# Patient Record
Sex: FEMALE | Race: WHITE | NOT HISPANIC OR LATINO | Employment: OTHER | URBAN - NONMETROPOLITAN AREA
[De-identification: names, ages, dates, MRNs, and addresses within clinical notes are randomized per-mention and may not be internally consistent; named-entity substitution may affect disease eponyms.]

---

## 2017-01-12 ENCOUNTER — AMBULATORY - GICH (OUTPATIENT)
Dept: RADIOLOGY | Facility: OTHER | Age: 72
End: 2017-01-12

## 2017-01-12 DIAGNOSIS — R92.8 OTHER ABNORMAL AND INCONCLUSIVE FINDINGS ON DIAGNOSTIC IMAGING OF BREAST: ICD-10-CM

## 2017-01-27 ENCOUNTER — AMBULATORY - GICH (OUTPATIENT)
Dept: LAB | Facility: OTHER | Age: 72
End: 2017-01-27

## 2017-01-27 DIAGNOSIS — M06.4 INFLAMMATORY POLYARTHROPATHY (H): ICD-10-CM

## 2017-01-27 LAB
AST SERPL-CCNC: 20 IU/L (ref 13–39)
CREAT SERPL-MCNC: 1.08 MG/DL (ref 0.7–1.3)
ERYTHROCYTE [DISTWIDTH] IN BLOOD BY AUTOMATED COUNT: 14.5 % (ref 11.5–15.5)
GFR IF NOT AFRICAN AMERICAN - HISTORICAL: 50 ML/MIN/1.73M2
HCT VFR BLD AUTO: 42.7 % (ref 33–51)
HEMOGLOBIN: 13.6 G/DL (ref 12–16)
MCH RBC QN AUTO: 30.9 PG (ref 26–34)
MCHC RBC AUTO-ENTMCNC: 31.9 G/DL (ref 32–36)
MCV RBC AUTO: 97 FL (ref 80–100)
PLATELET # BLD AUTO: 267 THOU/CU MM (ref 140–440)
PMV BLD: 8.4 FL (ref 6.5–11)
RED BLOOD COUNT - HISTORICAL: 4.42 MIL/CU MM (ref 4–5.2)
WHITE BLOOD COUNT - HISTORICAL: 9.1 THOU/CU MM (ref 4.5–11)

## 2017-02-07 ENCOUNTER — HISTORY (OUTPATIENT)
Dept: RADIOLOGY | Facility: OTHER | Age: 72
End: 2017-02-07

## 2017-02-07 ENCOUNTER — HOSPITAL ENCOUNTER (OUTPATIENT)
Dept: RADIOLOGY | Facility: OTHER | Age: 72
End: 2017-02-07
Attending: NURSE PRACTITIONER

## 2017-02-07 DIAGNOSIS — R92.8 OTHER ABNORMAL AND INCONCLUSIVE FINDINGS ON DIAGNOSTIC IMAGING OF BREAST: ICD-10-CM

## 2017-03-16 ENCOUNTER — COMMUNICATION - GICH (OUTPATIENT)
Dept: FAMILY MEDICINE | Facility: OTHER | Age: 72
End: 2017-03-16

## 2017-03-16 DIAGNOSIS — Z20.828 CONTACT WITH AND (SUSPECTED) EXPOSURE TO OTHER VIRAL COMMUNICABLE DISEASES: ICD-10-CM

## 2017-05-23 ENCOUNTER — OFFICE VISIT - GICH (OUTPATIENT)
Dept: INTERNAL MEDICINE | Facility: OTHER | Age: 72
End: 2017-05-23

## 2017-05-23 ENCOUNTER — HISTORY (OUTPATIENT)
Dept: INTERNAL MEDICINE | Facility: OTHER | Age: 72
End: 2017-05-23

## 2017-05-23 DIAGNOSIS — S29.019A STRAIN OF MUSCLE AND TENDON OF UNSPECIFIED WALL OF THORAX, INITIAL ENCOUNTER: ICD-10-CM

## 2017-05-23 DIAGNOSIS — I10 ESSENTIAL (PRIMARY) HYPERTENSION: ICD-10-CM

## 2017-05-23 DIAGNOSIS — M1A.0720 IDIOPATHIC CHRONIC GOUT, LEFT ANKLE AND FOOT, WITHOUT TOPHUS (TOPHI): ICD-10-CM

## 2017-05-23 DIAGNOSIS — N28.9 DISORDER OF KIDNEY AND URETER: ICD-10-CM

## 2017-05-23 DIAGNOSIS — M06.09 RHEUMATOID ARTHRITIS OF MULTIPLE SITES WITHOUT RHEUMATOID FACTOR (H): ICD-10-CM

## 2017-05-23 LAB
ANION GAP - HISTORICAL: 5 (ref 5–18)
BUN SERPL-MCNC: 22 MG/DL (ref 7–25)
BUN/CREAT RATIO - HISTORICAL: 20
CALCIUM SERPL-MCNC: 9.6 MG/DL (ref 8.6–10.3)
CHLORIDE SERPLBLD-SCNC: 104 MMOL/L (ref 98–107)
CO2 SERPL-SCNC: 27 MMOL/L (ref 21–31)
CREAT SERPL-MCNC: 1.09 MG/DL (ref 0.7–1.3)
GFR IF NOT AFRICAN AMERICAN - HISTORICAL: 50 ML/MIN/1.73M2
GLUCOSE SERPL-MCNC: 100 MG/DL (ref 70–105)
POTASSIUM SERPL-SCNC: 4.2 MMOL/L (ref 3.5–5.1)
SODIUM SERPL-SCNC: 136 MMOL/L (ref 133–143)
URATE SERPL-MCNC: 4.6 MG/DL (ref 4.4–7.6)

## 2017-05-24 ENCOUNTER — AMBULATORY - GICH (OUTPATIENT)
Dept: ORTHOPEDICS | Facility: OTHER | Age: 72
End: 2017-05-24

## 2017-05-24 DIAGNOSIS — M79.645 PAIN OF FINGER OF LEFT HAND: ICD-10-CM

## 2017-05-26 ENCOUNTER — AMBULATORY - GICH (OUTPATIENT)
Dept: ORTHOPEDICS | Facility: OTHER | Age: 72
End: 2017-05-26

## 2017-05-26 ENCOUNTER — HISTORY (OUTPATIENT)
Dept: ORTHOPEDICS | Facility: OTHER | Age: 72
End: 2017-05-26

## 2017-05-30 ENCOUNTER — OFFICE VISIT - GICH (OUTPATIENT)
Dept: ORTHOPEDICS | Facility: OTHER | Age: 72
End: 2017-05-30

## 2017-05-30 ENCOUNTER — HISTORY (OUTPATIENT)
Dept: ORTHOPEDICS | Facility: OTHER | Age: 72
End: 2017-05-30

## 2017-05-30 ENCOUNTER — HOSPITAL ENCOUNTER (OUTPATIENT)
Dept: RADIOLOGY | Facility: OTHER | Age: 72
End: 2017-05-30
Attending: ORTHOPAEDIC SURGERY

## 2017-05-30 DIAGNOSIS — M19.041 PRIMARY OSTEOARTHRITIS OF RIGHT HAND: ICD-10-CM

## 2017-05-30 DIAGNOSIS — M19.042 PRIMARY OSTEOARTHRITIS OF LEFT HAND: ICD-10-CM

## 2017-05-30 DIAGNOSIS — M79.645 PAIN OF FINGER OF LEFT HAND: ICD-10-CM

## 2017-06-13 ENCOUNTER — COMMUNICATION - GICH (OUTPATIENT)
Dept: INTERNAL MEDICINE | Facility: OTHER | Age: 72
End: 2017-06-13

## 2017-06-13 DIAGNOSIS — I10 ESSENTIAL (PRIMARY) HYPERTENSION: ICD-10-CM

## 2017-08-08 ENCOUNTER — AMBULATORY - GICH (OUTPATIENT)
Dept: INTERNAL MEDICINE | Facility: OTHER | Age: 72
End: 2017-08-08

## 2017-08-08 ENCOUNTER — AMBULATORY - GICH (OUTPATIENT)
Dept: LAB | Facility: OTHER | Age: 72
End: 2017-08-08

## 2017-08-08 DIAGNOSIS — F19.21 OTHER PSYCHOACTIVE SUBSTANCE DEPENDENCE, IN REMISSION (H): ICD-10-CM

## 2017-08-08 DIAGNOSIS — Z79.899 OTHER LONG TERM (CURRENT) DRUG THERAPY: ICD-10-CM

## 2017-08-08 LAB
ABSOLUTE BASOPHILS - HISTORICAL: 0 THOU/CU MM
ABSOLUTE EOSINOPHILS - HISTORICAL: 0.3 THOU/CU MM
ABSOLUTE IMMATURE GRANULOCYTES(METAS,MYELOS,PROS) - HISTORICAL: 0.1 THOU/CU MM
ABSOLUTE LYMPHOCYTES - HISTORICAL: 1.6 THOU/CU MM (ref 0.9–2.9)
ABSOLUTE MONOCYTES - HISTORICAL: 0.7 THOU/CU MM
ABSOLUTE NEUTROPHILS - HISTORICAL: 7.1 THOU/CU MM (ref 1.7–7)
AST SERPL-CCNC: 23 IU/L (ref 13–39)
BASOPHILS # BLD AUTO: 0.4 %
CREAT SERPL-MCNC: 1.02 MG/DL (ref 0.7–1.3)
EOSINOPHIL NFR BLD AUTO: 3.1 %
ERYTHROCYTE [DISTWIDTH] IN BLOOD BY AUTOMATED COUNT: 15.6 % (ref 11.5–15.5)
GFR IF NOT AFRICAN AMERICAN - HISTORICAL: 54 ML/MIN/1.73M2
HCT VFR BLD AUTO: 40.1 % (ref 33–51)
HEMOGLOBIN: 13.3 G/DL (ref 12–16)
IMMATURE GRANULOCYTES(METAS,MYELOS,PROS) - HISTORICAL: 0.5 %
LYMPHOCYTES NFR BLD AUTO: 16.1 % (ref 20–44)
MCH RBC QN AUTO: 32.1 PG (ref 26–34)
MCHC RBC AUTO-ENTMCNC: 33.2 G/DL (ref 32–36)
MCV RBC AUTO: 97 FL (ref 80–100)
MONOCYTES NFR BLD AUTO: 7.4 %
NEUTROPHILS NFR BLD AUTO: 72.5 % (ref 42–72)
PLATELET # BLD AUTO: 243 THOU/CU MM (ref 140–440)
PMV BLD: 10.1 FL (ref 6.5–11)
RED BLOOD COUNT - HISTORICAL: 4.14 MIL/CU MM (ref 4–5.2)
WHITE BLOOD COUNT - HISTORICAL: 9.8 THOU/CU MM (ref 4.5–11)

## 2017-10-19 ENCOUNTER — AMBULATORY - GICH (OUTPATIENT)
Dept: LAB | Facility: OTHER | Age: 72
End: 2017-10-19

## 2017-10-19 DIAGNOSIS — Z79.899 OTHER LONG TERM (CURRENT) DRUG THERAPY: ICD-10-CM

## 2017-10-19 LAB
ABSOLUTE BASOPHILS - HISTORICAL: 0.1 THOU/CU MM
ABSOLUTE EOSINOPHILS - HISTORICAL: 0.4 THOU/CU MM
ABSOLUTE IMMATURE GRANULOCYTES(METAS,MYELOS,PROS) - HISTORICAL: 0.1 THOU/CU MM
ABSOLUTE LYMPHOCYTES - HISTORICAL: 1.6 THOU/CU MM (ref 0.9–2.9)
ABSOLUTE MONOCYTES - HISTORICAL: 0.4 THOU/CU MM
ABSOLUTE NEUTROPHILS - HISTORICAL: 6 THOU/CU MM (ref 1.7–7)
AST SERPL-CCNC: 22 IU/L (ref 13–39)
BASOPHILS # BLD AUTO: 0.6 %
CREAT SERPL-MCNC: 1.02 MG/DL (ref 0.7–1.3)
EOSINOPHIL NFR BLD AUTO: 4.2 %
ERYTHROCYTE [DISTWIDTH] IN BLOOD BY AUTOMATED COUNT: 15.2 % (ref 11.5–15.5)
GFR IF NOT AFRICAN AMERICAN - HISTORICAL: 54 ML/MIN/1.73M2
HCT VFR BLD AUTO: 42.1 % (ref 33–51)
HEMOGLOBIN: 13.9 G/DL (ref 12–16)
IMMATURE GRANULOCYTES(METAS,MYELOS,PROS) - HISTORICAL: 0.6 %
LYMPHOCYTES NFR BLD AUTO: 18.6 % (ref 20–44)
MCH RBC QN AUTO: 31.7 PG (ref 26–34)
MCHC RBC AUTO-ENTMCNC: 33 G/DL (ref 32–36)
MCV RBC AUTO: 96 FL (ref 80–100)
MONOCYTES NFR BLD AUTO: 4.6 %
NEUTROPHILS NFR BLD AUTO: 71.4 % (ref 42–72)
PLATELET # BLD AUTO: 253 THOU/CU MM (ref 140–440)
PMV BLD: 9.7 FL (ref 6.5–11)
RED BLOOD COUNT - HISTORICAL: 4.39 MIL/CU MM (ref 4–5.2)
WHITE BLOOD COUNT - HISTORICAL: 8.4 THOU/CU MM (ref 4.5–11)

## 2017-11-13 ENCOUNTER — AMBULATORY - GICH (OUTPATIENT)
Dept: FAMILY MEDICINE | Facility: OTHER | Age: 72
End: 2017-11-13

## 2017-11-13 DIAGNOSIS — Z23 ENCOUNTER FOR IMMUNIZATION: ICD-10-CM

## 2017-12-20 ENCOUNTER — OFFICE VISIT - GICH (OUTPATIENT)
Dept: INTERNAL MEDICINE | Facility: OTHER | Age: 72
End: 2017-12-20

## 2017-12-20 ENCOUNTER — HISTORY (OUTPATIENT)
Dept: INTERNAL MEDICINE | Facility: OTHER | Age: 72
End: 2017-12-20

## 2017-12-20 DIAGNOSIS — I10 ESSENTIAL (PRIMARY) HYPERTENSION: ICD-10-CM

## 2017-12-20 DIAGNOSIS — E78.5 HYPERLIPIDEMIA: ICD-10-CM

## 2017-12-20 DIAGNOSIS — R10.31 RIGHT LOWER QUADRANT PAIN: ICD-10-CM

## 2017-12-20 DIAGNOSIS — M1A.0720 IDIOPATHIC CHRONIC GOUT, LEFT ANKLE AND FOOT, WITHOUT TOPHUS (TOPHI): ICD-10-CM

## 2017-12-20 DIAGNOSIS — I25.10 ATHEROSCLEROTIC HEART DISEASE OF NATIVE CORONARY ARTERY WITHOUT ANGINA PECTORIS: ICD-10-CM

## 2017-12-20 LAB
ALT (SGPT) - HISTORICAL: 18 IU/L (ref 7–52)
ANION GAP - HISTORICAL: 11 (ref 5–18)
AST SERPL-CCNC: 20 IU/L (ref 13–39)
BUN SERPL-MCNC: 16 MG/DL (ref 7–25)
BUN/CREAT RATIO - HISTORICAL: 15
CALCIUM SERPL-MCNC: 9.3 MG/DL (ref 8.6–10.3)
CHLORIDE SERPLBLD-SCNC: 102 MMOL/L (ref 98–107)
CHOL/HDL RATIO - HISTORICAL: 2.76
CHOLESTEROL TOTAL: 135 MG/DL
CK SERPL-CCNC: 149 IU/L (ref 30–223)
CO2 SERPL-SCNC: 27 MMOL/L (ref 21–31)
CREAT SERPL-MCNC: 1.05 MG/DL (ref 0.7–1.3)
ERYTHROCYTE [DISTWIDTH] IN BLOOD BY AUTOMATED COUNT: 15.2 % (ref 11.5–15.5)
GFR IF NOT AFRICAN AMERICAN - HISTORICAL: 52 ML/MIN/1.73M2
GLUCOSE SERPL-MCNC: 102 MG/DL (ref 70–105)
HCT VFR BLD AUTO: 42.8 % (ref 33–51)
HDLC SERPL-MCNC: 49 MG/DL (ref 23–92)
HEMOGLOBIN: 14.2 G/DL (ref 12–16)
LDLC SERPL CALC-MCNC: 52 MG/DL
MCH RBC QN AUTO: 31.8 PG (ref 26–34)
MCHC RBC AUTO-ENTMCNC: 33.2 G/DL (ref 32–36)
MCV RBC AUTO: 96 FL (ref 80–100)
NON-HDL CHOLESTEROL - HISTORICAL: 86 MG/DL
PLATELET # BLD AUTO: 228 THOU/CU MM (ref 140–440)
PMV BLD: 9.9 FL (ref 6.5–11)
POTASSIUM SERPL-SCNC: 3.7 MMOL/L (ref 3.5–5.1)
PROVIDER ORDERDED STATUS - HISTORICAL: ABNORMAL
RED BLOOD COUNT - HISTORICAL: 4.47 MIL/CU MM (ref 4–5.2)
SODIUM SERPL-SCNC: 140 MMOL/L (ref 133–143)
TRIGL SERPL-MCNC: 169 MG/DL
WHITE BLOOD COUNT - HISTORICAL: 8.6 THOU/CU MM (ref 4.5–11)

## 2017-12-20 ASSESSMENT — PATIENT HEALTH QUESTIONNAIRE - PHQ9: SUM OF ALL RESPONSES TO PHQ QUESTIONS 1-9: 0

## 2017-12-22 ENCOUNTER — HOSPITAL ENCOUNTER (OUTPATIENT)
Dept: RADIOLOGY | Facility: OTHER | Age: 72
End: 2017-12-22
Attending: NURSE PRACTITIONER

## 2017-12-22 DIAGNOSIS — R10.31 RIGHT LOWER QUADRANT PAIN: ICD-10-CM

## 2017-12-28 NOTE — TELEPHONE ENCOUNTER
Patient Information     Patient Name MRN Sex Margaret Nowak [id number] Female 1945      Telephone Encounter by Nimisha Cordon LPN at 2017  9:54 AM     Author:  Nimisha Cordon LPN Service:  (none) Author Type:  NURS- Licensed Practical Nurse     Filed:  2017 10:00 AM Encounter Date:  2017 Status:  Signed     :  Nimisha Cordon LPN (NURS- Licensed Practical Nurse)            Attempted to call [facility], phone number was for member directory, fax has been placed in folder for RN to jose elias up refill.  Nimisha Cordon LPN.........2017   10:00 AM

## 2017-12-28 NOTE — TELEPHONE ENCOUNTER
Patient Information     Patient Name MRN Margaret Montanez [id number] Female 1945      Telephone Encounter by Tony Buck RN at 2017 11:20 AM     Author:  Tony Buck RN Service:  (none) Author Type:  NURS- Registered Nurse     Filed:  2017 11:25 AM Encounter Date:  2017 Status:  Signed     :  Tony Buck RN (NURS- Registered Nurse)            Pharmacy is requesting a change from Atenolol 100mg tabs (as these are currently out of stock), to Atenolol 50 mg tabs.    Beta Blockers     Office visit in the past 12 months or per provider note.    Last visit with JONATHAN DAWKINS was on: 2017 in [facility]  Next visit with JONATHAN DAWKINS is on: No future appointment listed with this provider  Next visit with Internal Medicine is on: No future appointment listed in this department    Max refill for 12 months from last office visit or per provider note.  Prescription refilled per RN Medication Refill Policy.................... TONY BUCK RN ....................  2017   11:22 AM

## 2018-01-03 NOTE — TELEPHONE ENCOUNTER
Patient Information     Patient Name MRN Margaret Montanez [id number] Female 1945      Telephone Encounter by Alda Vargas NP at 3/16/2017  4:47 PM     Author:  Alda Vargas NP Service:  (none) Author Type:  PHYS- Nurse Practitioner     Filed:  3/16/2017  4:48 PM Encounter Date:  3/16/2017 Status:  Signed     :  Alda Vargas NP (PHYS- Nurse Practitioner)             with influenza  She would like Tamilfu as well  Rx sent to pharmacy  ALDA VARGAS NP ....................  3/16/2017   4:48 PM

## 2018-01-05 NOTE — NURSING NOTE
Patient Information     Patient Name MRN Sex Margaret Nowak [id number] Female 1945      Nursing Note by Nimisha Cordon LPN at 2017  8:00 AM     Author:  Nimisha Cordon LPN Service:  (none) Author Type:  NURS- Licensed Practical Nurse     Filed:  2017  8:25 AM Encounter Date:  2017 Status:  Signed     :  Nimisha Cordon LPN (NURS- Licensed Practical Nurse)            Margaret Batista is a 71 y.o. female here today for follow up of lab work from the [facility] and concerns about a growth on her thumb.  Nimisha Cordon LPN.........2017   8:12 AM

## 2018-01-05 NOTE — PROGRESS NOTES
Patient Information     Patient Name MRN Sex Margaret Navarro [id number] Female 1945      Progress Notes by Swetha Maxwell NP at 2017  8:00 AM     Author:  Swetha Maxwell NP Service:  (none) Author Type:  PHYS- Nurse Practitioner     Filed:  2017  8:53 AM Encounter Date:  2017 Status:  Signed     :  Swetha Maxwell NP (PHYS- Nurse Practitioner)            SUBJECTIVE:    Margaret Batista is a 71 y.o. female who presents for multiple concerns    HPI  gout: Patient was seen by her rheumatologist at [facility] on . Uric acid level was increased at 7.5 so allopurinol dose was increased. For the first 2 weeks she took 250 mg and then 300 mg daily thereafter. She is due to have lab rechecked.  Renal insufficiency: During this same visit she was found to have an increase of her creatinine at 1.3. She has normally had good renal function. She feels that she likely does not drink enough water during the day. Her rheumatologist recommend that she have this checked again.  Rheumatoid arthritis: Plaqueinal dose was decreased from 200 mg down to 150 mg. She states that she actually accomplishes this dose by taking 200 mg one day and the next day taking 100 mg. She is doing well on the dose reduction.  Joint cyst: The right thumb has a growth. Sometimes it is larger than others and is more comfortable. She states it is now less swollen than usual. She was seen by her rheumatologist who thought maybe she needed an ultrasound but did not order anything. She is here today for follow-up. She has not seen an orthopedic specialist.  Right mid back pain: This is been occurring for at least the past 3 months. She states it hurts if she twists to the right and left and also with raising her right arm. She had shoulder surgery several years ago and since that time has had limited range of motion of the right shoulder. She is right-handed. She finds that it is worse after  doing activities that she is reaching overhead or twisting. She denies gallbladder disease. It is not worse after meals.  Allergies     Allergen  Reactions     Enalapril Cough   ,   Current Outpatient Prescriptions on File Prior to Visit       Medication  Sig Dispense Refill     aspirin (ECOTRIN) 81 mg enteric coated tablet Take 1 tablet by mouth once daily. 30 tablet prn     atenolol (TENORMIN) 100 mg tablet Take 1 tablet by mouth once daily. 90 tablet 3     atorvastatin (LIPITOR) 40 mg tablet Take 1 tablet by mouth once daily. 90 tablet 3     cholecalciferol (VITAMIN D-3) 2,000 unit capsule Take 1 capsule by mouth once daily.  0     fexofenadine-pseudoephedrine,  MG, (ALLEGRA-D)  mg per tablet Take 1 tablet by mouth 2 times daily. 180 tablet 3     folic acid 1 mg tablet Take 1 tablet by mouth once daily. 90 tablet 3     hydroCHLOROthiazide (HCTZ) 25 mg tablet Take 1 tablet by mouth once daily. 90 tablet 3     losartan (COZAAR) 50 mg tablet Take 1 tablet by mouth once daily. 90 tablet 3     methotrexate (RHEUMATREX) 2.5 mg tablet Take 15 mg by mouth every Monday.  0     nitroglycerin (NITROSTAT) 0.4 mg sublingual tablet Place 1 tablet under the tongue every 5 minutes if needed for Chest Pain (For chest pain x 3 doses.). 25 tablet 2     prasugrel (EFFIENT) 10 mg tab tablet Take 1 tablet by mouth every morning. 90 Tab 3     triamcinolone, 55 mcg each actuation, nasal (NASACORT AQ) 55 mcg nasal spray Inhale 2 Sprays into both nostrils once daily. 16.5 g 0     No current facility-administered medications on file prior to visit.     and   Past Medical History:     Diagnosis  Date     ACP (advance care planning) 10/28/2013    Patient has identified Health Care Agent(s): Yes Add Health Care Agents: Yes   Health Care Agent(s): Primary Health Care Agent: Jay Batista  Relationship:  Phone:   Secondary Health Care Agent:  Relationship: Daughter Phone:   Conservator:  Relationship:  Phone:   Guardian:  "Relationship:  Phone:    Patient has Advance Care Plan Documents (Health Care Directive, POLST): No, referral made to Social Work Services.  Patient has identified Specific Treatment Preferences: Yes  Specific Treatment Preferences: a.) Code Status:  CPR/Attempt Resuscitation        ASCVD (arteriosclerotic cardiovascular disease)     -s/p ADÁN to the mid-LAD, mid-circumflex, mid-right coronary artery, proximal right coronary  artery, and PTCA of a marginal branch of the right coronary artery 04/30/2007. -s/p ADÁN to proximal left anterior descending 10/29/13.       Gout, unspecified      Hematoma of right psoas region 2/2 to anticoagulant therapy 2014     Hyperlipidemia 4/4/2007     Hypertension 12/14/2006     Osteoarthritis of multiple joints      Postmenopausal bleeding      Rheumatoid arthritis (HC)     follows at [facility] yearly      Stented coronary artery 2007/2013    unstable angina; severe prox LAD stenosis; s/p 7 stents        REVIEW OF SYSTEMS:  ROS  see history of present illness  OBJECTIVE:  /82  Temp 97.8  F (36.6  C) (Tympanic)  Ht 1.715 m (5' 7.5\")  Wt 98.9 kg (218 lb)  Breastfeeding? No  BMI 33.64 kg/m2    EXAM:   Physical Exam  pleasant female without acute distress. Affect normal. Alert and oriented ×4. Skin color pink. Sclera nonicteric. Lung fields clear to auscultation. Cardiovascular regular rate and rhythm. Extremities with trace bilateral edema. Right, along the dorsal surface has a small cystic lesion over the DIP joint space that is not tender with palpation. No pain with palpation over spinal processes. There is some tenderness to the right of the mid thoracic region with palpation, twisting from side to side and reaching overhead with her right arm. She does have reduction in the right shoulder range of motion was lifting over her head.  Labs are [facility] that patient brought in today are reviewed and discussed. Creatinine 1.3, uric acid 7.5. Normal hemoglobin  ASSESSMENT/PLAN:    " ICD-10-CM    1. Idiopathic chronic gout of left foot without tophus M1A.0720 allopurinol (ZYLOPRIM) 100 mg tablet      URIC ACID   2. Rheumatoid arthritis of multiple sites with negative rheumatoid factor (HC) M06.09    3. Renal insufficiency N28.9 BASIC METABOLIC PANEL   4. Hypertension I10    5. Thoracic myofascial strain, initial encounter S29.019A         Plan:  1. Uric acid level will be repeated. Allopurinol dose will be adjusted accordingly. 2. Continue with management through rheumatology. 3. She is on medications which can cause climb in her kidney function. She also does not drink enough fluids. I have asked that she try drinking more water during the day. She may need dose adjustment of medications depending upon results today. 4. Blood pressure well controlled. When she was seen at the [facility] her blood pressure was 105/62. She has not been checking her blood pressure at home. It was repeated today by this provider and same as documented in vital sign portion. 5. She has thoracic myofascial strain. Recommended gentle stretching exercises application of heat and topical analgesia. If she is not finding that this improves over the course of the next month then she will follow up and will refer to physical therapy.

## 2018-01-05 NOTE — PROGRESS NOTES
Patient Information     Patient Name MRN Sex Margaret Navarro [id number] Female 1945      Progress Notes by Waqar Bee DO at 2017  7:45 AM     Author:  Waqar Bee DO Service:  (none) Author Type:  Physician     Filed:  2017  8:19 AM Encounter Date:  2017 Status:  Signed     :  Waqar Bee DO (Physician)            Margaret Batista was seen in consultation for Swetha Maxwell NP for a chief complaint of a mass about the right and left thumbs.      CHIEF COMPLAINT: Margaret Batista is a 71 y.o.  female  Chief Complaint     Patient presents with       Consult      left thumb mass         HISTORY OF PRESENTING INJURY   History of presenting injury, patient is a 71-year-old female who has noticed lumps/masses over the DIP joint of both thumbs. She doesn't recall any injury to when she was visiting with her rheumatologist at [facility] states that she had brought this up to her and she was encouraged to see orthopedics for this. She comes in today to discuss options.  Description of pain:  mild aching   Radiation of pain: no  Pain course: stable  Worse with: tender to touch  Improved by: Rx meds, rest and ice  History of injection: No  Any PT: No    PAST MEDICAL HISTORY:  Past Medical History:     Diagnosis  Date     ACP (advance care planning) 10/28/2013    Patient has identified Health Care Agent(s): Yes Add Health Care Agents: Yes   Health Care Agent(s): Primary Health Care Agent: Jay Murilloin  Relationship:  Phone:   Secondary Health Care Agent:  Relationship: Daughter Phone:   Conservator:  Relationship:  Phone:   Guardian: Relationship:  Phone:    Patient has Advance Care Plan Documents (Health Care Directive, POLST): No, referral made to Social Work Services.  Patient has identified Specific Treatment Preferences: Yes  Specific Treatment Preferences: a.) Code Status:  CPR/Attempt Resuscitation        ASCVD (arteriosclerotic  cardiovascular disease)     -s/p ADÁN to the mid-LAD, mid-circumflex, mid-right coronary artery, proximal right coronary  artery, and PTCA of a marginal branch of the right coronary artery 2007. -s/p ADÁN to proximal left anterior descending 10/29/13.       Gout, unspecified      Hematoma of right psoas region 2/2 to anticoagulant therapy      Hyperlipidemia 2007     Hypertension 2006     Mass of left thumb 2017     Osteoarthritis of multiple joints      Postmenopausal bleeding      Rheumatoid arthritis (HC)     follows at [facility] yearly      Stented coronary artery     unstable angina; severe prox LAD stenosis; s/p 7 stents        PAST SURGICAL HISTORY:  Past Surgical History:      Procedure  Laterality Date     BREAST BIOPSY  10/25/95    BRIAN RAMIREZ        SECTION  1974     Section       COLONOSCOPY SCREENING  05    Normal - Repeat in 10 years       CORONARY STENT PLACEMENT  10/29/2013    ADÁN to proximal left anterior descending       ESOPHAGOGASTRODUODENOSCOPY  2011    erosive gastritis;bx;consider MRI/A;[facility]       KNEE ARTHROSCOPY Bilateral      KNEE REPLACEMENT Left 2011     MT COLONOSCOPY REMOVE ELIZA POLYP LESN SNARE  2016    repeat 2019, precancerous polyps       STATUS POST PERC TRANSLUM CORON ANGIO V45.82       TOTAL SHOULDER ARTHROPLASTY Right 2014     WISDOM TEETH EXTRACTION  1970       ORTHOPEDIC FRACTURES AND BROKEN BONES:  As above.    ALLERGIES:  Allergies     Allergen  Reactions     Enalapril Cough       CURRENT MEDICATIONS:  Current Outpatient Prescriptions       Medication  Sig Dispense Refill     allopurinol (ZYLOPRIM) 100 mg tablet Take 3 tablets by mouth once daily. 180 tablet 3     aspirin (ECOTRIN) 81 mg enteric coated tablet Take 1 tablet by mouth once daily. 30 tablet prn     atenolol (TENORMIN) 100 mg tablet Take 1 tablet by mouth once daily. 90 tablet 3     atorvastatin (LIPITOR) 40 mg tablet  Take 1 tablet by mouth once daily. 90 tablet 3     cholecalciferol (VITAMIN D-3) 2,000 unit capsule Take 1 capsule by mouth once daily.  0     fexofenadine-pseudoephedrine,  MG, (ALLEGRA-D)  mg per tablet Take 1 tablet by mouth 2 times daily. 180 tablet 3     folic acid 1 mg tablet Take 1 tablet by mouth once daily. 90 tablet 3     hydroCHLOROthiazide (HCTZ) 25 mg tablet Take 1 tablet by mouth once daily. 90 tablet 3     hydroxychloroquine (PLAQUENIL) 200 mg tablet Take two tablets by mouth every other day and one tablet by mouth on opposite days.  0     losartan (COZAAR) 50 mg tablet Take 1 tablet by mouth once daily. 90 tablet 3     methotrexate (RHEUMATREX) 2.5 mg tablet Take 15 mg by mouth every Monday.  0     nitroglycerin (NITROSTAT) 0.4 mg sublingual tablet Place 1 tablet under the tongue every 5 minutes if needed for Chest Pain (For chest pain x 3 doses.). 25 tablet 2     prasugrel (EFFIENT) 10 mg tab tablet Take 1 tablet by mouth every morning. 90 Tab 3     triamcinolone, 55 mcg each actuation, nasal (NASACORT AQ) 55 mcg nasal spray Inhale 2 Sprays into both nostrils once daily. 16.5 g 0     No current facility-administered medications for this visit.      Medications have been reviewed by me and are current to the best of my knowledge and ability.      SOCIAL HISTORY:  Marital Status:   Children: Yes  Occupation: Retired.  Alcohol use:  Yes  Tobacco use: Smoker: no  Are you or have you used illicit drugs:  no    FAMILY HISTORY:  Family History      Problem  Relation Age of Onset     Heart Disease Mother      Hypertension Mother      Stroke Mother      Cancer-breast Mother 53     Arthritis Father      Cancer Father      Heart Disease Father      Hypertension Father      Good Health Brother      Heart Disease Brother      Good Health Sister        REVIEW OF SYSTEMS:  The review of systems as documented in the HPI and on the intake questionnaire, completed by the patient on 5/30/2017 have  "been reviewed by myself and the pertinent positives and negatives addressed.  The remainder of the complete review of systems was non-contributory.      PHYSICAL EXAM:   /80  Pulse 68  Ht 1.715 m (5' 7.5\")  Wt 98.9 kg (218 lb)  BMI 33.64 kg/m2 Body mass index is 33.64 kg/(m^2).    General Appearance: Pleasant female in good appearance, mood and affect.  Alert and orientated times three ( time, date and location).    Skin: Abnormal, the skin is intact there are increased masses over the DIP joint of both thumbs. She also has these on other digits namely the small fingers.    Hand:  Thenar wasting: no  Hypothenar wasting: no  Sensation: Normal  Radial and ulnar blood flow:  Normal  Tinel's test negative  Phalen's test negative  Compression test negative.  Tenderness: With palpation over the DIP joints of both thumbs she does have some increased discomfort.  Negative grind tests have both first CMC joints.    Shoulder:  Motion: full    Elbow:  Flexion: Normal  Extension: Normal    Eyes: Pupils are round.    Ears: Hearing: Intact    Heart: Normal capillary refill into her hands and fingers.    Lungs: Clear.     Radiographic images from 5/30 where independently reviewed and discussed with the patient.      Xray:    X-rays demonstrate very mild first CMC arthritis, there are degenerative changes of the DIP joint with increased bony osteophytes. No fractures or subluxations noted.    IMPRESSION:  Osteoarthritis small joints bilateral thumbs at the DIP joints with Heberden's nodes. There are also changes with osteoarthritis over other digits namely the small fingers.  Bilateral first CMC arthritis.    PLAN:  Risks, benefits, conservative, surgical and alternatives to treatment where discussed and the patient would like to proceed with conservative measures.  I instructed the patient and how to utilize her hands in the morning to warm up so she could have better use throughout the day, she verbalized an " understanding.  If things seem to worsen or she has increased discomfort she would come in to see me.  There is a possibility based on her description of these getting larger and then smaller that there could be a small ganglion or mucinous cyst. At present time I do not palpate or see one.  After our discussion she verbalized an understanding of her osteoarthritis and call if there are other problems.  Questions and concerns answered.    Waqar Bee D.O.  Orthopaedic Surgeon    [address] [address]  Phone [phone] (KNEE)  Fax [phone]    This document was created using computer generated templates and voice activated software.    8:00 AM 5/30/2017

## 2018-01-22 ENCOUNTER — AMBULATORY - GICH (OUTPATIENT)
Dept: LAB | Facility: OTHER | Age: 73
End: 2018-01-22

## 2018-01-22 DIAGNOSIS — Z79.899 OTHER LONG TERM (CURRENT) DRUG THERAPY: ICD-10-CM

## 2018-01-22 LAB
ABSOLUTE BASOPHILS - HISTORICAL: 0.1 THOU/CU MM
ABSOLUTE EOSINOPHILS - HISTORICAL: 0.4 THOU/CU MM
ABSOLUTE IMMATURE GRANULOCYTES(METAS,MYELOS,PROS) - HISTORICAL: 0.1 THOU/CU MM
ABSOLUTE LYMPHOCYTES - HISTORICAL: 1.4 THOU/CU MM (ref 0.9–2.9)
ABSOLUTE MONOCYTES - HISTORICAL: 0.6 THOU/CU MM
ABSOLUTE NEUTROPHILS - HISTORICAL: 6.3 THOU/CU MM (ref 1.7–7)
AST SERPL-CCNC: 16 IU/L (ref 13–39)
BASOPHILS # BLD AUTO: 0.6 %
CREAT SERPL-MCNC: 1.22 MG/DL (ref 0.7–1.3)
EOSINOPHIL NFR BLD AUTO: 4 %
ERYTHROCYTE [DISTWIDTH] IN BLOOD BY AUTOMATED COUNT: 15.1 % (ref 11.5–15.5)
GFR IF NOT AFRICAN AMERICAN - HISTORICAL: 43 ML/MIN/1.73M2
HCT VFR BLD AUTO: 42.7 % (ref 33–51)
HEMOGLOBIN: 14.1 G/DL (ref 12–16)
IMMATURE GRANULOCYTES(METAS,MYELOS,PROS) - HISTORICAL: 0.6 %
LYMPHOCYTES NFR BLD AUTO: 16.2 % (ref 20–44)
MCH RBC QN AUTO: 31.4 PG (ref 26–34)
MCHC RBC AUTO-ENTMCNC: 33 G/DL (ref 32–36)
MCV RBC AUTO: 95 FL (ref 80–100)
MONOCYTES NFR BLD AUTO: 6.4 %
NEUTROPHILS NFR BLD AUTO: 72.2 % (ref 42–72)
PLATELET # BLD AUTO: 224 THOU/CU MM (ref 140–440)
PMV BLD: 9.8 FL (ref 6.5–11)
RED BLOOD COUNT - HISTORICAL: 4.49 MIL/CU MM (ref 4–5.2)
WHITE BLOOD COUNT - HISTORICAL: 8.8 THOU/CU MM (ref 4.5–11)

## 2018-01-24 ENCOUNTER — DOCUMENTATION ONLY (OUTPATIENT)
Dept: FAMILY MEDICINE | Facility: OTHER | Age: 73
End: 2018-01-24

## 2018-01-24 PROBLEM — M19.042 PRIMARY OSTEOARTHRITIS OF BOTH HANDS: Status: ACTIVE | Noted: 2017-05-30

## 2018-01-24 PROBLEM — R22.30 MASS OF FINGER: Status: ACTIVE | Noted: 2017-05-26

## 2018-01-24 PROBLEM — M19.041 PRIMARY OSTEOARTHRITIS OF BOTH HANDS: Status: ACTIVE | Noted: 2017-05-30

## 2018-01-24 RX ORDER — PRASUGREL 10 MG/1
1 TABLET, FILM COATED ORAL EVERY MORNING
COMMUNITY
Start: 2017-12-15 | End: 2019-05-31

## 2018-01-24 RX ORDER — NITROGLYCERIN 0.4 MG/1
1 TABLET SUBLINGUAL EVERY 5 MIN PRN
COMMUNITY
Start: 2017-12-20 | End: 2020-09-16

## 2018-01-24 RX ORDER — ASPIRIN 81 MG/1
1 TABLET ORAL DAILY
COMMUNITY
Start: 2014-07-15 | End: 2023-08-15 | Stop reason: ALTCHOICE

## 2018-01-24 RX ORDER — FOLIC ACID 1 MG/1
1 TABLET ORAL DAILY
COMMUNITY
Start: 2016-11-29 | End: 2019-10-08

## 2018-01-24 RX ORDER — ATENOLOL 50 MG/1
2 TABLET ORAL DAILY
COMMUNITY
Start: 2017-12-20 | End: 2018-11-06

## 2018-01-24 RX ORDER — TRIAMCINOLONE ACETONIDE 55 UG/1
2 SPRAY, METERED NASAL DAILY
COMMUNITY
Start: 2016-02-01 | End: 2020-01-21

## 2018-01-24 RX ORDER — ATORVASTATIN CALCIUM 40 MG/1
1 TABLET, FILM COATED ORAL DAILY
COMMUNITY
Start: 2017-12-20 | End: 2018-11-06

## 2018-01-24 RX ORDER — ALLOPURINOL 300 MG/1
1 TABLET ORAL DAILY
COMMUNITY
Start: 2017-12-20 | End: 2018-11-06

## 2018-01-24 RX ORDER — FEXOFENADINE HCL AND PSEUDOEPHEDRINE HCI 60; 120 MG/1; MG/1
1 TABLET, EXTENDED RELEASE ORAL 2 TIMES DAILY
COMMUNITY
Start: 2016-11-21 | End: 2018-08-06

## 2018-01-24 RX ORDER — HYDROCHLOROTHIAZIDE 25 MG/1
1 TABLET ORAL DAILY
COMMUNITY
Start: 2016-11-29 | End: 2018-02-16

## 2018-01-24 RX ORDER — LOSARTAN POTASSIUM 50 MG/1
1 TABLET ORAL DAILY
COMMUNITY
Start: 2017-12-20 | End: 2018-11-06

## 2018-01-24 RX ORDER — ACETAMINOPHEN 160 MG
2000 TABLET,DISINTEGRATING ORAL DAILY
COMMUNITY
Start: 2013-10-25 | End: 2024-05-23

## 2018-01-25 VITALS
DIASTOLIC BLOOD PRESSURE: 82 MMHG | WEIGHT: 218 LBS | SYSTOLIC BLOOD PRESSURE: 132 MMHG | HEIGHT: 68 IN | TEMPERATURE: 97.8 F | BODY MASS INDEX: 33.04 KG/M2

## 2018-01-25 VITALS
BODY MASS INDEX: 33.04 KG/M2 | HEART RATE: 68 BPM | WEIGHT: 218 LBS | DIASTOLIC BLOOD PRESSURE: 80 MMHG | HEIGHT: 68 IN | SYSTOLIC BLOOD PRESSURE: 120 MMHG

## 2018-02-03 ENCOUNTER — OFFICE VISIT - GICH (OUTPATIENT)
Dept: FAMILY MEDICINE | Facility: OTHER | Age: 73
End: 2018-02-03

## 2018-02-03 ENCOUNTER — HISTORY (OUTPATIENT)
Dept: FAMILY MEDICINE | Facility: OTHER | Age: 73
End: 2018-02-03

## 2018-02-03 DIAGNOSIS — R39.89 OTHER SYMPTOMS AND SIGNS INVOLVING THE GENITOURINARY SYSTEM: ICD-10-CM

## 2018-02-03 DIAGNOSIS — N95.0 POSTMENOPAUSAL BLEEDING: ICD-10-CM

## 2018-02-03 LAB
BACTERIA URINE: ABNORMAL BACTERIA/HPF
BILIRUB UR QL: NEGATIVE
CLARITY, URINE: CLEAR CLARITY
COLOR UR: YELLOW COLOR
EPITHELIAL CELLS: ABNORMAL EPI/HPF
GLUCOSE URINE: NEGATIVE MG/DL
KETONES UR QL: NEGATIVE MG/DL
LEUKOCYTE ESTERASE URINE: ABNORMAL
NITRITE UR QL STRIP: NEGATIVE
OCCULT BLOOD,URINE - HISTORICAL: ABNORMAL
PH UR: 6 [PH]
PROTEIN QUALITATIVE,URINE - HISTORICAL: NEGATIVE MG/DL
RBC - HISTORICAL: ABNORMAL /HPF
SP GR UR STRIP: 1.01
UROBILINOGEN,QUALITATIVE - HISTORICAL: NORMAL EU/DL
WBC - HISTORICAL: ABNORMAL /HPF

## 2018-02-07 ENCOUNTER — HISTORY (OUTPATIENT)
Dept: OBGYN | Facility: OTHER | Age: 73
End: 2018-02-07

## 2018-02-07 ENCOUNTER — OFFICE VISIT - GICH (OUTPATIENT)
Dept: OBGYN | Facility: OTHER | Age: 73
End: 2018-02-07

## 2018-02-07 DIAGNOSIS — N95.0 POSTMENOPAUSAL BLEEDING: ICD-10-CM

## 2018-02-07 DIAGNOSIS — R31.29 OTHER MICROSCOPIC HEMATURIA: ICD-10-CM

## 2018-02-09 ENCOUNTER — HOSPITAL ENCOUNTER (OUTPATIENT)
Dept: RADIOLOGY | Facility: OTHER | Age: 73
End: 2018-02-09
Attending: OBSTETRICS & GYNECOLOGY

## 2018-02-09 VITALS
BODY MASS INDEX: 33.8 KG/M2 | HEART RATE: 80 BPM | DIASTOLIC BLOOD PRESSURE: 84 MMHG | WEIGHT: 216 LBS | HEIGHT: 67 IN | SYSTOLIC BLOOD PRESSURE: 122 MMHG | TEMPERATURE: 98.2 F | SYSTOLIC BLOOD PRESSURE: 132 MMHG | WEIGHT: 215.38 LBS | BODY MASS INDEX: 33.9 KG/M2 | HEIGHT: 67 IN | DIASTOLIC BLOOD PRESSURE: 82 MMHG | HEART RATE: 80 BPM

## 2018-02-09 VITALS
HEART RATE: 72 BPM | TEMPERATURE: 97.4 F | WEIGHT: 215 LBS | HEIGHT: 67 IN | BODY MASS INDEX: 33.74 KG/M2 | DIASTOLIC BLOOD PRESSURE: 80 MMHG | SYSTOLIC BLOOD PRESSURE: 130 MMHG

## 2018-02-09 DIAGNOSIS — R31.29 OTHER MICROSCOPIC HEMATURIA: ICD-10-CM

## 2018-02-11 ASSESSMENT — PATIENT HEALTH QUESTIONNAIRE - PHQ9: SUM OF ALL RESPONSES TO PHQ QUESTIONS 1-9: 0

## 2018-02-12 ENCOUNTER — TELEPHONE (OUTPATIENT)
Dept: ONCOLOGY | Facility: OTHER | Age: 73
End: 2018-02-12

## 2018-02-12 NOTE — PROGRESS NOTES
Patient Information     Patient Name MRN Sex Margaret Navarro [id number] Female 1945      Progress Notes by Swetha Maxwell NP at 2017  9:00 AM     Author:  Swetha Maxwell NP Service:  (none) Author Type:  PHYS- Nurse Practitioner     Filed:  2017 10:25 AM Encounter Date:  2017 Status:  Signed     :  Swetha Maxwell NP (PHYS- Nurse Practitioner)            SUBJECTIVE:    Margaret Batista is a 71 y.o. female who presents for chronic disease management    HPI  patient has hypertension, ASCVD and hyperlipidemia. She saw cardiology last week. They have ordered labs for her to have completed today which include fasting lipids, AST, ALT and CPK. She is considering having knee replacement surgery. She recently had a steroid injection and that has helped significantly with the pain so she is postponing the surgery. If she decides to go through with the surgery she will need cardiac stress testing. An order has been placed by cardiology for her to have a Lexiscan. She was playing to have this done in [place]. She would like to have this completed at this clinic if at all possible if she proceeds with surgery. She will discuss this with her cardiologist.  Also has history of gout. She has not had a flare since allopurinol was increased. She had been on 100 mg daily and is now 300 mg daily.  She does report that she has had mild crampy type pain in the right lower quadrant of her abdomen. She has had bowel changes in that she doesn't have a bowel movement every day as she use to and when she does have a bowel movement it is in fragments and doesn't feel that she is emptying completely. She does not hurt enough water nor fresh fruits and vegetables. She does very little exercising. She had a colonoscopy last year and is due for repeat colonoscopy in 2019 secondary to polyps. She is not having rectal bleeding or dark stools. Denies vaginal bleeding, hematuria and  dysuria and other changes with urination. The pain does not radiate. She believes the pain is about 1 or 2 out of 10 and only lasts a few minutes when it occurs. It does not change with movement. It is been occurring for approximately 4 months or longer.  She has immunizations updated.  She is not due for mammogram until February and will schedule this herself. She had a bone density scan 2016 that was normal.  Allergies     Allergen  Reactions     Enalapril Cough   ,   Family History      Problem  Relation Age of Onset     Heart Disease Mother      Hypertension Mother      Stroke Mother      Cancer-breast Mother 53     Arthritis Father      Cancer Father      Heart Disease Father      Hypertension Father      Good Health Brother      Heart Disease Brother      Good Health Sister    ,   Current Outpatient Prescriptions on File Prior to Visit       Medication  Sig Dispense Refill     aspirin (ECOTRIN) 81 mg enteric coated tablet Take 1 tablet by mouth once daily. 30 tablet prn     cholecalciferol (VITAMIN D-3) 2,000 unit capsule Take 1 capsule by mouth once daily.  0     fexofenadine-pseudoephedrine,  MG, (ALLEGRA-D)  mg per tablet Take 1 tablet by mouth 2 times daily. 180 tablet 3     folic acid 1 mg tablet Take 1 tablet by mouth once daily. 90 tablet 3     hydroCHLOROthiazide (HCTZ) 25 mg tablet Take 1 tablet by mouth once daily. 90 tablet 3     methotrexate (RHEUMATREX) 2.5 mg tablet Take 15 mg by mouth every Monday.  0     prasugrel (EFFIENT) 10 mg tab tablet Take 1 tablet by mouth every morning. 90 Tab 3     triamcinolone, 55 mcg each actuation, nasal (NASACORT AQ) 55 mcg nasal spray Inhale 2 Sprays into both nostrils once daily. 16.5 g 0     No current facility-administered medications on file prior to visit.    ,   Past Medical History:     Diagnosis  Date     ACP (advance care planning) 10/28/2013    Patient has identified Health Care Agent(s): Yes Add Health Care Agents: Yes   Health Care  Agent(s): Primary Health Care Agent: Jay Batista  Relationship:  Phone:   Secondary Health Care Agent:  Relationship: Daughter Phone:   Conservator:  Relationship:  Phone:   Guardian: Relationship:  Phone:    Patient has Advance Care Plan Documents (Health Care Directive, POLST): No, referral made to Social Work Services.  Patient has identified Specific Treatment Preferences: Yes  Specific Treatment Preferences: a.) Code Status:  CPR/Attempt Resuscitation        ASCVD (arteriosclerotic cardiovascular disease)     -s/p ADÁN to the mid-LAD, mid-circumflex, mid-right coronary artery, proximal right coronary  artery, and PTCA of a marginal branch of the right coronary artery 2007. -s/p ADÁN to proximal left anterior descending 10/29/13.       Gout, unspecified      Hematoma of right psoas region  to anticoagulant therapy      Hyperlipidemia 2007     Hypertension 2006     Mass of left thumb 2017     Osteoarthritis of multiple joints      Postmenopausal bleeding      Primary osteoarthritis of both hands 2017     Rheumatoid arthritis (HC)     follows at [facility] yearly      Stented coronary artery     unstable angina; severe prox LAD stenosis; s/p 7 stents    ,   Past Surgical History:      Procedure  Laterality Date     BREAST BIOPSY  10/25/95    BRIAN RAMIREZ        SECTION  1974     Section       COLONOSCOPY SCREENING  05    Normal - Repeat in 10 years       CORONARY STENT PLACEMENT  10/29/2013    ADÁN to proximal left anterior descending       ESOPHAGOGASTRODUODENOSCOPY  2011    erosive gastritis;bx;consider MRI/A;[facility]       KNEE ARTHROSCOPY Bilateral      KNEE REPLACEMENT Left 2011     [place] COLONOSCOPY REMOVE ELIZA POLYP LESN SNARE  2016    repeat 2019, precancerous polyps       STATUS POST PERC TRANSLUM CORON ANGIO V45.82       TOTAL SHOULDER ARTHROPLASTY Right 2014     WISDOM TEETH EXTRACTION  1970    and  "  Social History       Substance Use Topics         Smoking status:   Never Smoker     Smokeless tobacco:   Never Used     Alcohol use   0.0 oz/week     0 Standard drinks or equivalent per week        Comment: Rare use. ~2 glasses of wine a month        REVIEW OF SYSTEMS:  Review of Systems   Constitutional: Negative.    HENT: Negative.    Eyes: Negative.    Respiratory: Negative.    Cardiovascular: Negative.    Gastrointestinal: Positive for abdominal pain. Negative for blood in stool, diarrhea, heartburn, melena, nausea and vomiting.   Genitourinary: Negative.    Musculoskeletal: Negative.    Skin: Negative.    Neurological: Negative.    Endo/Heme/Allergies: Negative.    Psychiatric/Behavioral: Negative.        OBJECTIVE:  /80 (Cuff Site: Right Arm, Position: Sitting, Cuff Size: Adult Large)  Pulse 72  Temp 97.4  F (36.3  C) (Tympanic)  Ht 1.708 m (5' 7.25\")  Wt 97.5 kg (215 lb)  Breastfeeding? No  BMI 33.42 kg/m2    EXAM:   Physical Exam   Constitutional: She is oriented to person, place, and time and well-developed, well-nourished, and in no distress. No distress.   HENT:   Mouth/Throat: Oropharynx is clear and moist. No oropharyngeal exudate.   Throat without erythema   Eyes: Conjunctivae are normal. No scleral icterus.   Neck: Neck supple. No JVD present. No thyromegaly present.   No carotid bruits   Cardiovascular: Normal rate, regular rhythm, normal heart sounds and intact distal pulses.  Exam reveals no gallop.    Pulmonary/Chest: Effort normal and breath sounds normal.   Abdominal: Soft. Bowel sounds are normal. She exhibits no distension and no mass. There is tenderness. There is no rebound and no guarding.   Mild discomfort with deep palpation to the right lower quadrant. No hepatosplenomegaly. No inguinal adenopathy. No abdominal bruits or pulsatile masses.   Genitourinary:   Genitourinary Comments: Vaginal exam deferred by patient   Musculoskeletal: Normal range of motion. She exhibits no " edema or tenderness.   Lymphadenopathy:     She has no cervical adenopathy.   Neurological: She is alert and oriented to person, place, and time. Gait normal.   Skin: Skin is warm. No rash noted. She is not diaphoretic. No pallor.   Psychiatric: Mood, memory, affect and judgment normal.   Nursing note and vitals reviewed.      ASSESSMENT/PLAN:    ICD-10-CM    1. Hypertension I10 atenolol (TENORMIN) 50 mg tablet      losartan (COZAAR) 50 mg tablet      BASIC METABOLIC PANEL      BASIC METABOLIC PANEL   2. ASCVD (arteriosclerotic cardiovascular disease) I25.10 atorvastatin (LIPITOR) 40 mg tablet      losartan (COZAAR) 50 mg tablet      nitroglycerin (NITROSTAT) 0.4 mg sublingual tablet   3. Hyperlipidemia, unspecified hyperlipidemia type E78.5 atorvastatin (LIPITOR) 40 mg tablet      LIPID PANEL      CK TOTAL      ALT (SGPT)      AST (SGOT)   4. Idiopathic chronic gout of left foot without tophus M1A.0720 allopurinol (ZYLOPRIM) 300 mg tablet   5. ASHD (arteriosclerotic heart disease) I25.10 LIPID PANEL      CK TOTAL      ALT (SGPT)      AST (SGOT)   6. Colicky RLQ abdominal pain R10.31 US PELVIS COMPLETE TA AND TV      CBC W PLT NO DIFF      CBC W PLT NO DIFF        Plan:  She will continue with same medication and treatment plans. Refills are provided. She has labs ordered today through cardiology. We'll also add basic metabolic panel and chemistry panel. With the right lower quadrant abdominal discomfort and change in bowel patterns. Will order ultrasound of pelvis. If not improving then may consider checking urinalysis and referring her back for colonoscopy or may even consider CT of abdomen and pelvis. I have asked that she increase her fluid intake and begin eating fresh fruits and vegetables. She may very well have some constipation causing discomfort and change in bowel patterns. Also discussed that if she does need to have cardiac stress test completed that I would be more than happy to do that here rather  than her having to travel however she will have to discuss this with cardiology. Otherwise we'll plan to see her back in 3 months, sooner with problems. She will set up her own mammogram in February.  40 minutes of face-to-face time spent with patient with greater than 50% in care coordination and counseling.

## 2018-02-12 NOTE — NURSING NOTE
Patient Information     Patient Name MRN Sex Margaret Nowak [id number] Female 1945      Nursing Note by Nimisha Cordon LPN at 2017  9:00 AM     Author:  Nimisha Cordon LPN Service:  (none) Author Type:  NURS- Licensed Practical Nurse     Filed:  2017  9:18 AM Encounter Date:  2017 Status:  Signed     :  Nimisah Cordon LPN (NURS- Licensed Practical Nurse)            Margaret Batista is a 71 y.o. female here today for annual review. She also has concerns about a right sided groin pain that she has been having on and off for the past couple months.  Nimisha Cordon LPN.........2017   9:06 AM

## 2018-02-12 NOTE — TELEPHONE ENCOUNTER
EMBx 2/7/18. Has bright red blood only when wiping after urination. She is unsure if the bleeding is vaginal or from her urethra. She does not notice blood in the toilet. She was advised that this small amount of bleeding is not concerning and is okay to wait until her appointment. She is requesting an earlier appointment. [facility]  will contact to determine if that is possible.    Verena Bee RN...................2/12/2018 10:52 AM

## 2018-02-12 NOTE — PROGRESS NOTES
Patient Information     Patient Name MRN Sex Margaret Nowak [id number] Female 1945      Progress Notes by Narda Andrea R.T. ([facility]) at 2017 11:35 AM     Author:  Narda Andrea R.T. ([facility]) Service:  (none) Author Type:  [employer] - Registered Radiologic Technologist     Filed:  2017 12:11 PM Date of Service:  2017 11:35 AM Status:  Signed     :  Narda Andrea R.T. ([facility]) ([employer] - Registered Radiologic Technologist)            Falls Risk Criteria:    Age 65 and older or under age 4        Sensory deficits    Poor vision    Use of ambulatory aides    Impaired judgment    Unable to walk independently    Meets High Risk criteria for falls:  Yes               1.  Do you have dizziness or vertigo?    no                    2.  Do you need help standing or walking?   no                 3.  Have you fallen within the last 6 months?    no           4.  Has the patient been fasting?      no       If any risks are marked Yes, the following interventions are utilized:    Do not leave patient unattended     Assist patient in the dressing room and bathroom    Have ambulatory aides available throughout procedure    Involve patient s family if available

## 2018-02-13 ENCOUNTER — TELEPHONE (OUTPATIENT)
Dept: OBGYN | Facility: OTHER | Age: 73
End: 2018-02-13

## 2018-02-13 NOTE — PROGRESS NOTES
Patient Information     Patient Name MRN Sex Margaret Nowak [id number] Female 1945      Progress Notes by Nazanin Wood at 2018  9:04 AM     Author:  Nazanin Wood Service:  (none) Author Type:  Other Clinical Staff     Filed:  2018  9:04 AM Date of Service:  2018  9:04 AM Status:  Signed     :  Nazanin Wood (Other Clinical Staff)            Falls Risk Criteria:    Age 65 and older or under age 4        Sensory deficits    Poor vision    Use of ambulatory aides    Impaired judgment    Unable to walk independently    Meets High Risk criteria for falls:  Yes               1.  Do you have dizziness or vertigo?    no                    2.  Do you need help standing or walking?   no                 3.  Have you fallen within the last 6 months?    no           4.  Has the patient been fasting?      no       If any risks are marked Yes, the following interventions are utilized:    Do not leave patient unattended     Assist patient in the dressing room and bathroom    Have ambulatory aides available throughout procedure    Involve patient s family if available

## 2018-02-13 NOTE — NURSING NOTE
Patient Information     Patient Name MRN Sex Margaret Nowak [id number] Female 1945      Nursing Note by Tracy Dumont at 2/3/2018 12:15 PM     Author:  Tracy Dumont Service:  (none) Author Type:  (none)     Filed:  2/3/2018 12:17 PM Encounter Date:  2/3/2018 Status:  Signed     :  Tracy Dumont            Patient presents to the clinic for hematuria that started this morning. Patient reports having a blood clot in her urine and states it is now painful when she urinates.   Tracy MARIA, RODRIGUE..AAMA.....2/3/2018..12:04 PM

## 2018-02-13 NOTE — PROGRESS NOTES
Patient Information     Patient Name MRN Sex     Margaret Batista [id number] Female 1945      Progress Notes by Quinten Mackenzie MD at 2018  2:00 PM     Author:  Quinten Mackenzie MD Service:  (none) Author Type:  Physician     Filed:  2018  3:27 PM Encounter Date:  2018 Status:  Signed     :  Quinten Mackenzie MD (Physician)            SUBJECTIVE:    Margaret Batista is a 72 y.o. female who presents for consultation & treatment of her pelvic bleeding at the request of Dr Johnson    Gyn Exam   The patient's primary symptoms include pelvic pain and vaginal bleeding. The patient's pertinent negatives include no genital itching, genital lesions, genital odor, genital rash or missed menses. This is a recurrent problem. The current episode started in the past 7 days. The problem has been gradually improving. The pain is mild. The problem affects the right side. Associated symptoms include flank pain, hematuria and nausea. Pertinent negatives include no back pain, chills, constipation, diarrhea, dysuria, fever, frequency, urgency or vomiting. The vaginal bleeding is spotting. She has not been passing clots. She has not been passing tissue. She is not sexually active. She is postmenopausal.       Allergies     Allergen  Reactions     Enalapril Cough   ,   Family History      Problem  Relation Age of Onset     Heart Disease Mother      Hypertension Mother      Stroke Mother      Cancer-breast Mother 53     Arthritis Father      Cancer Father      Heart Disease Father      Hypertension Father      Good Health Brother      Heart Disease Brother      Good Health Sister    ,   Current Outpatient Prescriptions:      allopurinol (ZYLOPRIM) 300 mg tablet, Take 1 tablet by mouth once daily., Disp: 90 tablet, Rfl: 3     aspirin (ECOTRIN) 81 mg enteric coated tablet, Take 1 tablet by mouth once daily., Disp: 30 tablet, Rfl: prn     atenolol (TENORMIN) 50 mg tablet, Take 2 tablets by mouth once daily.,  Disp: 180 tablet, Rfl: 3     atorvastatin (LIPITOR) 40 mg tablet, Take 1 tablet by mouth once daily., Disp: 90 tablet, Rfl: 3     cholecalciferol (VITAMIN D-3) 2,000 unit capsule, Take 1 capsule by mouth once daily., Disp: , Rfl: 0     fexofenadine-pseudoephedrine,  MG, (ALLEGRA-D)  mg per tablet, Take 1 tablet by mouth 2 times daily., Disp: 180 tablet, Rfl: 3     folic acid 1 mg tablet, Take 1 tablet by mouth once daily., Disp: 90 tablet, Rfl: 3     hydroCHLOROthiazide (HCTZ) 25 mg tablet, Take 1 tablet by mouth once daily., Disp: 90 tablet, Rfl: 3     hydroxychloroquine (PLAQUENIL) 200 mg tablet, , Disp: , Rfl:      losartan (COZAAR) 50 mg tablet, Take 1 tablet by mouth once daily., Disp: 90 tablet, Rfl: 3     methotrexate (RHEUMATREX) 2.5 mg tablet, Take 15 mg by mouth every Monday., Disp: , Rfl: 0     nitroglycerin (NITROSTAT) 0.4 mg sublingual tablet, Place 1 tablet under the tongue every 5 minutes if needed for Chest Pain (For chest pain x 3 doses.)., Disp: 25 tablet, Rfl: 2     prasugrel (EFFIENT) 10 mg tab tablet, Take 1 tablet by mouth every morning., Disp: 90 Tab, Rfl: 3     triamcinolone, 55 mcg each actuation, nasal (NASACORT AQ) 55 mcg nasal spray, Inhale 2 Sprays into both nostrils once daily., Disp: 16.5 g, Rfl: 0  Medications have been reviewed by me and are current to the best of my knowledge and ability.,   Past Medical History:     Diagnosis  Date     ACP (advance care planning) 10/28/2013    Patient has identified Health Care Agent(s): Yes Add Health Care Agents: Yes   Health Care Agent(s): Primary Health Care Agent: Jay Batista  Relationship:  Phone:   Secondary Health Care Agent:  Relationship: Daughter Phone:   Conservator:  Relationship:  Phone:   Guardian: Relationship:  Phone:    Patient has Advance Care Plan Documents (Health Care Directive, POLST): No, referral made to Social Work Services.  Patient has identified Specific Treatment Preferences: Yes  Specific Treatment  Preferences: a.) Code Status:  CPR/Attempt Resuscitation        ASCVD (arteriosclerotic cardiovascular disease)     -s/p ADÁN to the mid-LAD, mid-circumflex, mid-right coronary artery, proximal right coronary  artery, and PTCA of a marginal branch of the right coronary artery 04/30/2007. -s/p ADÁN to proximal left anterior descending 10/29/13.       Gout, unspecified      Hematoma of right psoas region 2/2 to anticoagulant therapy 2014     Hyperlipidemia 4/4/2007     Hypertension 12/14/2006     Mass of left thumb 5/26/2017     Osteoarthritis of multiple joints      Postmenopausal bleeding      Primary osteoarthritis of both hands 5/30/2017     Rheumatoid arthritis (HC)     follows at [facility] yearly      Stented coronary artery 2007/2013    unstable angina; severe prox LAD stenosis; s/p 7 stents    ,   Patient Active Problem List       Diagnosis  Date Noted     Primary osteoarthritis of both hands  05/30/2017     Mass of left thumb  05/26/2017     Osteopenia  11/21/2016     Special screening for malignant neoplasms, colon  04/13/2016     Allergic rhinitis  02/01/2016     Health care maintenance  11/17/2015     Colonoscopy: Due, will order  Breast Exam/Mammography: Does not complete self exams; last mammogram 11/2015 and normal, repeat every year however consider moving to every other year soon  Pap smear: Never any abnormal, no longer performing due to age and patient request unless symptomatic  DEXA: Done in 2012 and showed osteopenia; repeat as needed to manage your bone density problem including in the next 6 months  Immunizations: Due for prevnar, given today. UTD on other vaccines.   Lipids/Annual Exam: Done today, repeat annually  Hepatitis C screen: will discuss in future        Hypertension  01/16/2015     Hyperlipidemia  01/16/2015     ASCVD (arteriosclerotic cardiovascular disease)  10/28/2013     -s/p ADÁN to the mid-LAD, mid-circumflex, mid-right coronary artery, proximal right coronary artery, and PTCA of a  marginal branch of the right coronary artery 2007.  -s/p ADÁN to proximal left anterior descending 10/29/13.          ACP (advance care planning)  10/28/2013     Patient has identified Health Care Agent(s): Yes  Add Health Care Agents: Yes    Health Care Agent(s):  Primary Health Care Agent: Jay Batista  Relationship:  Phone:    Secondary Health Care Agent:  Relationship: Daughter Phone:    Conservator:  Relationship:  Phone:    Guardian: Relationship:  Phone:      Patient has Advance Care Plan Documents (Health Care Directive, POLST): No, referral made to Social Work Services.    Patient has identified Specific Treatment Preferences: Yes   Specific Treatment Preferences: a.) Code Status:  CPR/Attempt Resuscitation         Rheumatoid arthritis-followed at [facility]  10/25/2013     DJD (degenerative joint disease) of knee  2011     Gout, unspecified  10/27/2009   ,   Past Surgical History:      Procedure  Laterality Date     BREAST BIOPSY  10/25/95    BRIAN RAMIREZ        SECTION  1974     Section       COLONOSCOPY SCREENING  05    Normal - Repeat in 10 years       CORONARY STENT PLACEMENT  10/29/2013    ADÁN to proximal left anterior descending       ESOPHAGOGASTRODUODENOSCOPY  2011    erosive gastritis;bx;consider MRI/A;[facility]       KNEE ARTHROSCOPY Bilateral      KNEE REPLACEMENT Left 2011     [place] COLONOSCOPY REMOVE ELIZA POLYP LESN SNARE  2016    repeat 2019, precancerous polyps       STATUS POST PERC TRANSLUM CORON ANGIO V45.82       TOTAL SHOULDER ARTHROPLASTY Right 2014     WISDOM TEETH EXTRACTION  1970    and   Social History       Substance Use Topics         Smoking status:   Never Smoker     Smokeless tobacco:   Never Used     Alcohol use   0.0 oz/week      0 Standard drinks or equivalent per week         Comment: Rare use. ~2 glasses of wine a month        REVIEW OF SYSTEMS:  Review of Systems   Constitutional: Negative for chills  "and fever.   Gastrointestinal: Positive for nausea. Negative for constipation, diarrhea and vomiting.   Genitourinary: Positive for flank pain, hematuria and pelvic pain. Negative for dysuria, frequency, missed menses and urgency.   Musculoskeletal: Negative for back pain.       OBJECTIVE:  /82 (Cuff Site: Right Arm, Position: Sitting, Cuff Size: Adult Large)  Pulse 80  Ht 1.702 m (5' 7\")  Wt 98 kg (216 lb)  BMI 33.83 kg/m2    EXAM:   Physical Exam   Constitutional: She is oriented to person, place, and time and well-developed, well-nourished, and in no distress.   HENT:   Head: Normocephalic and atraumatic.   Eyes: Pupils are equal, round, and reactive to light.   Abdominal: Soft. She exhibits no distension and no mass. There is no tenderness. There is no rebound and no guarding.   Genitourinary: Vagina normal, uterus normal, cervix normal and vulva normal.   Genitourinary Comments: Non-palpable adnexa.    Pelvic exam was performed which revealed the uterus to be anterior.  There are no adnexal masses. The speculum was then placed and the  cervix was washed with Betadine solution.  The uterus was sounded to 6 cm and then the endometrial cannula was inserted. Curettage was performed in all areas. There was a scant amount of tissue obtained. A double pass was completed to ensure all areas were adequately sampled.       Neurological: She is alert and oriented to person, place, and time.   Skin: Skin is warm and dry.   Psychiatric: Mood, memory, affect and judgment normal.       ASSESSMENT/PLAN:  1. Microhematuria, CT, stone protocol and urine cytology  2. Postmenopausal bleeding, embx pending    Plan:  F/U in 1 week to review results        "

## 2018-02-13 NOTE — TELEPHONE ENCOUNTER
Patient was contacted and instructed to schedule a Pre-op appointment with her PCP and a nurse only visit to sign surgical consents for hysteroscopy, D&C, and polypectomy, per verbal discussion with Dr. Quinten Mackenzie MD, FACOG. Unit [facility]  will call patient tomorrow to facilitate this.    Verena Bee RN...................2/13/2018 5:30 PM

## 2018-02-13 NOTE — PROGRESS NOTES
Patient Information     Patient Name MRN Sex     Margaret Batista [id number] Female 1945      Progress Notes by Ana Johnson DO at 2/3/2018 12:15 PM     Author:  Ana Johnson DO Service:  (none) Author Type:  PHYS- Osteopathic     Filed:  2018  2:22 PM Encounter Date:  2/3/2018 Status:  Signed     :  Ana Johnson DO (PHYS- Osteopathic)            SUBJECTIVE:  Margaret Batista is a 72 y.o. female who presents for blood in urine.    HPI  Blood during urination this morning; noticed x 1 this morning.  But believes this was more from a vaginal source, however it is hard to tell for her.  Not the first urine this morning; but second one - after 9am.  Went to the bathroom; a lot of blood after wiping.  Bright red.  Quite a bit in underwear afterward/since.  She has recently had a pelvic US due to some groin pain. That revealed a fibroid uterus, non-visualization of the ovaries and a poorly visualized endometrial stripe.      No weight changes; no blood with stooling.  NO recent constipation.    Allergies     Allergen  Reactions     Enalapril Cough   ,   Current Outpatient Prescriptions on File Prior to Visit       Medication  Sig Dispense Refill     allopurinol (ZYLOPRIM) 300 mg tablet Take 1 tablet by mouth once daily. 90 tablet 3     aspirin (ECOTRIN) 81 mg enteric coated tablet Take 1 tablet by mouth once daily. 30 tablet prn     atenolol (TENORMIN) 50 mg tablet Take 2 tablets by mouth once daily. 180 tablet 3     atorvastatin (LIPITOR) 40 mg tablet Take 1 tablet by mouth once daily. 90 tablet 3     cholecalciferol (VITAMIN D-3) 2,000 unit capsule Take 1 capsule by mouth once daily.  0     fexofenadine-pseudoephedrine,  MG, (ALLEGRA-D)  mg per tablet Take 1 tablet by mouth 2 times daily. 180 tablet 3     folic acid 1 mg tablet Take 1 tablet by mouth once daily. 90 tablet 3     hydroCHLOROthiazide (HCTZ) 25 mg tablet Take 1 tablet by mouth once daily. 90 tablet 3      losartan (COZAAR) 50 mg tablet Take 1 tablet by mouth once daily. 90 tablet 3     methotrexate (RHEUMATREX) 2.5 mg tablet Take 15 mg by mouth every Monday.  0     nitroglycerin (NITROSTAT) 0.4 mg sublingual tablet Place 1 tablet under the tongue every 5 minutes if needed for Chest Pain (For chest pain x 3 doses.). 25 tablet 2     prasugrel (EFFIENT) 10 mg tab tablet Take 1 tablet by mouth every morning. 90 Tab 3     triamcinolone, 55 mcg each actuation, nasal (NASACORT AQ) 55 mcg nasal spray Inhale 2 Sprays into both nostrils once daily. 16.5 g 0     No current facility-administered medications on file prior to visit.     and   Past Medical History:     Diagnosis  Date     ACP (advance care planning) 10/28/2013    Patient has identified Health Care Agent(s): Yes Add Health Care Agents: Yes   Health Care Agent(s): Primary Health Care Agent: Jay Batista  Relationship:  Phone:   Secondary Health Care Agent:  Relationship: Daughter Phone:   Conservator:  Relationship:  Phone:   Guardian: Relationship:  Phone:    Patient has Advance Care Plan Documents (Health Care Directive, POLST): No, referral made to Social Work Services.  Patient has identified Specific Treatment Preferences: Yes  Specific Treatment Preferences: a.) Code Status:  CPR/Attempt Resuscitation        ASCVD (arteriosclerotic cardiovascular disease)     -s/p ADÁN to the mid-LAD, mid-circumflex, mid-right coronary artery, proximal right coronary  artery, and PTCA of a marginal branch of the right coronary artery 04/30/2007. -s/p ADÁN to proximal left anterior descending 10/29/13.       Gout, unspecified      Hematoma of right psoas region 2/2 to anticoagulant therapy 2014     Hyperlipidemia 4/4/2007     Hypertension 12/14/2006     Mass of left thumb 5/26/2017     Osteoarthritis of multiple joints      Postmenopausal bleeding      Primary osteoarthritis of both hands 5/30/2017     Rheumatoid arthritis (HC)     follows at [place] yearly      Stented  "coronary artery 2007/2013    unstable angina; severe prox LAD stenosis; s/p 7 stents        REVIEW OF SYSTEMS:  Review of Systems   All other systems reviewed and are negative.      OBJECTIVE:  /84 (Cuff Site: Left Arm, Position: Sitting, Cuff Size: Adult Large)  Pulse 80  Temp 98.2  F (36.8  C) (Tympanic)  Ht 1.702 m (5' 7\")  Wt 97.7 kg (215 lb 6 oz)  BMI 33.73 kg/m2    EXAM:   Physical Exam   Constitutional: She is well-developed, well-nourished, and in no distress.   HENT:   Head: Normocephalic and atraumatic.   Genitourinary: Vagina normal, uterus normal, cervix normal, right adnexa normal, left adnexa normal and vulva normal. No vaginal discharge found.   Genitourinary Comments: No obvious laceration or area of bleeding.  No blood at cervical os.   Nursing note and vitals reviewed.      ASSESSMENT/PLAN:    ICD-10-CM    1. Post-menopausal bleeding N95.0 AMB CONSULT TO OB-GYN   2. Urinary problem R39.89 URINALYSIS W REFLEX MICROSCOPIC IF POSITIVE      URINALYSIS W REFLEX MICROSCOPIC IF POSITIVE      URINALYSIS MICROSCOPIC      URINALYSIS MICROSCOPIC      URINE CULTURE      URINE CULTURE        Plan:   Urine without hematuria, but concern that this was from a vaginal source.  She has recent pelvic US - will be referred to OBGYN for possible EMB and further work up.  UC is added to today's labs; and she will be notified when that is resulted.  No obvious treatment required today.    Follow up prn; or otherwise as scheduled.          "

## 2018-02-13 NOTE — NURSING NOTE
Patient Information     Patient Name MRN Sex Margaret Nowak [id number] Female 1945      Nursing Note by Bari Miguel LPN at 2018  2:00 PM     Author:  Bari Miguel LPN  Service:  (none) Author Type:  NURS- Licensed Practical Nurse     Filed:  2018  2:11 PM  Encounter Date:  2018 Status:  Addendum     :  Bari Miguel LPN (NURS- Licensed Practical Nurse)        Related Notes: Original Note by Bari Miguel LPN (NURS- Licensed Practical Nurse) filed at 2018  2:08 PM            Patient presents to the clinic for a consult for post menopausal bleeding. Patient states Saturday it started but did not last long and has not had bleeding since.  Bari Miguel LPN ..............2018 2:08 PM

## 2018-02-15 ENCOUNTER — TELEPHONE (OUTPATIENT)
Dept: INTERNAL MEDICINE | Facility: OTHER | Age: 73
End: 2018-02-15

## 2018-02-15 DIAGNOSIS — I10 ESSENTIAL HYPERTENSION: Primary | ICD-10-CM

## 2018-02-16 RX ORDER — HYDROCHLOROTHIAZIDE 25 MG/1
25 TABLET ORAL DAILY
Qty: 30 TABLET | Refills: 0 | Status: SHIPPED | OUTPATIENT
Start: 2018-02-16 | End: 2018-03-29

## 2018-02-16 NOTE — TELEPHONE ENCOUNTER
Pharmacy calling stated that the patient is going on vacation and will not be home while her mail order for the hydrochlorothiazide arrives at her place. She is [facility] day [facility] Please address.  Nimisha Cordon LPN.......2/16/2018.......12:44 PM

## 2018-03-27 ENCOUNTER — HOSPITAL ENCOUNTER (OUTPATIENT)
Dept: MAMMOGRAPHY | Facility: OTHER | Age: 73
Discharge: HOME | End: 2018-03-27
Attending: NURSE PRACTITIONER | Admitting: NURSE PRACTITIONER
Payer: COMMERCIAL

## 2018-03-27 DIAGNOSIS — Z12.39 SCREENING BREAST EXAMINATION: ICD-10-CM

## 2018-03-27 PROCEDURE — 77067 SCR MAMMO BI INCL CAD: CPT

## 2018-03-29 DIAGNOSIS — I10 ESSENTIAL HYPERTENSION: ICD-10-CM

## 2018-04-02 RX ORDER — HYDROCHLOROTHIAZIDE 25 MG/1
TABLET ORAL
Qty: 90 TABLET | Refills: 3 | Status: SHIPPED | OUTPATIENT
Start: 2018-04-02 | End: 2018-11-06

## 2018-04-03 ENCOUNTER — HOSPITAL ENCOUNTER (OUTPATIENT)
Dept: MAMMOGRAPHY | Facility: OTHER | Age: 73
End: 2018-04-03
Attending: NURSE PRACTITIONER
Payer: COMMERCIAL

## 2018-04-03 DIAGNOSIS — R92.8 ABNORMAL FINDING ON BREAST IMAGING: ICD-10-CM

## 2018-04-03 PROCEDURE — 77065 DX MAMMO INCL CAD UNI: CPT | Mod: LT

## 2018-07-23 NOTE — PROGRESS NOTES
Patient Information     Patient Name  Margaret Batista MRN  [id number] Sex  Female   1945      Letter by José Miguel Cosme at      Author:  José Miguel Cosme Service:  (none) Author Type:  (none)    Filed:   Date of Service:   Status:  (Other)       [facility]  [address]  [facility] [address]  [phone]         Margaret Batista   [address] [facility] [address]      2017  Date of Breast Imagin2017  9:50 AM    Dear Ms. Batista:    We are pleased to inform you that the result of your recent breast imaging examination is normal/benign (not cancer).    A report of your results was sent to your health care provider(s).    Your images will become part of your medical file here at [facility] and will be available for your continuing care. You are responsible for informing any new health care provider or breast imaging facility of the date and location of this examination.    Although mammography is the most accurate method for early detection, not all cancers are found through mammography. If you notice any new changes in your breast(s) please inform your health care provider without delay.    Thank you for choosing [facility] [place] to participate in your healthcare needs.         [facility] [place] Recommendations for Early Breast Cancer Detection   in Women without Symptoms  When to start having mammograms to screen for breast cancer, and how often to have them, is a personal decision. It should be based on your preferences, your values and your risk for developing breast cancer. [facility] [place] recommends that you and your health care provider together determine when mammograms are right for you.    [facility] [place] recommends the following guidelines for women who have an average risk for breast cancer, based on American Cancer Society guidelines:    Age 40 to 44:  Mammograms are optional.     Age 45 to 54: Have a mammogram every year.           Age 55 and older: Have a mammogram every year, or transition to having one every 2 years. Continue to have mammograms as long as your health is good.    If you have a higher than average risk for breast cancer, your health care provider may recommend a different schedule.

## 2018-07-31 DIAGNOSIS — M06.4 RHEUMATOID ARTHRITIS WITH INFLAMMATORY POLYARTHROPATHY (H): Primary | ICD-10-CM

## 2018-07-31 LAB
AST SERPL W P-5'-P-CCNC: 18 U/L (ref 13–39)
BASOPHILS # BLD AUTO: 0.1 10E9/L (ref 0–0.2)
BASOPHILS NFR BLD AUTO: 0.7 %
CREAT SERPL-MCNC: 1.19 MG/DL (ref 0.6–1.2)
DIFFERENTIAL METHOD BLD: ABNORMAL
EOSINOPHIL # BLD AUTO: 0.3 10E9/L (ref 0–0.7)
EOSINOPHIL NFR BLD AUTO: 3.3 %
ERYTHROCYTE [DISTWIDTH] IN BLOOD BY AUTOMATED COUNT: 15.5 % (ref 10–15)
GFR SERPL CREATININE-BSD FRML MDRD: 45 ML/MIN/1.7M2
HCT VFR BLD AUTO: 43.1 % (ref 35–47)
HGB BLD-MCNC: 14.1 G/DL (ref 11.7–15.7)
IMM GRANULOCYTES # BLD: 0 10E9/L (ref 0–0.4)
IMM GRANULOCYTES NFR BLD: 0.3 %
LYMPHOCYTES # BLD AUTO: 1.4 10E9/L (ref 0.8–5.3)
LYMPHOCYTES NFR BLD AUTO: 16 %
MCH RBC QN AUTO: 31.6 PG (ref 26.5–33)
MCHC RBC AUTO-ENTMCNC: 32.7 G/DL (ref 31.5–36.5)
MCV RBC AUTO: 97 FL (ref 78–100)
MONOCYTES # BLD AUTO: 0.5 10E9/L (ref 0–1.3)
MONOCYTES NFR BLD AUTO: 5.2 %
NEUTROPHILS # BLD AUTO: 6.5 10E9/L (ref 1.6–8.3)
NEUTROPHILS NFR BLD AUTO: 74.5 %
PLATELET # BLD AUTO: 250 10E9/L (ref 150–450)
RBC # BLD AUTO: 4.46 10E12/L (ref 3.8–5.2)
WBC # BLD AUTO: 8.7 10E9/L (ref 4–11)

## 2018-07-31 PROCEDURE — 84450 TRANSFERASE (AST) (SGOT): CPT

## 2018-07-31 PROCEDURE — 36415 COLL VENOUS BLD VENIPUNCTURE: CPT

## 2018-07-31 PROCEDURE — 82565 ASSAY OF CREATININE: CPT

## 2018-07-31 PROCEDURE — 85025 COMPLETE CBC W/AUTO DIFF WBC: CPT

## 2018-08-06 ENCOUNTER — OFFICE VISIT (OUTPATIENT)
Dept: OBGYN | Facility: OTHER | Age: 73
End: 2018-08-06
Attending: OBSTETRICS & GYNECOLOGY
Payer: COMMERCIAL

## 2018-08-06 VITALS
SYSTOLIC BLOOD PRESSURE: 138 MMHG | BODY MASS INDEX: 32.96 KG/M2 | WEIGHT: 212 LBS | DIASTOLIC BLOOD PRESSURE: 88 MMHG | HEART RATE: 68 BPM

## 2018-08-06 DIAGNOSIS — N84.0 ENDOMETRIAL POLYP: Primary | ICD-10-CM

## 2018-08-06 DIAGNOSIS — I86.3 VULVAL VARICES: ICD-10-CM

## 2018-08-06 PROCEDURE — 99213 OFFICE O/P EST LOW 20 MIN: CPT | Performed by: OBSTETRICS & GYNECOLOGY

## 2018-08-06 PROCEDURE — G0463 HOSPITAL OUTPT CLINIC VISIT: HCPCS

## 2018-08-06 RX ORDER — CETIRIZINE HYDROCHLORIDE 10 MG/1
10 TABLET ORAL DAILY PRN
COMMUNITY
End: 2019-02-11 | Stop reason: ALTCHOICE

## 2018-08-06 ASSESSMENT — PAIN SCALES - GENERAL: PAINLEVEL: NO PAIN (1)

## 2018-08-06 NOTE — MR AVS SNAPSHOT
After Visit Summary   8/6/2018    Margaret Batista    MRN: [id number]           Patient Information     Date Of Birth          1945        Visit Information        Provider Department      8/6/2018 9:00 AM Dereck Cosme MD [facility]        Today's Diagnoses     Endometrial polyp    -  1    Vulval varices           Follow-ups after your visit        Your next 10 appointments already scheduled     Sep 25, 2018  8:00 AM CDT   Office Visit with Swetha Maxwell NP   [facility]    [address]  [place] [address]   [phone]           Bring a current list of meds and any records pertaining to this visit. For Physicals, please bring immunization records and any forms needing to be filled out. Please arrive 10 minutes early to complete paperwork.              Who to contact     If you have questions or need follow up information about today's clinic visit or your schedule please contact [facility] directly at [phone].  Normal or non-critical lab and imaging results will be communicated to you by [email or web address], letter or phone within 4 business days after the clinic has received the results. If you do not hear from us within 7 days, please contact the clinic through OneCardt or phone. If you have a critical or abnormal lab result, we will notify you by phone as soon as possible.  Submit refill requests through Sharethrough or call your pharmacy and they will forward the refill request to us. Please allow 3 business days for your refill to be completed.          Additional Information About Your Visit        [email or web address] Information     Sharethrough gives you secure access to your electronic health record. If you see a primary care provider, you can also send messages to your care team and make appointments. If you have questions, please call your primary care clinic.  If you do not have a  primary care provider, please call [phone] and they will assist you.        Care EveryWhere ID     This is your Care EveryWhere ID. This could be used by other organizations to access your [facility] medical records  [id number]        Your Vitals Were     Pulse Breastfeeding? BMI (Body Mass Index)             68 No 32.96 kg/m2          Blood Pressure from Last 3 Encounters:   08/06/18 138/88   02/13/18 130/90   02/07/18 122/82    Weight from Last 3 Encounters:   08/06/18 96.2 kg (212 lb)   02/13/18 97.7 kg (215 lb 4.8 oz)   02/07/18 98 kg (216 lb)              Today, you had the following     No orders found for display       Primary Care Provider Office Phone # Fax #    Swetha ROMEO Maxwell [phone] [address] [facility] [address]        Equal Access to Services     [facility] : Hadii aad ku hadasho Sorosieali, waaxda luqadaha, qaybta kaalmada adeegyada, waxyonatan montes . So [facility] [phone].    ATENCIÓN: Si habla español, tiene a almanzar disposición servicios gratuitos de asistencia lingüística. Marcelo al [phone].    We comply with applicable federal civil rights laws and [place] laws. We do not discriminate on the basis of race, color, national origin, age, disability, sex, sexual orientation, or gender identity.            Thank you!     Thank you for choosing [facility]  for your care. Our goal is always to provide you with excellent care. Hearing back from our patients is one way we can continue to improve our services. Please take a few minutes to complete the written survey that you may receive in the mail after your visit with us. Thank you!             Your Updated Medication List - Protect others around you: Learn how to safely use, store and throw away your medicines at www.disposemymeds.org.          This list is accurate as of 8/6/18  9:58 AM.  Always use your most recent med list.                   Brand Name  Dispense Instructions for use Diagnosis    allopurinol 300 MG tablet    ZYLOPRIM     Take 1 tablet by mouth daily        aspirin 81 MG EC tablet      Take 1 tablet by mouth daily        atenolol 50 MG tablet    TENORMIN     Take 2 tablets by mouth daily        atorvastatin 40 MG tablet    LIPITOR     Take 1 tablet by mouth daily        cetirizine 10 MG tablet    zyrTEC     Take 10 mg by mouth daily as needed for allergies        folic acid 1 MG tablet    FOLVITE     Take 1 mg by mouth daily        hydrochlorothiazide 25 MG tablet    HYDRODIURIL    90 tablet    TAKE 1 TABLET DAILY    Essential hypertension       hydroxychloroquine 200 MG tablet    PLAQUENIL          losartan 50 MG tablet    COZAAR     Take 1 tablet by mouth daily        methotrexate 2.5 MG tablet CHEMO      Take 15 mg by mouth Every Monday        nitroGLYcerin 0.4 MG sublingual tablet    NITROSTAT     Place 1 tablet under the tongue every 5 minutes as needed for chest pain (For chest pain x 3 doses)        prasugrel 10 MG Tabs tablet    EFFIENT     Take 1 tablet by mouth every morning        triamcinolone 55 MCG/ACT Inhaler    NASACORT     Spray 2 sprays into both nostrils daily        vitamin D3 2000 units Caps      Take 2,000 Units by mouth daily

## 2018-08-06 NOTE — LETTER
8/6/2018       RE: Margaret Batista  [address] [facility] [address]     Dear Colleague,    Thank you for referring your patient, Margaret Batista, to the [facility] at [facility]. Please see a copy of my visit note below.    Margaret is a very pleasant 72-year-old who is seen today in follow-up from Dr. Quinten Mckeon.  This past winter she had an episode of vaginal bleeding.  It was self-limited but heavy for about 1 day.  She had had an ultrasound performed in December which indicated 2 small fibroids however the endometrial plate was difficult to distinguish.  She had endometrial biopsy by Dr. Mackenzie in February which was benign and suggested benign endometrial polyps.  At that point he had talked about the option of proceeding with hysteroscopy D&C.    Did have a D&C in 2011 for benign endometrial polyps    Patient since that time has had no bleeding at all and denies any pelvic pain.  She has noticed some lumps or areas of swelling right around the vaginal opening and wonders what they are.  They are nontender and soft and she states that they feel almost like there are hemorrhoids.    He has no other gynecologic concerns    Problem list is reviewed in [facility] and includes atherosclerotic heart disease hypertension arthritis hyperlipidemia    Medications reviewed on epic    Allergies reviewed on epic    Social history     GYN  GI review of systems otherwise negative physical exam today patient is alert orientated ×3 appears in no acute distress blood pressure 138/88 pulse 68 weight 212 abdomen is soft benign grade 1 nontender no masses appreciated pelvic external without lesion very faint varicosities present around the vaginal introitus and this is what patient has noticed.  There is no hyperplastic lesions vagina is clean cervix unremarkable bimanual shows a small mobile nontender uterus and adnexa is negative  assessment    1 episode of postmenopausal bleeding 6-7 months ago isolated.  Ultrasound noncontributory.  Endometrial biopsy benign suspicious for benign endometrial polyps.  Previous history thereof.  No further issues.    2 benign small perineal varicosities     Plan:    Discussed the option of proceeding with hysteroscopy D&C but at this point since no further bleeding would be content on expectant management.  Patient is in agreement with this.  If she would have any further bleeding would proceed directly to a hysteroscopy drink D&C.    Peroneal varicosities discussed no further evaluation needed.        Again, thank you for allowing me to participate in the care of your patient.      Sincerely,    Dereck Cosme MD

## 2018-08-06 NOTE — PROGRESS NOTES
Margaret is a very pleasant 72-year-old who is seen today in follow-up from Dr. Quinten Mckeon.  This past winter she had an episode of vaginal bleeding.  It was self-limited but heavy for about 1 day.  She had had an ultrasound performed in December which indicated 2 small fibroids however the endometrial plate was difficult to distinguish.  She had endometrial biopsy by Dr. Mackenzie in February which was benign and suggested benign endometrial polyps.  At that point he had talked about the option of proceeding with hysteroscopy D&C.    Did have a D&C in 2011 for benign endometrial polyps    Patient since that time has had no bleeding at all and denies any pelvic pain.  She has noticed some lumps or areas of swelling right around the vaginal opening and wonders what they are.  They are nontender and soft and she states that they feel almost like there are hemorrhoids.    He has no other gynecologic concerns    Problem list is reviewed in [facility] and includes atherosclerotic heart disease hypertension arthritis hyperlipidemia    Medications reviewed on epic    Allergies reviewed on epic    Social history     GYN  GI review of systems otherwise negative physical exam today patient is alert orientated ×3 appears in no acute distress blood pressure 138/88 pulse 68 weight 212 abdomen is soft benign grade 1 nontender no masses appreciated pelvic external without lesion very faint varicosities present around the vaginal introitus and this is what patient has noticed.  There is no hyperplastic lesions vagina is clean cervix unremarkable bimanual shows a small mobile nontender uterus and adnexa is negative assessment    1 episode of postmenopausal bleeding 6-7 months ago isolated.  Ultrasound noncontributory.  Endometrial biopsy benign suspicious for benign endometrial polyps.  Previous history thereof.  No further issues.    2 benign small perineal varicosities     Plan:    Discussed the option of proceeding with  hysteroscopy D&C but at this point since no further bleeding would be content on expectant management.  Patient is in agreement with this.  If she would have any further bleeding would proceed directly to a hysteroscopy drink D&C.    Peroneal varicosities discussed no further evaluation needed.

## 2018-08-06 NOTE — NURSING NOTE
Patient presents for vaginal bumps that she has noticed within the last month.  She would also like to discuss the history of vaginal bleeding.  She states no bleeding since had EMB.   Gissell Linder LPN........................8/6/2018  9:02 AM

## 2018-10-01 ENCOUNTER — OFFICE VISIT (OUTPATIENT)
Dept: INTERNAL MEDICINE | Facility: OTHER | Age: 73
End: 2018-10-01
Attending: NURSE PRACTITIONER
Payer: COMMERCIAL

## 2018-10-01 VITALS
HEART RATE: 72 BPM | BODY MASS INDEX: 33.98 KG/M2 | DIASTOLIC BLOOD PRESSURE: 88 MMHG | HEIGHT: 67 IN | OXYGEN SATURATION: 96 % | RESPIRATION RATE: 12 BRPM | TEMPERATURE: 96.5 F | WEIGHT: 216.5 LBS | SYSTOLIC BLOOD PRESSURE: 124 MMHG

## 2018-10-01 DIAGNOSIS — I25.10 ASCVD (ARTERIOSCLEROTIC CARDIOVASCULAR DISEASE): Primary | ICD-10-CM

## 2018-10-01 DIAGNOSIS — L98.9 SKIN LESION: ICD-10-CM

## 2018-10-01 DIAGNOSIS — E78.5 HYPERLIPIDEMIA, UNSPECIFIED HYPERLIPIDEMIA TYPE: ICD-10-CM

## 2018-10-01 DIAGNOSIS — I10 ESSENTIAL HYPERTENSION: ICD-10-CM

## 2018-10-01 DIAGNOSIS — Z01.818 PREOPERATIVE EXAMINATION: ICD-10-CM

## 2018-10-01 DIAGNOSIS — M05.79 RHEUMATOID ARTHRITIS INVOLVING MULTIPLE SITES WITH POSITIVE RHEUMATOID FACTOR (H): ICD-10-CM

## 2018-10-01 DIAGNOSIS — M06.4 RHEUMATOID ARTHRITIS WITH INFLAMMATORY POLYARTHROPATHY (H): ICD-10-CM

## 2018-10-01 DIAGNOSIS — R06.83 SNORING: ICD-10-CM

## 2018-10-01 LAB
ANION GAP SERPL CALCULATED.3IONS-SCNC: 8 MMOL/L (ref 3–14)
AST SERPL W P-5'-P-CCNC: 22 U/L (ref 13–39)
BASOPHILS # BLD AUTO: 0.1 10E9/L (ref 0–0.2)
BASOPHILS NFR BLD AUTO: 0.6 %
BUN SERPL-MCNC: 19 MG/DL (ref 7–25)
CALCIUM SERPL-MCNC: 9.6 MG/DL (ref 8.6–10.3)
CHLORIDE SERPL-SCNC: 106 MMOL/L (ref 98–107)
CO2 SERPL-SCNC: 28 MMOL/L (ref 21–31)
CREAT SERPL-MCNC: 0.98 MG/DL (ref 0.6–1.2)
DIFFERENTIAL METHOD BLD: ABNORMAL
EOSINOPHIL # BLD AUTO: 0.4 10E9/L (ref 0–0.7)
EOSINOPHIL NFR BLD AUTO: 3.7 %
ERYTHROCYTE [DISTWIDTH] IN BLOOD BY AUTOMATED COUNT: 15.5 % (ref 10–15)
GFR SERPL CREATININE-BSD FRML MDRD: 56 ML/MIN/1.7M2
GLUCOSE SERPL-MCNC: 99 MG/DL (ref 70–105)
HCT VFR BLD AUTO: 42.9 % (ref 35–47)
HGB BLD-MCNC: 13.8 G/DL (ref 11.7–15.7)
IMM GRANULOCYTES # BLD: 0.1 10E9/L (ref 0–0.4)
IMM GRANULOCYTES NFR BLD: 0.8 %
LYMPHOCYTES # BLD AUTO: 1.6 10E9/L (ref 0.8–5.3)
LYMPHOCYTES NFR BLD AUTO: 15.8 %
MCH RBC QN AUTO: 31.4 PG (ref 26.5–33)
MCHC RBC AUTO-ENTMCNC: 32.2 G/DL (ref 31.5–36.5)
MCV RBC AUTO: 98 FL (ref 78–100)
MONOCYTES # BLD AUTO: 0.5 10E9/L (ref 0–1.3)
MONOCYTES NFR BLD AUTO: 5.2 %
NEUTROPHILS # BLD AUTO: 7.4 10E9/L (ref 1.6–8.3)
NEUTROPHILS NFR BLD AUTO: 73.9 %
PLATELET # BLD AUTO: 226 10E9/L (ref 150–450)
POTASSIUM SERPL-SCNC: 4.3 MMOL/L (ref 3.5–5.1)
RBC # BLD AUTO: 4.4 10E12/L (ref 3.8–5.2)
SODIUM SERPL-SCNC: 142 MMOL/L (ref 134–144)
WBC # BLD AUTO: 10.1 10E9/L (ref 4–11)

## 2018-10-01 PROCEDURE — 36415 COLL VENOUS BLD VENIPUNCTURE: CPT | Performed by: NURSE PRACTITIONER

## 2018-10-01 PROCEDURE — 85025 COMPLETE CBC W/AUTO DIFF WBC: CPT | Performed by: NURSE PRACTITIONER

## 2018-10-01 PROCEDURE — 84450 TRANSFERASE (AST) (SGOT): CPT | Performed by: NURSE PRACTITIONER

## 2018-10-01 PROCEDURE — 80048 BASIC METABOLIC PNL TOTAL CA: CPT | Performed by: NURSE PRACTITIONER

## 2018-10-01 PROCEDURE — 99215 OFFICE O/P EST HI 40 MIN: CPT | Performed by: NURSE PRACTITIONER

## 2018-10-01 PROCEDURE — G0463 HOSPITAL OUTPT CLINIC VISIT: HCPCS

## 2018-10-01 ASSESSMENT — PAIN SCALES - GENERAL: PAINLEVEL: NO PAIN (0)

## 2018-10-01 NOTE — MR AVS SNAPSHOT
After Visit Summary   10/1/2018    Margaret Batista    MRN: [id number]           Patient Information     Date Of Birth          1945        Visit Information        Provider Department      10/1/2018 8:20 AM Swetha Maxwell NP [facility]        Today's Diagnoses     ASCVD (arteriosclerotic cardiovascular disease)    -  1    Essential hypertension        Hyperlipidemia, unspecified hyperlipidemia type        Rheumatoid arthritis involving multiple sites with positive rheumatoid factor (H)        Snoring        Skin lesion        Preoperative examination        Rheumatoid arthritis with inflammatory polyarthropathy (H)           Follow-ups after your visit        Additional Services     GENERAL SURG ADULT REFERRAL           SLEEP EVALUATION & MANAGEMENT REFERRAL - [facility] [phone] (Age 13 and up)       Please be aware that coverage of these services is subject to the terms and limitations of your health insurance plan.  Call member services at your health plan with any benefit or coverage questions.      Please bring the following to your appointment:    >>   List of current medications   >>   This referral request   >>   Any documents/labs given to you for this referral                      Your next 10 appointments already scheduled     Nov 13, 2018  9:50 AM CST   PROCEDURE with Evelin Thompson MD,   SURGERY   [facility] ([facility])    [address]  [facility] [id number] [phone]              Future tests that were ordered for you today     Open Future Orders        Priority Expected Expires Ordered    SLEEP EVALUATION & MANAGEMENT REFERRAL - [facility] [phone] (Age 13 and up) Routine  10/1/2019 10/1/2018            Who to contact     If you have questions or need follow up information about today's  "clinic visit or your schedule please contact [facility] directly at [phone].  Normal or non-critical lab and imaging results will be communicated to you by MyChart, letter or phone within 4 business days after the clinic has received the results. If you do not hear from us within 7 days, please contact the clinic through [employer] or phone. If you have a critical or abnormal lab result, we will notify you by phone as soon as possible.  Submit refill requests through [employer] or call your pharmacy and they will forward the refill request to us. Please allow 3 business days for your refill to be completed.          Additional Information About Your Visit        [employer] Information     Expert[employer] gives you secure access to your electronic health record. If you see a primary care provider, you can also send messages to your care team and make appointments. If you have questions, please call your primary care clinic.  If you do not have a primary care provider, please call [phone] and they will assist you.        Care EveryWhere ID     This is your Care EveryWhere ID. This could be used by other organizations to access your [place] medical records  [id number]        Your Vitals Were     Pulse Temperature Respirations Height Pulse Oximetry Breastfeeding?    72 96.5  F (35.8  C) (Tympanic) 12 5' 7.25\" (1.708 m) 96% Yes    BMI (Body Mass Index)                   33.66 kg/m2            Blood Pressure from Last 3 Encounters:   10/01/18 124/88   08/06/18 138/88   02/13/18 130/90    Weight from Last 3 Encounters:   10/01/18 216 lb 8 oz (98.2 kg)   08/06/18 212 lb (96.2 kg)   02/13/18 215 lb 4.8 oz (97.7 kg)              We Performed the Following     AST     Basic metabolic panel     CBC with platelets differential     GENERAL SURG ADULT REFERRAL        Primary Care Provider Office Phone # Fax #    Swetha Maxwell [phone] [address]  [facility] " [id number]        Equal Access to Services     [facility] : Hadii aad ku hadisaethel Tarunali, wadmitriyda luqadaha, qaybta kaalmaedna zhang. So [facility] [phone].    ATENCIÓN: Si habla español, tiene a almanzar disposición servicios gratuitos de asistencia lingüística. Ricoame al [phone].    We comply with applicable federal civil rights laws and [place] laws. We do not discriminate on the basis of race, color, national origin, age, disability, sex, sexual orientation, or gender identity.            Thank you!     Thank you for choosing [facility]  for your care. Our goal is always to provide you with excellent care. Hearing back from our patients is one way we can continue to improve our services. Please take a few minutes to complete the written survey that you may receive in the mail after your visit with us. Thank you!             Your Updated Medication List - Protect others around you: Learn how to safely use, store and throw away your medicines at www.disposemymeds.org.          This list is accurate as of 10/1/18 11:27 AM.  Always use your most recent med list.                   Brand Name Dispense Instructions for use Diagnosis    allopurinol 300 MG tablet    ZYLOPRIM     Take 1 tablet by mouth daily        aspirin 81 MG EC tablet      Take 1 tablet by mouth daily        atenolol 50 MG tablet    TENORMIN     Take 2 tablets by mouth daily        atorvastatin 40 MG tablet    LIPITOR     Take 1 tablet by mouth daily        cetirizine 10 MG tablet    zyrTEC     Take 10 mg by mouth daily as needed for allergies        folic acid 1 MG tablet    FOLVITE     Take 1 mg by mouth daily        hydrochlorothiazide 25 MG tablet    HYDRODIURIL    90 tablet    TAKE 1 TABLET DAILY    Essential hypertension       hydroxychloroquine 200 MG tablet    PLAQUENIL          losartan 50 MG tablet    COZAAR     Take 1 tablet by mouth daily        methotrexate 2.5 MG tablet CHEMO       Take 15 mg by mouth Every Monday        nitroGLYcerin 0.4 MG sublingual tablet    NITROSTAT     Place 1 tablet under the tongue every 5 minutes as needed for chest pain (For chest pain x 3 doses)        prasugrel 10 MG Tabs tablet    EFFIENT     Take 1 tablet by mouth every morning        triamcinolone 55 MCG/ACT inhaler    NASACORT     Spray 2 sprays into both nostrils daily        vitamin D3 2000 units Caps      Take 2,000 Units by mouth daily

## 2018-10-01 NOTE — NURSING NOTE
"Date of Surgery: 10-8-18   Type of Surgery: Right Total Knee  Surgeon: Dr. Marshall  Primary [facility]:  [facility]       Fever/Chills or other infectious symptoms in past month: no  >10lb weight loss in past two months: no  O2 SAT: 96% on room air    Health Care Directive/Code status:  No has one to fill out at home  Hx of blood transfusions:   no  Td up to date:  no  History of VRE/MRSA:      Preoperative Evaluation: Obstructive Sleep Apnea screening    S: Snore -  Do you snore loudly? (louder than talking or loud enough to be heard through closed doors)yes  T: Tired - Do you often feel tired, fatigued, or sleepy during the daytime?yes  O: Observed - Has anyone ever observed you stop breathing during your sleep?no  P: Pressure - Do you have or are you being treated for high blood pressure?yes  B: BMI - BMI greater than 35kg/m2?no  A: Age - Age over 50 years old?yes  N: Neck - Neck circumference greater than 40 cm?no  G: Gender - Gender: Male?no    Total number of \"YES\" responses:  4    Scoring: Low risk of AJ 0-2  At Risk of AJ: >3 High Risk of AJ: 5-8    Antonia Arnold 10/1/2018 8:24 AM   "

## 2018-10-02 ASSESSMENT — PATIENT HEALTH QUESTIONNAIRE - PHQ9: SUM OF ALL RESPONSES TO PHQ QUESTIONS 1-9: 0

## 2018-10-08 ENCOUNTER — TRANSFERRED RECORDS (OUTPATIENT)
Dept: HEALTH INFORMATION MANAGEMENT | Facility: OTHER | Age: 73
End: 2018-10-08

## 2018-10-24 ENCOUNTER — ALLIED HEALTH/NURSE VISIT (OUTPATIENT)
Dept: FAMILY MEDICINE | Facility: OTHER | Age: 73
End: 2018-10-24
Attending: NURSE PRACTITIONER
Payer: COMMERCIAL

## 2018-10-24 DIAGNOSIS — Z23 NEED FOR PROPHYLACTIC VACCINATION AND INOCULATION AGAINST INFLUENZA: Primary | ICD-10-CM

## 2018-10-24 PROCEDURE — 90471 IMMUNIZATION ADMIN: CPT

## 2018-10-24 PROCEDURE — 90662 IIV NO PRSV INCREASED AG IM: CPT

## 2018-10-24 NOTE — PROGRESS NOTES

## 2018-10-24 NOTE — MR AVS SNAPSHOT
"              After Visit Summary   10/24/2018    Margaret Batista    MRN: [id number]           Patient Information     Date Of Birth          1945        Visit Information        Provider Department      10/24/2018 11:10 AM  [facility]        Today's Diagnoses     Need for prophylactic vaccination and inoculation against influenza    -  1       Follow-ups after your visit        Your next 10 appointments already scheduled     Nov 13, 2018  9:50 AM CST   PROCEDURE with Evelin Thompson MD,   SURGERY   [facility] ([facility])    [address]  [place] [id number] [phone]              Who to contact     If you have questions or need follow up information about today's clinic visit or your schedule please contact [facility] directly at [phone].  Normal or non-critical lab and imaging results will be communicated to you by [employer], letter or phone within 4 business days after the clinic has received the results. If you do not hear from us within 7 days, please contact the clinic through [employer] or phone. If you have a critical or abnormal lab result, we will notify you by phone as soon as possible.  Submit refill requests through [email or web address] or call your pharmacy and they will forward the refill request to us. Please allow 3 business days for your refill to be completed.          Additional Information About Your Visit        [employer] Information     [email or web address] lets you send messages to your doctor, view your test results, renew your prescriptions, schedule appointments and more. To sign up, go to [email or web address] . Click on \"Log in\" on the left side of the screen, which will take you to the Welcome page. Then click on \"Sign up Now\" on the right side of the page.     You will be asked to enter the access code listed below, as well as some personal information. Please follow the directions to " create your username and password.     Your access code is: [email or web address]  Expires: 2019 11:23 AM     Your access code will  in 90 days. If you need help or a new code, please call your [facility] or [phone].        Care EveryWhere ID     This is your Care EveryWhere ID. This could be used by other organizations to access your [place] medical records  [id number]         Blood Pressure from Last 3 Encounters:   10/01/18 124/88   18 138/88   18 130/90    Weight from Last 3 Encounters:   10/01/18 216 lb 8 oz (98.2 kg)   18 212 lb (96.2 kg)   18 215 lb 4.8 oz (97.7 kg)              We Performed the Following     HC FLU VACCINE, INCREASED ANTIGEN, PRESV FREE     Vaccine Administration, Initial [32560]        Primary Care Provider Office Phone # Fax #    Swetha ROMEO Maxwell [phone] [address] [facility] [address]        Equal Access to Services     [facility]: Hadii aad ku hadasho Sorosieali, waaxda luqadaha, qaybta kaalmada zoë, edna montes . So [facility] [phone].    ATENCIÓN: Si habla español, tiene a almanzar disposición servicios gratuitos de asistencia lingüística. [facility] [phone].    We comply with applicable federal civil rights laws and [place] laws. We do not discriminate on the basis of race, color, national origin, age, disability, sex, sexual orientation, or gender identity.            Thank you!     Thank you for choosing [facility]  for your care. Our goal is always to provide you with excellent care. Hearing back from our patients is one way we can continue to improve our services. Please take a few minutes to complete the written survey that you may receive in the mail after your visit with us. Thank you!             Your Updated Medication List - Protect others around you: Learn how to safely use, store and throw away your medicines at www.disposemymeds.org.           This list is accurate as of 10/24/18 11:23 AM.  Always use your most recent med list.                   Brand Name Dispense Instructions for use Diagnosis    allopurinol 300 MG tablet    ZYLOPRIM     Take 1 tablet by mouth daily        aspirin 81 MG EC tablet      Take 1 tablet by mouth daily        atenolol 50 MG tablet    TENORMIN     Take 2 tablets by mouth daily        atorvastatin 40 MG tablet    LIPITOR     Take 1 tablet by mouth daily        cetirizine 10 MG tablet    zyrTEC     Take 10 mg by mouth daily as needed for allergies        folic acid 1 MG tablet    FOLVITE     Take 1 mg by mouth daily        hydrochlorothiazide 25 MG tablet    HYDRODIURIL    90 tablet    TAKE 1 TABLET DAILY    Essential hypertension       hydroxychloroquine 200 MG tablet    PLAQUENIL          losartan 50 MG tablet    COZAAR     Take 1 tablet by mouth daily        methotrexate 2.5 MG tablet CHEMO      Take 15 mg by mouth Every Monday        nitroGLYcerin 0.4 MG sublingual tablet    NITROSTAT     Place 1 tablet under the tongue every 5 minutes as needed for chest pain (For chest pain x 3 doses)        prasugrel 10 MG Tabs tablet    EFFIENT     Take 1 tablet by mouth every morning        triamcinolone 55 MCG/ACT inhaler    NASACORT     Spray 2 sprays into both nostrils daily        vitamin D3 2000 units Caps      Take 2,000 Units by mouth daily

## 2018-11-06 ENCOUNTER — OFFICE VISIT (OUTPATIENT)
Dept: FAMILY MEDICINE | Facility: OTHER | Age: 73
End: 2018-11-06
Attending: FAMILY MEDICINE
Payer: COMMERCIAL

## 2018-11-06 VITALS
TEMPERATURE: 97.3 F | RESPIRATION RATE: 18 BRPM | SYSTOLIC BLOOD PRESSURE: 124 MMHG | DIASTOLIC BLOOD PRESSURE: 70 MMHG | WEIGHT: 205 LBS | HEART RATE: 76 BPM | HEIGHT: 67 IN | BODY MASS INDEX: 32.18 KG/M2

## 2018-11-06 DIAGNOSIS — M10.9 GOUT, UNSPECIFIED CAUSE, UNSPECIFIED CHRONICITY, UNSPECIFIED SITE: ICD-10-CM

## 2018-11-06 DIAGNOSIS — E78.5 HYPERLIPIDEMIA, UNSPECIFIED HYPERLIPIDEMIA TYPE: ICD-10-CM

## 2018-11-06 DIAGNOSIS — M13.80 SERONEGATIVE ARTHRITIS: ICD-10-CM

## 2018-11-06 DIAGNOSIS — K29.70 GASTRITIS WITHOUT BLEEDING, UNSPECIFIED CHRONICITY, UNSPECIFIED GASTRITIS TYPE: ICD-10-CM

## 2018-11-06 DIAGNOSIS — I10 ESSENTIAL HYPERTENSION: ICD-10-CM

## 2018-11-06 LAB
ALBUMIN SERPL-MCNC: 3.9 G/DL (ref 3.5–5.7)
ALP SERPL-CCNC: 72 U/L (ref 34–104)
ALT SERPL W P-5'-P-CCNC: 14 U/L (ref 7–52)
AMYLASE SERPL-CCNC: 47 U/L (ref 29–103)
ANION GAP SERPL CALCULATED.3IONS-SCNC: 11 MMOL/L (ref 3–14)
AST SERPL W P-5'-P-CCNC: 15 U/L (ref 13–39)
BASOPHILS # BLD AUTO: 0 10E9/L (ref 0–0.2)
BASOPHILS NFR BLD AUTO: 0.4 %
BILIRUB SERPL-MCNC: 0.7 MG/DL (ref 0.3–1)
BUN SERPL-MCNC: 23 MG/DL (ref 7–25)
CALCIUM SERPL-MCNC: 10 MG/DL (ref 8.6–10.3)
CHLORIDE SERPL-SCNC: 101 MMOL/L (ref 98–107)
CO2 SERPL-SCNC: 28 MMOL/L (ref 21–31)
CREAT SERPL-MCNC: 1.24 MG/DL (ref 0.6–1.2)
DIFFERENTIAL METHOD BLD: ABNORMAL
EOSINOPHIL # BLD AUTO: 0.5 10E9/L (ref 0–0.7)
EOSINOPHIL NFR BLD AUTO: 5.3 %
ERYTHROCYTE [DISTWIDTH] IN BLOOD BY AUTOMATED COUNT: 15.3 % (ref 10–15)
ERYTHROCYTE [SEDIMENTATION RATE] IN BLOOD BY WESTERGREN METHOD: 23 MM/H (ref 1–15)
GFR SERPL CREATININE-BSD FRML MDRD: 43 ML/MIN/1.7M2
GLUCOSE SERPL-MCNC: 111 MG/DL (ref 70–105)
HCT VFR BLD AUTO: 39.2 % (ref 35–47)
HGB BLD-MCNC: 12.5 G/DL (ref 11.7–15.7)
IMM GRANULOCYTES # BLD: 0 10E9/L (ref 0–0.4)
IMM GRANULOCYTES NFR BLD: 0.3 %
LYMPHOCYTES # BLD AUTO: 1.6 10E9/L (ref 0.8–5.3)
LYMPHOCYTES NFR BLD AUTO: 16.6 %
MCH RBC QN AUTO: 30.6 PG (ref 26.5–33)
MCHC RBC AUTO-ENTMCNC: 31.9 G/DL (ref 31.5–36.5)
MCV RBC AUTO: 96 FL (ref 78–100)
MONOCYTES # BLD AUTO: 0.7 10E9/L (ref 0–1.3)
MONOCYTES NFR BLD AUTO: 7.6 %
NEUTROPHILS # BLD AUTO: 6.8 10E9/L (ref 1.6–8.3)
NEUTROPHILS NFR BLD AUTO: 69.8 %
PLATELET # BLD AUTO: 296 10E9/L (ref 150–450)
POTASSIUM SERPL-SCNC: 3.4 MMOL/L (ref 3.5–5.1)
PROT SERPL-MCNC: 6.7 G/DL (ref 6.4–8.9)
RBC # BLD AUTO: 4.08 10E12/L (ref 3.8–5.2)
SODIUM SERPL-SCNC: 140 MMOL/L (ref 134–144)
WBC # BLD AUTO: 9.7 10E9/L (ref 4–11)

## 2018-11-06 PROCEDURE — 85025 COMPLETE CBC W/AUTO DIFF WBC: CPT | Performed by: FAMILY MEDICINE

## 2018-11-06 PROCEDURE — G0463 HOSPITAL OUTPT CLINIC VISIT: HCPCS

## 2018-11-06 PROCEDURE — 80053 COMPREHEN METABOLIC PANEL: CPT | Performed by: FAMILY MEDICINE

## 2018-11-06 PROCEDURE — 99214 OFFICE O/P EST MOD 30 MIN: CPT | Performed by: FAMILY MEDICINE

## 2018-11-06 PROCEDURE — 85652 RBC SED RATE AUTOMATED: CPT | Performed by: FAMILY MEDICINE

## 2018-11-06 PROCEDURE — 36415 COLL VENOUS BLD VENIPUNCTURE: CPT | Performed by: FAMILY MEDICINE

## 2018-11-06 PROCEDURE — 82150 ASSAY OF AMYLASE: CPT | Performed by: FAMILY MEDICINE

## 2018-11-06 RX ORDER — ATENOLOL 50 MG/1
100 TABLET ORAL DAILY
Qty: 90 TABLET | Refills: 3 | Status: SHIPPED | OUTPATIENT
Start: 2018-11-06 | End: 2019-02-07

## 2018-11-06 RX ORDER — HYDROCHLOROTHIAZIDE 25 MG/1
25 TABLET ORAL DAILY
Qty: 90 TABLET | Refills: 3 | Status: SHIPPED | OUTPATIENT
Start: 2018-11-06 | End: 2019-10-08

## 2018-11-06 RX ORDER — SUCRALFATE 1 G/1
1 TABLET ORAL 4 TIMES DAILY
Qty: 120 TABLET | Refills: 1 | Status: SHIPPED | OUTPATIENT
Start: 2018-11-06 | End: 2018-12-06

## 2018-11-06 RX ORDER — LOSARTAN POTASSIUM 50 MG/1
50 TABLET ORAL DAILY
Qty: 90 TABLET | Refills: 3 | Status: SHIPPED | OUTPATIENT
Start: 2018-11-06 | End: 2019-02-26

## 2018-11-06 RX ORDER — ALLOPURINOL 300 MG/1
1 TABLET ORAL DAILY
Qty: 90 TABLET | Refills: 3 | Status: SHIPPED | OUTPATIENT
Start: 2018-11-06 | End: 2019-10-08

## 2018-11-06 RX ORDER — ATORVASTATIN CALCIUM 40 MG/1
40 TABLET, FILM COATED ORAL DAILY
Qty: 90 TABLET | Refills: 3 | Status: SHIPPED | OUTPATIENT
Start: 2018-11-06 | End: 2019-07-23

## 2018-11-06 ASSESSMENT — PAIN SCALES - GENERAL: PAINLEVEL: MILD PAIN (2)

## 2018-11-06 NOTE — PROGRESS NOTES
"  SUBJECTIVE:   Nursing Notes:   Ania Contreras LPN  11/6/2018  9:40 AM  Unsigned  Chief Complaint   Patient presents with     Recheck Medication     asa    Patient has had nausea since having her right knee replacement a few weeks ago . She has also had diarrhea off and on also.     Initial /70 (BP Location: Right arm, Patient Position: Sitting, Cuff Size: Adult Large)  Pulse 76  Temp 97.3  F (36.3  C) (Tympanic)  Resp 18  Ht 5' 7.25\" (1.708 m)  Wt 205 lb (93 kg)  Breastfeeding? No  BMI 31.87 kg/m2 Estimated body mass index is 31.87 kg/(m^2) as calculated from the following:    Height as of this encounter: 5' 7.25\" (1.708 m).    Weight as of this encounter: 205 lb (93 kg).  Medication Reconciliation: complete    Ania Contreras LPN    Margaret Batista is a 72 year old female who presents to clinic today for a complaint of of nausea.  She had her right knee replaced a few weeks ago.  She had been taking a lot of tylenol for 1-2 weeks. She was taking 8 regular strength tylenol + 325 mg of aspirin per day.  Started having nausea, so decreased her tylenol to one per day and a baby aspirin.  She is feeling nauseous.  No dizziness.  Had a lot of diarrhea after coming home from the hospital.  She had been on a lot of stool softeners.  She is still having this intermittently since her hospitalization.  In 2011, had a left total knee.  The same type of thing happened after that surgery as well.  She had a lot of testing and even went to the [facility].  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  They put her on methotrexate and plaquenil.  She goes to [place] once a year.  She had been on prednisone at that time as well.  Right now, her low back is bothering her a little, but no other joint pain to speak of.  Last time in 2011, she hurt everywhere and was having swollen joints.  She has lost 11 lb since early October.      HPI    I personally " reviewed medications/allergies/history listed below:    Patient Active Problem List    Diagnosis Date Noted     Primary osteoarthritis of both hands 05/30/2017     Priority: Medium     Mass of finger 05/26/2017     Priority: Medium     Osteopenia 11/21/2016     Priority: Medium     Special screening for malignant neoplasms, colon 04/13/2016     Priority: Medium     Allergic rhinitis 02/01/2016     Priority: Medium     Health care maintenance 11/17/2015     Priority: Medium     Overview:   Colonoscopy: Due, will order  Breast Exam/Mammography: Does not complete self exams; last mammogram 11/2015 and normal, repeat every year however consider moving to every other year soon  Pap smear: Never any abnormal, no longer performing due to age and patient request unless symptomatic  DEXA: Done in 2012 and showed osteopenia; repeat as needed to manage your bone density problem including in the next 6 months  Immunizations: Due for prevnar, given today. UTD on other vaccines.   Lipids/Annual Exam: Done today, repeat annually  Hepatitis C screen: will discuss in future       Status post total shoulder replacement, right 05/28/2015     Priority: Medium     S/P shoulder surgery 02/10/2015     Priority: Medium     Hyperlipidemia 01/16/2015     Priority: Medium     Hypertension 01/16/2015     Priority: Medium     Avascular necrosis of humeral head, right (H) 01/12/2015     Priority: Medium     Rotator cuff tendonitis, right 12/01/2014     Priority: Medium     Shoulder impingement, right 12/01/2014     Priority: Medium     Right shoulder pain 10/14/2014     Priority: Medium     ACP (advance care planning) 10/28/2013     Priority: Medium     Overview:   Formatting of this note may be different from the original.  Patient has identified Health Care Agent(s): Yes  Add Health Care Agents: Yes    Health Care Agent(s):  Primary Health Care Agent: Jay Batista  Relationship:  Phone:    Secondary Health Care Agent:  Relationship:  Daughter Phone:    Conservator:  Relationship:  Phone:    Guardian: Relationship:  Phone:      Patient has Advance Care Plan Documents (Health Care Directive, POLST): No, referral made to Social Work Services.    Patient has identified Specific Treatment Preferences: Yes   Specific Treatment Preferences: a.) Code Status:  CPR/Attempt Resuscitation        ASCVD (arteriosclerotic cardiovascular disease) 10/28/2013     Priority: Medium     Overview:   -s/p ADÁN to the mid-LAD, mid-circumflex, mid-right coronary artery, proximal right coronary artery, and PTCA of a marginal branch of the right coronary artery 04/30/2007.  -s/p ADÁN to proximal left anterior descending 10/29/13.       Rheumatoid arthritis (H) 10/25/2013     Priority: Medium     DJD (degenerative joint disease) of knee 01/13/2011     Priority: Medium     Gout 10/27/2009     Priority: Medium     Past Medical History:   Diagnosis Date     Atherosclerotic heart disease of native coronary artery without angina pectoris     -s/p ADÁN to the mid-LAD, mid-circumflex, mid-right coronary artery, proximal right coronary  artery, and PTCA of a marginal branch of the right coronary artery 04/30/2007. -s/p ADÁN to proximal left anterior descending 10/29/13.     Contusion of abdominal wall     2014     Essential (primary) hypertension     12/14/2006     Gout     No Comments Provided     Hyperlipidemia     4/4/2007     Localized swelling, mass, or lump of upper extremity     5/26/2017     Other specified counseling     10/28/2013,Patient has identified Health Care Agent(s): Yes Add Health Care Agents: Yes   Health Care Agent(s): Primary Health Care Agent: Jay Batista  Relationship:  Phone:   Secondary Health Care Agent:  Relationship: Daughter Phone:   Conservator:  Relationship:  Phone:   Guardian: Relationship:  Phone:    Patient has Advance Care Plan Documents (Health Care Directive, POLST): No, refe*     Polyosteoarthritis     No Comments Provided      Postmenopausal bleeding     No Comments Provided     Presence of coronary angioplasty implant and graft     ,unstable angina; severe prox LAD stenosis; s/p 7 stents     Primary osteoarthritis of left hand     2017     Rheumatoid arthritis (H)     follows at [facility] yearly      Past Surgical History:   Procedure Laterality Date     ARTHROPLASTY KNEE      2011     ARTHROSCOPY KNEE           BIOPSY BREAST      10/25/95,BRIAN RAMIREZ      SECTION      1974, Section     COLONOSCOPY      05,Normal - Repeat in 10 years     ESOPHAGOSCOPY, GASTROSCOPY, DUODENOSCOPY (EGD), COMBINED      2011,erosive gastritis;bx;consider MRI/A;[facility]     EXTRACTION(S) DENTAL      1970     HEART CATH, ANGIOPLASTY      10/29/2013,ADÁN to proximal left anterior descending     OTHER SURGICAL HISTORY      ,149684,OTHER     OTHER SURGICAL HISTORY      2014,DVD049,TOTAL SHOULDER ARTHROPLASTY,Right     OTHER SURGICAL HISTORY      2016,38189.0,FL COLONOSCOPY REMOVE ELIZA POLYP LESN SNARE,repeat , precancerous polyps     Family History   Problem Relation Age of Onset     HEART DISEASE Mother      Heart Disease     Hypertension Mother      Hypertension     Other - See Comments Mother      Stroke     Breast Cancer Mother 53     Cancer-breast     Arthritis Father      Arthritis     Cancer Father      Cancer     HEART DISEASE Father      Heart Disease     Hypertension Father      Hypertension     Family History Negative Brother      Good Health     HEART DISEASE Brother      Heart Disease     Family History Negative Sister      Good Health     Social History   Substance Use Topics     Smoking status: Never Smoker     Smokeless tobacco: Never Used     Alcohol use 0.0 oz/week      Comment: Alcoholic Drinks/day: Rare use. ~2 glasses of wine a month     Social History     Social History Narrative    . Moved to [place], [place] Summer of 2013 from [place],  [place]. Has a daughter (lives in [place]) and son (who lives in [place]). 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 Never smoked. Rare alcohol use.     Current Outpatient Prescriptions   Medication Sig Dispense Refill     allopurinol (ZYLOPRIM) 300 MG tablet Take 1 tablet (300 mg) by mouth daily 90 tablet 3     aspirin EC 81 MG EC tablet Take 1 tablet by mouth daily       atenolol (TENORMIN) 50 MG tablet Take 2 tablets (100 mg) by mouth daily 90 tablet 3     atorvastatin (LIPITOR) 40 MG tablet Take 1 tablet (40 mg) by mouth daily 90 tablet 3     cetirizine (ZYRTEC) 10 MG tablet Take 10 mg by mouth daily as needed for allergies       Cholecalciferol (VITAMIN D3) 2000 UNITS CAPS Take 2,000 Units by mouth daily       folic acid (FOLVITE) 1 MG tablet Take 1 mg by mouth daily       hydrochlorothiazide (HYDRODIURIL) 25 MG tablet Take 1 tablet (25 mg) by mouth daily 90 tablet 3     hydroxychloroquine (PLAQUENIL) 200 MG tablet        losartan (COZAAR) 50 MG tablet Take 1 tablet (50 mg) by mouth daily 90 tablet 3     methotrexate 2.5 MG tablet CHEMO Take 15 mg by mouth Every Monday       nitroGLYcerin (NITROSTAT) 0.4 MG sublingual tablet Place 1 tablet under the tongue every 5 minutes as needed for chest pain (For chest pain x 3 doses)       prasugrel (EFFIENT) 10 MG TABS tablet Take 1 tablet by mouth every morning       sucralfate (CARAFATE) 1 GM tablet Take 1 tablet (1 g) by mouth 4 times daily 120 tablet 1     triamcinolone (NASACORT) 55 MCG/ACT Inhaler Spray 2 sprays into both nostrils daily       Allergies   Allergen Reactions     Food      Macadamia nuts make mouth itch     Enalapril Cough       Review of Systems   Constitutional: Negative for chills and fever.   Gastrointestinal: Positive for nausea. Negative for abdominal distention, abdominal pain, anal bleeding, constipation, diarrhea and vomiting.   Genitourinary: Negative for dysuria.   Psychiatric/Behavioral: Negative for mood  "changes.        OBJECTIVE:     /70 (BP Location: Right arm, Patient Position: Sitting, Cuff Size: Adult Large)  Pulse 76  Temp 97.3  F (36.3  C) (Tympanic)  Resp 18  Ht 5' 7.25\" (1.708 m)  Wt 205 lb (93 kg)  Breastfeeding? No  BMI 31.87 kg/m2  Body mass index is 31.87 kg/(m^2).  Physical Exam   Constitutional: She appears well-developed.   HENT:   Head: Normocephalic.   Mouth/Throat: Oropharynx is clear and moist.   Eyes: Pupils are equal, round, and reactive to light.   Neck: Normal range of motion. Neck supple. No thyromegaly present.   Cardiovascular: Normal rate, regular rhythm and normal heart sounds.    No murmur heard.  Pulmonary/Chest: Effort normal and breath sounds normal. No respiratory distress. She has no wheezes. She has no rales.   Abdominal: Soft. Bowel sounds are normal. She exhibits no distension and no mass. There is no tenderness. There is no rebound and no guarding.   Musculoskeletal: She exhibits no edema.   Lymphadenopathy:     She has no cervical adenopathy.   Psychiatric: She has a normal mood and affect.         PHQ-2 Score:     PHQ-2 ( 1999 [employer]) 11/6/2018 8/6/2018   Q1: Little interest or pleasure in doing things 0 0   Q2: Feeling down, depressed or hopeless 0 0   PHQ-2 Score 0 0       I personally reviewed results withpatient as listed below:   Diagnostic Test Results:  Results for orders placed or performed in visit on 11/06/18   CBC with platelets differential   Result Value Ref Range    WBC 9.7 4.0 - 11.0 10e9/L    RBC Count 4.08 3.8 - 5.2 10e12/L    Hemoglobin 12.5 11.7 - 15.7 g/dL    Hematocrit 39.2 35.0 - 47.0 %    MCV 96 78 - 100 fl    MCH 30.6 26.5 - 33.0 pg    MCHC 31.9 31.5 - 36.5 g/dL    RDW 15.3 (H) 10.0 - 15.0 %    Platelet Count 296 150 - 450 10e9/L    Diff Method Automated Method     % Neutrophils 69.8 %    % Lymphocytes 16.6 %    % Monocytes 7.6 %    % Eosinophils 5.3 %    % Basophils 0.4 %    % Immature Granulocytes 0.3 %    Absolute Neutrophil 6.8 1.6 - " 8.3 10e9/L    Absolute Lymphocytes 1.6 0.8 - 5.3 10e9/L    Absolute Monocytes 0.7 0.0 - 1.3 10e9/L    Absolute Eosinophils 0.5 0.0 - 0.7 10e9/L    Absolute Basophils 0.0 0.0 - 0.2 10e9/L    Abs Immature Granulocytes 0.0 0 - 0.4 10e9/L   Comprehensive Metabolic Panel   Result Value Ref Range    Sodium 140 134 - 144 mmol/L    Potassium 3.4 (L) 3.5 - 5.1 mmol/L    Chloride 101 98 - 107 mmol/L    Carbon Dioxide 28 21 - 31 mmol/L    Anion Gap 11 3 - 14 mmol/L    Glucose 111 (H) 70 - 105 mg/dL    Urea Nitrogen 23 7 - 25 mg/dL    Creatinine 1.24 (H) 0.60 - 1.20 mg/dL    GFR Estimate 43 (L) >60 mL/min/1.7m2    GFR Estimate If Black 52 (L) >60 mL/min/1.7m2    Calcium 10.0 8.6 - 10.3 mg/dL    Bilirubin Total 0.7 0.3 - 1.0 mg/dL    Albumin 3.9 3.5 - 5.7 g/dL    Protein Total 6.7 6.4 - 8.9 g/dL    Alkaline Phosphatase 72 34 - 104 U/L    ALT 14 7 - 52 U/L    AST 15 13 - 39 U/L   Sedimentation Rate (ESR)   Result Value Ref Range    Sed Rate 23 (H) 1 - 15 mm/h   Amylase   Result Value Ref Range    Amylase 47 29 - 103 U/L       ASSESSMENT/PLAN:       ICD-10-CM    1. Gastritis without bleeding, unspecified chronicity, unspecified gastritis type K29.70 CBC with platelets differential     Comprehensive Metabolic Panel     Amylase     sucralfate (CARAFATE) 1 GM tablet     CBC with platelets differential     Comprehensive Metabolic Panel     Amylase   2. Seronegative arthritis M13.80 Sedimentation Rate (ESR)     Sedimentation Rate (ESR)   3. Essential hypertension I10 hydrochlorothiazide (HYDRODIURIL) 25 MG tablet     atenolol (TENORMIN) 50 MG tablet     losartan (COZAAR) 50 MG tablet   4. Hyperlipidemia, unspecified hyperlipidemia type E78.5 atorvastatin (LIPITOR) 40 MG tablet   5. Gout, unspecified cause, unspecified chronicity, unspecified site M10.9 allopurinol (ZYLOPRIM) 300 MG tablet       1.  Complete Blood Count was normal.  Potassium was slightly low and creatinine up a bit.  Suspect not taking enough fluids with decreased  appetite.  Encouraged her to eat a banana daily to improve potassium.  Suspect that she likely has a gastritis from having taken more aspirin and tylenol than she is used to.  Only minimal ESR increase and discussed that she doesn't have other joint pain to suspect flare of her arthritis.  Recommend trial of carafate to see if this helps symptoms subside.  If not improving or worsening, would recommend follow up and consider imaging vs EGD.  2.  See above.  Continues on plaquenil and methotrexate.  Follows with Rheumatology.  3.  Blood pressure stable.  Medications refilled as above.  4.  Stable.  Atorvastatin refilled.  5.  Stable.  Allopurinol refilled.  Madhuri Richardson MD on 11/7/2018 at 10:35 AM     Madhuri Richardson MD  [facility]

## 2018-11-06 NOTE — MR AVS SNAPSHOT
After Visit Summary   11/6/2018    Margaret Batista    MRN: [id number]           Patient Information     Date Of Birth          1945        Visit Information        Provider Department      11/6/2018 9:30 AM Madhuri Richardson MD [facility]        Today's Diagnoses     Essential hypertension    -  1    Hyperlipidemia, unspecified hyperlipidemia type        Gout, unspecified cause, unspecified chronicity, unspecified site        Gastritis without bleeding, unspecified chronicity, unspecified gastritis type        Seronegative arthritis           Follow-ups after your visit        Your next 10 appointments already scheduled     Nov 13, 2018  9:50 AM CST   PROCEDURE with Evelin Thompson MD,   SURGERY   [facility] ([facility])    [address]  [facility] [id number] [phone]            Dec 17, 2018 11:00 AM CST   New Visit with Tomy Tejada   [facility] ([facility]    [address]  [facility] [id number] [phone]              Future tests that were ordered for you today     Open Future Orders        Priority Expected Expires Ordered    Amylase Routine  11/6/2019 11/6/2018    CBC with platelets differential Routine  11/7/2019 11/6/2018    Comprehensive Metabolic Panel Routine  11/6/2019 11/6/2018    Sedimentation Rate (ESR) Routine  11/6/2019 11/6/2018            Who to contact     If you have questions or need follow up information about today's clinic visit or your schedule please contact [facility] directly at [phone].  Normal or non-critical lab and imaging results will be communicated to you by MyChart, letter or phone within 4 business days after the clinic has received the results. If you do not hear from us within 7 days, please contact the clinic through MyChart or phone. If you have a critical or abnormal  "lab result, we will notify you by phone as soon as possible.  Submit refill requests through go[employer] or call your pharmacy and they will forward the refill request to us. Please allow 3 business days for your refill to be completed.          Additional Information About Your Visit        [employer] Information     [employer] gives you secure access to your electronic health record. If you see a primary care provider, you can also send messages to your care team and make appointments. If you have questions, please call your primary care clinic.  If you do not have a primary care provider, please call [phone] and they will assist you.        Care EveryWhere ID     This is your Care EveryWhere ID. This could be used by other organizations to access your [facility] medical records  [id number]        Your Vitals Were     Pulse Temperature Respirations Height Breastfeeding? BMI (Body Mass Index)    76 97.3  F (36.3  C) (Tympanic) 18 5' 7.25\" (1.708 m) No 31.87 kg/m2       Blood Pressure from Last 3 Encounters:   11/06/18 124/70   10/01/18 124/88   08/06/18 138/88    Weight from Last 3 Encounters:   11/06/18 205 lb (93 kg)   10/01/18 216 lb 8 oz (98.2 kg)   08/06/18 212 lb (96.2 kg)                 Today's Medication Changes          These changes are accurate as of 11/6/18 10:09 AM.  If you have any questions, ask your nurse or doctor.               Start taking these medicines.        Dose/Directions    sucralfate 1 GM tablet   Commonly known as:  CARAFATE   Used for:  Gastritis without bleeding, unspecified chronicity, unspecified gastritis type   Started by:  Madhuri Richardson MD        Dose:  1 g   Take 1 tablet (1 g) by mouth 4 times daily   Quantity:  120 tablet   Refills:  1         These medicines have changed or have updated prescriptions.        Dose/Directions    hydrochlorothiazide 25 MG tablet   Commonly known as:  HYDRODIURIL   This may have changed:  See the new instructions.   Used for:  Essential " hypertension   Changed by:  Madhuri Richardson MD        Dose:  25 mg   Take 1 tablet (25 mg) by mouth daily   Quantity:  90 tablet   Refills:  3            Where to get your medicines      These medications were sent to [facility] - [address] [address]     Phone:  [phone]     allopurinol 300 MG tablet    atenolol 50 MG tablet    atorvastatin 40 MG tablet    hydrochlorothiazide 25 MG tablet    losartan 50 MG tablet         Some of these will need a paper prescription and others can be bought over the counter.  Ask your nurse if you have questions.     Bring a paper prescription for each of these medications     sucralfate 1 GM tablet                Primary Care Provider Office Phone # Fax #    Swetha ROMEO Maxwell [phone] [address] [facility] [address]        Equal Access to Services     [facility]: Hadii marzena de jesus hadasho Soomaali, waaxda luqadaha, qaybta kaalmada adeegyada, edna wang haymarielle montes . So [facility] [phone].    ATENCIÓN: Si habla español, tiene a almanzar disposición servicios gratuitos de asistencia lingüística. Ricoame al [phone].    We comply with applicable federal civil rights laws and [place] laws. We do not discriminate on the basis of race, color, national origin, age, disability, sex, sexual orientation, or gender identity.            Thank you!     Thank you for choosing [facility]  for your care. Our goal is always to provide you with excellent care. Hearing back from our patients is one way we can continue to improve our services. Please take a few minutes to complete the written survey that you may receive in the mail after your visit with us. Thank you!             Your Updated Medication List - Protect others around you: Learn how to safely use, store and throw away your medicines at www.disposemymeds.org.           This list is accurate as of 11/6/18 10:09 AM.  Always use your most recent med list.                   Brand Name Dispense Instructions for use Diagnosis    allopurinol 300 MG tablet    ZYLOPRIM    90 tablet    Take 1 tablet (300 mg) by mouth daily    Gout, unspecified cause, unspecified chronicity, unspecified site       aspirin 81 MG EC tablet      Take 1 tablet by mouth daily        atenolol 50 MG tablet    TENORMIN    90 tablet    Take 2 tablets (100 mg) by mouth daily    Essential hypertension       atorvastatin 40 MG tablet    LIPITOR    90 tablet    Take 1 tablet (40 mg) by mouth daily    Hyperlipidemia, unspecified hyperlipidemia type       cetirizine 10 MG tablet    zyrTEC     Take 10 mg by mouth daily as needed for allergies        folic acid 1 MG tablet    FOLVITE     Take 1 mg by mouth daily        hydrochlorothiazide 25 MG tablet    HYDRODIURIL    90 tablet    Take 1 tablet (25 mg) by mouth daily    Essential hypertension       hydroxychloroquine 200 MG tablet    PLAQUENIL          losartan 50 MG tablet    COZAAR    90 tablet    Take 1 tablet (50 mg) by mouth daily    Essential hypertension       methotrexate 2.5 MG tablet CHEMO      Take 15 mg by mouth Every Monday        nitroGLYcerin 0.4 MG sublingual tablet    NITROSTAT     Place 1 tablet under the tongue every 5 minutes as needed for chest pain (For chest pain x 3 doses)        prasugrel 10 MG Tabs tablet    EFFIENT     Take 1 tablet by mouth every morning        sucralfate 1 GM tablet    CARAFATE    120 tablet    Take 1 tablet (1 g) by mouth 4 times daily    Gastritis without bleeding, unspecified chronicity, unspecified gastritis type       triamcinolone 55 MCG/ACT inhaler    NASACORT     Spray 2 sprays into both nostrils daily        vitamin D3 2000 units Caps      Take 2,000 Units by mouth daily

## 2018-11-06 NOTE — NURSING NOTE
"Chief Complaint   Patient presents with     Recheck Medication     asa    Patient has had nausea since having her right knee replacement a few weeks ago . She has also had diarrhea off and on also.     Initial /70 (BP Location: Right arm, Patient Position: Sitting, Cuff Size: Adult Large)  Pulse 76  Temp 97.3  F (36.3  C) (Tympanic)  Resp 18  Ht 5' 7.25\" (1.708 m)  Wt 205 lb (93 kg)  Breastfeeding? No  BMI 31.87 kg/m2 Estimated body mass index is 31.87 kg/(m^2) as calculated from the following:    Height as of this encounter: 5' 7.25\" (1.708 m).    Weight as of this encounter: 205 lb (93 kg).  Medication Reconciliation: complete    Ania Contreras LPN  "

## 2018-11-07 ASSESSMENT — ENCOUNTER SYMPTOMS
FEVER: 0
CHILLS: 0
CONSTIPATION: 0
VOMITING: 0
NAUSEA: 1
DYSURIA: 0
ABDOMINAL PAIN: 0
ABDOMINAL DISTENTION: 0
ANAL BLEEDING: 0
DIARRHEA: 0

## 2018-11-13 ENCOUNTER — OFFICE VISIT (OUTPATIENT)
Dept: SURGERY | Facility: OTHER | Age: 73
End: 2018-11-13
Attending: NURSE PRACTITIONER
Payer: COMMERCIAL

## 2018-11-13 VITALS — WEIGHT: 204.8 LBS | SYSTOLIC BLOOD PRESSURE: 118 MMHG | DIASTOLIC BLOOD PRESSURE: 70 MMHG | BODY MASS INDEX: 31.84 KG/M2

## 2018-11-13 DIAGNOSIS — L98.9 SKIN LESION: ICD-10-CM

## 2018-11-13 DIAGNOSIS — L82.0 INFLAMED SEBORRHEIC KERATOSIS: ICD-10-CM

## 2018-11-13 DIAGNOSIS — Z12.11 SPECIAL SCREENING FOR MALIGNANT NEOPLASMS, COLON: Primary | ICD-10-CM

## 2018-11-13 DIAGNOSIS — L82.1 SEBORRHEIC KERATOSIS: Primary | ICD-10-CM

## 2018-11-13 PROCEDURE — 17110 DESTRUCTION B9 LES UP TO 14: CPT | Performed by: SURGERY

## 2018-11-13 PROCEDURE — G0463 HOSPITAL OUTPT CLINIC VISIT: HCPCS | Mod: 25

## 2018-11-13 PROCEDURE — 17110 DESTRUCTION B9 LES UP TO 14: CPT

## 2018-11-13 RX ORDER — BISACODYL 5 MG/1
TABLET, DELAYED RELEASE ORAL
Qty: 2 TABLET | Refills: 0 | Status: ON HOLD | OUTPATIENT
Start: 2018-11-13 | End: 2019-01-09

## 2018-11-13 RX ORDER — POLYETHYLENE GLYCOL 3350, SODIUM CHLORIDE, SODIUM BICARBONATE, POTASSIUM CHLORIDE 420; 11.2; 5.72; 1.48 G/4L; G/4L; G/4L; G/4L
4000 POWDER, FOR SOLUTION ORAL ONCE
Qty: 4000 ML | Refills: 0 | Status: SHIPPED | OUTPATIENT
Start: 2018-11-13 | End: 2019-02-15

## 2018-11-13 ASSESSMENT — PAIN SCALES - GENERAL: PAINLEVEL: MILD PAIN (2)

## 2018-11-13 NOTE — TELEPHONE ENCOUNTER
Screening Questions for the Scheduling of Screening Colonoscopies   (If Colonoscopy is diagnostic, Provider should review the chart before scheduling.)  Are you younger than 50 or older than 80?  NO  Do you take aspirin or fish oil?  YES (if yes, tell patient to stop 1 week prior to Colonoscopy)  Do you take warfarin (Coumadin), clopidogrel (Plavix), apixaban (Eliquis), dabigatram (Pradaxa), rivaroxaban (Xarelto) or any blood thinner? YES  Do you use oxygen at home?  NO  Do you have kidney disease? NO  Are you on dialysis? NO  Have you had a stroke or heart attack in the last year? NO  Have you had a stent in your heart or any blood vessel in the last year? NO-LAST STENT 2015  Have you had a transplant of any organ? NO  Have you had a colonoscopy or upper endoscopy (EGD) before? YES         When?  3YRS AGO  Date of scheduled Colonoscopy. 1/9/2019  Provider DR HEMPHILL  [facility]

## 2018-11-13 NOTE — NURSING NOTE
"Chief Complaint   Patient presents with     Lesion Removal     from left side       Initial /70 (BP Location: Right arm, Patient Position: Sitting, Cuff Size: Adult Large)  Wt 204 lb 12.8 oz (92.9 kg)  Breastfeeding? No  BMI 31.84 kg/m2 Estimated body mass index is 31.84 kg/(m^2) as calculated from the following:    Height as of 11/6/18: 5' 7.25\" (1.708 m).    Weight as of this encounter: 204 lb 12.8 oz (92.9 kg).  Medication Reconciliation: complete    Sujatha Lynch LPN  "

## 2018-11-13 NOTE — PROGRESS NOTES
Patient with inflamed SK.  /70 (BP Location: Right arm, Patient Position: Sitting, Cuff Size: Adult Large)  Wt 204 lb 12.8 oz (92.9 kg)  Breastfeeding? No  BMI 31.84 kg/m2  General-no acute distress  We discussed cryo therapy of the lesion. We specifically discussed the risks of infection, discoloration and the possible need for further treatments. The patient expressed understanding and the patient wishes to proceed. Informed consent paperwork was completed.   Procedure:  The area of the skin lesion was treated with liquid nitrogen for 2 freeze thaw cycles. The patient tolerated the procedure with no immediately apparent complications. We revieweddischarge instructions. The patient will call for any concerns. The patient will follow up in 6 weeks for a recheck of the area if it hasn't completely resolved. The patient denies questions at this time.

## 2018-11-13 NOTE — MR AVS SNAPSHOT
After Visit Summary   11/13/2018    Margaret Batista    MRN: [id number]           Patient Information     Date Of Birth          1945        Visit Information        Provider Department      11/13/2018 9:50 AM Evelin Thompson MD;   SURGERY [facility]        Today's Diagnoses     Seborrheic keratosis    -  1    Skin lesion        Inflamed seborrheic keratosis          Care Instructions    What to Expect Following Cryosurgery (Liquid Nitrogen)   What isCryosurgery?   Cryosurgery is a technique for removing skin lesions that primarily involve the surface of the skin, such as warts, seborrheic keratosis, or actinic keratosis. It is a quick method of removing the lesion with minimal scarring.   The liquid nitrogen needs nadeem applied long enough to freeze the affected skin. By freezing the skin, a blister is created underneath the lesion. Ideally, as the new skin forms underneath the blister, the abnormal skin on the roof of the blister peelsoff. Occasionally, if the lesion is very thick (such as a large wart), only the surface is blistered off. The base or residual lesion may need to be frozen at another visit.   It takes about one to two weeks for the scabto fall off, which is when the new layer of skin has formed under the blister. Areas of thinner skin, such as the face, may heal a little faster.      What to Expect Over the Next Few Weeks     During Treatment - Area being treated will sting, burn, and then possibly itch.     Immediately After Treatment - Area will be red, sore, and swollen.     Next Day - Blister or blood blister has formed, tendernessstarts to subside. Apply a Band-Aid if necessary.     7 Days - Surface is dark red/brown and scab-like. You can apply an antibacterial ointment, such as Polysporin , but you don't have to.     2 to 4 Weeks - The surface starts to peel off. This may be encouraged gently during bathing, when the scab is softened.     No  makeup should be applied until area is fully healed.      How to Take Care of the Skin after Cryosurgery   A Band-Aid can be used for larger blisters or blisters in areas that are more likely to betraumatized -such as fingers and toes. If the area becomes dry or crusted, an ointment (Vaseline , Bacitracin , or Polysporin ) can also be applied.   Cleanse area with a mild cleanser and cool water.   Patthe area dry with a lint-free cloth.   Avoid glycolic acids, Vitamin C, scrubs, Tretinoin (Retin-A), and Retinol creams for 7 to 10 days.  The area may get wet while bathing, but swimming or hot tub use should beavoided for one week following a treatment or while the skin is open.   Within 24 hours, you can expect the area to be swollen and/or blistered. The blister may not be visible to the naked eye.   Within one week, theswelling goes down. The top becomes dark red and scab-like. The scab will loosen over the next weeks, and should fall off within one month.      Adverse Effects   The most common adverse effects are pain,swelling/blistering, potential for infection, and discoloration of the skin after it heals.     Blisters  Anytime a blister surfaces, whether from ill-fitting shoes, an oven burn, or liquid nitrogencryosurgery, it will be a bit painful. For most patients, the pain is a temporary sting with some discomfort periodically over the next day as the blister forms.   The goal is to achieve a blister. This means, mostcommonly, patients will have a blister form following treatment. Sometimes, the blister is so thin that it can't be seen and may have minimal swelling. Occasionally, a blood blister forms that can be quite dramatic but isharmless.   Rarely, the blister may become infected. When this happens, the blister becomes unusually tender, the fluid becomes cloudy, and the redness around it becomes more extensive (and may even form streaks). If this happens, contact our office.   Some lesions, especially  those on the face, may leave a slight palediscoloration.   True scarring, involving deeper layers of the skin is unlikely.             Follow-ups after your visit        Follow-up notes from your care team     Return for Endoscopy-follow up polyps in January.      Your next 10 appointments already scheduled     Dec 17, 2018 11:00 AM CST   New Visit with Tomy Tejada   [facility]    [address]  [place] [address] [phone]              Who to contact     If you have questions or need follow up information about today's clinic visit or your schedule please contact [facility] directly at [phone].  Normal or non-critical lab and imaging results will be communicated to you by [employer], letter or phone within 4 business days after the clinic has received the results. If you do not hear from us within 7 days, please contact the clinic through [employer] or phone. If you have a critical or abnormal lab result, we will notify you by phone as soon as possible.  Submit refill requests through [employer] or call your pharmacy and they will forward the refill request to us. Please allow 3 business days for your refill to be completed.          Additional Information About Your Visit        MyChart Information     [employer] gives you secure access to your electronic health record. If you see a primary care provider, you can also send messages to your care team and make appointments. If you have questions, please call your primary care clinic.  If you do not have a primary care provider, please call [phone] and they will assist you.        Care EveryWhere ID     This is your Care EveryWhere ID. This could be used by other organizations to access your [place] medical records  [id number]        Your Vitals Were     Breastfeeding? BMI (Body Mass Index)                No 31.84 kg/m2           Blood Pressure from Last 3  Encounters:   11/13/18 118/70   11/06/18 124/70   10/01/18 124/88    Weight from Last 3 Encounters:   11/13/18 204 lb 12.8 oz (92.9 kg)   11/06/18 205 lb (93 kg)   10/01/18 216 lb 8 oz (98.2 kg)              We Performed the Following     C DESTROY < 15 BENIGN SKIN LESIONS        Primary Care Provider Office Phone # Fax #    Swetha MaxwellLEONID [phone]       [address]  [facility] [address]        Equal Access to Services     [facility]: Hadii aad ku hadasho Soomaali, waaxda luqadaha, qaybta kaalmada adeegyada, edna montes . [facility] [phone].    ATENCIÓN: Si habla español, tiene a almanzar disposición servicios gratuitos de asistencia lingüística. [facility] [phone].    We comply with applicable federal civil rights laws and [place] laws. We do not discriminate on the basis of race, color, national origin, age, disability, sex, sexual orientation, or gender identity.            Thank you!     Thank you for choosing [facility]  for your care. Our goal is always to provide you with excellent care. Hearing back from our patients is one way we can continue to improve our services. Please take a few minutes to complete the written survey that you may receive in the mail after your visit with us. Thank you!             Your Updated Medication List - Protect others around you: Learn how to safely use, store and throw away your medicines at www.disposemymeds.org.          This list is accurate as of 11/13/18 10:42 AM.  Always use your most recent med list.                   Brand Name Dispense Instructions for use Diagnosis    allopurinol 300 MG tablet    ZYLOPRIM    90 tablet    Take 1 tablet (300 mg) by mouth daily    Gout, unspecified cause, unspecified chronicity, unspecified site       aspirin 81 MG EC tablet      Take 1 tablet by mouth daily        atenolol 50 MG tablet    TENORMIN    90 tablet    Take 2 tablets (100 mg) by  mouth daily    Essential hypertension       atorvastatin 40 MG tablet    LIPITOR    90 tablet    Take 1 tablet (40 mg) by mouth daily    Hyperlipidemia, unspecified hyperlipidemia type       cetirizine 10 MG tablet    zyrTEC     Take 10 mg by mouth daily as needed for allergies        folic acid 1 MG tablet    FOLVITE     Take 1 mg by mouth daily        hydrochlorothiazide 25 MG tablet    HYDRODIURIL    90 tablet    Take 1 tablet (25 mg) by mouth daily    Essential hypertension       hydroxychloroquine 200 MG tablet    PLAQUENIL          losartan 50 MG tablet    COZAAR    90 tablet    Take 1 tablet (50 mg) by mouth daily    Essential hypertension       methotrexate 2.5 MG tablet CHEMO      Take 15 mg by mouth Every Monday        nitroGLYcerin 0.4 MG sublingual tablet    NITROSTAT     Place 1 tablet under the tongue every 5 minutes as needed for chest pain (For chest pain x 3 doses)        prasugrel 10 MG Tabs tablet    EFFIENT     Take 1 tablet by mouth every morning        sucralfate 1 GM tablet    CARAFATE    120 tablet    Take 1 tablet (1 g) by mouth 4 times daily    Gastritis without bleeding, unspecified chronicity, unspecified gastritis type       triamcinolone 55 MCG/ACT inhaler    NASACORT     Spray 2 sprays into both nostrils daily        vitamin D3 2000 units Caps      Take 2,000 Units by mouth daily

## 2018-11-13 NOTE — PATIENT INSTRUCTIONS
What to Expect Following Cryosurgery (Liquid Nitrogen)   What isCryosurgery?   Cryosurgery is a technique for removing skin lesions that primarily involve the surface of the skin, such as warts, seborrheic keratosis, or actinic keratosis. It is a quick method of removing the lesion with minimal scarring.   The liquid nitrogen needs nadeem applied long enough to freeze the affected skin. By freezing the skin, a blister is created underneath the lesion. Ideally, as the new skin forms underneath the blister, the abnormal skin on the roof of the blister peelsoff. Occasionally, if the lesion is very thick (such as a large wart), only the surface is blistered off. The base or residual lesion may need to be frozen at another visit.   It takes about one to two weeks for the scabto fall off, which is when the new layer of skin has formed under the blister. Areas of thinner skin, such as the face, may heal a little faster.      What to Expect Over the Next Few Weeks     During Treatment - Area being treated will sting, burn, and then possibly itch.     Immediately After Treatment - Area will be red, sore, and swollen.     Next Day - Blister or blood blister has formed, tendernessstarts to subside. Apply a Band-Aid if necessary.     7 Days - Surface is dark red/brown and scab-like. You can apply an antibacterial ointment, such as Polysporin , but you don't have to.     2 to 4 Weeks - The surface starts to peel off. This may be encouraged gently during bathing, when the scab is softened.     No makeup should be applied until area is fully healed.      How to Take Care of the Skin after Cryosurgery   A Band-Aid can be used for larger blisters or blisters in areas that are more likely to betraumatized -such as fingers and toes. If the area becomes dry or crusted, an ointment (Vaseline , Bacitracin , or Polysporin ) can also be applied.   Cleanse area with a mild cleanser and cool water.   Patthe area dry with a lint-free cloth.    Avoid glycolic acids, Vitamin C, scrubs, Tretinoin (Retin-A), and Retinol creams for 7 to 10 days.  The area may get wet while bathing, but swimming or hot tub use should beavoided for one week following a treatment or while the skin is open.   Within 24 hours, you can expect the area to be swollen and/or blistered. The blister may not be visible to the naked eye.   Within one week, theswelling goes down. The top becomes dark red and scab-like. The scab will loosen over the next weeks, and should fall off within one month.      Adverse Effects   The most common adverse effects are pain,swelling/blistering, potential for infection, and discoloration of the skin after it heals.     Blisters  Anytime a blister surfaces, whether from ill-fitting shoes, an oven burn, or liquid nitrogencryosurgery, it will be a bit painful. For most patients, the pain is a temporary sting with some discomfort periodically over the next day as the blister forms.   The goal is to achieve a blister. This means, mostcommonly, patients will have a blister form following treatment. Sometimes, the blister is so thin that it can't be seen and may have minimal swelling. Occasionally, a blood blister forms that can be quite dramatic but isharmless.   Rarely, the blister may become infected. When this happens, the blister becomes unusually tender, the fluid becomes cloudy, and the redness around it becomes more extensive (and may even form streaks). If this happens, contact our office.   Some lesions, especially those on the face, may leave a slight palediscoloration.   True scarring, involving deeper layers of the skin is unlikely.

## 2018-11-20 ENCOUNTER — TELEPHONE (OUTPATIENT)
Dept: FAMILY MEDICINE | Facility: OTHER | Age: 73
End: 2018-11-20

## 2018-11-20 NOTE — TELEPHONE ENCOUNTER
I think it would be ok to try omeprazole to see if this provides any relief.  I think she should have an EGD to evaluate this further.  I'm concerned that she could have an ulcer or other process that is not improving.  If she is ok with that, please let me know and I will place an order.  Madhuri Richardson MD on 11/20/2018 at 4:57 PM

## 2018-11-20 NOTE — TELEPHONE ENCOUNTER
Patient continues to have c/o nausea, was started on Carafate, states helped for awhile but now it is not. She wondered about stopping that and starting omeprazole? States its kind of in her throat as well.  She will buy this OTC. And wondering if you have any other suggestions?  Ani Hardy LPN ...... 11/20/2018 11:55 AM

## 2018-11-21 NOTE — TELEPHONE ENCOUNTER
Patient notified, she will try the omeprazole and call us early next week if not feeling better, and decide if she would like to proceed.  Ani Hardy LPN ...... 11/21/2018 8:45 AM

## 2018-12-06 ENCOUNTER — OFFICE VISIT (OUTPATIENT)
Dept: FAMILY MEDICINE | Facility: OTHER | Age: 73
End: 2018-12-06
Attending: FAMILY MEDICINE
Payer: COMMERCIAL

## 2018-12-06 ENCOUNTER — TELEPHONE (OUTPATIENT)
Dept: FAMILY MEDICINE | Facility: OTHER | Age: 73
End: 2018-12-06

## 2018-12-06 VITALS
DIASTOLIC BLOOD PRESSURE: 80 MMHG | HEART RATE: 80 BPM | RESPIRATION RATE: 18 BRPM | SYSTOLIC BLOOD PRESSURE: 134 MMHG | WEIGHT: 205 LBS | TEMPERATURE: 98 F | BODY MASS INDEX: 31.87 KG/M2

## 2018-12-06 DIAGNOSIS — R10.13 EPIGASTRIC PAIN: ICD-10-CM

## 2018-12-06 DIAGNOSIS — R11.0 NAUSEA: Primary | ICD-10-CM

## 2018-12-06 LAB
ALBUMIN SERPL-MCNC: 4 G/DL (ref 3.5–5.7)
ALP SERPL-CCNC: 70 U/L (ref 34–104)
ALT SERPL W P-5'-P-CCNC: 13 U/L (ref 7–52)
ANION GAP SERPL CALCULATED.3IONS-SCNC: 11 MMOL/L (ref 3–14)
AST SERPL W P-5'-P-CCNC: 15 U/L (ref 13–39)
BASOPHILS # BLD AUTO: 0.1 10E9/L (ref 0–0.2)
BASOPHILS NFR BLD AUTO: 0.5 %
BILIRUB SERPL-MCNC: 0.9 MG/DL (ref 0.3–1)
BUN SERPL-MCNC: 24 MG/DL (ref 7–25)
CALCIUM SERPL-MCNC: 9.9 MG/DL (ref 8.6–10.3)
CHLORIDE SERPL-SCNC: 101 MMOL/L (ref 98–107)
CO2 SERPL-SCNC: 26 MMOL/L (ref 21–31)
CREAT SERPL-MCNC: 1.18 MG/DL (ref 0.6–1.2)
DIFFERENTIAL METHOD BLD: NORMAL
EOSINOPHIL # BLD AUTO: 0.4 10E9/L (ref 0–0.7)
EOSINOPHIL NFR BLD AUTO: 3.7 %
ERYTHROCYTE [DISTWIDTH] IN BLOOD BY AUTOMATED COUNT: 14.6 % (ref 10–15)
GFR SERPL CREATININE-BSD FRML MDRD: 45 ML/MIN/1.7M2
GLUCOSE SERPL-MCNC: 95 MG/DL (ref 70–105)
HCT VFR BLD AUTO: 40.6 % (ref 35–47)
HGB BLD-MCNC: 13.2 G/DL (ref 11.7–15.7)
IMM GRANULOCYTES # BLD: 0 10E9/L (ref 0–0.4)
IMM GRANULOCYTES NFR BLD: 0.4 %
LYMPHOCYTES # BLD AUTO: 2.1 10E9/L (ref 0.8–5.3)
LYMPHOCYTES NFR BLD AUTO: 19.3 %
MCH RBC QN AUTO: 30.6 PG (ref 26.5–33)
MCHC RBC AUTO-ENTMCNC: 32.5 G/DL (ref 31.5–36.5)
MCV RBC AUTO: 94 FL (ref 78–100)
MONOCYTES # BLD AUTO: 0.4 10E9/L (ref 0–1.3)
MONOCYTES NFR BLD AUTO: 3.7 %
NEUTROPHILS # BLD AUTO: 7.8 10E9/L (ref 1.6–8.3)
NEUTROPHILS NFR BLD AUTO: 72.4 %
PLATELET # BLD AUTO: 281 10E9/L (ref 150–450)
POTASSIUM SERPL-SCNC: 3.6 MMOL/L (ref 3.5–5.1)
PROT SERPL-MCNC: 7.1 G/DL (ref 6.4–8.9)
RBC # BLD AUTO: 4.31 10E12/L (ref 3.8–5.2)
SODIUM SERPL-SCNC: 138 MMOL/L (ref 134–144)
TSH SERPL DL<=0.05 MIU/L-ACNC: 0.65 IU/ML (ref 0.34–5.6)
WBC # BLD AUTO: 10.8 10E9/L (ref 4–11)

## 2018-12-06 PROCEDURE — 84443 ASSAY THYROID STIM HORMONE: CPT | Performed by: FAMILY MEDICINE

## 2018-12-06 PROCEDURE — G0463 HOSPITAL OUTPT CLINIC VISIT: HCPCS | Performed by: FAMILY MEDICINE

## 2018-12-06 PROCEDURE — 36415 COLL VENOUS BLD VENIPUNCTURE: CPT | Performed by: FAMILY MEDICINE

## 2018-12-06 PROCEDURE — 99213 OFFICE O/P EST LOW 20 MIN: CPT | Performed by: FAMILY MEDICINE

## 2018-12-06 PROCEDURE — 85025 COMPLETE CBC W/AUTO DIFF WBC: CPT | Performed by: FAMILY MEDICINE

## 2018-12-06 PROCEDURE — 80053 COMPREHEN METABOLIC PANEL: CPT | Performed by: FAMILY MEDICINE

## 2018-12-06 RX ORDER — SUCRALFATE 1 G/1
TABLET ORAL
Refills: 1 | COMMUNITY
Start: 2018-11-06 | End: 2019-01-02

## 2018-12-06 ASSESSMENT — PAIN SCALES - GENERAL: PAINLEVEL: NO PAIN (0)

## 2018-12-06 NOTE — TELEPHONE ENCOUNTER
[facility]-Pt has not felt good since after knee surgery Oct. 8th she is still not feeling well at all, no appts to see her. Please call on cell number.    Thanks

## 2018-12-06 NOTE — TELEPHONE ENCOUNTER
Talked to patient and she will be here today at 3:15 . Put in schedule . Ania Contreras LPN ....................12/6/2018  1:10 PM

## 2018-12-06 NOTE — NURSING NOTE
Patient here today because she has not been feeling well since surgery in October. C/o feeling nauseous, very fatigued and just not right. Has had a 10lbs weight loss since surgery.     Chief Complaint   Patient presents with     RECHECK         Medication Reconciliation: complete    Carin Lorenzo, LPN

## 2018-12-06 NOTE — TELEPHONE ENCOUNTER
Before calling patient, we only have a couple 15 minute same days. Is that enough time for patient ? Ania Contreras LPN ....................12/6/2018  10:22 AM

## 2018-12-06 NOTE — PROGRESS NOTES
SUBJECTIVE:   Nursing Notes:   Carin Lorezno LPN  12/6/2018  3:24 PM  Unsigned  Patient here today because she has not been feeling well since surgery in October. C/o feeling nauseous, very fatigued and just not right. Has had a 10lbs weight loss since surgery.     Chief Complaint   Patient presents with     RECHECK         Medication Reconciliation: complete    Carin Lorenzo LPN      Margaret Batista is a 72 year old female who presents to clinic today for continued symptoms of fatigue and nausea.  Had right total knee replacement on 10/81/8.  Has been feeling this way ever since her surgery.  Has minimal pain in her knee any longer.  Has not had to take any pain relievers for weeks.  No fever.  Has had 10 lb of weight loss.  No abdominal pain.  Had a little diarrhea 2 days ago, but this is much better than it had been.  No black/tarry or red stools.  She is scheduled for a colonoscopy in January with Dr. Thompson.  Last colonoscopy was in 1/2016.  She had adenomatous polyps.  Gets a little sweaty and hot with waves of nausea, but it passes with rest.  Little appetite.  Has lost 10 lb since October.  No night sweats.  No vomiting.  No frequency, urgency or dysuria.  No known fever.   carafate and omeprazole did not help.  Had a stress test this past summer, which was normal.    HPI    I personally reviewed medications/allergies/history listed below:    Patient Active Problem List    Diagnosis Date Noted     Primary osteoarthritis of both hands 05/30/2017     Priority: Medium     Mass of finger 05/26/2017     Priority: Medium     Osteopenia 11/21/2016     Priority: Medium     Special screening for malignant neoplasms, colon 04/13/2016     Priority: Medium     Allergic rhinitis 02/01/2016     Priority: Medium     Health care maintenance 11/17/2015     Priority: Medium     Overview:   Colonoscopy: Due, will order  Breast Exam/Mammography: Does not complete self exams; last mammogram 11/2015 and normal, repeat  every year however consider moving to every other year soon  Pap smear: Never any abnormal, no longer performing due to age and patient request unless symptomatic  DEXA: Done in 2012 and showed osteopenia; repeat as needed to manage your bone density problem including in the next 6 months  Immunizations: Due for prevnar, given today. UTD on other vaccines.   Lipids/Annual Exam: Done today, repeat annually  Hepatitis C screen: will discuss in future       Status post total shoulder replacement, right 05/28/2015     Priority: Medium     S/P shoulder surgery 02/10/2015     Priority: Medium     Hyperlipidemia 01/16/2015     Priority: Medium     Hypertension 01/16/2015     Priority: Medium     Avascular necrosis of humeral head, right (H) 01/12/2015     Priority: Medium     Rotator cuff tendonitis, right 12/01/2014     Priority: Medium     Shoulder impingement, right 12/01/2014     Priority: Medium     Right shoulder pain 10/14/2014     Priority: Medium     ACP (advance care planning) 10/28/2013     Priority: Medium     Overview:   Formatting of this note may be different from the original.  Patient has identified Health Care Agent(s): Yes  Add Health Care Agents: Yes    Health Care Agent(s):  Primary Health Care Agent: Jay Batista  Relationship:  Phone:    Secondary Health Care Agent:  Relationship: Daughter Phone:    Conservator:  Relationship:  Phone:    Guardian: Relationship:  Phone:      Patient has Advance Care Plan Documents (Health Care Directive, POLST): No, referral made to Social Work Services.    Patient has identified Specific Treatment Preferences: Yes   Specific Treatment Preferences: a.) Code Status:  CPR/Attempt Resuscitation        ASCVD (arteriosclerotic cardiovascular disease) 10/28/2013     Priority: Medium     Overview:   -s/p ADÁN to the mid-LAD, mid-circumflex, mid-right coronary artery, proximal right coronary artery, and PTCA of a marginal branch of the right coronary artery  2007.  -s/p ADÁN to proximal left anterior descending 10/29/13.       Rheumatoid arthritis (H) 10/25/2013     Priority: Medium     DJD (degenerative joint disease) of knee 2011     Priority: Medium     Gout 10/27/2009     Priority: Medium     Past Medical History:   Diagnosis Date     Atherosclerotic heart disease of native coronary artery without angina pectoris     -s/p ADÁN to the mid-LAD, mid-circumflex, mid-right coronary artery, proximal right coronary  artery, and PTCA of a marginal branch of the right coronary artery 2007. -s/p ADÁN to proximal left anterior descending 10/29/13.     Contusion of abdominal wall          Essential (primary) hypertension     2006     Gout     No Comments Provided     Hyperlipidemia     2007     Localized swelling, mass, or lump of upper extremity     2017     Other specified counseling     10/28/2013,Patient has identified Health Care Agent(s): Yes Add Health Care Agents: Yes   Health Care Agent(s): Primary Health Care Agent: Jay Batista  Relationship:  Phone:   Secondary Health Care Agent:  Relationship: Daughter Phone:   Conservator:  Relationship:  Phone:   Guardian: Relationship:  Phone:    Patient has Advance Care Plan Documents (Health Care Directive, POLST): No, refe*     Polyosteoarthritis     No Comments Provided     Postmenopausal bleeding     No Comments Provided     Presence of coronary angioplasty implant and graft     ,unstable angina; severe prox LAD stenosis; s/p 7 stents     Primary osteoarthritis of left hand     2017     Rheumatoid arthritis (H)     follows at [facility] yearly      Past Surgical History:   Procedure Laterality Date     ARTHROPLASTY KNEE      2011     ARTHROSCOPY KNEE           BIOPSY BREAST      10/25/95,BRIAN RAMIREZ      SECTION      , Section     COLONOSCOPY      05,Normal - Repeat in 10 years     ESOPHAGOSCOPY, GASTROSCOPY, DUODENOSCOPY  (EGD), COMBINED      5/2/2011,erosive gastritis;bx;consider MRI/A;[facility]     EXTRACTION(S) DENTAL      1970     HEART CATH, ANGIOPLASTY      10/29/2013,ADÁN to proximal left anterior descending     OTHER SURGICAL HISTORY      2007/2013,239668,OTHER     OTHER SURGICAL HISTORY      01/2014,VXO613,TOTAL SHOULDER ARTHROPLASTY,Right     OTHER SURGICAL HISTORY      04/14/2016,91105.0,ME COLONOSCOPY REMOVE ELIZA POLYP LESN SNARE,repeat 2019, precancerous polyps     STRESS LEXISCAN TEST  08/2018    With Dr. Irwin - normal     Family History   Problem Relation Age of Onset     Heart Disease Mother      Heart Disease     Hypertension Mother      Hypertension     Other - See Comments Mother      Stroke     Breast Cancer Mother 53     Cancer-breast     Arthritis Father      Arthritis     Cancer Father      Cancer     Heart Disease Father      Heart Disease     Hypertension Father      Hypertension     Family History Negative Brother      Good Health     Heart Disease Brother      Heart Disease     Family History Negative Sister      Good Health     Social History   Substance Use Topics     Smoking status: Never Smoker     Smokeless tobacco: Never Used     Alcohol use 0.0 oz/week      Comment: Alcoholic Drinks/day: Rare use. ~2 glasses of wine a month     Social History     Social History Narrative    . Moved to [place], [place] Summer of 2013 from [place], [place]. Has a daughter (lives in [place]) and son (who lives in [place]). 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 Never smoked. Rare alcohol use.     Current Outpatient Prescriptions   Medication Sig Dispense Refill     allopurinol (ZYLOPRIM) 300 MG tablet Take 1 tablet (300 mg) by mouth daily 90 tablet 3     aspirin EC 81 MG EC tablet Take 1 tablet by mouth daily       atenolol (TENORMIN) 50 MG tablet Take 2 tablets (100 mg) by mouth daily 90 tablet 3     atorvastatin (LIPITOR) 40 MG tablet Take 1 tablet (40 mg) by mouth  daily 90 tablet 3     bisacodyl (DULCOLAX) 5 MG EC tablet Take as directed by colonoscopy prep instructions 2 tablet 0     cetirizine (ZYRTEC) 10 MG tablet Take 10 mg by mouth daily as needed for allergies       Cholecalciferol (VITAMIN D3) 2000 UNITS CAPS Take 2,000 Units by mouth daily       folic acid (FOLVITE) 1 MG tablet Take 1 mg by mouth daily       hydrochlorothiazide (HYDRODIURIL) 25 MG tablet Take 1 tablet (25 mg) by mouth daily 90 tablet 3     hydroxychloroquine (PLAQUENIL) 200 MG tablet        losartan (COZAAR) 50 MG tablet Take 1 tablet (50 mg) by mouth daily 90 tablet 3     methotrexate 2.5 MG tablet CHEMO Take 15 mg by mouth Every Monday       nitroGLYcerin (NITROSTAT) 0.4 MG sublingual tablet Place 1 tablet under the tongue every 5 minutes as needed for chest pain (For chest pain x 3 doses)       prasugrel (EFFIENT) 10 MG TABS tablet Take 1 tablet by mouth every morning       triamcinolone (NASACORT) 55 MCG/ACT Inhaler Spray 2 sprays into both nostrils daily       sucralfate (CARAFATE) 1 GM tablet TK 1 T PO 4 TIMES DAILY  1     Allergies   Allergen Reactions     Food      Macadamia nuts make mouth itch     Enalapril Cough       Review of Systems   Constitutional: Negative for fever.   HENT: Negative for congestion, ear pain, rhinorrhea, sinus pain, sinus pressure and sore throat.    Respiratory: Negative for cough.    Gastrointestinal: Positive for nausea. Negative for abdominal distention, abdominal pain, anal bleeding, constipation, diarrhea, heartburn, hematochezia, rectal pain and vomiting.   Musculoskeletal: Negative for arthralgias and joint swelling.   Psychiatric/Behavioral: Negative for mood changes.        OBJECTIVE:     /80 (BP Location: Right arm, Patient Position: Sitting, Cuff Size: Adult Regular)  Pulse 80  Temp 98  F (36.7  C) (Tympanic)  Resp 18  Wt 205 lb (93 kg)  Breastfeeding? No  BMI 31.87 kg/m2  Body mass index is 31.87 kg/(m^2).  Physical Exam   Constitutional: She  appears well-developed.   HENT:   Head: Normocephalic.   Right Ear: External ear normal.   Left Ear: External ear normal.   Nose: Nose normal.   Mouth/Throat: Oropharynx is clear and moist.   Eyes: Pupils are equal, round, and reactive to light.   Neck: Normal range of motion. Neck supple. No thyromegaly present.   Cardiovascular: Normal rate, regular rhythm and normal heart sounds.    No murmur heard.  Pulmonary/Chest: Effort normal and breath sounds normal. No respiratory distress. She has no wheezes. She has no rales.   Abdominal: Soft. Bowel sounds are normal. She exhibits no distension and no mass. There is tenderness (tender in epigastric region). There is no rebound and no guarding.   Musculoskeletal: She exhibits no edema.   Lymphadenopathy:     She has no cervical adenopathy.   Psychiatric: She has a normal mood and affect.         PHQ-2 Score:     PHQ-2 ( 1999 [employer]) 12/6/2018 11/6/2018   Q1: Little interest or pleasure in doing things 1 0   Q2: Feeling down, depressed or hopeless 0 0   PHQ-2 Score 1 0       I personally reviewed results withpatient as listed below:   Diagnostic Test Results:  Results for orders placed or performed in visit on 12/06/18   Thyrotropin GH   Result Value Ref Range    Thyrotropin 0.65 0.34 - 5.60 IU/mL   Comprehensive Metabolic Panel   Result Value Ref Range    Sodium 138 134 - 144 mmol/L    Potassium 3.6 3.5 - 5.1 mmol/L    Chloride 101 98 - 107 mmol/L    Carbon Dioxide 26 21 - 31 mmol/L    Anion Gap 11 3 - 14 mmol/L    Glucose 95 70 - 105 mg/dL    Urea Nitrogen 24 7 - 25 mg/dL    Creatinine 1.18 0.60 - 1.20 mg/dL    GFR Estimate 45 (L) >60 mL/min/1.7m2    GFR Estimate If Black 55 (L) >60 mL/min/1.7m2    Calcium 9.9 8.6 - 10.3 mg/dL    Bilirubin Total 0.9 0.3 - 1.0 mg/dL    Albumin 4.0 3.5 - 5.7 g/dL    Protein Total 7.1 6.4 - 8.9 g/dL    Alkaline Phosphatase 70 34 - 104 U/L    ALT 13 7 - 52 U/L    AST 15 13 - 39 U/L   CBC with platelets differential   Result Value Ref Range     WBC 10.8 4.0 - 11.0 10e9/L    RBC Count 4.31 3.8 - 5.2 10e12/L    Hemoglobin 13.2 11.7 - 15.7 g/dL    Hematocrit 40.6 35.0 - 47.0 %    MCV 94 78 - 100 fl    MCH 30.6 26.5 - 33.0 pg    MCHC 32.5 31.5 - 36.5 g/dL    RDW 14.6 10.0 - 15.0 %    Platelet Count 281 150 - 450 10e9/L    Diff Method Automated Method     % Neutrophils 72.4 %    % Lymphocytes 19.3 %    % Monocytes 3.7 %    % Eosinophils 3.7 %    % Basophils 0.5 %    % Immature Granulocytes 0.4 %    Absolute Neutrophil 7.8 1.6 - 8.3 10e9/L    Absolute Lymphocytes 2.1 0.8 - 5.3 10e9/L    Absolute Monocytes 0.4 0.0 - 1.3 10e9/L    Absolute Eosinophils 0.4 0.0 - 0.7 10e9/L    Absolute Basophils 0.1 0.0 - 0.2 10e9/L    Abs Immature Granulocytes 0.0 0 - 0.4 10e9/L       ASSESSMENT/PLAN:       ICD-10-CM    1. Nausea R11.0 Thyrotropin GH     Comprehensive Metabolic Panel     CBC with platelets differential     CT Abdomen Pelvis w Contrast     Thyrotropin GH     Comprehensive Metabolic Panel     CBC with platelets differential   2. Epigastric pain R10.13 CT Abdomen Pelvis w Contrast       1.  Labs completed today were normal as above.  Will pursue further evaluation with CT as above.  If normal, would recommend having EGD (and possible colonoscopy) for further evaluation.  Follow-up urgently if pain is getting considerably worse.  2.  CT as above.    Madhuri Richardson MD  [facility]

## 2018-12-06 NOTE — MR AVS SNAPSHOT
After Visit Summary   12/6/2018    Margaret Batista    MRN: [id number]           Patient Information     Date Of Birth          1945        Visit Information        Provider Department      12/6/2018 3:15 PM Madhuri Richardson MD [facility]        Today's Diagnoses     Nausea    -  1    Epigastric pain           Follow-ups after your visit        Your next 10 appointments already scheduled     Dec 17, 2018 11:00 AM CST   New Visit with Tomy Tejada   [facility] ([facility])    [address]  [place] [id number] [phone]            Jan 09, 2019   Procedure with Evelin Thompson MD   [facility] ([facility])    [address]  [place] [id number] [phone]              Future tests that were ordered for you today     Open Future Orders        Priority Expected Expires Ordered    Thyrotropin GH Routine 12/6/2018 12/6/2019 12/6/2018    Comprehensive Metabolic Panel Routine  12/6/2019 12/6/2018    CBC with platelets differential Routine  12/7/2019 12/6/2018    CT Abdomen Pelvis w Contrast Routine  12/6/2019 12/6/2018            Who to contact     If you have questions or need follow up information about today's clinic visit or your schedule please contact [facility] directly at [phone].  Normal or non-critical lab and imaging results will be communicated to you by MyChart, letter or phone within 4 business days after the clinic has received the results. If you do not hear from us within 7 days, please contact the clinic through MyChart or phone. If you have a critical or abnormal lab result, we will notify you by phone as soon as possible.  Submit refill requests through [employer] or call your pharmacy and they will forward the refill request to us. Please allow 3 business days for your refill to be completed.           Additional Information About Your Visit        MyChart Information     [employer] gives you secure access to your electronic health record. If you see a primary care provider, you can also send messages to your care team and make appointments. If you have questions, please call your primary care clinic.  If you do not have a primary care provider, please call [phone] and they will assist you.        Care EveryWhere ID     This is your Care EveryWhere ID. This could be used by other organizations to access your [facility] medical records  [id number]        Your Vitals Were     Pulse Temperature Respirations Breastfeeding? BMI (Body Mass Index)       80 98  F (36.7  C) (Tympanic) 18 No 31.87 kg/m2        Blood Pressure from Last 3 Encounters:   12/06/18 134/80   11/13/18 118/70   11/06/18 124/70    Weight from Last 3 Encounters:   12/06/18 205 lb (93 kg)   11/13/18 204 lb 12.8 oz (92.9 kg)   11/06/18 205 lb (93 kg)               Primary Care Provider Office Phone # Fax #    Swetha Maxwell [phone]       [address] [facility] [address]        Equal Access to Services     BEATRIS SWANN AH: Hadii aad ku hadasho Sorosieali, waaxda luqadaha, qaybta kaalmada adeegyada, edna roberts. So [facility] [phone].    ATENCIÓN: Si habla español, tiene a almanzar disposición servicios gratuitos de asistencia lingüística. Llame al [phone].    We comply with applicable federal civil rights laws and [place] laws. We do not discriminate on the basis of race, color, national origin, age, disability, sex, sexual orientation, or gender identity.            Thank you!     Thank you for choosing [facility] AND [facility]  for your care. Our goal is always to provide you with excellent care. Hearing back from our patients is one way we can continue to improve our services. Please take a few minutes to complete the written survey that you may receive in the mail after your  visit with us. Thank you!             Your Updated Medication List - Protect others around you: Learn how to safely use, store and throw away your medicines at www.disposemymeds.org.          This list is accurate as of 12/6/18  3:52 PM.  Always use your most recent med list.                   Brand Name Dispense Instructions for use Diagnosis    allopurinol 300 MG tablet    ZYLOPRIM    90 tablet    Take 1 tablet (300 mg) by mouth daily    Gout, unspecified cause, unspecified chronicity, unspecified site       aspirin 81 MG EC tablet      Take 1 tablet by mouth daily        atenolol 50 MG tablet    TENORMIN    90 tablet    Take 2 tablets (100 mg) by mouth daily    Essential hypertension       atorvastatin 40 MG tablet    LIPITOR    90 tablet    Take 1 tablet (40 mg) by mouth daily    Hyperlipidemia, unspecified hyperlipidemia type       bisacodyl 5 MG EC tablet    DULCOLAX    2 tablet    Take as directed by colonoscopy prep instructions    Special screening for malignant neoplasms, colon       cetirizine 10 MG tablet    zyrTEC     Take 10 mg by mouth daily as needed for allergies        folic acid 1 MG tablet    FOLVITE     Take 1 mg by mouth daily        hydrochlorothiazide 25 MG tablet    HYDRODIURIL    90 tablet    Take 1 tablet (25 mg) by mouth daily    Essential hypertension       hydroxychloroquine 200 MG tablet    PLAQUENIL          losartan 50 MG tablet    COZAAR    90 tablet    Take 1 tablet (50 mg) by mouth daily    Essential hypertension       methotrexate 2.5 MG tablet      Take 15 mg by mouth Every Monday        nitroGLYcerin 0.4 MG sublingual tablet    NITROSTAT     Place 1 tablet under the tongue every 5 minutes as needed for chest pain (For chest pain x 3 doses)        prasugrel 10 MG Tabs tablet    EFFIENT     Take 1 tablet by mouth every morning        sucralfate 1 GM tablet    CARAFATE     TK 1 T PO 4 TIMES DAILY        triamcinolone 55 MCG/ACT nasal aerosol    NASACORT     Cypress 2 sprays into  both nostrils daily        vitamin D3 2000 units Caps      Take 2,000 Units by mouth daily

## 2018-12-07 ASSESSMENT — ENCOUNTER SYMPTOMS
VOMITING: 0
ARTHRALGIAS: 0
ANAL BLEEDING: 0
NAUSEA: 1
HEARTBURN: 0
HEMATOCHEZIA: 0
ABDOMINAL PAIN: 0
JOINT SWELLING: 0
CONSTIPATION: 0
RECTAL PAIN: 0
DIARRHEA: 0
FEVER: 0
ABDOMINAL DISTENTION: 0
SORE THROAT: 0
SINUS PAIN: 0
COUGH: 0
SINUS PRESSURE: 0
RHINORRHEA: 0

## 2018-12-17 ENCOUNTER — HOSPITAL ENCOUNTER (OUTPATIENT)
Dept: CT IMAGING | Facility: OTHER | Age: 73
Discharge: HOME | End: 2018-12-17
Attending: FAMILY MEDICINE | Admitting: FAMILY MEDICINE
Payer: COMMERCIAL

## 2018-12-17 DIAGNOSIS — R10.13 ABDOMINAL PAIN, EPIGASTRIC: Primary | ICD-10-CM

## 2018-12-17 DIAGNOSIS — R10.13 EPIGASTRIC PAIN: ICD-10-CM

## 2018-12-17 DIAGNOSIS — R11.0 NAUSEA: ICD-10-CM

## 2018-12-17 PROCEDURE — 25500064 ZZH RX 255 OP 636: Performed by: FAMILY MEDICINE

## 2018-12-17 PROCEDURE — 74177 CT ABD & PELVIS W/CONTRAST: CPT

## 2018-12-17 RX ORDER — IODIXANOL 320 MG/ML
100 INJECTION, SOLUTION INTRAVASCULAR ONCE
Status: COMPLETED | OUTPATIENT
Start: 2018-12-17 | End: 2018-12-17

## 2018-12-17 RX ADMIN — IODIXANOL 100 ML: 320 INJECTION, SOLUTION INTRAVASCULAR at 09:39

## 2019-01-02 RX ORDER — ACETAMINOPHEN 325 MG/1
650 TABLET ORAL
COMMUNITY
Start: 2018-10-09 | End: 2019-01-17

## 2019-01-02 RX ORDER — FEXOFENADINE HCL AND PSEUDOEPHEDRINE HCI 60; 120 MG/1; MG/1
1 TABLET, EXTENDED RELEASE ORAL
COMMUNITY
Start: 2016-11-21 | End: 2019-01-17

## 2019-01-09 ENCOUNTER — ANESTHESIA (OUTPATIENT)
Dept: SURGERY | Facility: OTHER | Age: 74
End: 2019-01-09
Payer: COMMERCIAL

## 2019-01-09 ENCOUNTER — HOSPITAL ENCOUNTER (OUTPATIENT)
Facility: OTHER | Age: 74
Discharge: HOME | End: 2019-01-09
Attending: SURGERY | Admitting: SURGERY
Payer: COMMERCIAL

## 2019-01-09 ENCOUNTER — ANESTHESIA EVENT (OUTPATIENT)
Dept: SURGERY | Facility: OTHER | Age: 74
End: 2019-01-09
Payer: COMMERCIAL

## 2019-01-09 VITALS
BODY MASS INDEX: 30.71 KG/M2 | RESPIRATION RATE: 14 BRPM | TEMPERATURE: 97.5 F | DIASTOLIC BLOOD PRESSURE: 77 MMHG | SYSTOLIC BLOOD PRESSURE: 121 MMHG | OXYGEN SATURATION: 97 % | WEIGHT: 202.6 LBS | HEIGHT: 68 IN

## 2019-01-09 DIAGNOSIS — Z12.11 SPECIAL SCREENING FOR MALIGNANT NEOPLASMS, COLON: ICD-10-CM

## 2019-01-09 DIAGNOSIS — K29.70 GASTRITIS DETERMINED BY ENDOSCOPY: Primary | ICD-10-CM

## 2019-01-09 PROCEDURE — 43239 EGD BIOPSY SINGLE/MULTIPLE: CPT | Performed by: NURSE ANESTHETIST, CERTIFIED REGISTERED

## 2019-01-09 PROCEDURE — 25000125 ZZHC RX 250: Performed by: NURSE ANESTHETIST, CERTIFIED REGISTERED

## 2019-01-09 PROCEDURE — G0105 COLORECTAL SCRN; HI RISK IND: HCPCS | Performed by: SURGERY

## 2019-01-09 PROCEDURE — 25000128 H RX IP 250 OP 636: Performed by: SURGERY

## 2019-01-09 PROCEDURE — 25000125 ZZHC RX 250: Performed by: SURGERY

## 2019-01-09 PROCEDURE — 99100 ANES PT EXTEME AGE<1 YR&>70: CPT | Performed by: NURSE ANESTHETIST, CERTIFIED REGISTERED

## 2019-01-09 PROCEDURE — 25000128 H RX IP 250 OP 636: Performed by: NURSE ANESTHETIST, CERTIFIED REGISTERED

## 2019-01-09 PROCEDURE — 88305 TISSUE EXAM BY PATHOLOGIST: CPT

## 2019-01-09 PROCEDURE — 40000010 ZZH STATISTIC ANES STAT CODE-CRNA PER MINUTE: Performed by: SURGERY

## 2019-01-09 PROCEDURE — 43239 EGD BIOPSY SINGLE/MULTIPLE: CPT | Mod: 51 | Performed by: SURGERY

## 2019-01-09 PROCEDURE — 45380 COLONOSCOPY AND BIOPSY: CPT | Performed by: SURGERY

## 2019-01-09 PROCEDURE — 43239 EGD BIOPSY SINGLE/MULTIPLE: CPT | Performed by: SURGERY

## 2019-01-09 PROCEDURE — 45378 DIAGNOSTIC COLONOSCOPY: CPT | Performed by: SURGERY

## 2019-01-09 RX ORDER — LIDOCAINE 40 MG/G
CREAM TOPICAL
Status: DISCONTINUED | OUTPATIENT
Start: 2019-01-09 | End: 2019-01-09 | Stop reason: HOSPADM

## 2019-01-09 RX ORDER — LIDOCAINE HYDROCHLORIDE 20 MG/ML
INJECTION, SOLUTION INFILTRATION; PERINEURAL PRN
Status: DISCONTINUED | OUTPATIENT
Start: 2019-01-09 | End: 2019-01-09

## 2019-01-09 RX ORDER — FLUMAZENIL 0.1 MG/ML
0.2 INJECTION, SOLUTION INTRAVENOUS
Status: DISCONTINUED | OUTPATIENT
Start: 2019-01-09 | End: 2019-01-09 | Stop reason: HOSPADM

## 2019-01-09 RX ORDER — ONDANSETRON 4 MG/1
4 TABLET, ORALLY DISINTEGRATING ORAL EVERY 6 HOURS PRN
Status: DISCONTINUED | OUTPATIENT
Start: 2019-01-09 | End: 2019-01-09 | Stop reason: HOSPADM

## 2019-01-09 RX ORDER — ONDANSETRON 2 MG/ML
4 INJECTION INTRAMUSCULAR; INTRAVENOUS
Status: DISCONTINUED | OUTPATIENT
Start: 2019-01-09 | End: 2019-01-09 | Stop reason: HOSPADM

## 2019-01-09 RX ORDER — SODIUM CHLORIDE, SODIUM LACTATE, POTASSIUM CHLORIDE, CALCIUM CHLORIDE 600; 310; 30; 20 MG/100ML; MG/100ML; MG/100ML; MG/100ML
INJECTION, SOLUTION INTRAVENOUS CONTINUOUS
Status: DISCONTINUED | OUTPATIENT
Start: 2019-01-09 | End: 2019-01-09 | Stop reason: HOSPADM

## 2019-01-09 RX ORDER — NALOXONE HYDROCHLORIDE 0.4 MG/ML
.1-.4 INJECTION, SOLUTION INTRAMUSCULAR; INTRAVENOUS; SUBCUTANEOUS
Status: DISCONTINUED | OUTPATIENT
Start: 2019-01-09 | End: 2019-01-09 | Stop reason: HOSPADM

## 2019-01-09 RX ORDER — ONDANSETRON 2 MG/ML
4 INJECTION INTRAMUSCULAR; INTRAVENOUS EVERY 6 HOURS PRN
Status: DISCONTINUED | OUTPATIENT
Start: 2019-01-09 | End: 2019-01-09 | Stop reason: HOSPADM

## 2019-01-09 RX ORDER — PROPOFOL 10 MG/ML
INJECTION, EMULSION INTRAVENOUS PRN
Status: DISCONTINUED | OUTPATIENT
Start: 2019-01-09 | End: 2019-01-09

## 2019-01-09 RX ORDER — PROPOFOL 10 MG/ML
INJECTION, EMULSION INTRAVENOUS CONTINUOUS PRN
Status: DISCONTINUED | OUTPATIENT
Start: 2019-01-09 | End: 2019-01-09

## 2019-01-09 RX ADMIN — PROPOFOL 140 MCG/KG/MIN: 10 INJECTION, EMULSION INTRAVENOUS at 12:38

## 2019-01-09 RX ADMIN — SODIUM CHLORIDE, SODIUM LACTATE, POTASSIUM CHLORIDE, AND CALCIUM CHLORIDE: 600; 310; 30; 20 INJECTION, SOLUTION INTRAVENOUS at 11:58

## 2019-01-09 RX ADMIN — LIDOCAINE HYDROCHLORIDE 1 ML: 10 INJECTION, SOLUTION EPIDURAL; INFILTRATION; INTRACAUDAL; PERINEURAL at 11:58

## 2019-01-09 RX ADMIN — PROPOFOL 50 MG: 10 INJECTION, EMULSION INTRAVENOUS at 12:48

## 2019-01-09 RX ADMIN — LIDOCAINE HYDROCHLORIDE 40 MG: 20 INJECTION, SOLUTION INFILTRATION; PERINEURAL at 12:38

## 2019-01-09 RX ADMIN — PROPOFOL 120 MG: 10 INJECTION, EMULSION INTRAVENOUS at 12:38

## 2019-01-09 ASSESSMENT — MIFFLIN-ST. JEOR: SCORE: 1477.49

## 2019-01-09 NOTE — OP NOTE
PROCEDURE NOTE    SURGEON:Evelin Thompson MD    PRE-OP DIAGNOSIS:   Reflux, history of colon polyp      POST-OP DIAGNOSIS: gastritis, normal appearing colon    PROCEDURE PLANNED:   Diagnostic EGD      Screening Colonoscopy    PROCEDURE PERFORMED:  Flexible EGD with biopsies, screening colonoscopy with history of polyps    SPECIMEN:  Duodenum, antrum, GEJ biopsies    ANESTHESIA:  See anesthesia note, anesthesia coverage requested due to age more than 70    ESTIMATED BLOOD LOSS: none    COMPLICATIONS:  None    INDICATION FOR THE PROCEDURE: The patient is a 73 year old female. The patient presents with reflux and history of colon polyps. I explained to the patient the risks, benefits and alternatives to diagnostic EGD and colonoscopy for evaluating her concerns. We specifically discussed the risks of bleeding, infection, perforation, potential inability to reach the cecum and the risks of sedation. The patient's questions were answered and the patient wished to proceed. Informed consent paperwork was completed.    PROCEDURE: The patient was taken to the endoscopy suite. Appropriate monitors were attached. The patient was placed in the left lateral decubitus position. Biteblock was positioned. Timeout was performed confirming the patient's identity and procedure to be performed. After appropriate sedation was confirmed, the flexible endoscope was advanced into the oropharynx. The posteriororopharynx appeared grossly normal. The scope was advanced into the proximal esophagus. The esophagus was insufflated with air. The scope was advanced under direct visualization. No acute abnormalities of the esophagus were noted. The scope was advanced into the stomach. Gastritis was noted. The scope was advanced through the pylorus into the duodenal bulb. The bulb and distal duodenum appeared grossly normal. Biopsies were taken. The scope was withdrawn back into the stomach. Antral biopsy was obtained and sent to pathology. The scope  was retroflexed and the GE junction inspected. No abnormalities were noted. The scope was returned to a neutral position and the stomach was decompressed. The scope was withdrawn to the GE junction and biopsy obtained. The mucosa of the esophagus was inspected while withdrawing the scope. No abnormalities were noted. The scope was withdrawn from the patient. The bite block was removed.    The patient was repositioned. After appropriate sedation, digital rectal exam was performed.  There was normal tone and no gross abnormality was noted.  The lubricated flexible colonoscope was introduced into the anus the colon was insufflated with air. The prep quality was adequate. Under direct visualization the scope was advanced to the cecum. The ileocecal valve was intubated and the terminal ileum inspected. No gross abnormality was noted. The scope was withdrawn back into the cecum. The mucosa of colon was inspected while withdrawing the scope. No abnormalities were noted. The scope was retroflexed in the rectum and the anorectal junction was inspected. No abnormalities were noted. The scope was returned to a neutral position and the colon was decompressed. The scope was removed. The patient tolerated the procedure with no immediately apparent complication. The patient was taken to recovery in stable condition.    FOLLOW UP:  RECOMMENDATIONS high fiber diet, will call with EGD pathology follow up screening colonoscopy not indicated due to age more than 75 at next screening interval.

## 2019-01-09 NOTE — H&P
CHIEF COMPLAINT / REASON FOR PROCEDURE:  Reflux and history of polyps    PERTINENT HISTORY   Patient complains of reflux and previous polyps. Previous colonoscopy 2016-3 year follow up for polyps, previous EGD . No family history of colon polyps or colon cancer.  Past Medical History:   Diagnosis Date     Atherosclerotic heart disease of native coronary artery without angina pectoris     -s/p ADÁN to the mid-LAD, mid-circumflex, mid-right coronary artery, proximal right coronary  artery, and PTCA of a marginal branch of the right coronary artery 2007. -s/p ADÁN to proximal left anterior descending 10/29/13.     Contusion of abdominal wall          Essential (primary) hypertension     2006     Gout     No Comments Provided     Hyperlipidemia     2007     Localized swelling, mass, or lump of upper extremity     2017     Other specified counseling     10/28/2013,Patient has identified Health Care Agent(s): Yes Add Health Care Agents: Yes   Health Care Agent(s): Primary Health Care Agent: Jay Batista  Relationship:  Phone:   Secondary Health Care Agent:  Relationship: Daughter Phone:   Conservator:  Relationship:  Phone:   Guardian: Relationship:  Phone:    Patient has Advance Care Plan Documents (Health Care Directive, POLST): No, refe*     Polyosteoarthritis     No Comments Provided     Postmenopausal bleeding     No Comments Provided     Presence of coronary angioplasty implant and graft     ,unstable angina; severe prox LAD stenosis; s/p 7 stents     Primary osteoarthritis of left hand     2017     Rheumatoid arthritis (H)     follows at [facility] yearly     Past Surgical History:   Procedure Laterality Date     ARTHROPLASTY KNEE      2011     ARTHROSCOPY KNEE           BIOPSY BREAST      10/25/95,BRIAN RAMIREZ      SECTION      1974, Section     COLONOSCOPY      05,Normal - Repeat in 10 years     COLONOSCOPY  2016     04/14/2016     ESOPHAGOSCOPY, GASTROSCOPY, DUODENOSCOPY (EGD), COMBINED      5/2/2011,erosive gastritis;bx;consider MRI/A;[facility]     EXTRACTION(S) DENTAL      1970     HEART CATH, ANGIOPLASTY      10/29/2013,ADÁN to proximal left anterior descending     OTHER SURGICAL HISTORY      2007/2013,[id number],OTHER     OTHER SURGICAL HISTORY      01/2014,[id number],TOTAL SHOULDER ARTHROPLASTY,Right     OTHER SURGICAL HISTORY      04/14/2016,22090.0,[place] COLONOSCOPY REMOVE ELIZA POLYP LESN SNARE,repeat 2019, precancerous polyps     STRESS LEXISCAN TEST  08/2018    With Dr. Irwin - sonja     Other:  None  Bleeding tendencies:  No    Relevant Family History: none    Relevant Social History:  none    A relevant review of systems was performed and was Negative. See HPI.    ALLERGIES/SENSITIVITIES:   Allergies   Allergen Reactions     Food      Macadamia nuts make mouth itch     Enalapril Cough        CURRENT MEDICATIONS:    Current Facility-Administered Medications   Medication     lactated ringers infusion     lidocaine (LMX4) cream     lidocaine 1 % 1 mL     ondansetron (ZOFRAN) injection 4 mg     sodium chloride (PF) 0.9% PF flush 3 mL     sodium chloride (PF) 0.9% PF flush 3 mL       No current facility-administered medications on file prior to encounter.   Current Outpatient Medications on File Prior to Encounter:  allopurinol (ZYLOPRIM) 300 MG tablet Take 1 tablet (300 mg) by mouth daily   atenolol (TENORMIN) 50 MG tablet Take 2 tablets (100 mg) by mouth daily   atorvastatin (LIPITOR) 40 MG tablet Take 1 tablet (40 mg) by mouth daily   Cholecalciferol (VITAMIN D3) 2000 UNITS CAPS Take 2,000 Units by mouth daily   folic acid (FOLVITE) 1 MG tablet Take 1 mg by mouth daily   hydrochlorothiazide (HYDRODIURIL) 25 MG tablet Take 1 tablet (25 mg) by mouth daily   hydroxychloroquine (PLAQUENIL) 200 MG tablet    losartan (COZAAR) 50 MG tablet Take 1 tablet (50 mg) by mouth daily   methotrexate 2.5 MG tablet CHEMO Take 15 mg by mouth Every  "Monday   prasugrel (EFFIENT) 10 MG TABS tablet Take 1 tablet by mouth every morning   acetaminophen (TYLENOL) 325 MG tablet Take 650 mg by mouth   aspirin EC 81 MG EC tablet Take 1 tablet by mouth daily   cetirizine (ZYRTEC) 10 MG tablet Take 10 mg by mouth daily as needed for allergies   fexofenadine-pseudoePHEDrine (ALLEGRA-D)  MG 12 hr tablet Take 1 tablet by mouth   nitroGLYcerin (NITROSTAT) 0.4 MG sublingual tablet Place 1 tablet under the tongue every 5 minutes as needed for chest pain (For chest pain x 3 doses)   triamcinolone (NASACORT) 55 MCG/ACT Inhaler Spray 2 sprays into both nostrils daily       PRE-SEDATION ASSESSMENT:    /77   Temp 98  F (36.7  C) (Tympanic)   Resp 16   Ht 1.727 m (5' 8\")   Wt 91.9 kg (202 lb 9.6 oz)   SpO2 98%   Breastfeeding? No   BMI 30.81 kg/m    Lung Exam:  Normal  Heart Exam:  Normal    Comment(s):      IMPRESSION:  Reflux and history of polyps.    PLAN:  I discussed diagnostic EGD and colonoscopy with the patient. Anesthesia coverage requested due to age more than 71.    "

## 2019-01-09 NOTE — OR NURSING
Patient has been discharged to home at 1415 via ambulatory accompanied by     Written discharge instructions were provided to patient.  Prescriptions were N/A.      Patient and adult caring for them verbalize understanding of discharge instructions including no driving until tomorrow and no longer taking narcotic pain medications - no operating mechanical equipment and no making any important decisions.They understand reason for discharge, and necessary follow-up appointments.      Peggy Blanco RN

## 2019-01-09 NOTE — ANESTHESIA PREPROCEDURE EVALUATION
Anesthesia Pre-Procedure Evaluation    Patient: Margaret Batista   MRN: [id number] : 1945          Preoperative Diagnosis: history of polyps    Procedure(s):  COLONOSCOPY  COMBINED ESOPHAGOSCOPY, GASTROSCOPY, DUODENOSCOPY (EGD)    Past Medical History:   Diagnosis Date     Atherosclerotic heart disease of native coronary artery without angina pectoris     -s/p ADÁN to the mid-LAD, mid-circumflex, mid-right coronary artery, proximal right coronary  artery, and PTCA of a marginal branch of the right coronary artery 2007. -s/p ADÁN to proximal left anterior descending 10/29/13.     Contusion of abdominal wall          Essential (primary) hypertension     2006     Gout     No Comments Provided     Hyperlipidemia     2007     Localized swelling, mass, or lump of upper extremity     2017     Other specified counseling     10/28/2013,Patient has identified Health Care Agent(s): Yes Add Health Care Agents: Yes   Health Care Agent(s): Primary Health Care Agent: Jya Batista  Relationship:  Phone:   Secondary Health Care Agent:  Relationship: Daughter Phone:   Conservator:  Relationship:  Phone:   Guardian: Relationship:  Phone:    Patient has Advance Care Plan Documents (Health Care Directive, POLST): No, refe*     Polyosteoarthritis     No Comments Provided     Postmenopausal bleeding     No Comments Provided     Presence of coronary angioplasty implant and graft     ,unstable angina; severe prox LAD stenosis; s/p 7 stents     Primary osteoarthritis of left hand     2017     Rheumatoid arthritis (H)     follows at [facility] yearly     Past Surgical History:   Procedure Laterality Date     ARTHROPLASTY KNEE      2011     ARTHROSCOPY KNEE           BIOPSY BREAST      10/25/95,BRIAN RAMIREZ      SECTION      1974, Section     COLONOSCOPY      05,Normal - Repeat in 10 years     COLONOSCOPY  2016     ESOPHAGOSCOPY,  GASTROSCOPY, DUODENOSCOPY (EGD), COMBINED      5/2/2011,erosive gastritis;bx;consider MRI/A;[facility]ravo     EXTRACTION(S) DENTAL      1970     HEART CATH, ANGIOPLASTY      10/29/2013,ADÁN to proximal left anterior descending     OTHER SURGICAL HISTORY      2007/2013,[id number],OTHER     OTHER SURGICAL HISTORY      01/2014,[id number],TOTAL SHOULDER ARTHROPLASTY,Right     OTHER SURGICAL HISTORY      04/14/2016,70522.0,[place] COLONOSCOPY REMOVE ELIZA POLYP LESN SNARE,repeat 2019, precancerous polyps     STRESS LEXISCAN TEST  08/2018    With Dr. Irwin - normal       Anesthesia Evaluation     . Pt has had prior anesthetic.            ROS/MED HX    ENT/Pulmonary:  - neg pulmonary ROS     Neurologic:  - neg neurologic ROS     Cardiovascular:     (+) Dyslipidemia, hypertension--CAD, --stent,. : . . . :. .       METS/Exercise Tolerance:  >4 METS   Hematologic:  - neg hematologic  ROS       Musculoskeletal:   (+) arthritis, , , -       GI/Hepatic:  - neg GI/hepatic ROS       Renal/Genitourinary:  - ROS Renal section negative       Endo:  - neg endo ROS       Psychiatric:  - neg psychiatric ROS       Infectious Disease:  - neg infectious disease ROS       Malignancy:      - no malignancy   Other:    (+) No chance of pregnancy   - neg other ROS                      Physical Exam  Normal systems: cardiovascular, pulmonary and dental    Airway   Mallampati: II  TM distance: >3 FB  Neck ROM: full    Dental     Cardiovascular   Rhythm and rate: regular and normal      Pulmonary    breath sounds clear to auscultation            Lab Results   Component Value Date    WBC 10.8 12/06/2018    HGB 13.2 12/06/2018    HCT 40.6 12/06/2018     12/06/2018    SED 23 (H) 11/06/2018     12/06/2018    POTASSIUM 3.6 12/06/2018    CHLORIDE 101 12/06/2018    CO2 26 12/06/2018    BUN 24 12/06/2018    CR 1.18 12/06/2018    GLC 95 12/06/2018    MONI 9.9 12/06/2018    PHOS 3.3 11/16/2015    ALBUMIN 4.0 12/06/2018    PROTTOTAL 7.1 12/06/2018    ALT 13  "12/06/2018    AST 15 12/06/2018    ALKPHOS 70 12/06/2018    BILITOTAL 0.9 12/06/2018    AMYLASE 47 11/06/2018    INR 1.0 01/16/2015       Preop Vitals  BP Readings from Last 3 Encounters:   01/09/19 142/77   12/06/18 134/80   11/13/18 118/70    Pulse Readings from Last 3 Encounters:   12/06/18 80   11/06/18 76   10/01/18 72      Resp Readings from Last 3 Encounters:   01/09/19 16   12/06/18 18   11/06/18 18    SpO2 Readings from Last 3 Encounters:   01/09/19 98%   10/01/18 96%      Temp Readings from Last 1 Encounters:   01/09/19 98  F (36.7  C) (Tympanic)    Ht Readings from Last 1 Encounters:   01/09/19 1.727 m (5' 8\")      Wt Readings from Last 1 Encounters:   01/09/19 91.9 kg (202 lb 9.6 oz)    Estimated body mass index is 30.81 kg/m  as calculated from the following:    Height as of this encounter: 1.727 m (5' 8\").    Weight as of this encounter: 91.9 kg (202 lb 9.6 oz).       Anesthesia Plan      History & Physical Review      ASA Status:  2 .    NPO Status:  > 8 hours    Plan for MAC with Intravenous induction. Maintenance will be Balanced.           Postoperative Care      Consents                 JODEE Monteiro CRNA  "

## 2019-01-09 NOTE — ANESTHESIA POSTPROCEDURE EVALUATION
Patient: Margaret Batista    Procedure(s):  COLONOSCOPY  COMBINED COLONOSCOPY, SINGLE OR MULTIPLE BIOPSY/POLYPECTOMY BY BIOPSY    Diagnosis:history of polyps  Diagnosis Additional Information: No value filed.    Anesthesia Type:  MAC    Note:  Anesthesia Post Evaluation    Patient participation: Able to fully participate in evaluation  Level of consciousness: awake and alert  Pain management: adequate  Airway patency: patent  Cardiovascular status: blood pressure returned to baseline, acceptable and hemodynamically stable  Respiratory status: acceptable  Hydration status: acceptable  PONV: none     Anesthetic complications: None          Last vitals:  Vitals:    01/09/19 1142 01/09/19 1319   BP: 142/77 114/81   Resp: 16 14   Temp: 98  F (36.7  C) 97.5  F (36.4  C)   SpO2: 98%          Electronically Signed By: JODEE Monteiro CRNA  January 9, 2019  1:35 PM

## 2019-01-09 NOTE — DISCHARGE INSTRUCTIONS
Procedure you had done: egd and colonoscopy  Your health care provider is:  Swetha Maxwell  Your surgeon is Dr. Evelin Thompson.   Please call your health care provider or surgeon at [phone] if:    - you feel you are getting worse or having an increase in problems    - fever greater than 101 degrees  - increasing shortness of breath or chest pain  - any signs of infection (increasing redness, swelling, tenderness, warmth, change in appearance, or  increased drainage)  - blood in your urine or stool  - coughing or vomiting blood  - nausea (upset stomach) and vomiting and/or diarrhea that will not stop  - severe pain that is not relieved by medicine, rest or ice  You have had medications for sedation. Please be aware that this can cause drowsiness and impaired judgment for up to 24 hours after your procedure. Do not drive, operate power tools or drink alcohol for 24 hours.  If samples were taken-you will get a phone call and a letter with your results in the next 7-10 days. If you don't get results, please call and let us know!

## 2019-01-15 DIAGNOSIS — E78.5 HYPERLIPIDEMIA, UNSPECIFIED HYPERLIPIDEMIA TYPE: Primary | ICD-10-CM

## 2019-01-15 DIAGNOSIS — M06.4 RHEUMATOID ARTHRITIS WITH INFLAMMATORY POLYARTHROPATHY (H): ICD-10-CM

## 2019-01-15 LAB
ALT SERPL W P-5'-P-CCNC: 14 U/L (ref 7–52)
AST SERPL W P-5'-P-CCNC: 16 U/L (ref 13–39)
BASOPHILS # BLD AUTO: 0.1 10E9/L (ref 0–0.2)
BASOPHILS NFR BLD AUTO: 0.6 %
CHOLEST SERPL-MCNC: 135 MG/DL
CK SERPL-CCNC: 97 U/L (ref 30–223)
CREAT SERPL-MCNC: 1 MG/DL (ref 0.6–1.2)
DIFFERENTIAL METHOD BLD: ABNORMAL
EOSINOPHIL # BLD AUTO: 0.3 10E9/L (ref 0–0.7)
EOSINOPHIL NFR BLD AUTO: 3.6 %
ERYTHROCYTE [DISTWIDTH] IN BLOOD BY AUTOMATED COUNT: 15.2 % (ref 10–15)
GFR SERPL CREATININE-BSD FRML MDRD: 55 ML/MIN/{1.73_M2}
HCT VFR BLD AUTO: 42 % (ref 35–47)
HDLC SERPL-MCNC: 45 MG/DL (ref 23–92)
HGB BLD-MCNC: 13.5 G/DL (ref 11.7–15.7)
IMM GRANULOCYTES # BLD: 0.1 10E9/L (ref 0–0.4)
IMM GRANULOCYTES NFR BLD: 0.5 %
LDLC SERPL CALC-MCNC: 65 MG/DL
LYMPHOCYTES # BLD AUTO: 1.6 10E9/L (ref 0.8–5.3)
LYMPHOCYTES NFR BLD AUTO: 17.5 %
MCH RBC QN AUTO: 30.5 PG (ref 26.5–33)
MCHC RBC AUTO-ENTMCNC: 32.1 G/DL (ref 31.5–36.5)
MCV RBC AUTO: 95 FL (ref 78–100)
MONOCYTES # BLD AUTO: 0.6 10E9/L (ref 0–1.3)
MONOCYTES NFR BLD AUTO: 6.1 %
NEUTROPHILS # BLD AUTO: 6.7 10E9/L (ref 1.6–8.3)
NEUTROPHILS NFR BLD AUTO: 71.7 %
NONHDLC SERPL-MCNC: 90 MG/DL
PLATELET # BLD AUTO: 221 10E9/L (ref 150–450)
RBC # BLD AUTO: 4.43 10E12/L (ref 3.8–5.2)
TRIGL SERPL-MCNC: 124 MG/DL
WBC # BLD AUTO: 9.3 10E9/L (ref 4–11)

## 2019-01-15 PROCEDURE — 36415 COLL VENOUS BLD VENIPUNCTURE: CPT

## 2019-01-15 PROCEDURE — 84450 TRANSFERASE (AST) (SGOT): CPT

## 2019-01-15 PROCEDURE — 82565 ASSAY OF CREATININE: CPT

## 2019-01-15 PROCEDURE — 85025 COMPLETE CBC W/AUTO DIFF WBC: CPT

## 2019-01-15 PROCEDURE — 82550 ASSAY OF CK (CPK): CPT | Performed by: INTERNAL MEDICINE

## 2019-01-15 PROCEDURE — 84460 ALANINE AMINO (ALT) (SGPT): CPT | Performed by: INTERNAL MEDICINE

## 2019-01-15 PROCEDURE — 80061 LIPID PANEL: CPT | Performed by: INTERNAL MEDICINE

## 2019-01-17 ENCOUNTER — OFFICE VISIT (OUTPATIENT)
Dept: SURGERY | Facility: OTHER | Age: 74
End: 2019-01-17
Attending: SURGERY
Payer: COMMERCIAL

## 2019-01-17 VITALS
SYSTOLIC BLOOD PRESSURE: 118 MMHG | BODY MASS INDEX: 30.77 KG/M2 | RESPIRATION RATE: 16 BRPM | HEART RATE: 76 BPM | WEIGHT: 203 LBS | HEIGHT: 68 IN | DIASTOLIC BLOOD PRESSURE: 78 MMHG | TEMPERATURE: 97.7 F

## 2019-01-17 DIAGNOSIS — M19.042 PRIMARY OSTEOARTHRITIS OF BOTH HANDS: ICD-10-CM

## 2019-01-17 DIAGNOSIS — K31.9 GASTROPATHY: Primary | ICD-10-CM

## 2019-01-17 DIAGNOSIS — M19.041 PRIMARY OSTEOARTHRITIS OF BOTH HANDS: ICD-10-CM

## 2019-01-17 DIAGNOSIS — M05.79 RHEUMATOID ARTHRITIS INVOLVING MULTIPLE SITES WITH POSITIVE RHEUMATOID FACTOR (H): ICD-10-CM

## 2019-01-17 LAB — ERYTHROCYTE [SEDIMENTATION RATE] IN BLOOD BY WESTERGREN METHOD: 12 MM/H (ref 1–15)

## 2019-01-17 PROCEDURE — 36415 COLL VENOUS BLD VENIPUNCTURE: CPT | Performed by: SURGERY

## 2019-01-17 PROCEDURE — 85652 RBC SED RATE AUTOMATED: CPT | Performed by: SURGERY

## 2019-01-17 PROCEDURE — G0463 HOSPITAL OUTPT CLINIC VISIT: HCPCS

## 2019-01-17 PROCEDURE — 99213 OFFICE O/P EST LOW 20 MIN: CPT | Performed by: SURGERY

## 2019-01-17 RX ORDER — L.ACIDOPH/B.ANIMALIS/B.LONGUM 15B CELL
1 CAPSULE ORAL DAILY
Qty: 30 CAPSULE | Refills: 1 | Status: SHIPPED | OUTPATIENT
Start: 2019-01-17 | End: 2019-05-31

## 2019-01-17 RX ORDER — SUCRALFATE 1 G/1
1 TABLET ORAL
Qty: 90 TABLET | Refills: 3 | Status: SHIPPED | OUTPATIENT
Start: 2019-01-17 | End: 2019-05-31

## 2019-01-17 ASSESSMENT — MIFFLIN-ST. JEOR: SCORE: 1479.3

## 2019-01-17 ASSESSMENT — PAIN SCALES - GENERAL: PAINLEVEL: NO PAIN (0)

## 2019-01-17 NOTE — PROGRESS NOTES
Swetha Maxwell NP    HPI:   Patient is here for follow up. The patient is 73 year old female with history of reflux and bloating. The patient hadn't been taking PPI or H2 blocker with continued symptoms.  She recently had EGD (1/9/19) showing gastropathy with bile reflux noted. Not having bad heartburn but is having nausea and bloating. She notes that the first time this happened was after orthopedic surgery and they ended up diagnosing her arthritis and when that got under control so did her stomach issues. This most recent episode started after an ortho procedure as well. She had a slightly elevated sed rate at last check (11/6/18). No dark stools or vomiting. No dysphagia.     ASSESSMENT AND PLAN/RECOMMENDATIONS:   Gastropathy- Start on carafate 2-4 times a day.  h2 blocker (like zantac) 2 times a day. Add probiotic for bloating. Discussed the pathophysiology of bile reflux gastritis/gastropathy and the importance of acid and bile balancing out for healthy stomach lining, digestion and absorption of nutrients. Patient expressed understanding. Follow up in about a month.  Rheumatoid arthritis-will check sed rate and call her with results.    Past Medical History:   Diagnosis Date     Atherosclerotic heart disease of native coronary artery without angina pectoris     -s/p ADÁN to the mid-LAD, mid-circumflex, mid-right coronary artery, proximal right coronary  artery, and PTCA of a marginal branch of the right coronary artery 04/30/2007. -s/p ADÁN to proximal left anterior descending 10/29/13.     Contusion of abdominal wall     2014     Essential (primary) hypertension     12/14/2006     Gout     No Comments Provided     Hyperlipidemia     4/4/2007     Localized swelling, mass, or lump of upper extremity     5/26/2017     Other specified counseling     10/28/2013,Patient has identified Health Care Agent(s): Yes Add Health Care Agents: Yes   Health Care Agent(s): Primary Health Care Agent: Jay Batista   Relationship:  Phone:   Secondary Health Care Agent:  Relationship: Daughter Phone:   Conservator:  Relationship:  Phone:   Guardian: Relationship:  Phone:    Patient has Advance Care Plan Documents (Health Care Directive, POLST): No, refe*     Polyosteoarthritis     No Comments Provided     Postmenopausal bleeding     No Comments Provided     Presence of coronary angioplasty implant and graft     ,unstable angina; severe prox LAD stenosis; s/p 7 stents     Primary osteoarthritis of left hand     2017     Rheumatoid arthritis (H)     follows at [facility] yearly     Past Surgical History:   Procedure Laterality Date     ARTHROPLASTY KNEE      2011     ARTHROSCOPY KNEE           BIOPSY BREAST      10/25/95,BRIAN  DR. YOGI RAMIREZ      SECTION      , Section     COLONOSCOPY      05,Normal - Repeat in 10 years     COLONOSCOPY  2016     COLONOSCOPY N/A 2019    Procedure: COLONOSCOPY;  Surgeon: Evelin Thompson MD;  Location:  OR     COLONOSCOPY N/A 2019    Procedure: COMBINED COLONOSCOPY, SINGLE OR MULTIPLE BIOPSY/POLYPECTOMY BY BIOPSY;  Surgeon: Evelin Thompson MD;  Location:  OR     ESOPHAGOSCOPY, GASTROSCOPY, DUODENOSCOPY (EGD), COMBINED      2011,erosive gastritis;bx;consider MRI/A;Bravo     EXTRACTION(S) DENTAL      1970     HEART CATH, ANGIOPLASTY      10/29/2013,ADÁN to proximal left anterior descending     OTHER SURGICAL HISTORY      ,[id number],OTHER     OTHER SURGICAL HISTORY      2014,[id number],TOTAL SHOULDER ARTHROPLASTY,Right     OTHER SURGICAL HISTORY      2016,59191.0,[place] COLONOSCOPY REMOVE ELIZA POLYP LESN SNARE,repeat , precancerous polyps     STRESS LEXISCAN TEST  2018    With Dr. Irwin - normal     Family History   Problem Relation Age of Onset     Heart Disease Mother         Heart Disease     Hypertension Mother         Hypertension     Other - See Comments Mother         Stroke     Breast Cancer  Mother 53        Cancer-breast     Arthritis Father         Arthritis     Cancer Father         Cancer     Heart Disease Father         Heart Disease     Hypertension Father         Hypertension     Family History Negative Brother         Good Health     Heart Disease Brother         Heart Disease     Family History Negative Sister         Good Health     Social History     Socioeconomic History     Marital status:      Spouse name: rashaad     Number of children: None     Years of education: None     Highest education level: None   Social Needs     Financial resource strain: None     Food insecurity - worry: None     Food insecurity - inability: None     Transportation needs - medical: None     Transportation needs - non-medical: None   Occupational History     None   Tobacco Use     Smoking status: Never Smoker     Smokeless tobacco: Never Used   Substance and Sexual Activity     Alcohol use: Yes     Alcohol/week: 0.0 oz     Comment: Alcoholic Drinks/day: Rare use. ~2 glasses of wine a month     Drug use: No     Comment: Drug use: Nono IV drug use     Sexual activity: Yes     Partners: Male     Birth control/protection: Post-menopausal   Other Topics Concern     None   Social History Narrative    . Moved to [place], [place] Summer of 2013 from [place], [place]. Has a daughter (lives in [place]) and son (who lives in [place]). 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 Never smoked. Rare alcohol use.     Current Outpatient Medications   Medication     allopurinol (ZYLOPRIM) 300 MG tablet     aspirin EC 81 MG EC tablet     atenolol (TENORMIN) 50 MG tablet     atorvastatin (LIPITOR) 40 MG tablet     cetirizine (ZYRTEC) 10 MG tablet     Cholecalciferol (VITAMIN D3) 2000 UNITS CAPS     folic acid (FOLVITE) 1 MG tablet     hydrochlorothiazide (HYDRODIURIL) 25 MG tablet     hydroxychloroquine (PLAQUENIL) 200 MG tablet     losartan (COZAAR) 50 MG tablet     methotrexate  "2.5 MG tablet CHEMO     nitroGLYcerin (NITROSTAT) 0.4 MG sublingual tablet     prasugrel (EFFIENT) 10 MG TABS tablet     triamcinolone (NASACORT) 55 MCG/ACT Inhaler     No current facility-administered medications for this visit.      Allergies   Allergen Reactions     Food      Macadamia nuts make mouth itch     Enalapril Cough       REVIEW OF SYSTEMS  GENERAL: No fevers or chills. Has some fatigue.  HEENT: No sinus drainage. No changes with vision or hearing. No difficulty swallowing.   LYMPHATICS:  No swollen nodes in axilla, neck or groin.  CARDIOVASCULAR: Denies chest pain, palpitations and dyspnea on exertion.  PULMONARY: No shortness of breath or cough. No increase in sputum production.  GI: Denies melena, bright red blood in stools. No hematemesis. No constipation or diarrhea.  : No dysuria or hematuria.  SKIN: No recent rashes or ulcers.   HEMATOLOGY:  No history of easy bruising or bleeding.  ENDOCRINE:  No history of diabetes or thyroid problems.  NEUROLOGY:  No history of seizures or headaches. No motor or sensory changes.    PHYSICAL EXAM  Vitals: /78 (BP Location: Right arm, Patient Position: Sitting, Cuff Size: Adult Large)   Pulse 76   Temp 97.7  F (36.5  C) (Tympanic)   Resp 16   Ht 1.727 m (5' 8\")   Wt 92.1 kg (203 lb)   BMI 30.87 kg/m    BMI= Body mass index is 30.87 kg/m .   GENERAL: Healthy appearing patient in no acute distress. Pleasant and cooperative with exam and interview.   HEENT:Head-normocephalic. Eyes-no scleral icterus, pupils equal, round, and reactive to light. Nose-no nasal drainage. No lesions. Mouth-oral mucosa pink and moist, no lesions.  NECK: Supple. No thyroid nodules. Trachea midline.  ABDOMEN: Non distended. Bowel sounds active. Soft, minimal epigastric tenderness, no hepatosplenomegaly or hernias. No peritoneal signs.  SKIN: Pink, warm and dry. No jaundice. No rash.  NEURO:  Cranial nerves II-XII grossly intact. Alert and oriented.  PSYCH: Appropriate mood " and affect.    IMAGING/LAB  I personally reviewed patient's EGD photos and pathology report with the patient.

## 2019-01-17 NOTE — NURSING NOTE
"Chief Complaint   Patient presents with     RECHECK     gastropathy       Initial /78 (BP Location: Right arm, Patient Position: Sitting, Cuff Size: Adult Large)   Pulse 76   Temp 97.7  F (36.5  C) (Tympanic)   Resp 16   Ht 1.727 m (5' 8\")   Wt 92.1 kg (203 lb)   BMI 30.87 kg/m   Estimated body mass index is 30.87 kg/m  as calculated from the following:    Height as of this encounter: 1.727 m (5' 8\").    Weight as of this encounter: 92.1 kg (203 lb).  Medication Reconciliation: complete    Rae Villavicencio LPN    "

## 2019-01-17 NOTE — PATIENT INSTRUCTIONS
Start Probiotic Florjen3 daily, see how that goes. Try the carafate 2-3 times a day, as you're able. Try the Zantac twice a day. I will call you with lab results. I'll see you in about 3 weeks.

## 2019-02-04 ENCOUNTER — TRANSFERRED RECORDS (OUTPATIENT)
Dept: HEALTH INFORMATION MANAGEMENT | Facility: OTHER | Age: 74
End: 2019-02-04

## 2019-02-07 ENCOUNTER — OFFICE VISIT (OUTPATIENT)
Dept: SURGERY | Facility: OTHER | Age: 74
End: 2019-02-07
Attending: SURGERY
Payer: COMMERCIAL

## 2019-02-07 VITALS
HEIGHT: 68 IN | BODY MASS INDEX: 31.07 KG/M2 | TEMPERATURE: 97.9 F | SYSTOLIC BLOOD PRESSURE: 130 MMHG | HEART RATE: 76 BPM | RESPIRATION RATE: 16 BRPM | DIASTOLIC BLOOD PRESSURE: 80 MMHG | WEIGHT: 205 LBS

## 2019-02-07 DIAGNOSIS — K31.9 GASTROPATHY: Primary | ICD-10-CM

## 2019-02-07 PROCEDURE — G0463 HOSPITAL OUTPT CLINIC VISIT: HCPCS

## 2019-02-07 PROCEDURE — 99212 OFFICE O/P EST SF 10 MIN: CPT | Performed by: SURGERY

## 2019-02-07 RX ORDER — ATENOLOL 100 MG/1
100 TABLET ORAL DAILY
COMMUNITY
Start: 2019-02-04 | End: 2019-10-08

## 2019-02-07 ASSESSMENT — MIFFLIN-ST. JEOR: SCORE: 1488.37

## 2019-02-07 ASSESSMENT — PAIN SCALES - GENERAL: PAINLEVEL: NO PAIN (1)

## 2019-02-07 NOTE — NURSING NOTE
"Chief Complaint   Patient presents with     RECHECK     follow up medication       Initial /80 (BP Location: Right arm, Patient Position: Sitting, Cuff Size: Adult Large)   Pulse 76   Temp 97.9  F (36.6  C) (Tympanic)   Resp 16   Ht 1.727 m (5' 8\")   Wt 93 kg (205 lb)   BMI 31.17 kg/m   Estimated body mass index is 31.17 kg/m  as calculated from the following:    Height as of this encounter: 1.727 m (5' 8\").    Weight as of this encounter: 93 kg (205 lb).  Medication Reconciliation: complete    Rae Villavicencio LPN    "

## 2019-02-11 ENCOUNTER — HOSPITAL ENCOUNTER (EMERGENCY)
Facility: OTHER | Age: 74
Discharge: HOME | End: 2019-02-11
Attending: FAMILY MEDICINE | Admitting: FAMILY MEDICINE
Payer: COMMERCIAL

## 2019-02-11 VITALS
DIASTOLIC BLOOD PRESSURE: 80 MMHG | SYSTOLIC BLOOD PRESSURE: 114 MMHG | TEMPERATURE: 98.4 F | RESPIRATION RATE: 18 BRPM | HEART RATE: 80 BPM | OXYGEN SATURATION: 95 %

## 2019-02-11 DIAGNOSIS — T78.3XXA ANGIOEDEMA, INITIAL ENCOUNTER: ICD-10-CM

## 2019-02-11 DIAGNOSIS — L50.9 URTICARIA: ICD-10-CM

## 2019-02-11 PROCEDURE — 96375 TX/PRO/DX INJ NEW DRUG ADDON: CPT | Performed by: FAMILY MEDICINE

## 2019-02-11 PROCEDURE — 99284 EMERGENCY DEPT VISIT MOD MDM: CPT | Mod: 25 | Performed by: FAMILY MEDICINE

## 2019-02-11 PROCEDURE — 96374 THER/PROPH/DIAG INJ IV PUSH: CPT | Performed by: FAMILY MEDICINE

## 2019-02-11 PROCEDURE — 99283 EMERGENCY DEPT VISIT LOW MDM: CPT | Mod: Z6 | Performed by: FAMILY MEDICINE

## 2019-02-11 PROCEDURE — 25000128 H RX IP 250 OP 636: Performed by: FAMILY MEDICINE

## 2019-02-11 PROCEDURE — 25000132 ZZH RX MED GY IP 250 OP 250 PS 637: Performed by: FAMILY MEDICINE

## 2019-02-11 RX ORDER — HYDROXYZINE HYDROCHLORIDE 25 MG/1
25 TABLET, FILM COATED ORAL EVERY 8 HOURS PRN
Qty: 20 TABLET | Refills: 0 | Status: SHIPPED | OUTPATIENT
Start: 2019-02-11 | End: 2019-02-26

## 2019-02-11 RX ORDER — CETIRIZINE HYDROCHLORIDE 10 MG/1
10 TABLET ORAL DAILY
Qty: 5 TABLET | Refills: 0 | Status: SHIPPED | OUTPATIENT
Start: 2019-02-11 | End: 2019-02-15

## 2019-02-11 RX ORDER — DIPHENHYDRAMINE HYDROCHLORIDE 50 MG/ML
25 INJECTION INTRAMUSCULAR; INTRAVENOUS ONCE
Status: COMPLETED | OUTPATIENT
Start: 2019-02-11 | End: 2019-02-11

## 2019-02-11 RX ORDER — LORATADINE 10 MG/1
10 TABLET ORAL ONCE
Status: COMPLETED | OUTPATIENT
Start: 2019-02-11 | End: 2019-02-11

## 2019-02-11 RX ORDER — FAMOTIDINE 40 MG/1
40 TABLET, FILM COATED ORAL DAILY
Qty: 5 TABLET | Refills: 0 | Status: SHIPPED | OUTPATIENT
Start: 2019-02-11 | End: 2019-02-26

## 2019-02-11 RX ORDER — PREDNISONE 20 MG/1
60 TABLET ORAL DAILY
Qty: 12 TABLET | Refills: 0 | Status: SHIPPED | OUTPATIENT
Start: 2019-02-11 | End: 2019-02-15

## 2019-02-11 RX ORDER — DEXAMETHASONE SODIUM PHOSPHATE 4 MG/ML
10 INJECTION, SOLUTION INTRA-ARTICULAR; INTRALESIONAL; INTRAMUSCULAR; INTRAVENOUS; SOFT TISSUE ONCE
Status: COMPLETED | OUTPATIENT
Start: 2019-02-11 | End: 2019-02-11

## 2019-02-11 RX ORDER — HYDROXYZINE PAMOATE 25 MG/1
25 CAPSULE ORAL ONCE
Status: COMPLETED | OUTPATIENT
Start: 2019-02-11 | End: 2019-02-11

## 2019-02-11 RX ADMIN — HYDROXYZINE PAMOATE 25 MG: 25 CAPSULE ORAL at 04:02

## 2019-02-11 RX ADMIN — DIPHENHYDRAMINE HYDROCHLORIDE 25 MG: 50 INJECTION INTRAMUSCULAR; INTRAVENOUS at 03:03

## 2019-02-11 RX ADMIN — LORATADINE 10 MG: 10 TABLET ORAL at 02:59

## 2019-02-11 RX ADMIN — DEXAMETHASONE SODIUM PHOSPHATE 10 MG: 4 INJECTION, SOLUTION INTRAMUSCULAR; INTRAVENOUS at 02:59

## 2019-02-11 RX ADMIN — FAMOTIDINE 20 MG: 20 INJECTION, SOLUTION INTRAVENOUS at 03:05

## 2019-02-11 ASSESSMENT — ENCOUNTER SYMPTOMS
TROUBLE SWALLOWING: 0
EYE REDNESS: 0
EYE ITCHING: 0
DIARRHEA: 0
SHORTNESS OF BREATH: 0
PALPITATIONS: 0
LIGHT-HEADEDNESS: 0
FEVER: 0
DYSURIA: 0
VOMITING: 0
COUGH: 0
WHEEZING: 0
VOICE CHANGE: 0
NECK PAIN: 0
RHINORRHEA: 0
FACIAL SWELLING: 1
SORE THROAT: 0
EYE DISCHARGE: 0
ABDOMINAL PAIN: 0
NAUSEA: 0

## 2019-02-11 NOTE — PROGRESS NOTES
Swetha Maxwell NP    HPI:   Patient is here for follow up. The patient is 73 year old female with history of nausea and bloating. The patient has been taking carafate and H2 blocker-she is doing much better. Just a bit of pressure after she eats. Not having bad heartburn or nausea. No dark stools or vomiting. No dysphagia. She is getting an appetite back and getting more energy.    ASSESSMENT AND PLAN/RECOMMENDATIONS:   Gastropathy- Continue on carafate up to 4 times a day. Continue with the H2 blocker as needed. Reviewed the pathophysiology of bile reflux gastritis/gastropathy and the importance of acid and bile balancing out for healthy stomach lining, digestion and absorption of nutrients. Patient expressed understanding. Follow up if she has concerns or questions.    Past Medical History:   Diagnosis Date     Atherosclerotic heart disease of native coronary artery without angina pectoris     -s/p ADÁN to the mid-LAD, mid-circumflex, mid-right coronary artery, proximal right coronary  artery, and PTCA of a marginal branch of the right coronary artery 04/30/2007. -s/p ADÁN to proximal left anterior descending 10/29/13.     Contusion of abdominal wall     2014     Essential (primary) hypertension     12/14/2006     Gout     No Comments Provided     Hyperlipidemia     4/4/2007     Localized swelling, mass, or lump of upper extremity     5/26/2017     Other specified counseling     10/28/2013,Patient has identified Health Care Agent(s): Yes Add Health Care Agents: Yes   Health Care Agent(s): Primary Health Care Agent: Jay Batista  Relationship:  Phone:   Secondary Health Care Agent:  Relationship: Daughter Phone:   Conservator:  Relationship:  Phone:   Guardian: Relationship:  Phone:    Patient has Advance Care Plan Documents (Health Care Directive, POLST): No, refe*     Polyosteoarthritis     No Comments Provided     Postmenopausal bleeding     No Comments Provided     Presence of coronary angioplasty  implant and graft     ,unstable angina; severe prox LAD stenosis; s/p 7 stents     Primary osteoarthritis of left hand     2017     Rheumatoid arthritis (H)     follows at [facility] yearly     Past Surgical History:   Procedure Laterality Date     ARTHROPLASTY KNEE      2011     ARTHROSCOPY KNEE           BIOPSY BREAST      10/25/95,BRIAN RAMIREZ      SECTION      1974, Section     COLONOSCOPY      05,Normal - Repeat in 10 years     COLONOSCOPY  2016     COLONOSCOPY N/A 2019    Procedure: COLONOSCOPY;  Surgeon: Evelin Thompson MD;  Location:  OR     COLONOSCOPY N/A 2019    Procedure: COMBINED COLONOSCOPY, SINGLE OR MULTIPLE BIOPSY/POLYPECTOMY BY BIOPSY;  Surgeon: Evelin Thompson MD;  Location:  OR     ESOPHAGOSCOPY, GASTROSCOPY, DUODENOSCOPY (EGD), COMBINED      2011,erosive gastritis;bx;consider MRI/A;[facility]     EXTRACTION(S) DENTAL      1970     HEART CATH, ANGIOPLASTY      10/29/2013,ADÁN to proximal left anterior descending     OTHER SURGICAL HISTORY      ,[id number],OTHER     OTHER SURGICAL HISTORY      2014,[id number],TOTAL SHOULDER ARTHROPLASTY,Right     OTHER SURGICAL HISTORY      2016,71042.0,IL COLONOSCOPY REMOVE ELIZA POLYP LESN SNARE,repeat , precancerous polyps     STRESS LEXISCAN TEST  2018    With Dr. Irwin - normal     Family History   Problem Relation Age of Onset     Heart Disease Mother         Heart Disease     Hypertension Mother         Hypertension     Other - See Comments Mother         Stroke     Breast Cancer Mother 53        Cancer-breast     Arthritis Father         Arthritis     Cancer Father         Cancer     Heart Disease Father         Heart Disease     Hypertension Father         Hypertension     Family History Negative Brother         Good Health     Heart Disease Brother         Heart Disease     Family History Negative Sister         Good Health     Social History     Socioeconomic  History     Marital status:      Spouse name: rashaad     Number of children: None     Years of education: None     Highest education level: None   Social Needs     Financial resource strain: None     Food insecurity - worry: None     Food insecurity - inability: None     Transportation needs - medical: None     Transportation needs - non-medical: None   Occupational History     None   Tobacco Use     Smoking status: Never Smoker     Smokeless tobacco: Never Used   Substance and Sexual Activity     Alcohol use: Yes     Alcohol/week: 0.0 oz     Comment: Alcoholic Drinks/day: Rare use. ~2 glasses of wine a month     Drug use: No     Comment: Drug use: Nono IV drug use     Sexual activity: Yes     Partners: Male     Birth control/protection: Post-menopausal   Other Topics Concern     None   Social History Narrative    . Moved to [place], [place] Summer of 2013 from [place], [place]. Has a daughter (lives in [place]) and son (who lives in [place]). Patient retired. 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 Never smoked. Rare alcohol use.     Current Outpatient Medications   Medication     allopurinol (ZYLOPRIM) 300 MG tablet     aspirin EC 81 MG EC tablet     atenolol (TENORMIN) 100 MG tablet     atorvastatin (LIPITOR) 40 MG tablet     Cholecalciferol (VITAMIN D3) 2000 UNITS CAPS     folic acid (FOLVITE) 1 MG tablet     hydrochlorothiazide (HYDRODIURIL) 25 MG tablet     hydroxychloroquine (PLAQUENIL) 200 MG tablet     losartan (COZAAR) 50 MG tablet     methotrexate 2.5 MG tablet CHEMO     nitroGLYcerin (NITROSTAT) 0.4 MG sublingual tablet     prasugrel (EFFIENT) 10 MG TABS tablet     Probiotic Product (FLORAJEN3) CAPS     ranitidine (ZANTAC) 150 MG capsule     sucralfate (CARAFATE) 1 GM tablet     triamcinolone (NASACORT) 55 MCG/ACT Inhaler     cetirizine (ZYRTEC) 10 MG tablet     famotidine (PEPCID) 40 MG tablet     hydrOXYzine (ATARAX) 25 MG tablet     predniSONE (DELTASONE) 20 MG  "tablet     No current facility-administered medications for this visit.      Allergies   Allergen Reactions     Food      Macadamia nuts make mouth itch     Enalapril Cough       REVIEW OF SYSTEMS  GENERAL: No fevers or chills. Denies fatigue, recent weight loss.  HEENT: No sinus drainage. No changes with vision or hearing. No difficulty swallowing.   LYMPHATICS:  No swollen nodes in axilla, neck or groin.  CARDIOVASCULAR: Denies chest pain, palpitations and dyspnea on exertion.  PULMONARY: No shortness of breath or cough. No increase in sputum production.  GI: Denies melena, bright red blood in stools. No hematemesis. No constipation or diarrhea.  : No dysuria or hematuria.  SKIN: No recent rashes or ulcers.   HEMATOLOGY:  No history of easy bruising or bleeding.  ENDOCRINE:  No history of diabetes or thyroid problems.  NEUROLOGY:  Nohistory of seizures or headaches. No motor or sensory changes.    PHYSICAL EXAM  Vitals: /80 (BP Location: Right arm, Patient Position: Sitting, Cuff Size: Adult Large)   Pulse 76   Temp 97.9  F (36.6  C) (Tympanic)   Resp 16   Ht 1.727 m (5' 8\")   Wt 93 kg (205 lb)   BMI 31.17 kg/m    BMI= Body mass index is 31.17 kg/m .   GENERAL: Healthy appearing patient in no acute distress. Pleasant and cooperative with exam and interview.   HEENT:Head-normocephalic. Eyes-no scleral icterus, pupils equal, round, and reactive to light. Nose-no nasal drainage. No lesions. Mouth-oral mucosa pink and moist, no lesions.  NECK: Supple. No thyroid nodules. Tracheamidline.  ABDOMEN: Non distended. Bowel sounds active. Soft, non-tender, no hepatosplenomegaly or hernias. No peritoneal signs.  SKIN: Pink, warm and dry. No jaundice. No rash.  NEURO:  Cranial nerves II-XII grossly intact. Alert and oriented.  PSYCH: Appropriate mood and affect.    "

## 2019-02-11 NOTE — ED PROVIDER NOTES
History     Chief Complaint   Patient presents with     Facial Swelling     HPI  Margaret Batista is a 73 year old female who presents to ED with facial swelling that started yesterday afternoon around 4:30 PM.  Was approximately 10-1/2 hours ago.  Patient states that she noticed some itching in her throat and her throat felt funny and tight and then she broke out in hives.  She states that she took some Benadryl at around 5 PM last night and then again around 10 PM last night.  She woke up this morning with itching on the left side of her face and noticed that her lips were swollen on the left side.  She felt that initially the left side of her face was swollen as well.  She continues to have hives in the trunk and upper thighs.  She denies any eye symptoms, nasal symptoms, difficulty swallowing or speaking, cough, shortness of breath, wheezing.    She is not aware of anything that she has eaten or been exposed to that may be triggering this.    Allergies:  Allergies   Allergen Reactions     Food      Macadamia nuts make mouth itch     Enalapril Cough       Problem List:    Patient Active Problem List    Diagnosis Date Noted     Primary osteoarthritis of both hands 05/30/2017     Priority: Medium     Mass of finger 05/26/2017     Priority: Medium     Osteopenia 11/21/2016     Priority: Medium     Special screening for malignant neoplasms, colon 04/13/2016     Priority: Medium     Allergic rhinitis 02/01/2016     Priority: Medium     Health care maintenance 11/17/2015     Priority: Medium     Overview:   Colonoscopy: Due, will order  Breast Exam/Mammography: Does not complete self exams; last mammogram 11/2015 and normal, repeat every year however consider moving to every other year soon  Pap smear: Never any abnormal, no longer performing due to age and patient request unless symptomatic  DEXA: Done in 2012 and showed osteopenia; repeat as needed to manage your bone density problem including in the next 6  months  Immunizations: Due for prevnar, given today. UTD on other vaccines.   Lipids/Annual Exam: Done today, repeat annually  Hepatitis C screen: will discuss in future       Status post total shoulder replacement, right 05/28/2015     Priority: Medium     S/P shoulder surgery 02/10/2015     Priority: Medium     Hyperlipidemia 01/16/2015     Priority: Medium     Hypertension 01/16/2015     Priority: Medium     Avascular necrosis of humeral head, right (H) 01/12/2015     Priority: Medium     Rotator cuff tendonitis, right 12/01/2014     Priority: Medium     Shoulder impingement, right 12/01/2014     Priority: Medium     Right shoulder pain 10/14/2014     Priority: Medium     ACP (advance care planning) 10/28/2013     Priority: Medium     Overview:   Formatting of this note may be different from the original.  Patient has identified Health Care Agent(s): Yes  Add Health Care Agents: Yes    Health Care Agent(s):  Primary Health Care Agent: Jay Batista  Relationship:  Phone:    Secondary Health Care Agent:  Relationship: Daughter Phone:    Conservator:  Relationship:  Phone:    Guardian: Relationship:  Phone:      Patient has Advance Care Plan Documents (Health Care Directive, POLST): No, referral made to Social Work Services.    Patient has identified Specific Treatment Preferences: Yes   Specific Treatment Preferences: a.) Code Status:  CPR/Attempt Resuscitation        ASCVD (arteriosclerotic cardiovascular disease) 10/28/2013     Priority: Medium     Overview:   -s/p ADÁN to the mid-LAD, mid-circumflex, mid-right coronary artery, proximal right coronary artery, and PTCA of a marginal branch of the right coronary artery 04/30/2007.  -s/p ADÁN to proximal left anterior descending 10/29/13.       Rheumatoid arthritis (H) 10/25/2013     Priority: Medium     DJD (degenerative joint disease) of knee 01/13/2011     Priority: Medium     Gout 10/27/2009     Priority: Medium        Past Medical History:    Past Medical  History:   Diagnosis Date     Atherosclerotic heart disease of native coronary artery without angina pectoris      Contusion of abdominal wall      Essential (primary) hypertension      Gout      Hyperlipidemia      Localized swelling, mass, or lump of upper extremity      Other specified counseling      Polyosteoarthritis      Postmenopausal bleeding      Presence of coronary angioplasty implant and graft      Primary osteoarthritis of left hand      Rheumatoid arthritis (H)        Past Surgical History:    Past Surgical History:   Procedure Laterality Date     ARTHROPLASTY KNEE      2011     ARTHROSCOPY KNEE           BIOPSY BREAST      10/25/95,BRIAN  DR. YOGI RAMIREZ      SECTION      , Section     COLONOSCOPY      05,Normal - Repeat in 10 years     COLONOSCOPY  2016     COLONOSCOPY N/A 2019    Procedure: COLONOSCOPY;  Surgeon: Evelin Thompson MD;  Location:  OR     COLONOSCOPY N/A 2019    Procedure: COMBINED COLONOSCOPY, SINGLE OR MULTIPLE BIOPSY/POLYPECTOMY BY BIOPSY;  Surgeon: Evelin Thompson MD;  Location:  OR     ESOPHAGOSCOPY, GASTROSCOPY, DUODENOSCOPY (EGD), COMBINED      2011,erosive gastritis;bx;consider MRI/A;[facility]     EXTRACTION(S) DENTAL      1970     HEART CATH, ANGIOPLASTY      10/29/2013,ADÁN to proximal left anterior descending     OTHER SURGICAL HISTORY      ,[id number],OTHER     OTHER SURGICAL HISTORY      2014,[id number],TOTAL SHOULDER ARTHROPLASTY,Right     OTHER SURGICAL HISTORY      2016,65613.0,ID COLONOSCOPY REMOVE ELIZA POLYP LESN SNARE,repeat , precancerous polyps     STRESS LEXISCAN TEST  2018    With Dr. Irwin - normal       Family History:    Family History   Problem Relation Age of Onset     Heart Disease Mother         Heart Disease     Hypertension Mother         Hypertension     Other - See Comments Mother         Stroke     Breast Cancer Mother 53        Cancer-breast     Arthritis Father          Arthritis     Cancer Father         Cancer     Heart Disease Father         Heart Disease     Hypertension Father         Hypertension     Family History Negative Brother         Good Health     Heart Disease Brother         Heart Disease     Family History Negative Sister         Good Health       Social History:  Marital Status:   [2]  Social History     Tobacco Use     Smoking status: Never Smoker     Smokeless tobacco: Never Used   Substance Use Topics     Alcohol use: Yes     Alcohol/week: 0.0 oz     Comment: Alcoholic Drinks/day: Rare use. ~2 glasses of wine a month     Drug use: No     Comment: Drug use: Nono IV drug use        Medications:      cetirizine (ZYRTEC) 10 MG tablet   famotidine (PEPCID) 40 MG tablet   hydrOXYzine (ATARAX) 25 MG tablet   predniSONE (DELTASONE) 20 MG tablet   allopurinol (ZYLOPRIM) 300 MG tablet   aspirin EC 81 MG EC tablet   atenolol (TENORMIN) 100 MG tablet   atorvastatin (LIPITOR) 40 MG tablet   Cholecalciferol (VITAMIN D3) 2000 UNITS CAPS   folic acid (FOLVITE) 1 MG tablet   hydrochlorothiazide (HYDRODIURIL) 25 MG tablet   hydroxychloroquine (PLAQUENIL) 200 MG tablet   losartan (COZAAR) 50 MG tablet   methotrexate 2.5 MG tablet CHEMO   nitroGLYcerin (NITROSTAT) 0.4 MG sublingual tablet   prasugrel (EFFIENT) 10 MG TABS tablet   Probiotic Product (FLORAJEN3) CAPS   ranitidine (ZANTAC) 150 MG capsule   sucralfate (CARAFATE) 1 GM tablet   triamcinolone (NASACORT) 55 MCG/ACT Inhaler         Review of Systems   Constitutional: Negative for fever.   HENT: Positive for facial swelling. Negative for congestion, rhinorrhea, sneezing, sore throat, trouble swallowing and voice change.    Eyes: Negative for discharge, redness and itching.   Respiratory: Negative for cough, shortness of breath and wheezing.    Cardiovascular: Negative for chest pain, palpitations and leg swelling.   Gastrointestinal: Negative for abdominal pain, diarrhea, nausea and vomiting.   Genitourinary:  Negative for dysuria.   Musculoskeletal: Negative for neck pain.   Skin: Positive for rash.   Neurological: Negative for light-headedness.   All other systems reviewed and are negative.      Physical Exam   BP: 137/84  Pulse: 80  Heart Rate: 85  Temp: 98.4  F (36.9  C)  Resp: 18  SpO2: 99 %      Physical Exam   Constitutional: She is oriented to person, place, and time. She appears well-developed and well-nourished. She is cooperative. No distress.   HENT:   Head: Normocephalic and atraumatic.   Right Ear: External ear normal.   Left Ear: External ear normal.   Nose: Nose normal.   Mouth/Throat: Uvula is midline, oropharynx is clear and moist and mucous membranes are normal. No oral lesions. No uvula swelling. No posterior oropharyngeal edema.       Eyes: Conjunctivae and EOM are normal. Pupils are equal, round, and reactive to light. No scleral icterus.   Neck: Trachea normal, normal range of motion, full passive range of motion without pain and phonation normal. Neck supple. No thyromegaly present.   Cardiovascular: Normal rate, regular rhythm, normal heart sounds and intact distal pulses.   No murmur heard.  Pulmonary/Chest: Effort normal and breath sounds normal. No respiratory distress. She has no wheezes. She has no rales.   Abdominal: Soft. Bowel sounds are normal. There is no tenderness.   Musculoskeletal: Normal range of motion. She exhibits no edema or tenderness.   Lymphadenopathy:     She has no cervical adenopathy.   Neurological: She is alert and oriented to person, place, and time.   Skin: Skin is warm. Capillary refill takes less than 2 seconds. Rash noted. Rash is urticarial. She is not diaphoretic.   There are scattered hives on the lower abdomen, buttocks, lower back and upper thighs.   Nursing note and vitals reviewed.      ED Course     Procedures        Critical Care time:  none    No results found for this or any previous visit (from the past 24 hour(s)).    Medications   hydrOXYzine  (VISTARIL) capsule 25 mg (not administered)   dexamethasone (DECADRON) injection 10 mg (10 mg Intravenous Given 2/11/19 0259)   diphenhydrAMINE (BENADRYL) injection 25 mg (25 mg Intravenous Given 2/11/19 0303)   loratadine (CLARITIN) tablet 10 mg (10 mg Oral Given 2/11/19 0259)   famotidine (PEPCID) infusion 20 mg (0 mg Intravenous Stopped 2/11/19 0320)       I had a discussion with the patient and the family regarding the symptoms, exam results, diagnosis, and plan.  0357 -patient was reevaluated and is doing much better.  Her lips are improving and have very little swelling left at this time.  The hives on her lower trunk and thighs have almost completely resolved.  She does still have a few scattered hives on her upper extremities but those are also improving.  She oxygen saturations, clear lungs, no difficulty speaking or swallowing.    The patient is discharged home in good condition.  She will be continued on prednisone, Pepcid, Zyrtec, Atarax as needed for itching.    I answered all questions to the best of my ability.    The patient voiced understanding of the plan, was agreeable, and had no further questions or concerns. Advised to return for any worsening symptoms.      Assessments & Plan (with Medical Decision Making)     I have reviewed the nursing notes.    I have reviewed the findings, diagnosis, plan and need for follow up with the patient.       Medication List      Started    famotidine 40 MG tablet  Commonly known as:  PEPCID  40 mg, Oral, DAILY     hydrOXYzine 25 MG tablet  Commonly known as:  ATARAX  25 mg, Oral, EVERY 8 HOURS PRN     predniSONE 20 MG tablet  Commonly known as:  DELTASONE  60 mg, Oral, DAILY        Modified    cetirizine 10 MG tablet  Commonly known as:  zyrTEC  10 mg, Oral, DAILY  What changed:      when to take this    reasons to take this        ASK your doctor about these medications    polyethylene glycol-electrolytes 420 g solution  Commonly known as:  NULYTELY  4,000 mLs,  Oral, ONCE  Ask about: Should I take this medication?            Final diagnoses:   Urticaria   Angioedema, initial encounter       2/11/2019   [facility]     Gerry Avelar MD  02/11/19 0404

## 2019-02-11 NOTE — ED AVS SNAPSHOT
[facility]  [address]  [place] [address]  Phone:  [phone]  Fax:  [phone]                                    Margaret Batista   MRN: [id number]    Department:  [facility]   Date of Visit:  2/11/2019           After Visit Summary Signature Page    I have received my discharge instructions, and my questions have been answered. I have discussed any challenges I see with this plan with the nurse or doctor.    ..........................................................................................................................................  Patient/Patient Representative Signature      ..........................................................................................................................................  Patient Representative Print Name and Relationship to Patient    ..................................................               ................................................  Date                                   Time    ..........................................................................................................................................  Reviewed by Signature/Title    ...................................................              ..............................................  Date                                               Time          22EPIC Rev 08/18

## 2019-02-11 NOTE — ED TRIAGE NOTES
Pt comes into the ER with her  reporting hives (on her bottom) after she woke up from a nap around [id number] last evening. [place] something in her throat. Felt her throat was hoarse. Pt took a benadryl around 1830 and again around 2230. Reports the benadryl helped a little the first time she took it and the second time it did not do much.   Pt's voice is hoarse, left side of her face is swollen, hives noted on bottom and lower back, itching.

## 2019-02-12 ENCOUNTER — TELEPHONE (OUTPATIENT)
Dept: INTERNAL MEDICINE | Facility: OTHER | Age: 74
End: 2019-02-12

## 2019-02-12 ENCOUNTER — OFFICE VISIT (OUTPATIENT)
Dept: INTERNAL MEDICINE | Facility: OTHER | Age: 74
End: 2019-02-12
Attending: NURSE PRACTITIONER
Payer: COMMERCIAL

## 2019-02-12 VITALS
SYSTOLIC BLOOD PRESSURE: 116 MMHG | DIASTOLIC BLOOD PRESSURE: 82 MMHG | WEIGHT: 208 LBS | TEMPERATURE: 96.7 F | HEART RATE: 76 BPM | BODY MASS INDEX: 31.63 KG/M2 | RESPIRATION RATE: 16 BRPM

## 2019-02-12 DIAGNOSIS — T78.3XXD ANGIOEDEMA, SUBSEQUENT ENCOUNTER: ICD-10-CM

## 2019-02-12 DIAGNOSIS — L50.9 HIVES: Primary | ICD-10-CM

## 2019-02-12 PROCEDURE — 99214 OFFICE O/P EST MOD 30 MIN: CPT | Performed by: NURSE PRACTITIONER

## 2019-02-12 PROCEDURE — G0463 HOSPITAL OUTPT CLINIC VISIT: HCPCS

## 2019-02-12 ASSESSMENT — PAIN SCALES - GENERAL: PAINLEVEL: NO PAIN (0)

## 2019-02-12 NOTE — PROGRESS NOTES
Subjective:  She is here today for follow-up on emergency department visit.  She was seen there early Monday morning with facial and lip swelling and hives.  She reports that around 4:00 that day she developed hives and during the night she awoke with burning in the throat swelling of her lips and face.  She received Vistaril, Decadron, Benadryl, Claritin and famotidine in the emergency department.  She improved over her stay.  She states she was feeling well yesterday but then this morning she awoke with hives on her arm and chest and abdomen once again.  This does itch.  She was prescribed several medications at the emergency department visit and she is not quite sure what she should be taking.  She also has not sure what she could be allergic to.  She did have a CT angiogram last Monday and is never had a reaction to CT contrast dye in the past.  She also is on Floragen 3, ranitidine twice daily and Carafate which was prescribed for gastritis by Dr. Evelin Thompson.  She has been on these medications for proximately 3 weeks now.  There have been no other medications, foods, detergents, soaps, etc.  At the emergency department visit she was prescribed prednisone 20 mg 3 pills daily which she was told not to start taking until today, famotidine 20 mg daily for 5 days, hydroxyzine 3 times daily as needed and could also take Benadryl as needed.  She took a Benadryl 25 mg tablet this morning and was taking it every 6 hours yesterday.  She has not had any evidence of angioedema, chest tightness nor wheezing today.    Patient Active Problem List   Diagnosis     ACP (advance care planning)     Allergic rhinitis     ASCVD (arteriosclerotic cardiovascular disease)     DJD (degenerative joint disease) of knee     Gout     Hyperlipidemia     Hypertension     Mass of finger     Osteopenia     Primary osteoarthritis of both hands     Rheumatoid arthritis (H)     Avascular necrosis of humeral head, right (H)     Right shoulder pain      Rotator cuff tendonitis, right     S/P shoulder surgery     Shoulder impingement, right     Status post total shoulder replacement, right     Past Medical History:   Diagnosis Date     Atherosclerotic heart disease of native coronary artery without angina pectoris     -s/p ADÁN to the mid-LAD, mid-circumflex, mid-right coronary artery, proximal right coronary  artery, and PTCA of a marginal branch of the right coronary artery 2007. -s/p ADÁN to proximal left anterior descending 10/29/13.     Contusion of abdominal wall          Essential (primary) hypertension     2006     Gout     No Comments Provided     Hyperlipidemia     2007     Localized swelling, mass, or lump of upper extremity     2017     Other specified counseling     10/28/2013,Patient has identified Health Care Agent(s): Yes Add Health Care Agents: Yes   Health Care Agent(s): Primary Health Care Agent: Jay Batista  Relationship:  Phone:   Secondary Health Care Agent:  Relationship: Daughter Phone:   Conservator:  Relationship:  Phone:   Guardian: Relationship:  Phone:    Patient has Advance Care Plan Documents (Health Care Directive, POLST): No, refe*     Polyosteoarthritis     No Comments Provided     Postmenopausal bleeding     No Comments Provided     Presence of coronary angioplasty implant and graft     ,unstable angina; severe prox LAD stenosis; s/p 7 stents     Primary osteoarthritis of left hand     2017     Rheumatoid arthritis (H)     follows at [facility] yearly     Past Surgical History:   Procedure Laterality Date     ARTHROPLASTY KNEE      2011     ARTHROSCOPY KNEE           BIOPSY BREAST      10/25/95,BRIAN RAMIREZ      SECTION      1974, Section     COLONOSCOPY      05,Normal - Repeat in 10 years     COLONOSCOPY  2016     COLONOSCOPY N/A 2019    Procedure: COLONOSCOPY;  Surgeon: Evelin Thompson MD;  Location: [facility] OR     COLONOSCOPY  N/A 1/9/2019    Procedure: COMBINED COLONOSCOPY, SINGLE OR MULTIPLE BIOPSY/POLYPECTOMY BY BIOPSY;  Surgeon: Evelin Thompson MD;  Location: [facility] OR     ESOPHAGOSCOPY, GASTROSCOPY, DUODENOSCOPY (EGD), COMBINED      5/2/2011,erosive gastritis;bx;consider MRI/A;Bravo     EXTRACTION(S) DENTAL      1970     HEART CATH, ANGIOPLASTY      10/29/2013,ADÁN to proximal left anterior descending     OTHER SURGICAL HISTORY      2007/2013,839030,OTHER     OTHER SURGICAL HISTORY      01/2014,[id number],TOTAL SHOULDER ARTHROPLASTY,Right     OTHER SURGICAL HISTORY      04/14/2016,17271.0,[place] COLONOSCOPY REMOVE ELIZA POLYP LESN SNARE,repeat 2019, precancerous polyps     STRESS LEXISCAN TEST  08/2018    With Dr. Irwin - normal     Social History     Socioeconomic History     Marital status:      Spouse name: rashaad     Number of children: Not on file     Years of education: Not on file     Highest education level: Not on file   Social Needs     Financial resource strain: Not on file     Food insecurity - worry: Not on file     Food insecurity - inability: Not on file     Transportation needs - medical: Not on file     Transportation needs - non-medical: Not on file   Occupational History     Not on file   Tobacco Use     Smoking status: Never Smoker     Smokeless tobacco: Never Used   Substance and Sexual Activity     Alcohol use: Yes     Alcohol/week: 0.0 oz     Comment: Alcoholic Drinks/day: Rare use. ~2 glasses of wine a month     Drug use: No     Comment: Drug use: Nono IV drug use     Sexual activity: Yes     Partners: Male     Birth control/protection: Post-menopausal   Other Topics Concern     Not on file   Social History Narrative    . Moved to [place], [place] Summer of 2013 from [place], [place]. Has a daughter (lives in [place]) and son (who lives in [place]). 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 Rare alcohol use.     Family History   Problem Relation Age of Onset      Heart Disease Mother         Heart Disease     Hypertension Mother         Hypertension     Other - See Comments Mother         Stroke     Breast Cancer Mother 53        Cancer-breast     Arthritis Father         Arthritis     Cancer Father         Cancer     Heart Disease Father         Heart Disease     Hypertension Father         Hypertension     Family History Negative Brother         Good Health     Heart Disease Brother         Heart Disease     Family History Negative Sister         Good Health     Current Outpatient Medications   Medication Sig Dispense Refill     allopurinol (ZYLOPRIM) 300 MG tablet Take 1 tablet (300 mg) by mouth daily 90 tablet 3     aspirin EC 81 MG EC tablet Take 1 tablet by mouth daily       atenolol (TENORMIN) 100 MG tablet Take 100 mg by mouth daily       atorvastatin (LIPITOR) 40 MG tablet Take 1 tablet (40 mg) by mouth daily 90 tablet 3     cetirizine (ZYRTEC) 10 MG tablet Take 1 tablet (10 mg) by mouth daily for 5 days 5 tablet 0     Cholecalciferol (VITAMIN D3) 2000 UNITS CAPS Take 2,000 Units by mouth daily       famotidine (PEPCID) 40 MG tablet Take 1 tablet (40 mg) by mouth daily for 5 days 5 tablet 0     folic acid (FOLVITE) 1 MG tablet Take 1 mg by mouth daily       hydrochlorothiazide (HYDRODIURIL) 25 MG tablet Take 1 tablet (25 mg) by mouth daily 90 tablet 3     hydroxychloroquine (PLAQUENIL) 200 MG tablet        hydrOXYzine (ATARAX) 25 MG tablet Take 1 tablet (25 mg) by mouth every 8 hours as needed for itching 20 tablet 0     losartan (COZAAR) 50 MG tablet Take 1 tablet (50 mg) by mouth daily 90 tablet 3     methotrexate 2.5 MG tablet CHEMO Take 15 mg by mouth Every Monday       nitroGLYcerin (NITROSTAT) 0.4 MG sublingual tablet Place 1 tablet under the tongue every 5 minutes as needed for chest pain (For chest pain x 3 doses)       prasugrel (EFFIENT) 10 MG TABS tablet Take 1 tablet by mouth every morning       predniSONE (DELTASONE) 20 MG tablet Take 60 mg by mouth  daily for 4 days. 12 tablet 0     Probiotic Product (FLORAJEN3) CAPS Take 1 capsule by mouth daily 30 capsule 1     ranitidine (ZANTAC) 150 MG capsule Take 1 capsule (150 mg) by mouth 2 times daily 90 capsule 3     sucralfate (CARAFATE) 1 GM tablet Take 1 tablet (1 g) by mouth 3 times daily (before meals) 90 tablet 3     triamcinolone (NASACORT) 55 MCG/ACT Inhaler Spray 2 sprays into both nostrils daily       Food and Enalapril      Review of Systems:  Review of Systems  See HPI    Objective:   /82 (BP Location: Right arm, Patient Position: Chair, Cuff Size: Adult Large)   Pulse 76   Temp 96.7  F (35.9  C) (Tympanic)   Resp 16   Wt 94.3 kg (208 lb)   Breastfeeding? No   BMI 31.63 kg/m    Physical Exam  Pleasant female accompanied by her  in no acute distress.  Affect normal.  Alert and oriented x4.  Sclera nonicteric.  Conjunctiva noninflamed.  Oral mucosa pink and moist.  Throat without erythema.  No swelling along the lips or face.  Neck supple without adenopathy.  Lung fields clear to auscultation.  Cardiovascular regular rate and rhythm.  Hives are present on her arms chest and abdomen and her cheeks are light pink in color.    Assessment:    ICD-10-CM    1. Hives L50.9    2. Angioedema, subsequent encounter T78.3XXD        Plan:   Not sure what she is allergic to however patient is on losartan which does have a risk of angioedema.  I have asked her to place this medication on hold and to contact her cardiologist to get her opinion as to whether she thought this could be the potential cause to the recent angioedema.  I also feel that she would benefit from holding the new medications which were started last week to include Zantac, Carafate and Floragen 3.  She recently had CT contrast dye which could have caused reaction even though delayed.  She will start the prednisone today.  She may take the famotidine once daily and may use Benadryl 25 mg 1 tablet every 6 hours as needed.  She will hold  off on the hydroxyzine unless she needs this for pruritus.  Also will start the cetirizine 10 mg daily.  She will follow-up in clinic later this week however she has further problems with angioedema, wheezing, chest tightness, etc. she needs to be evaluated in the emergency department.    PEDRO Reese   2/12/2019  2:36 PM

## 2019-02-12 NOTE — NURSING NOTE
"Chief Complaint   Patient presents with     ER F/U     Hives       Initial /82 (BP Location: Right arm, Patient Position: Chair, Cuff Size: Adult Large)   Pulse 76   Temp 96.7  F (35.9  C) (Tympanic)   Resp 16   Wt 94.3 kg (208 lb)   Breastfeeding? No   BMI 31.63 kg/m   Estimated body mass index is 31.63 kg/m  as calculated from the following:    Height as of 2/7/19: 1.727 m (5' 8\").    Weight as of this encounter: 94.3 kg (208 lb).  Medication Reconciliation: complete      Antonia Arnold LPN on 2/12/2019 at 12:53 PM    "

## 2019-02-12 NOTE — TELEPHONE ENCOUNTER
Please advise regarding appointment today  Antonia Arnold LPN...................2/12/2019   9:25 AM

## 2019-02-12 NOTE — TELEPHONE ENCOUNTER
The patient called regarding her appointment tomorrow for hives.  She was wondering if she could get in today.  She was seen in the ER and was given medication for this and has questions about this.  She has broken out in hives again and needs advice about the medication she was given.  She does have an appointment tomorrow morning but because of the hives coming back would like to see Suma today if at all possible.  Please advise.  [phone],

## 2019-02-13 ENCOUNTER — NURSE TRIAGE (OUTPATIENT)
Dept: INTERNAL MEDICINE | Facility: OTHER | Age: 74
End: 2019-02-13

## 2019-02-13 NOTE — TELEPHONE ENCOUNTER
"Patient will try taking the 2 pills of the 25 mg of Benadryl and is she wondering if she can take this with the Hydroxyzine as well? She said she did take Benadryl \"all day yesterday\" but it was only 1 25 mg tablet.   Antonia Arnold LPN...................2/13/2019   11:21 AM   "

## 2019-02-15 ENCOUNTER — OFFICE VISIT (OUTPATIENT)
Dept: INTERNAL MEDICINE | Facility: OTHER | Age: 74
End: 2019-02-15
Attending: NURSE PRACTITIONER
Payer: COMMERCIAL

## 2019-02-15 VITALS
RESPIRATION RATE: 16 BRPM | WEIGHT: 207.2 LBS | TEMPERATURE: 97 F | HEART RATE: 76 BPM | DIASTOLIC BLOOD PRESSURE: 88 MMHG | BODY MASS INDEX: 31.5 KG/M2 | SYSTOLIC BLOOD PRESSURE: 122 MMHG

## 2019-02-15 DIAGNOSIS — T78.3XXD ANGIOEDEMA, SUBSEQUENT ENCOUNTER: ICD-10-CM

## 2019-02-15 DIAGNOSIS — L50.9 HIVES: Primary | ICD-10-CM

## 2019-02-15 DIAGNOSIS — I10 ESSENTIAL HYPERTENSION: ICD-10-CM

## 2019-02-15 PROCEDURE — G0463 HOSPITAL OUTPT CLINIC VISIT: HCPCS

## 2019-02-15 PROCEDURE — 99214 OFFICE O/P EST MOD 30 MIN: CPT | Performed by: NURSE PRACTITIONER

## 2019-02-15 RX ORDER — PREDNISONE 20 MG/1
TABLET ORAL
Qty: 12 TABLET | Refills: 0 | Status: SHIPPED | OUTPATIENT
Start: 2019-02-15 | End: 2019-05-31

## 2019-02-15 RX ORDER — CETIRIZINE HYDROCHLORIDE 10 MG/1
10 TABLET ORAL DAILY
Qty: 30 TABLET | Refills: 3 | Status: SHIPPED | OUTPATIENT
Start: 2019-02-15 | End: 2019-02-26

## 2019-02-15 ASSESSMENT — PAIN SCALES - GENERAL: PAINLEVEL: NO PAIN (0)

## 2019-02-15 NOTE — PROGRESS NOTES
Subjective:  She is here today for follow-up on hives and angioedema.  She was seen in clinic on Tuesday and at that time she was continued on the prednisone 60 mg daily and has been taking Benadryl 50 mg every 6 hours.  She is not finding that the hives are improving.  She has not had angioedema any longer.  She continues on all her usual medications but did stop her losartan and the GI meds that were recently prescribed by Dr. Thompson.  She still cannot think of any new foods lotions detergents, etc. in her environment.  The only thing different is that the prasugrel tablet change to a different color and she started this about 2 months ago.  Last cardiac stents were in 2013.  She denies shortness of breath, wheezing, chest tightness, difficulty swallowing and angioedema.    Patient Active Problem List   Diagnosis     ACP (advance care planning)     Allergic rhinitis     ASCVD (arteriosclerotic cardiovascular disease)     DJD (degenerative joint disease) of knee     Gout     Hyperlipidemia     Hypertension     Mass of finger     Osteopenia     Primary osteoarthritis of both hands     Rheumatoid arthritis (H)     Avascular necrosis of humeral head, right (H)     Right shoulder pain     Rotator cuff tendonitis, right     S/P shoulder surgery     Shoulder impingement, right     Status post total shoulder replacement, right     Past Medical History:   Diagnosis Date     Atherosclerotic heart disease of native coronary artery without angina pectoris     -s/p ADÁN to the mid-LAD, mid-circumflex, mid-right coronary artery, proximal right coronary  artery, and PTCA of a marginal branch of the right coronary artery 04/30/2007. -s/p ADÁN to proximal left anterior descending 10/29/13.     Contusion of abdominal wall     2014     Essential (primary) hypertension     12/14/2006     Gout     No Comments Provided     Hyperlipidemia     4/4/2007     Localized swelling, mass, or lump of upper extremity     5/26/2017     Other  specified counseling     10/28/2013,Patient has identified Health Care Agent(s): Yes Add Health Care Agents: Yes   Health Care Agent(s): Primary Health Care Agent: Jay Batista  Relationship:  Phone:   Secondary Health Care Agent:  Relationship: Daughter Phone:   Conservator:  Relationship:  Phone:   Guardian: Relationship:  Phone:    Patient has Advance Care Plan Documents (Health Care Directive, POLST): No, refe*     Polyosteoarthritis     No Comments Provided     Postmenopausal bleeding     No Comments Provided     Presence of coronary angioplasty implant and graft     ,unstable angina; severe prox LAD stenosis; s/p 7 stents     Primary osteoarthritis of left hand     2017     Rheumatoid arthritis (H)     follows at [facility] yearly     Past Surgical History:   Procedure Laterality Date     ARTHROPLASTY KNEE      2011     ARTHROSCOPY KNEE           BIOPSY BREAST      10/25/95,BRIAN RAMIREZ      SECTION      , Section     COLONOSCOPY      05,Normal - Repeat in 10 years     COLONOSCOPY  2016     COLONOSCOPY N/A 2019    Procedure: COLONOSCOPY;  Surgeon: Evelin Thompson MD;  Location:  OR     COLONOSCOPY N/A 2019    Procedure: COMBINED COLONOSCOPY, SINGLE OR MULTIPLE BIOPSY/POLYPECTOMY BY BIOPSY;  Surgeon: Evelin Thompson MD;  Location:  OR     ESOPHAGOSCOPY, GASTROSCOPY, DUODENOSCOPY (EGD), COMBINED      2011,erosive gastritis;bx;consider MRI/A;Bravo     EXTRACTION(S) DENTAL      1970     HEART CATH, ANGIOPLASTY      10/29/2013,ADÁN to proximal left anterior descending     OTHER SURGICAL HISTORY      ,[id number],OTHER     OTHER SURGICAL HISTORY      2014,[id number],TOTAL SHOULDER ARTHROPLASTY,Right     OTHER SURGICAL HISTORY      2016,99891.0,[place] COLONOSCOPY REMOVE ELIZA POLYP LESN SNARE,repeat , precancerous polyps     STRESS LEXISCAN TEST  2018    With Dr. Irwin - normal     Social History      Socioeconomic History     Marital status:      Spouse name: rashaad     Number of children: Not on file     Years of education: Not on file     Highest education level: Not on file   Social Needs     Financial resource strain: Not on file     Food insecurity - worry: Not on file     Food insecurity - inability: Not on file     Transportation needs - medical: Not on file     Transportation needs - non-medical: Not on file   Occupational History     Not on file   Tobacco Use     Smoking status: Never Smoker     Smokeless tobacco: Never Used   Substance and Sexual Activity     Alcohol use: Yes     Alcohol/week: 0.0 oz     Comment: Alcoholic Drinks/day: Rare use. ~2 glasses of wine a month     Drug use: No     Comment: Drug use: Nono IV drug use     Sexual activity: Yes     Partners: Male     Birth control/protection: Post-menopausal   Other Topics Concern     Not on file   Social History Narrative    . Moved to [place], [place] Summer of 2013 from [place], [place]. Has a daughter (lives in [place]) and son (who lives in [place]). 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 Rare alcohol use.     Family History   Problem Relation Age of Onset     Heart Disease Mother         Heart Disease     Hypertension Mother         Hypertension     Other - See Comments Mother         Stroke     Breast Cancer Mother 53        Cancer-breast     Arthritis Father         Arthritis     Cancer Father         Cancer     Heart Disease Father         Heart Disease     Hypertension Father         Hypertension     Family History Negative Brother         Good Health     Heart Disease Brother         Heart Disease     Family History Negative Sister         Good Health     Current Outpatient Medications   Medication Sig Dispense Refill     cetirizine (ZYRTEC) 10 MG tablet Take 1 tablet (10 mg) by mouth daily 30 tablet 3     predniSONE (DELTASONE) 20 MG tablet 2 pills daily for 3  days, 1 pill daily for 3 days, 1/2 pill daily for 3 days then stop. 12 tablet 0     allopurinol (ZYLOPRIM) 300 MG tablet Take 1 tablet (300 mg) by mouth daily 90 tablet 3     aspirin EC 81 MG EC tablet Take 1 tablet by mouth daily       atenolol (TENORMIN) 100 MG tablet Take 100 mg by mouth daily       atorvastatin (LIPITOR) 40 MG tablet Take 1 tablet (40 mg) by mouth daily 90 tablet 3     Cholecalciferol (VITAMIN D3) 2000 UNITS CAPS Take 2,000 Units by mouth daily       famotidine (PEPCID) 40 MG tablet Take 1 tablet (40 mg) by mouth daily for 5 days 5 tablet 0     folic acid (FOLVITE) 1 MG tablet Take 1 mg by mouth daily       hydrochlorothiazide (HYDRODIURIL) 25 MG tablet Take 1 tablet (25 mg) by mouth daily 90 tablet 3     hydroxychloroquine (PLAQUENIL) 200 MG tablet        hydrOXYzine (ATARAX) 25 MG tablet Take 1 tablet (25 mg) by mouth every 8 hours as needed for itching 20 tablet 0     losartan (COZAAR) 50 MG tablet Take 1 tablet (50 mg) by mouth daily 90 tablet 3     methotrexate 2.5 MG tablet CHEMO Take 15 mg by mouth Every Monday       nitroGLYcerin (NITROSTAT) 0.4 MG sublingual tablet Place 1 tablet under the tongue every 5 minutes as needed for chest pain (For chest pain x 3 doses)       prasugrel (EFFIENT) 10 MG TABS tablet Take 1 tablet by mouth every morning       Probiotic Product (FLORAJEN3) CAPS Take 1 capsule by mouth daily 30 capsule 1     ranitidine (ZANTAC) 150 MG capsule Take 1 capsule (150 mg) by mouth 2 times daily 90 capsule 3     sucralfate (CARAFATE) 1 GM tablet Take 1 tablet (1 g) by mouth 3 times daily (before meals) 90 tablet 3     triamcinolone (NASACORT) 55 MCG/ACT Inhaler Spray 2 sprays into both nostrils daily       Food and Enalapril      Review of Systems:  Review of Systems  See HPI    Objective:   /88 (BP Location: Right arm, Patient Position: Chair, Cuff Size: Adult Large)   Pulse 76   Temp 97  F (36.1  C) (Tympanic)   Resp 16   Wt 94 kg (207 lb 3.2 oz)    Breastfeeding? No   BMI 31.50 kg/m    Physical Exam  Pleasant female no acute distress.  Affect normal.  Alert and oriented x4.  Hives are present along her forehead, arms, chest, abdomen, back and legs.  Coordination noted.  No facial swelling.  Neck supple without adenopathy.  Lung fields clear to auscultation.  Cardiovascular regular.    Assessment:    ICD-10-CM    1. Hives L50.9 predniSONE (DELTASONE) 20 MG tablet     cetirizine (ZYRTEC) 10 MG tablet   2. Angioedema, subsequent encounter T78.3XXD predniSONE (DELTASONE) 20 MG tablet     cetirizine (ZYRTEC) 10 MG tablet   3. Essential hypertension I10        Plan:   Patient symptoms are not clearing.  I want her to continue with prednisone but will do a taper.  She will begin prednisone 40 mg daily for 3 days then 20 mg daily for 3 days then 10 mg for 3 days then discontinue.  Start Zyrtec 10 mg daily.  Continue with the Benadryl 50 mg every 6 hours.  If hives improve then may back down to 1 tablet every 6 hours until they go away then discontinue the Benadryl.  I feel she is going to benefit from a stopping all of her medications.  She needs to follow-up next week in clinic.  She has been seen by dermatologist in the past and will try to get an appointment with dermatology in [place] in the near future.  If her hives resolve with stopping the medications then these will need to be started back slowly.  Would recommend starting back the cardiac medications first then the RA medications and so on.  If she develops angioedema, difficulty swallowing or breathing, chest pain or tightness, etc. she needs to seek ED care  Blood pressure is well controlled with being off the losartan.  She will update her cardiologist of the medication changes.  PEDRO Reese   2/15/2019  9:51 AM

## 2019-02-15 NOTE — NURSING NOTE
"Chief Complaint   Patient presents with     Follow Up       Initial /88 (BP Location: Right arm, Patient Position: Chair, Cuff Size: Adult Large)   Pulse 76   Temp 97  F (36.1  C) (Tympanic)   Resp 16   Wt 94 kg (207 lb 3.2 oz)   Breastfeeding? No   BMI 31.50 kg/m   Estimated body mass index is 31.5 kg/m  as calculated from the following:    Height as of 2/7/19: 1.727 m (5' 8\").    Weight as of this encounter: 94 kg (207 lb 3.2 oz).  Medication Reconciliation: complete      Antonia Arnold LPN on 2/15/2019 at 9:19 AM    "

## 2019-02-22 ENCOUNTER — OFFICE VISIT (OUTPATIENT)
Dept: FAMILY MEDICINE | Facility: OTHER | Age: 74
End: 2019-02-22
Attending: FAMILY MEDICINE
Payer: COMMERCIAL

## 2019-02-22 VITALS
DIASTOLIC BLOOD PRESSURE: 90 MMHG | BODY MASS INDEX: 33.17 KG/M2 | HEIGHT: 66 IN | SYSTOLIC BLOOD PRESSURE: 130 MMHG | RESPIRATION RATE: 24 BRPM | HEART RATE: 74 BPM | TEMPERATURE: 97 F | WEIGHT: 206.4 LBS

## 2019-02-22 DIAGNOSIS — Z91.018 FOOD ALLERGY: ICD-10-CM

## 2019-02-22 DIAGNOSIS — L50.9 URTICARIA: Primary | ICD-10-CM

## 2019-02-22 PROCEDURE — 99213 OFFICE O/P EST LOW 20 MIN: CPT | Performed by: FAMILY MEDICINE

## 2019-02-22 PROCEDURE — G0463 HOSPITAL OUTPT CLINIC VISIT: HCPCS

## 2019-02-22 ASSESSMENT — MIFFLIN-ST. JEOR: SCORE: 1462.97

## 2019-02-22 ASSESSMENT — PAIN SCALES - GENERAL: PAINLEVEL: NO PAIN (0)

## 2019-02-22 NOTE — PROGRESS NOTES
SUBJECTIVE:   There are no exam notes on file for this visit.    Margaret Batista is a 73 year old female who presents to clinic today for follow up.  She had been seen last week with hives and angioedema.  She was given prednisone 60 mg daily and benadryl 50 mg by mouth every 6 hours.  The angioedema went away, but her hives did not.  She had stopped losartan and the GI medications that had recently been prescribed by Dr. Thompson.  When she last saw Suma Maxwell on 2/15/19, she had extended her prednisone with a taper (40 mg daily x 3 days, then 20 mg daily x 3 days, the 10 mg daily x 3 days, the stop).  Zyrtec 10 mg daily also started and was recommended to continue benadryl.  She was also going to try getting in to see Dermatology.  They had discussed restarting her medications one by one if the hives went away.  Suma recommended restarting her cardiac medications first, then her rheumatoid arthritis medications, etc.  The only new medication she had been on prior to this happening was Effient.  She had been on this medication before, but it was from a different .  The fillers were different.  She has been off of all of her medications as of 2/15/19.  The hives went away 2-3 days after stopping everything.  She has now restarted aspirin, atenolol, hydrochlorothiazide, plaquenil, methotrexate, allopurinol.  She is planning to restart Atorvastatin tonight.  She has not restarted Effient, losartan, folic acid or vitamin D.   She sees Dr. Irwin for her cardiology.  She had called her and she felt that if she needed to stay off of the Effient, that would be ok.  Stopped benadryl 2 days ago.  Has a history of food allergies as a youngster, which she mostly grew out of.  She still has had issues with macadamia nuts more recently.    HPI    I personally reviewed medications/allergies/history listed below:    Patient Active Problem List    Diagnosis Date Noted     Primary osteoarthritis of both hands  05/30/2017     Priority: Medium     Mass of finger 05/26/2017     Priority: Medium     Osteopenia 11/21/2016     Priority: Medium     Allergic rhinitis 02/01/2016     Priority: Medium     Status post total shoulder replacement, right 05/28/2015     Priority: Medium     S/P shoulder surgery 02/10/2015     Priority: Medium     Hyperlipidemia 01/16/2015     Priority: Medium     Hypertension 01/16/2015     Priority: Medium     Avascular necrosis of humeral head, right (H) 01/12/2015     Priority: Medium     Rotator cuff tendonitis, right 12/01/2014     Priority: Medium     Shoulder impingement, right 12/01/2014     Priority: Medium     Right shoulder pain 10/14/2014     Priority: Medium     ACP (advance care planning) 10/28/2013     Priority: Medium     Overview:   Formatting of this note may be different from the original.  Patient has identified Health Care Agent(s): Yes  Add Health Care Agents: Yes    Health Care Agent(s):  Primary Health Care Agent: Jay Batitsa  Relationship:  Phone:    Secondary Health Care Agent:  Relationship: Daughter Phone:    Conservator:  Relationship:  Phone:    Guardian: Relationship:  Phone:      Patient has Advance Care Plan Documents (Health Care Directive, POLST): No, referral made to Social Work Services.    Patient has identified Specific Treatment Preferences: Yes   Specific Treatment Preferences: a.) Code Status:  CPR/Attempt Resuscitation        ASCVD (arteriosclerotic cardiovascular disease) 10/28/2013     Priority: Medium     Overview:   -s/p ADÁN to the mid-LAD, mid-circumflex, mid-right coronary artery, proximal right coronary artery, and PTCA of a marginal branch of the right coronary artery 04/30/2007.  -s/p ADÁN to proximal left anterior descending 10/29/13.       Rheumatoid arthritis (H) 10/25/2013     Priority: Medium     DJD (degenerative joint disease) of knee 01/13/2011     Priority: Medium     Gout 10/27/2009     Priority: Medium     Past Medical History:    Diagnosis Date     Atherosclerotic heart disease of native coronary artery without angina pectoris     -s/p ADÁN to the mid-LAD, mid-circumflex, mid-right coronary artery, proximal right coronary  artery, and PTCA of a marginal branch of the right coronary artery 2007. -s/p ADÁN to proximal left anterior descending 10/29/13.     Contusion of abdominal wall          Essential (primary) hypertension     2006     Gout     No Comments Provided     Hyperlipidemia     2007     Localized swelling, mass, or lump of upper extremity     2017     Other specified counseling     10/28/2013,Patient has identified Health Care Agent(s): Yes Add Health Care Agents: Yes   Health Care Agent(s): Primary Health Care Agent: Jay Batista  Relationship:  Phone:   Secondary Health Care Agent:  Relationship: Daughter Phone:   Conservator:  Relationship:  Phone:   Guardian: Relationship:  Phone:    Patient has Advance Care Plan Documents (Health Care Directive, POLST): No, refe*     Polyosteoarthritis     No Comments Provided     Postmenopausal bleeding     No Comments Provided     Presence of coronary angioplasty implant and graft     ,unstable angina; severe prox LAD stenosis; s/p 7 stents     Primary osteoarthritis of left hand     2017     Rheumatoid arthritis (H)     follows at [facility] yearly      Past Surgical History:   Procedure Laterality Date     ARTHROPLASTY KNEE      2011     ARTHROSCOPY KNEE           BIOPSY BREAST      10/25/95,BRIAN RAMIREZ      SECTION      1974, Section     COLONOSCOPY      05,Normal - Repeat in 10 years     COLONOSCOPY  2016     COLONOSCOPY N/A 2019    Procedure: COLONOSCOPY;  Surgeon: Evelin Thompson MD;  Location:  OR     COLONOSCOPY N/A 2019    Procedure: COMBINED COLONOSCOPY, SINGLE OR MULTIPLE BIOPSY/POLYPECTOMY BY BIOPSY;  Surgeon: Evelin Thompson MD;  Location:  OR     ESOPHAGOSCOPY,  GASTROSCOPY, DUODENOSCOPY (EGD), COMBINED      5/2/2011,erosive gastritis;bx;consider MRI/A;Bravo     EXTRACTION(S) DENTAL      1970     HEART CATH, ANGIOPLASTY      10/29/2013,ADÁN to proximal left anterior descending     OTHER SURGICAL HISTORY      2007/2013,097126,OTHER     OTHER SURGICAL HISTORY      01/2014,QFI218,TOTAL SHOULDER ARTHROPLASTY,Right     OTHER SURGICAL HISTORY      04/14/2016,26488.0,[place] COLONOSCOPY REMOVE ELIZA POLYP LESN SNARE,repeat 2019, precancerous polyps     STRESS LEXISCAN TEST  08/2018    With Dr. Irwin - normal     Family History   Problem Relation Age of Onset     Heart Disease Mother         Heart Disease     Hypertension Mother         Hypertension     Other - See Comments Mother         Stroke     Breast Cancer Mother 53        Cancer-breast     Arthritis Father         Arthritis     Cancer Father         Cancer     Heart Disease Father         Heart Disease     Hypertension Father         Hypertension     Family History Negative Brother         Good Health     Heart Disease Brother         Heart Disease     Family History Negative Sister         Good Health     Social History     Tobacco Use     Smoking status: Never Smoker     Smokeless tobacco: Never Used   Substance Use Topics     Alcohol use: Yes     Alcohol/week: 0.0 oz     Comment: Alcoholic Drinks/day: Rare use. ~2 glasses of wine a month     Social History     Social History Narrative    . Moved to [place], [place] Summer of 2013 from [place], [place]. Has a daughter (lives in [place]) and son (who lives in [place]). 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 Rare alcohol use.     Current Outpatient Medications   Medication Sig Dispense Refill     allopurinol (ZYLOPRIM) 300 MG tablet Take 1 tablet (300 mg) by mouth daily 90 tablet 3     aspirin EC 81 MG EC tablet Take 1 tablet by mouth daily       atenolol (TENORMIN) 100 MG tablet Take 100 mg by mouth daily        atorvastatin (LIPITOR) 40 MG tablet Take 1 tablet (40 mg) by mouth daily 90 tablet 3     cetirizine (ZYRTEC) 10 MG tablet Take 1 tablet (10 mg) by mouth daily 30 tablet 3     Cholecalciferol (VITAMIN D3) 2000 UNITS CAPS Take 2,000 Units by mouth daily       folic acid (FOLVITE) 1 MG tablet Take 1 mg by mouth daily       hydrochlorothiazide (HYDRODIURIL) 25 MG tablet Take 1 tablet (25 mg) by mouth daily 90 tablet 3     hydroxychloroquine (PLAQUENIL) 200 MG tablet        hydrOXYzine (ATARAX) 25 MG tablet Take 1 tablet (25 mg) by mouth every 8 hours as needed for itching 20 tablet 0     losartan (COZAAR) 50 MG tablet Take 1 tablet (50 mg) by mouth daily 90 tablet 3     methotrexate 2.5 MG tablet CHEMO Take 15 mg by mouth Every Monday       nitroGLYcerin (NITROSTAT) 0.4 MG sublingual tablet Place 1 tablet under the tongue every 5 minutes as needed for chest pain (For chest pain x 3 doses)       prasugrel (EFFIENT) 10 MG TABS tablet Take 1 tablet by mouth every morning       predniSONE (DELTASONE) 20 MG tablet 2 pills daily for 3 days, 1 pill daily for 3 days, 1/2 pill daily for 3 days then stop. 12 tablet 0     Probiotic Product (FLORAJEN3) CAPS Take 1 capsule by mouth daily 30 capsule 1     ranitidine (ZANTAC) 150 MG capsule Take 1 capsule (150 mg) by mouth 2 times daily 90 capsule 3     sucralfate (CARAFATE) 1 GM tablet Take 1 tablet (1 g) by mouth 3 times daily (before meals) 90 tablet 3     triamcinolone (NASACORT) 55 MCG/ACT Inhaler Spray 2 sprays into both nostrils daily       Allergies   Allergen Reactions     Food      Macadamia nuts make mouth itch     Enalapril Cough       Review of Systems   Constitutional: Negative for fatigue and fever.   Respiratory: Negative for cough and stridor.    Skin: Negative for rash.   Psychiatric/Behavioral: Negative for mood changes.        OBJECTIVE:     /90 (BP Location: Right arm, Patient Position: Sitting, Cuff Size: Adult Regular)   Pulse 74   Temp 97  F (36.1  C)  "(Tympanic)   Resp 24   Ht 1.676 m (5' 6\")   Wt 93.6 kg (206 lb 6.4 oz)   BMI 33.31 kg/m    Body mass index is 33.31 kg/m .  Physical Exam   Constitutional: She appears well-developed.   HENT:   Head: Normocephalic.   Mouth/Throat: Oropharynx is clear and moist.   Eyes: Pupils are equal, round, and reactive to light.   Neck: Normal range of motion. Neck supple.   Cardiovascular: Normal rate, regular rhythm and normal heart sounds.   No murmur heard.  Pulmonary/Chest: Effort normal and breath sounds normal.   Skin: Skin is warm and dry. Rash (tiny amount of splotchy redness on dorsa of feet, otherwise rash has resolved.) noted.   Psychiatric: She has a normal mood and affect.       PHQ-2 Score:     PHQ-2 ( 1999 [employer]) 2/22/2019 2/15/2019   Q1: Little interest or pleasure in doing things 0 0   Q2: Feeling down, depressed or hopeless 0 0   PHQ-2 Score 0 0       I personally reviewed results withpatient as listed below:   Diagnostic Test Results:  none     ASSESSMENT/PLAN:       ICD-10-CM    1. Urticaria L50.9    2. Food allergy Z91.018        1.  Rash has mostly resolved.  Recommend continuing to add medications back one by one.  If urticaria seems to return , she should stop the last added medication.  She may call if needed if needs to be seen again.  If she is able to add all medications back without problem, would question if her urticaria has an autoimmune etiology.  2.  As above, she does also have a history of food allergies, but no new foods lately and no ingestion of known foods that would have incited an allergic response.  Consider allergist referral if continues to have issues with urticaria as well.    Madhuri Richardson MD  [facility]    "

## 2019-02-24 ASSESSMENT — ENCOUNTER SYMPTOMS
COUGH: 0
FATIGUE: 0
FEVER: 0
STRIDOR: 0

## 2019-02-26 ENCOUNTER — OFFICE VISIT (OUTPATIENT)
Dept: INTERNAL MEDICINE | Facility: OTHER | Age: 74
End: 2019-02-26
Attending: NURSE PRACTITIONER
Payer: COMMERCIAL

## 2019-02-26 VITALS
WEIGHT: 206 LBS | DIASTOLIC BLOOD PRESSURE: 72 MMHG | TEMPERATURE: 97.2 F | RESPIRATION RATE: 16 BRPM | HEART RATE: 76 BPM | BODY MASS INDEX: 33.25 KG/M2 | SYSTOLIC BLOOD PRESSURE: 112 MMHG

## 2019-02-26 DIAGNOSIS — I10 ESSENTIAL HYPERTENSION: ICD-10-CM

## 2019-02-26 DIAGNOSIS — L50.9 HIVES: Primary | ICD-10-CM

## 2019-02-26 DIAGNOSIS — I25.10 ASCVD (ARTERIOSCLEROTIC CARDIOVASCULAR DISEASE): ICD-10-CM

## 2019-02-26 DIAGNOSIS — T78.3XXD ANGIOEDEMA, SUBSEQUENT ENCOUNTER: ICD-10-CM

## 2019-02-26 PROCEDURE — G0463 HOSPITAL OUTPT CLINIC VISIT: HCPCS

## 2019-02-26 PROCEDURE — 99213 OFFICE O/P EST LOW 20 MIN: CPT | Performed by: NURSE PRACTITIONER

## 2019-02-26 ASSESSMENT — PATIENT HEALTH QUESTIONNAIRE - PHQ9: SUM OF ALL RESPONSES TO PHQ QUESTIONS 1-9: 0

## 2019-02-26 ASSESSMENT — PAIN SCALES - GENERAL: PAINLEVEL: NO PAIN (0)

## 2019-02-26 NOTE — NURSING NOTE
"Chief Complaint   Patient presents with     Follow Up       Initial /72 (BP Location: Right arm, Patient Position: Chair, Cuff Size: Adult Large)   Pulse 76   Temp 97.2  F (36.2  C) (Tympanic)   Resp 16   Wt 93.4 kg (206 lb)   Breastfeeding? No   BMI 33.25 kg/m   Estimated body mass index is 33.25 kg/m  as calculated from the following:    Height as of 2/22/19: 1.676 m (5' 6\").    Weight as of this encounter: 93.4 kg (206 lb).  Medication Reconciliation: complete      Antonia Arnold LPN on 2/26/2019 at 9:41 AM    "

## 2019-02-26 NOTE — PROGRESS NOTES
Subjective:  She is here today for follow-up on hives and angioedema.  She stopped all of her medications and has slowly restarted.  She did not restart the GI medications that were prescribed by Dr. Thompson.  She has not yet restarted Effient nor losartan.  She was told by cardiology that she could remain off the losartan as long as blood pressure was controlled.  She has no history of congestive heart failure.  She is awaiting for the Effient to come out of the previous  that she had been receiving before the allergic reaction.  She denies fever, chills, difficulty breathing or swallowing, facial swelling and rashes and hives.  Blood pressure well controlled.  She is on hydrochlorothiazide and last potassium level in December was normal.    Patient Active Problem List   Diagnosis     ACP (advance care planning)     Allergic rhinitis     ASCVD (arteriosclerotic cardiovascular disease)     DJD (degenerative joint disease) of knee     Gout     Hyperlipidemia     Hypertension     Mass of finger     Osteopenia     Primary osteoarthritis of both hands     Rheumatoid arthritis (H)     Avascular necrosis of humeral head, right (H)     Right shoulder pain     Rotator cuff tendonitis, right     S/P shoulder surgery     Shoulder impingement, right     Status post total shoulder replacement, right     Past Medical History:   Diagnosis Date     Atherosclerotic heart disease of native coronary artery without angina pectoris     -s/p ADÁN to the mid-LAD, mid-circumflex, mid-right coronary artery, proximal right coronary  artery, and PTCA of a marginal branch of the right coronary artery 04/30/2007. -s/p ADÁN to proximal left anterior descending 10/29/13.     Contusion of abdominal wall     2014     Essential (primary) hypertension     12/14/2006     Gout     No Comments Provided     Hyperlipidemia     4/4/2007     Localized swelling, mass, or lump of upper extremity     5/26/2017     Other specified counseling      10/28/2013,Patient has identified Health Care Agent(s): Yes Add Health Care Agents: Yes   Health Care Agent(s): Primary Health Care Agent: Jay Batista  Relationship:  Phone:   Secondary Health Care Agent:  Relationship: Daughter Phone:   Conservator:  Relationship:  Phone:   Guardian: Relationship:  Phone:    Patient has Advance Care Plan Documents (Health Care Directive, POLST): No, refe*     Polyosteoarthritis     No Comments Provided     Postmenopausal bleeding     No Comments Provided     Presence of coronary angioplasty implant and graft     ,unstable angina; severe prox LAD stenosis; s/p 7 stents     Primary osteoarthritis of left hand     2017     Rheumatoid arthritis (H)     follows at [facility] yearly     Past Surgical History:   Procedure Laterality Date     ARTHROPLASTY KNEE      2011     ARTHROSCOPY KNEE           BIOPSY BREAST      10/25/95,BRIAN RAMIREZ      SECTION      , Section     COLONOSCOPY      05,Normal - Repeat in 10 years     COLONOSCOPY  2016     COLONOSCOPY N/A 2019    Procedure: COLONOSCOPY;  Surgeon: Evelin Thompson MD;  Location:  OR     COLONOSCOPY N/A 2019    Procedure: COMBINED COLONOSCOPY, SINGLE OR MULTIPLE BIOPSY/POLYPECTOMY BY BIOPSY;  Surgeon: Evelin Thompson MD;  Location:  OR     ESOPHAGOSCOPY, GASTROSCOPY, DUODENOSCOPY (EGD), COMBINED      2011,erosive gastritis;bx;consider MRI/A;Bravo     EXTRACTION(S) DENTAL      1970     HEART CATH, ANGIOPLASTY      10/29/2013,ADÁN to proximal left anterior descending     OTHER SURGICAL HISTORY      ,[id number],OTHER     OTHER SURGICAL HISTORY      2014,[id number],TOTAL SHOULDER ARTHROPLASTY,Right     OTHER SURGICAL HISTORY      2016,57810.0,[place] COLONOSCOPY REMOVE ELIZA POLYP LESN SNARE,repeat , precancerous polyps     STRESS LEXISCAN TEST  2018    With Dr. Irwin - normal     Social History     Socioeconomic History     Marital  status:      Spouse name: rashaad     Number of children: Not on file     Years of education: Not on file     Highest education level: Not on file   Occupational History     Not on file   Social Needs     Financial resource strain: Not on file     Food insecurity:     Worry: Not on file     Inability: Not on file     Transportation needs:     Medical: Not on file     Non-medical: Not on file   Tobacco Use     Smoking status: Never Smoker     Smokeless tobacco: Never Used   Substance and Sexual Activity     Alcohol use: Yes     Alcohol/week: 0.0 oz     Comment: Alcoholic Drinks/day: Rare use. ~2 glasses of wine a month     Drug use: No     Comment: Drug use: Nono IV drug use     Sexual activity: Yes     Partners: Male     Birth control/protection: Post-menopausal   Lifestyle     Physical activity:     Days per week: Not on file     Minutes per session: Not on file     Stress: Not on file   Relationships     Social connections:     Talks on phone: Not on file     Gets together: Not on file     Attends Advent service: Not on file     Active member of club or organization: Not on file     Attends meetings of clubs or organizations: Not on file     Relationship status: Not on file     Intimate partner violence:     Fear of current or ex partner: Not on file     Emotionally abused: Not on file     Physically abused: Not on file     Forced sexual activity: Not on file   Other Topics Concern     Not on file   Social History Narrative    . Moved to [place], [place] Summer of 2013 from [place], [place]. Has a daughter (lives in [place]) and son (who lives in [place]). 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 Rare alcohol use.     Family History   Problem Relation Age of Onset     Heart Disease Mother         Heart Disease     Hypertension Mother         Hypertension     Other - See Comments Mother         Stroke     Breast Cancer Mother 53         Cancer-breast     Arthritis Father         Arthritis     Cancer Father         Cancer     Heart Disease Father         Heart Disease     Hypertension Father         Hypertension     Family History Negative Brother         Good Health     Heart Disease Brother         Heart Disease     Family History Negative Sister         Good Health     Current Outpatient Medications   Medication Sig Dispense Refill     allopurinol (ZYLOPRIM) 300 MG tablet Take 1 tablet (300 mg) by mouth daily 90 tablet 3     aspirin EC 81 MG EC tablet Take 1 tablet by mouth daily       atenolol (TENORMIN) 100 MG tablet Take 100 mg by mouth daily       atorvastatin (LIPITOR) 40 MG tablet Take 1 tablet (40 mg) by mouth daily 90 tablet 3     Cholecalciferol (VITAMIN D3) 2000 UNITS CAPS Take 2,000 Units by mouth daily       folic acid (FOLVITE) 1 MG tablet Take 1 mg by mouth daily       hydrochlorothiazide (HYDRODIURIL) 25 MG tablet Take 1 tablet (25 mg) by mouth daily 90 tablet 3     hydroxychloroquine (PLAQUENIL) 200 MG tablet        methotrexate 2.5 MG tablet CHEMO Take 15 mg by mouth Every Monday       nitroGLYcerin (NITROSTAT) 0.4 MG sublingual tablet Place 1 tablet under the tongue every 5 minutes as needed for chest pain (For chest pain x 3 doses)       prasugrel (EFFIENT) 10 MG TABS tablet Take 1 tablet by mouth every morning       predniSONE (DELTASONE) 20 MG tablet 2 pills daily for 3 days, 1 pill daily for 3 days, 1/2 pill daily for 3 days then stop. 12 tablet 0     Probiotic Product (FLORAJEN3) CAPS Take 1 capsule by mouth daily 30 capsule 1     ranitidine (ZANTAC) 150 MG capsule Take 1 capsule (150 mg) by mouth 2 times daily 90 capsule 3     sucralfate (CARAFATE) 1 GM tablet Take 1 tablet (1 g) by mouth 3 times daily (before meals) 90 tablet 3     triamcinolone (NASACORT) 55 MCG/ACT Inhaler Spray 2 sprays into both nostrils daily       Food; Losartan; and Enalapril      Review of Systems:  Review of Systems  See HPI    Objective:   BP  112/72 (BP Location: Right arm, Patient Position: Chair, Cuff Size: Adult Large)   Pulse 76   Temp 97.2  F (36.2  C) (Tympanic)   Resp 16   Wt 93.4 kg (206 lb)   Breastfeeding? No   BMI 33.25 kg/m    Physical Exam  Female no acute distress.  Affect normal.  Alert and oriented x4.  Lungs clear to auscultation.  No wheezing.  Cardiovascular regular rate and rhythm.  No hives or other rashes on skin.    Assessment:    ICD-10-CM    1. Hives L50.9    2. Angioedema, subsequent encounter T78.3XXD    3. Essential hypertension I10    4. ASCVD (arteriosclerotic cardiovascular disease) I25.10        Plan:   She will restart the Effient once she receives the medication from her previous .  She is planning to stay off the losartan since blood pressure is well controlled and is one less medication she needs to take.  This is been approved by her cardiologist.  She has no history of heart failure.  She does have atherosclerotic coronary vascular disease.  She is on hydrochlorothiazide.  Needs to be cautious of hypokalemia.  She will be out of town for a few months and not able to have her potassium level checked but describes symptoms of hypokalemia and if those occur she needs to be seen.  Otherwise recommend she have this checked when she is in [place] in about 3-4 weeks.  Eat potassium rich foods.  Remain off the losartan.  This is added to her allergy list but very well could have been from the new Effient tablets that she had been switched over to.  She will start the GI medications after she has restarted the Effient and knows that she is doing fine.    PEDRO Reese   2/26/2019  10:44 AM

## 2019-05-16 ENCOUNTER — HOSPITAL ENCOUNTER (OUTPATIENT)
Dept: ULTRASOUND IMAGING | Facility: OTHER | Age: 74
End: 2019-05-16
Attending: NURSE PRACTITIONER
Payer: COMMERCIAL

## 2019-05-16 ENCOUNTER — HOSPITAL ENCOUNTER (OUTPATIENT)
Dept: MAMMOGRAPHY | Facility: OTHER | Age: 74
Discharge: HOME | End: 2019-05-16
Attending: NURSE PRACTITIONER | Admitting: NURSE PRACTITIONER
Payer: COMMERCIAL

## 2019-05-16 DIAGNOSIS — R92.8 ABNORMAL MAMMOGRAM: ICD-10-CM

## 2019-05-16 DIAGNOSIS — Z12.31 VISIT FOR SCREENING MAMMOGRAM: ICD-10-CM

## 2019-05-16 PROCEDURE — 77063 BREAST TOMOSYNTHESIS BI: CPT

## 2019-05-16 PROCEDURE — 76642 ULTRASOUND BREAST LIMITED: CPT | Mod: LT

## 2019-05-17 ENCOUNTER — TELEPHONE (OUTPATIENT)
Dept: INTERNAL MEDICINE | Facility: OTHER | Age: 74
End: 2019-05-17

## 2019-05-17 NOTE — TELEPHONE ENCOUNTER
Patient given education on what to expect with breast aspiration and possible biopsy.  Patient is on low dose aspirin.  Swetha Hernandez contacted to inquire if patient could hold aspirin for 5 days prior to procedure.  She gives the ok.  Patient is aware that she needs to hold the aspirin.  Kayleigh Gee RN.

## 2019-05-21 ENCOUNTER — HOSPITAL ENCOUNTER (OUTPATIENT)
Dept: ULTRASOUND IMAGING | Facility: OTHER | Age: 74
Discharge: HOME | End: 2019-05-21
Attending: NURSE PRACTITIONER | Admitting: NURSE PRACTITIONER
Payer: COMMERCIAL

## 2019-05-21 VITALS — DIASTOLIC BLOOD PRESSURE: 80 MMHG | SYSTOLIC BLOOD PRESSURE: 140 MMHG | HEART RATE: 65 BPM | RESPIRATION RATE: 16 BRPM

## 2019-05-21 DIAGNOSIS — R92.8 ABNORMAL MAMMOGRAM OF LEFT BREAST: ICD-10-CM

## 2019-05-21 PROCEDURE — 76942 ECHO GUIDE FOR BIOPSY: CPT

## 2019-05-21 PROCEDURE — 25000125 ZZHC RX 250: Performed by: RADIOLOGY

## 2019-05-21 RX ORDER — LIDOCAINE HYDROCHLORIDE 10 MG/ML
20 INJECTION, SOLUTION EPIDURAL; INFILTRATION; INTRACAUDAL; PERINEURAL ONCE
Status: COMPLETED | OUTPATIENT
Start: 2019-05-21 | End: 2019-05-21

## 2019-05-21 RX ORDER — LIDOCAINE HYDROCHLORIDE AND EPINEPHRINE 10; 10 MG/ML; UG/ML
20 INJECTION, SOLUTION INFILTRATION; PERINEURAL ONCE
Status: DISCONTINUED | OUTPATIENT
Start: 2019-05-21 | End: 2019-05-22 | Stop reason: HOSPADM

## 2019-05-21 RX ADMIN — LIDOCAINE HYDROCHLORIDE 2 ML: 10 INJECTION, SOLUTION INFILTRATION; PERINEURAL at 11:29

## 2019-05-21 NOTE — PROGRESS NOTES
Patient here for ultrasound cyst aspiration and possible ultrasound guided biopsy of left breast.  Procedure reviewed with patient by writer and radiologist, questions answered.  Time out performed prior to procedure.  Aspiration successfully completed by radiologist.  Pressure held to biopsy site for 5 minutes.  Bandaid applied.   Discharge instructions reviewed with patient, patient verbalizes understanding of instructions.  Discharged to home in stable condition with no evidence of bleeding from biopsy site.   Radioligst states no need to send aspirated fluid to lab, Dr. Thompson updated and is ok with this plan.  Follow up appointment cancelled with Dr. Thompson.  Dr. Savage tells patient she will need to come back in a year for her annual mammogram.  Kayleigh Gee RN.

## 2019-05-21 NOTE — DISCHARGE INSTRUCTIONS
"NEEDLE BIOPSY BREAST    Activity: Rest the remainder of the day. You may resume normal activity after the next day. Avoid any vigorous/strenuous physical activity for 24 hours.    Comfort: If you have discomfort or tenderness at the site you may take your usual or recommended pain medication. Do not take aspirin the day of the procedure or for 48 hours following the biopsy.    Diet: You may resume your usual diet.    Care of site: Leave ice pack in place for 4 hours, or until it is no longer cold. The ice pack is reusable and may be refrozen.  Keep your bra and the dressing on for 24 hours. Then you may remove the bandage and shower. If there are steri-strips you may remove them in 3 to 5 days.  You may have some discomfort and a small amount of bruising where the biopsy was performed. This is normal. For several days or even a couple of weeks, you may have tenderness or \"twinges\" and a tiny bump where the needle went into the skin. This can be bothersome, but is not abnormal. You can use warm moist washcloths, as this may help. Do Not Use A Heating Pad.    RETURN TO THE EMERGENCY ROOM FOR:   Shortness of breath   Rapid heart rate   If pain becomes worse    Call Your Doctor For:    A fever over 101 degrees   Increased redness, increased swelling, and/or persistent drainage/discomfort  around the site    Other: At the end of your breast biopsy, a tiny titanium clip will be inserted through the biopsy needle and placed at the biopsy site within your breast. The marker provides a landmark of the biopsy for further mammograms or surgical procedures. This marker is MRI compatible and poses no known health risks.    You will be receiving a letter in the mail from [facility] Mammography Department with your biopsy results.  In 6 months a mammogram will be needed to establish a new baseline and to recheck the area where the biopsy occurred. Our radiology department will call you to schedule an appointment.    For " "questions, problems or concerns, contact the Radiology Department at [phone].    NEEDLE BIOPSY BREAST    Activity: Rest the remainder of the day. You may resume normal activity after the next day. Avoid any vigorous/strenuous physical activity for 24 hours.    Comfort: If you have discomfort or tenderness at the site you may take your usual or recommended pain medication. Do not take aspirin the day of the procedure or for 48 hours following the biopsy.    Diet: You may resume your usual diet.    Care of site: Leave ice pack in place for 4 hours, or until it is no longer cold. The ice pack is reusable and may be refrozen.  Keep your bra and the dressing on for 24 hours. Then you may remove the bandage and shower. If there are steri-strips you may remove them in 3 to 5 days.  You may have some discomfort and a small amount of bruising where the biopsy was performed. This is normal. For several days or even a couple of weeks, you may have tenderness or \"twinges\" and a tiny bump where the needle went into the skin. This can be bothersome, but is not abnormal. You can use warm moist washcloths, as this may help. Do Not Use A Heating Pad.    RETURN TO THE EMERGENCY ROOM FOR:   Shortness of breath   Rapid heart rate   If pain becomes worse    Call Your Doctor For:    A fever over 101 degrees   Increased redness, increased swelling, and/or persistent drainage/discomfort  around the site    Other: At the end of your breast biopsy, a tiny titanium clip will be inserted through the biopsy needle and placed at the biopsy site within your breast. The marker provides a landmark of the biopsy for further mammograms or surgical procedures. This marker is MRI compatible and poses no known health risks.    You will be receiving a letter in the mail from [facility] Mammography Department with your biopsy results.  In 6 months a mammogram will be needed to establish a new baseline and to recheck the area where the biopsy occurred. " Our radiology department will call you to schedule an appointment.    For questions, problems or concerns, contact the Radiology Department at [phone].

## 2019-05-22 ENCOUNTER — TELEPHONE (OUTPATIENT)
Dept: INTERNAL MEDICINE | Facility: OTHER | Age: 74
End: 2019-05-22

## 2019-05-22 NOTE — TELEPHONE ENCOUNTER
Called patient to check on status post ultrasound cyst aspiration.  Patient reports pain 0/10.  Patient reports no bleeding.  Patient verbalizes understanding of returning for annual mammogram in one year.   Kayleigh Gee RN.

## 2019-05-31 ENCOUNTER — OFFICE VISIT (OUTPATIENT)
Dept: INTERNAL MEDICINE | Facility: OTHER | Age: 74
End: 2019-05-31
Attending: NURSE PRACTITIONER
Payer: COMMERCIAL

## 2019-05-31 VITALS
RESPIRATION RATE: 18 BRPM | HEART RATE: 70 BPM | OXYGEN SATURATION: 99 % | HEIGHT: 66 IN | DIASTOLIC BLOOD PRESSURE: 80 MMHG | SYSTOLIC BLOOD PRESSURE: 132 MMHG | WEIGHT: 211.9 LBS | BODY MASS INDEX: 34.06 KG/M2 | TEMPERATURE: 97 F

## 2019-05-31 DIAGNOSIS — I10 ESSENTIAL HYPERTENSION: Primary | ICD-10-CM

## 2019-05-31 DIAGNOSIS — I25.10 ASCVD (ARTERIOSCLEROTIC CARDIOVASCULAR DISEASE): ICD-10-CM

## 2019-05-31 DIAGNOSIS — M05.79 RHEUMATOID ARTHRITIS INVOLVING MULTIPLE SITES WITH POSITIVE RHEUMATOID FACTOR (H): ICD-10-CM

## 2019-05-31 DIAGNOSIS — L50.9 HIVES: ICD-10-CM

## 2019-05-31 DIAGNOSIS — N63.20 LEFT BREAST MASS: ICD-10-CM

## 2019-05-31 PROCEDURE — G0463 HOSPITAL OUTPT CLINIC VISIT: HCPCS

## 2019-05-31 PROCEDURE — 99214 OFFICE O/P EST MOD 30 MIN: CPT | Performed by: NURSE PRACTITIONER

## 2019-05-31 ASSESSMENT — MIFFLIN-ST. JEOR: SCORE: 1487.92

## 2019-05-31 ASSESSMENT — ENCOUNTER SYMPTOMS
FEVER: 0
TROUBLE SWALLOWING: 0
FACIAL SWELLING: 0
SHORTNESS OF BREATH: 0

## 2019-05-31 ASSESSMENT — PAIN SCALES - GENERAL: PAINLEVEL: NO PAIN (0)

## 2019-05-31 NOTE — PROGRESS NOTES
Subjective:  She is here today for follow-up on hypertension and atherosclerotic heart disease.  She had been on losartan and developed lip swelling and a terrible rash.  She was taken off all of her medications as these were slowly restarted.  The losartan was not restarted as it was a felt culprit.  Her symptoms resolved and have not returned.  She is also off Effient.  Her cardiologist is aware that she is off both of these medications and is fine with her proceeding without these medications.  Her blood pressure has been well controlled.  She also has rheumatoid arthritis.  She recently had a dose reduction of her Plaquenil because of dermatologic issues along her shins.  Her arthritis has been stable with the dose reduction.  She saw a rheumatologist recently.  Had lab work that was all stable.  Last week she had aspiration of a left breast cyst.  This was benign.  She was told to follow-up in 1 year for mammography.  No drainage from that area no redness or warmth.      Patient Active Problem List   Diagnosis     ACP (advance care planning)     Allergic rhinitis     ASCVD (arteriosclerotic cardiovascular disease)     DJD (degenerative joint disease) of knee     Gout     Hyperlipidemia     Hypertension     Mass of finger     Osteopenia     Primary osteoarthritis of both hands     Rheumatoid arthritis (H)     Avascular necrosis of humeral head, right (H)     Right shoulder pain     Rotator cuff tendonitis, right     S/P shoulder surgery     Shoulder impingement, right     Status post total shoulder replacement, right     Past Medical History:   Diagnosis Date     Atherosclerotic heart disease of native coronary artery without angina pectoris     -s/p ADÁN to the mid-LAD, mid-circumflex, mid-right coronary artery, proximal right coronary  artery, and PTCA of a marginal branch of the right coronary artery 04/30/2007. -s/p ADÁN to proximal left anterior descending 10/29/13.     Contusion of abdominal wall     2014      Essential (primary) hypertension     2006     Gout     No Comments Provided     Hyperlipidemia     2007     Localized swelling, mass, or lump of upper extremity     2017     Other specified counseling     10/28/2013,Patient has identified Health Care Agent(s): Yes Add Health Care Agents: Yes   Health Care Agent(s): Primary Health Care Agent: Jay Batista  Relationship:  Phone:   Secondary Health Care Agent:  Relationship: Daughter Phone:   Conservator:  Relationship:  Phone:   Guardian: Relationship:  Phone:    Patient has Advance Care Plan Documents (Health Care Directive, POLST): No, refe*     Polyosteoarthritis     No Comments Provided     Postmenopausal bleeding     No Comments Provided     Presence of coronary angioplasty implant and graft     ,unstable angina; severe prox LAD stenosis; s/p 7 stents     Primary osteoarthritis of left hand     2017     Rheumatoid arthritis (H)     follows at [facility]     Past Surgical History:   Procedure Laterality Date     ARTHROPLASTY KNEE      2011     ARTHROSCOPY KNEE           BIOPSY BREAST      10/25/95,BRIAN RAMIREZ      SECTION      1974, Section     COLONOSCOPY      05,Normal - Repeat in 10 years     COLONOSCOPY  2016     COLONOSCOPY N/A 2019    Procedure: COLONOSCOPY;  Surgeon: Evelin Thompson MD;  Location:  OR     COLONOSCOPY N/A 2019    Procedure: COMBINED COLONOSCOPY, SINGLE OR MULTIPLE BIOPSY/POLYPECTOMY BY BIOPSY;  Surgeon: Evelin Thompson MD;  Location:  OR     ESOPHAGOSCOPY, GASTROSCOPY, DUODENOSCOPY (EGD), COMBINED      2011,erosive gastritis;bx;consider MRI/A;[facility]     EXTRACTION(S) DENTAL      1970     HEART CATH, ANGIOPLASTY      10/29/2013,ADÁN to proximal left anterior descending     OTHER SURGICAL HISTORY      ,030988,OTHER     OTHER SURGICAL HISTORY      2014,XAT649,TOTAL SHOULDER ARTHROPLASTY,Right     OTHER SURGICAL HISTORY       04/14/2016,85730.0,OK COLONOSCOPY REMOVE ELIZA POLYP LESN SNARE,repeat 2019, precancerous polyps     STRESS LEXISCAN TEST  08/2018    With Dr. Irwin - normal     Social History     Socioeconomic History     Marital status:      Spouse name: rashaad     Number of children: Not on file     Years of education: Not on file     Highest education level: Not on file   Occupational History     Not on file   Social Needs     Financial resource strain: Not on file     Food insecurity:     Worry: Not on file     Inability: Not on file     Transportation needs:     Medical: Not on file     Non-medical: Not on file   Tobacco Use     Smoking status: Never Smoker     Smokeless tobacco: Never Used   Substance and Sexual Activity     Alcohol use: Yes     Alcohol/week: 0.0 oz     Comment: Alcoholic Drinks/day: Rare use. ~2 glasses of wine a month     Drug use: No     Comment: Drug use: Nono IV drug use     Sexual activity: Yes     Partners: Male     Birth control/protection: Post-menopausal   Lifestyle     Physical activity:     Days per week: Not on file     Minutes per session: Not on file     Stress: Not on file   Relationships     Social connections:     Talks on phone: Not on file     Gets together: Not on file     Attends [employer] service: Not on file     Active member of club or organization: Not on file     Attends meetings of clubs or organizations: Not on file     Relationship status: Not on file     Intimate partner violence:     Fear of current or ex partner: Not on file     Emotionally abused: Not on file     Physically abused: Not on file     Forced sexual activity: Not on file   Other Topics Concern     Not on file   Social History Narrative    . Moved to [place], [place] Summer of 2013 from [place], [place]. Has a daughter (lives in [place]) and son (who lives in [place]). 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 Never smoked. Rare alcohol use.     Family  History   Problem Relation Age of Onset     Heart Disease Mother         Heart Disease     Hypertension Mother         Hypertension     Other - See Comments Mother         Stroke     Breast Cancer Mother 53        Cancer-breast     Arthritis Father         Arthritis     Cancer Father         Cancer     Heart Disease Father         Heart Disease     Hypertension Father         Hypertension     Family History Negative Brother         Good Health     Heart Disease Brother         Heart Disease     Family History Negative Sister         Good Health     Current Outpatient Medications   Medication Sig Dispense Refill     allopurinol (ZYLOPRIM) 300 MG tablet Take 1 tablet (300 mg) by mouth daily 90 tablet 3     aspirin EC 81 MG EC tablet Take 1 tablet by mouth daily       atenolol (TENORMIN) 100 MG tablet Take 100 mg by mouth daily       atorvastatin (LIPITOR) 40 MG tablet Take 1 tablet (40 mg) by mouth daily 90 tablet 3     Cholecalciferol (VITAMIN D3) 2000 UNITS CAPS Take 2,000 Units by mouth daily       folic acid (FOLVITE) 1 MG tablet Take 1 mg by mouth daily       hydrochlorothiazide (HYDRODIURIL) 25 MG tablet Take 1 tablet (25 mg) by mouth daily 90 tablet 3     hydroxychloroquine (PLAQUENIL) 200 MG tablet Take 200 mg by mouth daily       methotrexate 2.5 MG tablet CHEMO Take 15 mg by mouth Every Monday       nitroGLYcerin (NITROSTAT) 0.4 MG sublingual tablet Place 1 tablet under the tongue every 5 minutes as needed for chest pain (For chest pain x 3 doses)       triamcinolone (NASACORT) 55 MCG/ACT Inhaler Spray 2 sprays into both nostrils daily       Food; Losartan; and Enalapril      Review of Systems:  Review of Systems   Constitutional: Negative for fever.   HENT: Negative for facial swelling and trouble swallowing.    Respiratory: Negative for shortness of breath.    Cardiovascular: Negative for chest pain.   Breasts:  Negative for tenderness and discharge.   Skin: Negative for rash.       Objective:   /80  "(BP Location: Right arm, Patient Position: Sitting, Cuff Size: Adult Regular)   Pulse 70   Temp 97  F (36.1  C) (Tympanic)   Resp 18   Ht 1.676 m (5' 6\")   Wt 96.1 kg (211 lb 14.4 oz)   SpO2 99%   Breastfeeding? No   BMI 34.20 kg/m    Physical Exam  Pleasant female in no acute distress.  Affect normal.  Alert and oriented x4.  Skin color pink.  Mucous memories moist.  Neck supple without adenopathy.  Lung fields clear to auscultation.  Cardiovascular regular rate and rhythm, no S3 or murmur auscultated.  Extremities with trace bilateral edema.  She does have dark brown discoloration on her shins and varicosities present.    Assessment:    ICD-10-CM    1. Essential hypertension I10    2. ASCVD (arteriosclerotic cardiovascular disease) I25.10    3. Rheumatoid arthritis involving multiple sites with positive rheumatoid factor (H) M05.79    4. Hives L50.9    5. Left breast mass N63.20        Plan:   1.  She will remain off the losartan and Effient.  Losartan is on her allergy list.  Continue following with cardiology.  2.  Plaquenil dose reduced recently due to dermatologic issues.  RA has been stable and recently evaluated by rheumatologist.  She will continue with labs every 3 months as ordered by her rheumatologist.  These have been stable.  3.  Patient had a recent benign breast aspiration.  Follow with mammography in a year.    PEDRO Reese   5/31/2019  10:33 AM  "

## 2019-05-31 NOTE — NURSING NOTE
Patient presents to the clinic for follow-up medication.     Medication Reconciliation: complete   Socorro Awad LPN............. May 31, 2019 9:36 AM

## 2019-06-24 ENCOUNTER — MYC REFILL (OUTPATIENT)
Dept: FAMILY MEDICINE | Facility: OTHER | Age: 74
End: 2019-06-24

## 2019-06-24 DIAGNOSIS — E78.5 HYPERLIPIDEMIA, UNSPECIFIED HYPERLIPIDEMIA TYPE: ICD-10-CM

## 2019-07-03 RX ORDER — ATORVASTATIN CALCIUM 40 MG/1
40 TABLET, FILM COATED ORAL DAILY
Qty: 90 TABLET | Refills: 3 | OUTPATIENT
Start: 2019-07-03

## 2019-07-03 NOTE — TELEPHONE ENCOUNTER
[facility] sent Rx request for the following:      Atorvastatin 40 mg tablet      Last Prescription Date:   11/6/18  Last Fill Qty/Refills:         90, R-3      Refill request refused.  One year supply, #90 and 3 refills, [facility] on 11/6/18.  Requested too soon.    Unable to complete prescription refill per RNMedication Refill Policy.................... Hodan Ledezma ....................  7/3/2019   12:39 PM

## 2019-07-22 DIAGNOSIS — M06.4 RHEUMATOID ARTHRITIS WITH INFLAMMATORY POLYARTHROPATHY (H): ICD-10-CM

## 2019-07-22 LAB
AST SERPL W P-5'-P-CCNC: 21 U/L (ref 13–39)
BASOPHILS # BLD AUTO: 0.1 10E9/L (ref 0–0.2)
BASOPHILS NFR BLD AUTO: 0.5 %
CREAT SERPL-MCNC: 1.11 MG/DL (ref 0.6–1.2)
DIFFERENTIAL METHOD BLD: ABNORMAL
EOSINOPHIL # BLD AUTO: 0.4 10E9/L (ref 0–0.7)
EOSINOPHIL NFR BLD AUTO: 3.2 %
ERYTHROCYTE [DISTWIDTH] IN BLOOD BY AUTOMATED COUNT: 15.9 % (ref 10–15)
GFR SERPL CREATININE-BSD FRML MDRD: 48 ML/MIN/{1.73_M2}
HCT VFR BLD AUTO: 45.5 % (ref 35–47)
HGB BLD-MCNC: 14.6 G/DL (ref 11.7–15.7)
IMM GRANULOCYTES # BLD: 0.1 10E9/L (ref 0–0.4)
IMM GRANULOCYTES NFR BLD: 0.7 %
LYMPHOCYTES # BLD AUTO: 1.9 10E9/L (ref 0.8–5.3)
LYMPHOCYTES NFR BLD AUTO: 16.8 %
MCH RBC QN AUTO: 30.4 PG (ref 26.5–33)
MCHC RBC AUTO-ENTMCNC: 32.1 G/DL (ref 31.5–36.5)
MCV RBC AUTO: 95 FL (ref 78–100)
MONOCYTES # BLD AUTO: 0.7 10E9/L (ref 0–1.3)
MONOCYTES NFR BLD AUTO: 6.5 %
NEUTROPHILS # BLD AUTO: 8 10E9/L (ref 1.6–8.3)
NEUTROPHILS NFR BLD AUTO: 72.3 %
PLATELET # BLD AUTO: 239 10E9/L (ref 150–450)
RBC # BLD AUTO: 4.8 10E12/L (ref 3.8–5.2)
WBC # BLD AUTO: 11.1 10E9/L (ref 4–11)

## 2019-07-22 PROCEDURE — 36415 COLL VENOUS BLD VENIPUNCTURE: CPT

## 2019-07-22 PROCEDURE — 84450 TRANSFERASE (AST) (SGOT): CPT

## 2019-07-22 PROCEDURE — 82565 ASSAY OF CREATININE: CPT

## 2019-07-22 PROCEDURE — 85025 COMPLETE CBC W/AUTO DIFF WBC: CPT

## 2019-07-23 ENCOUNTER — TELEPHONE (OUTPATIENT)
Dept: INTERNAL MEDICINE | Facility: OTHER | Age: 74
End: 2019-07-23

## 2019-07-23 DIAGNOSIS — E78.5 HYPERLIPIDEMIA, UNSPECIFIED HYPERLIPIDEMIA TYPE: ICD-10-CM

## 2019-07-23 RX ORDER — ATORVASTATIN CALCIUM 40 MG/1
40 TABLET, FILM COATED ORAL DAILY
Qty: 90 TABLET | Refills: 2 | Status: SHIPPED | OUTPATIENT
Start: 2019-07-23 | End: 2019-10-08

## 2019-07-23 NOTE — TELEPHONE ENCOUNTER
Prescription approved per [facility] Refill Protocol. Patient thinks a bottle may have been lost as [facility] said they did not have a refill on file. Filled 11-6-19 for #90 X 3. Recent OV on 5-31-19 with medication continued. Filled as requested.   Hodan Poole, RN on 7/23/2019 at 10:23 AM

## 2019-10-08 ENCOUNTER — OFFICE VISIT (OUTPATIENT)
Dept: INTERNAL MEDICINE | Facility: OTHER | Age: 74
End: 2019-10-08
Attending: NURSE PRACTITIONER
Payer: COMMERCIAL

## 2019-10-08 VITALS
SYSTOLIC BLOOD PRESSURE: 130 MMHG | WEIGHT: 211 LBS | HEART RATE: 76 BPM | RESPIRATION RATE: 16 BRPM | TEMPERATURE: 96.8 F | HEIGHT: 66 IN | BODY MASS INDEX: 33.91 KG/M2 | DIASTOLIC BLOOD PRESSURE: 76 MMHG

## 2019-10-08 DIAGNOSIS — E53.8 VITAMIN B 12 DEFICIENCY: Primary | ICD-10-CM

## 2019-10-08 DIAGNOSIS — I10 ESSENTIAL HYPERTENSION: ICD-10-CM

## 2019-10-08 DIAGNOSIS — E78.5 HYPERLIPIDEMIA, UNSPECIFIED HYPERLIPIDEMIA TYPE: ICD-10-CM

## 2019-10-08 DIAGNOSIS — M85.80 OSTEOPENIA, UNSPECIFIED LOCATION: ICD-10-CM

## 2019-10-08 DIAGNOSIS — G89.29 CHRONIC BILATERAL LOW BACK PAIN WITHOUT SCIATICA: ICD-10-CM

## 2019-10-08 DIAGNOSIS — M05.79 RHEUMATOID ARTHRITIS INVOLVING MULTIPLE SITES WITH POSITIVE RHEUMATOID FACTOR (H): ICD-10-CM

## 2019-10-08 DIAGNOSIS — Z23 NEED FOR IMMUNIZATION AGAINST INFLUENZA: ICD-10-CM

## 2019-10-08 DIAGNOSIS — M10.9 GOUT, UNSPECIFIED CAUSE, UNSPECIFIED CHRONICITY, UNSPECIFIED SITE: ICD-10-CM

## 2019-10-08 DIAGNOSIS — I25.10 ASCVD (ARTERIOSCLEROTIC CARDIOVASCULAR DISEASE): Primary | ICD-10-CM

## 2019-10-08 DIAGNOSIS — R20.2 TINGLING OF BOTH FEET: ICD-10-CM

## 2019-10-08 DIAGNOSIS — M06.4 RHEUMATOID ARTHRITIS WITH INFLAMMATORY POLYARTHROPATHY (H): ICD-10-CM

## 2019-10-08 DIAGNOSIS — M54.50 CHRONIC BILATERAL LOW BACK PAIN WITHOUT SCIATICA: ICD-10-CM

## 2019-10-08 LAB
ANION GAP SERPL CALCULATED.3IONS-SCNC: 10 MMOL/L (ref 3–14)
AST SERPL W P-5'-P-CCNC: 22 U/L (ref 13–39)
BASOPHILS # BLD AUTO: 0.1 10E9/L (ref 0–0.2)
BASOPHILS NFR BLD AUTO: 0.6 %
BUN SERPL-MCNC: 22 MG/DL (ref 7–25)
CALCIUM SERPL-MCNC: 9.6 MG/DL (ref 8.6–10.3)
CHLORIDE SERPL-SCNC: 101 MMOL/L (ref 98–107)
CO2 SERPL-SCNC: 28 MMOL/L (ref 21–31)
CREAT SERPL-MCNC: 1.05 MG/DL (ref 0.6–1.2)
CREAT SERPL-MCNC: 1.05 MG/DL (ref 0.6–1.2)
DIFFERENTIAL METHOD BLD: ABNORMAL
EOSINOPHIL # BLD AUTO: 0.3 10E9/L (ref 0–0.7)
EOSINOPHIL NFR BLD AUTO: 3 %
ERYTHROCYTE [DISTWIDTH] IN BLOOD BY AUTOMATED COUNT: 15.6 % (ref 10–15)
GFR SERPL CREATININE-BSD FRML MDRD: 52 ML/MIN/{1.73_M2}
GFR SERPL CREATININE-BSD FRML MDRD: 52 ML/MIN/{1.73_M2}
GLUCOSE SERPL-MCNC: 104 MG/DL (ref 70–105)
HCT VFR BLD AUTO: 46.4 % (ref 35–47)
HGB BLD-MCNC: 15 G/DL (ref 11.7–15.7)
IMM GRANULOCYTES # BLD: 0 10E9/L (ref 0–0.4)
IMM GRANULOCYTES NFR BLD: 0.4 %
LYMPHOCYTES # BLD AUTO: 1.9 10E9/L (ref 0.8–5.3)
LYMPHOCYTES NFR BLD AUTO: 17.4 %
MCH RBC QN AUTO: 31 PG (ref 26.5–33)
MCHC RBC AUTO-ENTMCNC: 32.3 G/DL (ref 31.5–36.5)
MCV RBC AUTO: 96 FL (ref 78–100)
MONOCYTES # BLD AUTO: 0.6 10E9/L (ref 0–1.3)
MONOCYTES NFR BLD AUTO: 5.7 %
NEUTROPHILS # BLD AUTO: 8.1 10E9/L (ref 1.6–8.3)
NEUTROPHILS NFR BLD AUTO: 72.9 %
PLATELET # BLD AUTO: 220 10E9/L (ref 150–450)
POTASSIUM SERPL-SCNC: 3.5 MMOL/L (ref 3.5–5.1)
RBC # BLD AUTO: 4.84 10E12/L (ref 3.8–5.2)
SODIUM SERPL-SCNC: 139 MMOL/L (ref 134–144)
TSH SERPL DL<=0.05 MIU/L-ACNC: 0.63 IU/ML (ref 0.34–5.6)
VIT B12 SERPL-MCNC: 259 PG/ML (ref 180–914)
WBC # BLD AUTO: 11.1 10E9/L (ref 4–11)

## 2019-10-08 PROCEDURE — 84450 TRANSFERASE (AST) (SGOT): CPT | Mod: ZL

## 2019-10-08 PROCEDURE — 82607 VITAMIN B-12: CPT | Mod: ZL | Performed by: NURSE PRACTITIONER

## 2019-10-08 PROCEDURE — 85025 COMPLETE CBC W/AUTO DIFF WBC: CPT | Mod: ZL

## 2019-10-08 PROCEDURE — G0463 HOSPITAL OUTPT CLINIC VISIT: HCPCS

## 2019-10-08 PROCEDURE — 36415 COLL VENOUS BLD VENIPUNCTURE: CPT | Mod: ZL | Performed by: NURSE PRACTITIONER

## 2019-10-08 PROCEDURE — 84443 ASSAY THYROID STIM HORMONE: CPT | Mod: ZL | Performed by: NURSE PRACTITIONER

## 2019-10-08 PROCEDURE — G0008 ADMIN INFLUENZA VIRUS VAC: HCPCS

## 2019-10-08 PROCEDURE — 99215 OFFICE O/P EST HI 40 MIN: CPT | Performed by: NURSE PRACTITIONER

## 2019-10-08 PROCEDURE — 90662 IIV NO PRSV INCREASED AG IM: CPT

## 2019-10-08 PROCEDURE — G0463 HOSPITAL OUTPT CLINIC VISIT: HCPCS | Mod: 25

## 2019-10-08 PROCEDURE — 80048 BASIC METABOLIC PNL TOTAL CA: CPT | Mod: ZL | Performed by: NURSE PRACTITIONER

## 2019-10-08 PROCEDURE — 82565 ASSAY OF CREATININE: CPT | Mod: ZL

## 2019-10-08 RX ORDER — ATORVASTATIN CALCIUM 40 MG/1
40 TABLET, FILM COATED ORAL DAILY
Qty: 90 TABLET | Refills: 3 | Status: SHIPPED | OUTPATIENT
Start: 2019-10-08 | End: 2019-10-08

## 2019-10-08 RX ORDER — ALLOPURINOL 300 MG/1
1 TABLET ORAL DAILY
Qty: 90 TABLET | Refills: 3 | Status: SHIPPED | OUTPATIENT
Start: 2019-10-08 | End: 2019-10-08

## 2019-10-08 RX ORDER — ATORVASTATIN CALCIUM 40 MG/1
40 TABLET, FILM COATED ORAL DAILY
Qty: 90 TABLET | Refills: 3 | Status: SHIPPED | OUTPATIENT
Start: 2019-10-08 | End: 2020-09-09

## 2019-10-08 RX ORDER — HYDROXYCHLOROQUINE SULFATE 200 MG/1
TABLET, FILM COATED ORAL
Start: 2019-10-08

## 2019-10-08 RX ORDER — HYDROCHLOROTHIAZIDE 25 MG/1
25 TABLET ORAL DAILY
Qty: 90 TABLET | Refills: 3 | Status: SHIPPED | OUTPATIENT
Start: 2019-10-08 | End: 2020-09-16

## 2019-10-08 RX ORDER — ATENOLOL 100 MG/1
100 TABLET ORAL DAILY
Qty: 90 TABLET | Refills: 3 | Status: SHIPPED | OUTPATIENT
Start: 2019-10-08 | End: 2019-10-08

## 2019-10-08 RX ORDER — FOLIC ACID 1 MG/1
1 TABLET ORAL DAILY
Qty: 90 TABLET | Refills: 3 | Status: SHIPPED | OUTPATIENT
Start: 2019-10-08 | End: 2020-09-16

## 2019-10-08 RX ORDER — ALLOPURINOL 300 MG/1
1 TABLET ORAL DAILY
Qty: 90 TABLET | Refills: 3 | Status: SHIPPED | OUTPATIENT
Start: 2019-10-08 | End: 2020-09-16

## 2019-10-08 RX ORDER — HYDROCHLOROTHIAZIDE 25 MG/1
25 TABLET ORAL DAILY
Qty: 90 TABLET | Refills: 3 | Status: SHIPPED | OUTPATIENT
Start: 2019-10-08 | End: 2019-10-08

## 2019-10-08 RX ORDER — LANOLIN ALCOHOL/MO/W.PET/CERES
1000 CREAM (GRAM) TOPICAL DAILY
Start: 2019-10-08 | End: 2020-05-27

## 2019-10-08 RX ORDER — ATENOLOL 100 MG/1
100 TABLET ORAL DAILY
Qty: 90 TABLET | Refills: 3 | Status: SHIPPED | OUTPATIENT
Start: 2019-10-08 | End: 2020-09-16

## 2019-10-08 ASSESSMENT — ENCOUNTER SYMPTOMS
SLEEP DISTURBANCE: 0
WHEEZING: 0
FREQUENCY: 0
APNEA: 0
HEADACHES: 0
BACK PAIN: 1
SORE THROAT: 0
DYSURIA: 0
POLYPHAGIA: 0
PALPITATIONS: 0
NERVOUS/ANXIOUS: 0
VOMITING: 0
COUGH: 0
DYSPHORIC MOOD: 0
SHORTNESS OF BREATH: 0
PARESTHESIAS: 1
POLYDIPSIA: 0
DIZZINESS: 0
HEARTBURN: 0
WOUND: 0
ADENOPATHY: 0
CHILLS: 0
CONSTIPATION: 0
ARTHRALGIAS: 0
UNEXPECTED WEIGHT CHANGE: 0
MYALGIAS: 0
ABDOMINAL PAIN: 0
EYE DISCHARGE: 0
FEVER: 0
DIARRHEA: 0
CONFUSION: 0
HEMATOCHEZIA: 0
NUMBNESS: 0
NAUSEA: 0
EYE REDNESS: 0
WEAKNESS: 0

## 2019-10-08 ASSESSMENT — MIFFLIN-ST. JEOR: SCORE: 1483.84

## 2019-10-08 ASSESSMENT — PAIN SCALES - GENERAL: PAINLEVEL: MILD PAIN (3)

## 2019-10-08 NOTE — NURSING NOTE
"Patient here for medication management and sore back.  Gissell Hughes LPN ..........10/8/2019 8:07 AM   Chief Complaint   Patient presents with     Recheck Medication     refill, sore back       Initial /76 (BP Location: Right arm, Patient Position: Sitting, Cuff Size: Adult Regular)   Pulse 76   Temp 96.8  F (36  C) (Tympanic)   Resp 16   Ht 1.676 m (5' 6\")   Wt 95.7 kg (211 lb)   LMP  (LMP Unknown)   BMI 34.06 kg/m   Estimated body mass index is 34.06 kg/m  as calculated from the following:    Height as of this encounter: 1.676 m (5' 6\").    Weight as of this encounter: 95.7 kg (211 lb).  Medication Reconciliation: complete    Gissell Hughes LPN    "

## 2019-10-08 NOTE — PROGRESS NOTES
Subjective:  She is here today for chronic disease management.  She has hypertension, hyperlipidemia and ASCVD.  She is managed by cardiology and use receives them once or twice yearly.  She has an appointment next month.  Her blood pressures been well controlled.  She also has rheumatoid arthritis and osteopenia.  She is managed by rheumatology.  Her Plaquenil was discontinued due to some skin changes but then she restarted that due to some joint pain about a month later.  That is well controlled.  Her gout is stable and she continues on allopurinol.  She does report that she has noticed some tingling in both of her feet this intermittent.  Is not every day.  It usually on the sides of her feet by her ankle and foot.  Not on the plantar surface.  Not worse with ambulation or elevation.  She does not have claudication symptoms.  She does have some bilateral low back pain.  She states that is worse with sitting and standing for long periods of time or walking for long distances.  The pain will start at the low back and radiate into her buttocks.  She does not usually take anything for the pain.  She just tries to do some stretching.  She is in need of influenza vaccine.  She is up-to-date on mammogram.  Colonoscopy is up-to-date.  Pneumonia vaccines are up-to-date.    Patient Active Problem List   Diagnosis     ACP (advance care planning)     Allergic rhinitis     ASCVD (arteriosclerotic cardiovascular disease)     DJD (degenerative joint disease) of knee     Gout     Hyperlipidemia     Hypertension     Mass of finger     Osteopenia     Primary osteoarthritis of both hands     Rheumatoid arthritis (H)     Avascular necrosis of humeral head, right (H)     Right shoulder pain     Rotator cuff tendonitis, right     S/P shoulder surgery     Shoulder impingement, right     Status post total shoulder replacement, right     Past Medical History:   Diagnosis Date     Atherosclerotic heart disease of native coronary artery  without angina pectoris     -s/p ADÁN to the mid-LAD, mid-circumflex, mid-right coronary artery, proximal right coronary  artery, and PTCA of a marginal branch of the right coronary artery 2007. -s/p ADÁN to proximal left anterior descending 10/29/13.     Contusion of abdominal wall          Essential (primary) hypertension     2006     Gout     No Comments Provided     Hyperlipidemia     2007     Localized swelling, mass, or lump of upper extremity     2017     Other specified counseling     10/28/2013,Patient has identified Health Care Agent(s): Yes Add Health Care Agents: Yes   Health Care Agent(s): Primary Health Care Agent: Jay Batista  Relationship:  Phone:   Secondary Health Care Agent:  Relationship: Daughter Phone:   Conservator:  Relationship:  Phone:   Guardian: Relationship:  Phone:    Patient has Advance Care Plan Documents (Health Care Directive, POLST): No, refe*     Polyosteoarthritis     No Comments Provided     Postmenopausal bleeding     No Comments Provided     Presence of coronary angioplasty implant and graft     ,unstable angina; severe prox LAD stenosis; s/p 7 stents     Primary osteoarthritis of left hand     2017     Rheumatoid arthritis (H)     follows at [facility] yearly     Past Surgical History:   Procedure Laterality Date     ARTHROPLASTY KNEE      2011     ARTHROSCOPY KNEE           BIOPSY BREAST      10/25/95,BRIAN RAMIREZ      SECTION      , Section     COLONOSCOPY      05,Normal - Repeat in 10 years     COLONOSCOPY  2016     COLONOSCOPY N/A 2019    Procedure: COLONOSCOPY;  Surgeon: Evelin Thompson MD;  Location:  OR     COLONOSCOPY N/A 2019    Procedure: COMBINED COLONOSCOPY, SINGLE OR MULTIPLE BIOPSY/POLYPECTOMY BY BIOPSY;  Surgeon: Evelin Thompson MD;  Location:  OR     ESOPHAGOSCOPY, GASTROSCOPY, DUODENOSCOPY (EGD), COMBINED      2011,erosive  gastritis;bx;consider MRI/A;[facility]     EXTRACTION(S) DENTAL      1970     HEART CATH, ANGIOPLASTY      10/29/2013,ADÁN to proximal left anterior descending     OTHER SURGICAL HISTORY      2007/2013,989499,OTHER     OTHER SURGICAL HISTORY      01/2014,[id number],TOTAL SHOULDER ARTHROPLASTY,Right     OTHER SURGICAL HISTORY      04/14/2016,47819.0,NE COLONOSCOPY REMOVE ELIZA POLYP LESN SNARE,repeat 2019, precancerous polyps     STRESS LEXISCAN TEST  08/2018    With Dr. Irwin - normal     Social History     Socioeconomic History     Marital status:      Spouse name: rashaad     Number of children: Not on file     Years of education: Not on file     Highest education level: Not on file   Occupational History     Not on file   Social Needs     Financial resource strain: Not on file     Food insecurity:     Worry: Not on file     Inability: Not on file     Transportation needs:     Medical: Not on file     Non-medical: Not on file   Tobacco Use     Smoking status: Never Smoker     Smokeless tobacco: Never Used   Substance and Sexual Activity     Alcohol use: Yes     Alcohol/week: 0.0 standard drinks     Comment: Alcoholic Drinks/day: Rare use. ~2 glasses of wine a month     Drug use: No     Comment: Drug use: Nono IV drug use     Sexual activity: Yes     Partners: Male     Birth control/protection: Post-menopausal   Lifestyle     Physical activity:     Days per week: Not on file     Minutes per session: Not on file     Stress: Not on file   Relationships     Social connections:     Talks on phone: Not on file     Gets together: Not on file     Attends Sabianism service: Not on file     Active member of club or organization: Not on file     Attends meetings of clubs or organizations: Not on file     Relationship status: Not on file     Intimate partner violence:     Fear of current or ex partner: Not on file     Emotionally abused: Not on file     Physically abused: Not on file     Forced sexual activity: Not on file   Other  Topics Concern     Not on file   Social History Narrative    . Moved to [place], [place] Summer of 2013 from [place], [place]. Has a daughter (lives in [place]) and son (who lives in [place]). 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 Never smoked. Rare alcohol use.     Family History   Problem Relation Age of Onset     Heart Disease Mother         Heart Disease     Hypertension Mother         Hypertension     Other - See Comments Mother         Stroke     Breast Cancer Mother 53        Cancer-breast     Arthritis Father         Arthritis     Cancer Father         Cancer     Heart Disease Father         Heart Disease     Hypertension Father         Hypertension     Family History Negative Brother         Good Health     Heart Disease Brother         Heart Disease     Family History Negative Sister         Good Health     Current Outpatient Medications   Medication Sig Dispense Refill     allopurinol (ZYLOPRIM) 300 MG tablet Take 1 tablet (300 mg) by mouth daily 90 tablet 3     aspirin EC 81 MG EC tablet Take 1 tablet by mouth daily       atenolol (TENORMIN) 100 MG tablet Take 1 tablet (100 mg) by mouth daily 90 tablet 3     atorvastatin (LIPITOR) 40 MG tablet Take 1 tablet (40 mg) by mouth daily 90 tablet 3     Cholecalciferol (VITAMIN D3) 2000 UNITS CAPS Take 2,000 Units by mouth daily       folic acid (FOLVITE) 1 MG tablet Take 1 tablet (1 mg) by mouth daily 90 tablet 3     hydrochlorothiazide (HYDRODIURIL) 25 MG tablet Take 1 tablet (25 mg) by mouth daily 90 tablet 3     hydroxychloroquine (PLAQUENIL) 200 MG tablet 400 mg daily alternating with 200 mg daily       methotrexate 2.5 MG tablet CHEMO Take 15 mg by mouth Every Monday       nitroGLYcerin (NITROSTAT) 0.4 MG sublingual tablet Place 1 tablet under the tongue every 5 minutes as needed for chest pain (For chest pain x 3 doses)       triamcinolone (NASACORT) 55 MCG/ACT Inhaler Spray 2 sprays into both nostrils  "daily       Food; Losartan; and Enalapril      Review of Systems:  Review of Systems   Constitutional: Negative for chills, fever and unexpected weight change.   HENT: Negative for congestion, ear pain and sore throat.    Eyes: Negative for discharge and redness.   Respiratory: Negative for apnea, cough, shortness of breath and wheezing.    Cardiovascular: Negative for chest pain, palpitations and peripheral edema.   Gastrointestinal: Negative for abdominal pain, constipation, diarrhea, heartburn, hematochezia, nausea and vomiting.   Endocrine: Negative for cold intolerance, heat intolerance, polydipsia, polyphagia and polyuria.   Breasts:  negative.    Genitourinary: Negative for dysuria, frequency and vaginal bleeding.   Musculoskeletal: Positive for back pain. Negative for arthralgias and myalgias.   Skin: Negative for pallor, rash and wound.   Allergic/Immunologic: Positive for immunocompromised state.   Neurological: Positive for paresthesias. Negative for dizziness, syncope, weakness, numbness and headaches.   Hematological: Negative for adenopathy.   Psychiatric/Behavioral: Negative for confusion, dysphoric mood and sleep disturbance. The patient is not nervous/anxious.        Objective:   /76 (BP Location: Right arm, Patient Position: Sitting, Cuff Size: Adult Regular)   Pulse 76   Temp 96.8  F (36  C) (Tympanic)   Resp 16   Ht 1.676 m (5' 6\")   Wt 95.7 kg (211 lb)   LMP  (LMP Unknown)   BMI 34.06 kg/m    Physical Exam  Pleasant overweight female in no acute distress.  Affect normal.  Alert and oriented x4.  Sclera nonicteric.  Conjunctiva noninflamed.  TMs clear bilaterally.  Oral mucosa pink and moist.  Throat without erythema.  Neck supple without adenopathy.  No thyromegaly.  No carotid bruits.  Lung fields clear to auscultation throughout.  Cardiovascular regular rate and rhythm.  Abdomen soft and without masses, tenderness and organomegaly.  No abdominal bruits or pulsatile masses.  No " axillary or inguinal adenopathy.  Extremities with trace to 1+ bilateral edema.  Multiple varicosities in the lower extremities.  DP PT is palpable.  Capillary refill less than 3 seconds.  She is flat-footed.  Normal sensation along plantar surfaces of both feet.  There is tenderness with palpation to the lower lumbar spine.  No SI joint tenderness.  Ambulation and gait is stable.  Assessment:    ICD-10-CM    1. ASCVD (arteriosclerotic cardiovascular disease) I25.10    2. Essential hypertension I10 Basic metabolic panel     atenolol (TENORMIN) 100 MG tablet     hydrochlorothiazide (HYDRODIURIL) 25 MG tablet     DISCONTINUED: hydrochlorothiazide (HYDRODIURIL) 25 MG tablet     DISCONTINUED: atenolol (TENORMIN) 100 MG tablet   3. Hyperlipidemia, unspecified hyperlipidemia type E78.5 atorvastatin (LIPITOR) 40 MG tablet     DISCONTINUED: atorvastatin (LIPITOR) 40 MG tablet   4. Rheumatoid arthritis involving multiple sites with positive rheumatoid factor (H) M05.79 hydroxychloroquine (PLAQUENIL) 200 MG tablet     folic acid (FOLVITE) 1 MG tablet   5. Osteopenia, unspecified location M85.80    6. Gout, unspecified cause, unspecified chronicity, unspecified site M10.9 allopurinol (ZYLOPRIM) 300 MG tablet     DISCONTINUED: allopurinol (ZYLOPRIM) 300 MG tablet   7. Need for immunization against influenza Z23 HC FLU VACCINE, INCREASED ANTIGEN, PRESV FREE   8. Tingling of both feet R20.2 Thyrotropin GH     Vitamin B12   9. Chronic bilateral low back pain without sciatica M54.5 PHYSICAL THERAPY REFERRAL    G89.29        Plan:   Medications are refilled.  Keep follow-up visits with cardiology and rheumatology.  Influenza vaccine will be provided at this visit.  No changes to medication or treatment plan at this time.  She does have some tingling in her feet therefore we will do some lab work.  We will check a B12 level.  Also will check a basic metabolic panel which will check blood sugar and also her electrolytes due to her  chronic medications.  Recommend physical therapy for her chronic low back pain.  Suspect she has some spinal stenosis.  If failing to improve over the next 4 to 6 weeks or develop worsening could always follow-up and we can get x-rays or evaluate and treat further.      PEDRO Reese   10/8/2019  8:33 AM

## 2019-10-08 NOTE — PROGRESS NOTES
Immunization Documentation    Prior to Immunization administration, verified patients identity using patient's name and date of birth. Please see IMMUNIZATIONS  and order for additional information.  Patient / Parent instructed to remain in clinic for 15 minutes and report any adverse reaction to staff immediately.    Was entire vial of medication used? Yes  Vial/Syringe: Syringe    Dawna Boyle LPN  10/8/2019   8:48 AM

## 2019-10-30 ENCOUNTER — HOSPITAL ENCOUNTER (OUTPATIENT)
Dept: PHYSICAL THERAPY | Facility: OTHER | Age: 74
Setting detail: THERAPIES SERIES
End: 2019-10-30
Attending: NURSE PRACTITIONER
Payer: COMMERCIAL

## 2019-10-30 DIAGNOSIS — G89.29 CHRONIC BILATERAL LOW BACK PAIN WITHOUT SCIATICA: ICD-10-CM

## 2019-10-30 DIAGNOSIS — M54.50 CHRONIC BILATERAL LOW BACK PAIN WITHOUT SCIATICA: ICD-10-CM

## 2019-10-30 PROCEDURE — 97140 MANUAL THERAPY 1/> REGIONS: CPT | Mod: GP

## 2019-10-30 PROCEDURE — 97112 NEUROMUSCULAR REEDUCATION: CPT | Mod: GP

## 2019-10-30 PROCEDURE — 97161 PT EVAL LOW COMPLEX 20 MIN: CPT | Mod: GP

## 2019-10-30 PROCEDURE — 97110 THERAPEUTIC EXERCISES: CPT | Mod: GP

## 2019-10-31 NOTE — PROGRESS NOTES
[facility]          OUTPATIENT PHYSICAL THERAPY ORTHOPEDIC EVALUATION  PLAN OF TREATMENT FOR OUTPATIENT REHABILITATION  (COMPLETE FOR INITIAL CLAIMS ONLY)  Patient's Last Name, First Name, M.I.  YOB: 1945  Margaret Batista    Provider s Name:  [facility]   Medical Record No.  [id number]   Start of Care Date:  10/30/19   Onset Date:  10/08/19   Type:     _X__PT   ___OT   ___SLP Medical Diagnosis:  (P) chronic low back pain without sciatica     PT Diagnosis:  (P) low back pain, left shoulder pain, myofascial tightness and pain, lumbar segmental dysfunction, muscle weakness.    Visits from SOC:  1      _________________________________________________________________________________  Plan of Treatment/Functional Goals:  (P) joint mobilization, manual therapy, neuromuscular re-education, strengthening, stretching  (P) will also assess left shoulder/arm pain.  (P) Cryotherapy, Electrical stimulation, Hot packs     Goals  Goal Identifier: (P) joint mechanics  Goal Description: (P) Patient to display normal joint mechanics of spine as well as equal postural loading to decrease myofascial tightenss and pain wiht functional tasks.   Target Date: (P) 01/22/20    Goal Identifier: (P) bending  Goal Description: (P) Patient to report ability to forward bend to complete self cares without back pain.   Target Date: (P) 01/22/20    Goal Identifier: (P) walking  Goal Description: (P) Patient to walk greater than 1000 feet without onset of low back pain.   Target Date: (P) 01/22/20    Goal Identifier: (P) sitting  Goal Description: (P) Patient to tolerate sitting for 3 hours of travel without increased low back pain.   Target Date: (P) 01/22/20    Goal Identifier: (P) left arm use  Goal Description: (P) Patient to report ability to use left shoulder for functional tasks without pain or limitation.   Target Date: (P) 01/22/20    Goal Identifier: (P) HEP  Goal Description:  (P) Patient to be compliant with HEP for self management of symtpoms.   Target Date: (P) 01/22/20                          Therapy Frequency:  (P) 2 times/Week  Predicted Duration of Therapy Intervention:  (P) 12 weeks    Kareen Luis, PT                 I CERTIFY THE NEED FOR THESE SERVICES FURNISHED UNDER        THIS PLAN OF TREATMENT AND WHILE UNDER MY CARE     (Physician co-signature of this document indicates review and certification of the therapy plan).                       Certification Date From:  (P) 10/30/19   Certification Date To:  (P) 01/22/20    Referring Provider:  Suma Maxwell NP    Initial Assessment        See Epic Evaluation Start of Care Date: 10/30/19

## 2019-10-31 NOTE — PROGRESS NOTES
10/30/19 1600   General Information   Type of Visit Initial OP Ortho PT Evaluation   Start of Care Date 10/30/19   Referring Physician Suma Maxwell NP   Patient/Family Goals Statement reduce pain   Orders Evaluate and Treat   Date of Order 10/08/19   Certification Required? Yes   Medical Diagnosis chronic low back pain without sciatica   Surgical/Medical history reviewed Yes   Body Part(s)   Body Part(s) Lumbar Spine/SI   Presentation and Etiology   Pertinent history of current problem (include personal factors and/or comorbidities that impact the POC) Having back pain right sided, and lower sacral area. Pain with sitting and walking long distances. History of RA, OA, gout; hard to tell if it's just arthritic pain or something else. Also a history of right lower quadrant pain; past CT scans negative for findings. Also dealing with left shoulder pain and pain in to the arm.    Impairments A. Pain;D. Decreased ROM;E. Decreased flexibility;H. Impaired gait;M. Locking or catching   Functional Limitations perform activities of daily living   Onset date of current episode/exacerbation 10/08/19   Chronicity Chronic   Pain rating (0-10 point scale) Best (/10);Worst (/10)   Best (/10) 1   Worst (/10) 8   Pain quality C. Aching;E. Shooting   Frequency of pain/symptoms A. Constant   Pain/symptoms exacerbated by A. Sitting;B. Walking;G. Certain positions;I. Bending   Pain/symptoms eased by E. Changing positions;F. Certain positions;I. OTC medication(s)   Progression of symptoms since onset: Worsened   Prior Level of Function   Prior Level of Function-Mobility no limitations   Prior Level of Function-ADLs no limitations   Current Level of Function   Patient role/employment history F. Retired   Fall Risk Screen   Fall screen completed by PT   Have you fallen 2 or more times in the past year? No   Have you fallen and had an injury in the past year? No   Is patient a fall risk? No   Lumbar Spine/SI Objective Findings    Posture Right iliac crest superior instanding, level in sitting and supine. Right ASIS superior in supine.    Hip Screen limited inf/medial glide right hip, limited loading in to right hip   Hip Abduction Strength left 3/5, right 4+/5   Hip Extension Strength 3/4   Lumbar/SI Special Tests Comments -FFT, level pubes   Segmental Mobility FRS left L5-S1   Palpation Postural loading limited thorugh left and right shoulder, right pelvis, unable to load weight on to right foot. General listening to right lower abdomen; local listening to ascneding colon, cecum, uterus, parietal peritoenum. Myofascial tightness with fascia of toldt, inferior and medial parietocecal ligament, uterus <>parietal peritoneum.    Planned Therapy Interventions   Planned Therapy Interventions joint mobilization;manual therapy;neuromuscular re-education;strengthening;stretching   Planned Therapy Interventions Comment will also assess left shoulder/arm pain.   Planned Modality Interventions   Planned Modality Interventions Cryotherapy;Electrical stimulation;Hot packs   Clinical Impression   Criteria for Skilled Therapeutic Interventions Met yes, treatment indicated   PT Diagnosis low back pain, left shoulder pain, myofascial tightness and pain, lumbar segmental dysfunction, muscle weakness.    Influenced by the following impairments pain, stiffness, trouble withwalking, locking/catching joint   Functional limitations due to impairments sitting, bending forward, reaching with left arm   Clinical Presentation Evolving/Changing   Clinical Decision Making (Complexity) Low complexity   Therapy Frequency 2 times/Week   Predicted Duration of Therapy Intervention (days/wks) 12 weeks   Risk & Benefits of therapy have been explained Yes   Patient, Family & other staff in agreement with plan of care Yes   Clinical Impression Comments Chronic low back pain without injury with noted myofascial tightness and pain, lumbar segmental dysfunction, muscle weakness,  also dealing with left shoulder pain that is limiting function.    ORTHO GOALS   PT Ortho Eval Goals 1;2;3;4;5;6   Ortho Goal 1   Goal Identifier joint mechanics   Goal Description Patient to display normal joint mechanics of spine as well as equal postural loading to decrease myofascial tightenss and pain wiht functional tasks.    Target Date 01/22/20   Ortho Goal 2   Goal Identifier bending   Goal Description Patient to report ability to forward bend to complete self cares without back pain.    Target Date 01/22/20   Ortho Goal 3   Goal Identifier walking   Goal Description Patient to walk greater than 1000 feet without onset of low back pain.    Target Date 01/22/20   Ortho Goal 4   Goal Identifier sitting   Goal Description Patient to tolerate sitting for 3 hours of travel without increased low back pain.    Target Date 01/22/20   Ortho Goal 5   Goal Identifier left arm use   Goal Description Patient to report ability to use left shoulder for functional tasks without pain or limitation.    Target Date 01/22/20   Ortho Goal 6   Goal Identifier HEP   Goal Description Patient to be compliant with HEP for self management of symtpoms.    Target Date 01/22/20   Total Evaluation Time   PT Eval, Low Complexity Minutes (22941) 20   Therapy Certification   Certification date from 10/30/19   Certification date to 01/22/20   Medical Diagnosis chronic low back pain without sciatica

## 2019-11-04 ENCOUNTER — HOSPITAL ENCOUNTER (OUTPATIENT)
Dept: PHYSICAL THERAPY | Facility: OTHER | Age: 74
Setting detail: THERAPIES SERIES
End: 2019-11-04
Attending: NURSE PRACTITIONER
Payer: COMMERCIAL

## 2019-11-04 PROCEDURE — 97110 THERAPEUTIC EXERCISES: CPT | Mod: GP

## 2019-11-04 PROCEDURE — 97112 NEUROMUSCULAR REEDUCATION: CPT | Mod: GP

## 2019-11-06 ENCOUNTER — HOSPITAL ENCOUNTER (OUTPATIENT)
Dept: PHYSICAL THERAPY | Facility: OTHER | Age: 74
Setting detail: THERAPIES SERIES
End: 2019-11-06
Attending: NURSE PRACTITIONER
Payer: COMMERCIAL

## 2019-11-06 PROCEDURE — 97110 THERAPEUTIC EXERCISES: CPT | Mod: GP

## 2019-11-06 PROCEDURE — 97140 MANUAL THERAPY 1/> REGIONS: CPT | Mod: GP

## 2019-11-06 PROCEDURE — 97112 NEUROMUSCULAR REEDUCATION: CPT | Mod: GP

## 2019-11-12 ENCOUNTER — HOSPITAL ENCOUNTER (OUTPATIENT)
Dept: PHYSICAL THERAPY | Facility: OTHER | Age: 74
Setting detail: THERAPIES SERIES
End: 2019-11-12
Attending: NURSE PRACTITIONER
Payer: COMMERCIAL

## 2019-11-12 PROCEDURE — 97110 THERAPEUTIC EXERCISES: CPT | Mod: GP

## 2019-11-14 ENCOUNTER — HOSPITAL ENCOUNTER (OUTPATIENT)
Dept: PHYSICAL THERAPY | Facility: OTHER | Age: 74
Setting detail: THERAPIES SERIES
End: 2019-11-14
Attending: NURSE PRACTITIONER
Payer: COMMERCIAL

## 2019-11-14 PROCEDURE — 97110 THERAPEUTIC EXERCISES: CPT | Mod: GP

## 2019-11-18 ENCOUNTER — HOSPITAL ENCOUNTER (OUTPATIENT)
Dept: PHYSICAL THERAPY | Facility: OTHER | Age: 74
Setting detail: THERAPIES SERIES
End: 2019-11-18
Attending: NURSE PRACTITIONER
Payer: COMMERCIAL

## 2019-11-18 PROCEDURE — 97110 THERAPEUTIC EXERCISES: CPT | Mod: GP

## 2019-11-21 ENCOUNTER — HOSPITAL ENCOUNTER (OUTPATIENT)
Dept: PHYSICAL THERAPY | Facility: OTHER | Age: 74
Setting detail: THERAPIES SERIES
End: 2019-11-21
Attending: NURSE PRACTITIONER
Payer: COMMERCIAL

## 2019-11-21 PROCEDURE — 97112 NEUROMUSCULAR REEDUCATION: CPT | Mod: GP

## 2019-11-21 PROCEDURE — 97110 THERAPEUTIC EXERCISES: CPT | Mod: GP

## 2020-01-21 ENCOUNTER — HOSPITAL ENCOUNTER (OUTPATIENT)
Dept: GENERAL RADIOLOGY | Facility: OTHER | Age: 75
Discharge: HOME | End: 2020-01-21
Attending: NURSE PRACTITIONER | Admitting: NURSE PRACTITIONER
Payer: COMMERCIAL

## 2020-01-21 ENCOUNTER — OFFICE VISIT (OUTPATIENT)
Dept: INTERNAL MEDICINE | Facility: OTHER | Age: 75
End: 2020-01-21
Attending: NURSE PRACTITIONER
Payer: COMMERCIAL

## 2020-01-21 VITALS
BODY MASS INDEX: 33.51 KG/M2 | WEIGHT: 207.6 LBS | HEART RATE: 74 BPM | DIASTOLIC BLOOD PRESSURE: 82 MMHG | TEMPERATURE: 98 F | RESPIRATION RATE: 22 BRPM | SYSTOLIC BLOOD PRESSURE: 124 MMHG | OXYGEN SATURATION: 94 %

## 2020-01-21 DIAGNOSIS — R09.89 CHEST CONGESTION: ICD-10-CM

## 2020-01-21 DIAGNOSIS — R05.9 COUGH: ICD-10-CM

## 2020-01-21 DIAGNOSIS — J30.89 SEASONAL ALLERGIC RHINITIS DUE TO OTHER ALLERGIC TRIGGER: ICD-10-CM

## 2020-01-21 DIAGNOSIS — Z20.89 EXPOSURE TO PNEUMONIA: ICD-10-CM

## 2020-01-21 DIAGNOSIS — R05.9 COUGH: Primary | ICD-10-CM

## 2020-01-21 DIAGNOSIS — D84.9 IMMUNOCOMPROMISED (H): ICD-10-CM

## 2020-01-21 DIAGNOSIS — J20.9 ACUTE BRONCHITIS, UNSPECIFIED ORGANISM: ICD-10-CM

## 2020-01-21 PROCEDURE — G0463 HOSPITAL OUTPT CLINIC VISIT: HCPCS

## 2020-01-21 PROCEDURE — G0463 HOSPITAL OUTPT CLINIC VISIT: HCPCS | Mod: 25

## 2020-01-21 PROCEDURE — 71046 X-RAY EXAM CHEST 2 VIEWS: CPT

## 2020-01-21 PROCEDURE — 99214 OFFICE O/P EST MOD 30 MIN: CPT | Performed by: NURSE PRACTITIONER

## 2020-01-21 RX ORDER — AZITHROMYCIN 250 MG/1
TABLET, FILM COATED ORAL
Qty: 6 TABLET | Refills: 0 | Status: SHIPPED | OUTPATIENT
Start: 2020-01-21 | End: 2020-03-30

## 2020-01-21 RX ORDER — CETIRIZINE HYDROCHLORIDE 10 MG/1
10 TABLET ORAL DAILY
Start: 2020-01-21 | End: 2024-04-30

## 2020-01-21 ASSESSMENT — PAIN SCALES - GENERAL: PAINLEVEL: NO PAIN (0)

## 2020-01-21 NOTE — PROGRESS NOTES
Subjective:  She is here today for cough and chest congestion that has been occurring since the start of the weekend.  She has not had fever or chills.  She has had some nasal congestion and pressure in her maxillary sinuses.  Nasal drainage has been clear to yellow in color.  I seem to be a little more puffy and pressure behind her eyes.  She can hear a rattle in her chest when she coughs.  This worsens at nighttime.  She has not had any wheezing.  She has not taken any over-the-counter cough preparations.  Patient does have rheumatoid arthritis and is on Plaquenil and methotrexate.  She did see her cousin last week who was being treated for pneumonia.  Patient has had pneumonia vaccination in the past.  She has history of seasonal allergies which seem to worsen during the winter but she is not taking Zyrtec.  She does have this at home.  Denies shortness of breath, pleuritic chest pain, hemoptysis, chest pressure.  No weight changes or new edema.    Patient Active Problem List   Diagnosis     ACP (advance care planning)     Allergic rhinitis     ASCVD (arteriosclerotic cardiovascular disease)     DJD (degenerative joint disease) of knee     Gout     Hyperlipidemia     Hypertension     Mass of finger     Osteopenia     Primary osteoarthritis of both hands     Rheumatoid arthritis (H)     Avascular necrosis of humeral head, right (H)     Right shoulder pain     Rotator cuff tendonitis, right     S/P shoulder surgery     Shoulder impingement, right     Status post total shoulder replacement, right     Past Medical History:   Diagnosis Date     Atherosclerotic heart disease of native coronary artery without angina pectoris     -s/p ADÁN to the mid-LAD, mid-circumflex, mid-right coronary artery, proximal right coronary  artery, and PTCA of a marginal branch of the right coronary artery 04/30/2007. -s/p ADÁN to proximal left anterior descending 10/29/13.     Contusion of abdominal wall     2014     Essential (primary)  hypertension     2006     Gout     No Comments Provided     Hyperlipidemia     2007     Localized swelling, mass, or lump of upper extremity     2017     Other specified counseling     10/28/2013,Patient has identified Health Care Agent(s): Yes Add Health Care Agents: Yes   Health Care Agent(s): Primary Health Care Agent: Jay Batista  Relationship:  Phone:   Secondary Health Care Agent:  Relationship: Daughter Phone:   Conservator:  Relationship:  Phone:   Guardian: Relationship:  Phone:    Patient has Advance Care Plan Documents (Health Care Directive, POLST): No, refe*     Polyosteoarthritis     No Comments Provided     Postmenopausal bleeding     No Comments Provided     Presence of coronary angioplasty implant and graft     ,unstable angina; severe prox LAD stenosis; s/p 7 stents     Primary osteoarthritis of left hand     2017     Rheumatoid arthritis (H)     follows at [facility] yearly     Past Surgical History:   Procedure Laterality Date     ARTHROPLASTY KNEE      2011     ARTHROSCOPY KNEE           BIOPSY BREAST      10/25/95,BRIAN RAMIREZ      SECTION      1974, Section     COLONOSCOPY      05,Normal - Repeat in 10 years     COLONOSCOPY  2016     COLONOSCOPY N/A 2019    Procedure: COLONOSCOPY;  Surgeon: Evelin Thompson MD;  Location:  OR     COLONOSCOPY N/A 2019    Procedure: COMBINED COLONOSCOPY, SINGLE OR MULTIPLE BIOPSY/POLYPECTOMY BY BIOPSY;  Surgeon: Evelin Thompson MD;  Location:  OR     ESOPHAGOSCOPY, GASTROSCOPY, DUODENOSCOPY (EGD), COMBINED      2011,erosive gastritis;bx;consider MRI/A;[facility]     EXTRACTION(S) DENTAL      1970     HEART CATH, ANGIOPLASTY      10/29/2013,ADÁN to proximal left anterior descending     OTHER SURGICAL HISTORY      ,461682,OTHER     OTHER SURGICAL HISTORY      2014,KAI473,TOTAL SHOULDER ARTHROPLASTY,Right     OTHER SURGICAL HISTORY       04/14/2016,31395.0,[facility] COLONOSCOPY REMOVE ELIZA POLYP LESN SNARE,repeat 2019, precancerous polyps     STRESS LEXISCAN TEST  08/2018    With Dr. Irwin - normal     Social History     Socioeconomic History     Marital status:      Spouse name: rashaad     Number of children: Not on file     Years of education: Not on file     Highest education level: Not on file   Occupational History     Not on file   Social Needs     Financial resource strain: Not on file     Food insecurity:     Worry: Not on file     Inability: Not on file     Transportation needs:     Medical: Not on file     Non-medical: Not on file   Tobacco Use     Smoking status: Never Smoker     Smokeless tobacco: Never Used   Substance and Sexual Activity     Alcohol use: Yes     Alcohol/week: 0.0 standard drinks     Comment: Alcoholic Drinks/day: Rare use. ~2 glasses of wine a month     Drug use: No     Comment: Drug use: Nono IV drug use     Sexual activity: Yes     Partners: Male     Birth control/protection: Post-menopausal   Lifestyle     Physical activity:     Days per week: Not on file     Minutes per session: Not on file     Stress: Not on file   Relationships     Social connections:     Talks on phone: Not on file     Gets together: Not on file     Attends Synagogue service: Not on file     Active member of club or organization: Not on file     Attends meetings of clubs or organizations: Not on file     Relationship status: Not on file     Intimate partner violence:     Fear of current or ex partner: Not on file     Emotionally abused: Not on file     Physically abused: Not on file     Forced sexual activity: Not on file   Other Topics Concern     Not on file   Social History Narrative    . Moved to [place], [place] Summer of 2013 from [place], [place]. Has a daughter (lives in [place]) and son (who lives in [place]). 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 Never smoked. Rare alcohol use.      Family History   Problem Relation Age of Onset     Heart Disease Mother         Heart Disease     Hypertension Mother         Hypertension     Other - See Comments Mother         Stroke     Breast Cancer Mother 53        Cancer-breast     Arthritis Father         Arthritis     Cancer Father         Cancer     Heart Disease Father         Heart Disease     Hypertension Father         Hypertension     Family History Negative Brother         Good Health     Heart Disease Brother         Heart Disease     Family History Negative Sister         Good Health     Current Outpatient Medications   Medication Sig Dispense Refill     allopurinol (ZYLOPRIM) 300 MG tablet Take 1 tablet (300 mg) by mouth daily 90 tablet 3     aspirin EC 81 MG EC tablet Take 1 tablet by mouth daily       atenolol (TENORMIN) 100 MG tablet Take 1 tablet (100 mg) by mouth daily 90 tablet 3     atorvastatin (LIPITOR) 40 MG tablet Take 1 tablet (40 mg) by mouth daily 90 tablet 3     cetirizine (ZYRTEC) 10 MG tablet Take 1 tablet (10 mg) by mouth daily       Cholecalciferol (VITAMIN D3) 2000 UNITS CAPS Take 2,000 Units by mouth daily       cyanocobalamin (VITAMIN B-12) 1000 MCG tablet Take 1 tablet (1,000 mcg) by mouth daily       folic acid (FOLVITE) 1 MG tablet Take 1 tablet (1 mg) by mouth daily 90 tablet 3     hydrochlorothiazide (HYDRODIURIL) 25 MG tablet Take 1 tablet (25 mg) by mouth daily 90 tablet 3     hydroxychloroquine (PLAQUENIL) 200 MG tablet 400 mg daily alternating with 200 mg daily       methotrexate 2.5 MG tablet CHEMO Take 15 mg by mouth Every Monday       nitroGLYcerin (NITROSTAT) 0.4 MG sublingual tablet Place 1 tablet under the tongue every 5 minutes as needed for chest pain (For chest pain x 3 doses)       Food; Losartan; and Enalapril      Review of Systems:  Review of Systems  See HPI  Objective:   /82 (BP Location: Right arm, Patient Position: Sitting, Cuff Size: Adult Large)   Pulse 74   Temp 98  F (36.7  C)  (Tympanic)   Resp 22   Wt 94.2 kg (207 lb 9.6 oz)   LMP  (LMP Unknown)   SpO2 94%   BMI 33.51 kg/m    Physical Exam  Pleasant female in no acute distress.  Affect normal.  Alert and oriented x4.  Skin color pink.  Sclera nonicteric.  Conjunctiva noninflamed.  Right nares is pale swollen and boggy.  Left nares is erythema but no swelling.  No pain with palpation to maxillary frontal sinuses.  Oral mucosa pink and moist.  Throat without erythema.  Neck supple without adenopathy.  Lung fields with rhonchi and faint wheezing upper anterior lung fields and rhonchi and faint wheezing left midlung field.  Cardiovascular regular rate and rhythm.  Extremities with chronic trace to 1+ edema.  Chest x-ray: No acute findings, mild chronic cardiomegaly but stable compared to previous.  Chest x-ray interpretation by radiologist pending.  Assessment:    ICD-10-CM    1. Cough R05 XR Chest 2 Views   2. Chest congestion R09.89 XR Chest 2 Views   3. Seasonal allergic rhinitis due to other allergic trigger J30.89 cetirizine (ZYRTEC) 10 MG tablet   4. Exposure to pneumonia Z20.89        Plan:   Patient was exposed to pneumonia and now has acute she is immunocompromised due to her RA meds of Plaquenil and methotrexate.  She has abnormal breath sounds.  Chest x-ray at this time does not show infiltrate.  She will however be started on Z-Guevara for the above-mentioned reasons.  If symptoms not clearing over the next 7-10 days or if she develops worsening then recommend follow-up visit.  Patient also was wondering if she could be round her daughter this weekend who developed shingles on the face in the last couple of days.  It was recommended that patient discuss this with her rheumatologist.  Also recommend that she restart Zyrtec 10 mg daily to help with her sinus symptoms.    PEDRO Reese   1/21/2020  2:15 PM

## 2020-01-21 NOTE — PROGRESS NOTES
Outpatient Physical Therapy Discharge Note     Patient: Margaret Batista  : 1945    Beginning/End Dates of Reporting Period:  10/30/19 to 2019    Referring Provider: LEONID Tafoya Diagnosis:    low back pain, left shoulder pain, myofascial tightness and pain, lumbar segmental dysfunction, muscle weakness.      Client Self Report: soreness is getting better from fall the other day. No questions about HEP. Feels she is doing well.    Objective Measurements:  Objective Measure: posutral loading  Details: equal loading  Objective Measure: myofascial     Objective Measure: joint mobility  Details: ERS left L5          Goals:  Goal Identifier joint mechanics   Goal Description Patient to display normal joint mechanics of spine as well as equal postural loading to decrease myofascial tightenss and pain wiht functional tasks.    Target Date 20   Date Met   19   Progress:     Goal Identifier bending   Goal Description Patient to report ability to forward bend to complete self cares without back pain.    Target Date 20   Date Met  19   Progress:     Goal Identifier walking   Goal Description Patient to walk greater than 1000 feet without onset of low back pain.    Target Date 20   Date Met  19   Progress:     Goal Identifier sitting   Goal Description Patient to tolerate sitting for 3 hours of travel without increased low back pain.    Target Date 20   Date Met  19   Progress:     Goal Identifier left arm use   Goal Description Patient to report ability to use left shoulder for functional tasks without pain or limitation.    Target Date 20   Date Met      Progress:     Goal Identifier HEP   Goal Description Patient to be compliant with HEP for self management of symtpoms.    Target Date 20   Date Met  19   Progress:       Progress Toward Goals:   Progress this reporting period: all goals met          Plan:  Discharge from  therapy.    Discharge:    Reason for Discharge: Patient has met all goals.    Equipment Issued: NA    Discharge Plan: Patient to continue home program.

## 2020-01-21 NOTE — NURSING NOTE
"Chief Complaint   Patient presents with     Sinus Problem       Initial /82 (BP Location: Right arm, Patient Position: Sitting, Cuff Size: Adult Large)   Pulse 74   Temp 98  F (36.7  C) (Tympanic)   Resp 22   Wt 94.2 kg (207 lb 9.6 oz)   LMP  (LMP Unknown)   SpO2 94%   BMI 33.51 kg/m   Estimated body mass index is 33.51 kg/m  as calculated from the following:    Height as of 10/8/19: 1.676 m (5' 6\").    Weight as of this encounter: 94.2 kg (207 lb 9.6 oz).  Medication Reconciliation: complete    Debbie Loyd LPN  "

## 2020-01-29 ENCOUNTER — MEDICAL CORRESPONDENCE (OUTPATIENT)
Dept: HEALTH INFORMATION MANAGEMENT | Facility: OTHER | Age: 75
End: 2020-01-29

## 2020-01-29 ENCOUNTER — TELEPHONE (OUTPATIENT)
Dept: INTERNAL MEDICINE | Facility: OTHER | Age: 75
End: 2020-01-29

## 2020-01-29 DIAGNOSIS — M06.9 RA (RHEUMATOID ARTHRITIS) (H): Primary | ICD-10-CM

## 2020-01-29 DIAGNOSIS — E53.8 VITAMIN B 12 DEFICIENCY: Primary | ICD-10-CM

## 2020-01-29 DIAGNOSIS — E53.8 VITAMIN B 12 DEFICIENCY: ICD-10-CM

## 2020-01-29 LAB
AST SERPL W P-5'-P-CCNC: 17 U/L (ref 13–39)
BASOPHILS # BLD AUTO: 0.1 10E9/L (ref 0–0.2)
BASOPHILS NFR BLD AUTO: 0.5 %
CREAT SERPL-MCNC: 0.96 MG/DL (ref 0.6–1.2)
DIFFERENTIAL METHOD BLD: ABNORMAL
EOSINOPHIL # BLD AUTO: 0.3 10E9/L (ref 0–0.7)
EOSINOPHIL NFR BLD AUTO: 3 %
ERYTHROCYTE [DISTWIDTH] IN BLOOD BY AUTOMATED COUNT: 15.2 % (ref 10–15)
GFR SERPL CREATININE-BSD FRML MDRD: 57 ML/MIN/{1.73_M2}
HCT VFR BLD AUTO: 45.1 % (ref 35–47)
HGB BLD-MCNC: 14.7 G/DL (ref 11.7–15.7)
IMM GRANULOCYTES # BLD: 0.1 10E9/L (ref 0–0.4)
IMM GRANULOCYTES NFR BLD: 0.9 %
LYMPHOCYTES # BLD AUTO: 2 10E9/L (ref 0.8–5.3)
LYMPHOCYTES NFR BLD AUTO: 20.7 %
MCH RBC QN AUTO: 31.2 PG (ref 26.5–33)
MCHC RBC AUTO-ENTMCNC: 32.6 G/DL (ref 31.5–36.5)
MCV RBC AUTO: 96 FL (ref 78–100)
MONOCYTES # BLD AUTO: 0.5 10E9/L (ref 0–1.3)
MONOCYTES NFR BLD AUTO: 5.5 %
NEUTROPHILS # BLD AUTO: 6.8 10E9/L (ref 1.6–8.3)
NEUTROPHILS NFR BLD AUTO: 69.4 %
PLATELET # BLD AUTO: 266 10E9/L (ref 150–450)
RBC # BLD AUTO: 4.71 10E12/L (ref 3.8–5.2)
VIT B12 SERPL-MCNC: >1500 PG/ML (ref 180–914)
WBC # BLD AUTO: 9.8 10E9/L (ref 4–11)

## 2020-01-29 PROCEDURE — 82607 VITAMIN B-12: CPT | Mod: ZL | Performed by: NURSE PRACTITIONER

## 2020-01-29 PROCEDURE — 82565 ASSAY OF CREATININE: CPT | Mod: ZL

## 2020-01-29 PROCEDURE — 84450 TRANSFERASE (AST) (SGOT): CPT | Mod: ZL

## 2020-01-29 PROCEDURE — 85025 COMPLETE CBC W/AUTO DIFF WBC: CPT | Mod: ZL

## 2020-01-29 PROCEDURE — 36415 COLL VENOUS BLD VENIPUNCTURE: CPT | Mod: ZL

## 2020-01-29 NOTE — TELEPHONE ENCOUNTER
This patient came in for lab from an outside provider today. She said she was due for Vitamin B12 check. I told her I would send a message to see if Suma could put an order in for it and we could add it onto the blood we have already drawn.   Thank you

## 2020-03-11 ENCOUNTER — HEALTH MAINTENANCE LETTER (OUTPATIENT)
Age: 75
End: 2020-03-11

## 2020-03-30 ENCOUNTER — VIRTUAL VISIT (OUTPATIENT)
Dept: INTERNAL MEDICINE | Facility: OTHER | Age: 75
End: 2020-03-30
Attending: NURSE PRACTITIONER
Payer: COMMERCIAL

## 2020-03-30 DIAGNOSIS — M05.79 RHEUMATOID ARTHRITIS INVOLVING MULTIPLE SITES WITH POSITIVE RHEUMATOID FACTOR (H): ICD-10-CM

## 2020-03-30 DIAGNOSIS — D84.9 IMMUNOCOMPROMISED (H): ICD-10-CM

## 2020-03-30 DIAGNOSIS — L30.4 INTERTRIGO: Primary | ICD-10-CM

## 2020-03-30 PROCEDURE — 99212 OFFICE O/P EST SF 10 MIN: CPT | Mod: 95 | Performed by: NURSE PRACTITIONER

## 2020-03-30 RX ORDER — NYSTATIN 100000 U/G
CREAM TOPICAL 3 TIMES DAILY
Qty: 30 G | Refills: 1 | Status: SHIPPED | OUTPATIENT
Start: 2020-03-30 | End: 2020-06-02

## 2020-03-30 SDOH — HEALTH STABILITY: MENTAL HEALTH: HOW OFTEN DO YOU HAVE A DRINK CONTAINING ALCOHOL?: MONTHLY OR LESS

## 2020-03-30 ASSESSMENT — PAIN SCALES - GENERAL: PAINLEVEL: NO PAIN (0)

## 2020-03-30 NOTE — PROGRESS NOTES
"Subjective     Margaret Batista is a 74 year old female who is being evaluated via a billable telephone visit.      The patient has been notified of following:     \"This telephone visit will be conducted via a call between you and your physician/provider. We have found that certain health care needs can be provided without the need for a physical exam.  This service lets us provide the care you need with a short phone conversation.  If a prescription is necessary we can send it directly to your pharmacy.  If lab work is needed we can place an order for that and you can then stop by our lab to have the test done at a later time.    If during the course of the call the physician/provider feels a telephone visit is not appropriate, you will not be charged for this service.\"     Physician has received verbal consent for a Telephone Visit from the patient? Yes    Margaret Batista complains of   Chief Complaint   Patient presents with     Derm Problem       ALLERGIES  Food; Losartan; and Enalapril  Patient has developed a rash beneath her breast for about the past 4 days.  It began under the left breast.  It does itch slightly.  She noticed as of this morning it is now under the right breast.  She states it kind of reminds her of ringworm.  It is not under the abdominal fold, beneath the axillary nor in the vaginal area.  She does have a large breast and is overweight.  She is never had this type of rash before.  He does not have history of diabetes nor symptoms to suggest diabetes.  For treatment she was using Neosporin initially but over the last 2 days switched over to an antifungal cream and only used it once each of those 2 days.  She does have rheumatoid arthritis and is on Plaquenil and methotrexate.          Patient Active Problem List   Diagnosis     ACP (advance care planning)     Allergic rhinitis     ASCVD (arteriosclerotic cardiovascular disease)     DJD (degenerative joint disease) of knee     Gout     " Hyperlipidemia     Hypertension     Mass of finger     Osteopenia     Primary osteoarthritis of both hands     Rheumatoid arthritis (H)     Avascular necrosis of humeral head, right (H)     Right shoulder pain     Rotator cuff tendonitis, right     S/P shoulder surgery     Shoulder impingement, right     Status post total shoulder replacement, right     Past Surgical History:   Procedure Laterality Date     ARTHROPLASTY KNEE      2011     ARTHROSCOPY KNEE           BIOPSY BREAST      10/25/95,BRIAN RAMIREZ      SECTION      1974, Section     COLONOSCOPY      05,Normal - Repeat in 10 years     COLONOSCOPY  2016     COLONOSCOPY N/A 2019    Procedure: COLONOSCOPY;  Surgeon: Evelin Thompson MD;  Location:  OR     COLONOSCOPY N/A 2019    Procedure: COMBINED COLONOSCOPY, SINGLE OR MULTIPLE BIOPSY/POLYPECTOMY BY BIOPSY;  Surgeon: Evelin Thompson MD;  Location:  OR     ESOPHAGOSCOPY, GASTROSCOPY, DUODENOSCOPY (EGD), COMBINED      2011,erosive gastritis;bx;consider MRI/A;[facility]ravo     EXTRACTION(S) DENTAL      1970     HEART CATH, ANGIOPLASTY      10/29/2013,ADÁN to proximal left anterior descending     OTHER SURGICAL HISTORY      ,308273,OTHER     OTHER SURGICAL HISTORY      2014,IPN560,TOTAL SHOULDER ARTHROPLASTY,Right     OTHER SURGICAL HISTORY      2016,53960.0,[place] COLONOSCOPY REMOVE ELIZA POLYP LESN SNARE,repeat , precancerous polyps     STRESS LEXISCAN TEST  2018    With Dr. Irwin - normal       Social History     Tobacco Use     Smoking status: Never Smoker     Smokeless tobacco: Never Used   Substance Use Topics     Alcohol use: Not Currently     Alcohol/week: 0.0 standard drinks     Frequency: Monthly or less     Comment: Alcoholic Drinks/day: Rare use. ~2 glasses of wine a month     Family History   Problem Relation Age of Onset     Heart Disease Mother         Heart Disease     Hypertension Mother         Hypertension      Other - See Comments Mother         Stroke     Breast Cancer Mother 53        Cancer-breast     Arthritis Father         Arthritis     Cancer Father         Cancer     Heart Disease Father         Heart Disease     Hypertension Father         Hypertension     Family History Negative Brother         Good Health     Heart Disease Brother         Heart Disease     Family History Negative Sister         Good Health         Current Outpatient Medications   Medication Sig Dispense Refill     allopurinol (ZYLOPRIM) 300 MG tablet Take 1 tablet (300 mg) by mouth daily 90 tablet 3     aspirin EC 81 MG EC tablet Take 1 tablet by mouth daily       atenolol (TENORMIN) 100 MG tablet Take 1 tablet (100 mg) by mouth daily 90 tablet 3     atorvastatin (LIPITOR) 40 MG tablet Take 1 tablet (40 mg) by mouth daily 90 tablet 3     cetirizine (ZYRTEC) 10 MG tablet Take 1 tablet (10 mg) by mouth daily       Cholecalciferol (VITAMIN D3) 2000 UNITS CAPS Take 2,000 Units by mouth daily       cyanocobalamin (VITAMIN B-12) 1000 MCG tablet Take 1 tablet (1,000 mcg) by mouth daily       folic acid (FOLVITE) 1 MG tablet Take 1 tablet (1 mg) by mouth daily 90 tablet 3     hydrochlorothiazide (HYDRODIURIL) 25 MG tablet Take 1 tablet (25 mg) by mouth daily 90 tablet 3     hydroxychloroquine (PLAQUENIL) 200 MG tablet 400 mg daily alternating with 200 mg daily       methotrexate 2.5 MG tablet CHEMO Take 15 mg by mouth Every Monday       nystatin (MYCOSTATIN) 676572 UNIT/GM external cream Apply topically 3 times daily 30 g 1     nitroGLYcerin (NITROSTAT) 0.4 MG sublingual tablet Place 1 tablet under the tongue every 5 minutes as needed for chest pain (For chest pain x 3 doses)       Allergies   Allergen Reactions     Food      Macadamia nuts make mouth itch     Losartan      Allergic rxn with hives and angioedema suspected to be from alternative manufacture of the effient or the losartan     Enalapril Cough       Reviewed and updated as needed this  visit by Provider         Review of Systems   Positive for rash beneath both breast with mild itching  Negative for fever, chills, redness and warmth around rash    Objective   Reported vitals:  LMP  (LMP Unknown)    healthy, alert and no distress  Psych: Alert and oriented times 3; coherent speech, normal   rate and volume, able to articulate logical thoughts, able   to abstract reason, no tangential thoughts, no hallucinations   or delusions  Her affect is normal         Assessment/Plan:  1. Intertrigo  Discussed with patient that if she would like to continue with her over-the-counter antifungal cream that would be appropriate however would recommend that she increase to 3 times daily for at least a week but preferably 2 or 3 days after the resolution of the rash.  She will also keep area of skin folds clean and dry so that this can heal and to prevent reoccurrence.  If this does become a recurrent may need to do additional evaluation for diabetes, etc.    - nystatin (MYCOSTATIN) [id number] UNIT/GM external cream; Apply topically 3 times daily  Dispense: 30 g; Refill: 1    2. Immunocompromised (H)  Patient is immunocompromised due to taking Plaquenil and methotrexate.  At this time she has a mild intertrigo which should clear appropriately with antifungal treatment.  If failing to improve or she does develop any concerning symptoms such as secondary bacterial infection or progressive spread of the intertrigo then may need to consider stopping Plaquenil and methotrexate.  Concerning symptoms are reviewed and discussed and she expresses understanding.    3. Rheumatoid arthritis involving multiple sites with positive rheumatoid factor (H)        No follow-ups on file.      Phone call duration:  9 minutes    Swetha Maxwell NP

## 2020-04-24 ENCOUNTER — DOCUMENTATION ONLY (OUTPATIENT)
Dept: OTHER | Facility: CLINIC | Age: 75
End: 2020-04-24

## 2020-05-19 ENCOUNTER — HOSPITAL ENCOUNTER (OUTPATIENT)
Dept: MAMMOGRAPHY | Facility: OTHER | Age: 75
Discharge: HOME | End: 2020-05-19
Attending: NURSE PRACTITIONER | Admitting: NURSE PRACTITIONER
Payer: COMMERCIAL

## 2020-05-19 DIAGNOSIS — Z12.31 VISIT FOR SCREENING MAMMOGRAM: ICD-10-CM

## 2020-05-19 PROCEDURE — 77063 BREAST TOMOSYNTHESIS BI: CPT

## 2020-05-20 ENCOUNTER — HOSPITAL ENCOUNTER (OUTPATIENT)
Dept: ULTRASOUND IMAGING | Facility: OTHER | Age: 75
End: 2020-05-20
Attending: NURSE PRACTITIONER
Payer: COMMERCIAL

## 2020-05-20 ENCOUNTER — HOSPITAL ENCOUNTER (OUTPATIENT)
Dept: MAMMOGRAPHY | Facility: OTHER | Age: 75
End: 2020-05-20
Attending: NURSE PRACTITIONER
Payer: COMMERCIAL

## 2020-05-20 ENCOUNTER — TELEPHONE (OUTPATIENT)
Dept: INTERNAL MEDICINE | Facility: OTHER | Age: 75
End: 2020-05-20

## 2020-05-20 DIAGNOSIS — R92.8 ABNORMAL FINDING ON BREAST IMAGING: ICD-10-CM

## 2020-05-20 PROCEDURE — 77065 DX MAMMO INCL CAD UNI: CPT

## 2020-05-20 PROCEDURE — 76642 ULTRASOUND BREAST LIMITED: CPT | Mod: RT

## 2020-05-20 NOTE — TELEPHONE ENCOUNTER
Patient education performed regarding what to expect with ultrasound breast biopsy.  Patient verbalizes understanding.  Biopsy appointment scheduled for May 27 at 1245.  Patient reports use of blood thinners- low dose aspirin.  Suma Hernandez contacted, she states ok for patient to hold for 5 days prior to procedure.  Patient is aware to stop asa on Saturday.  Patient reports no allergy to lidocaine or epinephrine.  Follow up (results) appointment scheduled with Dr Thompson at 0820 on 6/2.  Kayleigh Gee RN.

## 2020-05-27 ENCOUNTER — HOSPITAL ENCOUNTER (OUTPATIENT)
Dept: MAMMOGRAPHY | Facility: OTHER | Age: 75
End: 2020-05-27
Attending: NURSE PRACTITIONER
Payer: COMMERCIAL

## 2020-05-27 ENCOUNTER — HOSPITAL ENCOUNTER (OUTPATIENT)
Dept: ULTRASOUND IMAGING | Facility: OTHER | Age: 75
End: 2020-05-27
Attending: NURSE PRACTITIONER
Payer: COMMERCIAL

## 2020-05-27 VITALS
OXYGEN SATURATION: 97 % | DIASTOLIC BLOOD PRESSURE: 92 MMHG | HEART RATE: 75 BPM | SYSTOLIC BLOOD PRESSURE: 135 MMHG | RESPIRATION RATE: 16 BRPM

## 2020-05-27 DIAGNOSIS — N63.10 BREAST MASS, RIGHT: ICD-10-CM

## 2020-05-27 DIAGNOSIS — R92.8 ABNORMAL FINDING ON BREAST IMAGING: ICD-10-CM

## 2020-05-27 PROCEDURE — 25000125 ZZHC RX 250: Performed by: RADIOLOGY

## 2020-05-27 PROCEDURE — 27211116 US BREAST BIOPSY CORE NEEDLE RIGHT

## 2020-05-27 PROCEDURE — 40000986 MA POST PROCEDURE RIGHT

## 2020-05-27 PROCEDURE — 88305 TISSUE EXAM BY PATHOLOGIST: CPT

## 2020-05-27 RX ORDER — LIDOCAINE HYDROCHLORIDE AND EPINEPHRINE 10; 10 MG/ML; UG/ML
20 INJECTION, SOLUTION INFILTRATION; PERINEURAL ONCE
Status: COMPLETED | OUTPATIENT
Start: 2020-05-27 | End: 2020-05-27

## 2020-05-27 RX ORDER — LIDOCAINE HYDROCHLORIDE 10 MG/ML
20 INJECTION, SOLUTION EPIDURAL; INFILTRATION; INTRACAUDAL; PERINEURAL ONCE
Status: COMPLETED | OUTPATIENT
Start: 2020-05-27 | End: 2020-05-27

## 2020-05-27 RX ADMIN — LIDOCAINE HYDROCHLORIDE 2 ML: 10 INJECTION, SOLUTION INFILTRATION; PERINEURAL at 13:16

## 2020-05-27 RX ADMIN — LIDOCAINE HYDROCHLORIDE,EPINEPHRINE BITARTRATE 6 ML: 10; .01 INJECTION, SOLUTION INFILTRATION; PERINEURAL at 13:18

## 2020-05-27 NOTE — PROGRESS NOTES
Patient here for ultrasound guided biopsy of right breast.  Procedure reviewed with patient by writer and radiologist, questions answered.  Time out performed prior to biopsy.  Biopsy completed by radiologist, clip placed.  Pressure held to biopsy site for 10 minutes.  Medipore dressing applied.   Post clip mammogram completed.  Discharge instructions reviewed with patient, patient verbalizes understanding of instructions.  Discharged to home in stable condition with no evidence of bleeding from biopsy site.   Kayleigh Gee RN.

## 2020-05-27 NOTE — DISCHARGE INSTRUCTIONS
"NEEDLE BIOPSY BREAST    Activity: Rest the remainder of the day. You may resume normal activity after the next day. Avoid any vigorous/strenuous physical activity for 24 hours.    Comfort: If you have discomfort or tenderness at the site you may take your usual or recommended pain medication. Do not take aspirin the day of the procedure or for 48 hours following the biopsy.    Diet: You may resume your usual diet.    Care of site: Leave ice pack in place for 4 hours, or until it is no longer cold. The ice pack is reusable and may be refrozen.  Keep your bra and the dressing on for 24 hours. Then you may remove the bandage and shower. If there are steri-strips you may remove them in 3 to 5 days.  You may have some discomfort and a small amount of bruising where the biopsy was performed. This is normal. For several days or even a couple of weeks, you may have tenderness or \"twinges\" and a tiny bump where the needle went into the skin. This can be bothersome, but is not abnormal. You can use warm moist washcloths, as this may help. Do Not Use A Heating Pad.    RETURN TO THE EMERGENCY ROOM FOR:   Shortness of breath   Rapid heart rate   If pain becomes worse    Call Your Doctor For:    A fever over 101 degrees   Increased redness, increased swelling, and/or persistent drainage/discomfort  around the site    Other: At the end of your breast biopsy, a tiny titanium clip will be inserted through the biopsy needle and placed at the biopsy site within your breast. The marker provides a landmark of the biopsy for further mammograms or surgical procedures. This marker is MRI compatible and poses no known health risks.    You will be receiving a letter in the mail from [facility] Mammography Department with your biopsy results.  In 6 months a mammogram will be needed to establish a new baseline and to recheck the area where the biopsy occurred. Our radiology department will call you to schedule an appointment.    For " "questions, problems or concerns, contact the Radiology Department at [phone].    NEEDLE BIOPSY BREAST    Activity: Rest the remainder of the day. You may resume normal activity after the next day. Avoid any vigorous/strenuous physical activity for 24 hours.    Comfort: If you have discomfort or tenderness at the site you may take your usual or recommended pain medication. Do not take aspirin the day of the procedure or for 48 hours following the biopsy.    Diet: You may resume your usual diet.    Care of site: Leave ice pack in place for 4 hours, or until it is no longer cold. The ice pack is reusable and may be refrozen.  Keep your bra and the dressing on for 24 hours. Then you may remove the bandage and shower. If there are steri-strips you may remove them in 3 to 5 days.  You may have some discomfort and a small amount of bruising where the biopsy was performed. This is normal. For several days or even a couple of weeks, you may have tenderness or \"twinges\" and a tiny bump where the needle went into the skin. This can be bothersome, but is not abnormal. You can use warm moist washcloths, as this may help. Do Not Use A Heating Pad.    RETURN TO THE EMERGENCY ROOM FOR:   Shortness of breath   Rapid heart rate   If pain becomes worse    Call Your Doctor For:    A fever over 101 degrees   Increased redness, increased swelling, and/or persistent drainage/discomfort  around the site    Other: At the end of your breast biopsy, a tiny titanium clip will be inserted through the biopsy needle and placed at the biopsy site within your breast. The marker provides a landmark of the biopsy for further mammograms or surgical procedures. This marker is MRI compatible and poses no known health risks.    You will be receiving a letter in the mail from [facility] Mammography Department with your biopsy results.  In 6 months a mammogram will be needed to establish a new baseline and to recheck the area where the biopsy occurred. " Our radiology department will call you to schedule an appointment.    For questions, problems or concerns, contact the Radiology Department at [phone].

## 2020-05-28 ENCOUNTER — TELEPHONE (OUTPATIENT)
Dept: SURGERY | Facility: OTHER | Age: 75
End: 2020-05-28

## 2020-05-28 NOTE — TELEPHONE ENCOUNTER
Called patient to check on status post ultrasound breast biopsy.  Patient reports pain 0/10.  Patient reports no bleeding.  Patient verbalizes understanding of importance of attending results appointment with Dr. Thompson on 6/2 at 0820.  Kayleigh Gee RN.

## 2020-06-02 ENCOUNTER — OFFICE VISIT (OUTPATIENT)
Dept: SURGERY | Facility: OTHER | Age: 75
End: 2020-06-02
Attending: SURGERY
Payer: COMMERCIAL

## 2020-06-02 VITALS
RESPIRATION RATE: 16 BRPM | DIASTOLIC BLOOD PRESSURE: 88 MMHG | HEART RATE: 76 BPM | SYSTOLIC BLOOD PRESSURE: 138 MMHG | WEIGHT: 210 LBS | HEIGHT: 66 IN | TEMPERATURE: 97.6 F | BODY MASS INDEX: 33.75 KG/M2

## 2020-06-02 DIAGNOSIS — R92.8 ABNORMAL MAMMOGRAM OF RIGHT BREAST: Primary | ICD-10-CM

## 2020-06-02 DIAGNOSIS — N60.01 BENIGN CYST OF BREAST, RIGHT: ICD-10-CM

## 2020-06-02 PROCEDURE — G0463 HOSPITAL OUTPT CLINIC VISIT: HCPCS

## 2020-06-02 PROCEDURE — 99213 OFFICE O/P EST LOW 20 MIN: CPT | Performed by: SURGERY

## 2020-06-02 RX ORDER — HYDROCORTISONE 2.5 %
CREAM (GRAM) TOPICAL
COMMUNITY
Start: 2020-05-29 | End: 2021-08-11

## 2020-06-02 RX ORDER — KETOCONAZOLE 20 MG/G
CREAM TOPICAL
COMMUNITY
Start: 2020-05-29 | End: 2021-01-09

## 2020-06-02 ASSESSMENT — PAIN SCALES - GENERAL: PAINLEVEL: NO PAIN (0)

## 2020-06-02 ASSESSMENT — MIFFLIN-ST. JEOR: SCORE: 1474.3

## 2020-06-02 NOTE — PATIENT INSTRUCTIONS
Call if you have questions. 6 month follow up mammogram on the right, then back to yearly screening.

## 2020-06-02 NOTE — PROGRESS NOTES
OFFICE CONSULTATION NOTE  Patient Name: Margaret Batista  Address: [address] [address]  Age:74 year old  Sex: female     Primary Care Physician: Swetha Maxwell NP    I was requested to see this patient in consultation by Swetha Maxwell NP for evaluation of new right  breast asymmetry on recent mammogram. A copy of this note will be sent to Swetha Maxwell NP.    HPI:   The patient is 74 year old female with new asymmetry noted in the right breast on recent mammogram. This was confirmed as a complex cyst with irregular margins on US. The patient hasn't noted any skin, nipple or breast changes. No previous breast cancer. No previous right breast biopsy.Family history of breast cancer in her mother. The patient had biopsy performed that showed cluster of cysts with focal rupture. No atypia or malignancy identified.      CONSULTATION ASSESSMENT AND PLAN/RECOMMENDATIONS:   I discussed with the patient the pathophysiology of cysts and breast disease. We specifically discussed that most abnormalities seen on mammogram and US are not breast cancer. I explained that benign cysts are not a precancerous condition and don't increase future risk for breast cancer. I recommended a 6 month right breast mammogram to follow up breast changes after the recent biopsy. The patient expressed understanding and denies further questions. The patient will call with questions or concerns.     REVIEW OF SYSTEMS  GENERAL: No fevers or chills. Denies fatigue, recent weight loss.  HEENT: No sinus drainage. No changeswith vision or hearing. No difficulty swallowing.   LYMPHATICS:  No swollen nodes in axilla, neck or groin.  CARDIOVASCULAR: Denies chest pain, palpitations and dyspnea on exertion.  PULMONARY: No shortness of breath or cough. No increase in sputum production.  GI: Denies melena, bright red blood in stools. No hematemesis. No constipation or diarrhea.  : No dysuria or  hematuria.  SKIN: No recent rashes or ulcers.   HEMATOLOGY:  No history of easy bruising or bleeding.  ENDOCRINE:  No history of diabetes or thyroid problems.  NEUROLOGY:  No history of seizures or headaches. No motor or sensory changes.  BREAST:  Denies breast pain or changes.  Past Medical History:   Diagnosis Date     Atherosclerotic heart disease of native coronary artery without angina pectoris     -s/p ADÁN to the mid-LAD, mid-circumflex, mid-right coronary artery, proximal right coronary  artery, and PTCA of a marginal branch of the right coronary artery 2007. -s/p ADÁN to proximal left anterior descending 10/29/13.     Contusion of abdominal wall          Essential (primary) hypertension     2006     Gout     No Comments Provided     Hyperlipidemia     2007     Localized swelling, mass, or lump of upper extremity     2017     Other specified counseling     10/28/2013,Patient has identified Health Care Agent(s): Yes Add Health Care Agents: Yes   Health Care Agent(s): Primary Health Care Agent: Jay Batista  Relationship:  Phone:   Secondary Health Care Agent:  Relationship: Daughter Phone:   Conservator:  Relationship:  Phone:   Guardian: Relationship:  Phone:    Patient has Advance Care Plan Documents (Health Care Directive, POLST): No, refe*     Polyosteoarthritis     No Comments Provided     Postmenopausal bleeding     No Comments Provided     Presence of coronary angioplasty implant and graft     ,unstable angina; severe prox LAD stenosis; s/p 7 stents     Primary osteoarthritis of left hand     2017     Rheumatoid arthritis (H)     follows at [facility] yearly     Past Surgical History:   Procedure Laterality Date     ARTHROPLASTY KNEE      2011     ARTHROSCOPY KNEE           BIOPSY BREAST      10/25/95,BRIAN RAMIREZ      SECTION      , Section     COLONOSCOPY      05,Normal - Repeat in 10 years     COLONOSCOPY   04/14/2016 04/14/2016     COLONOSCOPY N/A 1/9/2019    Procedure: COLONOSCOPY;  Surgeon: Evelin Thompson MD;  Location:  OR     COLONOSCOPY N/A 1/9/2019    Procedure: COMBINED COLONOSCOPY, SINGLE OR MULTIPLE BIOPSY/POLYPECTOMY BY BIOPSY;  Surgeon: Evelin Thompson MD;  Location:  OR     ESOPHAGOSCOPY, GASTROSCOPY, DUODENOSCOPY (EGD), COMBINED      5/2/2011,erosive gastritis;bx;consider MRI/A;[facility]     EXTRACTION(S) DENTAL      1970     HEART CATH, ANGIOPLASTY      10/29/2013,ADÁN to proximal left anterior descending     OTHER SURGICAL HISTORY      2007/2013,734254,OTHER     OTHER SURGICAL HISTORY      01/2014,OSN035,TOTAL SHOULDER ARTHROPLASTY,Right     OTHER SURGICAL HISTORY      04/14/2016,11385.0,UT COLONOSCOPY REMOVE ELIZA POLYP LESN SNARE,repeat 2019, precancerous polyps     STRESS LEXISCAN TEST  08/2018    With Dr. Irwin - normal     Current Outpatient Medications   Medication     allopurinol (ZYLOPRIM) 300 MG tablet     aspirin EC 81 MG EC tablet     atenolol (TENORMIN) 100 MG tablet     atorvastatin (LIPITOR) 40 MG tablet     cetirizine (ZYRTEC) 10 MG tablet     Cholecalciferol (VITAMIN D3) 2000 UNITS CAPS     folic acid (FOLVITE) 1 MG tablet     hydrochlorothiazide (HYDRODIURIL) 25 MG tablet     hydrocortisone 2.5 % cream     hydroxychloroquine (PLAQUENIL) 200 MG tablet     ketoconazole (NIZORAL) 2 % external cream     methotrexate 2.5 MG tablet     nitroGLYcerin (NITROSTAT) 0.4 MG sublingual tablet     No current facility-administered medications for this visit.      Allergies   Allergen Reactions     Food      Macadamia nuts make mouth itch     Losartan      Allergic rxn with hives and angioedema suspected to be from alternative manufacture of the effient or the losartan     Enalapril Cough     Family History   Problem Relation Age of Onset     Heart Disease Mother         Heart Disease     Hypertension Mother         Hypertension     Other - See Comments Mother         Stroke     Breast Cancer Mother 53         Cancer-breast     Arthritis Father         Arthritis     Cancer Father         Cancer     Heart Disease Father         Heart Disease     Hypertension Father         Hypertension     Family History Negative Brother         Good Health     Heart Disease Brother         Heart Disease     Family History Negative Sister         Good Health     Social History     Socioeconomic History     Marital status:      Spouse name: rashaad     Number of children: None     Years of education: None     Highest education level: None   Occupational History     None   Social Needs     Financial resource strain: None     Food insecurity     Worry: None     Inability: None     Transportation needs     Medical: None     Non-medical: None   Tobacco Use     Smoking status: Never Smoker     Smokeless tobacco: Never Used   Substance and Sexual Activity     Alcohol use: Not Currently     Alcohol/week: 0.0 standard drinks     Frequency: Monthly or less     Comment: Alcoholic Drinks/day: Rare use. ~2 glasses of wine a month     Drug use: No     Sexual activity: Yes     Partners: Male     Birth control/protection: Post-menopausal   Lifestyle     Physical activity     Days per week: None     Minutes per session: None     Stress: None   Relationships     Social connections     Talks on phone: None     Gets together: None     Attends Confucianism service: None     Active member of club or organization: None     Attends meetings of clubs or organizations: None     Relationship status: None     Intimate partner violence     Fear of current or ex partner: None     Emotionally abused: None     Physically abused: None     Forced sexual activity: None   Other Topics Concern     None   Social History Narrative    . Moved to [place], [place] Summer of 2013 from [place], [place]. Has a daughter (lives in [place]) and son (who lives in [place]). 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 Never smoked.  "Rare alcohol use.     The above history was reviewed today, 6/2/2020  PHYSICAL EXAM  /88 (BP Location: Right arm, Patient Position: Sitting, Cuff Size: Adult Regular)   Pulse 76   Temp 97.6  F (36.4  C) (Tympanic)   Resp 16   Ht 1.676 m (5' 6\")   Wt 95.3 kg (210 lb)   LMP  (LMP Unknown)   BMI 33.89 kg/m    GENERAL: Healthy appearing patient in no acute distress. Pleasant and cooperative with exam and interview.   HEENT:Head-normocephalic. Eyes-no scleral icterus. Nose-no nasal drainage. No lesions. Mouth-oral mucosa pink and moist, no lesions.  NECK: Supple. No thyroid nodules. Trachea midline.  LYMPHATICS:  No cervical, axillary or supraclavicular adenopathy.  CV: Regular rate and rhythm, no murmurs. No peripheral edema.  LUNGS:  No respiratory distress. Clear bilaterally to auscultation.  ABDOMEN: Non distended. Bowel sounds active. Soft, non-tender, no hepatosplenomegaly or hernias. No peritoneal signs.  SKIN: Pink, warm and dry. No jaundice. No rash.  NEURO:  Cranial nerves II-XII grossly intact. Alert and oriented.  PSYCH: Appropriate mood and affect.  BREAST: Breasts were examined in the seated and supine position. No mass noted bilaterally. No nipple changes or discharge bilaterally. Post biopsy changes noted right breast. Resolving ecchymosis with no sign of infection. Appropriate tenderness noted.    IMAGING/LAB  I personally reviewed patient's recent mammogram, US and biopsy images and reports and pathology report.    "

## 2020-06-02 NOTE — NURSING NOTE
"Chief Complaint   Patient presents with     Consult     right breast       Initial /88 (BP Location: Right arm, Patient Position: Sitting, Cuff Size: Adult Regular)   Pulse 76   Temp 97.6  F (36.4  C) (Tympanic)   Resp 16   Ht 1.676 m (5' 6\")   Wt 95.3 kg (210 lb)   LMP  (LMP Unknown)   BMI 33.89 kg/m   Estimated body mass index is 33.89 kg/m  as calculated from the following:    Height as of this encounter: 1.676 m (5' 6\").    Weight as of this encounter: 95.3 kg (210 lb).  Medication Reconciliation: complete    At what age did you start menopause? Mid 40's  How many children do you have? 2  What age did your menstrual cycle start? 13  How old were you when your first child was born? 26  Did you breast feed? yes  Are you on or have you ever taken any hormone replacement or birth control? Birth control  Do you have a family history of breast cancer? Yes, mother  Rae Villavicencio LPN..........6/2/2020  8:40 AM      "

## 2020-07-30 DIAGNOSIS — R73.01 FASTING HYPERGLYCEMIA: ICD-10-CM

## 2020-07-30 DIAGNOSIS — M06.9 RA (RHEUMATOID ARTHRITIS) (H): ICD-10-CM

## 2020-07-30 LAB
AST SERPL W P-5'-P-CCNC: 28 U/L (ref 13–39)
BASOPHILS # BLD AUTO: 0.1 10E9/L (ref 0–0.2)
BASOPHILS NFR BLD AUTO: 0.5 %
CREAT SERPL-MCNC: 1.12 MG/DL (ref 0.6–1.2)
DIFFERENTIAL METHOD BLD: ABNORMAL
EOSINOPHIL # BLD AUTO: 0.4 10E9/L (ref 0–0.7)
EOSINOPHIL NFR BLD AUTO: 4.7 %
ERYTHROCYTE [DISTWIDTH] IN BLOOD BY AUTOMATED COUNT: 15.2 % (ref 10–15)
GFR SERPL CREATININE-BSD FRML MDRD: 48 ML/MIN/{1.73_M2}
HCT VFR BLD AUTO: 46.1 % (ref 35–47)
HGB BLD-MCNC: 14.8 G/DL (ref 11.7–15.7)
IMM GRANULOCYTES # BLD: 0.1 10E9/L (ref 0–0.4)
IMM GRANULOCYTES NFR BLD: 0.5 %
LYMPHOCYTES # BLD AUTO: 2.5 10E9/L (ref 0.8–5.3)
LYMPHOCYTES NFR BLD AUTO: 27.2 %
MCH RBC QN AUTO: 31.1 PG (ref 26.5–33)
MCHC RBC AUTO-ENTMCNC: 32.1 G/DL (ref 31.5–36.5)
MCV RBC AUTO: 97 FL (ref 78–100)
MONOCYTES # BLD AUTO: 0.3 10E9/L (ref 0–1.3)
MONOCYTES NFR BLD AUTO: 3.4 %
NEUTROPHILS # BLD AUTO: 5.9 10E9/L (ref 1.6–8.3)
NEUTROPHILS NFR BLD AUTO: 63.7 %
PLATELET # BLD AUTO: 227 10E9/L (ref 150–450)
RBC # BLD AUTO: 4.76 10E12/L (ref 3.8–5.2)
WBC # BLD AUTO: 9.3 10E9/L (ref 4–11)

## 2020-07-30 PROCEDURE — 36415 COLL VENOUS BLD VENIPUNCTURE: CPT | Mod: ZL

## 2020-07-30 PROCEDURE — 85025 COMPLETE CBC W/AUTO DIFF WBC: CPT | Mod: ZL

## 2020-07-30 PROCEDURE — 84450 TRANSFERASE (AST) (SGOT): CPT | Mod: ZL

## 2020-07-30 PROCEDURE — 82565 ASSAY OF CREATININE: CPT | Mod: ZL

## 2020-09-06 DIAGNOSIS — E78.5 HYPERLIPIDEMIA, UNSPECIFIED HYPERLIPIDEMIA TYPE: ICD-10-CM

## 2020-09-09 RX ORDER — ATORVASTATIN CALCIUM 40 MG/1
TABLET, FILM COATED ORAL
Qty: 90 TABLET | Refills: 3 | Status: SHIPPED | OUTPATIENT
Start: 2020-09-09 | End: 2021-07-22

## 2020-09-09 NOTE — TELEPHONE ENCOUNTER
"Requested Prescriptions   Pending Prescriptions Disp Refills     atorvastatin (LIPITOR) 40 MG tablet [Pharmacy Med Name: ATORVASTATIN TABS 40MG] 90 tablet 3     Sig: TAKE 1 TABLET DAILY       Statins Protocol Failed - 9/6/2020 11:37 PM        Failed - LDL on file in past 12 months     Recent Labs   Lab Test 01/15/19  0940   LDL 65             Passed - No abnormal creatine kinase in past 12 months     Recent Labs   Lab Test 01/15/19  0940   CKT 97                Passed - Recent (12 mo) or future (30 days) visit within the authorizing provider's specialty     Patient has had an office visit with the authorizing provider or a provider within the authorizing providers department within the previous 12 mos or has a future within next 30 days. See \"Patient Info\" tab in inbasket, or \"Choose Columns\" in Meds & Orders section of the refill encounter.              Passed - Medication is active on med list        Passed - Patient is age 18 or older        Passed - No active pregnancy on record        Passed - No positive pregnancy test in past 12 months               Last Written Prescription Date:  10/08/2019  Last Fill Quantity: 90,   # refills: 3  Last Office Visit: 03/30/2020 Swetha DENNIS  Future Office visit:    Next 5 appointments (look out 90 days)    Sep 16, 2020  8:40 AM CDT  MyChart Physical Adult with Swetha Maxwell NP  [facility] [address]  [facility] [id number] [phone]      Unable to complete prescription refill per RN medication refill policy. Will route to provider for review and consideration.  Ryele Addison RN on 9/9/2020 at 11:26 AM               "

## 2020-09-16 ENCOUNTER — OFFICE VISIT (OUTPATIENT)
Dept: INTERNAL MEDICINE | Facility: OTHER | Age: 75
End: 2020-09-16
Attending: NURSE PRACTITIONER
Payer: COMMERCIAL

## 2020-09-16 VITALS
HEART RATE: 72 BPM | DIASTOLIC BLOOD PRESSURE: 78 MMHG | BODY MASS INDEX: 34.46 KG/M2 | TEMPERATURE: 97.8 F | SYSTOLIC BLOOD PRESSURE: 138 MMHG | RESPIRATION RATE: 16 BRPM | OXYGEN SATURATION: 98 % | WEIGHT: 213.5 LBS

## 2020-09-16 DIAGNOSIS — Z23 NEED FOR INFLUENZA VACCINATION: ICD-10-CM

## 2020-09-16 DIAGNOSIS — M05.79 RHEUMATOID ARTHRITIS INVOLVING MULTIPLE SITES WITH POSITIVE RHEUMATOID FACTOR (H): ICD-10-CM

## 2020-09-16 DIAGNOSIS — M10.9 GOUT, UNSPECIFIED CAUSE, UNSPECIFIED CHRONICITY, UNSPECIFIED SITE: ICD-10-CM

## 2020-09-16 DIAGNOSIS — E53.8 VITAMIN B 12 DEFICIENCY: ICD-10-CM

## 2020-09-16 DIAGNOSIS — G62.9 NEUROPATHY: ICD-10-CM

## 2020-09-16 DIAGNOSIS — R73.01 FASTING HYPERGLYCEMIA: ICD-10-CM

## 2020-09-16 DIAGNOSIS — I25.10 ASCVD (ARTERIOSCLEROTIC CARDIOVASCULAR DISEASE): Primary | ICD-10-CM

## 2020-09-16 DIAGNOSIS — M06.4 INFLAMMATORY POLYARTHROPATHY (H): Primary | ICD-10-CM

## 2020-09-16 DIAGNOSIS — E78.5 HYPERLIPIDEMIA, UNSPECIFIED HYPERLIPIDEMIA TYPE: ICD-10-CM

## 2020-09-16 DIAGNOSIS — M85.80 OSTEOPENIA, UNSPECIFIED LOCATION: ICD-10-CM

## 2020-09-16 DIAGNOSIS — Z12.31 VISIT FOR SCREENING MAMMOGRAM: ICD-10-CM

## 2020-09-16 DIAGNOSIS — L98.9 SKIN LESION: ICD-10-CM

## 2020-09-16 DIAGNOSIS — I10 ESSENTIAL HYPERTENSION: ICD-10-CM

## 2020-09-16 LAB
ANION GAP SERPL CALCULATED.3IONS-SCNC: 6 MMOL/L (ref 3–14)
BUN SERPL-MCNC: 18 MG/DL (ref 7–25)
CALCIUM SERPL-MCNC: 9.3 MG/DL (ref 8.6–10.3)
CHLORIDE SERPL-SCNC: 102 MMOL/L (ref 98–107)
CHOLEST SERPL-MCNC: 122 MG/DL
CO2 SERPL-SCNC: 30 MMOL/L (ref 21–31)
CREAT SERPL-MCNC: 1.1 MG/DL (ref 0.6–1.2)
GFR SERPL CREATININE-BSD FRML MDRD: 49 ML/MIN/{1.73_M2}
GLUCOSE SERPL-MCNC: 109 MG/DL (ref 70–105)
HBA1C MFR BLD: 5.4 % (ref 4–6)
HDLC SERPL-MCNC: 44 MG/DL (ref 23–92)
LDLC SERPL CALC-MCNC: 55 MG/DL
NONHDLC SERPL-MCNC: 78 MG/DL
POTASSIUM SERPL-SCNC: 3.8 MMOL/L (ref 3.5–5.1)
SODIUM SERPL-SCNC: 138 MMOL/L (ref 134–144)
TRIGL SERPL-MCNC: 114 MG/DL
TSH SERPL DL<=0.05 MIU/L-ACNC: 0.7 IU/ML (ref 0.34–5.6)
VIT B12 SERPL-MCNC: 446 PG/ML (ref 180–914)

## 2020-09-16 PROCEDURE — G0463 HOSPITAL OUTPT CLINIC VISIT: HCPCS | Mod: 25

## 2020-09-16 PROCEDURE — 80061 LIPID PANEL: CPT | Mod: ZL | Performed by: NURSE PRACTITIONER

## 2020-09-16 PROCEDURE — 82607 VITAMIN B-12: CPT | Mod: ZL | Performed by: NURSE PRACTITIONER

## 2020-09-16 PROCEDURE — G0463 HOSPITAL OUTPT CLINIC VISIT: HCPCS

## 2020-09-16 PROCEDURE — 83036 HEMOGLOBIN GLYCOSYLATED A1C: CPT | Mod: ZL | Performed by: NURSE PRACTITIONER

## 2020-09-16 PROCEDURE — 36415 COLL VENOUS BLD VENIPUNCTURE: CPT | Mod: ZL | Performed by: NURSE PRACTITIONER

## 2020-09-16 PROCEDURE — 99215 OFFICE O/P EST HI 40 MIN: CPT | Performed by: NURSE PRACTITIONER

## 2020-09-16 PROCEDURE — 80048 BASIC METABOLIC PNL TOTAL CA: CPT | Mod: ZL | Performed by: NURSE PRACTITIONER

## 2020-09-16 PROCEDURE — G0008 ADMIN INFLUENZA VIRUS VAC: HCPCS

## 2020-09-16 PROCEDURE — 84443 ASSAY THYROID STIM HORMONE: CPT | Mod: ZL | Performed by: NURSE PRACTITIONER

## 2020-09-16 PROCEDURE — 90662 IIV NO PRSV INCREASED AG IM: CPT

## 2020-09-16 RX ORDER — HYDROCHLOROTHIAZIDE 25 MG/1
25 TABLET ORAL DAILY
Qty: 90 TABLET | Refills: 3 | Status: SHIPPED | OUTPATIENT
Start: 2020-09-16 | End: 2020-11-24

## 2020-09-16 RX ORDER — FOLIC ACID 1 MG/1
1 TABLET ORAL DAILY
Qty: 90 TABLET | Refills: 3 | Status: SHIPPED | OUTPATIENT
Start: 2020-09-16 | End: 2020-11-30

## 2020-09-16 RX ORDER — NITROGLYCERIN 0.4 MG/1
0.4 TABLET SUBLINGUAL EVERY 5 MIN PRN
Qty: 30 TABLET | Refills: 3 | Status: SHIPPED | OUTPATIENT
Start: 2020-09-16 | End: 2023-08-15

## 2020-09-16 RX ORDER — ATENOLOL 100 MG/1
100 TABLET ORAL DAILY
Qty: 90 TABLET | Refills: 3 | Status: SHIPPED | OUTPATIENT
Start: 2020-09-16 | End: 2020-11-24

## 2020-09-16 RX ORDER — ALLOPURINOL 300 MG/1
1 TABLET ORAL DAILY
Qty: 90 TABLET | Refills: 3 | Status: SHIPPED | OUTPATIENT
Start: 2020-09-16 | End: 2020-11-24

## 2020-09-16 ASSESSMENT — ENCOUNTER SYMPTOMS
COUGH: 0
HEADACHES: 0
ARTHRALGIAS: 0
DIZZINESS: 0
CHILLS: 0
SORE THROAT: 0
VOMITING: 0
PARESTHESIAS: 0
HEMATOCHEZIA: 0
DIARRHEA: 0
NAUSEA: 0
WOUND: 0
CONFUSION: 0
EYE REDNESS: 0
DYSURIA: 0
ABDOMINAL PAIN: 0
FEVER: 0
MYALGIAS: 0
FREQUENCY: 0
NERVOUS/ANXIOUS: 0
CONSTIPATION: 0
PALPITATIONS: 0
HEARTBURN: 0
EYE DISCHARGE: 0
POLYDIPSIA: 0
DYSPHORIC MOOD: 0
SHORTNESS OF BREATH: 0
NUMBNESS: 0
POLYPHAGIA: 0
ADENOPATHY: 0
UNEXPECTED WEIGHT CHANGE: 0
WHEEZING: 0

## 2020-09-16 ASSESSMENT — ACTIVITIES OF DAILY LIVING (ADL)
BATHING: 0-->INDEPENDENT
TRANSFERRING: 0-->INDEPENDENT
AMBULATION: 0-->INDEPENDENT
SWALLOWING: 0-->SWALLOWS FOODS/LIQUIDS WITHOUT DIFFICULTY
RETIRED_COMMUNICATION: 0-->UNDERSTANDS/COMMUNICATES WITHOUT DIFFICULTY
RETIRED_EATING: 0-->INDEPENDENT
FALL_HISTORY_WITHIN_LAST_SIX_MONTHS: NO
TOILETING: 0-->INDEPENDENT
DRESS: 0-->INDEPENDENT
COGNITION: 0 - NO COGNITION ISSUES REPORTED

## 2020-09-16 ASSESSMENT — PAIN SCALES - GENERAL: PAINLEVEL: MILD PAIN (3)

## 2020-09-16 NOTE — PROGRESS NOTES
Immunization Documentation    Prior to Immunization administration, verified patients identity using patient's name and date of birth. Please see IMMUNIZATIONS  and order for additional information.  Patient / Parent instructed to remain in clinic for 15 minutes and report any adverse reaction to staff immediately.    Was entire vial of medication used? Yes  Vial/Syringe: Syringe    Dawna Boyle LPN  9/16/2020   9:39 AM

## 2020-09-16 NOTE — NURSING NOTE
Chief Complaint   Patient presents with     Medication Therapy Management       Medication Reconciliation complete.   Dawna Boyle LPN  ..................9/16/2020   8:45 AM

## 2020-09-16 NOTE — PROGRESS NOTES
Subjective:  She is here today for chronic disease management.  She has history of ASCVD, hyperlipidemia and hypertension and is followed by [facility].  She is in the process of getting an appointment scheduled with them.  Also was followed by rheumatologist for her rheumatoid arthritis and gout.  She has history of osteopenia.  Last bone scan was 2016.  She is on calcium and vitamin D supplementation.  She does have history of vitamin B12 deficiency and would like to have that checked.  She has been more tired and is noticed some burning shooting pain at nighttime in her feet.  It does not bother her during the day.  She does take folic acid with her methotrexate.  She had a breast biopsy last spring.  She is due for follow-up mammogram again in December.  Biopsy was negative.  She is in need of influenza vaccine.  She also needs to get her second step Shingrix vaccine.  First step was given at [facility].  She is not sure but she believes that it was covered by her insurance.  She is up-to-date on colonoscopy.    Patient Active Problem List   Diagnosis     Allergic rhinitis     ASCVD (arteriosclerotic cardiovascular disease)     DJD (degenerative joint disease) of knee     Gout     Hyperlipidemia     Hypertension     Mass of finger     Osteopenia     Primary osteoarthritis of both hands     Rheumatoid arthritis (H)     Avascular necrosis of humeral head, right (H)     Right shoulder pain     Rotator cuff tendonitis, right     S/P shoulder surgery     Shoulder impingement, right     Status post total shoulder replacement, right     Past Medical History:   Diagnosis Date     Atherosclerotic heart disease of native coronary artery without angina pectoris     -s/p ADÁN to the mid-LAD, mid-circumflex, mid-right coronary artery, proximal right coronary  artery, and PTCA of a marginal branch of the right coronary artery 04/30/2007. -s/p ADÁN to proximal left anterior descending 10/29/13.     Contusion of abdominal  scar          Essential (primary) hypertension     2006     Gout     No Comments Provided     Hyperlipidemia     2007     Localized swelling, mass, or lump of upper extremity     2017     Other specified counseling     10/28/2013,Patient has identified Health Care Agent(s): Yes Add Health Care Agents: Yes   Health Care Agent(s): Primary Health Care Agent: Jay Batista  Relationship:  Phone:   Secondary Health Care Agent:  Relationship: Daughter Phone:   Conservator:  Relationship:  Phone:   Guardian: Relationship:  Phone:    Patient has Advance Care Plan Documents (Health Care Directive, POLST): No, refe*     Polyosteoarthritis     No Comments Provided     Postmenopausal bleeding     No Comments Provided     Presence of coronary angioplasty implant and graft     ,unstable angina; severe prox LAD stenosis; s/p 7 stents     Primary osteoarthritis of left hand     2017     Rheumatoid arthritis (H)     follows at [facility]     Past Surgical History:   Procedure Laterality Date     ARTHROPLASTY KNEE      2011     ARTHROSCOPY KNEE           BIOPSY BREAST      10/25/95,BRIAN RAMIREZ      SECTION      1974, Section     COLONOSCOPY      05,Normal - Repeat in 10 years     COLONOSCOPY  2016     COLONOSCOPY N/A 2019    Procedure: COLONOSCOPY;  Surgeon: Evelin Thompson MD;  Location:  OR     COLONOSCOPY N/A 2019    Procedure: COMBINED COLONOSCOPY, SINGLE OR MULTIPLE BIOPSY/POLYPECTOMY BY BIOPSY;  Surgeon: Evelin Thompson MD;  Location:  OR     ESOPHAGOSCOPY, GASTROSCOPY, DUODENOSCOPY (EGD), COMBINED      2011,erosive gastritis;bx;consider MRI/A;[facility]     EXTRACTION(S) DENTAL      1970     HEART CATH, ANGIOPLASTY      10/29/2013,ADÁN to proximal left anterior descending     OTHER SURGICAL HISTORY      ,[id number],OTHER     OTHER SURGICAL HISTORY      2014,[id number],TOTAL SHOULDER ARTHROPLASTY,Right     OTHER  SURGICAL HISTORY      04/14/2016,49782.0,[facility] COLONOSCOPY REMOVE ELIZA POLYP LESN SNARE,repeat 2019, precancerous polyps     STRESS LEXISCAN TEST  08/2018    With Dr. Irwin - normal     Social History     Socioeconomic History     Marital status:      Spouse name: rashaad     Number of children: Not on file     Years of education: Not on file     Highest education level: Not on file   Occupational History     Not on file   Social Needs     Financial resource strain: Not on file     Food insecurity     Worry: Not on file     Inability: Not on file     Transportation needs     Medical: Not on file     Non-medical: Not on file   Tobacco Use     Smoking status: Never Smoker     Smokeless tobacco: Never Used     Tobacco comment: no ecig   Substance and Sexual Activity     Alcohol use: Not Currently     Alcohol/week: 0.0 standard drinks     Frequency: Monthly or less     Comment: Alcoholic Drinks/day: Rare use. ~2 glasses of wine a month     Drug use: No     Sexual activity: Yes     Partners: Male     Birth control/protection: Post-menopausal   Lifestyle     Physical activity     Days per week: Not on file     Minutes per session: Not on file     Stress: Not on file   Relationships     Social connections     Talks on phone: Not on file     Gets together: Not on file     Attends Ep[employer] service: Not on file     Active member of club or organization: Not on file     Attends meetings of clubs or organizations: Not on file     Relationship status: Not on file     Intimate partner violence     Fear of current or ex partner: Not on file     Emotionally abused: Not on file     Physically abused: Not on file     Forced sexual activity: Not on file   Other Topics Concern     Not on file   Social History Narrative    . Moved to [place], [place] Summer of 2013 from [place], [place]. Has a daughter (lives in [place]) and son (who lives in [place]). 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 Never smoked. Rare alcohol use.     Family History   Problem Relation Age of Onset     Heart Disease Mother         Heart Disease     Hypertension Mother         Hypertension     Other - See Comments Mother         Stroke     Breast Cancer Mother 53        Cancer-breast     Arthritis Father         Arthritis     Cancer Father         Cancer     Heart Disease Father         Heart Disease     Hypertension Father         Hypertension     Family History Negative Brother         Good Health     Heart Disease Brother         Heart Disease     Family History Negative Sister         Good Health     Current Outpatient Medications   Medication Sig Dispense Refill     allopurinol (ZYLOPRIM) 300 MG tablet Take 1 tablet (300 mg) by mouth daily 90 tablet 3     atenolol (TENORMIN) 100 MG tablet Take 1 tablet (100 mg) by mouth daily 90 tablet 3     folic acid (FOLVITE) 1 MG tablet Take 1 tablet (1 mg) by mouth daily 90 tablet 3     hydrochlorothiazide (HYDRODIURIL) 25 MG tablet Take 1 tablet (25 mg) by mouth daily 90 tablet 3     nitroGLYcerin (NITROSTAT) 0.4 MG sublingual tablet Place 1 tablet (0.4 mg) under the tongue every 5 minutes as needed for chest pain (For chest pain x 3 doses) 30 tablet 3     aspirin EC 81 MG EC tablet Take 1 tablet by mouth daily       atorvastatin (LIPITOR) 40 MG tablet TAKE 1 TABLET DAILY 90 tablet 3     cetirizine (ZYRTEC) 10 MG tablet Take 1 tablet (10 mg) by mouth daily       Cholecalciferol (VITAMIN D3) 2000 UNITS CAPS Take 2,000 Units by mouth daily       hydrocortisone 2.5 % cream MIX 1 1 WITH KETOCONAZOLE AND APPLY TO THE AFFECTED AREA UNDER BREAST TWICE A DAY UNTIL RASH RESOLVES       hydroxychloroquine (PLAQUENIL) 200 MG tablet 400 mg daily alternating with 200 mg daily (Patient taking differently: Take 200 mg by mouth daily Every other day. One day 200mg next day 400mg)       ketoconazole (NIZORAL) 2 % external cream MIX 1 1 WITH HYDROCORTISONE AND APPLY TO THE AFFECTED AREA UNDER  BREAST TWICE A DAY UNTIL RASH RESOLVES       methotrexate 2.5 MG tablet Take 20 mg by mouth once a week       Food; Losartan; and Enalapril      Review of Systems:  Review of Systems   Constitutional: Negative for chills, fever and unexpected weight change.   HENT: Negative for congestion, ear pain and sore throat.    Eyes: Negative for discharge and redness.   Respiratory: Negative for cough, shortness of breath and wheezing.    Cardiovascular: Negative for chest pain, palpitations and peripheral edema.   Gastrointestinal: Negative for abdominal pain, constipation, diarrhea, heartburn, hematochezia, nausea and vomiting.   Endocrine: Negative for cold intolerance, heat intolerance, polydipsia, polyphagia and polyuria.   Breasts:  negative.    Genitourinary: Negative for dysuria, frequency and vaginal bleeding.   Musculoskeletal: Negative for arthralgias and myalgias.   Skin: Negative for pallor, rash and wound.        Dark brown skin lesion right forehead that is getting thicker and larger in diameter.  It does not itch or bleed.   Allergic/Immunologic: Negative for immunocompromised state.   Neurological: Negative for dizziness, numbness, headaches and paresthesias.        Burning pain in her feet at nighttime   Hematological: Negative for adenopathy.   Psychiatric/Behavioral: Negative for confusion and dysphoric mood. The patient is not nervous/anxious.        Objective:   /78 (BP Location: Right arm, Patient Position: Sitting, Cuff Size: Adult Regular)   Pulse 72   Temp 97.8  F (36.6  C) (Tympanic)   Resp 16   Wt 96.8 kg (213 lb 8 oz)   LMP  (LMP Unknown)   SpO2 98%   Breastfeeding No   BMI 34.46 kg/m    Physical Exam  Pleasant female no acute distress.  Affect normal.  Alert and oriented x4.  Sclera nonicteric.  Conjunctiva noninflamed.  TMs clear bilaterally.  Oral mucosa pink and moist.  Neck supple without adenopathy.  No thyromegaly.  No carotid bruits.  Lung fields clear to auscultation  throughout.  Cardiovascular regular rate and rhythm and without murmur or S3.  Abdomen is soft and without masses, tenderness and organomegaly.  No abdominal bruits or pulsatile masses.  No axillary or inguinal adenopathy.  Extremities with trace bilateral edema.  She has many varicose veins in her legs with hemosiderin staining.  DP PT 2+.  Capillary refill less than 3 seconds.  Normal sensation with palpation to feet.  Along the right forehead there is a raised dark brown slightly dry lesion without any surrounding irritation.    Assessment:    ICD-10-CM    1. ASCVD (arteriosclerotic cardiovascular disease)  I25.10 nitroGLYcerin (NITROSTAT) 0.4 MG sublingual tablet     Lipid Profile   2. Hyperlipidemia, unspecified hyperlipidemia type  E78.5 Thyrotropin GH   3. Essential hypertension  I10 hydrochlorothiazide (HYDRODIURIL) 25 MG tablet     atenolol (TENORMIN) 100 MG tablet   4. Gout, unspecified cause, unspecified chronicity, unspecified site  M10.9 allopurinol (ZYLOPRIM) 300 MG tablet   5. Rheumatoid arthritis involving multiple sites with positive rheumatoid factor (H)  M05.79 folic acid (FOLVITE) 1 MG tablet   6. Osteopenia, unspecified location  M85.80    7. Vitamin B 12 deficiency  E53.8 Vitamin B12   8. Need for influenza vaccination  Z23 HC FLU VACCINE, INCREASED ANTIGEN, PRESV FREE   9. Visit for screening mammogram  Z12.31 MA Screening Digital Bilateral   10. Neuropathy  G62.9 Basic metabolic panel   11. Skin lesion  L98.9 GENERAL SURG ADULT REFERRAL       Plan:   She is due for rheumatology labs and brings in orders from her rheumatologist to have those completed.  Those will include CBC with platelet, AST and creatinine.  For labs today from this visit will order fasting lipids, TSH and B12.  She has new pain in her feet at nighttime which could be related to neuropathy.  Medication refills are provided.  Influenza vaccine will be administered.  She is going to check with her insurance company to make  sure that her Shingrix will be covered in clinic and if so we can get her set up with injection nurse.  She could also check with local pharmacy.  She will be referred to surgeon for skin lesion removal.  Also will be set up for 6-month follow-up mammogram in December.  She will also get her appointment set up with her cardiologist and follow-up with rheumatology as recommended.  Her got hold off on bone density scan until next year but she will continue with calcium and vitamin D supplementation at this time.    PEDRO Reese   9/16/2020  9:26 AM

## 2020-09-21 ENCOUNTER — OFFICE VISIT (OUTPATIENT)
Dept: SURGERY | Facility: OTHER | Age: 75
End: 2020-09-21
Attending: NURSE PRACTITIONER
Payer: COMMERCIAL

## 2020-09-21 VITALS
BODY MASS INDEX: 34.38 KG/M2 | SYSTOLIC BLOOD PRESSURE: 130 MMHG | RESPIRATION RATE: 16 BRPM | TEMPERATURE: 98.2 F | WEIGHT: 213 LBS | HEART RATE: 72 BPM | DIASTOLIC BLOOD PRESSURE: 88 MMHG

## 2020-09-21 DIAGNOSIS — L98.9 SKIN LESION: Primary | ICD-10-CM

## 2020-09-21 PROCEDURE — 88305 TISSUE EXAM BY PATHOLOGIST: CPT

## 2020-09-21 PROCEDURE — G0463 HOSPITAL OUTPT CLINIC VISIT: HCPCS | Mod: 25

## 2020-09-21 PROCEDURE — 11441 EXC FACE-MM B9+MARG 0.6-1 CM: CPT | Performed by: SURGERY

## 2020-09-21 ASSESSMENT — PAIN SCALES - GENERAL: PAINLEVEL: NO PAIN (0)

## 2020-09-21 NOTE — PATIENT INSTRUCTIONS
Your incision was closed with stitches that will dissolve.  A glue was used to help keep the incision closed.    It is ok to get the incision wet in the shower on the day after your procedure.     Don't soak in a tub, pool or lake for 1 week .  Call if you have increased pain, redness, drainage or fever.  If you have concerns, please call.

## 2020-09-21 NOTE — NURSING NOTE
"Chief Complaint   Patient presents with     Derm Problem     lesion on right forehead       Initial /88 (BP Location: Left arm, Patient Position: Sitting, Cuff Size: Adult Large)   Pulse 72   Temp 98.2  F (36.8  C) (Tympanic)   Resp 16   Wt 96.6 kg (213 lb)   LMP  (LMP Unknown)   BMI 34.38 kg/m   Estimated body mass index is 34.38 kg/m  as calculated from the following:    Height as of 6/2/20: 1.676 m (5' 6\").    Weight as of this encounter: 96.6 kg (213 lb).  Medication Reconciliation: complete    Alba Salter LPN     TIMEOUT  [place] Protocol    A. Pre-procedure verification complete     1-relevant information / documentation available, reviewed and properly matched to the patient; 2-consent accurate and complete, 3-equipment and supplies available    B. Site marking complete     Site marked if not in continuous attendance with patient    C. TIME OUT completed     Time Out was conducted just prior to starting procedure to verify the eight required elements: 1-patient identity, 2-consent accurate and complete, 3-position, 4-correct side/site marked (if applicable), 5-procedure, 6-relevant images / results properly labeled and displayed (if applicable), 7-antibiotics / irrigation fluids (if applicable), 8-safety precautions.  "

## 2020-09-21 NOTE — PROGRESS NOTES
Procedure Note     Pre/Post Operative Diagnosis:   Right temple skin lesion     Procedure:    Excision of Right temple skin lesion      Surgeon: MELISSA Martines MD     Local Anesthesia: 1% lidocaine with0.25%Marcaine with epinephrine    Indication for the procedure:    This is a 74 year old female patient with Right temple skin lesion. Patient presents with changing, pigmented lesion on the right temple. She states it's been there for years, but her PCP thought it was changing. Patient denies personal history of skin cancer or family history of melanoma.   Clinically this is a 7mm nevus on the right temple. Smooth borders and homogenous in color.    After explaining the risks to include bleeding, infection, recurrence or need for re-excision, and scarring the patient wished to proceed.    Procedure:   The area was prepped and draped in usual sterile fashion with ChloraPrep. After adequate local anesthesia, an elliptical skin incision was made to encompass the lesion, 2.1cm x 0.8 cm with margins. The skin was closed with 4-0 monocryl suture in a running fashion.  Dermabond was applied.     Plan:  The patient will be called with pathology results.  Patient will followup if there any problems with the wound including redness or drainage.      MELISSA Martines MD

## 2020-09-21 NOTE — NURSING NOTE
"Chief Complaint   Patient presents with     Derm Problem     lesion on right forehead       Initial /88 (BP Location: Left arm, Patient Position: Sitting, Cuff Size: Adult Large)   Pulse 72   Temp 98.2  F (36.8  C) (Tympanic)   Resp 16   Wt 96.6 kg (213 lb)   LMP  (LMP Unknown)   BMI 34.38 kg/m   Estimated body mass index is 34.38 kg/m  as calculated from the following:    Height as of 6/2/20: 1.676 m (5' 6\").    Weight as of this encounter: 96.6 kg (213 lb).  Medication Reconciliation: complete    Alba Salter LPN  "

## 2020-10-27 DIAGNOSIS — M06.4 INFLAMMATORY POLYARTHROPATHY (H): ICD-10-CM

## 2020-10-27 LAB
AST SERPL W P-5'-P-CCNC: 18 U/L (ref 13–39)
BASOPHILS # BLD AUTO: 0.1 10E9/L (ref 0–0.2)
BASOPHILS NFR BLD AUTO: 0.6 %
CREAT SERPL-MCNC: 1.11 MG/DL (ref 0.6–1.2)
DIFFERENTIAL METHOD BLD: ABNORMAL
EOSINOPHIL # BLD AUTO: 0.4 10E9/L (ref 0–0.7)
EOSINOPHIL NFR BLD AUTO: 3.8 %
ERYTHROCYTE [DISTWIDTH] IN BLOOD BY AUTOMATED COUNT: 15.6 % (ref 10–15)
GFR SERPL CREATININE-BSD FRML MDRD: 48 ML/MIN/{1.73_M2}
HCT VFR BLD AUTO: 44.3 % (ref 35–47)
HGB BLD-MCNC: 14.2 G/DL (ref 11.7–15.7)
IMM GRANULOCYTES # BLD: 0 10E9/L (ref 0–0.4)
IMM GRANULOCYTES NFR BLD: 0.4 %
LYMPHOCYTES # BLD AUTO: 2.4 10E9/L (ref 0.8–5.3)
LYMPHOCYTES NFR BLD AUTO: 23 %
MCH RBC QN AUTO: 31.1 PG (ref 26.5–33)
MCHC RBC AUTO-ENTMCNC: 32.1 G/DL (ref 31.5–36.5)
MCV RBC AUTO: 97 FL (ref 78–100)
MONOCYTES # BLD AUTO: 0.7 10E9/L (ref 0–1.3)
MONOCYTES NFR BLD AUTO: 6.4 %
NEUTROPHILS # BLD AUTO: 6.7 10E9/L (ref 1.6–8.3)
NEUTROPHILS NFR BLD AUTO: 65.8 %
PLATELET # BLD AUTO: 216 10E9/L (ref 150–450)
RBC # BLD AUTO: 4.56 10E12/L (ref 3.8–5.2)
WBC # BLD AUTO: 10.2 10E9/L (ref 4–11)

## 2020-10-27 PROCEDURE — 85025 COMPLETE CBC W/AUTO DIFF WBC: CPT | Mod: ZL | Performed by: NURSE PRACTITIONER

## 2020-10-27 PROCEDURE — 36415 COLL VENOUS BLD VENIPUNCTURE: CPT | Mod: ZL | Performed by: NURSE PRACTITIONER

## 2020-10-27 PROCEDURE — 84450 TRANSFERASE (AST) (SGOT): CPT | Mod: ZL | Performed by: NURSE PRACTITIONER

## 2020-10-27 PROCEDURE — 82565 ASSAY OF CREATININE: CPT | Mod: ZL | Performed by: NURSE PRACTITIONER

## 2020-11-22 DIAGNOSIS — I10 ESSENTIAL HYPERTENSION: ICD-10-CM

## 2020-11-22 DIAGNOSIS — M10.9 GOUT, UNSPECIFIED CAUSE, UNSPECIFIED CHRONICITY, UNSPECIFIED SITE: ICD-10-CM

## 2020-11-23 ENCOUNTER — HOSPITAL ENCOUNTER (OUTPATIENT)
Dept: ULTRASOUND IMAGING | Facility: OTHER | Age: 75
End: 2020-11-23
Attending: NURSE PRACTITIONER
Payer: COMMERCIAL

## 2020-11-23 ENCOUNTER — HOSPITAL ENCOUNTER (OUTPATIENT)
Dept: MAMMOGRAPHY | Facility: OTHER | Age: 75
End: 2020-11-23
Attending: NURSE PRACTITIONER
Payer: COMMERCIAL

## 2020-11-23 DIAGNOSIS — Z09 FOLLOW-UP EXAM, 3-6 MONTHS SINCE PREVIOUS EXAM: ICD-10-CM

## 2020-11-23 DIAGNOSIS — R92.8 ABNORMAL FINDING ON BREAST IMAGING: ICD-10-CM

## 2020-11-23 PROCEDURE — 77065 DX MAMMO INCL CAD UNI: CPT

## 2020-11-23 PROCEDURE — 76642 ULTRASOUND BREAST LIMITED: CPT | Mod: RT

## 2020-11-24 ENCOUNTER — TELEPHONE (OUTPATIENT)
Dept: INTERNAL MEDICINE | Facility: OTHER | Age: 75
End: 2020-11-24

## 2020-11-24 RX ORDER — ATENOLOL 100 MG/1
TABLET ORAL
Qty: 90 TABLET | Refills: 3 | Status: SHIPPED | OUTPATIENT
Start: 2020-11-24 | End: 2021-10-12

## 2020-11-24 RX ORDER — ALLOPURINOL 300 MG/1
TABLET ORAL
Qty: 90 TABLET | Refills: 3 | Status: SHIPPED | OUTPATIENT
Start: 2020-11-24 | End: 2021-11-16

## 2020-11-24 RX ORDER — HYDROCHLOROTHIAZIDE 25 MG/1
TABLET ORAL
Qty: 90 TABLET | Refills: 3 | Status: SHIPPED | OUTPATIENT
Start: 2020-11-24 | End: 2021-09-21

## 2020-11-24 NOTE — TELEPHONE ENCOUNTER
[facility] mail service sent Rx request for the following:   atenolol (TENORMIN) 100 MG tablet  Sig: Take 1 tablet (100 mg) by mouth daily - Oral    Last Prescription Date:   09/16/2020  Last Fill Qty/Refills:         90, R-3        [facility] mail service sent Rx request for the following:   hydrochlorothiazide (HYDRODIURIL) 25 MG tablet  Sig: Take 1 tablet (25 mg) by mouth daily - Oral    Last Prescription Date:   09/16/2020  Last Fill Qty/Refills:         90, R-3        [facility] mail service sent Rx request for the following:   allopurinol (ZYLOPRIM) 300 MG tablet  Sig: Take 1 tablet (300 mg) by mouth daily - Oral    Last Prescription Date:   09/16/2020  Last Fill Qty/Refills:         90, R-3        Rx's originally sent to [id number] on 09/16/2020 for 1 year supply. Pt requesting RX [facility]. Will do new RX.    Nazanin Interiano RN  ....................  11/24/2020   10:20 AM

## 2020-11-24 NOTE — TELEPHONE ENCOUNTER
Patient education performed regarding what to expect with ultrasound breast biopsy.  Patient verbalizes understanding.  Biopsy appointment scheduled for 11/30 at 0945.  Patient reports  use of blood thinners- aspirin.  Suma Hernandez contacted, ok for patient to be off of aspirin for 5 days prior to the procedure.  Patient agreeable with plan.  Patient reports no allergy to lidocaine or epinephrine.  Follow up (results) appointment scheduled for 12/3 at 0850 with Dr Thompson.  Kayleigh Gee RN.

## 2020-11-29 DIAGNOSIS — M05.79 RHEUMATOID ARTHRITIS INVOLVING MULTIPLE SITES WITH POSITIVE RHEUMATOID FACTOR (H): ICD-10-CM

## 2020-11-30 ENCOUNTER — HOSPITAL ENCOUNTER (OUTPATIENT)
Dept: ULTRASOUND IMAGING | Facility: OTHER | Age: 75
End: 2020-11-30
Attending: NURSE PRACTITIONER
Payer: COMMERCIAL

## 2020-11-30 ENCOUNTER — HOSPITAL ENCOUNTER (OUTPATIENT)
Dept: MAMMOGRAPHY | Facility: OTHER | Age: 75
End: 2020-11-30
Attending: NURSE PRACTITIONER
Payer: COMMERCIAL

## 2020-11-30 VITALS — SYSTOLIC BLOOD PRESSURE: 115 MMHG | RESPIRATION RATE: 16 BRPM | HEART RATE: 80 BPM | DIASTOLIC BLOOD PRESSURE: 70 MMHG

## 2020-11-30 DIAGNOSIS — R92.8 ABNORMAL FINDING ON BREAST IMAGING: ICD-10-CM

## 2020-11-30 DIAGNOSIS — N63.10 BREAST MASS, RIGHT: ICD-10-CM

## 2020-11-30 PROCEDURE — 19083 BX BREAST 1ST LESION US IMAG: CPT | Mod: RT

## 2020-11-30 PROCEDURE — 88305 TISSUE EXAM BY PATHOLOGIST: CPT

## 2020-11-30 PROCEDURE — 250N000009 HC RX 250: Performed by: RADIOLOGY

## 2020-11-30 PROCEDURE — 999N000065 MA POST PROCEDURE RIGHT

## 2020-11-30 RX ORDER — FOLIC ACID 1 MG/1
TABLET ORAL
Qty: 90 TABLET | Refills: 3 | Status: SHIPPED | OUTPATIENT
Start: 2020-11-30

## 2020-11-30 RX ORDER — LIDOCAINE HYDROCHLORIDE 10 MG/ML
20 INJECTION, SOLUTION EPIDURAL; INFILTRATION; INTRACAUDAL; PERINEURAL ONCE
Status: COMPLETED | OUTPATIENT
Start: 2020-11-30 | End: 2020-11-30

## 2020-11-30 RX ORDER — LIDOCAINE HYDROCHLORIDE AND EPINEPHRINE 10; 10 MG/ML; UG/ML
20 INJECTION, SOLUTION INFILTRATION; PERINEURAL ONCE
Status: COMPLETED | OUTPATIENT
Start: 2020-11-30 | End: 2020-11-30

## 2020-11-30 RX ADMIN — LIDOCAINE HYDROCHLORIDE AND EPINEPHRINE 3 ML: 10; 10 INJECTION, SOLUTION INFILTRATION; PERINEURAL at 10:28

## 2020-11-30 RX ADMIN — LIDOCAINE HYDROCHLORIDE 4 ML: 10 INJECTION, SOLUTION INFILTRATION; PERINEURAL at 10:27

## 2020-11-30 NOTE — DISCHARGE INSTRUCTIONS
"NEEDLE BIOPSY BREAST    Activity: Rest the remainder of the day. You may resume normal activity after the next day. Avoid any vigorous/strenuous physical activity for 24 hours.    Comfort: If you have discomfort or tenderness at the site you may take your usual or recommended pain medication. Do not take aspirin the day of the procedure or for 48 hours following the biopsy.    Diet: You may resume your usual diet.    Care of site: Leave ice pack in place for 4 hours, or until it is no longer cold. The ice pack is reusable and may be refrozen.  Keep your bra and the dressing on for 24 hours. Then you may remove the bandage and shower. If there are steri-strips you may remove them in 3 to 5 days.  You may have some discomfort and a small amount of bruising where the biopsy was performed. This is normal. For several days or even a couple of weeks, you may have tenderness or \"twinges\" and a tiny bump where the needle went into the skin. This can be bothersome, but is not abnormal. You can use warm moist washcloths, as this may help. Do Not Use A Heating Pad.    RETURN TO THE EMERGENCY ROOM FOR:   Shortness of breath   Rapid heart rate   If pain becomes worse    Call Your Doctor For:    A fever over 101 degrees   Increased redness, increased swelling, and/or persistent drainage/discomfort  around the site    Other: At the end of your breast biopsy, a tiny titanium clip will be inserted through the biopsy needle and placed at the biopsy site within your breast. The marker provides a landmark of the biopsy for further mammograms or surgical procedures. This marker is MRI compatible and poses no known health risks.    You will be receiving a letter in the mail from [facility] Mammography Department with your biopsy results.  In 6 months a mammogram will be needed to establish a new baseline and to recheck the area where the biopsy occurred. Our radiology department will call you to schedule an appointment.    For " questions, problems or concerns, contact the Radiology Department at [phone].

## 2020-12-01 ENCOUNTER — TELEPHONE (OUTPATIENT)
Dept: INTERNAL MEDICINE | Facility: OTHER | Age: 75
End: 2020-12-01

## 2020-12-01 NOTE — TELEPHONE ENCOUNTER
Called patient to check on status post ultrasound breast biopsy.  Patient reports pain 0/10.  P Patient reports no new bleeding.  Patient verbalizes understanding of importance of attending results appointment with Dr. Thompson on 12/3 at 1050.  Kayleigh Gee RN.

## 2020-12-03 ENCOUNTER — OFFICE VISIT (OUTPATIENT)
Dept: SURGERY | Facility: OTHER | Age: 75
End: 2020-12-03
Attending: SURGERY
Payer: COMMERCIAL

## 2020-12-03 VITALS
OXYGEN SATURATION: 100 % | RESPIRATION RATE: 16 BRPM | TEMPERATURE: 97.4 F | HEART RATE: 73 BPM | BODY MASS INDEX: 33.25 KG/M2 | WEIGHT: 206 LBS | SYSTOLIC BLOOD PRESSURE: 130 MMHG | DIASTOLIC BLOOD PRESSURE: 76 MMHG

## 2020-12-03 DIAGNOSIS — N60.11 FIBROCYSTIC CHANGE OF BREAST, RIGHT: ICD-10-CM

## 2020-12-03 DIAGNOSIS — R92.8 ABNORMAL MAMMOGRAM OF RIGHT BREAST: Primary | ICD-10-CM

## 2020-12-03 PROCEDURE — G0463 HOSPITAL OUTPT CLINIC VISIT: HCPCS

## 2020-12-03 PROCEDURE — 99213 OFFICE O/P EST LOW 20 MIN: CPT | Performed by: SURGERY

## 2020-12-03 ASSESSMENT — PAIN SCALES - GENERAL: PAINLEVEL: NO PAIN (0)

## 2020-12-03 NOTE — PROGRESS NOTES
OFFICE CONSULTATION NOTE  Patient Name: Margaret Batista  Address: [address] [facility] [address]  Age:74 year old  Sex: female     Primary Care Physician: Swetha Maxwell NP    I was requested to see this patient in consultation by Swetha Maxwell NP for evaluation of right breast microcalcifications and mass. A copy of this note will be sent to Swetha Maxwell NP.    HPI:   The patient is 74 year old female with new nodule noted in the right breast on recent mammogram. This mammogram was follow up to a biopsy done 6 months ago that had benign results. The patient hasn't noted any skin, nipple or breast changes. No previous breast cancer.  Family history of breast cancer-mother. The patient had biopsy performed that showed fibrocystic change with ruptured cysts. No atypia or malignancy noted.      CONSULTATION ASSESSMENT AND PLAN/RECOMMENDATIONS:   I discussed with the patient and her  the pathophysiology of microcalcifications and breast disease. We specifically discussed that most abnormalities seen on mammogram and US are not breast cancer. I explained that fibrocystic change and cysts are not a precancerous condition and that they don't increase future risk for breast cancer. I recommended a 6 month follow up right breast mammogram to follow up breast changes after the recent biopsy-she will be due for annual screening bilaterally at that time. The patient expressed understanding and denies further questions. The patient will call with questions or concerns.     REVIEW OF SYSTEMS  GENERAL: No fevers or chills. Denies fatigue, recent weight loss.  HEENT: No sinus drainage. No changeswith vision or hearing. No difficulty swallowing.   LYMPHATICS:  No swollen nodes in axilla, neck or groin.  CARDIOVASCULAR: Denies chest pain, palpitations and dyspnea on exertion.  PULMONARY: No shortness of breath or cough. No increase in sputum production.  GI: Denies melena, bright  red blood in stools. No hematemesis. No constipation or diarrhea.  : No dysuria or hematuria.  SKIN: No recent rashes or ulcers.   HEMATOLOGY:  No history of easy bruising or bleeding.  ENDOCRINE:  No history of diabetes or thyroid problems.  NEUROLOGY:  No history of seizures or headaches. No motor or sensory changes.  BREAST:  Denies breast pain or changes.  Past Medical History:   Diagnosis Date     Atherosclerotic heart disease of native coronary artery without angina pectoris     -s/p ADÁN to the mid-LAD, mid-circumflex, mid-right coronary artery, proximal right coronary  artery, and PTCA of a marginal branch of the right coronary artery 2007. -s/p ADÁN to proximal left anterior descending 10/29/13.     Contusion of abdominal wall          Essential (primary) hypertension     2006     Gout     No Comments Provided     Hyperlipidemia     2007     Localized swelling, mass, or lump of upper extremity     2017     Other specified counseling     10/28/2013,Patient has identified Health Care Agent(s): Yes Add Health Care Agents: Yes   Health Care Agent(s): Primary Health Care Agent: Jay Batista  Relationship:  Phone:   Secondary Health Care Agent:  Relationship: Daughter Phone:   Conservator:  Relationship:  Phone:   Guardian: Relationship:  Phone:    Patient has Advance Care Plan Documents (Health Care Directive, POLST): No, refe*     Polyosteoarthritis     No Comments Provided     Postmenopausal bleeding     No Comments Provided     Presence of coronary angioplasty implant and graft     ,unstable angina; severe prox LAD stenosis; s/p 7 stents     Primary osteoarthritis of left hand     2017     Rheumatoid arthritis (H)     follows at [facility] yearly     Past Surgical History:   Procedure Laterality Date     ARTHROPLASTY KNEE      2011     ARTHROSCOPY KNEE           BIOPSY BREAST      10/25/95,BRIAN RAMIREZ      SECTION      ,  Section     COLONOSCOPY      11/21/05,Normal - Repeat in 10 years     COLONOSCOPY  04/14/2016 04/14/2016     COLONOSCOPY N/A 1/9/2019    Procedure: COLONOSCOPY;  Surgeon: Evelin Thompson MD;  Location:  OR     COLONOSCOPY N/A 1/9/2019    Procedure: COMBINED COLONOSCOPY, SINGLE OR MULTIPLE BIOPSY/POLYPECTOMY BY BIOPSY;  Surgeon: Evelin Thompson MD;  Location:  OR     ESOPHAGOSCOPY, GASTROSCOPY, DUODENOSCOPY (EGD), COMBINED      5/2/2011,erosive gastritis;bx;consider MRI/A;[facility]     EXTRACTION(S) DENTAL      1970     HEART CATH, ANGIOPLASTY      10/29/2013,ADÁN to proximal left anterior descending     OTHER SURGICAL HISTORY      2007/2013,621556,OTHER     OTHER SURGICAL HISTORY      01/2014,[id number],TOTAL SHOULDER ARTHROPLASTY,Right     OTHER SURGICAL HISTORY      04/14/2016,54525.0,[place] COLONOSCOPY REMOVE ELIZA POLYP LESN SNARE,repeat 2019, precancerous polyps     ZZ STRESS LEXISCAN TEST  08/2018    With Dr. Irwin - normal     Current Outpatient Medications   Medication     allopurinol (ZYLOPRIM) 300 MG tablet     aspirin EC 81 MG EC tablet     atenolol (TENORMIN) 100 MG tablet     atorvastatin (LIPITOR) 40 MG tablet     cetirizine (ZYRTEC) 10 MG tablet     Cholecalciferol (VITAMIN D3) 2000 UNITS CAPS     folic acid (FOLVITE) 1 MG tablet     hydrochlorothiazide (HYDRODIURIL) 25 MG tablet     hydrocortisone 2.5 % cream     hydroxychloroquine (PLAQUENIL) 200 MG tablet     ketoconazole (NIZORAL) 2 % external cream     methotrexate 2.5 MG tablet     nitroGLYcerin (NITROSTAT) 0.4 MG sublingual tablet     No current facility-administered medications for this visit.      Allergies   Allergen Reactions     Food      Macadamia nuts make mouth itch     Losartan      Allergic rxn with hives and angioedema suspected to be from alternative manufacture of the effient or the losartan     Enalapril Cough     Family History   Problem Relation Age of Onset     Heart Disease Mother         Heart Disease     Hypertension Mother          Hypertension     Other - See Comments Mother         Stroke     Breast Cancer Mother 53        Cancer-breast     Arthritis Father         Arthritis     Cancer Father         Cancer     Heart Disease Father         Heart Disease     Hypertension Father         Hypertension     Family History Negative Brother         Good Health     Heart Disease Brother         Heart Disease     Family History Negative Sister         Good Health     Social History     Socioeconomic History     Marital status:      Spouse name: rashaad     Number of children: None     Years of education: None     Highest education level: None   Occupational History     None   Social Needs     Financial resource strain: None     Food insecurity     Worry: None     Inability: None     Transportation needs     Medical: None     Non-medical: None   Tobacco Use     Smoking status: Never Smoker     Smokeless tobacco: Never Used     Tobacco comment: no ecig   Substance and Sexual Activity     Alcohol use: Not Currently     Alcohol/week: 0.0 standard drinks     Frequency: Monthly or less     Comment: Alcoholic Drinks/day: Rare use. ~2 glasses of wine a month     Drug use: No     Sexual activity: Yes     Partners: Male     Birth control/protection: Post-menopausal   Lifestyle     Physical activity     Days per week: None     Minutes per session: None     Stress: None   Relationships     Social connections     Talks on phone: None     Gets together: None     Attends Christianity service: None     Active member of club or organization: None     Attends meetings of clubs or organizations: None     Relationship status: None     Intimate partner violence     Fear of current or ex partner: None     Emotionally abused: None     Physically abused: None     Forced sexual activity: None   Other Topics Concern     None   Social History Narrative    . Moved to [place], [place] Summer of 2013 from [place], [place]. Has a daughter (lives in [place])  and son (who lives in [place]). Patient retired. 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 Never smoked. Rare alcohol use.     The above history was reviewed today, 12/3/2020  PHYSICAL EXAM  /76 (BP Location: Right arm, Patient Position: Sitting, Cuff Size: Adult Large)   Pulse 73   Temp 97.4  F (36.3  C) (Tympanic)   Resp 16   Wt 93.4 kg (206 lb)   LMP  (LMP Unknown)   SpO2 100%   BMI 33.25 kg/m    GENERAL: Healthy appearing patient in no acute distress. Pleasant and cooperative with exam and interview.   HEENT:Head-normocephalic. Eyes-no scleral icterus. Nose-no nasal drainage. No lesions. Mouth-oral mucosa pink and moist, no lesions.  NECK: Supple. No thyroid nodules. Trachea midline.  LYMPHATICS:  No cervical, axillary or supraclavicular adenopathy.  CV: Regular rate and rhythm, no murmurs. No peripheral edema.  LUNGS:  No respiratory distress. Clear bilaterally to auscultation.  ABDOMEN: Non distended. Bowel sounds active. Soft, non-tender, no hepatosplenomegaly or hernias. No peritoneal signs.  SKIN: Pink, warm and dry. No jaundice. No rash.  NEURO:  Cranial nerves II-XII grossly intact. Alert and oriented.  PSYCH: Appropriate mood and affect.  BREAST: Breasts were examined in the seated and supine position. Dense breast tissue bilaterally. No mass noted bilaterally. No nipple changes or discharge bilaterally. Post biopsy changes noted right breast. Resolving ecchymosis with no sign of infection. Appropriate tenderness noted.    IMAGING/LAB  I personally reviewed patient's recent mammogram, US and biopsy images and reports and pathology report.

## 2020-12-03 NOTE — NURSING NOTE
"Chief Complaint   Patient presents with     Consult     right breast mass       Initial /76 (BP Location: Right arm, Patient Position: Sitting, Cuff Size: Adult Large)   Pulse 73   Temp 97.4  F (36.3  C) (Tympanic)   Resp 16   Wt 93.4 kg (206 lb)   LMP  (LMP Unknown)   SpO2 100%   BMI 33.25 kg/m   Estimated body mass index is 33.25 kg/m  as calculated from the following:    Height as of 6/2/20: 1.676 m (5' 6\").    Weight as of this encounter: 93.4 kg (206 lb).  Medication Reconciliation: complete    At what age did you start menopause? 49  How many children do you have? 2  What age did your menstrual cycle start? 13  How old were you when your first child was born? 24  Did you breast feed? yes  Are you on or have you ever taken any hormone replacement or birth control? Birth control  Do you have a family history of breast cancer? mom  Rae Villavicencio LPN..........12/3/2020  8:49 AM      "

## 2021-01-04 ENCOUNTER — TELEPHONE (OUTPATIENT)
Dept: INTERNAL MEDICINE | Facility: OTHER | Age: 76
End: 2021-01-04

## 2021-01-04 NOTE — TELEPHONE ENCOUNTER
After verifying pts name and date of birth with pt, pt currently does not need any medication refills but there insurance changed pharmacies to [facility]. Chart updated at this time.  Bethany Funez LPN

## 2021-01-09 ENCOUNTER — OFFICE VISIT (OUTPATIENT)
Dept: FAMILY MEDICINE | Facility: OTHER | Age: 76
End: 2021-01-09
Attending: NURSE PRACTITIONER
Payer: COMMERCIAL

## 2021-01-09 VITALS
RESPIRATION RATE: 20 BRPM | TEMPERATURE: 101.2 F | HEART RATE: 88 BPM | HEIGHT: 67 IN | WEIGHT: 204.7 LBS | BODY MASS INDEX: 32.13 KG/M2 | OXYGEN SATURATION: 95 % | DIASTOLIC BLOOD PRESSURE: 76 MMHG | SYSTOLIC BLOOD PRESSURE: 136 MMHG

## 2021-01-09 DIAGNOSIS — B34.9 ACUTE VIRAL SYNDROME: ICD-10-CM

## 2021-01-09 DIAGNOSIS — R50.9 FEVER IN ADULT: Primary | ICD-10-CM

## 2021-01-09 LAB
ALBUMIN SERPL-MCNC: 3.8 G/DL (ref 3.5–5.7)
ALP SERPL-CCNC: 72 U/L (ref 34–104)
ALT SERPL W P-5'-P-CCNC: 18 U/L (ref 7–52)
ANION GAP SERPL CALCULATED.3IONS-SCNC: 9 MMOL/L (ref 3–14)
AST SERPL W P-5'-P-CCNC: 22 U/L (ref 13–39)
BASOPHILS # BLD AUTO: 0 10E9/L (ref 0–0.2)
BASOPHILS NFR BLD AUTO: 0.3 %
BILIRUB SERPL-MCNC: 0.6 MG/DL (ref 0.3–1)
BUN SERPL-MCNC: 21 MG/DL (ref 7–25)
CALCIUM SERPL-MCNC: 9.1 MG/DL (ref 8.6–10.3)
CHLORIDE SERPL-SCNC: 97 MMOL/L (ref 98–107)
CO2 SERPL-SCNC: 29 MMOL/L (ref 21–31)
CREAT SERPL-MCNC: 1.07 MG/DL (ref 0.6–1.2)
CRP SERPL-MCNC: 10.7 MG/L
DIFFERENTIAL METHOD BLD: ABNORMAL
EOSINOPHIL # BLD AUTO: 0.1 10E9/L (ref 0–0.7)
EOSINOPHIL NFR BLD AUTO: 1.4 %
ERYTHROCYTE [DISTWIDTH] IN BLOOD BY AUTOMATED COUNT: 15 % (ref 10–15)
GFR SERPL CREATININE-BSD FRML MDRD: 50 ML/MIN/{1.73_M2}
GLUCOSE SERPL-MCNC: 135 MG/DL (ref 70–105)
HCT VFR BLD AUTO: 45.3 % (ref 35–47)
HGB BLD-MCNC: 14.5 G/DL (ref 11.7–15.7)
IMM GRANULOCYTES # BLD: 0 10E9/L (ref 0–0.4)
IMM GRANULOCYTES NFR BLD: 0.6 %
LYMPHOCYTES # BLD AUTO: 0.7 10E9/L (ref 0.8–5.3)
LYMPHOCYTES NFR BLD AUTO: 11 %
MCH RBC QN AUTO: 30.1 PG (ref 26.5–33)
MCHC RBC AUTO-ENTMCNC: 32 G/DL (ref 31.5–36.5)
MCV RBC AUTO: 94 FL (ref 78–100)
MONOCYTES # BLD AUTO: 0.2 10E9/L (ref 0–1.3)
MONOCYTES NFR BLD AUTO: 3.2 %
NEUTROPHILS # BLD AUTO: 5.2 10E9/L (ref 1.6–8.3)
NEUTROPHILS NFR BLD AUTO: 83.5 %
PLATELET # BLD AUTO: 158 10E9/L (ref 150–450)
POTASSIUM SERPL-SCNC: 3.7 MMOL/L (ref 3.5–5.1)
PROT SERPL-MCNC: 6.5 G/DL (ref 6.4–8.9)
RBC # BLD AUTO: 4.82 10E12/L (ref 3.8–5.2)
SARS-COV-2 RNA RESP QL NAA+PROBE: ABNORMAL
SODIUM SERPL-SCNC: 135 MMOL/L (ref 134–144)
SPECIMEN SOURCE: ABNORMAL
WBC # BLD AUTO: 6.3 10E9/L (ref 4–11)

## 2021-01-09 PROCEDURE — 99213 OFFICE O/P EST LOW 20 MIN: CPT | Performed by: NURSE PRACTITIONER

## 2021-01-09 PROCEDURE — U0003 INFECTIOUS AGENT DETECTION BY NUCLEIC ACID (DNA OR RNA); SEVERE ACUTE RESPIRATORY SYNDROME CORONAVIRUS 2 (SARS-COV-2) (CORONAVIRUS DISEASE [COVID-19]), AMPLIFIED PROBE TECHNIQUE, MAKING USE OF HIGH THROUGHPUT TECHNOLOGIES AS DESCRIBED BY CMS-2020-01-R: HCPCS | Mod: ZL | Performed by: NURSE PRACTITIONER

## 2021-01-09 PROCEDURE — G0463 HOSPITAL OUTPT CLINIC VISIT: HCPCS | Performed by: NURSE PRACTITIONER

## 2021-01-09 PROCEDURE — C9803 HOPD COVID-19 SPEC COLLECT: HCPCS

## 2021-01-09 PROCEDURE — U0005 INFEC AGEN DETEC AMPLI PROBE: HCPCS | Mod: ZL | Performed by: NURSE PRACTITIONER

## 2021-01-09 PROCEDURE — 80053 COMPREHEN METABOLIC PANEL: CPT | Mod: ZL | Performed by: NURSE PRACTITIONER

## 2021-01-09 PROCEDURE — 36415 COLL VENOUS BLD VENIPUNCTURE: CPT | Mod: ZL | Performed by: NURSE PRACTITIONER

## 2021-01-09 PROCEDURE — 85025 COMPLETE CBC W/AUTO DIFF WBC: CPT | Mod: ZL | Performed by: NURSE PRACTITIONER

## 2021-01-09 PROCEDURE — 86140 C-REACTIVE PROTEIN: CPT | Mod: ZL | Performed by: NURSE PRACTITIONER

## 2021-01-09 ASSESSMENT — MIFFLIN-ST. JEOR: SCORE: 1461.14

## 2021-01-09 ASSESSMENT — PAIN SCALES - GENERAL: PAINLEVEL: NO PAIN (0)

## 2021-01-09 NOTE — PROGRESS NOTES
"Nursing Notes:   Gosselin, Norma J., LPN  1/9/2021 11:16 AM  Sign at exiting of workspace  Chief Complaint   Patient presents with     Sinus Problem     dizziness in the morning when getting up and nausea all day         Initial /76   Pulse 88   Temp 100.8  F (38.2  C) (Tympanic)   Resp 20   Ht 1.702 m (5' 7\")   Wt 92.9 kg (204 lb 11.2 oz)   LMP  (LMP Unknown)   SpO2 95%   BMI 32.06 kg/m   Estimated body mass index is 32.06 kg/m  as calculated from the following:    Height as of this encounter: 1.702 m (5' 7\").    Weight as of this encounter: 92.9 kg (204 lb 11.2 oz).         Medication Reconciliation: Complete      Norma J. Gosselin, LPN       SUBJECTIVE:   Margaret Batista is a 75 year old female who presents to clinic today for the following health issues:    Patient presents the rapid clinic for evaluation of illness.  She states she has been sick for the last month however symptoms did subside only to return 4 days ago.  She has been experiencing nasal congestion, sinus pressure, throat irritation.  She has been checking her temperature at home and has been afebrile however upon clinic admission her temperature was 100.8.  She reports dizziness that started this morning.  She reports trying to keep up on her fluids and eating however has had a slight decrease in appetite.  She reports she has not been around anybody who has been ill or diagnosed with COVID-19.  However she lives at home with her  and her  has had the same symptoms for the last 3 to 4 days.  She denies cough, shortness of breath or wheezing.  She denies history of COPD or asthma.  She denies having lost her sense of taste or smell.      Problem list and histories reviewed & adjusted, as indicated.  Additional history: as documented    Patient Active Problem List   Diagnosis     Allergic rhinitis     ASCVD (arteriosclerotic cardiovascular disease)     DJD (degenerative joint disease) of knee     Gout     " Hyperlipidemia     Hypertension     Mass of finger     Osteopenia     Primary osteoarthritis of both hands     Rheumatoid arthritis (H)     Avascular necrosis of humeral head, right (H)     Right shoulder pain     Rotator cuff tendonitis, right     S/P shoulder surgery     Shoulder impingement, right     Status post total shoulder replacement, right     Past Surgical History:   Procedure Laterality Date     ARTHROPLASTY KNEE      2011     ARTHROSCOPY KNEE           BIOPSY BREAST      10/25/95,BRIAN RAMIREZ      SECTION      1974, Section     COLONOSCOPY      05,Normal - Repeat in 10 years     COLONOSCOPY  2016     COLONOSCOPY N/A 2019    Procedure: COLONOSCOPY;  Surgeon: Evelin Thompson MD;  Location:  OR     COLONOSCOPY N/A 2019    Procedure: COMBINED COLONOSCOPY, SINGLE OR MULTIPLE BIOPSY/POLYPECTOMY BY BIOPSY;  Surgeon: Evelin Thompson MD;  Location:  OR     ESOPHAGOSCOPY, GASTROSCOPY, DUODENOSCOPY (EGD), COMBINED      2011,erosive gastritis;bx;consider MRI/A;[facility]     EXTRACTION(S) DENTAL      1970     HEART CATH, ANGIOPLASTY      10/29/2013,ADÁN to proximal left anterior descending     OTHER SURGICAL HISTORY      ,[id number],OTHER     OTHER SURGICAL HISTORY      2014,[id number],TOTAL SHOULDER ARTHROPLASTY,Right     OTHER SURGICAL HISTORY      2016,22131.0,[place] COLONOSCOPY REMOVE ELIZA POLYP LESN SNARE,repeat , precancerous polyps     ZZ STRESS LEXISCAN TEST  2018    With Dr. Irwin - normal       Social History     Tobacco Use     Smoking status: Never Smoker     Smokeless tobacco: Never Used     Tobacco comment: no ecig   Substance Use Topics     Alcohol use: Not Currently     Alcohol/week: 0.0 standard drinks     Frequency: Monthly or less     Comment: Alcoholic Drinks/day: Rare use. ~2 glasses of wine a month     Family History   Problem Relation Age of Onset     Heart Disease Mother         Heart Disease     Hypertension  Mother         Hypertension     Other - See Comments Mother         Stroke     Breast Cancer Mother 53        Cancer-breast     Arthritis Father         Arthritis     Cancer Father         Cancer     Heart Disease Father         Heart Disease     Hypertension Father         Hypertension     Family History Negative Brother         Good Health     Heart Disease Brother         Heart Disease     Family History Negative Sister         Good Health         Current Outpatient Medications   Medication Sig Dispense Refill     allopurinol (ZYLOPRIM) 300 MG tablet TAKE 1 TABLET DAILY 90 tablet 3     aspirin EC 81 MG EC tablet Take 1 tablet by mouth daily       atenolol (TENORMIN) 100 MG tablet TAKE 1 TABLET DAILY 90 tablet 3     atorvastatin (LIPITOR) 40 MG tablet TAKE 1 TABLET DAILY 90 tablet 3     cetirizine (ZYRTEC) 10 MG tablet Take 1 tablet (10 mg) by mouth daily       Cholecalciferol (VITAMIN D3) 2000 UNITS CAPS Take 2,000 Units by mouth daily       folic acid (FOLVITE) 1 MG tablet TAKE 1 TABLET DAILY 90 tablet 3     hydrochlorothiazide (HYDRODIURIL) 25 MG tablet TAKE 1 TABLET DAILY 90 tablet 3     hydrocortisone 2.5 % cream MIX 1 1 WITH KETOCONAZOLE AND APPLY TO THE AFFECTED AREA UNDER BREAST TWICE A DAY UNTIL RASH RESOLVES       hydroxychloroquine (PLAQUENIL) 200 MG tablet 400 mg daily alternating with 200 mg daily (Patient taking differently: Take 200 mg by mouth daily Every other day. One day 200mg next day 400mg)       methotrexate 2.5 MG tablet Take 20 mg by mouth once a week       nitroGLYcerin (NITROSTAT) 0.4 MG sublingual tablet Place 1 tablet (0.4 mg) under the tongue every 5 minutes as needed for chest pain (For chest pain x 3 doses) 30 tablet 3     Allergies   Allergen Reactions     Food      Macadamia nuts make mouth itch     Losartan      Allergic rxn with hives and angioedema suspected to be from alternative manufacture of the effient or the losartan     Enalapril Cough         ROS:  Notable findings in  "the HPI.       OBJECTIVE:     /76   Pulse 88   Temp 100.8  F (38.2  C) (Tympanic)   Resp 20   Ht 1.702 m (5' 7\")   Wt 92.9 kg (204 lb 11.2 oz)   LMP  (LMP Unknown)   SpO2 95%   BMI 32.06 kg/m    Body mass index is 32.06 kg/m .  GENERAL: alert, no distress and fatigued  EYES: Eyes grossly normal to inspection  HENT: normal cephalic/atraumatic, ear canals and TM's normal, oropharynx clear, with mild irritation/erythema, oral mucous membranes moist and sinuses: maxillary tenderness on bilateral  NECK: no adenopathy  RESP: lungs clear to auscultation - no rales, rhonchi or wheezes  CV: regular rates and rhythm, normal S1 S2, no S3 or S4, no murmur, click or rub and no peripheral edema  ABDOMEN: soft, nontender, without hepatosplenomegaly or masses and bowel sounds normal  SKIN: no suspicious lesions or rashes  PSYCH: mentation appears normal, affect normal/bright    Diagnostic Test Results:  Results for orders placed or performed in visit on 01/09/21 (from the past 24 hour(s))   CBC with platelets differential   Result Value Ref Range    WBC 6.3 4.0 - 11.0 10e9/L    RBC Count 4.82 3.8 - 5.2 10e12/L    Hemoglobin 14.5 11.7 - 15.7 g/dL    Hematocrit 45.3 35.0 - 47.0 %    MCV 94 78 - 100 fl    MCH 30.1 26.5 - 33.0 pg    MCHC 32.0 31.5 - 36.5 g/dL    RDW 15.0 10.0 - 15.0 %    Platelet Count 158 150 - 450 10e9/L    Diff Method Automated Method     % Neutrophils 83.5 %    % Lymphocytes 11.0 %    % Monocytes 3.2 %    % Eosinophils 1.4 %    % Basophils 0.3 %    % Immature Granulocytes 0.6 %    Absolute Neutrophil 5.2 1.6 - 8.3 10e9/L    Absolute Lymphocytes 0.7 (L) 0.8 - 5.3 10e9/L    Absolute Monocytes 0.2 0.0 - 1.3 10e9/L    Absolute Eosinophils 0.1 0.0 - 0.7 10e9/L    Absolute Basophils 0.0 0.0 - 0.2 10e9/L    Abs Immature Granulocytes 0.0 0 - 0.4 10e9/L     COVID test pending.     ASSESSMENT/PLAN:     1. Fever in adult  - Symptomatic COVID-19 Virus (Coronavirus) by PCR  - CBC with platelets differential  - CRP " inflammation  - Comprehensive Metabolic Panel    2. Acute viral syndrome  Most likely she is dealing with a viral illness, she had symptoms starting around end of November. The symptoms did go away however for the last 4 days she has had acute symptoms again that seem to be getting worse.  Did do a COVID-19 test.  She reports having had her flu shot making this less likely.  She routinely wears a mask.      Medical Decision Making:    Differential Diagnosis:  URI Adult/Peds:  Sinusitis, Viral syndrome, Viral upper respiratory illness and influenza, COVID-19.    Serious Comorbid Conditions:  Adult:  None    PLAN:    URI Adult:  Tylenol, Ibuprofen, Fluids, Rest, OTC cough suppressant/expectorant, OTC decongestant/antihistamine, Saline gargles, Saline nasal spray, and Vaporizer.  Since the patient was tested for COVID-19 and has symptoms of illness, she was instructed to go straight home, wait for results.  If she is positive she will need to be in quarantine for the next 10 days.  If she is positive they can assume her  is positive however he may come in and get tested as well.  Again they will both need to be quarantined if she is positive since her  has symptoms as well.    Followup:    If not improving or if condition worsens, follow up with your Primary Care Provider    I explained my diagnostic considerations and recommendations to the patient, who voiced understanding and agreement with the treatment plan. All questions were answered. We discussed potential side effects of any prescribed or recommended therapies, as well as expectations for response to treatments.  She was advised to contact our office if there is no improvement or worsening of conditions or symptoms.  If s/s worsen or persist, patient will either come back or follow up with PCP.    Disclaimer:  This note consists of words and symbols derived from keyboarding, dictation, or using voice recognition software. As a result, there may  be errors in the script that have gone undetected. Please consider this when interpreting information found in this note.      Mary Munoz NP, 1/9/2021 11:14 AM

## 2021-01-09 NOTE — PATIENT INSTRUCTIONS
Patient Education     Viral Upper Respiratory Illness (Adult)  You have a viral upper respiratory illness (URI), which is another term for the common cold. This illness is contagious during the first few days. It is spread through the air by coughing and sneezing. It may also be spread by direct contact (touching the sick person and then touching your own eyes, nose, or mouth). Frequent handwashing will decrease risk of spread. Most viral illnesses go away within 7 to 10 days with rest and simple home remedies. Sometimes the illness may last for several weeks. Antibiotics will not kill a virus, and they are generally not prescribed for this condition.  Home care    If symptoms are severe, rest at home for the first 2 to 3 days. When you resume activity, don't let yourself get too tired.    Don't smoke. If you need help stopping, talk with your healthcare provider.    Avoid being exposed to cigarette smoke (yours or others ).    You may use acetaminophen or ibuprofen to control pain and fever, unless another medicine was prescribed. If you have chronic liver or kidney disease, have ever had a stomach ulcer or gastrointestinal bleeding, or are taking blood-thinning medicines, talk with your healthcare provider before using these medicines. Aspirin should never be given to anyone under 18 years of age who is ill with a viral infection or fever. It may cause severe liver or brain damage.    Your appetite may be poor, so a light diet is fine. Stay well hydrated by drinking 6 to 8 glasses of fluids per day (water, soft drinks, juices, tea, or soup). Extra fluids will help loosen secretions in the nose and lungs.    Over-the-counter cold medicines will not shorten the length of time you re sick, but they may be helpful for the following symptoms: cough, sore throat, and nasal and sinus congestion. If you take prescription medicines, ask your healthcare provider or pharmacist which over-the-counter medicines are safe to  "use. (Note: Don't use decongestants if you have high blood pressure.)  Follow-up care  Follow up with your healthcare provider, or as advised.  When to seek medical advice  Call your healthcare provider right away if any of these occur:    Cough with lots of colored sputum (mucus)    Severe headache; face, neck, or ear pain    Difficulty swallowing due to throat pain    Fever of 100.4 F (38 C) or higher, or as directed by your healthcare provider  Call 911  Call 911 if any of these occur:    Chest pain, shortness of breath, wheezing, or difficulty breathing    Coughing up blood    Very severe pain with swallowing, especially if it goes along with a muffled voice   [employer] last reviewed this educational content on 6/1/2018 2000-2020 [employer]. [address] [place] [address], [place] [address]. All rights reserved. This information is not intended as a substitute for professional medical care. Always follow your healthcare professional's instructions.      Patient Education   Discharge Instructions for COVID-19 Patients  You have--or may have--COVID-19. Please follow the instructions listed below.   If you have a weakened immune system, discuss with your doctor any other actions you need to take.  How can I protect others?  If you have symptoms (fever, cough, body aches or trouble breathing):    Stay home and away from others (self-isolate) until:  ? At least 10 days have passed since your symptoms started, And   ? You've had no fever--and no medicine that reduces fever--for 1 full day (24 hours), And    ? Your other symptoms have resolved (gotten better).  If you don't show symptoms, but testing showed that you have COVID-19:    Stay home and away from others (self-isolate). Follow the tips under \"How do I self-isolate?\" below for 10 days (20 days if you have a weak immune system).    You don't need to be retested for COVID-19 before going back to school or work. As long as you're fever-free and feeling " "better, you can go back to school, work and other activities after waiting the 10 or 20 days.   How do I self-isolate?    Stay in your own room, even for meals. Use your own bathroom if you can.    Stay away from others in your home. No hugging, kissing or shaking hands. No visitors.    Don't go to work, school or anywhere else.    Clean \"high touch\" surfaces often (doorknobs, counters, handles). Use household cleaning spray or wipes. You'll find a full list of  on the EPA website: www.epa.gov/pesticide-registration/list-n-disinfectants-use-against-sars-cov-2.    Cover your mouth and nose with a mask or other face covering to avoid spreading germs.    Wash your hands and face often. Use soap and water.    Caregivers in these groups are at risk for severe illness due to COVID-19:  ? People 65 years and older  ? People who live in a nursing home or long-term care facility  ? People with chronic disease (lung, heart, cancer, diabetes, kidney, liver, immunologic)  ? People who have a weakened immune system, including those who:    Are in cancer treatment    Take medicine that weakens the immune system, such as corticosteroids    Had a bone marrow or organ transplant    Have an immune deficiency    Have poorly controlled HIV or AIDS    Are obese (body mass index of 40 or higher)    Smoke regularly    Caregivers should wear gloves while washing dishes, handling laundry and cleaning bedrooms and bathrooms.    Use caution when washing and drying laundry: Don't shake dirty laundry and use the warmest water setting that you can.    For more tips on managing your health at home, go to www.cdc.gov/coronavirus/2019-ncov/downloads/10Things.pdf.  How can I take care of myself at home?  1. Get lots of rest. Drink extra fluids (unless a doctor has told you not to).    2. Take Tylenol (acetaminophen) for fever or pain. If you have liver or kidney problems, ask your family doctor if it's okay to take Tylenol.     Adults can " take either:  ? 650 mg (two 325 mg pills) every 4 to 6 hours, or   ? 1,000 mg (two 500 mg pills) every 8 hours as needed.  ? Note: Don't take more than 3,000 mg in one day. Acetaminophen is found in many medicines (both prescribed and over-the-counter medicines). Read all labels to be sure you don't take too much.   For children, check the Tylenol bottle for the right dose. The dose is based on the child's age or weight.  3. If you have other health problems (like cancer, heart failure, an organ transplant or severe kidney disease): Call your specialty clinic if you don't feel better in the next 2 days.    4. Know when to call 911. Emergency warning signs include:  ? Trouble breathing or shortness of breath  ? Pain or pressure in the chest that doesn't go away  ? Feeling confused like you haven't felt before, or not being able to wake up  ? Bluish-colored lips or face    5. Your doctor may have prescribed a blood thinner medicine. Follow their instructions.  Where can I get more information?    [facility] - About COVID-19: [email or web address]    CDC - What to Do If You're Sick: www.cdc.gov/coronavirus/2019-ncov/about/steps-when-sick.html    CDC - Ending Home Isolation: www.cdc.gov/coronavirus/2019-ncov/hcp/disposition-in-home-patients.html    CDC - Caring for Someone: www.cdc.gov/coronavirus/2019-ncov/if-you-are-sick/care-for-someone.html    [facility] - Interim Guidance for Hospital Discharge to Home: www.health.[facility].[place]/diseases/coronavirus/hcp/hospdischarge.pdf    [facility] clinical trials (COVID-19 research studies): [email or web address]/[facility]-clinical-trials    Below are the COVID-19 hotlines at the [place] Department of Health ([facility]). Interpreters are available.  ? For health questions: Call 653-976-8940 or 1-844.622.6918 (7 a.m. to 7 p.m.)  ? For questions about schools and childcare: Call 382-882-8150 or 1-501.344.3123 (7 a.m. to 7 p.m.)    For informational purposes only. Not to  replace the advice of your health care provider. Clinically reviewed by the Infection Prevention Team. Copyright   2020 [facility]. All rights reserved. [employer] 922161 - REV 08/04/20.

## 2021-01-09 NOTE — NURSING NOTE
"Chief Complaint   Patient presents with     Sinus Problem     dizziness in the morning when getting up and nausea all day         Initial /76   Pulse 88   Temp 100.8  F (38.2  C) (Tympanic)   Resp 20   Ht 1.702 m (5' 7\")   Wt 92.9 kg (204 lb 11.2 oz)   LMP  (LMP Unknown)   SpO2 95%   BMI 32.06 kg/m   Estimated body mass index is 32.06 kg/m  as calculated from the following:    Height as of this encounter: 1.702 m (5' 7\").    Weight as of this encounter: 92.9 kg (204 lb 11.2 oz).         Medication Reconciliation: Complete      Norma J. Gosselin, LPN   "

## 2021-01-12 ENCOUNTER — VIRTUAL VISIT (OUTPATIENT)
Dept: FAMILY MEDICINE | Facility: OTHER | Age: 76
End: 2021-01-12
Attending: NURSE PRACTITIONER
Payer: COMMERCIAL

## 2021-01-12 DIAGNOSIS — U07.1 2019 NOVEL CORONAVIRUS DISEASE (COVID-19): Primary | ICD-10-CM

## 2021-01-12 DIAGNOSIS — Z71.89 EDUCATED ABOUT COVID-19 VIRUS INFECTION: ICD-10-CM

## 2021-01-12 PROCEDURE — 99442 PR PHYSICIAN TELEPHONE EVALUATION 11-20 MIN: CPT | Performed by: NURSE PRACTITIONER

## 2021-01-12 NOTE — PROGRESS NOTES
Sandra is a 75 year old who is being evaluated via a billable telephone visit.      What phone number would you like to be contacted at? [phone]  How would you like to obtain your AVS? Carissa Mercer     Sandra is a 75 year old who presents to clinic today for the following health issues: COVID-19 get well loop follow-up    HPI     Patient was diagnosed with covid-19 on 1/9/2021, 3 days ago.  She developed symptoms 4 days prior to arriving in clinic.  Her symptoms included nasal congestion, sinus pressure, throat irritation, post nasal drip, dizziness, decreased appetite, and fatigue.   Fevers started on 1/9/2021, fevers now daily, fluctuates, some times up to 101.  Chronic chills.  No sweats.  Fever reached up to 103 on 1/9/2021.  Dizziness was brief and resolved.  Appetite slowly improving, ate her first solids today.  Diarrhea a few times, none yet today.  No vomiting.  No headaches.  Sleeping well.    She has not had any cough, chest tightness, chest heaviness, or shortness of breath.  She is having extreme fatigue.    Her sister-in-law has been dropping off her groceries.  She has been drinking extra water, fluids.  Taking Tylenol for fevers.  She took Cordicin initially, none the past few days.  Patient states she feels like overall she is at the peak of symptoms and is starting to slowly feel better.  She is unsure how she was exposed to covid, states she wears her mask and has been doing her time around others.  Her children and grandchildren live in [place].   Her  has the same head cold symptoms without the fevers and his Covid test is negative.      Review of Systems   As per HPI.          Objective           Vitals:  No vitals were obtained today due to virtual visit.    Physical Exam   healthy, alert, no distress and cooperative  PSYCH: Alert and oriented times 3; coherent speech, normal   rate and volume, able to articulate logical thoughts, able   to abstract reason, no tangential  thoughts, no hallucinations   or delusions  Her affect is normal and pleasant  RESP: No cough, no audible wheezing, able to talk in full sentences  Remainder of exam unable to be completed due to telephone visits           Assessment & Plan     2019 novel coronavirus disease (COVID-19)    Positive covid-19 test on 1/9/2021    Educated about COVID-19 virus infection    Reviewed with patient quarantine guidelines.  Reiewed with patient signs and symptoms indicate of of need to follow-up in ER or clinic.    Overall patient feels like she wojciech peaked and is now on the road to recovery.  Patient denies any current concerns.      Charissa King NP  [facility]  Phone call duration: 13 minutes

## 2021-01-12 NOTE — PROGRESS NOTES
Patient tested positive for Covid.  Covid get well loop ordered.  Charissa King NP on 1/12/2021 at 8:53 AM

## 2021-01-12 NOTE — NURSING NOTE
"Chief Complaint   Patient presents with     RECHECK     Patient reported that she is feeling much better and has not had much of a temp over the last few days the highest that it got was 103 on Saturday. She was finally able to eat some solid food today.    Initial LMP  (LMP Unknown)  Estimated body mass index is 32.06 kg/m  as calculated from the following:    Height as of 1/9/21: 1.702 m (5' 7\").    Weight as of 1/9/21: 92.9 kg (204 lb 11.2 oz).         Medication Reconciliation: Complete      Vale Bradford LPN       "

## 2021-01-19 ENCOUNTER — TELEPHONE (OUTPATIENT)
Dept: INTERNAL MEDICINE | Facility: OTHER | Age: 76
End: 2021-01-19

## 2021-01-19 NOTE — TELEPHONE ENCOUNTER
Patient is wondering since she just had ovid if it is safe to get the vaccine? Please call patient.  Thank you   Jennifer Trotter on 1/19/2021 at 3:06 PM

## 2021-01-19 NOTE — TELEPHONE ENCOUNTER
As long as patient is out of quarantine they are usually fine to get Covid vaccine.  I always recommend however that patients were followed by rheumatologist on immunosuppressive drugs discuss vaccines with rheumatologist first.

## 2021-01-25 DIAGNOSIS — M06.4 INFLAMMATORY POLYARTHROPATHY (H): ICD-10-CM

## 2021-01-25 LAB
AST SERPL W P-5'-P-CCNC: 16 U/L (ref 13–39)
BASOPHILS # BLD AUTO: 0.1 10E9/L (ref 0–0.2)
BASOPHILS NFR BLD AUTO: 0.6 %
CREAT SERPL-MCNC: 1.02 MG/DL (ref 0.6–1.2)
DIFFERENTIAL METHOD BLD: ABNORMAL
EOSINOPHIL # BLD AUTO: 0.4 10E9/L (ref 0–0.7)
EOSINOPHIL NFR BLD AUTO: 2.8 %
ERYTHROCYTE [DISTWIDTH] IN BLOOD BY AUTOMATED COUNT: 15.4 % (ref 10–15)
GFR SERPL CREATININE-BSD FRML MDRD: 53 ML/MIN/{1.73_M2}
HCT VFR BLD AUTO: 43.4 % (ref 35–47)
HGB BLD-MCNC: 14.1 G/DL (ref 11.7–15.7)
IMM GRANULOCYTES # BLD: 0.1 10E9/L (ref 0–0.4)
IMM GRANULOCYTES NFR BLD: 0.8 %
LYMPHOCYTES # BLD AUTO: 2 10E9/L (ref 0.8–5.3)
LYMPHOCYTES NFR BLD AUTO: 15.3 %
MCH RBC QN AUTO: 30.9 PG (ref 26.5–33)
MCHC RBC AUTO-ENTMCNC: 32.5 G/DL (ref 31.5–36.5)
MCV RBC AUTO: 95 FL (ref 78–100)
MONOCYTES # BLD AUTO: 0.9 10E9/L (ref 0–1.3)
MONOCYTES NFR BLD AUTO: 7 %
NEUTROPHILS # BLD AUTO: 9.4 10E9/L (ref 1.6–8.3)
NEUTROPHILS NFR BLD AUTO: 73.5 %
PLATELET # BLD AUTO: 337 10E9/L (ref 150–450)
RBC # BLD AUTO: 4.57 10E12/L (ref 3.8–5.2)
WBC # BLD AUTO: 12.8 10E9/L (ref 4–11)

## 2021-01-25 PROCEDURE — 82565 ASSAY OF CREATININE: CPT | Mod: ZL | Performed by: NURSE PRACTITIONER

## 2021-01-25 PROCEDURE — 84450 TRANSFERASE (AST) (SGOT): CPT | Mod: ZL | Performed by: NURSE PRACTITIONER

## 2021-01-25 PROCEDURE — 36415 COLL VENOUS BLD VENIPUNCTURE: CPT | Mod: ZL | Performed by: NURSE PRACTITIONER

## 2021-01-25 PROCEDURE — 85025 COMPLETE CBC W/AUTO DIFF WBC: CPT | Mod: ZL | Performed by: NURSE PRACTITIONER

## 2021-02-15 ENCOUNTER — TELEPHONE (OUTPATIENT)
Dept: INTERNAL MEDICINE | Facility: OTHER | Age: 76
End: 2021-02-15

## 2021-02-15 DIAGNOSIS — I10 ESSENTIAL HYPERTENSION: ICD-10-CM

## 2021-02-15 DIAGNOSIS — M10.9 GOUT, UNSPECIFIED CAUSE, UNSPECIFIED CHRONICITY, UNSPECIFIED SITE: ICD-10-CM

## 2021-02-15 NOTE — TELEPHONE ENCOUNTER
Spoke to patient.  She states that they changed insurances and wanted to make sure we had her new pharmacy on file.  This writer asked patient if she needed refills and she states her new pharmacy has it covered.  This writer will now close this enounter.  Janae Irving LPN 2/15/2021   10:59 AM

## 2021-02-15 NOTE — TELEPHONE ENCOUNTER
RKV-pt has changed pharmacies would like a call for refills I did offer appt but did not want that     Please call and advise    Thank You    Donna Cazares on 2/15/2021 at 10:50 AM

## 2021-04-25 ENCOUNTER — HEALTH MAINTENANCE LETTER (OUTPATIENT)
Age: 76
End: 2021-04-25

## 2021-04-26 ENCOUNTER — MEDICAL CORRESPONDENCE (OUTPATIENT)
Dept: HEALTH INFORMATION MANAGEMENT | Facility: OTHER | Age: 76
End: 2021-04-26

## 2021-04-26 DIAGNOSIS — M06.4 INFLAMMATORY POLYARTHROPATHY (H): ICD-10-CM

## 2021-04-26 LAB
AST SERPL W P-5'-P-CCNC: 20 U/L (ref 13–39)
BASOPHILS # BLD AUTO: 0.1 10E9/L (ref 0–0.2)
BASOPHILS NFR BLD AUTO: 0.6 %
CREAT SERPL-MCNC: 1.04 MG/DL (ref 0.6–1.2)
DIFFERENTIAL METHOD BLD: ABNORMAL
EOSINOPHIL # BLD AUTO: 0.5 10E9/L (ref 0–0.7)
EOSINOPHIL NFR BLD AUTO: 4.9 %
ERYTHROCYTE [DISTWIDTH] IN BLOOD BY AUTOMATED COUNT: 15.6 % (ref 10–15)
GFR SERPL CREATININE-BSD FRML MDRD: 52 ML/MIN/{1.73_M2}
HCT VFR BLD AUTO: 43.5 % (ref 35–47)
HGB BLD-MCNC: 14.5 G/DL (ref 11.7–15.7)
IMM GRANULOCYTES # BLD: 0.1 10E9/L (ref 0–0.4)
IMM GRANULOCYTES NFR BLD: 0.6 %
LYMPHOCYTES # BLD AUTO: 2.2 10E9/L (ref 0.8–5.3)
LYMPHOCYTES NFR BLD AUTO: 23.7 %
MCH RBC QN AUTO: 31.8 PG (ref 26.5–33)
MCHC RBC AUTO-ENTMCNC: 33.3 G/DL (ref 31.5–36.5)
MCV RBC AUTO: 95 FL (ref 78–100)
MONOCYTES # BLD AUTO: 0.6 10E9/L (ref 0–1.3)
MONOCYTES NFR BLD AUTO: 6.7 %
NEUTROPHILS # BLD AUTO: 5.9 10E9/L (ref 1.6–8.3)
NEUTROPHILS NFR BLD AUTO: 63.5 %
PLATELET # BLD AUTO: 197 10E9/L (ref 150–450)
RBC # BLD AUTO: 4.56 10E12/L (ref 3.8–5.2)
WBC # BLD AUTO: 9.3 10E9/L (ref 4–11)

## 2021-04-26 PROCEDURE — 84450 TRANSFERASE (AST) (SGOT): CPT | Mod: ZL | Performed by: NURSE PRACTITIONER

## 2021-04-26 PROCEDURE — 36415 COLL VENOUS BLD VENIPUNCTURE: CPT | Mod: ZL | Performed by: NURSE PRACTITIONER

## 2021-04-26 PROCEDURE — 82565 ASSAY OF CREATININE: CPT | Mod: ZL | Performed by: NURSE PRACTITIONER

## 2021-04-26 PROCEDURE — 85025 COMPLETE CBC W/AUTO DIFF WBC: CPT | Mod: ZL | Performed by: NURSE PRACTITIONER

## 2021-04-30 ENCOUNTER — TELEPHONE (OUTPATIENT)
Dept: INTERNAL MEDICINE | Facility: OTHER | Age: 76
End: 2021-04-30

## 2021-04-30 NOTE — TELEPHONE ENCOUNTER
Was this a possible reaction? How long after the vaccinations should she wait for her mammogram?  Demetra Leach LPN on 4/30/2021 at 9:40 AM

## 2021-04-30 NOTE — TELEPHONE ENCOUNTER
Called Pt and notified her of provider response. No further questions at this time.   Demetra Leach LPN on 4/30/2021 at 10:34 AM

## 2021-04-30 NOTE — TELEPHONE ENCOUNTER
Should wait 1 month after getting Covid vaccine for screening mammogram.  If however she has a new lump in her breast or something else about the breast she is concerned about then we would not have her wait 1 month.

## 2021-04-30 NOTE — TELEPHONE ENCOUNTER
Patient is having a covid vaccine on Friday 5/7/21. Patient is wondering if she may get swollen lymph nodes from the vaccine. If so she will not schedule her mammogram until it resolves.     Gris Addison on 4/30/2021 at 9:30 AM

## 2021-05-07 ENCOUNTER — IMMUNIZATION (OUTPATIENT)
Dept: FAMILY MEDICINE | Facility: OTHER | Age: 76
End: 2021-05-07
Attending: FAMILY MEDICINE
Payer: COMMERCIAL

## 2021-05-07 PROCEDURE — 91300 PR COVID VAC PFIZER DIL RECON 30 MCG/0.3 ML IM: CPT

## 2021-06-03 ENCOUNTER — IMMUNIZATION (OUTPATIENT)
Dept: FAMILY MEDICINE | Facility: OTHER | Age: 76
End: 2021-06-03
Attending: FAMILY MEDICINE
Payer: COMMERCIAL

## 2021-06-03 ENCOUNTER — HOSPITAL ENCOUNTER (OUTPATIENT)
Dept: MAMMOGRAPHY | Facility: OTHER | Age: 76
End: 2021-06-03
Attending: SURGERY
Payer: COMMERCIAL

## 2021-06-03 DIAGNOSIS — R92.8 ABNORMAL FINDING ON BREAST IMAGING: ICD-10-CM

## 2021-06-03 DIAGNOSIS — Z09 FOLLOW-UP EXAM, 3-6 MONTHS SINCE PREVIOUS EXAM: ICD-10-CM

## 2021-06-03 PROCEDURE — G0279 TOMOSYNTHESIS, MAMMO: HCPCS

## 2021-06-03 PROCEDURE — 91300 PR COVID VAC PFIZER DIL RECON 30 MCG/0.3 ML IM: CPT

## 2021-06-04 ENCOUNTER — HOSPITAL ENCOUNTER (OUTPATIENT)
Dept: ULTRASOUND IMAGING | Facility: OTHER | Age: 76
Discharge: HOME | End: 2021-06-04
Attending: SURGERY | Admitting: SURGERY
Payer: COMMERCIAL

## 2021-06-04 DIAGNOSIS — Z09 FOLLOW-UP EXAM, 3-6 MONTHS SINCE PREVIOUS EXAM: ICD-10-CM

## 2021-06-04 DIAGNOSIS — R92.8 ABNORMAL FINDING ON BREAST IMAGING: ICD-10-CM

## 2021-06-04 PROCEDURE — 76642 ULTRASOUND BREAST LIMITED: CPT | Mod: LT

## 2021-06-18 ENCOUNTER — OFFICE VISIT (OUTPATIENT)
Dept: INTERNAL MEDICINE | Facility: OTHER | Age: 76
End: 2021-06-18
Attending: NURSE PRACTITIONER
Payer: COMMERCIAL

## 2021-06-18 VITALS
HEART RATE: 80 BPM | OXYGEN SATURATION: 100 % | DIASTOLIC BLOOD PRESSURE: 74 MMHG | WEIGHT: 200.3 LBS | RESPIRATION RATE: 16 BRPM | BODY MASS INDEX: 31.37 KG/M2 | SYSTOLIC BLOOD PRESSURE: 132 MMHG | TEMPERATURE: 97.5 F

## 2021-06-18 DIAGNOSIS — M05.79 RHEUMATOID ARTHRITIS INVOLVING MULTIPLE SITES WITH POSITIVE RHEUMATOID FACTOR (H): ICD-10-CM

## 2021-06-18 DIAGNOSIS — N18.31 STAGE 3A CHRONIC KIDNEY DISEASE (H): ICD-10-CM

## 2021-06-18 DIAGNOSIS — R73.01 IMPAIRED FASTING GLUCOSE: ICD-10-CM

## 2021-06-18 DIAGNOSIS — M19.041 PRIMARY OSTEOARTHRITIS OF BOTH HANDS: ICD-10-CM

## 2021-06-18 DIAGNOSIS — G60.9 HEREDITARY AND IDIOPATHIC NEUROPATHY, UNSPECIFIED: ICD-10-CM

## 2021-06-18 DIAGNOSIS — G56.03 BILATERAL CARPAL TUNNEL SYNDROME: ICD-10-CM

## 2021-06-18 DIAGNOSIS — M19.042 PRIMARY OSTEOARTHRITIS OF BOTH HANDS: ICD-10-CM

## 2021-06-18 DIAGNOSIS — G56.90 NEUROPATHY OF HAND, UNSPECIFIED LATERALITY: Primary | ICD-10-CM

## 2021-06-18 PROBLEM — N18.30 CHRONIC KIDNEY DISEASE, STAGE 3 (H): Status: ACTIVE | Noted: 2021-06-18

## 2021-06-18 LAB
ALBUMIN SERPL-MCNC: 3.9 G/DL (ref 3.5–5.7)
ALP SERPL-CCNC: 55 U/L (ref 34–104)
ALT SERPL W P-5'-P-CCNC: 14 U/L (ref 7–52)
ANION GAP SERPL CALCULATED.3IONS-SCNC: 8 MMOL/L (ref 3–14)
AST SERPL W P-5'-P-CCNC: 18 U/L (ref 13–39)
BILIRUB SERPL-MCNC: 0.8 MG/DL (ref 0.3–1)
BUN SERPL-MCNC: 22 MG/DL (ref 7–25)
CALCIUM SERPL-MCNC: 9.7 MG/DL (ref 8.6–10.3)
CHLORIDE SERPL-SCNC: 101 MMOL/L (ref 98–107)
CO2 SERPL-SCNC: 30 MMOL/L (ref 21–31)
CREAT SERPL-MCNC: 0.99 MG/DL (ref 0.6–1.2)
ERYTHROCYTE [DISTWIDTH] IN BLOOD BY AUTOMATED COUNT: 14.6 % (ref 10–15)
FOLATE SERPL-MCNC: >24.8 NG/ML
GFR SERPL CREATININE-BSD FRML MDRD: 55 ML/MIN/{1.73_M2}
GLUCOSE SERPL-MCNC: 100 MG/DL (ref 70–105)
HBA1C MFR BLD: 5.4 % (ref 4–6)
HCT VFR BLD AUTO: 46 % (ref 35–47)
HGB BLD-MCNC: 14.8 G/DL (ref 11.7–15.7)
MCH RBC QN AUTO: 30.6 PG (ref 26.5–33)
MCHC RBC AUTO-ENTMCNC: 32.2 G/DL (ref 31.5–36.5)
MCV RBC AUTO: 95 FL (ref 78–100)
PLATELET # BLD AUTO: 209 10E9/L (ref 150–450)
POTASSIUM SERPL-SCNC: 3.4 MMOL/L (ref 3.5–5.1)
PROT SERPL-MCNC: 7 G/DL (ref 6.4–8.9)
RBC # BLD AUTO: 4.83 10E12/L (ref 3.8–5.2)
SODIUM SERPL-SCNC: 139 MMOL/L (ref 134–144)
TSH SERPL DL<=0.05 MIU/L-ACNC: 0.89 IU/ML (ref 0.34–5.6)
VIT B12 SERPL-MCNC: 321 PG/ML (ref 180–914)
WBC # BLD AUTO: 11.1 10E9/L (ref 4–11)

## 2021-06-18 PROCEDURE — 85027 COMPLETE CBC AUTOMATED: CPT | Mod: ZL | Performed by: NURSE PRACTITIONER

## 2021-06-18 PROCEDURE — 80053 COMPREHEN METABOLIC PANEL: CPT | Mod: ZL | Performed by: NURSE PRACTITIONER

## 2021-06-18 PROCEDURE — 36415 COLL VENOUS BLD VENIPUNCTURE: CPT | Mod: ZL | Performed by: NURSE PRACTITIONER

## 2021-06-18 PROCEDURE — 83036 HEMOGLOBIN GLYCOSYLATED A1C: CPT | Mod: ZL | Performed by: NURSE PRACTITIONER

## 2021-06-18 PROCEDURE — 84443 ASSAY THYROID STIM HORMONE: CPT | Mod: ZL | Performed by: NURSE PRACTITIONER

## 2021-06-18 PROCEDURE — 82746 ASSAY OF FOLIC ACID SERUM: CPT | Mod: ZL | Performed by: NURSE PRACTITIONER

## 2021-06-18 PROCEDURE — 99214 OFFICE O/P EST MOD 30 MIN: CPT | Performed by: NURSE PRACTITIONER

## 2021-06-18 PROCEDURE — G0463 HOSPITAL OUTPT CLINIC VISIT: HCPCS

## 2021-06-18 PROCEDURE — 82607 VITAMIN B-12: CPT | Mod: ZL | Performed by: NURSE PRACTITIONER

## 2021-06-18 RX ORDER — NAPROXEN SODIUM 220 MG
220 TABLET ORAL DAILY
Start: 2021-06-18 | End: 2021-08-11

## 2021-06-18 ASSESSMENT — ENCOUNTER SYMPTOMS
FATIGUE: 0
ARTHRALGIAS: 0
WEAKNESS: 0
NUMBNESS: 0
MYALGIAS: 0
FEVER: 0
ENDOCRINE NEGATIVE: 1

## 2021-06-18 ASSESSMENT — PAIN SCALES - GENERAL: PAINLEVEL: NO PAIN (1)

## 2021-06-18 NOTE — PROGRESS NOTES
ASSESSMENT:    ICD-10-CM    1. Neuropathy of hand, unspecified laterality  G56.90 Comprehensive metabolic panel     CBC with platelets     Folate     Vitamin B12     naproxen sodium (ANAPROX) 220 MG tablet   2. Hereditary and idiopathic neuropathy, unspecified   G60.9 Thyrotropin GH   3. Bilateral carpal tunnel syndrome  G56.03 naproxen sodium (ANAPROX) 220 MG tablet   4. Stage 3a chronic kidney disease  N18.31 Thyrotropin GH     Comprehensive metabolic panel   5. Rheumatoid arthritis involving multiple sites with positive rheumatoid factor (H)  M05.79 Folate   6. Impaired fasting glucose  R73.01 Hemoglobin A1c       PLAN:  Discussed with patient that she may have bilateral carpal tunnel.  Positive Tinel and Phalen's sign today.  Bilateral wrist splints to be worn at bedtime but would also recommend during the day as she is able.  Also will cautiously start her on Aleve 220 mg 1 pill daily in the morning with food.  If she develops GI upset the needs to stop medication.  She will only take this for 2 weeks.  Follow-up in clinic in 2 weeks.  In the meantime we will also check labs to include CBC, chemistry panel, B12, folate, hemoglobin A1c and TSH.  She has had mild impaired fasting glucose in the past with a normal hemoglobin A1c in September 2020.  May need to consider EMG and can review and discuss again at follow-up visit.  Patient has stage III chronic kidney disease therefore use Aleve cautiously.  This is reviewed and discussed.  Patient has rheumatoid arthritis and is on Plaquenil and methotrexate and also takes folic acid.  Disease has been stable.  She has follow-up with rheumatology later this month.    SUBJECTIVE:    She is here today for constant burning pain in both hands that have been occurring for months.  It seems like the right hand started before the left.  It is constant but does worsen throughout the day.  She had been doing a lot of reading a couple of months back but is not really reading  any longer.  She does sleep with her hands folded at the wrist.  She does not do any other repetitive activities such as crocheting, knitting, etc.  She denies numbness and tingling.  She does have rheumatoid arthritis and is followed by rheumatology.  She has not had any medication changes in the past year.  She continues on Plaquenil, methotrexate and folate.  She eats a well-balanced diet.  She states that her hands are weak but she just does not feel like she can close them completely but when she looks at her hands she sees at that she does close them completely.  The pain does not radiate up into her arms.  Does not have numbness or tingling in hands.  She has stage III chronic kidney disease with last renal function in April 2021 CBC was normal at that time as well.  Last TSH normal in 2020.  She does not use alcohol nor tobacco.    PROBLEM LIST:  Patient Active Problem List   Diagnosis     Allergic rhinitis     ASCVD (arteriosclerotic cardiovascular disease)     DJD (degenerative joint disease) of knee     Gout     Hyperlipidemia     Hypertension     Mass of finger     Osteopenia     Primary osteoarthritis of both hands     Rheumatoid arthritis (H)     Avascular necrosis of humeral head, right (H)     Right shoulder pain     Rotator cuff tendonitis, right     S/P shoulder surgery     Shoulder impingement, right     Status post total shoulder replacement, right     Neuropathy of hand     Chronic kidney disease, stage 3     PAST MEDICAL HISTORY:  Past Medical History:   Diagnosis Date     Atherosclerotic heart disease of native coronary artery without angina pectoris     -s/p ADÁN to the mid-LAD, mid-circumflex, mid-right coronary artery, proximal right coronary  artery, and PTCA of a marginal branch of the right coronary artery 04/30/2007. -s/p ADÁN to proximal left anterior descending 10/29/13.     Contusion of abdominal wall     2014     Essential (primary) hypertension     12/14/2006     Gout     No Comments  Provided     Hyperlipidemia     2007     Localized swelling, mass, or lump of upper extremity     2017     Other specified counseling     10/28/2013,Patient has identified Health Care Agent(s): Yes Add Health Care Agents: Yes   Health Care Agent(s): Primary Health Care Agent: Jay Batista  Relationship:  Phone:   Secondary Health Care Agent:  Relationship: Daughter Phone:   Conservator:  Relationship:  Phone:   Guardian: Relationship:  Phone:    Patient has Advance Care Plan Documents (Health Care Directive, POLST): No, refe*     Polyosteoarthritis     No Comments Provided     Postmenopausal bleeding     No Comments Provided     Presence of coronary angioplasty implant and graft     ,unstable angina; severe prox LAD stenosis; s/p 7 stents     Primary osteoarthritis of left hand     2017     Rheumatoid arthritis (H)     follows at [facility] yearly     SURGICAL HISTORY:  Past Surgical History:   Procedure Laterality Date     ARTHROPLASTY KNEE      2011     ARTHROSCOPY KNEE           BIOPSY BREAST      10/25/95,BRIAN RAMIREZ      SECTION      1974, Section     COLONOSCOPY      05,Normal - Repeat in 10 years     COLONOSCOPY  2016     COLONOSCOPY N/A 2019    Procedure: COLONOSCOPY;  Surgeon: Evelin Thompson MD;  Location:  OR     COLONOSCOPY N/A 2019    Procedure: COMBINED COLONOSCOPY, SINGLE OR MULTIPLE BIOPSY/POLYPECTOMY BY BIOPSY;  Surgeon: Evelin Thompson MD;  Location:  OR     ESOPHAGOSCOPY, GASTROSCOPY, DUODENOSCOPY (EGD), COMBINED      2011,erosive gastritis;bx;consider MRI/A;[facility]     EXTRACTION(S) DENTAL      1970     HEART CATH, ANGIOPLASTY      10/29/2013,ADÁN to proximal left anterior descending     OTHER SURGICAL HISTORY      ,599716,OTHER     OTHER SURGICAL HISTORY      2014,AST584,TOTAL SHOULDER ARTHROPLASTY,Right     OTHER SURGICAL HISTORY      2016,87309.0,CT COLONOSCOPY REMOVE ELIZA POLYP  [place] SNARE,repeat 2019, precancerous polyps     ZZ STRESS LEXISCAN TEST  08/2018    With Dr. Irwin - normal       SOCIAL HISTORY:  Social History     Socioeconomic History     Marital status:      Spouse name: rashaad     Number of children: Not on file     Years of education: Not on file     Highest education level: Not on file   Occupational History     Not on file   Social Needs     Financial resource strain: Not on file     Food insecurity     Worry: Not on file     Inability: Not on file     Transportation needs     Medical: Not on file     Non-medical: Not on file   Tobacco Use     Smoking status: Never Smoker     Smokeless tobacco: Never Used     Tobacco comment: no ecig   Substance and Sexual Activity     Alcohol use: Not Currently     Alcohol/week: 0.0 standard drinks     Frequency: Monthly or less     Comment: Alcoholic Drinks/day: Rare use. ~2 glasses of wine a month     Drug use: No     Sexual activity: Yes     Partners: Male     Birth control/protection: Post-menopausal   Lifestyle     Physical activity     Days per week: Not on file     Minutes per session: Not on file     Stress: Not on file   Relationships     Social connections     Talks on phone: Not on file     Gets together: Not on file     Attends Zoroastrianism service: Not on file     Active member of club or organization: Not on file     Attends meetings of clubs or organizations: Not on file     Relationship status: Not on file     Intimate partner violence     Fear of current or ex partner: Not on file     Emotionally abused: Not on file     Physically abused: Not on file     Forced sexual activity: Not on file   Other Topics Concern     Not on file   Social History Narrative    . Moved to [place], [place] Summer of 2013 from [place], [place]. Has a daughter (lives in [place]) and son (who lives in [place]). 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 Rare alcohol use.      FAMILYHISTORY:  Family History   Problem Relation Age of Onset     Heart Disease Mother         Heart Disease     Hypertension Mother         Hypertension     Other - See Comments Mother         Stroke     Breast Cancer Mother 53        Cancer-breast     Arthritis Father         Arthritis     Cancer Father         Cancer     Heart Disease Father         Heart Disease     Hypertension Father         Hypertension     Family History Negative Brother         Good Health     Heart Disease Brother         Heart Disease     Family History Negative Sister         Good Health     CURRENT MEDICATIONS:   Current Outpatient Medications   Medication Sig Dispense Refill     naproxen sodium (ANAPROX) 220 MG tablet Take 1 tablet (220 mg) by mouth daily With food for 2 weeks       allopurinol (ZYLOPRIM) 300 MG tablet TAKE 1 TABLET DAILY 90 tablet 3     aspirin EC 81 MG EC tablet Take 1 tablet by mouth daily       atenolol (TENORMIN) 100 MG tablet TAKE 1 TABLET DAILY 90 tablet 3     atorvastatin (LIPITOR) 40 MG tablet TAKE 1 TABLET DAILY 90 tablet 3     cetirizine (ZYRTEC) 10 MG tablet Take 1 tablet (10 mg) by mouth daily       Cholecalciferol (VITAMIN D3) 2000 UNITS CAPS Take 2,000 Units by mouth daily       folic acid (FOLVITE) 1 MG tablet TAKE 1 TABLET DAILY 90 tablet 3     hydrochlorothiazide (HYDRODIURIL) 25 MG tablet TAKE 1 TABLET DAILY 90 tablet 3     hydrocortisone 2.5 % cream MIX 1 1 WITH KETOCONAZOLE AND APPLY TO THE AFFECTED AREA UNDER BREAST TWICE A DAY UNTIL RASH RESOLVES       hydroxychloroquine (PLAQUENIL) 200 MG tablet 400 mg daily alternating with 200 mg daily (Patient taking differently: Take 200 mg by mouth daily Every other day. One day 200mg next day 400mg)       methotrexate 2.5 MG tablet Take 20 mg by mouth once a week       nitroGLYcerin (NITROSTAT) 0.4 MG sublingual tablet Place 1 tablet (0.4 mg) under the tongue every 5 minutes as needed for chest pain (For chest pain x 3 doses) 30 tablet 3      ALLERGIES:  Food, Losartan, and Enalapril    REVIEW OF SYSTEMS:  Review of Systems   Constitutional: Negative for fatigue and fever.   Endocrine: Negative.    Musculoskeletal: Negative for arthralgias and myalgias.   Skin: Negative for rash.   Neurological: Negative for weakness and numbness.         OBJECTIVE:  /74 (BP Location: Right arm, Patient Position: Sitting, Cuff Size: Adult Large)   Pulse 80   Temp 97.5  F (36.4  C) (Tympanic)   Resp 16   Wt 90.9 kg (200 lb 4.8 oz)   LMP  (LMP Unknown)   SpO2 100%   BMI 31.37 kg/m    EXAM:   Pleasant female no acute distress.  Affect normal.  Alert and oriented x4.  She has obvious arthritic changes in her digits of both hands.  Positive Tinel and Phalen sign.  Normal thenar eminence.  Normal hand grasp.  Able to make fist appropriately.  Normal wrist and hand range of motion.      Swetha Maxwell, LEONID

## 2021-06-18 NOTE — NURSING NOTE
Chief Complaint   Patient presents with     RECHECK     medication     Musculoskeletal Problem     burning in hand      Patient presents to the clinic today for medication recheck and burning in her hands that started a few months ago. Patient states that hands burn in the morning but gets worse as the day goes on.  Medication Reconciliation: completed   Sharda Rivera LPN  6/18/2021 8:49 AM

## 2021-07-12 ENCOUNTER — OFFICE VISIT (OUTPATIENT)
Dept: INTERNAL MEDICINE | Facility: OTHER | Age: 76
End: 2021-07-12
Attending: NURSE PRACTITIONER
Payer: COMMERCIAL

## 2021-07-12 VITALS
BODY MASS INDEX: 31.7 KG/M2 | SYSTOLIC BLOOD PRESSURE: 136 MMHG | TEMPERATURE: 98.4 F | OXYGEN SATURATION: 100 % | WEIGHT: 202.4 LBS | RESPIRATION RATE: 16 BRPM | DIASTOLIC BLOOD PRESSURE: 84 MMHG | HEART RATE: 62 BPM

## 2021-07-12 DIAGNOSIS — M19.072 PRIMARY OSTEOARTHRITIS OF LEFT FOOT: ICD-10-CM

## 2021-07-12 DIAGNOSIS — L82.1 SEBORRHEIC KERATOSIS: ICD-10-CM

## 2021-07-12 DIAGNOSIS — N18.31 STAGE 3A CHRONIC KIDNEY DISEASE (H): ICD-10-CM

## 2021-07-12 DIAGNOSIS — G56.03 BILATERAL CARPAL TUNNEL SYNDROME: Primary | ICD-10-CM

## 2021-07-12 PROCEDURE — 99214 OFFICE O/P EST MOD 30 MIN: CPT | Performed by: NURSE PRACTITIONER

## 2021-07-12 PROCEDURE — G0463 HOSPITAL OUTPT CLINIC VISIT: HCPCS

## 2021-07-12 ASSESSMENT — ENCOUNTER SYMPTOMS
DIFFICULTY URINATING: 0
ARTHRALGIAS: 1
ROS SKIN COMMENTS: SEE HPI
APPETITE CHANGE: 0
ACTIVITY CHANGE: 0
WEAKNESS: 1
NUMBNESS: 0

## 2021-07-12 ASSESSMENT — PAIN SCALES - GENERAL: PAINLEVEL: MILD PAIN (2)

## 2021-07-12 NOTE — PROGRESS NOTES
ASSESSMENT:    ICD-10-CM    1. Bilateral carpal tunnel syndrome  G56.03    2. Stage 3a chronic kidney disease  N18.31    3. Primary osteoarthritis of left foot  M19.072    4. Seborrheic keratosis  L82.1        PLAN:  1.  Continue to wear wrist splints at nighttime and during the day if needed.  Discontinue Aleve.  Start acetaminophen 650 mg twice daily.  May apply ice to her wrist for 10 minutes twice daily.  Follow-up in 1 month sooner if she develops weakness, muscle atrophy, etc.  2.  Discontinue Aleve to protect from kidney damage.  3.  Recommend shoes with a good arch support.  The acetaminophen will help with arthritis pain.  4.  Continue to monitor.  Discussed benign characteristics of seborrheic keratosis.  If for some reason this changes and becomes larger, more irritated, surrounding redness, etc. should be rechecked.  She is encouraged to follow-up with her dermatologist for her annual examination.    SUBJECTIVE:    She is here today to follow-up on bilateral carpal tunnel syndrome.  She has been wearing wrist splints every night.  Sometimes she wears them during the day if she is able to depending upon activities that she is doing.  She is found that her pain is improved from a 4 down to around a 1.  She only takes Aleve at bedtime.  Has not taken any acetaminophen.  She does not notice any new weakness or atrophy of muscles in her hand.  She states she was trying to put on an earring yesterday and had difficulty with her fingertips.   Also is complaining of bilateral foot pain more at the left foot.  It is on the underside of the foot.  Usually worse in the evening after she has been on her feet most of the day.  She usually wears a canvas types shoe that has very little arch support.  Has a lesion along the inside of the left breast.  It is dark brown and raised.  It does not bother her.  Has not flaked off and does not get irritated.  She does see dermatology annually due to Plaquenil  use.    PROBLEM LIST:  Patient Active Problem List   Diagnosis     Allergic rhinitis     ASCVD (arteriosclerotic cardiovascular disease)     DJD (degenerative joint disease) of knee     Gout     Hyperlipidemia     Hypertension     Mass of finger     Osteopenia     Primary osteoarthritis of both hands     Rheumatoid arthritis (H)     Avascular necrosis of humeral head, right (H)     Right shoulder pain     Rotator cuff tendonitis, right     S/P shoulder surgery     Shoulder impingement, right     Status post total shoulder replacement, right     Neuropathy of hand     Chronic kidney disease, stage 3     Seborrheic keratosis     PAST MEDICAL HISTORY:  Past Medical History:   Diagnosis Date     Atherosclerotic heart disease of native coronary artery without angina pectoris     -s/p ADÁN to the mid-LAD, mid-circumflex, mid-right coronary artery, proximal right coronary  artery, and PTCA of a marginal branch of the right coronary artery 04/30/2007. -s/p ADÁN to proximal left anterior descending 10/29/13.     Contusion of abdominal wall     2014     Essential (primary) hypertension     12/14/2006     Gout     No Comments Provided     Hyperlipidemia     4/4/2007     Localized swelling, mass, or lump of upper extremity     5/26/2017     Other specified counseling     10/28/2013,Patient has identified Health Care Agent(s): Yes Add Health Care Agents: Yes   Health Care Agent(s): Primary Health Care Agent: Jay Batista  Relationship:  Phone:   Secondary Health Care Agent:  Relationship: Daughter Phone:   Conservator:  Relationship:  Phone:   Guardian: Relationship:  Phone:    Patient has Advance Care Plan Documents (Health Care Directive, POLST): No, refe*     Polyosteoarthritis     No Comments Provided     Postmenopausal bleeding     No Comments Provided     Presence of coronary angioplasty implant and graft     2007/2013,unstable angina; severe prox LAD stenosis; s/p 7 stents     Primary osteoarthritis of left hand      2017     Rheumatoid arthritis (H)     follows at [facility] yearly     SURGICAL HISTORY:  Past Surgical History:   Procedure Laterality Date     ARTHROPLASTY KNEE      2011     ARTHROSCOPY KNEE           BIOPSY BREAST      10/25/95,BRIAN RAMIREZ      SECTION      1974, Section     COLONOSCOPY      05,Normal - Repeat in 10 years     COLONOSCOPY  2016     COLONOSCOPY N/A 2019    Procedure: COLONOSCOPY;  Surgeon: Evelin Thompson MD;  Location:  OR     COLONOSCOPY N/A 2019    Procedure: COMBINED COLONOSCOPY, SINGLE OR MULTIPLE BIOPSY/POLYPECTOMY BY BIOPSY;  Surgeon: Evelin Thompson MD;  Location:  OR     ESOPHAGOSCOPY, GASTROSCOPY, DUODENOSCOPY (EGD), COMBINED      2011,erosive gastritis;bx;consider MRI/A;[facility]     EXTRACTION(S) DENTAL      1970     HEART CATH, ANGIOPLASTY      10/29/2013,ADÁN to proximal left anterior descending     OTHER SURGICAL HISTORY      ,161412,OTHER     OTHER SURGICAL HISTORY      2014,[id number],TOTAL SHOULDER ARTHROPLASTY,Right     OTHER SURGICAL HISTORY      2016,26328.0,NC COLONOSCOPY REMOVE ELIZA POLYP [facility]ESN SNARE,repeat , precancerous polyps     ZZ STRESS LEXISCAN TEST  2018    With Dr. Irwin - normal       SOCIAL HISTORY:  Social History     Socioeconomic History     Marital status:      Spouse name: rashaad     Number of children: Not on file     Years of education: Not on file     Highest education level: Not on file   Occupational History     Not on file   Tobacco Use     Smoking status: Never Smoker     Smokeless tobacco: Never Used     Tobacco comment: no ecig   Substance and Sexual Activity     Alcohol use: Not Currently     Alcohol/week: 0.0 standard drinks     Comment: Alcoholic Drinks/day: Rare use. ~2 glasses of wine a month     Drug use: No     Sexual activity: Yes     Partners: Male     Birth control/protection: Post-menopausal   Other Topics Concern     Not on file   Social  History Narrative    . Moved to [place], [place] Summer of 2013 from [place], [place]. Has a daughter (lives in [place]) and son (who lives in [place]). 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     Social Determinants of Health     Financial Resource Strain:      Difficulty of Paying Living Expenses:    Food Insecurity:      Worried About Running Out of Food in the Last Year:      Ran Out of Food in the Last Year:    Transportation Needs:      Lack of Transportation (Medical):      Lack of Transportation (Non-Medical):    Physical Activity:      Days of Exercise per Week:      Minutes of Exercise per Session:    Stress:      Feeling of Stress :    Social Connections:      Frequency of Communication with Friends and Family:      Frequency of Social Gatherings with Friends and Family:      Attends Cheondoism Services:      Active Member of Clubs or Organizations:      Attends Club or Organization Meetings:      Marital Status:    Intimate Partner Violence:      Fear of Current or Ex-Partner:      Emotionally Abused:      Physically Abused:      Sexually Abused:      FAMILYHISTORY:  Family History   Problem Relation Age of Onset     Heart Disease Mother         Heart Disease     Hypertension Mother         Hypertension     Other - See Comments Mother         Stroke     Breast Cancer Mother 53        Cancer-breast     Arthritis Father         Arthritis     Cancer Father         Cancer     Heart Disease Father         Heart Disease     Hypertension Father         Hypertension     Family History Negative Brother         Good Health     Heart Disease Brother         Heart Disease     Family History Negative Sister         Good Health     CURRENT MEDICATIONS:   Current Outpatient Medications   Medication Sig Dispense Refill     allopurinol (ZYLOPRIM) 300 MG tablet TAKE 1 TABLET DAILY 90 tablet 3     aspirin EC 81 MG EC tablet Take 1 tablet by  mouth daily       atenolol (TENORMIN) 100 MG tablet TAKE 1 TABLET DAILY 90 tablet 3     atorvastatin (LIPITOR) 40 MG tablet TAKE 1 TABLET DAILY 90 tablet 3     cetirizine (ZYRTEC) 10 MG tablet Take 1 tablet (10 mg) by mouth daily       Cholecalciferol (VITAMIN D3) 2000 UNITS CAPS Take 2,000 Units by mouth daily       folic acid (FOLVITE) 1 MG tablet TAKE 1 TABLET DAILY 90 tablet 3     hydrochlorothiazide (HYDRODIURIL) 25 MG tablet TAKE 1 TABLET DAILY 90 tablet 3     hydrocortisone 2.5 % cream MIX 1 1 WITH KETOCONAZOLE AND APPLY TO THE AFFECTED AREA UNDER BREAST TWICE A DAY UNTIL RASH RESOLVES       hydroxychloroquine (PLAQUENIL) 200 MG tablet 400 mg daily alternating with 200 mg daily (Patient taking differently: Take 200 mg by mouth daily Every other day. One day 200mg next day 400mg)       methotrexate 2.5 MG tablet Take 20 mg by mouth once a week       naproxen sodium (ANAPROX) 220 MG tablet Take 1 tablet (220 mg) by mouth daily With food for 2 weeks       nitroGLYcerin (NITROSTAT) 0.4 MG sublingual tablet Place 1 tablet (0.4 mg) under the tongue every 5 minutes as needed for chest pain (For chest pain x 3 doses) 30 tablet 3     ALLERGIES:  Food, Losartan, and Enalapril    REVIEW OF SYSTEMS:  Review of Systems   Constitutional: Negative for activity change and appetite change.   Genitourinary: Negative for difficulty urinating.   Musculoskeletal: Positive for arthralgias.   Skin:        See HPI   Neurological: Positive for weakness. Negative for numbness.        See HPI         OBJECTIVE:  /84 (BP Location: Right arm, Patient Position: Sitting, Cuff Size: Adult Large)   Pulse 62   Temp 98.4  F (36.9  C) (Tympanic)   Resp 16   Wt 91.8 kg (202 lb 6.4 oz)   LMP  (LMP Unknown)   SpO2 100%   Breastfeeding No   BMI 31.70 kg/m    EXAM:   Pleasant female in no acute distress.  Affect normal.  Alert and oriented x4.  Hands bilaterally with normal strength.  No thenar atrophy.  No swelling.  Left foot with  decreased arch.  She has tenderness with palpation over the metacarpal bones and to the arch of the left foot.  No pain with palpation to the bony processes of the right foot.  She is wearing a shoe with minimal arch support.  In her left breast has a dark brown-black raised lesion with a white scale that has a stuck on appearance.  No surrounding erythema or irritation.      Swetha Maxwell, NP

## 2021-07-12 NOTE — NURSING NOTE
Patient is here for a 2 week follow up of neuropathy of her hand, states has been feeling ok, yesterday was bothering her more. States she drove to [place] and back and thinks that upset it.     FOOD SECURITY SCREENING QUESTIONS  Hunger Vital Signs:  Within the past 12 months we worried whether our food would run out before we got money to buy more. Never  Within the past 12 months the food we bought just didn't last and we didn't have money to get more. Never  Rekha Kimble LPN 7/12/2021 8:51 AM      No LMP recorded (lmp unknown). Patient is postmenopausal.  Medication Reconciliation: complete    Rekha Kimble LPN  7/12/2021 8:52 AM

## 2021-07-21 DIAGNOSIS — E78.5 HYPERLIPIDEMIA, UNSPECIFIED HYPERLIPIDEMIA TYPE: ICD-10-CM

## 2021-07-22 RX ORDER — ATORVASTATIN CALCIUM 40 MG/1
TABLET, FILM COATED ORAL
Qty: 90 TABLET | Refills: 3 | Status: SHIPPED | OUTPATIENT
Start: 2021-07-22 | End: 2022-06-21

## 2021-07-22 NOTE — TELEPHONE ENCOUNTER
Prescription refilled per RN Medication Refill Policy..................Rosette Dey RN 7/22/2021 10:15 AM

## 2021-08-11 ENCOUNTER — OFFICE VISIT (OUTPATIENT)
Dept: INTERNAL MEDICINE | Facility: OTHER | Age: 76
End: 2021-08-11
Attending: NURSE PRACTITIONER
Payer: COMMERCIAL

## 2021-08-11 VITALS
DIASTOLIC BLOOD PRESSURE: 82 MMHG | WEIGHT: 201.2 LBS | BODY MASS INDEX: 31.51 KG/M2 | SYSTOLIC BLOOD PRESSURE: 138 MMHG | OXYGEN SATURATION: 100 % | TEMPERATURE: 97.6 F | RESPIRATION RATE: 16 BRPM | HEART RATE: 55 BPM

## 2021-08-11 DIAGNOSIS — I83.93 VARICOSE VEINS OF BOTH LOWER EXTREMITIES, UNSPECIFIED WHETHER COMPLICATED: ICD-10-CM

## 2021-08-11 DIAGNOSIS — I73.9 PAD (PERIPHERAL ARTERY DISEASE) (H): ICD-10-CM

## 2021-08-11 DIAGNOSIS — L53.9 DEPENDENT RUBOR: ICD-10-CM

## 2021-08-11 DIAGNOSIS — G56.03 BILATERAL CARPAL TUNNEL SYNDROME: Primary | ICD-10-CM

## 2021-08-11 DIAGNOSIS — I70.219 INTERMITTENT CLAUDICATION DUE TO ATHEROSCLEROSIS OF ARTERY OF EXTREMITY (H): ICD-10-CM

## 2021-08-11 PROCEDURE — G0463 HOSPITAL OUTPT CLINIC VISIT: HCPCS

## 2021-08-11 PROCEDURE — 99214 OFFICE O/P EST MOD 30 MIN: CPT | Performed by: NURSE PRACTITIONER

## 2021-08-11 ASSESSMENT — PAIN SCALES - GENERAL: PAINLEVEL: MILD PAIN (2)

## 2021-08-11 ASSESSMENT — ENCOUNTER SYMPTOMS
WEAKNESS: 0
ROS SKIN COMMENTS: SEE HPI

## 2021-08-11 NOTE — PROGRESS NOTES
ASSESSMENT:    ICD-10-CM    1. Bilateral carpal tunnel syndrome  G56.03 Orthopedic  Referral   2. Dependent rubor  L53.9    3. Varicose veins of both lower extremities, unspecified whether complicated  I83.93    4. Intermittent claudication due to atherosclerosis of artery of extremity (H)  I70.219    5. PAD (peripheral artery disease) (H)  I73.9 US ERMELINDA Doppler No Exercise 1-2 Levels Bilateral       PLAN:  1.  Continue with wrist splints.  Recommend ice to both wrist for 10 minutes 2-3 times daily.  We discussed getting an EMG but she is somewhat hesitant.  She would be interested in seeing orthopedic physician first for evaluation and further treatment or evaluation plan.  2.  Patient describes dependent rubor.  Reviewed and discussed etiology.  3.  She has symptoms of intermittent claudication therefore will order ERMELINDA.  She will be contacted with results.  4.  Follow-up in 1 month, sooner with concerns.    SUBJECTIVE:    She is here today for follow-up on bilateral wrist pain.  She states that she continues to have pain at the wrist that radiates into her hands and sometimes because of burning pain.  Sometimes she will feel it radiating up her forearms.  She does not have any neck, elbow or shoulder pain.  There has been no weakness.  She has been wearing wrist splints every night but usually less than 5 hours because it causes aching of her wrist.  She will wear them 1-2 hours during the day.  She did try anti-inflammatories for a while but not effective.  She has been taking them acetaminophen twice daily.  Also states that she was seen by her rheumatologist at [facility] recently.  She had reported that she had redness of her feet with walking that improved with elevation.  She also has cold feet and some discomfort in her calfs when she is in bed at night.  She has not had ERMELINDA.  She also has a black spot on the distal tip of the left great toe that has been there for several months.    PROBLEM  LIST:  Patient Active Problem List   Diagnosis     Allergic rhinitis     ASCVD (arteriosclerotic cardiovascular disease)     DJD (degenerative joint disease) of knee     Gout     Hyperlipidemia     Hypertension     Mass of finger     Osteopenia     Primary osteoarthritis of both hands     Rheumatoid arthritis (H)     Avascular necrosis of humeral head, right (H)     Right shoulder pain     Rotator cuff tendonitis, right     S/P shoulder surgery     Shoulder impingement, right     Status post total shoulder replacement, right     Neuropathy of hand     Chronic kidney disease, stage 3     Seborrheic keratosis     PAST MEDICAL HISTORY:  Past Medical History:   Diagnosis Date     Atherosclerotic heart disease of native coronary artery without angina pectoris     -s/p ADNÁ to the mid-LAD, mid-circumflex, mid-right coronary artery, proximal right coronary  artery, and PTCA of a marginal branch of the right coronary artery 04/30/2007. -s/p ADÁN to proximal left anterior descending 10/29/13.     Contusion of abdominal wall     2014     Essential (primary) hypertension     12/14/2006     Gout     No Comments Provided     Hyperlipidemia     4/4/2007     Localized swelling, mass, or lump of upper extremity     5/26/2017     Other specified counseling     10/28/2013,Patient has identified Health Care Agent(s): Yes Add Health Care Agents: Yes   Health Care Agent(s): Primary Health Care Agent: Jay Batista  Relationship:  Phone:   Secondary Health Care Agent:  Relationship: Daughter Phone:   Conservator:  Relationship:  Phone:   Guardian: Relationship:  Phone:    Patient has Advance Care Plan Documents (Health Care Directive, POLST): No, refe*     Polyosteoarthritis     No Comments Provided     Postmenopausal bleeding     No Comments Provided     Presence of coronary angioplasty implant and graft     2007/2013,unstable angina; severe prox LAD stenosis; s/p 7 stents     Primary osteoarthritis of left hand     5/30/2017      Rheumatoid arthritis (H)     follows at [facility] yearly     SURGICAL HISTORY:  Past Surgical History:   Procedure Laterality Date     ARTHROPLASTY KNEE      2011     ARTHROSCOPY KNEE           BIOPSY BREAST      10/25/95,BRIAN RAMIREZ      SECTION      , Section     COLONOSCOPY      05,Normal - Repeat in 10 years     COLONOSCOPY  2016     COLONOSCOPY N/A 2019    Procedure: COLONOSCOPY;  Surgeon: Evelin Thompson MD;  Location:  OR     COLONOSCOPY N/A 2019    Procedure: COMBINED COLONOSCOPY, SINGLE OR MULTIPLE BIOPSY/POLYPECTOMY BY BIOPSY;  Surgeon: Evelin Thompson MD;  Location:  OR     ESOPHAGOSCOPY, GASTROSCOPY, DUODENOSCOPY (EGD), COMBINED      2011,erosive gastritis;bx;consider MRI/A;Bravo     EXTRACTION(S) DENTAL      1970     HEART CATH, ANGIOPLASTY      10/29/2013,ADÁN to proximal left anterior descending     OTHER SURGICAL HISTORY      ,767253,OTHER     OTHER SURGICAL HISTORY      2014,JQT816,TOTAL SHOULDER ARTHROPLASTY,Right     OTHER SURGICAL HISTORY      2016,28323.0,[place] COLONOSCOPY REMOVE ELIZA POLYP LESN SNARE,repeat , precancerous polyps     ZZ STRESS LEXISCAN TEST  2018    With Dr. Irwin - normal       SOCIAL HISTORY:  Social History     Socioeconomic History     Marital status:      Spouse name: rashaad     Number of children: Not on file     Years of education: Not on file     Highest education level: Not on file   Occupational History     Not on file   Tobacco Use     Smoking status: Never Smoker     Smokeless tobacco: Never Used     Tobacco comment: no ecig   Vaping Use     Vaping Use: Never used   Substance and Sexual Activity     Alcohol use: Not Currently     Alcohol/week: 0.0 standard drinks     Comment: Alcoholic Drinks/day: Rare use. ~2 glasses of wine a month     Drug use: No     Sexual activity: Yes     Partners: Male     Birth control/protection: Post-menopausal   Other Topics Concern      Not on file   Social History Narrative    . Moved to [place], [place] Summer of 2013 from [place], [place]. Has a daughter (lives in [place]) and son (who lives in [place]). 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 Rare alcohol use.     Social Determinants of Health     Financial Resource Strain:      Difficulty of Paying Living Expenses:    Food Insecurity:      Worried About Running Out of Food in the Last Year:      Ran Out of Food in the Last Year:    Transportation Needs:      Lack of Transportation (Medical):      Lack of Transportation (Non-Medical):    Physical Activity:      Days of Exercise per Week:      Minutes of Exercise per Session:    Stress:      Feeling of Stress :    Social Connections:      Frequency of Communication with Friends and Family:      Frequency of Social Gatherings with Friends and Family:      Attends Anglican Services:      Active Member of Clubs or Organizations:      Attends Club or Organization Meetings:      Marital Status:    Intimate Partner Violence:      Fear of Current or Ex-Partner:      Emotionally Abused:      Physically Abused:      Sexually Abused:      FAMILYHISTORY:  Family History   Problem Relation Age of Onset     Heart Disease Mother         Heart Disease     Hypertension Mother         Hypertension     Other - See Comments Mother         Stroke     Breast Cancer Mother 53        Cancer-breast     Arthritis Father         Arthritis     Cancer Father         Cancer     Heart Disease Father         Heart Disease     Hypertension Father         Hypertension     Family History Negative Brother         Good Health     Heart Disease Brother         Heart Disease     Family History Negative Sister         Good Health     CURRENT MEDICATIONS:   Current Outpatient Medications   Medication Sig Dispense Refill     allopurinol (ZYLOPRIM) 300 MG tablet TAKE 1 TABLET DAILY 90 tablet 3     aspirin EC 81 MG EC  tablet Take 1 tablet by mouth daily       atenolol (TENORMIN) 100 MG tablet TAKE 1 TABLET DAILY 90 tablet 3     atorvastatin (LIPITOR) 40 MG tablet TAKE 1 TABLET DAILY 90 tablet 3     cetirizine (ZYRTEC) 10 MG tablet Take 1 tablet (10 mg) by mouth daily       Cholecalciferol (VITAMIN D3) 2000 UNITS CAPS Take 2,000 Units by mouth daily       folic acid (FOLVITE) 1 MG tablet TAKE 1 TABLET DAILY 90 tablet 3     hydrochlorothiazide (HYDRODIURIL) 25 MG tablet TAKE 1 TABLET DAILY 90 tablet 3     hydroxychloroquine (PLAQUENIL) 200 MG tablet 400 mg daily alternating with 200 mg daily (Patient taking differently: Take 200 mg by mouth daily Every other day. One day 200mg next day 400mg)       methotrexate 2.5 MG tablet Take 20 mg by mouth once a week       nitroGLYcerin (NITROSTAT) 0.4 MG sublingual tablet Place 1 tablet (0.4 mg) under the tongue every 5 minutes as needed for chest pain (For chest pain x 3 doses) 30 tablet 3     ALLERGIES:  Food, Losartan, and Enalapril    REVIEW OF SYSTEMS:  Review of Systems   Musculoskeletal:        See HPI   Skin:        See HPI   Neurological: Negative for weakness.        See HPI         OBJECTIVE:  /82 (BP Location: Right arm, Patient Position: Sitting, Cuff Size: Adult Regular)   Pulse 55   Temp 97.6  F (36.4  C) (Tympanic)   Resp 16   Wt 91.3 kg (201 lb 3.2 oz)   LMP  (LMP Unknown)   SpO2 100%   BMI 31.51 kg/m    EXAM:   Pleasant female in no acute distress.  Affect normal.  Alert and oriented x4.  Normal hand grasp bilaterally.  No swelling or erythema of the hands.  Good range of motion of both wrist and digits.  She has obvious arthritic changes of the digits.  See previous visit for carpal tunnel examination.  Bilateral lower extremities DP PT 1+.  Capillary refill 3 seconds.  Varicose veins and trace edema present of lower extremities.  Along the distal tip of the left great toe is a pinpoint black area.      Swetha Maxwell, LEONID

## 2021-08-11 NOTE — NURSING NOTE
"Chief Complaint   Patient presents with     Follow Up     1 month carpal tunnel       FOOD SECURITY SCREENING QUESTIONS  Hunger Vital Signs:  Within the past 12 months we worried whether our food would run out before we got money to buy more. Never  Within the past 12 months the food we bought just didn't last and we didn't have money to get more. Never  Claudia Rahman LPN 8/11/2021 9:24 AM      Initial /82 (BP Location: Right arm, Patient Position: Sitting, Cuff Size: Adult Regular)   Pulse 55   Temp 97.6  F (36.4  C) (Tympanic)   Resp 16   Wt 91.3 kg (201 lb 3.2 oz)   LMP  (LMP Unknown)   SpO2 100%   BMI 31.51 kg/m   Estimated body mass index is 31.51 kg/m  as calculated from the following:    Height as of 1/9/21: 1.702 m (5' 7\").    Weight as of this encounter: 91.3 kg (201 lb 3.2 oz).  Medication Reconciliation: complete    Claudia Rahman LPN  "

## 2021-08-16 ENCOUNTER — HOSPITAL ENCOUNTER (OUTPATIENT)
Dept: ULTRASOUND IMAGING | Facility: OTHER | Age: 76
Discharge: HOME | End: 2021-08-16
Attending: NURSE PRACTITIONER | Admitting: NURSE PRACTITIONER
Payer: COMMERCIAL

## 2021-08-16 DIAGNOSIS — I73.9 PAD (PERIPHERAL ARTERY DISEASE) (H): ICD-10-CM

## 2021-08-16 PROCEDURE — 93922 UPR/L XTREMITY ART 2 LEVELS: CPT

## 2021-09-08 ENCOUNTER — OFFICE VISIT (OUTPATIENT)
Dept: INTERNAL MEDICINE | Facility: OTHER | Age: 76
End: 2021-09-08
Attending: NURSE PRACTITIONER
Payer: COMMERCIAL

## 2021-09-08 VITALS
DIASTOLIC BLOOD PRESSURE: 86 MMHG | WEIGHT: 204.2 LBS | BODY MASS INDEX: 31.98 KG/M2 | RESPIRATION RATE: 16 BRPM | OXYGEN SATURATION: 98 % | SYSTOLIC BLOOD PRESSURE: 134 MMHG | TEMPERATURE: 97.1 F | HEART RATE: 70 BPM

## 2021-09-08 DIAGNOSIS — G62.9 PERIPHERAL POLYNEUROPATHY: ICD-10-CM

## 2021-09-08 DIAGNOSIS — G56.03 BILATERAL CARPAL TUNNEL SYNDROME: Primary | ICD-10-CM

## 2021-09-08 PROCEDURE — 99213 OFFICE O/P EST LOW 20 MIN: CPT | Performed by: NURSE PRACTITIONER

## 2021-09-08 PROCEDURE — G0463 HOSPITAL OUTPT CLINIC VISIT: HCPCS

## 2021-09-08 ASSESSMENT — ENCOUNTER SYMPTOMS: BACK PAIN: 1

## 2021-09-08 ASSESSMENT — PAIN SCALES - GENERAL: PAINLEVEL: MILD PAIN (2)

## 2021-09-08 NOTE — PROGRESS NOTES
ASSESSMENT:    ICD-10-CM    1. Bilateral carpal tunnel syndrome  G56.03 Adult Neurology Referral   2. Peripheral polyneuropathy- feet  G62.9 Adult Neurology Referral       PLAN:  EMG of hands and feet is ordered.  Keep appointment with Dr. Stanley.  Would recommend following up a couple of weeks after the EMG to discuss results.  I also recommend that patient discuss with her rheumatologist to see if the methotrexate or Plaquenil could be contributing to her neuropathy.  Side effect of Plaquenil could be neuropathy.    SUBJECTIVE:    She is here today for follow-up.  She has symptoms consistent with bilateral carpal tunnel.  She has an appointment later this month with Dr. Stanley.  She had opted not to have an EMG until she saw him first.  She continues to have burning pain and numbness in her feet.  This is been occurring for several years but seems to slowly be worsening.  She had lab work-up that was negative.  She does not use alcohol.  She has chronic low back pain but no other radicular type symptoms.  This is not worsened or changed.  She had an ERMELINDA that returned normal for arterial circulation.  She now is saying that she would be interested in getting an EMG.      PROBLEM LIST:  Patient Active Problem List   Diagnosis     Allergic rhinitis     ASCVD (arteriosclerotic cardiovascular disease)     DJD (degenerative joint disease) of knee     Gout     Hyperlipidemia     Hypertension     Mass of finger     Osteopenia     Primary osteoarthritis of both hands     Rheumatoid arthritis (H)     Avascular necrosis of humeral head, right (H)     Right shoulder pain     Rotator cuff tendonitis, right     S/P shoulder surgery     Shoulder impingement, right     Status post total shoulder replacement, right     Neuropathy of hand     Chronic kidney disease, stage 3     Seborrheic keratosis     PAST MEDICAL HISTORY:  Past Medical History:   Diagnosis Date     Atherosclerotic heart disease of native coronary  artery without angina pectoris     -s/p ADÁN to the mid-LAD, mid-circumflex, mid-right coronary artery, proximal right coronary  artery, and PTCA of a marginal branch of the right coronary artery 2007. -s/p ADÁN to proximal left anterior descending 10/29/13.     Contusion of abdominal wall          Essential (primary) hypertension     2006     Gout     No Comments Provided     Hyperlipidemia     2007     Localized swelling, mass, or lump of upper extremity     2017     Other specified counseling     10/28/2013,Patient has identified Health Care Agent(s): Yes Add Health Care Agents: Yes   Health Care Agent(s): Primary Health Care Agent: Jay Lester  Relationship:  Phone:   Secondary Health Care Agent:  Relationship: Daughter Phone:   Conservator:  Relationship:  Phone:   Guardian: Relationship:  Phone:    Patient has Advance Care Plan Documents (Health Care Directive, POLST): No, refe*     Polyosteoarthritis     No Comments Provided     Postmenopausal bleeding     No Comments Provided     Presence of coronary angioplasty implant and graft     ,unstable angina; severe prox LAD stenosis; s/p 7 stents     Primary osteoarthritis of left hand     2017     Rheumatoid arthritis (H)     follows at [facility] yearly     SURGICAL HISTORY:  Past Surgical History:   Procedure Laterality Date     ARTHROPLASTY KNEE      2011     ARTHROSCOPY KNEE           BIOPSY BREAST      10/25/95,BRIAN RAMIREZ      SECTION      1974, Section     COLONOSCOPY      05,Normal - Repeat in 10 years     COLONOSCOPY  2016     COLONOSCOPY N/A 2019    Procedure: COLONOSCOPY;  Surgeon: Evelin Thompson MD;  Location:  OR     COLONOSCOPY N/A 2019    Procedure: COMBINED COLONOSCOPY, SINGLE OR MULTIPLE BIOPSY/POLYPECTOMY BY BIOPSY;  Surgeon: vEelin Thompson MD;  Location:  OR     ESOPHAGOSCOPY, GASTROSCOPY, DUODENOSCOPY (EGD), COMBINED       5/2/2011,erosive gastritis;bx;consider MRI/A;Bravo     EXTRACTION(S) DENTAL      1970     HEART CATH, ANGIOPLASTY      10/29/2013,ADÁN to proximal left anterior descending     OTHER SURGICAL HISTORY      2007/2013,930452,OTHER     OTHER SURGICAL HISTORY      01/2014,[id number],TOTAL SHOULDER ARTHROPLASTY,Right     OTHER SURGICAL HISTORY      04/14/2016,67542.0,[place] COLONOSCOPY REMOVE ELIZA POLYP LESN SNARE,repeat 2019, precancerous polyps     ZZ STRESS LEXISCAN TEST  08/2018    With Dr. Irwin - normal       SOCIAL HISTORY:  Social History     Socioeconomic History     Marital status:      Spouse name: rashaad     Number of children: Not on file     Years of education: Not on file     Highest education level: Not on file   Occupational History     Not on file   Tobacco Use     Smoking status: Never Smoker     Smokeless tobacco: Never Used     Tobacco comment: no ecig   Vaping Use     Vaping Use: Never used   Substance and Sexual Activity     Alcohol use: Not Currently     Alcohol/week: 0.0 standard drinks     Comment: Alcoholic Drinks/day: Rare use. ~2 glasses of wine a month     Drug use: No     Sexual activity: Yes     Partners: Male     Birth control/protection: Post-menopausal   Other Topics Concern     Not on file   Social History Narrative    . Moved to [place], [place] Summer of 2013 from [place], [place]. Has a daughter (lives in [place]) and son (who lives in [place]). 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     Social Determinants of Health     Financial Resource Strain:      Difficulty of Paying Living Expenses:    Food Insecurity:      Worried About Running Out of Food in the Last Year:      Ran Out of Food in the Last Year:    Transportation Needs:      Lack of Transportation (Medical):      Lack of Transportation (Non-Medical):    Physical Activity:      Days of Exercise per Week:      Minutes of Exercise per Session:     Stress:      Feeling of Stress :    Social Connections:      Frequency of Communication with Friends and Family:      Frequency of Social Gatherings with Friends and Family:      Attends Sabianism Services:      Active Member of Clubs or Organizations:      Attends Club or Organization Meetings:      Marital Status:    Intimate Partner Violence:      Fear of Current or Ex-Partner:      Emotionally Abused:      Physically Abused:      Sexually Abused:      FAMILYHISTORY:  Family History   Problem Relation Age of Onset     Heart Disease Mother         Heart Disease     Hypertension Mother         Hypertension     Other - See Comments Mother         Stroke     Breast Cancer Mother 53        Cancer-breast     Arthritis Father         Arthritis     Cancer Father         Cancer     Heart Disease Father         Heart Disease     Hypertension Father         Hypertension     Family History Negative Brother         Good Health     Heart Disease Brother         Heart Disease     Family History Negative Sister         Good Health     CURRENT MEDICATIONS:   Current Outpatient Medications   Medication Sig Dispense Refill     allopurinol (ZYLOPRIM) 300 MG tablet TAKE 1 TABLET DAILY 90 tablet 3     aspirin EC 81 MG EC tablet Take 1 tablet by mouth daily       atenolol (TENORMIN) 100 MG tablet TAKE 1 TABLET DAILY 90 tablet 3     atorvastatin (LIPITOR) 40 MG tablet TAKE 1 TABLET DAILY 90 tablet 3     cetirizine (ZYRTEC) 10 MG tablet Take 1 tablet (10 mg) by mouth daily       Cholecalciferol (VITAMIN D3) 2000 UNITS CAPS Take 2,000 Units by mouth daily       folic acid (FOLVITE) 1 MG tablet TAKE 1 TABLET DAILY 90 tablet 3     hydrochlorothiazide (HYDRODIURIL) 25 MG tablet TAKE 1 TABLET DAILY 90 tablet 3     hydroxychloroquine (PLAQUENIL) 200 MG tablet 400 mg daily alternating with 200 mg daily (Patient taking differently: Take 200 mg by mouth daily Every other day. One day 200mg next day 400mg)       methotrexate 2.5 MG tablet Take  20 mg by mouth once a week       nitroGLYcerin (NITROSTAT) 0.4 MG sublingual tablet Place 1 tablet (0.4 mg) under the tongue every 5 minutes as needed for chest pain (For chest pain x 3 doses) 30 tablet 3     ALLERGIES:  Food, Losartan, and Enalapril    REVIEW OF SYSTEMS:  Review of Systems   Musculoskeletal: Positive for back pain.   Neurological:        Burning pain and numbness of both feet, chronic.  Pain and aching of both hands          OBJECTIVE:  /86 (BP Location: Right arm, Patient Position: Sitting, Cuff Size: Adult Regular)   Pulse 70   Temp 97.1  F (36.2  C) (Tympanic)   Resp 16   Wt 92.6 kg (204 lb 3.2 oz)   LMP  (LMP Unknown)   SpO2 98%   BMI 31.98 kg/m    EXAM:   Pleasant female no acute distress.  Affect normal.  Alert and pleasant.  Monofilament exam completed of both of her feet.  Right 8/10+ for neuropathy, left 10/10+ for neuropathy.  See previous notes for examination in regards to hands and carpal tunnel findings.    ERMELINDA result reviewed and discussed.  June 2021 neuropathy lab work reviewed and discussed with patient once again.    Swetha Maxwell, LEONID

## 2021-09-08 NOTE — NURSING NOTE
"Chief Complaint   Patient presents with     Follow Up     Wrist and legs        FOOD SECURITY SCREENING QUESTIONS  Hunger Vital Signs:  Within the past 12 months we worried whether our food would run out before we got money to buy more. Never  Within the past 12 months the food we bought just didn't last and we didn't have money to get more. Never  Claudia Rahman LPN 9/8/2021 7:59 AM      Initial /86 (BP Location: Right arm, Patient Position: Sitting, Cuff Size: Adult Regular)   Pulse 70   Temp 97.1  F (36.2  C) (Tympanic)   Resp 16   Wt 92.6 kg (204 lb 3.2 oz)   LMP  (LMP Unknown)   SpO2 98%   BMI 31.98 kg/m   Estimated body mass index is 31.98 kg/m  as calculated from the following:    Height as of 1/9/21: 1.702 m (5' 7\").    Weight as of this encounter: 92.6 kg (204 lb 3.2 oz).  Medication Reconciliation: complete    Claudia Rahman LPN  "

## 2021-09-16 ENCOUNTER — OFFICE VISIT (OUTPATIENT)
Dept: NEUROLOGY | Facility: OTHER | Age: 76
End: 2021-09-16
Attending: PSYCHIATRY & NEUROLOGY
Payer: COMMERCIAL

## 2021-09-16 VITALS
WEIGHT: 201.4 LBS | SYSTOLIC BLOOD PRESSURE: 128 MMHG | HEIGHT: 67 IN | BODY MASS INDEX: 31.61 KG/M2 | OXYGEN SATURATION: 99 % | DIASTOLIC BLOOD PRESSURE: 84 MMHG | TEMPERATURE: 97 F | RESPIRATION RATE: 16 BRPM | HEART RATE: 60 BPM

## 2021-09-16 DIAGNOSIS — G56.03 BILATERAL CARPAL TUNNEL SYNDROME: Primary | ICD-10-CM

## 2021-09-16 PROCEDURE — 95913 NRV CNDJ TEST 13/> STUDIES: CPT | Mod: 26 | Performed by: PSYCHIATRY & NEUROLOGY

## 2021-09-16 PROCEDURE — 95886 MUSC TEST DONE W/N TEST COMP: CPT | Mod: 26 | Performed by: PSYCHIATRY & NEUROLOGY

## 2021-09-16 PROCEDURE — G0463 HOSPITAL OUTPT CLINIC VISIT: HCPCS

## 2021-09-16 PROCEDURE — 95913 NRV CNDJ TEST 13/> STUDIES: CPT | Performed by: PSYCHIATRY & NEUROLOGY

## 2021-09-16 PROCEDURE — 95886 MUSC TEST DONE W/N TEST COMP: CPT | Performed by: PSYCHIATRY & NEUROLOGY

## 2021-09-16 PROCEDURE — 99202 OFFICE O/P NEW SF 15 MIN: CPT | Mod: 25 | Performed by: PSYCHIATRY & NEUROLOGY

## 2021-09-16 ASSESSMENT — PAIN SCALES - GENERAL: PAINLEVEL: MILD PAIN (2)

## 2021-09-16 ASSESSMENT — MIFFLIN-ST. JEOR: SCORE: 1446.17

## 2021-09-16 NOTE — LETTER
9/16/2021         RE: Margaret Batista  [address] [facility] [id number]        Dear Colleague,    Thank you for referring your patient, Margaret Batista, to the [facility]. Please see a copy of my visit note below.    Visit Date: 09/16/2021    Swetha Maxwell MD.    HISTORY OF PRESENT ILLNESS:  The patient relates that for quite a few months, she has had recurrent paresthesia and sensory loss affecting the hands, right slightly more than left.  She is not convinced of any loss of strength.  She has not had significant problems with neck pain.  She has been chronically overweight, but is not known to be diabetic and does not describe symptoms in the feet.    PAST MEDICAL HISTORY:  Other than the above, is noncontributory.    REVIEW OF SYSTEMS:  A 10-system review of systems is negative.    PHYSICAL EXAMINATION:  Reveals an obese 75-year-old who appears deconditioned.  Her strength is within normal limits bilaterally for the intrinsic hand muscles and the forearm flexors and extensors.  There is no muscle wasting.  Pinprick is intact in the cervical dermatomes of the upper extremities.  Gait is consistent with body habitus.    NERVE CONDUCTION STUDIES:  Motor nerve conduction testing was performed bilaterally for the median and ulnar nerves.  Both median nerves showed mild latency prolongation with mild slowing at the wrist.  The ulnar studies were normal.  H reflexes were uniformly unobtainable due to excessive adipose at the recording sites.    Orthodromic mixed conduction studies were performed for the median and ulnar nerves.  Antidromic sensory nerve studies were performed for the superficial radial nerves.  The ulnar and radial nerves showed normal latencies, amplitudes and conduction velocities both median nerves showed mild latency prolongation and slowing through the carpal tunnel.    MONOPOLAR EMG NEEDLE EXAMINATION:  Monopolar needle exam was performed  bilaterally for the first dorsal interosseous, extensor digitorum communis, flexor carpi radialis, triceps and brachioradialis.  All of the tested muscles showed normal insertional activity.  Motor units were normal in size with normal recruitment and interference.    IMPRESSION:  The patient has nerve conduction findings consistent with mild bilateral carpal tunnel syndrome, slightly worse on the right than the left.  Otherwise, she looks to be neurologically intact with regard to the upper extremities.    Findings were reviewed with the patient.    Farshad Valles MD        D: 2021   T: 2021   MT: [email or web address]    Name:     JACKIE PAL  MRN:      [id number]        Account:    [id number]   :      1945           Visit Date: 2021     Document: [id number]    cc:  MD Swetha Solares NP       Again, thank you for allowing me to participate in the care of your patient.        Sincerely,        Farshad Valles MD

## 2021-09-16 NOTE — NURSING NOTE
"Chief Complaint   Patient presents with     Consult     Bilateral Upper Extremity Carpal Tunnel        Initial /84 (BP Location: Right arm, Patient Position: Chair, Cuff Size: Adult Large)   Pulse 60   Temp 97  F (36.1  C) (Tympanic)   Resp 16   Ht 1.702 m (5' 7\")   Wt 91.4 kg (201 lb 6.4 oz)   LMP  (LMP Unknown)   SpO2 99%   Breastfeeding No   BMI 31.54 kg/m   Estimated body mass index is 31.54 kg/m  as calculated from the following:    Height as of this encounter: 1.702 m (5' 7\").    Weight as of this encounter: 91.4 kg (201 lb 6.4 oz).  Medication Reconciliation: complete    FOOD SECURITY SCREENING QUESTIONS  Hunger Vital Signs:  Within the past 12 months we worried whether our food would run out before we got money to buy more. Never  Within the past 12 months the food we bought just didn't last and we didn't have money to get more. Never  Antonia Arnold LPN 9/16/2021 2:31 PM         "

## 2021-09-16 NOTE — PROGRESS NOTES
Visit Date: 09/16/2021    Swetha Maxwell MD.    HISTORY OF PRESENT ILLNESS:  The patient relates that for quite a few months, she has had recurrent paresthesia and sensory loss affecting the hands, right slightly more than left.  She is not convinced of any loss of strength.  She has not had significant problems with neck pain.  She has been chronically overweight, but is not known to be diabetic and does not describe symptoms in the feet.    PAST MEDICAL HISTORY:  Other than the above, is noncontributory.    REVIEW OF SYSTEMS:  A 10-system review of systems is negative.    PHYSICAL EXAMINATION:  Reveals an obese 75-year-old who appears deconditioned.  Her strength is within normal limits bilaterally for the intrinsic hand muscles and the forearm flexors and extensors.  There is no muscle wasting.  Pinprick is intact in the cervical dermatomes of the upper extremities.  Gait is consistent with body habitus.    NERVE CONDUCTION STUDIES:  Motor nerve conduction testing was performed bilaterally for the median and ulnar nerves.  Both median nerves showed mild latency prolongation with mild slowing at the wrist.  The ulnar studies were normal.  H reflexes were uniformly unobtainable due to excessive adipose at the recording sites.    Orthodromic mixed conduction studies were performed for the median and ulnar nerves.  Antidromic sensory nerve studies were performed for the superficial radial nerves.  The ulnar and radial nerves showed normal latencies, amplitudes and conduction velocities both median nerves showed mild latency prolongation and slowing through the carpal tunnel.    MONOPOLAR EMG NEEDLE EXAMINATION:  Monopolar needle exam was performed bilaterally for the first dorsal interosseous, extensor digitorum communis, flexor carpi radialis, triceps and brachioradialis.  All of the tested muscles showed normal insertional activity.  Motor units were normal in size with normal recruitment and  interference.    IMPRESSION:  The patient has nerve conduction findings consistent with mild bilateral carpal tunnel syndrome, slightly worse on the right than the left.  Otherwise, she looks to be neurologically intact with regard to the upper extremities.    Findings were reviewed with the patient.    Farshad Rdz MD        D: 2021   T: 2021   MT: [email or web address]    Name:     JACKIE PAL  MRN:      [id number]        Account:    [id number]   :      1945           Visit Date: 2021     Document: [id number]    cc:  MD Swetha Solares, LEONID

## 2021-09-17 ENCOUNTER — HOSPITAL ENCOUNTER (OUTPATIENT)
Dept: GENERAL RADIOLOGY | Facility: OTHER | Age: 76
End: 2021-09-17
Attending: SPECIALIST
Payer: COMMERCIAL

## 2021-09-17 ENCOUNTER — OFFICE VISIT (OUTPATIENT)
Dept: ORTHOPEDICS | Facility: OTHER | Age: 76
End: 2021-09-17
Attending: NURSE PRACTITIONER
Payer: COMMERCIAL

## 2021-09-17 DIAGNOSIS — I10 ESSENTIAL HYPERTENSION: ICD-10-CM

## 2021-09-17 DIAGNOSIS — G56.03 BILATERAL CARPAL TUNNEL SYNDROME: ICD-10-CM

## 2021-09-17 PROCEDURE — 73110 X-RAY EXAM OF WRIST: CPT | Mod: 50

## 2021-09-17 PROCEDURE — 99203 OFFICE O/P NEW LOW 30 MIN: CPT | Performed by: SPECIALIST

## 2021-09-17 PROCEDURE — G0463 HOSPITAL OUTPT CLINIC VISIT: HCPCS

## 2021-09-17 NOTE — PROGRESS NOTES
Visit Date: 2021    HISTORY OF PRESENT ILLNESS:  Sandra Pal is a 75-year-old right-hand dominant female I am seeing today for evaluation of burning pain in both hands.  She is a retired  and right hand dominant.  She underwent EMGs by Dr. Rdz and was referred for evaluation.    PAST MEDICAL HISTORY, PAST SURGICAL HISTORY, MEDICATIONS AND ALLERGIES:  Reviewed.    PHYSICAL EXAMINATION:  Reveals a 75-year-old female, alert and oriented x3 and appropriate.  Gait and station are appropriate.  She is well groomed and well kempt.  Examination of both upper extremities reveals full and symmetric range of motion of elbows, wrists.  Examination of both hands shows no evidence of intrinsic atrophy, atrophic skin changes, adenopathy or focal weakness.  She does have a positive Tinel's at the wrist.    IMAGING:  X-rays were reviewed; AP, lateral, and oblique views of both wrists as well as carpal tunnel views dated 2021.  These reveal basilar joint arthrosis bilaterally.  EMG studies reviewed from Dr. Rdz dated 2021.  EMG studies show bilateral carpal tunnel syndrome, this would be described as mild.    IMPRESSION:  Bilateral carpal tunnel syndrome.    PLAN:  We discussed options at length.  Dr. Rdz had recommended a trial splint wear.  She finds the splints are somewhat uncomfortable.  We discussed removing the aluminum portions of the splint to see if this would improve her symptoms.  We did this and we will see if this helps.  If not, she will let us know.  We also discussed consideration of a corticosteroid injection.  She will think about this and let us know if she would like to proceed.    Danial Stanley MD        D: 2021   T: 2021   MT: [id number]    Name:     JACKIE PAL  MRN:      [id number]        Account:    [id number]   :      1945           Visit Date: 2021     Document: [id number]

## 2021-09-17 NOTE — PROGRESS NOTES
Patient is here for consult on her carpal tunnel.  Nancy Daniel LPN .....................9/17/2021 2:22 PM

## 2021-09-21 ENCOUNTER — TELEPHONE (OUTPATIENT)
Dept: INTERNAL MEDICINE | Facility: OTHER | Age: 76
End: 2021-09-21

## 2021-09-21 RX ORDER — HYDROCHLOROTHIAZIDE 25 MG/1
TABLET ORAL
Qty: 90 TABLET | Refills: 3 | Status: SHIPPED | OUTPATIENT
Start: 2021-09-21 | End: 2022-09-28

## 2021-09-21 NOTE — TELEPHONE ENCOUNTER
Please call patient and let her know that the EMG of her hands shows that she has mild bilateral carpal tunnel syndrome right worse than left.  I want her to keep her appointment with orthopedics.  Also, I had ordered for her to have an EMG of her feet also.  I do not see a note for that EMG.  Did she have it completed that day or did they have to schedule her for a different day?

## 2021-09-21 NOTE — TELEPHONE ENCOUNTER
HYDROCHLOROT TAB 25MG       Last Written Prescription Date:  11/24/20  Last Fill Quantity: 90,   # refills: 3  Last Office Visit: 9/8/21  Future Office visit:       Routing refill request to provider for review/approval because:  Drug not on the [facility], [facility] or [facility] refill protocol or controlled substance, due to unable to refill with protocol.  Unable to complete prescription refill per RNMedication Refill Policy.................... Shannon Fairchild RN ....................  9/21/2021   10:32 AM

## 2021-09-21 NOTE — TELEPHONE ENCOUNTER
Called and spoke with the patient and she states she had her hands completed but has not heard anything about having her feet done.   Claudia Rahman LPN on 9/21/2021 at 1:46 PM

## 2021-09-21 NOTE — TELEPHONE ENCOUNTER
Okay, I would recommend that she call [facility] neurology and ask them about getting this scheduled as I had sent over an order when I also ordered the EMG of her hands.

## 2021-09-22 ENCOUNTER — TELEPHONE (OUTPATIENT)
Dept: INTERNAL MEDICINE | Facility: OTHER | Age: 76
End: 2021-09-22

## 2021-09-22 NOTE — TELEPHONE ENCOUNTER
Called and spoke with the patient. She is having it done 10/21/21.  Claudia Rahman LPN on 9/22/2021 at 11:24 AM

## 2021-09-22 NOTE — TELEPHONE ENCOUNTER
Called and spoke with the patient after verifying name and . She is going to call [facility] neurology.   Claudia Rahman LPN on 2021 at 9:39 AM

## 2021-10-01 DIAGNOSIS — M10.9 GOUT, UNSPECIFIED CAUSE, UNSPECIFIED CHRONICITY, UNSPECIFIED SITE: ICD-10-CM

## 2021-10-05 RX ORDER — ALLOPURINOL 300 MG/1
TABLET ORAL
Qty: 90 TABLET | Refills: 3 | OUTPATIENT
Start: 2021-10-05

## 2021-10-05 NOTE — TELEPHONE ENCOUNTER
Redundant refill request refused: Too soon:  [facility]  Unable to complete prescription refill per RN Medication Refill Policy.................... Claudia Orozco RN ....................  10/5/2021   3:37 PM

## 2021-10-09 ENCOUNTER — HEALTH MAINTENANCE LETTER (OUTPATIENT)
Age: 76
End: 2021-10-09

## 2021-10-10 DIAGNOSIS — I10 ESSENTIAL HYPERTENSION: ICD-10-CM

## 2021-10-12 RX ORDER — ATENOLOL 100 MG/1
TABLET ORAL
Qty: 90 TABLET | Refills: 3 | Status: SHIPPED | OUTPATIENT
Start: 2021-10-12 | End: 2022-09-28

## 2021-10-12 NOTE — TELEPHONE ENCOUNTER
" Disp Refills Start End TEN   atenolol (TENORMIN) 100 MG tablet 90 tablet 3 11/24/2020  No   Sig: TAKE 1 TABLET DAILY       LOV: 9/8/2021  Future Office visit: No future appointment scheduled at this time.     Requested Prescriptions   Pending Prescriptions Disp Refills     atenolol (TENORMIN) 100 MG tablet [Pharmacy Med Name: ATENOLOL TAB 100MG] 90 tablet 3     Sig: TAKE 1 TABLET DAILY       Beta-Blockers Protocol Passed - 10/10/2021  6:22 PM        Passed - Blood pressure under 140/90 in past 12 months     BP Readings from Last 3 Encounters:   09/16/21 128/84   09/08/21 134/86   08/11/21 138/82                 Passed - Patient is age 6 or older        Passed - Recent (12 mo) or future (30 days) visit within the authorizing provider's specialty     Patient has had an office visit with the authorizing provider or a provider within the authorizing providers department within the previous 12 mos or has a future within next 30 days. See \"Patient Info\" tab in inbasket, or \"Choose Columns\" in Meds & Orders section of the refill encounter.              Passed - Medication is active on med list           Kyra Dodson RN  ....................  10/12/2021   4:34 PM      "

## 2021-10-21 ENCOUNTER — ALLIED HEALTH/NURSE VISIT (OUTPATIENT)
Dept: FAMILY MEDICINE | Facility: OTHER | Age: 76
End: 2021-10-21
Attending: NURSE PRACTITIONER
Payer: COMMERCIAL

## 2021-10-21 ENCOUNTER — OFFICE VISIT (OUTPATIENT)
Dept: NEUROLOGY | Facility: OTHER | Age: 76
End: 2021-10-21
Attending: PSYCHIATRY & NEUROLOGY
Payer: COMMERCIAL

## 2021-10-21 ENCOUNTER — LAB (OUTPATIENT)
Dept: LAB | Facility: OTHER | Age: 76
End: 2021-10-21
Attending: NURSE PRACTITIONER
Payer: COMMERCIAL

## 2021-10-21 VITALS
RESPIRATION RATE: 16 BRPM | WEIGHT: 203.8 LBS | HEIGHT: 67 IN | DIASTOLIC BLOOD PRESSURE: 84 MMHG | BODY MASS INDEX: 31.99 KG/M2 | SYSTOLIC BLOOD PRESSURE: 130 MMHG | HEART RATE: 70 BPM | TEMPERATURE: 97 F | OXYGEN SATURATION: 94 %

## 2021-10-21 DIAGNOSIS — Z23 NEED FOR PROPHYLACTIC VACCINATION AND INOCULATION AGAINST INFLUENZA: Primary | ICD-10-CM

## 2021-10-21 DIAGNOSIS — G60.3 IDIOPATHIC PROGRESSIVE POLYNEUROPATHY: Primary | ICD-10-CM

## 2021-10-21 DIAGNOSIS — M06.4 INFLAMMATORY POLYARTHROPATHY (H): ICD-10-CM

## 2021-10-21 LAB
AST SERPL W P-5'-P-CCNC: 18 U/L (ref 13–39)
BASOPHILS # BLD AUTO: 0.1 10E3/UL (ref 0–0.2)
BASOPHILS NFR BLD AUTO: 1 %
CREAT SERPL-MCNC: 0.99 MG/DL (ref 0.6–1.2)
EOSINOPHIL # BLD AUTO: 0.4 10E3/UL (ref 0–0.7)
EOSINOPHIL NFR BLD AUTO: 3 %
ERYTHROCYTE [DISTWIDTH] IN BLOOD BY AUTOMATED COUNT: 15.6 % (ref 10–15)
GFR SERPL CREATININE-BSD FRML MDRD: 56 ML/MIN/1.73M2
HCT VFR BLD AUTO: 44.6 % (ref 35–47)
HGB BLD-MCNC: 14.7 G/DL (ref 11.7–15.7)
IMM GRANULOCYTES # BLD: 0.1 10E3/UL
IMM GRANULOCYTES NFR BLD: 0 %
LYMPHOCYTES # BLD AUTO: 3.5 10E3/UL (ref 0.8–5.3)
LYMPHOCYTES NFR BLD AUTO: 30 %
MCH RBC QN AUTO: 31.8 PG (ref 26.5–33)
MCHC RBC AUTO-ENTMCNC: 33 G/DL (ref 31.5–36.5)
MCV RBC AUTO: 97 FL (ref 78–100)
MONOCYTES # BLD AUTO: 0.3 10E3/UL (ref 0–1.3)
MONOCYTES NFR BLD AUTO: 2 %
NEUTROPHILS # BLD AUTO: 7.6 10E3/UL (ref 1.6–8.3)
NEUTROPHILS NFR BLD AUTO: 64 %
NRBC # BLD AUTO: 0 10E3/UL
NRBC BLD AUTO-RTO: 0 /100
PLATELET # BLD AUTO: 222 10E3/UL (ref 150–450)
RBC # BLD AUTO: 4.62 10E6/UL (ref 3.8–5.2)
WBC # BLD AUTO: 11.9 10E3/UL (ref 4–11)

## 2021-10-21 PROCEDURE — 95923 AUTONOMIC NRV SYST FUNJ TEST: CPT | Performed by: PSYCHIATRY & NEUROLOGY

## 2021-10-21 PROCEDURE — 99214 OFFICE O/P EST MOD 30 MIN: CPT | Mod: 25 | Performed by: PSYCHIATRY & NEUROLOGY

## 2021-10-21 PROCEDURE — 36415 COLL VENOUS BLD VENIPUNCTURE: CPT | Mod: ZL

## 2021-10-21 PROCEDURE — 90662 IIV NO PRSV INCREASED AG IM: CPT

## 2021-10-21 PROCEDURE — 85025 COMPLETE CBC W/AUTO DIFF WBC: CPT | Mod: ZL

## 2021-10-21 PROCEDURE — 84450 TRANSFERASE (AST) (SGOT): CPT | Mod: ZL

## 2021-10-21 PROCEDURE — 95912 NRV CNDJ TEST 11-12 STUDIES: CPT | Mod: 26 | Performed by: PSYCHIATRY & NEUROLOGY

## 2021-10-21 PROCEDURE — 82565 ASSAY OF CREATININE: CPT | Mod: ZL

## 2021-10-21 PROCEDURE — G0463 HOSPITAL OUTPT CLINIC VISIT: HCPCS | Mod: 25

## 2021-10-21 PROCEDURE — 95886 MUSC TEST DONE W/N TEST COMP: CPT | Performed by: PSYCHIATRY & NEUROLOGY

## 2021-10-21 PROCEDURE — 95886 MUSC TEST DONE W/N TEST COMP: CPT | Mod: 26 | Performed by: PSYCHIATRY & NEUROLOGY

## 2021-10-21 PROCEDURE — 95912 NRV CNDJ TEST 11-12 STUDIES: CPT | Performed by: PSYCHIATRY & NEUROLOGY

## 2021-10-21 PROCEDURE — 95923 AUTONOMIC NRV SYST FUNJ TEST: CPT | Mod: 26 | Performed by: PSYCHIATRY & NEUROLOGY

## 2021-10-21 PROCEDURE — G0008 ADMIN INFLUENZA VIRUS VAC: HCPCS

## 2021-10-21 ASSESSMENT — PAIN SCALES - GENERAL: PAINLEVEL: NO PAIN (1)

## 2021-10-21 ASSESSMENT — MIFFLIN-ST. JEOR: SCORE: 1457.06

## 2021-10-21 NOTE — PROGRESS NOTES
Visit Date: 10/21/2021    REFERRING PHYSICIAN:  Lidia Maxwell NP    HISTORY OF PRESENT ILLNESS:  The patient relates about a 2-year history of progressive paresthesia and dysesthesia affecting the feet and now ascending to the distal leg.  She describes some weakness at the ankles bilaterally.  Symptoms are symmetric and not affected by activity or position.  Back pain has not been a prominent symptom.    PAST MEDICAL HISTORY:  Noncontributory.  The patient has been chronically overweight, but is not diabetic.  There is no history of tobacco or alcohol use.    REVIEW OF SYSTEMS:  A 10-system review of systems other than the above is negative.    FAMILY HISTORY:  Seemingly positive for polyneuropathy affecting one of the patient's three siblings and possibly also one of her parents.  No details are available.    SOCIAL HISTORY:  The patient does not use significant alcohol and there is no history of tobacco use.    PHYSICAL EXAMINATION:  The patient is 67 inches tall and weighs 204 pounds.  Blood pressure is 130/84.  Gait is normal.  There is mild weakness for the dorsi and plantar flexors of the feet and toes.  Proximal strength is well preserved in the lower extremities.  Reflexes are absent at the knees and ankles.  Vibratory extinction is 5 seconds at each ankle and cold sensation is significantly reduced in a stocking distribution bilaterally.    NERVE CONDUCTION STUDIES:  Motor nerve conduction testing was performed bilaterally for the peroneal and tibial nerves.  Only small waveforms could be obtained for the peroneal nerves.  Waveforms were absent for the tibial nerves.  H reflex latencies were unobtainable throughout.    Antidromic sensory nerve conduction studies were performed bilaterally for the sural and peroneal nerves.  Waveforms were uniformly absent.    Sympathetic skin response testing was performed stimulating at the right knee and recording from the plantar aspect of the foot.  A biphasic  waveform with normal latency and amplitude was obtained.    MONOPOLAR EMG NEEDLE EXAMINATION:  Monopolar needle exam was performed bilaterally for the biceps femoris, tibialis anterior, gastrocnemius, extensor hallucis longus and abductor hallucis.  There were moderate-to-severe chronic denervation changes in a distal to proximal gradient bilaterally.    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  The pattern is very nonspecific and likely to prove idiopathic.  Laboratory workup should include B12 level, thyroid function tests, serum protein electrophoresis and transglutaminase antibody.    Findings were reviewed with the patient.    Farshad Rdz MD        D: 10/21/2021   T: 10/21/2021   MT: [id number]    Name:     JACKIE PAL  MRN:      [id number]        Account:    [id number]   :      1945           Visit Date: 10/21/2021     Document: [id number]

## 2021-10-21 NOTE — Clinical Note
10/21/2021         RE: Margaret Batista  [id number] [facility] [address]        Dear Colleague,    Thank you for referring your patient, Margaret Batista, to the [facility] [place]. Please see a copy of my visit note below.    No notes on file    Again, thank you for allowing me to participate in the care of your patient.        Sincerely,        Farshad Valles MD

## 2021-10-21 NOTE — NURSING NOTE
"Chief Complaint   Patient presents with     Consult     BLE numbness         Initial /84 (BP Location: Right arm, Patient Position: Sitting, Cuff Size: Adult Large)   Pulse 70   Temp 97  F (36.1  C) (Tympanic)   Resp 16   Ht 1.702 m (5' 7\")   Wt 92.4 kg (203 lb 12.8 oz)   LMP  (LMP Unknown)   SpO2 94%   BMI 31.92 kg/m   Estimated body mass index is 31.92 kg/m  as calculated from the following:    Height as of this encounter: 1.702 m (5' 7\").    Weight as of this encounter: 92.4 kg (203 lb 12.8 oz).     FOOD SECURITY SCREENING QUESTIONS  Hunger Vital Signs:  Within the past 12 months we worried whether our food would run out before we got money to buy more. Never  Within the past 12 months the food we bought just didn't last and we didn't have money to get more. Never      Medication Reconciliation: Complete      Yulissa Carroll LPN   "

## 2021-10-26 NOTE — PROGRESS NOTES
Visit Date: 10/21/2021    REFERRING PHYSICIAN:  Lidia Maxwell NP    HISTORY OF PRESENT ILLNESS:  The patient relates about a 2-year history of progressive paresthesia and dysesthesia affecting the feet and now ascending to the distal leg.  She describes some weakness at the ankles bilaterally.  Symptoms are symmetric and not affected by activity or position.  Back pain has not been a prominent symptom.    PAST MEDICAL HISTORY:  Noncontributory.  The patient has been chronically overweight, but is not diabetic.  There is no history of tobacco or alcohol use.    REVIEW OF SYSTEMS:  A 10-system review of systems other than the above is negative.    FAMILY HISTORY:  Seemingly positive for polyneuropathy affecting one of the patient's three siblings and possibly also one of her parents.  No details are available.    SOCIAL HISTORY:  The patient does not use significant alcohol and there is no history of tobacco use.    PHYSICAL EXAMINATION:  The patient is 67 inches tall and weighs 204 pounds.  Blood pressure is 130/84.  Gait is normal.  There is mild weakness for the dorsi and plantar flexors of the feet and toes.  Proximal strength is well preserved in the lower extremities.  Reflexes are absent at the knees and ankles.  Vibratory extinction is 5 seconds at each ankle and cold sensation is significantly reduced in a stocking distribution bilaterally.    NERVE CONDUCTION STUDIES:  Motor nerve conduction testing was performed bilaterally for the peroneal and tibial nerves.  Only small waveforms could be obtained for the peroneal nerves.  Waveforms were absent for the tibial nerves.  H reflex latencies were unobtainable throughout.    Antidromic sensory nerve conduction studies were performed bilaterally for the sural and peroneal nerves.  Waveforms were uniformly absent.    Sympathetic skin response testing was performed stimulating at the right knee and recording from the plantar aspect of the foot.  A biphasic  waveform with normal latency and amplitude was obtained.    MONOPOLAR EMG NEEDLE EXAMINATION:  Monopolar needle exam was performed bilaterally for the biceps femoris, tibialis anterior, gastrocnemius, extensor hallucis longus and abductor hallucis.  There were moderate-to-severe chronic denervation changes in a distal to proximal gradient bilaterally.    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  The pattern is very nonspecific and likely to prove idiopathic.  Laboratory workup should include B12 level, thyroid function tests, serum protein electrophoresis and transglutaminase antibody.    Findings were reviewed with the patient.    Revised Account:  10/26/2021, sp - Text copied from signed document in Epic.    Farshad Rdz MD        D: 10/21/2021   T: 10/21/2021   MT: [id number]    Name:     JACKIE PAL  MRN:      [id number]        Account:    [id number]   :      1945           Visit Date: 10/21/2021     Document: [id number]

## 2021-11-16 ENCOUNTER — OFFICE VISIT (OUTPATIENT)
Dept: INTERNAL MEDICINE | Facility: OTHER | Age: 76
End: 2021-11-16
Attending: NURSE PRACTITIONER
Payer: COMMERCIAL

## 2021-11-16 VITALS
HEART RATE: 67 BPM | BODY MASS INDEX: 32.42 KG/M2 | TEMPERATURE: 97 F | SYSTOLIC BLOOD PRESSURE: 136 MMHG | RESPIRATION RATE: 16 BRPM | OXYGEN SATURATION: 96 % | WEIGHT: 207 LBS | DIASTOLIC BLOOD PRESSURE: 84 MMHG

## 2021-11-16 DIAGNOSIS — Z00.00 ENCOUNTER FOR MEDICARE ANNUAL WELLNESS EXAM: Primary | ICD-10-CM

## 2021-11-16 DIAGNOSIS — N18.31 STAGE 3A CHRONIC KIDNEY DISEASE (H): ICD-10-CM

## 2021-11-16 DIAGNOSIS — I10 PRIMARY HYPERTENSION: ICD-10-CM

## 2021-11-16 DIAGNOSIS — G60.3 IDIOPATHIC PROGRESSIVE POLYNEUROPATHY: ICD-10-CM

## 2021-11-16 DIAGNOSIS — M05.79 RHEUMATOID ARTHRITIS INVOLVING MULTIPLE SITES WITH POSITIVE RHEUMATOID FACTOR (H): ICD-10-CM

## 2021-11-16 DIAGNOSIS — I25.10 ASCVD (ARTERIOSCLEROTIC CARDIOVASCULAR DISEASE): ICD-10-CM

## 2021-11-16 DIAGNOSIS — M10.9 GOUT, UNSPECIFIED CAUSE, UNSPECIFIED CHRONICITY, UNSPECIFIED SITE: ICD-10-CM

## 2021-11-16 LAB
TOTAL PROTEIN SERUM FOR ELP: 6.8 G/DL (ref 6.8–8.8)
VIT B12 SERPL-MCNC: 296 PG/ML (ref 180–914)

## 2021-11-16 PROCEDURE — G0439 PPPS, SUBSEQ VISIT: HCPCS | Performed by: NURSE PRACTITIONER

## 2021-11-16 PROCEDURE — 99213 OFFICE O/P EST LOW 20 MIN: CPT | Mod: 25 | Performed by: NURSE PRACTITIONER

## 2021-11-16 PROCEDURE — 36415 COLL VENOUS BLD VENIPUNCTURE: CPT | Mod: ZL | Performed by: NURSE PRACTITIONER

## 2021-11-16 PROCEDURE — 84165 PROTEIN E-PHORESIS SERUM: CPT | Mod: ZL | Performed by: STUDENT IN AN ORGANIZED HEALTH CARE EDUCATION/TRAINING PROGRAM

## 2021-11-16 PROCEDURE — 82607 VITAMIN B-12: CPT | Mod: ZL | Performed by: NURSE PRACTITIONER

## 2021-11-16 PROCEDURE — 83516 IMMUNOASSAY NONANTIBODY: CPT | Mod: ZL | Performed by: NURSE PRACTITIONER

## 2021-11-16 PROCEDURE — G0463 HOSPITAL OUTPT CLINIC VISIT: HCPCS

## 2021-11-16 PROCEDURE — 84155 ASSAY OF PROTEIN SERUM: CPT | Mod: ZL | Performed by: NURSE PRACTITIONER

## 2021-11-16 RX ORDER — ALLOPURINOL 300 MG/1
1 TABLET ORAL DAILY
Qty: 90 TABLET | Refills: 3 | Status: SHIPPED | OUTPATIENT
Start: 2021-11-16 | End: 2021-12-23

## 2021-11-16 ASSESSMENT — ENCOUNTER SYMPTOMS
CHEST TIGHTNESS: 0
VOMITING: 0
TROUBLE SWALLOWING: 0
CONSTIPATION: 1
PALPITATIONS: 0
DIARRHEA: 0
BLOOD IN STOOL: 0
DIZZINESS: 0
ANAL BLEEDING: 0
NERVOUS/ANXIOUS: 0
DECREASED CONCENTRATION: 0
DYSURIA: 0
ENDOCRINE NEGATIVE: 1
SHORTNESS OF BREATH: 0
WEAKNESS: 0
FATIGUE: 0
LIGHT-HEADEDNESS: 0
NAUSEA: 0
ABDOMINAL PAIN: 0
UNEXPECTED WEIGHT CHANGE: 0
DIFFICULTY URINATING: 0

## 2021-11-16 ASSESSMENT — PAIN SCALES - GENERAL: PAINLEVEL: NO PAIN (0)

## 2021-11-16 NOTE — NURSING NOTE
"Chief Complaint   Patient presents with     Medicare Visit       FOOD SECURITY SCREENING QUESTIONS  Hunger Vital Signs:  Within the past 12 months we worried whether our food would run out before we got money to buy more. Never  Within the past 12 months the food we bought just didn't last and we didn't have money to get more. Never  Claudia Rahman LPN 11/16/2021 8:02 AM      Initial /84 (BP Location: Right arm, Patient Position: Sitting, Cuff Size: Adult Regular)   Pulse 67   Temp 97  F (36.1  C) (Tympanic)   Resp 16   Wt 93.9 kg (207 lb)   LMP  (LMP Unknown)   SpO2 96%   BMI 32.42 kg/m   Estimated body mass index is 32.42 kg/m  as calculated from the following:    Height as of 10/21/21: 1.702 m (5' 7\").    Weight as of this encounter: 93.9 kg (207 lb).  Medication Reconciliation: complete    Claudia Rahman LPN  "

## 2021-11-16 NOTE — PATIENT INSTRUCTIONS
Patient Education   Personalized Prevention Plan  You are due for the preventive services outlined below.  Your care team is available to assist you in scheduling these services.  If you have already completed any of these items, please share that information with your care team to update in your medical record.  Health Maintenance Due   Topic Date Due     COVID-19 Vaccine (3 - Pfizer risk 4-dose series) 07/01/2021

## 2021-11-16 NOTE — PROGRESS NOTES
"Medicare Wellness Visit   Ms. Batista is a 75 year old female who presents today who presents for Preventive Visit.      Are you in the first 12 months of your Medicare coverage?  No    Health Risk Assessment     Do you feel safe in your environment? Yes    Physical Health:    In general, how would you rate your overall physical health? excellent    Outside of work, how many days during the week do you exercise? 1 day/week    Outside of work, approximately how many minutes a day do you exercise?15-30 minutes    If you drink alcohol do you typically have >3 drinks per day or >7 drinks per week? No    Do you usually eat at least 4 servings of fruit and vegetables a day, include whole grains & fiber and avoid regularly eating high fat or \"junk\" foods? NO    Do you have any problems taking medications regularly?  No    Do you have any side effects from medications? none    Needs assistance for the following daily activities: no assistance needed    Which of the following safety concerns are present in your home?  none identified     Hearing impairment: No    In the past 6 months, have you been bothered by leaking of urine? no      Screening     Fall risk  Fallen 2 or more times in the past year?: No  Any fall with injury in the past year?: No    Cognitive Screening   1) Repeat 3 items (Leader, Season, Table)    2) Clock draw: NORMAL  3) 3 item recall: Recalls 3 objects  Results: 3 items recalled: COGNITIVE IMPAIRMENT LESS LIKELY    Mini-CogTM Copyright ASHLEE Ramirez. Licensed by the author for use in [facility]; reprinted with permission (alex@.[facility]). All rights reserved.      Do you have sleep apnea, excessive snoring or daytime drowsiness?: yes    Breast cancer screenin2021    Regular dental visits: 2021    Eye exam with in 2 years: 2021      End of Life Planning     Have you ever done Advance Care Planning? (For example, a Health Directive, POLST, or a discussion with a medical provider or your " loved ones about your wishes): Yes, advance care planning is on file.      End of Life Planning:  Patient currently has an advanced directive: Yes.  Practitioner is supportive of decision.        Medical Record Update     Reviewed and updated as needed this visit by clinical staff  Tobacco  Allergies  Meds  Med Hx  Surg Hx  Fam Hx  Soc Hx      Reviewed and updated as needed this visit by Provider  Tobacco  Allergies  Meds  Problems  Med Hx  Surg Hx  Fam Hx         Current Outpatient Medications   Medication     allopurinol (ZYLOPRIM) 300 MG tablet     aspirin EC 81 MG EC tablet     atenolol (TENORMIN) 100 MG tablet     atorvastatin (LIPITOR) 40 MG tablet     cetirizine (ZYRTEC) 10 MG tablet     Cholecalciferol (VITAMIN D3) 2000 UNITS CAPS     folic acid (FOLVITE) 1 MG tablet     hydrochlorothiazide (HYDRODIURIL) 25 MG tablet     hydroxychloroquine (PLAQUENIL) 200 MG tablet     methotrexate 2.5 MG tablet     nitroGLYcerin (NITROSTAT) 0.4 MG sublingual tablet     No current facility-administered medications for this visit.          Current providers sharing in care for this patient include:   Patient Care Team:  Swetha Maxwell NP as PCP - General (Nurse Practitioner)  Swetha Maxwell NP as Assigned PCP  Evelin Thompson MD as Assigned Surgical Provider  Danial Stanley MD as Assigned Musculoskeletal Provider  Farshad Rdz MD as Assigned Neuroscience Provider     Subjective:   Patient is seen today for Medicare annual wellness visit and chronic disease management.  She has history of hypertension and ASCVD.  She would like to establish care with cardiologist locally.  She has rheumatoid arthritis and takes Plaquenil and methotrexate.  She is followed by rheumatology and has labs routinely.  She has stage III chronic kidney disease which is reviewed and discussed and mild.  She has been seen by neurology recently for burning pain in her feet.  She has been diagnosed with idiopathic  polyneuropathy.  Neurologist recommended transglutaminase antibody and protein electrophoresis.  She has had B12 and folate.  B12 is on the low side of normal.  She took B12 supplement for a while but then stopped taking it.  Also needs a refill of her allopurinol which she takes for gout.  No recent flares.  She is up-to-date on immunizations.  Up-to-date on mammogram and colon cancer screening.  She is not sure when she was supposed to have a follow-up colonoscopy and plans to check with surgeon.  Last colonoscopy was 2019.  Also was seen by orthopedics recently for bilateral carpal tunnel.  She tells me that her symptoms are actually improving and almost resolved.    Review of Systems   Constitutional: Negative for fatigue and unexpected weight change.   HENT: Negative for mouth sores and trouble swallowing.    Respiratory: Negative for chest tightness and shortness of breath.    Cardiovascular: Negative for chest pain, palpitations and leg swelling.   Gastrointestinal: Positive for constipation. Negative for abdominal pain, anal bleeding, blood in stool, diarrhea, nausea and vomiting.        Occasional constipation recently but she thinks it is because she has not had enough fluids and fiber in her diet and has been sitting more.   Endocrine: Negative.    Genitourinary: Negative for difficulty urinating, dysuria and vaginal bleeding.   Musculoskeletal: Negative for gait problem.   Allergic/Immunologic: Positive for immunocompromised state.   Neurological: Negative for dizziness, weakness and light-headedness.   Psychiatric/Behavioral: Negative for decreased concentration. The patient is not nervous/anxious.           OBJECTIVE:     Vitals:    11/16/21 0801   BP: 136/84   BP Location: Right arm   Patient Position: Sitting   Cuff Size: Adult Regular   Pulse: 67   Resp: 16   Temp: 97  F (36.1  C)   TempSrc: Tympanic   SpO2: 96%   Weight: 93.9 kg (207 lb)        Estimated body mass index is 32.42 kg/m  as calculated  "from the following:    Height as of 10/21/21: 1.702 m (5' 7\").    Weight as of this encounter: 93.9 kg (207 lb).     Physical Exam  Pleasant female no acute distress.  Affect normal.  Alert and oriented x4.  Sclera nonicteric.  Conjunctiva noninflamed.  TMs clear.  Patient wearing facial mask secondary to pandemic but denies oral mucosal concerns.  Neck supple and without adenopathy.  No thyromegaly.  No carotid bruits.  Lung fields clear to auscultation.  Cardiovascular regular rate and rhythm with no murmur or S3.  Abdomen soft and without masses, tenderness and organomegaly.  No abdominal bruits or pulsatile masses.  No axillary or inguinal adenopathy.  Extremities with trace edema.  DPPT intact.  Gait stable.    Labs reviewed in EPIC    ASSESSMENT / PLAN:       ICD-10-CM    1. Encounter for Medicare annual wellness exam  Z00.00    2. ASCVD (arteriosclerotic cardiovascular disease)  I25.10 Adult Cardiology Eval Referral   3. Primary hypertension  I10 Adult Cardiology Eval Referral   4. Rheumatoid arthritis involving multiple sites with positive rheumatoid factor (H)  M05.79    5. Stage 3a chronic kidney disease (H)  N18.31    6. Idiopathic progressive polyneuropathy  G60.3 Protein electrophoresis     Tissue transglutaminase Ab IgA and IgG     Vitamin B12     Vitamin B12     Tissue transglutaminase Ab IgA and IgG     Protein electrophoresis   7. Gout, unspecified cause, unspecified chronicity, unspecified site  M10.9 allopurinol (ZYLOPRIM) 300 MG tablet         Preventative Care Guidelines and Health Counseling     COUNSELING:  Reviewed preventive health counseling  Special attention given to:   Preventative screening  Regular exercise  Healthy diet/nutrition  Bladder control   Immunizations   Reviewed preventive health counseling, as reflected in patient instructions       Regular exercise       Healthy diet/nutrition       Vision screening       Hearing screening       Dental care       Fall risk prevention      "  Aspirin prophylaxis        Osteoporosis prevention/bone health       Colon cancer screening       Advanced Planning     136/84       Body mass index is 32.42 kg/m . Weight management plan: Discussed healthy diet and exercise guidelines        Appropriate preventive services were discussed with this patient, including applicable screening as appropriate for cardiovascular disease, diabetes, osteopenia/osteoporosis, and glaucoma.  As appropriate for age/gender, discussed screening for colorectal cancer, prostate cancer, breast cancer, and cervical cancer. Checklist reviewing preventive services available has been given to the patient.    Reviewed patients plan of care and provided an AVS. The Basic Care Plan (routine screening as documented in Health Maintenance) for patient meets the Care Plan requirement. This Care Plan has been established and reviewed with the Patient and any available family members.    Counseling Resources:  ATP IV Guidelines  Pooled Cohorts Equation Calculator  Breast Cancer Risk Calculator  FRAX Risk Assessment  ICSI Preventive Guidelines  Dietary Guidelines for Americans, 2010  USDA's MyPlate  ASA Prophylaxis  Lung CA Screening    PLAN:  We will repeat B12 level and also check transglutaminase antibody and serum protein electrophoresis as recommended by neurologist.  She will check with general surgeon to find out when she needs follow-up colonoscopy.  She will add fiber to her diet and try to get more exercise and see if the constipation does not resolve, if not we will follow-up.  She is referred to cardiology to establish care with history of ASCVD and hypertension.  Refill provided of allopurinol.  Continue to follow with rheumatology.  Due to the pandemic she would like to defer bone density scan until next year.    11/16/2021  8:03 AM  [facility]'

## 2021-11-17 LAB
ALBUMIN SERPL ELPH-MCNC: 3.9 G/DL (ref 3.7–5.1)
ALPHA1 GLOB SERPL ELPH-MCNC: 0.4 G/DL (ref 0.2–0.4)
ALPHA2 GLOB SERPL ELPH-MCNC: 0.9 G/DL (ref 0.5–0.9)
B-GLOBULIN SERPL ELPH-MCNC: 0.7 G/DL (ref 0.6–1)
GAMMA GLOB SERPL ELPH-MCNC: 1 G/DL (ref 0.7–1.6)
M PROTEIN SERPL ELPH-MCNC: 0 G/DL
PROT PATTERN SERPL ELPH-IMP: NORMAL

## 2021-11-18 LAB
TTG IGA SER-ACNC: 0.3 U/ML
TTG IGG SER-ACNC: <0.6 U/ML

## 2021-12-20 ENCOUNTER — TELEPHONE (OUTPATIENT)
Dept: INTERNAL MEDICINE | Facility: OTHER | Age: 76
End: 2021-12-20
Payer: COMMERCIAL

## 2021-12-20 NOTE — TELEPHONE ENCOUNTER
Reason for call: Medication or medication refill    Name of medication requested: allopurinol    Are you out of the medication? Yes    What pharmacy do you use? [facility] usually, [facility] you [facility] this one time since she is out?    Preferred method for responding to this message: Phone    Phone number patient can be reached at: [phone]    If we cannot reach you directly, may we leave a detailed response at the number you provided? Yes    Maura Pete on 12/20/2021 at 12:24 PM

## 2021-12-20 NOTE — TELEPHONE ENCOUNTER
Called patient and patient states that she called [facility] and the system told her she had no refills.  Informed patient Swetha Maxwell ERIKA-FRANCISCO refilled Allopurinol on 11/16/2021 #90 x 3 reffills [facility].    Patient states its probably because she used old Rx number .  States she will contact [facility] and if needs refills before they can send it out she will call back.  Arminda Cosme RN on 12/20/2021 at 12:31 PM

## 2021-12-23 ENCOUNTER — TELEPHONE (OUTPATIENT)
Dept: INTERNAL MEDICINE | Facility: OTHER | Age: 76
End: 2021-12-23
Payer: COMMERCIAL

## 2021-12-23 DIAGNOSIS — M10.9 GOUT, UNSPECIFIED CAUSE, UNSPECIFIED CHRONICITY, UNSPECIFIED SITE: ICD-10-CM

## 2021-12-23 RX ORDER — ALLOPURINOL 300 MG/1
1 TABLET ORAL DAILY
Qty: 90 TABLET | Refills: 3 | Status: SHIPPED | OUTPATIENT
Start: 2021-12-23 | End: 2022-11-04

## 2021-12-23 NOTE — TELEPHONE ENCOUNTER
Please send new prescription to local retail pharmacy and call Pt with status.    Last prescription:  11/16/21 0823 Sign Swetha Maxwell, NP Reorder from Order:[id number]     allopurinol (ZYLOPRIM) 300 MG tablet 90 tablet 3 11/16/2021  No   Sig - Route: Take 1 tablet (300 mg) by mouth daily - Oral     [facility] [place], [place] - [address] AT PORTAL TO REGISTERED [facility] SITES     In clinical absence of patient's primary, Swetha Maxwell, patient is requesting that this message be sent to the covering provider for consideration please.    Hodan Nguyen RN .............. 12/23/2021  12:48 PM

## 2021-12-23 NOTE — TELEPHONE ENCOUNTER
Called again looking to see if they can get a refill [facility]   Patient lost the bottle and is out.     Uzma Ashraf on 12/23/2021 at 12:40 PM

## 2021-12-23 NOTE — TELEPHONE ENCOUNTER
Returned call to patient and informed her of prescription at pharmacy.  Leny Kirk LPN on 12/23/2021 at 1:54 PM

## 2022-01-03 ENCOUNTER — IMMUNIZATION (OUTPATIENT)
Dept: FAMILY MEDICINE | Facility: OTHER | Age: 77
End: 2022-01-03
Attending: FAMILY MEDICINE
Payer: COMMERCIAL

## 2022-01-03 PROCEDURE — 0004A PR COVID VAC PFIZER DIL RECON 30 MCG/0.3 ML IM: CPT

## 2022-01-17 ENCOUNTER — LAB (OUTPATIENT)
Dept: LAB | Facility: OTHER | Age: 77
End: 2022-01-17
Payer: COMMERCIAL

## 2022-01-17 DIAGNOSIS — M06.4 INFLAMMATORY POLYARTHROPATHY (H): ICD-10-CM

## 2022-01-17 LAB
AST SERPL W P-5'-P-CCNC: 18 U/L (ref 13–39)
BASOPHILS # BLD AUTO: 0.1 10E3/UL (ref 0–0.2)
BASOPHILS NFR BLD AUTO: 1 %
CREAT SERPL-MCNC: 0.95 MG/DL (ref 0.6–1.2)
EOSINOPHIL # BLD AUTO: 0.4 10E3/UL (ref 0–0.7)
EOSINOPHIL NFR BLD AUTO: 3 %
ERYTHROCYTE [DISTWIDTH] IN BLOOD BY AUTOMATED COUNT: 15.6 % (ref 10–15)
GFR SERPL CREATININE-BSD FRML MDRD: 62 ML/MIN/1.73M2
HCT VFR BLD AUTO: 45.8 % (ref 35–47)
HGB BLD-MCNC: 14.8 G/DL (ref 11.7–15.7)
IMM GRANULOCYTES # BLD: 0.1 10E3/UL
IMM GRANULOCYTES NFR BLD: 1 %
LYMPHOCYTES # BLD AUTO: 3 10E3/UL (ref 0.8–5.3)
LYMPHOCYTES NFR BLD AUTO: 27 %
MCH RBC QN AUTO: 31.7 PG (ref 26.5–33)
MCHC RBC AUTO-ENTMCNC: 32.3 G/DL (ref 31.5–36.5)
MCV RBC AUTO: 98 FL (ref 78–100)
MONOCYTES # BLD AUTO: 0.6 10E3/UL (ref 0–1.3)
MONOCYTES NFR BLD AUTO: 6 %
NEUTROPHILS # BLD AUTO: 6.9 10E3/UL (ref 1.6–8.3)
NEUTROPHILS NFR BLD AUTO: 62 %
NRBC # BLD AUTO: 0 10E3/UL
NRBC BLD AUTO-RTO: 0 /100
PLATELET # BLD AUTO: 191 10E3/UL (ref 150–450)
RBC # BLD AUTO: 4.67 10E6/UL (ref 3.8–5.2)
WBC # BLD AUTO: 11 10E3/UL (ref 4–11)

## 2022-01-17 PROCEDURE — 36415 COLL VENOUS BLD VENIPUNCTURE: CPT | Mod: ZL

## 2022-01-17 PROCEDURE — 84450 TRANSFERASE (AST) (SGOT): CPT | Mod: ZL

## 2022-01-17 PROCEDURE — 85025 COMPLETE CBC W/AUTO DIFF WBC: CPT | Mod: ZL

## 2022-01-17 PROCEDURE — 82565 ASSAY OF CREATININE: CPT | Mod: ZL

## 2022-05-05 ENCOUNTER — TELEPHONE (OUTPATIENT)
Dept: INTERNAL MEDICINE | Facility: OTHER | Age: 77
End: 2022-05-05
Payer: COMMERCIAL

## 2022-05-05 NOTE — TELEPHONE ENCOUNTER
Patient is wondering when she is supposed to have her next colonoscopy. Please advise    Anabell Dennis on 5/5/2022 at 2:41 PM

## 2022-05-08 NOTE — TELEPHONE ENCOUNTER
According to Dr Thompson note from colonoscopy on 1/9/2019, she did not need another colonoscopydue to age > 76

## 2022-05-13 NOTE — TELEPHONE ENCOUNTER
After verifying last name and  patient notifed of the below information.   Claudia Rahman LPN on 2022 at 1:47 PM

## 2022-05-24 ENCOUNTER — OFFICE VISIT (OUTPATIENT)
Dept: INTERNAL MEDICINE | Facility: OTHER | Age: 77
End: 2022-05-24
Attending: NURSE PRACTITIONER
Payer: COMMERCIAL

## 2022-05-24 VITALS
BODY MASS INDEX: 32.64 KG/M2 | SYSTOLIC BLOOD PRESSURE: 138 MMHG | OXYGEN SATURATION: 96 % | DIASTOLIC BLOOD PRESSURE: 82 MMHG | HEART RATE: 72 BPM | WEIGHT: 208.4 LBS | TEMPERATURE: 97.2 F | RESPIRATION RATE: 16 BRPM

## 2022-05-24 DIAGNOSIS — R30.0 DYSURIA: Primary | ICD-10-CM

## 2022-05-24 DIAGNOSIS — N30.00 ACUTE CYSTITIS WITHOUT HEMATURIA: ICD-10-CM

## 2022-05-24 LAB
ALBUMIN UR-MCNC: 30 MG/DL
APPEARANCE UR: ABNORMAL
BACTERIA #/AREA URNS HPF: ABNORMAL /HPF
BILIRUB UR QL STRIP: NEGATIVE
COLOR UR AUTO: YELLOW
GLUCOSE UR STRIP-MCNC: NEGATIVE MG/DL
HGB UR QL STRIP: ABNORMAL
HYALINE CASTS: 2 /LPF
KETONES UR STRIP-MCNC: NEGATIVE MG/DL
LEUKOCYTE ESTERASE UR QL STRIP: ABNORMAL
MUCOUS THREADS #/AREA URNS LPF: PRESENT /LPF
NITRATE UR QL: NEGATIVE
PH UR STRIP: 6.5 [PH] (ref 5–9)
RBC URINE: 8 /HPF
RENAL TUB EPI: <1 /HPF
SP GR UR STRIP: 1.02 (ref 1–1.03)
SQUAMOUS EPITHELIAL: 52 /HPF
TRANSITIONAL EPI: 3 /HPF
UROBILINOGEN UR STRIP-MCNC: NORMAL MG/DL
WBC CLUMPS #/AREA URNS HPF: PRESENT /HPF
WBC URINE: 114 /HPF

## 2022-05-24 PROCEDURE — 87086 URINE CULTURE/COLONY COUNT: CPT | Mod: ZL | Performed by: NURSE PRACTITIONER

## 2022-05-24 PROCEDURE — 99213 OFFICE O/P EST LOW 20 MIN: CPT | Performed by: NURSE PRACTITIONER

## 2022-05-24 PROCEDURE — G0463 HOSPITAL OUTPT CLINIC VISIT: HCPCS | Performed by: NURSE PRACTITIONER

## 2022-05-24 PROCEDURE — 81001 URINALYSIS AUTO W/SCOPE: CPT | Mod: ZL | Performed by: NURSE PRACTITIONER

## 2022-05-24 RX ORDER — CEPHALEXIN 500 MG/1
500 CAPSULE ORAL 3 TIMES DAILY
Qty: 15 CAPSULE | Refills: 0 | Status: SHIPPED | OUTPATIENT
Start: 2022-05-24 | End: 2022-05-29

## 2022-05-24 ASSESSMENT — ENCOUNTER SYMPTOMS
DIAPHORESIS: 0
FREQUENCY: 1
NAUSEA: 0
FLANK PAIN: 0
CHILLS: 0
HEMATURIA: 0
DYSURIA: 1
ABDOMINAL PAIN: 0
FEVER: 0
VOMITING: 0

## 2022-05-24 ASSESSMENT — PAIN SCALES - GENERAL: PAINLEVEL: MILD PAIN (2)

## 2022-05-24 NOTE — PROGRESS NOTES
ICD-10-CM    1. Dysuria  R30.0 UA reflex to Microscopic and Culture     UA reflex to Microscopic and Culture     Urine Culture   2. Acute cystitis without hematuria  N30.00 cephALEXin (KEFLEX) 500 MG capsule     Plan:  Start Keflex 500 mg 1 pill 3 times daily for 5 days, had consider using Bactrim instead however there was an interaction between Bactrim and methotrexate.  Urine culture is pending.  Would recommend patient have follow-up UA to monitor for resolution especially of the RBCs.  If symptoms not improving as expected or she develops flank pain, fever, chills, vomiting or worsening of symptoms needs evaluation    Subjective   Sandra is a 76 year old who presents for the following health issues burring well urination     She is here for a 5 day history of dysuria, frequency and urgency.  No new flank pain, hematuria, fever or chills.    History of Present Illness       Reason for visit:  Burning when urinating  Symptom onset:  3-7 days ago  Symptoms include:  Burning when urinating  Symptom intensity:  Moderate  Symptom progression:  Staying the same  Had these symptoms before:  No  What makes it worse:  No  What makes it better:  No    She eats 2-3 servings of fruits and vegetables daily.She consumes 1 sweetened beverage(s) daily.She exercises with enough effort to increase her heart rate 10 to 19 minutes per day.  She exercises with enough effort to increase her heart rate 3 or less days per week.   She is taking medications regularly.       Review of Systems   Constitutional: Negative for chills, diaphoresis and fever.   Gastrointestinal: Negative for abdominal pain, nausea and vomiting.   Genitourinary: Positive for dysuria, frequency and urgency. Negative for flank pain and hematuria.            Objective    /82 (BP Location: Right arm, Patient Position: Sitting, Cuff Size: Adult Regular)   Pulse 72   Temp 97.2  F (36.2  C) (Tympanic)   Resp 16   Wt 94.5 kg (208 lb 6.4 oz)   LMP  (LMP  Unknown)   SpO2 96%   BMI 32.64 kg/m    Body mass index is 32.64 kg/m .  Physical Exam   Pleasant female no acute distress.  Affect normal.  Alert and oriented.  No CVA or suprapubic tenderness.    Results for orders placed or performed in visit on 05/24/22   UA reflex to Microscopic and Culture     Status: Abnormal    Specimen: Urine, Clean Catch   Result Value Ref Range    Color Urine Yellow Colorless, Straw, Light Yellow, Yellow    Appearance Urine Slightly Cloudy (A) Clear    Glucose Urine Negative Negative mg/dL    Bilirubin Urine Negative Negative    Ketones Urine Negative Negative mg/dL    Specific Gravity Urine 1.016 1.000 - 1.030    Blood Urine Small (A) Negative    pH Urine 6.5 5.0 - 9.0    Protein Albumin Urine 30  (A) Negative mg/dL    Urobilinogen Urine Normal Normal, 2.0 mg/dL    Nitrite Urine Negative Negative    Leukocyte Esterase Urine Large (A) Negative    Bacteria Urine Few (A) None Seen /HPF    WBC Clumps Urine Present (A) None Seen /HPF    Mucus Urine Present (A) None Seen /LPF    RBC Urine 8 (H) <=2 /HPF    WBC Urine 114 (H) <=5 /HPF    Squamous Epithelials Urine 52 (H) <=1 /HPF    Transitional Epithelials Urine 3 (H) <=1 /HPF    Renal Tubular Epithelials Urine <1 None Seen /HPF    Hyaline Casts Urine 2 <=2 /LPF    Narrative    Urine Culture ordered based on laboratory criteria

## 2022-05-24 NOTE — NURSING NOTE
"Chief Complaint   Patient presents with     Urinary Problem     Burning well urinating        FOOD SECURITY SCREENING QUESTIONS  Hunger Vital Signs:  Within the past 12 months we worried whether our food would run out before we got money to buy more. Never  Within the past 12 months the food we bought just didn't last and we didn't have money to get more. Never  Claudia Rahman LPN 5/24/2022 1:26 PM      Initial /82 (BP Location: Right arm, Patient Position: Sitting, Cuff Size: Adult Regular)   Pulse 72   Temp 97.2  F (36.2  C) (Tympanic)   Resp 16   Wt 94.5 kg (208 lb 6.4 oz)   LMP  (LMP Unknown)   SpO2 96%   BMI 32.64 kg/m   Estimated body mass index is 32.64 kg/m  as calculated from the following:    Height as of 10/21/21: 1.702 m (5' 7\").    Weight as of this encounter: 94.5 kg (208 lb 6.4 oz).  Medication Reconciliation: complete    Claudia Rahman LPN  "

## 2022-05-26 LAB — BACTERIA UR CULT: ABNORMAL

## 2022-06-01 ENCOUNTER — TELEPHONE (OUTPATIENT)
Dept: INTERNAL MEDICINE | Facility: OTHER | Age: 77
End: 2022-06-01
Payer: COMMERCIAL

## 2022-06-01 DIAGNOSIS — R30.0 DYSURIA: Primary | ICD-10-CM

## 2022-06-01 NOTE — TELEPHONE ENCOUNTER
Patient states she was to come back in to have another urine sample after meds are taken. I don't see any orders places.     Please contact patient to notify orders are in, or if she needs to schedule with [facility] herself .    Uzma Ashraf on 6/1/2022 at 12:09 PM

## 2022-06-03 NOTE — TELEPHONE ENCOUNTER
After verifying last name and  patient notifed of the below information.   Claudia Rahman LPN on 6/3/2022 at 9:00 AM

## 2022-06-07 ENCOUNTER — LAB (OUTPATIENT)
Dept: LAB | Facility: OTHER | Age: 77
End: 2022-06-07
Attending: NURSE PRACTITIONER
Payer: COMMERCIAL

## 2022-06-07 ENCOUNTER — HOSPITAL ENCOUNTER (OUTPATIENT)
Dept: MAMMOGRAPHY | Facility: OTHER | Age: 77
Discharge: HOME | End: 2022-06-07
Attending: NURSE PRACTITIONER
Payer: COMMERCIAL

## 2022-06-07 DIAGNOSIS — Z12.31 VISIT FOR SCREENING MAMMOGRAM: ICD-10-CM

## 2022-06-07 DIAGNOSIS — R30.0 DYSURIA: ICD-10-CM

## 2022-06-07 LAB
ALBUMIN UR-MCNC: 30 MG/DL
APPEARANCE UR: CLEAR
BILIRUB UR QL STRIP: NEGATIVE
COLOR UR AUTO: YELLOW
GLUCOSE UR STRIP-MCNC: NEGATIVE MG/DL
HGB UR QL STRIP: NEGATIVE
HYALINE CASTS: 7 /LPF
KETONES UR STRIP-MCNC: NEGATIVE MG/DL
LEUKOCYTE ESTERASE UR QL STRIP: ABNORMAL
MUCOUS THREADS #/AREA URNS LPF: PRESENT /LPF
NITRATE UR QL: NEGATIVE
PH UR STRIP: 6.5 [PH] (ref 5–9)
RBC URINE: 10 /HPF
SP GR UR STRIP: 1.02 (ref 1–1.03)
SQUAMOUS EPITHELIAL: 16 /HPF
UROBILINOGEN UR STRIP-MCNC: NORMAL MG/DL
WBC URINE: 35 /HPF

## 2022-06-07 PROCEDURE — 81001 URINALYSIS AUTO W/SCOPE: CPT | Mod: ZL

## 2022-06-07 PROCEDURE — 77067 SCR MAMMO BI INCL CAD: CPT

## 2022-06-07 PROCEDURE — 87086 URINE CULTURE/COLONY COUNT: CPT | Mod: ZL

## 2022-06-08 ENCOUNTER — OFFICE VISIT (OUTPATIENT)
Dept: FAMILY MEDICINE | Facility: OTHER | Age: 77
End: 2022-06-08
Attending: FAMILY MEDICINE
Payer: COMMERCIAL

## 2022-06-08 ENCOUNTER — TELEPHONE (OUTPATIENT)
Dept: INTERNAL MEDICINE | Facility: OTHER | Age: 77
End: 2022-06-08
Payer: COMMERCIAL

## 2022-06-08 VITALS
HEART RATE: 66 BPM | OXYGEN SATURATION: 98 % | RESPIRATION RATE: 18 BRPM | WEIGHT: 208.4 LBS | DIASTOLIC BLOOD PRESSURE: 72 MMHG | SYSTOLIC BLOOD PRESSURE: 134 MMHG | BODY MASS INDEX: 32.64 KG/M2 | TEMPERATURE: 98.3 F

## 2022-06-08 DIAGNOSIS — D84.9 IMMUNOCOMPROMISED (H): ICD-10-CM

## 2022-06-08 DIAGNOSIS — N39.0 ACUTE UTI (URINARY TRACT INFECTION): Primary | ICD-10-CM

## 2022-06-08 DIAGNOSIS — M05.79 RHEUMATOID ARTHRITIS INVOLVING MULTIPLE SITES WITH POSITIVE RHEUMATOID FACTOR (H): ICD-10-CM

## 2022-06-08 DIAGNOSIS — R31.29 MICROSCOPIC HEMATURIA: ICD-10-CM

## 2022-06-08 DIAGNOSIS — N18.31 STAGE 3A CHRONIC KIDNEY DISEASE (H): ICD-10-CM

## 2022-06-08 PROCEDURE — G0463 HOSPITAL OUTPT CLINIC VISIT: HCPCS

## 2022-06-08 PROCEDURE — 99213 OFFICE O/P EST LOW 20 MIN: CPT | Performed by: FAMILY MEDICINE

## 2022-06-08 RX ORDER — AMOXICILLIN 875 MG
875 TABLET ORAL 2 TIMES DAILY
Qty: 14 TABLET | Refills: 0 | Status: SHIPPED | OUTPATIENT
Start: 2022-06-08 | End: 2022-06-08

## 2022-06-08 RX ORDER — AMOXICILLIN 875 MG
875 TABLET ORAL 2 TIMES DAILY
Qty: 14 TABLET | Refills: 0 | Status: SHIPPED | OUTPATIENT
Start: 2022-06-08 | End: 2022-09-28

## 2022-06-08 ASSESSMENT — PAIN SCALES - GENERAL: PAINLEVEL: MILD PAIN (3)

## 2022-06-08 NOTE — PROGRESS NOTES
Assessment & Plan       ICD-10-CM    1. Acute UTI (urinary tract infection)  N39.0 amoxicillin (AMOXIL) 875 MG tablet     UA reflex to Microscopic and Culture     DISCONTINUED: amoxicillin (AMOXIL) 875 MG tablet   2. Immunocompromised (H)  D84.9    3. Rheumatoid arthritis involving multiple sites with positive rheumatoid factor (H)  M05.79    4. Stage 3a chronic kidney disease (H)  N18.31    5. Microscopic hematuria  R31.29      She left a follow-up UA yesterday as directed by her PCP.  This was to follow-up on UTI as well as RBC seen in the sample from May 24.  Current sample has large leukocyte Estrace, 10 RBC, and 35 WBC.  We discussed that if she has a UTI, this could explain the blood cells.  If not, potentially she warrants work-up for hematuria.  Current symptoms are similar to those from previous UTI.  She had pansensitive E. coli on the sample.  Since she is immune compromised, fever antibiotic treatment at this time while waiting for culture  Treat with amoxicillin 875 mg twice daily for a week to see if this helps.    I checked the next day and her urine culture returned negative.  Referral placed to urology for microscopic hematuria workup.      Jalen Paz MD   [facility]     Subjective   Sandra is a 76 year old who presents for the following health issues Continuing   UTI symptoms    UTI  This is a recurrent problem. The current episode started 1 to 4 weeks ago. The problem occurs constantly. The problem has been waxing and waning. Nothing aggravates the symptoms. The treatment provided no relief.      Took cephalexin for UTI May 24  Symptoms of dysuria, frequency and urgency improved  Symptoms returned 5 days ago  Has a little bit of right low back pain.  No fever, nausea, or chills.      Review of Systems   As above      Objective    /72 (BP Location: Right arm, Patient Position: Sitting, Cuff Size: Adult Large)   Pulse 66   Temp 98.3  F (36.8  C) (Tympanic)   Resp  18   Wt 94.5 kg (208 lb 6.4 oz)   LMP  (LMP Unknown)   SpO2 98%   Breastfeeding No   BMI 32.64 kg/m    Body mass index is 32.64 kg/m .  Physical Exam   General Appearance: Alert. No acute distress  Psychiatric: Normal affect and mentation  Musculoskeletal: Mild right lumbar paraspinous muscle and CVA tenderness

## 2022-06-08 NOTE — NURSING NOTE
"Chief Complaint   Patient presents with     UTI sypmonts continueing         Initial /72 (BP Location: Right arm, Patient Position: Sitting, Cuff Size: Adult Large)   Pulse 66   Temp 98.3  F (36.8  C) (Tympanic)   Resp 18   Wt 94.5 kg (208 lb 6.4 oz)   LMP  (LMP Unknown)   SpO2 98%   Breastfeeding No   BMI 32.64 kg/m   Estimated body mass index is 32.64 kg/m  as calculated from the following:    Height as of 10/21/21: 1.702 m (5' 7\").    Weight as of this encounter: 94.5 kg (208 lb 6.4 oz).       FOOD SECURITY SCREENING QUESTIONS:    The next two questions are to help us understand your food security.  If you are feeling you need any assistance in this area, we have resources available to support you today.    Hunger Vital Signs:  Within the past 12 months we worried whether our food would run out before we got money to buy more. Never  Within the past 12 months the food we bought just didn't last and we didn't have money to get more. Never    Advance Care Directive on file? yes      Medication reconciliation complete.      Rodolfo Kothari,on 6/8/2022 at 3:15 PM        "

## 2022-06-08 NOTE — TELEPHONE ENCOUNTER
[facility]-pt aware pcp out but is still having UTI issues. Can someone else address please? Thank you.  Misti Coon

## 2022-06-09 LAB — BACTERIA UR CULT: NO GROWTH

## 2022-06-11 PROBLEM — D84.9 IMMUNOCOMPROMISED (H): Status: ACTIVE | Noted: 2022-06-11

## 2022-06-20 DIAGNOSIS — E78.5 HYPERLIPIDEMIA, UNSPECIFIED HYPERLIPIDEMIA TYPE: ICD-10-CM

## 2022-06-21 RX ORDER — ATORVASTATIN CALCIUM 40 MG/1
TABLET, FILM COATED ORAL
Qty: 90 TABLET | Refills: 3 | Status: SHIPPED | OUTPATIENT
Start: 2022-06-21 | End: 2023-04-17

## 2022-06-21 NOTE — TELEPHONE ENCOUNTER
[facility] sent Rx request for the following:      ATORVASTATIN TAB 40MG      Last Prescription Date:   7/22/2021  Last Fill Qty/Refills:         90, R-3    Last Office Visit:              6/8/2022   Future Office visit:           none    Sim Hernandez RN, BSN  ....................  6/21/2022   9:37 AM

## 2022-06-22 ENCOUNTER — LAB (OUTPATIENT)
Dept: LAB | Facility: OTHER | Age: 77
End: 2022-06-22
Attending: FAMILY MEDICINE
Payer: COMMERCIAL

## 2022-06-22 DIAGNOSIS — M06.4 INFLAMMATORY POLYARTHROPATHY (H): ICD-10-CM

## 2022-06-22 DIAGNOSIS — N39.0 ACUTE UTI (URINARY TRACT INFECTION): ICD-10-CM

## 2022-06-22 LAB
ALBUMIN UR-MCNC: 30 MG/DL
APPEARANCE UR: CLEAR
AST SERPL W P-5'-P-CCNC: 24 U/L (ref 13–39)
BACTERIA #/AREA URNS HPF: ABNORMAL /HPF
BASOPHILS # BLD AUTO: 0 10E3/UL (ref 0–0.2)
BASOPHILS NFR BLD AUTO: 0 %
BILIRUB UR QL STRIP: NEGATIVE
COLOR UR AUTO: YELLOW
CREAT SERPL-MCNC: 1.09 MG/DL (ref 0.6–1.2)
EOSINOPHIL # BLD AUTO: 0.4 10E3/UL (ref 0–0.7)
EOSINOPHIL NFR BLD AUTO: 4 %
ERYTHROCYTE [DISTWIDTH] IN BLOOD BY AUTOMATED COUNT: 15.3 % (ref 10–15)
GFR SERPL CREATININE-BSD FRML MDRD: 53 ML/MIN/1.73M2
GLUCOSE UR STRIP-MCNC: NEGATIVE MG/DL
HCT VFR BLD AUTO: 44 % (ref 35–47)
HGB BLD-MCNC: 14.3 G/DL (ref 11.7–15.7)
HGB UR QL STRIP: NEGATIVE
HYALINE CASTS: 15 /LPF
IMM GRANULOCYTES # BLD: 0 10E3/UL
IMM GRANULOCYTES NFR BLD: 0 %
KETONES UR STRIP-MCNC: NEGATIVE MG/DL
LEUKOCYTE ESTERASE UR QL STRIP: ABNORMAL
LYMPHOCYTES # BLD AUTO: 2.8 10E3/UL (ref 0.8–5.3)
LYMPHOCYTES NFR BLD AUTO: 31 %
MCH RBC QN AUTO: 31.6 PG (ref 26.5–33)
MCHC RBC AUTO-ENTMCNC: 32.5 G/DL (ref 31.5–36.5)
MCV RBC AUTO: 97 FL (ref 78–100)
MONOCYTES # BLD AUTO: 0.3 10E3/UL (ref 0–1.3)
MONOCYTES NFR BLD AUTO: 3 %
MUCOUS THREADS #/AREA URNS LPF: PRESENT /LPF
NEUTROPHILS # BLD AUTO: 5.5 10E3/UL (ref 1.6–8.3)
NEUTROPHILS NFR BLD AUTO: 62 %
NITRATE UR QL: NEGATIVE
NRBC # BLD AUTO: 0 10E3/UL
NRBC BLD AUTO-RTO: 0 /100
PH UR STRIP: 6.5 [PH] (ref 5–9)
PLATELET # BLD AUTO: 218 10E3/UL (ref 150–450)
RBC # BLD AUTO: 4.53 10E6/UL (ref 3.8–5.2)
RBC URINE: 3 /HPF
RENAL TUB EPI: <1 /HPF
SP GR UR STRIP: 1.02 (ref 1–1.03)
SQUAMOUS EPITHELIAL: 14 /HPF
TRANSITIONAL EPI: 3 /HPF
UROBILINOGEN UR STRIP-MCNC: NORMAL MG/DL
WBC # BLD AUTO: 9 10E3/UL (ref 4–11)
WBC URINE: 43 /HPF

## 2022-06-22 PROCEDURE — 81001 URINALYSIS AUTO W/SCOPE: CPT | Mod: ZL

## 2022-06-22 PROCEDURE — 36415 COLL VENOUS BLD VENIPUNCTURE: CPT | Mod: ZL

## 2022-06-22 PROCEDURE — 82565 ASSAY OF CREATININE: CPT | Mod: ZL

## 2022-06-22 PROCEDURE — 85025 COMPLETE CBC W/AUTO DIFF WBC: CPT | Mod: ZL

## 2022-06-22 PROCEDURE — 87086 URINE CULTURE/COLONY COUNT: CPT | Mod: ZL

## 2022-06-22 PROCEDURE — 84450 TRANSFERASE (AST) (SGOT): CPT | Mod: ZL

## 2022-06-24 LAB — BACTERIA UR CULT: NO GROWTH

## 2022-07-18 ENCOUNTER — OFFICE VISIT (OUTPATIENT)
Dept: UROLOGY | Facility: OTHER | Age: 77
End: 2022-07-18
Attending: FAMILY MEDICINE
Payer: COMMERCIAL

## 2022-07-18 VITALS
WEIGHT: 205.8 LBS | OXYGEN SATURATION: 96 % | SYSTOLIC BLOOD PRESSURE: 120 MMHG | HEART RATE: 81 BPM | DIASTOLIC BLOOD PRESSURE: 80 MMHG | BODY MASS INDEX: 32.23 KG/M2 | RESPIRATION RATE: 16 BRPM

## 2022-07-18 DIAGNOSIS — R31.29 MICROSCOPIC HEMATURIA: Primary | ICD-10-CM

## 2022-07-18 PROCEDURE — 51798 US URINE CAPACITY MEASURE: CPT | Performed by: UROLOGY

## 2022-07-18 PROCEDURE — G0463 HOSPITAL OUTPT CLINIC VISIT: HCPCS

## 2022-07-18 PROCEDURE — 99204 OFFICE O/P NEW MOD 45 MIN: CPT | Performed by: UROLOGY

## 2022-07-18 ASSESSMENT — PAIN SCALES - GENERAL: PAINLEVEL: MILD PAIN (3)

## 2022-07-18 NOTE — PROGRESS NOTES
Type of Visit  NPV    Chief Complaint  Hematuria, microscopic  Sterile pyuria    HPI  Ms. Batista is a 76 year old female who presents with hematuria.  Microhematuria was first noted on UA 2 months ago.  Patient denies associated dysuria at the time of the UA.  She has undergone 2 subsequent urinalyses and both revealed sterile pyuria and microhematuria.  Urine cultures have been negative.    The patient does not have a past urologic history.  She also denies flank pain or renal colic.  She is a lifelong non-smoker.    Hematuria-related signs/symptoms  History of smoking?    No  History of chemotherapy?   No  History of pelvic radiation?   No  History of kidney or bladder stones?  No  History of frequent urinary tract infections? No      Past Medical History  She  has a past medical history of Atherosclerotic heart disease of native coronary artery without angina pectoris, Contusion of abdominal wall, Essential (primary) hypertension, Gout, Hyperlipidemia, Localized swelling, mass, or lump of upper extremity, Other specified counseling, Polyosteoarthritis, Postmenopausal bleeding, Presence of coronary angioplasty implant and graft, Primary osteoarthritis of left hand, and Rheumatoid arthritis (H).  Patient Active Problem List   Diagnosis     Allergic rhinitis     ASCVD (arteriosclerotic cardiovascular disease)     DJD (degenerative joint disease) of knee     Gout     Hyperlipidemia     Hypertension     Mass of finger     Osteopenia     Primary osteoarthritis of both hands     Rheumatoid arthritis (H)     Avascular necrosis of humeral head, right (H)     Right shoulder pain     Rotator cuff tendonitis, right     S/P shoulder surgery     Shoulder impingement, right     Status post total shoulder replacement, right     Neuropathy of hand     Chronic kidney disease, stage 3 (H)     Seborrheic keratosis     Immunocompromised (H)       Past Surgical History  She  has a past surgical history that includes Biopsy breast;  Arthroscopy knee;  section; other surgical history; Extraction(s) dental; Colonoscopy; Arthroplasty knee; Esophagoscopy, gastroscopy, duodenoscopy (EGD), combined; Heart Cath, Angioplasty; other surgical history; other surgical history; Stress lexiscan test (2018); colonoscopy (2016); Colonoscopy (N/A, 2019); and Colonoscopy (N/A, 2019).    Medications  She has a current medication list which includes the following prescription(s): allopurinol, amoxicillin, aspirin, atenolol, atorvastatin, cetirizine, vitamin d3, folic acid, hydrochlorothiazide, hydroxychloroquine, methotrexate, and nitroglycerin.    Allergies  Allergies   Allergen Reactions     Food      Macadamia nuts make mouth itch     Losartan      Allergic rxn with hives and angioedema suspected to be from alternative manufacture of the effient or the losartan     Enalapril Cough       Social History  She  reports that she has never smoked. She has never used smokeless tobacco. She reports previous alcohol use. She reports that she does not use drugs.  No drug abuse.    Family History  Family History   Problem Relation Age of Onset     Heart Disease Mother         Heart Disease     Hypertension Mother         Hypertension     Other - See Comments Mother         Stroke     Breast Cancer Mother 53        Cancer-breast     Arthritis Father         Arthritis     Cancer Father         Cancer     Heart Disease Father         Heart Disease     Hypertension Father         Hypertension     Family History Negative Brother         Good Health     Heart Disease Brother         Heart Disease     Family History Negative Sister         Good Health       Review of Systems  I personally reviewed the ROS with the patient.    Nursing Notes:   Sharda Rivera LPN  2022  1:48 PM  Addendum  Chief Complaint   Patient presents with     Consult     Microscopic hematuria     Patient presents to the clinic today for a consult for microscopic  Hematuria    Review of Systems:    Weight loss:    No     Recent fever/chills:  No   Night sweats:   No  Current skin rash:  No   Recent hair loss:  No  Heat intolerance:  No   Cold intolerance:  No  Chest pain:   No   Palpitations:   No  Shortness of breath:  No   Wheezing:   No  Constipation:    No   Diarrhea:   No   Nausea:   No   Vomiting:   No   Kidney/side pain:  Yes   Back pain:   No  Frequent headaches:  No   Dizziness:     No  Leg swelling:   No   Calf pain:    No    Parents, brothers or sisters with history of kidney cancer:   No  Parents, brothers or sisters with history of bladder cancer: No    Post-Void Residual  A post-void residual was measured by ultrasonic bladder scanner.  1 mL  Sharda Rivera LPN  7/18/2022 1:45 PM    Medication Reconciliation: completed   Sharda Rivera LPN  7/18/2022 1:30 PM       Physical Exam  Vitals:    07/18/22 1342   BP: 120/80   BP Location: Right arm   Patient Position: Sitting   Cuff Size: Adult Large   Pulse: 81   Resp: 16   SpO2: 96%   Weight: 93.4 kg (205 lb 12.8 oz)     Constitutional: NAD, WDWN.   Head: NCAT  Eyes: Conjunctivae normal  Cardiovascular: Regular rate.  Pulmonary/Chest: Respirations are even and non-labored bilaterally.  Abdominal: Soft. No distension, tenderness, masses or guarding. No CVA tenderness.  Neurological: A + O x 3.  Cranial Nerves II-XII grossly intact.  Extremities: LEISA x 4, Warm. No clubbing.  No cyanosis.    Skin: Pink, warm and dry.  No rashes noted.  Psychiatric:  Normal mood and affect  Genitourinary:  Nonpalpable bladder    Labs   05/24/22 13:44 06/07/22 09:01 06/22/22 10:01   Color Urine Yellow Yellow Yellow   Appearance Urine Slightly Cloudy ! Clear Clear   Glucose Urine Negative Negative Negative   Bilirubin Urine Negative Negative Negative   Ketones Urine Negative Negative Negative   Specific Gravity Urine 1.016 1.020 1.019   pH Urine 6.5 6.5 6.5   Protein Albumin Urine 30  ! 30  ! 30  !   Urobilinogen mg/dL Normal Normal Normal    Nitrite Urine Negative Negative Negative   Blood Urine Small ! Negative Negative   Leukocyte Esterase Urine Large ! Large ! Large !   WBC Urine 114 (H) 35 (H) 43 (H)   RBC Urine 8 (H) 10 (H) 3 (H)   Bacteria Urine Few !  Few !   WBC Clumps Present !     Squamous Epithelial /HPF Urine 52 (H) 16 (H) 14 (H)   Transitional Epi 3 (H)  3 (H)   Renal Tub Epi <1  <1   Mucus Urine Present ! Present ! Present !   Hyaline Casts 2 7 (H) 15 (H)     UCx were negative all 3 dates above.     06/22/22 09:54   Creatinine 1.09   GFR Estimate 53 (L)     Imaging  None    Assessment  Ms. Batista is a 76 year old female with sterile hematuria.    Discussed rationale for work up.  Discussed the specific indications for cross-sectional imaging as well as diagnostic cystoscopy.  Discussed potential findings of hematuria work up including, but not limited to, kidney stones, bladder tumors and/or kidney tumors.  Also discussed the potential for a normal work up.    Plan  CT Urogram with follow up for cystoscopy

## 2022-07-18 NOTE — PROGRESS NOTES
"Per last office visit  07/18/22 with Cornel Saucedo MD \"Plan  CT Urogram with follow up for cystoscopy\"    Creatinine Order placed.    Orders Placed    Sharda Rivera LPN on 7/18/2022 at 3:21 PM    "

## 2022-07-18 NOTE — NURSING NOTE
Chief Complaint   Patient presents with     Consult     Microscopic hematuria     Patient presents to the clinic today for a consult for microscopic Hematuria    Review of Systems:    Weight loss:    No     Recent fever/chills:  No   Night sweats:   No  Current skin rash:  No   Recent hair loss:  No  Heat intolerance:  No   Cold intolerance:  No  Chest pain:   No   Palpitations:   No  Shortness of breath:  No   Wheezing:   No  Constipation:    No   Diarrhea:   No   Nausea:   No   Vomiting:   No   Kidney/side pain:  Yes   Back pain:   No  Frequent headaches:  No   Dizziness:     No  Leg swelling:   No   Calf pain:    No    Parents, brothers or sisters with history of kidney cancer:   No  Parents, brothers or sisters with history of bladder cancer: No    Post-Void Residual  A post-void residual was measured by ultrasonic bladder scanner.  1 mL  Sharda Rivera LPN  7/18/2022 1:45 PM    Medication Reconciliation: completed   Sharda Rivera LPN  7/18/2022 1:30 PM

## 2022-07-26 ENCOUNTER — LAB (OUTPATIENT)
Dept: LAB | Facility: OTHER | Age: 77
End: 2022-07-26
Attending: UROLOGY
Payer: COMMERCIAL

## 2022-07-26 DIAGNOSIS — M06.4 INFLAMMATORY POLYARTHRITIS (H): Primary | ICD-10-CM

## 2022-07-26 DIAGNOSIS — M06.4 INFLAMMATORY POLYARTHROPATHY (H): ICD-10-CM

## 2022-07-26 LAB
AST SERPL W P-5'-P-CCNC: 19 U/L (ref 13–39)
BASOPHILS # BLD AUTO: 0.1 10E3/UL (ref 0–0.2)
BASOPHILS NFR BLD AUTO: 1 %
CREAT SERPL-MCNC: 0.97 MG/DL (ref 0.6–1.2)
CREAT SERPL-MCNC: 0.97 MG/DL (ref 0.6–1.2)
EOSINOPHIL # BLD AUTO: 0.4 10E3/UL (ref 0–0.7)
EOSINOPHIL NFR BLD AUTO: 3 %
ERYTHROCYTE [DISTWIDTH] IN BLOOD BY AUTOMATED COUNT: 15.5 % (ref 10–15)
GFR SERPL CREATININE-BSD FRML MDRD: 61 ML/MIN/1.73M2
GFR SERPL CREATININE-BSD FRML MDRD: 61 ML/MIN/1.73M2
HCT VFR BLD AUTO: 43.5 % (ref 35–47)
HGB BLD-MCNC: 14.5 G/DL (ref 11.7–15.7)
IMM GRANULOCYTES # BLD: 0.1 10E3/UL
IMM GRANULOCYTES NFR BLD: 1 %
LYMPHOCYTES # BLD AUTO: 2.7 10E3/UL (ref 0.8–5.3)
LYMPHOCYTES NFR BLD AUTO: 25 %
MCH RBC QN AUTO: 32.2 PG (ref 26.5–33)
MCHC RBC AUTO-ENTMCNC: 33.3 G/DL (ref 31.5–36.5)
MCV RBC AUTO: 97 FL (ref 78–100)
MONOCYTES # BLD AUTO: 0.7 10E3/UL (ref 0–1.3)
MONOCYTES NFR BLD AUTO: 6 %
NEUTROPHILS # BLD AUTO: 7 10E3/UL (ref 1.6–8.3)
NEUTROPHILS NFR BLD AUTO: 64 %
NRBC # BLD AUTO: 0 10E3/UL
NRBC BLD AUTO-RTO: 0 /100
PLATELET # BLD AUTO: 188 10E3/UL (ref 150–450)
RBC # BLD AUTO: 4.51 10E6/UL (ref 3.8–5.2)
WBC # BLD AUTO: 10.8 10E3/UL (ref 4–11)

## 2022-07-26 PROCEDURE — 36415 COLL VENOUS BLD VENIPUNCTURE: CPT | Mod: ZL

## 2022-07-26 PROCEDURE — 82565 ASSAY OF CREATININE: CPT | Mod: ZL

## 2022-07-26 PROCEDURE — 85004 AUTOMATED DIFF WBC COUNT: CPT | Mod: ZL

## 2022-07-26 PROCEDURE — 36415 COLL VENOUS BLD VENIPUNCTURE: CPT | Mod: ZL | Performed by: UROLOGY

## 2022-07-26 PROCEDURE — 84450 TRANSFERASE (AST) (SGOT): CPT | Mod: ZL

## 2022-07-26 PROCEDURE — 82565 ASSAY OF CREATININE: CPT | Mod: ZL | Performed by: UROLOGY

## 2022-08-01 ENCOUNTER — HOSPITAL ENCOUNTER (OUTPATIENT)
Dept: CT IMAGING | Facility: OTHER | Age: 77
Discharge: HOME | End: 2022-08-01
Attending: UROLOGY
Payer: COMMERCIAL

## 2022-08-01 ENCOUNTER — OFFICE VISIT (OUTPATIENT)
Dept: UROLOGY | Facility: OTHER | Age: 77
End: 2022-08-01
Attending: UROLOGY
Payer: COMMERCIAL

## 2022-08-01 VITALS
BODY MASS INDEX: 31.95 KG/M2 | RESPIRATION RATE: 16 BRPM | WEIGHT: 204 LBS | HEART RATE: 79 BPM | OXYGEN SATURATION: 98 %

## 2022-08-01 DIAGNOSIS — R31.29 MICROSCOPIC HEMATURIA: Primary | ICD-10-CM

## 2022-08-01 DIAGNOSIS — R31.29 MICROSCOPIC HEMATURIA: ICD-10-CM

## 2022-08-01 PROCEDURE — 250N000011 HC RX IP 250 OP 636: Performed by: UROLOGY

## 2022-08-01 PROCEDURE — 52000 CYSTOURETHROSCOPY: CPT | Performed by: UROLOGY

## 2022-08-01 PROCEDURE — G0463 HOSPITAL OUTPT CLINIC VISIT: HCPCS | Mod: 25

## 2022-08-01 PROCEDURE — 74178 CT ABD&PLV WO CNTR FLWD CNTR: CPT

## 2022-08-01 RX ORDER — IOPAMIDOL 755 MG/ML
105 INJECTION, SOLUTION INTRAVASCULAR ONCE
Status: COMPLETED | OUTPATIENT
Start: 2022-08-01 | End: 2022-08-01

## 2022-08-01 RX ADMIN — IOPAMIDOL 105 ML: 755 INJECTION, SOLUTION INTRAVENOUS at 10:50

## 2022-08-01 ASSESSMENT — PAIN SCALES - GENERAL: PAINLEVEL: NO PAIN (1)

## 2022-08-01 NOTE — PATIENT INSTRUCTIONS

## 2022-08-01 NOTE — NURSING NOTE
Patient positioned in frog leg position prior to Cornel Saucedo MD prepping patient with chlorhexidine gluconate cleanser.    [facility] Protocol    A. Pre-procedure verification complete Yes   1-relevant information / documentation available, reviewed and properly matched to the patient; 2-consent accurate and complete, 3-equipment and supplies available    B. Site marking complete N/A  Site marked if not in continuous attendance with patient    C. TIME OUT completed Yes  Time Out was conducted just prior to starting procedure to verify the eight required elements: 1-patient identity, 2-consent accurate and complete, 3-position, 4-correct side/site marked (if applicable), 5-procedure, 6-relevant images / results properly labeled and displayed (if applicable), 7-antibiotics / irrigation fluids (if applicable), 8-safety precautions.    After procedure perineum area rinsed. Discharge instructions reviewed with patient. Patient verbalized understanding of discharge instructions and discharged ambulatory.  Edilia Ramirez LPN..................8/1/2022  10:44 AM

## 2022-08-01 NOTE — PROGRESS NOTES
Preprocedure diagnosis  Hematuria    Postprocedure diagnosis  Hematuria    Procedure  Flexible Cystourethroscopy    Surgeon  Cornel Saucedo MD    Anesthesia  2% lidocaine jelly intraurethrally    Complications  None    Indications  76 year old female undergoing a flexible cystoscopy for the above mentioned indications.    Findings  Cystoscopic findings included a normal urethra.    The bladder appeared to be normal capacity.    There were no tumors, stones or foreign bodies.    The orifices were slit-shaped and in their normal location.    Procedure  The patient was placed in supine position and prepped and draped in sterile fashion with lidocaine jelly per urethra for anesthesia.    I passed a lubricated 14F flexible cystoscope through the urethra and into the bladder and the bladder was completely visualized.  The cystoscope was retroflexed and the bladder neck visualized.    The cystoscope was slowly withdrawn while visualizing the urethra and the procedure terminated.    The patient tolerated the procedure well.      Imaging  CTU  8/1/2022   negative- report pending    Assessment & Plan  Ms. Batista is a 76 year old female who underwent imaging and cystoscopy today to complete the hematuria work-up.  Following the cystoscopy I reviewed the imaging and we discussed the imaging results.  Provided reassurance given the negative work-up.              A total of 10 minutes (exclusive of separately billed services/procedures) was spent with the patient, reviewing records, tests, ordering medications, tests or procedures, counseling regarding the above described medical concern, recommendations regarding management and documenting clinical information in the EHR.

## 2022-08-01 NOTE — PROGRESS NOTES
1.  Has the patient had a previous reaction to IV contrast? no    2.  Does the patient have kidney disease? Yes - CKD3 - GFR 61     3.  Is the patient on dialysis? no    If YES to any of these questions, exam will be reviewed with a Radiologist before administering contrast.    IV Contrast- Discharge Instructions After Your CT Scan      The IV contrast you received today will be filtered from your bloodstream by your kidneys during the next 24 hours and pass from the body in urine.  You will not be aware of this process and your urine will not change in color.  To help this process you should drink at least 4 additional glasses of water or juice today.  This reduces stress on your kidneys.    Most contrast reactions are immediate.  Should you develop symptoms of concern after discharge, contact the department at the number below.  After hours you should contact your personal physician.  If you develop breathing distress or wheezing, call 911.

## 2022-09-22 ENCOUNTER — TELEPHONE (OUTPATIENT)
Dept: ORTHOPEDICS | Facility: OTHER | Age: 77
End: 2022-09-22

## 2022-09-22 NOTE — TELEPHONE ENCOUNTER
Hui called and would like to talk to a nurse as she is experiencing carpal tunnel pain again and can't remember what POG told her to do if the pain came back. She is wondering if she should make an appt.    Ekaterina Pierre on 9/22/2022 at 4:59 PM

## 2022-09-23 NOTE — TELEPHONE ENCOUNTER
Tips of her fingers are numb. I discussed what the surgery entails and recovery process. We will schedule her for a follow up with Dr. Stanley at 8:30 on 9/30.  Nancy Daniel LPN .....................9/23/2022 8:25 AM

## 2022-09-28 ENCOUNTER — TELEPHONE (OUTPATIENT)
Dept: INTERNAL MEDICINE | Facility: OTHER | Age: 77
End: 2022-09-28

## 2022-09-28 ENCOUNTER — OFFICE VISIT (OUTPATIENT)
Dept: INTERNAL MEDICINE | Facility: OTHER | Age: 77
End: 2022-09-28
Attending: NURSE PRACTITIONER
Payer: COMMERCIAL

## 2022-09-28 VITALS
SYSTOLIC BLOOD PRESSURE: 126 MMHG | RESPIRATION RATE: 16 BRPM | TEMPERATURE: 97.6 F | WEIGHT: 200.9 LBS | HEIGHT: 67 IN | DIASTOLIC BLOOD PRESSURE: 80 MMHG | OXYGEN SATURATION: 100 % | BODY MASS INDEX: 31.53 KG/M2 | HEART RATE: 70 BPM

## 2022-09-28 DIAGNOSIS — I10 ESSENTIAL HYPERTENSION: ICD-10-CM

## 2022-09-28 DIAGNOSIS — N30.00 ACUTE CYSTITIS WITHOUT HEMATURIA: ICD-10-CM

## 2022-09-28 DIAGNOSIS — Z23 NEED FOR COVID-19 VACCINE: ICD-10-CM

## 2022-09-28 DIAGNOSIS — R30.0 DYSURIA: Primary | ICD-10-CM

## 2022-09-28 DIAGNOSIS — N30.00 ACUTE CYSTITIS WITHOUT HEMATURIA: Primary | ICD-10-CM

## 2022-09-28 LAB
ALBUMIN UR-MCNC: NEGATIVE MG/DL
APPEARANCE UR: CLEAR
BACTERIA #/AREA URNS HPF: ABNORMAL /HPF
BILIRUB UR QL STRIP: NEGATIVE
COLOR UR AUTO: ABNORMAL
GLUCOSE UR STRIP-MCNC: NEGATIVE MG/DL
HGB UR QL STRIP: NEGATIVE
HYALINE CASTS: 4 /LPF
KETONES UR STRIP-MCNC: NEGATIVE MG/DL
LEUKOCYTE ESTERASE UR QL STRIP: ABNORMAL
MUCOUS THREADS #/AREA URNS LPF: PRESENT /LPF
NITRATE UR QL: NEGATIVE
PH UR STRIP: 6.5 [PH] (ref 5–9)
RBC URINE: 1 /HPF
RENAL TUB EPI: <1 /HPF
SP GR UR STRIP: 1.01 (ref 1–1.03)
SQUAMOUS EPITHELIAL: 1 /HPF
TRANSITIONAL EPI: 2 /HPF
UROBILINOGEN UR STRIP-MCNC: NORMAL MG/DL
WBC URINE: 57 /HPF

## 2022-09-28 PROCEDURE — 91312 COVID-19,PF,PFIZER BOOSTER BIVALENT: CPT

## 2022-09-28 PROCEDURE — 81001 URINALYSIS AUTO W/SCOPE: CPT | Mod: ZL | Performed by: NURSE PRACTITIONER

## 2022-09-28 PROCEDURE — 87086 URINE CULTURE/COLONY COUNT: CPT | Mod: ZL | Performed by: NURSE PRACTITIONER

## 2022-09-28 PROCEDURE — G0463 HOSPITAL OUTPT CLINIC VISIT: HCPCS

## 2022-09-28 PROCEDURE — 99214 OFFICE O/P EST MOD 30 MIN: CPT | Performed by: NURSE PRACTITIONER

## 2022-09-28 PROCEDURE — G0463 HOSPITAL OUTPT CLINIC VISIT: HCPCS | Mod: 25

## 2022-09-28 RX ORDER — ATENOLOL 100 MG/1
100 TABLET ORAL DAILY
Qty: 90 TABLET | Refills: 3 | Status: SHIPPED | OUTPATIENT
Start: 2022-09-28 | End: 2023-06-20

## 2022-09-28 RX ORDER — HYDROCHLOROTHIAZIDE 25 MG/1
25 TABLET ORAL DAILY
Qty: 90 TABLET | Refills: 3 | Status: SHIPPED | OUTPATIENT
Start: 2022-09-28 | End: 2023-06-20

## 2022-09-28 RX ORDER — AMOXICILLIN 500 MG/1
500 CAPSULE ORAL 3 TIMES DAILY
Qty: 15 CAPSULE | Refills: 0 | Status: SHIPPED | OUTPATIENT
Start: 2022-09-28 | End: 2022-10-03

## 2022-09-28 ASSESSMENT — ENCOUNTER SYMPTOMS
DYSURIA: 1
CHILLS: 0
FREQUENCY: 1
DIZZINESS: 0
SHORTNESS OF BREATH: 0
FEVER: 0

## 2022-09-28 ASSESSMENT — PAIN SCALES - GENERAL: PAINLEVEL: MILD PAIN (2)

## 2022-09-28 NOTE — NURSING NOTE
"Chief Complaint   Patient presents with     UTI       FOOD SECURITY SCREENING QUESTIONS  Hunger Vital Signs:  Within the past 12 months we worried whether our food would run out before we got money to buy more. Never  Within the past 12 months the food we bought just didn't last and we didn't have money to get more. Never  Claudia Rahman LPN 9/28/2022 1:48 PM      Initial /80 (BP Location: Right arm, Patient Position: Sitting, Cuff Size: Adult Regular)   Pulse 70   Temp 97.6  F (36.4  C) (Tympanic)   Resp 16   Ht 1.695 m (5' 6.73\")   Wt 91.1 kg (200 lb 14.4 oz)   LMP  (LMP Unknown)   SpO2 100%   BMI 31.72 kg/m   Estimated body mass index is 31.72 kg/m  as calculated from the following:    Height as of this encounter: 1.695 m (5' 6.73\").    Weight as of this encounter: 91.1 kg (200 lb 14.4 oz).  Medication Reconciliation: complete    Claudia Rahman LPN  "

## 2022-09-28 NOTE — PROGRESS NOTES
ICD-10-CM    1. Dysuria  R30.0 UA with Microscopic reflex to Culture     UA with Microscopic reflex to Culture     Urine Culture   2. Acute cystitis without hematuria  N30.00    3. Essential hypertension  I10 atenolol (TENORMIN) 100 MG tablet     hydrochlorothiazide (HYDRODIURIL) 25 MG tablet   4. Need for COVID-19 vaccine  Z23 COVID-19,PF,PFIZER BOOSTER BIVALENT 12+Yrs     Plan:  Urinalysis showed evidence of infection.  Patient left appointment before urinalysis returned.  She did report to nurse before leaving that she had a complete prescription for amoxicillin that she would like to use if she has a bladder infection.  I did send a [employer] message to patient with orders for amoxicillin 500 mg 1 pill 3 times daily for 5 days.  If she does not have this prescription or does not have enough supply of the amoxicillin, she will let me know and I can send over medication to pharmacy.  Based on previous urine culture amoxicillin should work.  Urine culture is pending.  Medication refills completed.  Blood pressure well controlled.  Influenza and COVID-vaccine provided.    Sylvie Flores is a 76 year old, presenting for the following health issues:  UTI      She is here today for dysuria, urgency and frequency that has been occurring for the past couple of weeks and getting worse.  Denies hematuria.  She did have a urological work-up for microscopic hematuria recently that was negative.  She is not having fever or chills.  She also needs to follow-up on hypertension.  Blood pressure has been well controlled.  She is due for refill of atenolol and hydrochlorothiazide.  Normal creatinine in July 2022.  She is also requesting to have COVID-19 booster.    UTI  Pertinent negatives include no chest pain, chills or fever.   History of Present Illness       Reason for visit:  Urinary followup    She eats 2-3 servings of fruits and vegetables daily.She consumes 0 sweetened beverage(s) daily.She exercises with enough  "effort to increase her heart rate 9 or less minutes per day.  She exercises with enough effort to increase her heart rate 3 or less days per week.   She is taking medications regularly.       Genitourinary - Female  Onset/Duration: a couple weeks   Description:   Painful urination (Dysuria): YES           Frequency: No  Blood in urine (Hematuria): No  Delay in urine (Hesitency): No  Intensity: moderate  Progression of Symptoms:  worsening  Accompanying Signs & Symptoms:  Fever/chills: No  Flank pain: No  Nausea and vomiting: No  Vaginal symptoms: odor  Abdominal/Pelvic Pain: YES  History:   History of frequent UTI s: YES  History of kidney stones: YES  Sexually Active: No  Possibility of pregnancy: No  Precipitating or alleviating factors: None  Therapies tried and outcome:     Hypertension Follow-up      Do you check your blood pressure regularly outside of the clinic? No     Are you following a low salt diet? Yes    Are your blood pressures ever more than 140 on the top number (systolic) OR more   than 90 on the bottom number (diastolic), for example 140/90? No      Review of Systems   Constitutional: Negative for chills and fever.   Respiratory: Negative for shortness of breath.    Cardiovascular: Negative for chest pain.   Genitourinary: Positive for dysuria, frequency and urgency.   Neurological: Negative for dizziness.            Objective    /80 (BP Location: Right arm, Patient Position: Sitting, Cuff Size: Adult Regular)   Pulse 70   Temp 97.6  F (36.4  C) (Tympanic)   Resp 16   Ht 1.695 m (5' 6.73\")   Wt 91.1 kg (200 lb 14.4 oz)   LMP  (LMP Unknown)   SpO2 100%   BMI 31.72 kg/m    Body mass index is 31.72 kg/m .  Physical Exam   Pleasant female no acute distress.  Affect normal.  Alert and oriented x4.  Lung fields clear to auscultation.  Cardiovascular regular.  No suprapubic or CVA tenderness.                    "

## 2022-09-28 NOTE — TELEPHONE ENCOUNTER
Patient was seen 09/28/2022.  Patient has bladder infection and is questioning the antibiotic prescription that was given to her.    Amy Tran on 9/28/2022 at 4:13 PM

## 2022-09-28 NOTE — TELEPHONE ENCOUNTER
Called and spoke with the patient she states that her prescription is 875 1 tablet for 7 days. She is wondering if you will send over the prescription for what the dosing that you told her she should be taking. Please send to [facility] and she will pick it up tomorrow.   Claudia Rahman LPN on 9/28/2022 at 4:27 PM

## 2022-09-30 ENCOUNTER — MYC MEDICAL ADVICE (OUTPATIENT)
Dept: INTERNAL MEDICINE | Facility: OTHER | Age: 77
End: 2022-09-30

## 2022-09-30 ENCOUNTER — OFFICE VISIT (OUTPATIENT)
Dept: ORTHOPEDICS | Facility: OTHER | Age: 77
End: 2022-09-30
Attending: SPECIALIST
Payer: COMMERCIAL

## 2022-09-30 DIAGNOSIS — G56.03 BILATERAL CARPAL TUNNEL SYNDROME: Primary | ICD-10-CM

## 2022-09-30 LAB — BACTERIA UR CULT: NORMAL

## 2022-09-30 PROCEDURE — G0463 HOSPITAL OUTPT CLINIC VISIT: HCPCS

## 2022-09-30 PROCEDURE — 99214 OFFICE O/P EST MOD 30 MIN: CPT | Performed by: SPECIALIST

## 2022-09-30 NOTE — PROGRESS NOTES
Visit Date: 2022    Jackie Pal returns for followup.  She is here today because of her carpal tunnel syndrome.  She saw Dr. Rdz and he recommended a trial splint wear.  She would like to proceed with a carpal tunnel release on the right side at this time.  She has had numbness in the thumb, index and long digit.    PHYSICAL EXAMINATION:  Confirms the patient to be well groomed and well kempt.  Examination of both upper extremities reveals full and symmetric range of motion of elbows and wrists.  She does have a positive Tinel's at the right wrist as well as numbness with provocative maneuvers, which compress the median nerve.    IMPRESSION AND PLAN:  Bilateral carpal tunnel syndrome, right greater than left.  She would like to proceed with a right endoscopic carpal tunnel release.  Risks, complications, benefits reviewed, and this can be scheduled at her convenience.    Danial Stanley MD        D: 2022   T: 2022   MT: [place]    Name:     JACKIE PAL  MRN:      [id number]        Account:    [id number]   :      1945           Visit Date: 2022     Document: [id number]

## 2022-09-30 NOTE — PROGRESS NOTES
Patient is here for follow up on her carpal tunnel.  Nancy Daniel LPN .....................9/30/2022 8:24 AM

## 2022-10-20 ENCOUNTER — OFFICE VISIT (OUTPATIENT)
Dept: FAMILY MEDICINE | Facility: OTHER | Age: 77
End: 2022-10-20
Attending: PHYSICIAN ASSISTANT
Payer: COMMERCIAL

## 2022-10-20 ENCOUNTER — NURSE TRIAGE (OUTPATIENT)
Dept: INTERNAL MEDICINE | Facility: OTHER | Age: 77
End: 2022-10-20

## 2022-10-20 VITALS
HEART RATE: 66 BPM | OXYGEN SATURATION: 100 % | WEIGHT: 199.4 LBS | TEMPERATURE: 97.9 F | BODY MASS INDEX: 31.3 KG/M2 | RESPIRATION RATE: 14 BRPM | HEIGHT: 67 IN | SYSTOLIC BLOOD PRESSURE: 132 MMHG | DIASTOLIC BLOOD PRESSURE: 84 MMHG

## 2022-10-20 DIAGNOSIS — N89.8 VAGINAL IRRITATION: ICD-10-CM

## 2022-10-20 DIAGNOSIS — N39.0 ACUTE UTI (URINARY TRACT INFECTION): Primary | ICD-10-CM

## 2022-10-20 DIAGNOSIS — R39.89 URINARY PROBLEM: ICD-10-CM

## 2022-10-20 LAB
ALBUMIN UR-MCNC: 10 MG/DL
APPEARANCE UR: CLEAR
BILIRUB UR QL STRIP: NEGATIVE
CLUE CELLS: ABNORMAL
COLOR UR AUTO: ABNORMAL
GLUCOSE UR STRIP-MCNC: NEGATIVE MG/DL
HGB UR QL STRIP: ABNORMAL
HYALINE CASTS: 1 /LPF
KETONES UR STRIP-MCNC: NEGATIVE MG/DL
LEUKOCYTE ESTERASE UR QL STRIP: ABNORMAL
MUCOUS THREADS #/AREA URNS LPF: PRESENT /LPF
NITRATE UR QL: NEGATIVE
PH UR STRIP: 6 [PH] (ref 5–9)
RBC URINE: 4 /HPF
SP GR UR STRIP: 1.02 (ref 1–1.03)
SQUAMOUS EPITHELIAL: 1 /HPF
TRICHOMONAS, WET PREP: ABNORMAL
UROBILINOGEN UR STRIP-MCNC: NORMAL MG/DL
WBC URINE: 33 /HPF
WBC'S/HIGH POWER FIELD, WET PREP: ABNORMAL
YEAST, WET PREP: ABNORMAL

## 2022-10-20 PROCEDURE — 99213 OFFICE O/P EST LOW 20 MIN: CPT | Performed by: PHYSICIAN ASSISTANT

## 2022-10-20 PROCEDURE — 87086 URINE CULTURE/COLONY COUNT: CPT | Mod: ZL | Performed by: PHYSICIAN ASSISTANT

## 2022-10-20 PROCEDURE — 81003 URINALYSIS AUTO W/O SCOPE: CPT | Mod: ZL | Performed by: PHYSICIAN ASSISTANT

## 2022-10-20 PROCEDURE — G0463 HOSPITAL OUTPT CLINIC VISIT: HCPCS

## 2022-10-20 PROCEDURE — 87210 SMEAR WET MOUNT SALINE/INK: CPT | Mod: ZL | Performed by: PHYSICIAN ASSISTANT

## 2022-10-20 RX ORDER — NITROFURANTOIN 25; 75 MG/1; MG/1
100 CAPSULE ORAL 2 TIMES DAILY
Qty: 14 CAPSULE | Refills: 0 | Status: SHIPPED | OUTPATIENT
Start: 2022-10-20 | End: 2022-10-20

## 2022-10-20 RX ORDER — NITROFURANTOIN 25; 75 MG/1; MG/1
100 CAPSULE ORAL 2 TIMES DAILY
Qty: 14 CAPSULE | Refills: 0 | Status: SHIPPED | OUTPATIENT
Start: 2022-10-20 | End: 2023-01-03

## 2022-10-20 ASSESSMENT — PAIN SCALES - GENERAL: PAINLEVEL: NO PAIN (1)

## 2022-10-20 NOTE — TELEPHONE ENCOUNTER
Patient was called and updated, she will complete visit in person. Natasha Walker RN on 10/20/2022 at 2:19 PM

## 2022-10-20 NOTE — TELEPHONE ENCOUNTER
"Called and spoke to Patient after verifying last name and date of birth. Updated her on the below information and she verbalized that she will \"give that a try right now.\" Also instructed her that if she had any farther issues to feel free to call us right back at [phone]. Natasha Walker RN on 10/20/2022 at 1:19 PM      " 24

## 2022-10-20 NOTE — PROGRESS NOTES
Assessment & Plan     1. Acute UTI (urinary tract infection)  2. Urinary problem  3. Vaginal irritation  Differential includes residual/recurrent UTI, renal dysfunction, glucosuria, proteinuria, vaginal infection, STD, interstitial cystitis, dehydration, external irritation, etc.  Reviewed recent urine cultures which have been negative or showed mixed urogenital tiara.  Reviewed recent urologic evaluation including unremarkable CT urogram, cystoscopy.  UA repeated today showing possible recurrent infection. Will cover with Macrobid until culture returns. If culture negative, recommend repeat urology follow up. Wet prep without definitive signs of infection.  Patient declined  exam.  - nitroFURantoin macrocrystal-monohydrate (MACROBID) 100 MG capsule; Take 1 capsule (100 mg) by mouth 2 times daily  Dispense: 14 capsule; Refill: 0  - UA reflex to Microscopic and Culture  - Urine Culture  - Wet Prep, Genital      Return if symptoms worsen or fail to improve.    Cinthya Ghotra PA-C  [facility] AND [facility]   Sandra is a 76 year old, presenting for the following health issues:  Follow Up      History of Present Illness       Hypothyroidism:     Since last visit, patient describes the following symptoms::  None    She eats 2-3 servings of fruits and vegetables daily.She consumes 0 sweetened beverage(s) daily.She exercises with enough effort to increase her heart rate 9 or less minutes per day.  She exercises with enough effort to increase her heart rate 3 or less days per week.      Here for follow-up on urinary concerns.  Treated for a UTI with amoxicillin 500 mg twice daily x10 days in September.  Prior to that she did have urologic evaluation for microscopic hematuria with Dr. Saucedo including CT urogram, cystoscopy which had been unremarkable.  Does report mild improvement in her UTI symptoms including dysuria, urinary urgency while she was on the amoxicillin but reports symptoms never  fully resolved.  She has continued to struggle with the symptoms as now is having some external irritation as well.  No associated fever/chills, hematuria, flank pain, abdominal pain, nausea, vomiting, diarrhea, constipation, vaginal discharge changes.    PAST MEDICAL HISTORY:   Past Medical History:   Diagnosis Date     Atherosclerotic heart disease of native coronary artery without angina pectoris     -s/p ADÁN to the mid-LAD, mid-circumflex, mid-right coronary artery, proximal right coronary  artery, and PTCA of a marginal branch of the right coronary artery 2007. -s/p ADNÁ to proximal left anterior descending 10/29/13.     Contusion of abdominal wall          Essential (primary) hypertension     2006     Gout     No Comments Provided     Hyperlipidemia     2007     Localized swelling, mass, or lump of upper extremity     2017     Other specified counseling     10/28/2013,Patient has identified Health Care Agent(s): Yes Add Health Care Agents: Yes   Health Care Agent(s): Primary Health Care Agent: Jay Batista  Relationship:  Phone:   Secondary Health Care Agent:  Relationship: Daughter Phone:   Conservator:  Relationship:  Phone:   Guardian: Relationship:  Phone:    Patient has Advance Care Plan Documents (Health Care Directive, POLST): No, refe*     Polyosteoarthritis     No Comments Provided     Postmenopausal bleeding     No Comments Provided     Presence of coronary angioplasty implant and graft     ,unstable angina; severe prox LAD stenosis; s/p 7 stents     Primary osteoarthritis of left hand     2017     Rheumatoid arthritis (H)     follows at [facility] yearly       PAST SURGICAL HISTORY:   Past Surgical History:   Procedure Laterality Date     ARTHROPLASTY KNEE      2011     ARTHROSCOPY KNEE           BIOPSY BREAST      10/25/95,BRIAN RAMIREZ      SECTION      , Section     COLONOSCOPY      05,Normal - Repeat in 10  years     COLONOSCOPY  04/14/2016 04/14/2016     COLONOSCOPY N/A 1/9/2019    Procedure: COLONOSCOPY;  Surgeon: Evelin Thompson MD;  Location:  OR     COLONOSCOPY N/A 1/9/2019    Procedure: COMBINED COLONOSCOPY, SINGLE OR MULTIPLE BIOPSY/POLYPECTOMY BY BIOPSY;  Surgeon: Evelin Thompson MD;  Location:  OR     ESOPHAGOSCOPY, GASTROSCOPY, DUODENOSCOPY (EGD), COMBINED      5/2/2011,erosive gastritis;bx;consider MRI/A;[facility]     EXTRACTION(S) DENTAL      1970     HEART CATH, ANGIOPLASTY      10/29/2013,ADÁN to proximal left anterior descending     OTHER SURGICAL HISTORY      2007/2013,437663,OTHER     OTHER SURGICAL HISTORY      01/2014,API674,TOTAL SHOULDER ARTHROPLASTY,Right     OTHER SURGICAL HISTORY      04/14/2016,51201.0,GA COLONOSCOPY REMOVE ELIZA POLYP LESN SNARE,repeat 2019, precancerous polyps     ZZ STRESS LEXISCAN TEST  08/2018    With Dr. Irwin - normal       FAMILY HISTORY:   Family History   Problem Relation Age of Onset     Heart Disease Mother         Heart Disease     Hypertension Mother         Hypertension     Other - See Comments Mother         Stroke     Breast Cancer Mother 53        Cancer-breast     Arthritis Father         Arthritis     Cancer Father         Cancer     Heart Disease Father         Heart Disease     Hypertension Father         Hypertension     Family History Negative Brother         Good Health     Heart Disease Brother         Heart Disease     Family History Negative Sister         Good Health       SOCIAL HISTORY:   Social History     Tobacco Use     Smoking status: Never     Smokeless tobacco: Never     Tobacco comments:     no ecig   Substance Use Topics     Alcohol use: Not Currently     Comment: Maybe 2 drinks a month        Allergies   Allergen Reactions     Food      Macadamia nuts make mouth itch     Losartan      Allergic rxn with hives and angioedema suspected to be from alternative manufacture of the effient or the losartan     Enalapril Cough     Current Outpatient  "Medications   Medication     allopurinol (ZYLOPRIM) 300 MG tablet     aspirin EC 81 MG EC tablet     atenolol (TENORMIN) 100 MG tablet     atorvastatin (LIPITOR) 40 MG tablet     cetirizine (ZYRTEC) 10 MG tablet     Cholecalciferol (VITAMIN D3) 2000 UNITS CAPS     folic acid (FOLVITE) 1 MG tablet     hydrochlorothiazide (HYDRODIURIL) 25 MG tablet     hydroxychloroquine (PLAQUENIL) 200 MG tablet     methotrexate 2.5 MG tablet     nitroFURantoin macrocrystal-monohydrate (MACROBID) 100 MG capsule     nitroGLYcerin (NITROSTAT) 0.4 MG sublingual tablet     ranitidine (ZANTAC) 150 MG capsule     No current facility-administered medications for this visit.         Review of Systems   Per HPI        Objective    /84   Pulse 66   Temp 97.9  F (36.6  C)   Resp 14   Ht 1.695 m (5' 6.75\")   Wt 90.4 kg (199 lb 6.4 oz)   LMP  (LMP Unknown)   SpO2 100%   BMI 31.46 kg/m    Body mass index is 31.46 kg/m .  Physical Exam   General: Pleasant, in no apparent distress.  Cardiovascular: Regular rate and rhythm with S1 equal to S2. No murmurs, friction rubs, or gallops.   Respiratory: Lungs are resonant and clear to auscultation bilaterally. No wheezes, crackles, or rhonchi.  : Declined  Psych: Appropriate mood and affect.    Results for orders placed or performed in visit on 10/20/22   UA reflex to Microscopic and Culture     Status: Abnormal    Specimen: Urine, Clean Catch   Result Value Ref Range    Color Urine Light Yellow Colorless, Straw, Light Yellow, Yellow    Appearance Urine Clear Clear    Glucose Urine Negative Negative mg/dL    Bilirubin Urine Negative Negative    Ketones Urine Negative Negative mg/dL    Specific Gravity Urine 1.017 1.000 - 1.030    Blood Urine Trace (A) Negative    pH Urine 6.0 5.0 - 9.0    Protein Albumin Urine 10 (A) Negative mg/dL    Urobilinogen Urine Normal Normal, 2.0 mg/dL    Nitrite Urine Negative Negative    Leukocyte Esterase Urine Large (A) Negative    Mucus Urine Present (A) None " Seen /LPF    RBC Urine 4 (H) <=2 /HPF    WBC Urine 33 (H) <=5 /HPF    Squamous Epithelials Urine 1 <=1 /HPF    Hyaline Casts Urine 1 <=2 /LPF    Narrative    Urine Culture ordered based on laboratory criteria   Wet Prep, Genital     Status: Abnormal    Specimen: Vagina; Swab   Result Value Ref Range    Trichomonas Absent Absent    Yeast Absent Absent    Clue Cells Absent Absent    WBCs/high power field 3+ (A) None

## 2022-10-20 NOTE — TELEPHONE ENCOUNTER
Pt states that she had been diagnosed with an UTI she has taken all the medication and it has not gone away.  Please call.    Jimi Chua on 10/20/2022 at 9:25 AM

## 2022-10-20 NOTE — NURSING NOTE
Patient presents to clinic for follow up on UTI. She is having burning with urination, irritation area vaginal area.    Magali Combs LPN ....................  10/20/2022   3:05 PM       01-Apr-2021

## 2022-10-20 NOTE — TELEPHONE ENCOUNTER
"S-(situation): Patient believes she has an urinary infection.    B-(background): Patient was treated with amoxicillin 9/28/22-10/3/22 for an urinary infection. States symptoms did initially improve but have returned (urgency, burning with urination). States she was to followup if she did not get better. Patient has a history of UTIs and heamturia.     A-(assessment): Denies fever, back/flank pain, confusion, dizziness, etc. States burning with urination and frequency. Denies any vaginal discharge but does report slight itching in vaginal area- did discuss possibility of yeast infection but patient reports \"I never get those\"    R-(recommendations): Recommendation is for patient to be seen today in clinic. Due to no clinic appointments, patient wanted message routed to a provider for recommendations. Did discuss ability to present to [facility] in which patient states she wants to try to avoid this. Please advise on best course of action.    Due to PCP's absence, routing to a covering provider.    Corinne R Thayer, RN on 10/20/2022 at 9:42 AM    Reason for Disposition    > 2 UTIs in last year    Age > 50 years    Painful urination AND EITHER frequency or urgency    All other females with painful urination, or patient wants to be seen    Additional Information    Negative: Shock suspected (e.g., cold/pale/clammy skin, too weak to stand, low BP, rapid pulse)    Negative: Sounds like a life-threatening emergency to the triager    Negative: Followed a female genital area injury (e.g., vagina, vulva)    Negative: Followed a male genital area injury (penis, scrotum)    Negative: Vaginal discharge    Negative: Pus (white, yellow) or bloody discharge from end of penis    Negative: Pain or burning with passing urine (urination) and pregnant    Negative: Unable to urinate (or only a few drops) > 4 hours and bladder feels very full (e.g., palpable bladder or strong urge to urinate)    Negative: Shock suspected (e.g., " "cold/pale/clammy skin, too weak to stand, low BP, rapid pulse)    Negative: Sounds like a life-threatening emergency to the triager    Negative: Unable to urinate (or only a few drops) and bladder feels very full    Negative: Vomiting    Negative: Patient sounds very sick or weak to the triager    Negative: SEVERE pain with urination    Negative: Fever > 100.4 F (38.0 C)    Negative: Side (flank) or lower back pain present    Negative: Taking antibiotic > 24 hours for UTI and fever persists    Negative: Taking antibiotic > 3 days for UTI and painful urination not improved    Negative: Unusual vaginal discharge    Negative: Patient is worried they have a sexually transmitted infection (STI)    Negative: Possibility of pregnancy    Answer Assessment - Initial Assessment Questions  1. SYMPTOM: \"What's the main symptom you're concerned about?\" (e.g., frequency, incontinence)      Burning with urination, frequency  2. ONSET: \"When did the  symptoms  start?\"      Patient was on antibiotics for an urinary infection 9/28/22-10/3/22. States symptoms improved but did no completely go away. States the frequency and burning with urination has slowly gotten worse over the past few days  3. PAIN: \"Is there any pain?\" If Yes, ask: \"How bad is it?\" (Scale: 1-10; mild, moderate, severe)      Denies  4. CAUSE: \"What do you think is causing the symptoms?\"      Urinary infection  5. OTHER SYMPTOMS: \"Do you have any other symptoms?\" (e.g., fever, flank pain, blood in urine, pain with urination)      Slight vaginal itching  6. PREGNANCY: \"Is there any chance you are pregnant?\" \"When was your last menstrual period?\"      No    Protocols used: URINATION PAIN - FEMALE-A-OH, URINARY SYMPTOMS-A-OH      "

## 2022-10-20 NOTE — TELEPHONE ENCOUNTER
Patient is in my schedule this afternoon for a clinic appointment not E-Visit. Is she aware that this was scheduled for an in person visit? If so, that would really be preferred so a repeat UA can be completed.     Cinthya Ghotra PA-C on 10/20/2022 at 1:41 PM

## 2022-10-22 LAB — BACTERIA UR CULT: NO GROWTH

## 2022-10-26 ENCOUNTER — LAB (OUTPATIENT)
Dept: LAB | Facility: OTHER | Age: 77
End: 2022-10-26
Attending: NURSE PRACTITIONER
Payer: COMMERCIAL

## 2022-10-26 DIAGNOSIS — M06.4 INFLAMMATORY POLYARTHROPATHY (H): ICD-10-CM

## 2022-10-26 LAB
AST SERPL W P-5'-P-CCNC: 20 U/L (ref 13–39)
BASOPHILS # BLD AUTO: 0.1 10E3/UL (ref 0–0.2)
BASOPHILS NFR BLD AUTO: 0 %
CREAT SERPL-MCNC: 1.05 MG/DL (ref 0.6–1.2)
EOSINOPHIL # BLD AUTO: 0.3 10E3/UL (ref 0–0.7)
EOSINOPHIL NFR BLD AUTO: 3 %
ERYTHROCYTE [DISTWIDTH] IN BLOOD BY AUTOMATED COUNT: 15.8 % (ref 10–15)
GFR SERPL CREATININE-BSD FRML MDRD: 55 ML/MIN/1.73M2
HCT VFR BLD AUTO: 44.7 % (ref 35–47)
HGB BLD-MCNC: 14.7 G/DL (ref 11.7–15.7)
IMM GRANULOCYTES # BLD: 0.1 10E3/UL
IMM GRANULOCYTES NFR BLD: 1 %
LYMPHOCYTES # BLD AUTO: 3.6 10E3/UL (ref 0.8–5.3)
LYMPHOCYTES NFR BLD AUTO: 32 %
MCH RBC QN AUTO: 31.8 PG (ref 26.5–33)
MCHC RBC AUTO-ENTMCNC: 32.9 G/DL (ref 31.5–36.5)
MCV RBC AUTO: 97 FL (ref 78–100)
MONOCYTES # BLD AUTO: 0.5 10E3/UL (ref 0–1.3)
MONOCYTES NFR BLD AUTO: 5 %
NEUTROPHILS # BLD AUTO: 6.7 10E3/UL (ref 1.6–8.3)
NEUTROPHILS NFR BLD AUTO: 59 %
NRBC # BLD AUTO: 0 10E3/UL
NRBC BLD AUTO-RTO: 0 /100
PLATELET # BLD AUTO: 207 10E3/UL (ref 150–450)
RBC # BLD AUTO: 4.62 10E6/UL (ref 3.8–5.2)
WBC # BLD AUTO: 11.2 10E3/UL (ref 4–11)

## 2022-10-26 PROCEDURE — 36415 COLL VENOUS BLD VENIPUNCTURE: CPT | Mod: ZL

## 2022-10-26 PROCEDURE — 82565 ASSAY OF CREATININE: CPT | Mod: ZL

## 2022-10-26 PROCEDURE — 84450 TRANSFERASE (AST) (SGOT): CPT | Mod: ZL

## 2022-10-26 PROCEDURE — 85025 COMPLETE CBC W/AUTO DIFF WBC: CPT | Mod: ZL

## 2022-11-01 DIAGNOSIS — M10.9 GOUT, UNSPECIFIED CAUSE, UNSPECIFIED CHRONICITY, UNSPECIFIED SITE: ICD-10-CM

## 2022-11-03 NOTE — TELEPHONE ENCOUNTER
[id number] in [employer] [facility] [place] sent Rx request for the following:      Requested Prescriptions   Pending Prescriptions Disp Refills     allopurinol (ZYLOPRIM) 300 MG tablet [Pharmacy Med Name: ALLOPURINOL  TAB 300MG] 90 tablet 3     Sig: TAKE 1 TABLET DAILY       Gout Agents Protocol Failed - 11/1/2022 12:15 PM        Failed - ALT on file in past 12 months     Recent Labs   Lab Test 06/18/21  0942 11/06/18  1016 12/20/17  1026   ALT 14   < >  --    GICHALT  --   --  18    < > = values in this interval not displayed.           Failed - Has Uric Acid on file in past 12 months and value is less than 6     Recent Labs   Lab Test 05/23/17  0957   URIC 4.6     If level is 6mg/dL or greater, ok to refill one time and refer to provider.           Last Prescription Date:   12/23/2021  Last Fill Qty/Refills:         90, R-3   Last Office Visit:              10/20/22  Future Office visit:          None    Routing to provider. Natasha Walker, RN on 11/3/2022 at 3:15 PM

## 2022-11-04 RX ORDER — ALLOPURINOL 300 MG/1
TABLET ORAL
Qty: 90 TABLET | Refills: 3 | Status: SHIPPED | OUTPATIENT
Start: 2022-11-04 | End: 2023-12-06

## 2022-11-16 ENCOUNTER — ALLIED HEALTH/NURSE VISIT (OUTPATIENT)
Dept: FAMILY MEDICINE | Facility: OTHER | Age: 77
End: 2022-11-16
Payer: COMMERCIAL

## 2022-11-16 DIAGNOSIS — Z23 NEED FOR PROPHYLACTIC VACCINATION AND INOCULATION AGAINST INFLUENZA: Primary | ICD-10-CM

## 2022-11-16 PROCEDURE — G0008 ADMIN INFLUENZA VIRUS VAC: HCPCS

## 2023-01-03 ENCOUNTER — TELEPHONE (OUTPATIENT)
Dept: INTERNAL MEDICINE | Facility: OTHER | Age: 78
End: 2023-01-03

## 2023-01-03 ENCOUNTER — OFFICE VISIT (OUTPATIENT)
Dept: INTERNAL MEDICINE | Facility: OTHER | Age: 78
End: 2023-01-03
Attending: NURSE PRACTITIONER
Payer: COMMERCIAL

## 2023-01-03 VITALS
WEIGHT: 195.2 LBS | OXYGEN SATURATION: 100 % | SYSTOLIC BLOOD PRESSURE: 138 MMHG | RESPIRATION RATE: 16 BRPM | HEART RATE: 70 BPM | BODY MASS INDEX: 30.8 KG/M2 | TEMPERATURE: 97.3 F | DIASTOLIC BLOOD PRESSURE: 86 MMHG

## 2023-01-03 DIAGNOSIS — Z01.818 PRE-OPERATIVE EXAMINATION: ICD-10-CM

## 2023-01-03 DIAGNOSIS — I10 PRIMARY HYPERTENSION: ICD-10-CM

## 2023-01-03 DIAGNOSIS — I25.10 ASCVD (ARTERIOSCLEROTIC CARDIOVASCULAR DISEASE): Primary | ICD-10-CM

## 2023-01-03 DIAGNOSIS — N18.31 STAGE 3A CHRONIC KIDNEY DISEASE (H): ICD-10-CM

## 2023-01-03 DIAGNOSIS — G56.01 CARPAL TUNNEL SYNDROME OF RIGHT WRIST: ICD-10-CM

## 2023-01-03 DIAGNOSIS — D84.9 IMMUNOCOMPROMISED (H): ICD-10-CM

## 2023-01-03 DIAGNOSIS — D72.829 LEUKOCYTOSIS, UNSPECIFIED TYPE: ICD-10-CM

## 2023-01-03 DIAGNOSIS — M05.79 RHEUMATOID ARTHRITIS INVOLVING MULTIPLE SITES WITH POSITIVE RHEUMATOID FACTOR (H): ICD-10-CM

## 2023-01-03 LAB
ANION GAP SERPL CALCULATED.3IONS-SCNC: 11 MMOL/L (ref 7–15)
ATRIAL RATE - MUSE: 63 BPM
BUN SERPL-MCNC: 20.1 MG/DL (ref 8–23)
CALCIUM SERPL-MCNC: 9.9 MG/DL (ref 8.8–10.2)
CHLORIDE SERPL-SCNC: 102 MMOL/L (ref 98–107)
CREAT SERPL-MCNC: 0.86 MG/DL (ref 0.51–0.95)
DEPRECATED HCO3 PLAS-SCNC: 27 MMOL/L (ref 22–29)
DIASTOLIC BLOOD PRESSURE - MUSE: NORMAL MMHG
ERYTHROCYTE [DISTWIDTH] IN BLOOD BY AUTOMATED COUNT: 15.8 % (ref 10–15)
GFR SERPL CREATININE-BSD FRML MDRD: 70 ML/MIN/1.73M2
GLUCOSE SERPL-MCNC: 94 MG/DL (ref 70–99)
HCT VFR BLD AUTO: 41 % (ref 35–47)
HGB BLD-MCNC: 13.5 G/DL (ref 11.7–15.7)
INTERPRETATION ECG - MUSE: NORMAL
MCH RBC QN AUTO: 31.7 PG (ref 26.5–33)
MCHC RBC AUTO-ENTMCNC: 32.9 G/DL (ref 31.5–36.5)
MCV RBC AUTO: 96 FL (ref 78–100)
P AXIS - MUSE: 58 DEGREES
PLATELET # BLD AUTO: 249 10E3/UL (ref 150–450)
POTASSIUM SERPL-SCNC: 3.7 MMOL/L (ref 3.4–5.3)
PR INTERVAL - MUSE: 172 MS
QRS DURATION - MUSE: 88 MS
QT - MUSE: 436 MS
QTC - MUSE: 446 MS
R AXIS - MUSE: 87 DEGREES
RBC # BLD AUTO: 4.26 10E6/UL (ref 3.8–5.2)
SODIUM SERPL-SCNC: 140 MMOL/L (ref 136–145)
SYSTOLIC BLOOD PRESSURE - MUSE: NORMAL MMHG
T AXIS - MUSE: 64 DEGREES
VENTRICULAR RATE- MUSE: 63 BPM
WBC # BLD AUTO: 15.9 10E3/UL (ref 4–11)

## 2023-01-03 PROCEDURE — 93005 ELECTROCARDIOGRAM TRACING: CPT | Performed by: NURSE PRACTITIONER

## 2023-01-03 PROCEDURE — 93010 ELECTROCARDIOGRAM REPORT: CPT | Performed by: STUDENT IN AN ORGANIZED HEALTH CARE EDUCATION/TRAINING PROGRAM

## 2023-01-03 PROCEDURE — G0463 HOSPITAL OUTPT CLINIC VISIT: HCPCS

## 2023-01-03 PROCEDURE — 36415 COLL VENOUS BLD VENIPUNCTURE: CPT | Mod: ZL | Performed by: NURSE PRACTITIONER

## 2023-01-03 PROCEDURE — 99214 OFFICE O/P EST MOD 30 MIN: CPT | Performed by: NURSE PRACTITIONER

## 2023-01-03 PROCEDURE — 80048 BASIC METABOLIC PNL TOTAL CA: CPT | Mod: ZL | Performed by: NURSE PRACTITIONER

## 2023-01-03 PROCEDURE — 85014 HEMATOCRIT: CPT | Mod: ZL | Performed by: NURSE PRACTITIONER

## 2023-01-03 ASSESSMENT — ENCOUNTER SYMPTOMS
SHORTNESS OF BREATH: 0
DYSPHORIC MOOD: 0
ANAL BLEEDING: 0
ABDOMINAL PAIN: 0
DIZZINESS: 0
POLYPHAGIA: 0
ADENOPATHY: 0
LIGHT-HEADEDNESS: 0
CHEST TIGHTNESS: 0
POLYDIPSIA: 0
TROUBLE SWALLOWING: 0
HEARTBURN: 0
SORE THROAT: 0
DIFFICULTY URINATING: 0
FEVER: 0
DIARRHEA: 0
BRUISES/BLEEDS EASILY: 0
NERVOUS/ANXIOUS: 0
UNEXPECTED WEIGHT CHANGE: 0
VOMITING: 0
ARTHRALGIAS: 1
CONSTIPATION: 0
PALPITATIONS: 0
HEMATOCHEZIA: 0
HEMATURIA: 0
DYSURIA: 0
WOUND: 0

## 2023-01-03 ASSESSMENT — PAIN SCALES - GENERAL: PAINLEVEL: NO PAIN (0)

## 2023-01-03 NOTE — NURSING NOTE
"Chief Complaint   Patient presents with     Pre-Op Exam       FOOD SECURITY SCREENING QUESTIONS  Hunger Vital Signs:  Within the past 12 months we worried whether our food would run out before we got money to buy more. Never  Within the past 12 months the food we bought just didn't last and we didn't have money to get more. Never  Claudia Rahman LPN 1/3/2023 9:10 AM      Initial /86 (BP Location: Right arm, Patient Position: Sitting, Cuff Size: Adult Regular)   Pulse 70   Temp 97.3  F (36.3  C) (Tympanic)   Resp 16   Wt 88.5 kg (195 lb 3.2 oz)   LMP  (LMP Unknown)   SpO2 100%   BMI 30.80 kg/m   Estimated body mass index is 30.8 kg/m  as calculated from the following:    Height as of 10/20/22: 1.695 m (5' 6.75\").    Weight as of this encounter: 88.5 kg (195 lb 3.2 oz).  Medication Reconciliation: complete    Claudia Rahman LPN  "

## 2023-01-03 NOTE — PROGRESS NOTES
[facility]  [address]  [place] [address]  Phone: [phone]  Primary Provider: Jonathan Maxwell  Pre-op Performing Provider: JONATHAN MAXWELL      PREOPERATIVE EVALUATION:  Today's date: 1/3/2023    Margaret Batista is a 77 year old female who presents for a preoperative evaluation.    Surgical Information:  Surgery/Procedure: Right CTR  Surgery Location: [facility]   Surgeon: Bayron  Surgery Date: 1/13/23  Time of Surgery: n/a  Where patient plans to recover: At home with family  Fax number for surgical facility: Note does not need to be faxed, will be available electronically in Epic.    Type of Anesthesia Anticipated: General      ICD-10-CM    1. ASCVD (arteriosclerotic cardiovascular disease)  I25.10 EKG 12-lead, tracing only      2. Primary hypertension  I10 EKG 12-lead, tracing only      3. Rheumatoid arthritis involving multiple sites with positive rheumatoid factor (H)  M05.79       4. Stage 3a chronic kidney disease (H)  N18.31 CBC with platelets     Basic metabolic panel     CBC with platelets     Basic metabolic panel      5. Immunocompromised (H)  D84.9       6. Pre-operative examination  Z01.818       7. Carpal tunnel syndrome of right wrist  G56.01       8. Leukocytosis, unspecified type  D72.829         Plan:  Patient will avoid aspirin and NSAIDs 1 week prior.  She is awaiting callback in regards to Plaquenil and methotrexate regarding medication management preoperatively.  Patient has no active symptoms nor clinical exam findings suggestive of infection however WBC did return at 15.9.  Remainder labs normal.  Patient notified that lab work needs to be rechecked and if WBC not improving to expected then she will need further evaluation and likely would consider obtaining UA UC, covid and chest x-ray.  She is planning to follow-up in clinic in 3-5 days.  Patient not cleared for surgery at this time until further follow-up.    Subjective      HPI related to upcoming procedure:   She has past medical history of ASCVD, hypertension and stage III chronic kidney disease which all of been stable.  She has rheumatoid arthritis and takes methotrexate and Plaquenil.  She is awaiting callback from rheumatologist to discuss if any medication changes are needed prior to surgery.  Previous surgery in 2018 she did not need to hold Plaquenil or methotrexate.  She does not take NSAIDs but does take aspirin 81 mg daily and planning to place that on hold.  She had a urinary tract infection back in October that was treated and symptoms resolved.    Preop Questions 1/2/2023   1. Have you ever had a heart attack or stroke? YES -  MI, 2006   2. Have you ever had surgery on your heart or blood vessels, such as a stent placement, a coronary artery bypass, or surgery on an artery in your head, neck, heart, or legs? YES - stents 2006 and 2013   3. Do you have chest pain with activity? No   4. Do you have a history of  heart failure? No   5. Do you currently have a cold, bronchitis or symptoms of other infection? No   6. Do you have a cough, shortness of breath, or wheezing? Chronic cough, unchanged   7. Do you or anyone in your family have previous history of blood clots? No   8. Do you or does anyone in your family have a serious bleeding problem such as prolonged bleeding following surgeries or cuts? No   9. Have you ever had problems with anemia or been told to take iron pills? No   10. Have you had any abnormal blood loss such as black, tarry or bloody stools, or abnormal vaginal bleeding? No   11. Have you ever had a blood transfusion? No   12. Are you willing to have a blood transfusion if it is medically needed before, during, or after your surgery? Yes   13. Have you or any of your relatives ever had problems with anesthesia? No   14. Do you have sleep apnea, excessive snoring or daytime drowsiness? No   15. Do you have any artifical heart valves or other implanted  medical devices like a pacemaker, defibrillator, or continuous glucose monitor? No   16. Do you have artificial joints? YES - b/l knees and rt shoulder   17. Are you allergic to latex? No       Health Care Directive:  Patient has a Health Care Directive on file      Preoperative Review of :   reviewed - no record of controlled substances prescribed.          Review of Systems   Constitutional: Negative for fever and unexpected weight change.        Lost 10 lbs since lost taste from Covid in 2021   HENT: Negative for congestion, mouth sores, sore throat and trouble swallowing.    Respiratory: Negative for chest tightness and shortness of breath.    Cardiovascular: Negative for chest pain, palpitations and peripheral edema.   Gastrointestinal: Negative for abdominal pain, anal bleeding, constipation, diarrhea, heartburn, hematochezia and vomiting.   Endocrine: Negative for cold intolerance, heat intolerance, polydipsia, polyphagia and polyuria.   Genitourinary: Negative for difficulty urinating, dysuria, hematuria and vaginal bleeding.   Musculoskeletal: Positive for arthralgias. Negative for gait problem.        Chronic joint pain, no active synovitis.   Skin: Negative for rash and wound.   Neurological: Negative for dizziness, syncope and light-headedness.   Hematological: Negative for adenopathy. Does not bruise/bleed easily.   Psychiatric/Behavioral: Negative for dysphoric mood. The patient is not nervous/anxious.          Patient Active Problem List    Diagnosis Date Noted     Immunocompromised (H) 06/11/2022     Priority: Medium     Seborrheic keratosis 07/12/2021     Priority: Medium     Neuropathy of hand 06/18/2021     Priority: Medium     Chronic kidney disease, stage 3 (H) 06/18/2021     Priority: Medium     Primary osteoarthritis of both hands 05/30/2017     Priority: Medium     Mass of finger 05/26/2017     Priority: Medium     Osteopenia 11/21/2016     Priority: Medium     Allergic rhinitis  02/01/2016     Priority: Medium     Status post total shoulder replacement, right 05/28/2015     Priority: Medium     S/P shoulder surgery 02/10/2015     Priority: Medium     Hyperlipidemia 01/16/2015     Priority: Medium     Hypertension 01/16/2015     Priority: Medium     Avascular necrosis of humeral head, right (H) 01/12/2015     Priority: Medium     Rotator cuff tendonitis, right 12/01/2014     Priority: Medium     Shoulder impingement, right 12/01/2014     Priority: Medium     Right shoulder pain 10/14/2014     Priority: Medium     ASCVD (arteriosclerotic cardiovascular disease) 10/28/2013     Priority: Medium     Overview:   -s/p ADÁN to the mid-LAD, mid-circumflex, mid-right coronary artery, proximal right coronary artery, and PTCA of a marginal branch of the right coronary artery 04/30/2007.  -s/p ADÁN to proximal left anterior descending 10/29/13.       Rheumatoid arthritis (H) 10/25/2013     Priority: Medium     DJD (degenerative joint disease) of knee 01/13/2011     Priority: Medium     Gout 10/27/2009     Priority: Medium      Past Medical History:   Diagnosis Date     Atherosclerotic heart disease of native coronary artery without angina pectoris     -s/p ADÁN to the mid-LAD, mid-circumflex, mid-right coronary artery, proximal right coronary  artery, and PTCA of a marginal branch of the right coronary artery 04/30/2007. -s/p ADÁN to proximal left anterior descending 10/29/13.     Contusion of abdominal wall     2014     Essential (primary) hypertension     12/14/2006     Gout     No Comments Provided     Hyperlipidemia     4/4/2007     Localized swelling, mass, or lump of upper extremity     5/26/2017     Other specified counseling     10/28/2013,Patient has identified Health Care Agent(s): Yes Add Health Care Agents: Yes   Health Care Agent(s): Primary Health Care Agent: Jay Batista  Relationship:  Phone:   Secondary Health Care Agent:  Relationship: Daughter Phone:   Conservator:  Relationship:   Phone:   Guardian: Relationship:  Phone:    Patient has Advance Care Plan Documents (Health Care Directive, POLST): No, refe*     Polyosteoarthritis     No Comments Provided     Postmenopausal bleeding     No Comments Provided     Presence of coronary angioplasty implant and graft     ,unstable angina; severe prox LAD stenosis; s/p 7 stents     Primary osteoarthritis of left hand     2017     Rheumatoid arthritis (H)     follows at [place] yearly     Past Surgical History:   Procedure Laterality Date     ARTHROPLASTY KNEE      2011     ARTHROSCOPY KNEE           BIOPSY BREAST      10/25/95,BRIAN  DR. YOGI RAMIREZ      SECTION      , Section     COLONOSCOPY      05,Normal - Repeat in 10 years     COLONOSCOPY  2016     COLONOSCOPY N/A 2019    Procedure: COLONOSCOPY;  Surgeon: Evelin Thompson MD;  Location:  OR     COLONOSCOPY N/A 2019    Procedure: COMBINED COLONOSCOPY, SINGLE OR MULTIPLE BIOPSY/POLYPECTOMY BY BIOPSY;  Surgeon: Evelin Thompson MD;  Location:  OR     ESOPHAGOSCOPY, GASTROSCOPY, DUODENOSCOPY (EGD), COMBINED      2011,erosive gastritis;bx;consider MRI/A;[facility]     EXTRACTION(S) DENTAL      1970     HEART CATH, ANGIOPLASTY      10/29/2013,ADÁN to proximal left anterior descending     OTHER SURGICAL HISTORY      ,[id number],OTHER     OTHER SURGICAL HISTORY      2014,LHF451,TOTAL SHOULDER ARTHROPLASTY,Right     OTHER SURGICAL HISTORY      2016,56397.0,ND COLONOSCOPY REMOVE ELIZA POLYP LESN SNARE,repeat , precancerous polyps     ZZ STRESS LEXISCAN TEST  2018    With Dr. Irwin - normal     Current Outpatient Medications   Medication Sig Dispense Refill     allopurinol (ZYLOPRIM) 300 MG tablet TAKE 1 TABLET DAILY 90 tablet 3     aspirin EC 81 MG EC tablet Take 1 tablet by mouth daily       atenolol (TENORMIN) 100 MG tablet Take 1 tablet (100 mg) by mouth daily 90 tablet 3     atorvastatin (LIPITOR) 40 MG tablet  TAKE 1 TABLET DAILY 90 tablet 3     cetirizine (ZYRTEC) 10 MG tablet Take 1 tablet (10 mg) by mouth daily       Cholecalciferol (VITAMIN D3) 2000 UNITS CAPS Take 2,000 Units by mouth daily       folic acid (FOLVITE) 1 MG tablet TAKE 1 TABLET DAILY 90 tablet 3     hydrochlorothiazide (HYDRODIURIL) 25 MG tablet Take 1 tablet (25 mg) by mouth daily 90 tablet 3     hydroxychloroquine (PLAQUENIL) 200 MG tablet 400 mg daily alternating with 200 mg daily (Patient taking differently: Take 200 mg by mouth daily Every other day. One day 200mg next day 400mg)       methotrexate 2.5 MG tablet Take 20 mg by mouth once a week       nitroGLYcerin (NITROSTAT) 0.4 MG sublingual tablet Place 1 tablet (0.4 mg) under the tongue every 5 minutes as needed for chest pain (For chest pain x 3 doses) 30 tablet 3     ranitidine (ZANTAC) 150 MG capsule Take 1 capsule by mouth 2 times daily         Allergies   Allergen Reactions     Food      Macadamia nuts make mouth itch     Losartan      Allergic rxn with hives and angioedema suspected to be from alternative manufacture of the effient or the losartan     Enalapril Cough        Social History     Tobacco Use     Smoking status: Never     Smokeless tobacco: Never     Tobacco comments:     no ecig   Substance Use Topics     Alcohol use: Not Currently     Comment: Maybe 2 drinks a month     Family History   Problem Relation Age of Onset     Heart Disease Mother         Heart Disease     Hypertension Mother         Hypertension     Other - See Comments Mother         Stroke     Breast Cancer Mother 53        Cancer-breast     Arthritis Father         Arthritis     Cancer Father         Cancer     Heart Disease Father         Heart Disease     Hypertension Father         Hypertension     Atrial fibrillation Sister      Melanoma Brother      Heart Disease Brother         Heart Disease     History   Drug Use No         Objective     /86 (BP Location: Right arm, Patient Position: Sitting, Cuff  Size: Adult Regular)   Pulse 70   Temp 97.3  F (36.3  C) (Tympanic)   Resp 16   Wt 88.5 kg (195 lb 3.2 oz)   LMP  (LMP Unknown)   SpO2 100%   BMI 30.80 kg/m      Physical Exam  Pleasant female no acute distress.  Affect normal.  Alert and oriented x4.  Sclera nonicteric.  Conjunctiva noninflamed.  TMs clear.  Oral mucosa pink and moist.  Throat without erythema.  Neck supple without adenopathy.  No thyromegaly or thyroid tenderness.  No carotid bruits.  Lung fields clear to auscultation throughout.  Cardiovascular regular rate and rhythm with no murmur or S3.  Abdomen soft with normal bowel sounds x4 quadrants.  No tenderness masses or organomegaly.  No abdominal bruits or pulsatile masses.  Extremities with trace chronic edema.  No evidence of active synovitis.  Exposed skin without rashes and ulceration.    Recent Labs   Lab Test 10/26/22  1532 07/26/22  1050 10/21/21  1016 06/18/21  0942 01/25/21  1110 01/09/21  1157   HGB 14.7 14.5   < > 14.8   < > 14.5    188   < > 209   < > 158   NA  --   --   --  139  --  135   POTASSIUM  --   --   --  3.4*  --  3.7   CR 1.05 0.97   < > 0.99   < > 1.07   A1C  --   --   --  5.4  --   --     < > = values in this interval not displayed.        Diagnostics:  Results for orders placed or performed in visit on 01/03/23   CBC with platelets     Status: Abnormal   Result Value Ref Range    WBC Count 15.9 (H) 4.0 - 11.0 10e3/uL    RBC Count 4.26 3.80 - 5.20 10e6/uL    Hemoglobin 13.5 11.7 - 15.7 g/dL    Hematocrit 41.0 35.0 - 47.0 %    MCV 96 78 - 100 fL    MCH 31.7 26.5 - 33.0 pg    MCHC 32.9 31.5 - 36.5 g/dL    RDW 15.8 (H) 10.0 - 15.0 %    Platelet Count 249 150 - 450 10e3/uL   Basic metabolic panel     Status: Normal   Result Value Ref Range    Sodium 140 136 - 145 mmol/L    Potassium 3.7 3.4 - 5.3 mmol/L    Chloride 102 98 - 107 mmol/L    Carbon Dioxide (CO2) 27 22 - 29 mmol/L    Anion Gap 11 7 - 15 mmol/L    Urea Nitrogen 20.1 8.0 - 23.0 mg/dL    Creatinine 0.86  0.51 - 0.95 mg/dL    Calcium 9.9 8.8 - 10.2 mg/dL    Glucose 94 70 - 99 mg/dL    GFR Estimate 70 >60 mL/min/1.73m2   EKG 12-lead, tracing only     Status: None (Preliminary result)   Result Value Ref Range    Systolic Blood Pressure  mmHg    Diastolic Blood Pressure  mmHg    Ventricular Rate 63 BPM    Atrial Rate 63 BPM    VA Interval 172 ms    QRS Duration 88 ms     ms    QTc 446 ms    P Axis 58 degrees    R AXIS 87 degrees    T Axis 64 degrees    Interpretation ECG       Sinus rhythm  Normal ECG  No previous ECGs available         Revised Cardiac Risk Index (RCRI):  The patient has the following serious cardiovascular risks for perioperative complications:   - Coronary Artery Disease (MI, positive stress test, angina, Qs on EKG) = 1 point     RCRI Interpretation: 1 point: Class II (low risk - 0.9% complication rate)           Signed Electronically by: Swetha Maxwell NP  Copy of this evaluation report is provided to requesting physician.

## 2023-01-03 NOTE — TELEPHONE ENCOUNTER
Reason for call: Patient wanting a work in appointment.    Is the appointment for a Hospital Follow up?  No    Patient is having the following symptoms:  Pt saw RKV for a preop today. RKV told her she would like her to follow up with her either this Friday or next Monday.    The patient is requesting an appointment with  RKV    Was an appointment offered for this call? No    Preferred method for responding to this message: Telephone Call    Phone number patient can be reached at? Home number on file [phone] (home)    If we can't reach you directly, may we leave a detailed response at the number you provided? Yes    Can this message wait until your PCP/provider returns if unavailable today? Yes     Anabell Dennis on 1/3/2023 at 12:11 PM

## 2023-01-03 NOTE — TELEPHONE ENCOUNTER
Called and spoke with the patient. Will schedule patient for Monday 1/9/2023 at 10 am     Claudia Rahman LPN on 1/3/2023 at 12:38 PM

## 2023-01-09 ENCOUNTER — OFFICE VISIT (OUTPATIENT)
Dept: INTERNAL MEDICINE | Facility: OTHER | Age: 78
End: 2023-01-09
Attending: NURSE PRACTITIONER
Payer: COMMERCIAL

## 2023-01-09 VITALS
RESPIRATION RATE: 20 BRPM | DIASTOLIC BLOOD PRESSURE: 68 MMHG | HEART RATE: 69 BPM | SYSTOLIC BLOOD PRESSURE: 130 MMHG | OXYGEN SATURATION: 98 %

## 2023-01-09 DIAGNOSIS — D84.9 IMMUNOCOMPROMISED (H): ICD-10-CM

## 2023-01-09 DIAGNOSIS — G56.01 CARPAL TUNNEL SYNDROME OF RIGHT WRIST: ICD-10-CM

## 2023-01-09 DIAGNOSIS — M06.4 INFLAMMATORY POLYARTHROPATHY (H): ICD-10-CM

## 2023-01-09 DIAGNOSIS — D72.829 LEUKOCYTOSIS, UNSPECIFIED TYPE: Primary | ICD-10-CM

## 2023-01-09 DIAGNOSIS — M05.79 RHEUMATOID ARTHRITIS INVOLVING MULTIPLE SITES WITH POSITIVE RHEUMATOID FACTOR (H): ICD-10-CM

## 2023-01-09 LAB
AST SERPL W P-5'-P-CCNC: 20 U/L (ref 10–35)
CREAT SERPL-MCNC: 0.89 MG/DL (ref 0.51–0.95)
ERYTHROCYTE [DISTWIDTH] IN BLOOD BY AUTOMATED COUNT: 15.5 % (ref 10–15)
GFR SERPL CREATININE-BSD FRML MDRD: 67 ML/MIN/1.73M2
HCT VFR BLD AUTO: 41.8 % (ref 35–47)
HGB BLD-MCNC: 13.4 G/DL (ref 11.7–15.7)
MCH RBC QN AUTO: 31.1 PG (ref 26.5–33)
MCHC RBC AUTO-ENTMCNC: 32.1 G/DL (ref 31.5–36.5)
MCV RBC AUTO: 97 FL (ref 78–100)
PLATELET # BLD AUTO: 206 10E3/UL (ref 150–450)
RBC # BLD AUTO: 4.31 10E6/UL (ref 3.8–5.2)
WBC # BLD AUTO: 12.3 10E3/UL (ref 4–11)

## 2023-01-09 PROCEDURE — 85027 COMPLETE CBC AUTOMATED: CPT | Mod: ZL | Performed by: NURSE PRACTITIONER

## 2023-01-09 PROCEDURE — 84450 TRANSFERASE (AST) (SGOT): CPT | Mod: ZL | Performed by: NURSE PRACTITIONER

## 2023-01-09 PROCEDURE — 36415 COLL VENOUS BLD VENIPUNCTURE: CPT | Mod: ZL | Performed by: NURSE PRACTITIONER

## 2023-01-09 PROCEDURE — G0463 HOSPITAL OUTPT CLINIC VISIT: HCPCS

## 2023-01-09 PROCEDURE — 99213 OFFICE O/P EST LOW 20 MIN: CPT | Performed by: NURSE PRACTITIONER

## 2023-01-09 PROCEDURE — 82565 ASSAY OF CREATININE: CPT | Mod: ZL | Performed by: NURSE PRACTITIONER

## 2023-01-09 ASSESSMENT — ENCOUNTER SYMPTOMS
HEMATURIA: 0
FEVER: 0
DIARRHEA: 0
SORE THROAT: 0
ARTHRALGIAS: 0
DYSURIA: 0
FLANK PAIN: 0
CHILLS: 0
SINUS PAIN: 0
MYALGIAS: 0
SHORTNESS OF BREATH: 0
DIFFICULTY URINATING: 0
ABDOMINAL PAIN: 0
ADENOPATHY: 0
SINUS PRESSURE: 0

## 2023-01-09 ASSESSMENT — PAIN SCALES - GENERAL: PAINLEVEL: NO PAIN (0)

## 2023-01-09 NOTE — PROGRESS NOTES
Follow up from last week lab work  Swetha Maxwell NP on 1/9/2023 at 10:01 AM      ICD-10-CM    1. Leukocytosis, unspecified type  D72.829 CBC W PLT No Diff     CBC W PLT No Diff      2. Rheumatoid arthritis involving multiple sites with positive rheumatoid factor (H)  M05.79       3. Immunocompromised (H)  D84.9       4. Inflammatory polyarthropathy (H)  M06.4 Creatinine     AST      5. Carpal tunnel syndrome of right wrist  G56.01         Plan:  WBC was 15.9 last week and is now trended back down to 12.3, she has had slightly elevated WBC before in the past.  She is asymptomatic of infection.  She is immunocompromise but without any symptoms of infection and with white count trending back to normal no further work-up needed at this time.  May proceed with planned carpal tunnel surgery however if she develops any symptoms suggestive of infection she needs to be seen and reevaluated prior to surgery.    Sylvie Flores is a 77 year old, presenting for the following health issues:  RECHECK (Follow up from last week lab work)      She is here today to follow-up on leukocytosis.  She had a preoperative evaluation last week and asymptomatic of infection.  WBC returned at 15.9 with remaining labs being normal.  Patient continues to have no symptoms suggestive of infection.  She is feeling well.  She is on methotrexate and Plaquenil for treatment of rheumatoid arthritis.  She has not been on any oral steroids.    History of Present Illness       Reason for visit:  High wBC    She eats 2-3 servings of fruits and vegetables daily.She consumes 1 sweetened beverage(s) daily.She exercises with enough effort to increase her heart rate 9 or less minutes per day.  She exercises with enough effort to increase her heart rate 3 or less days per week.   She is taking medications regularly.           Review of Systems   Constitutional: Negative for chills and fever.   HENT: Negative for congestion, ear pain, sinus pressure,  sinus pain and sore throat.    Respiratory: Negative for shortness of breath.         She has a chronic cough for many years but unchanged from baseline   Gastrointestinal: Negative for abdominal pain and diarrhea.   Genitourinary: Negative for difficulty urinating, dysuria, flank pain and hematuria.   Musculoskeletal: Negative for arthralgias and myalgias.   Skin: Negative for rash.   Hematological: Negative for adenopathy.            Objective    /68 (BP Location: Right arm, Patient Position: Sitting, Cuff Size: Adult Regular)   Pulse 69   Resp 20   LMP  (LMP Unknown)   SpO2 98%   There is no height or weight on file to calculate BMI.  Physical Exam   Pleasant female no acute distress.  Affect normal.  Alert and oriented x4.  Lung fields clear to auscultation throughout.  Cardiovascular regular rate and rhythm.  Abdomen is soft and without tenderness.  No suprapubic tenderness.    Results for orders placed or performed in visit on 01/09/23   CBC W PLT No Diff     Status: Abnormal   Result Value Ref Range    WBC Count 12.3 (H) 4.0 - 11.0 10e3/uL    RBC Count 4.31 3.80 - 5.20 10e6/uL    Hemoglobin 13.4 11.7 - 15.7 g/dL    Hematocrit 41.8 35.0 - 47.0 %    MCV 97 78 - 100 fL    MCH 31.1 26.5 - 33.0 pg    MCHC 32.1 31.5 - 36.5 g/dL    RDW 15.5 (H) 10.0 - 15.0 %    Platelet Count 206 150 - 450 10e3/uL   Extra Tube *Canceled*     Status: None ()    Narrative    The following orders were created for panel order Extra Tube.  Procedure                               Abnormality         Status                     ---------                               -----------         ------                     Extra Green Top (Lithium...[[id number]]                                                   Please view results for these tests on the individual orders.

## 2023-01-09 NOTE — H&P (VIEW-ONLY)
Follow up from last week lab work  Swetha Maxwell NP on 1/9/2023 at 10:01 AM      ICD-10-CM    1. Leukocytosis, unspecified type  D72.829 CBC W PLT No Diff     CBC W PLT No Diff      2. Rheumatoid arthritis involving multiple sites with positive rheumatoid factor (H)  M05.79       3. Immunocompromised (H)  D84.9       4. Inflammatory polyarthropathy (H)  M06.4 Creatinine     AST      5. Carpal tunnel syndrome of right wrist  G56.01         Plan:  WBC was 15.9 last week and is now trended back down to 12.3, she has had slightly elevated WBC before in the past.  She is asymptomatic of infection.  She is immunocompromise but without any symptoms of infection and with white count trending back to normal no further work-up needed at this time.  May proceed with planned carpal tunnel surgery however if she develops any symptoms suggestive of infection she needs to be seen and reevaluated prior to surgery.    Sylvie Flores is a 77 year old, presenting for the following health issues:  RECHECK (Follow up from last week lab work)      She is here today to follow-up on leukocytosis.  She had a preoperative evaluation last week and asymptomatic of infection.  WBC returned at 15.9 with remaining labs being normal.  Patient continues to have no symptoms suggestive of infection.  She is feeling well.  She is on methotrexate and Plaquenil for treatment of rheumatoid arthritis.  She has not been on any oral steroids.    History of Present Illness       Reason for visit:  High wBC    She eats 2-3 servings of fruits and vegetables daily.She consumes 1 sweetened beverage(s) daily.She exercises with enough effort to increase her heart rate 9 or less minutes per day.  She exercises with enough effort to increase her heart rate 3 or less days per week.   She is taking medications regularly.           Review of Systems   Constitutional: Negative for chills and fever.   HENT: Negative for congestion, ear pain, sinus pressure,  sinus pain and sore throat.    Respiratory: Negative for shortness of breath.         She has a chronic cough for many years but unchanged from baseline   Gastrointestinal: Negative for abdominal pain and diarrhea.   Genitourinary: Negative for difficulty urinating, dysuria, flank pain and hematuria.   Musculoskeletal: Negative for arthralgias and myalgias.   Skin: Negative for rash.   Hematological: Negative for adenopathy.            Objective    /68 (BP Location: Right arm, Patient Position: Sitting, Cuff Size: Adult Regular)   Pulse 69   Resp 20   LMP  (LMP Unknown)   SpO2 98%   There is no height or weight on file to calculate BMI.  Physical Exam   Pleasant female no acute distress.  Affect normal.  Alert and oriented x4.  Lung fields clear to auscultation throughout.  Cardiovascular regular rate and rhythm.  Abdomen is soft and without tenderness.  No suprapubic tenderness.    Results for orders placed or performed in visit on 01/09/23   CBC W PLT No Diff     Status: Abnormal   Result Value Ref Range    WBC Count 12.3 (H) 4.0 - 11.0 10e3/uL    RBC Count 4.31 3.80 - 5.20 10e6/uL    Hemoglobin 13.4 11.7 - 15.7 g/dL    Hematocrit 41.8 35.0 - 47.0 %    MCV 97 78 - 100 fL    MCH 31.1 26.5 - 33.0 pg    MCHC 32.1 31.5 - 36.5 g/dL    RDW 15.5 (H) 10.0 - 15.0 %    Platelet Count 206 150 - 450 10e3/uL   Extra Tube *Canceled*     Status: None ()    Narrative    The following orders were created for panel order Extra Tube.  Procedure                               Abnormality         Status                     ---------                               -----------         ------                     Extra Green Top (Lithium...[[id number]]                                                   Please view results for these tests on the individual orders.

## 2023-01-12 ENCOUNTER — ANESTHESIA EVENT (OUTPATIENT)
Dept: SURGERY | Facility: OTHER | Age: 78
End: 2023-01-12
Payer: COMMERCIAL

## 2023-01-13 ENCOUNTER — ANESTHESIA (OUTPATIENT)
Dept: SURGERY | Facility: OTHER | Age: 78
End: 2023-01-13
Payer: COMMERCIAL

## 2023-01-13 ENCOUNTER — HOSPITAL ENCOUNTER (OUTPATIENT)
Facility: OTHER | Age: 78
Discharge: HOME | End: 2023-01-13
Attending: SPECIALIST | Admitting: SPECIALIST
Payer: COMMERCIAL

## 2023-01-13 VITALS
TEMPERATURE: 98.1 F | WEIGHT: 195 LBS | RESPIRATION RATE: 16 BRPM | DIASTOLIC BLOOD PRESSURE: 66 MMHG | OXYGEN SATURATION: 98 % | BODY MASS INDEX: 31.34 KG/M2 | HEIGHT: 66 IN | SYSTOLIC BLOOD PRESSURE: 117 MMHG | HEART RATE: 64 BPM

## 2023-01-13 PROCEDURE — 370N000017 HC ANESTHESIA TECHNICAL FEE, PER MIN: Performed by: SPECIALIST

## 2023-01-13 PROCEDURE — 272N000001 HC OR GENERAL SUPPLY STERILE: Performed by: SPECIALIST

## 2023-01-13 PROCEDURE — 258N000003 HC RX IP 258 OP 636: Performed by: NURSE ANESTHETIST, CERTIFIED REGISTERED

## 2023-01-13 PROCEDURE — 29848 WRIST ENDOSCOPY/SURGERY: CPT | Performed by: NURSE ANESTHETIST, CERTIFIED REGISTERED

## 2023-01-13 PROCEDURE — 250N000009 HC RX 250: Performed by: SPECIALIST

## 2023-01-13 PROCEDURE — 360N000076 HC SURGERY LEVEL 3, PER MIN: Performed by: SPECIALIST

## 2023-01-13 PROCEDURE — 710N000012 HC RECOVERY PHASE 2, PER MINUTE: Performed by: SPECIALIST

## 2023-01-13 PROCEDURE — 250N000009 HC RX 250: Performed by: NURSE ANESTHETIST, CERTIFIED REGISTERED

## 2023-01-13 PROCEDURE — 250N000011 HC RX IP 250 OP 636: Performed by: NURSE ANESTHETIST, CERTIFIED REGISTERED

## 2023-01-13 PROCEDURE — 99100 ANES PT EXTEME AGE<1 YR&>70: CPT | Performed by: NURSE ANESTHETIST, CERTIFIED REGISTERED

## 2023-01-13 PROCEDURE — 29848 WRIST ENDOSCOPY/SURGERY: CPT | Mod: RT | Performed by: SPECIALIST

## 2023-01-13 PROCEDURE — 999N000141 HC STATISTIC PRE-PROCEDURE NURSING ASSESSMENT: Performed by: SPECIALIST

## 2023-01-13 PROCEDURE — 250N000011 HC RX IP 250 OP 636: Performed by: SPECIALIST

## 2023-01-13 RX ORDER — NALOXONE HYDROCHLORIDE 0.4 MG/ML
0.2 INJECTION, SOLUTION INTRAMUSCULAR; INTRAVENOUS; SUBCUTANEOUS
Status: DISCONTINUED | OUTPATIENT
Start: 2023-01-13 | End: 2023-01-13 | Stop reason: HOSPADM

## 2023-01-13 RX ORDER — HYDROMORPHONE HYDROCHLORIDE 1 MG/ML
0.4 INJECTION, SOLUTION INTRAMUSCULAR; INTRAVENOUS; SUBCUTANEOUS EVERY 5 MIN PRN
Status: DISCONTINUED | OUTPATIENT
Start: 2023-01-13 | End: 2023-01-13 | Stop reason: HOSPADM

## 2023-01-13 RX ORDER — FENTANYL CITRATE 50 UG/ML
50 INJECTION, SOLUTION INTRAMUSCULAR; INTRAVENOUS EVERY 5 MIN PRN
Status: DISCONTINUED | OUTPATIENT
Start: 2023-01-13 | End: 2023-01-13 | Stop reason: HOSPADM

## 2023-01-13 RX ORDER — LIDOCAINE HYDROCHLORIDE 20 MG/ML
INJECTION, SOLUTION INFILTRATION; PERINEURAL PRN
Status: DISCONTINUED | OUTPATIENT
Start: 2023-01-13 | End: 2023-01-13

## 2023-01-13 RX ORDER — FENTANYL CITRATE 50 UG/ML
25 INJECTION, SOLUTION INTRAMUSCULAR; INTRAVENOUS
Status: DISCONTINUED | OUTPATIENT
Start: 2023-01-13 | End: 2023-01-13 | Stop reason: HOSPADM

## 2023-01-13 RX ORDER — NALOXONE HYDROCHLORIDE 0.4 MG/ML
0.4 INJECTION, SOLUTION INTRAMUSCULAR; INTRAVENOUS; SUBCUTANEOUS
Status: DISCONTINUED | OUTPATIENT
Start: 2023-01-13 | End: 2023-01-13 | Stop reason: HOSPADM

## 2023-01-13 RX ORDER — FENTANYL CITRATE 50 UG/ML
25 INJECTION, SOLUTION INTRAMUSCULAR; INTRAVENOUS EVERY 5 MIN PRN
Status: DISCONTINUED | OUTPATIENT
Start: 2023-01-13 | End: 2023-01-13 | Stop reason: HOSPADM

## 2023-01-13 RX ORDER — MEPERIDINE HYDROCHLORIDE 50 MG/ML
12.5 INJECTION INTRAMUSCULAR; INTRAVENOUS; SUBCUTANEOUS
Status: DISCONTINUED | OUTPATIENT
Start: 2023-01-13 | End: 2023-01-13 | Stop reason: HOSPADM

## 2023-01-13 RX ORDER — KETOROLAC TROMETHAMINE 30 MG/ML
INJECTION, SOLUTION INTRAMUSCULAR; INTRAVENOUS PRN
Status: DISCONTINUED | OUTPATIENT
Start: 2023-01-13 | End: 2023-01-13

## 2023-01-13 RX ORDER — ONDANSETRON 2 MG/ML
4 INJECTION INTRAMUSCULAR; INTRAVENOUS EVERY 30 MIN PRN
Status: DISCONTINUED | OUTPATIENT
Start: 2023-01-13 | End: 2023-01-13 | Stop reason: HOSPADM

## 2023-01-13 RX ORDER — HYDROMORPHONE HYDROCHLORIDE 1 MG/ML
0.2 INJECTION, SOLUTION INTRAMUSCULAR; INTRAVENOUS; SUBCUTANEOUS EVERY 5 MIN PRN
Status: DISCONTINUED | OUTPATIENT
Start: 2023-01-13 | End: 2023-01-13 | Stop reason: HOSPADM

## 2023-01-13 RX ORDER — PROPOFOL 10 MG/ML
INJECTION, EMULSION INTRAVENOUS PRN
Status: DISCONTINUED | OUTPATIENT
Start: 2023-01-13 | End: 2023-01-13

## 2023-01-13 RX ORDER — PROPOFOL 10 MG/ML
INJECTION, EMULSION INTRAVENOUS CONTINUOUS PRN
Status: DISCONTINUED | OUTPATIENT
Start: 2023-01-13 | End: 2023-01-13

## 2023-01-13 RX ORDER — LIDOCAINE 40 MG/G
CREAM TOPICAL
Status: DISCONTINUED | OUTPATIENT
Start: 2023-01-13 | End: 2023-01-13 | Stop reason: HOSPADM

## 2023-01-13 RX ORDER — BUPIVACAINE HYDROCHLORIDE 2.5 MG/ML
INJECTION, SOLUTION EPIDURAL; INFILTRATION; INTRACAUDAL PRN
Status: DISCONTINUED | OUTPATIENT
Start: 2023-01-13 | End: 2023-01-13 | Stop reason: HOSPADM

## 2023-01-13 RX ORDER — SODIUM CHLORIDE, SODIUM LACTATE, POTASSIUM CHLORIDE, CALCIUM CHLORIDE 600; 310; 30; 20 MG/100ML; MG/100ML; MG/100ML; MG/100ML
INJECTION, SOLUTION INTRAVENOUS CONTINUOUS
Status: DISCONTINUED | OUTPATIENT
Start: 2023-01-13 | End: 2023-01-13 | Stop reason: HOSPADM

## 2023-01-13 RX ORDER — ONDANSETRON 4 MG/1
4 TABLET, ORALLY DISINTEGRATING ORAL EVERY 30 MIN PRN
Status: DISCONTINUED | OUTPATIENT
Start: 2023-01-13 | End: 2023-01-13 | Stop reason: HOSPADM

## 2023-01-13 RX ADMIN — SODIUM CHLORIDE, POTASSIUM CHLORIDE, SODIUM LACTATE AND CALCIUM CHLORIDE 100 ML/HR: 600; 310; 30; 20 INJECTION, SOLUTION INTRAVENOUS at 11:17

## 2023-01-13 RX ADMIN — LIDOCAINE HYDROCHLORIDE 40 MG: 20 INJECTION, SOLUTION INFILTRATION; PERINEURAL at 11:31

## 2023-01-13 RX ADMIN — PROPOFOL 140 MCG/KG/MIN: 10 INJECTION, EMULSION INTRAVENOUS at 11:31

## 2023-01-13 RX ADMIN — KETOROLAC TROMETHAMINE 30 MG: 30 INJECTION, SOLUTION INTRAMUSCULAR at 11:53

## 2023-01-13 RX ADMIN — PROPOFOL 50 MG: 10 INJECTION, EMULSION INTRAVENOUS at 11:31

## 2023-01-13 ASSESSMENT — ACTIVITIES OF DAILY LIVING (ADL): ADLS_ACUITY_SCORE: 35

## 2023-01-13 NOTE — ANESTHESIA PREPROCEDURE EVALUATION
Anesthesia Pre-Procedure Evaluation    Patient: Margaret Batista   MRN: [id number] : 1945        Procedure : Procedure(s):  RELEASE, CARPAL TUNNEL, ENDOSCOPIC          Past Medical History:   Diagnosis Date     Atherosclerotic heart disease of native coronary artery without angina pectoris     -s/p ADÁN to the mid-LAD, mid-circumflex, mid-right coronary artery, proximal right coronary  artery, and PTCA of a marginal branch of the right coronary artery 2007. -s/p ADÁN to proximal left anterior descending 10/29/13.     Contusion of abdominal wall          Essential (primary) hypertension     2006     Gout     No Comments Provided     Hyperlipidemia     2007     Localized swelling, mass, or lump of upper extremity     2017     Other specified counseling     10/28/2013,Patient has identified Health Care Agent(s): Yes Add Health Care Agents: Yes   Health Care Agent(s): Primary Health Care Agent: Jay Batista  Relationship:  Phone:   Secondary Health Care Agent:  Relationship: Daughter Phone:   Conservator:  Relationship:  Phone:   Guardian: Relationship:  Phone:    Patient has Advance Care Plan Documents (Health Care Directive, POLST): No, refe*     Polyosteoarthritis     No Comments Provided     Postmenopausal bleeding     No Comments Provided     Presence of coronary angioplasty implant and graft     ,unstable angina; severe prox LAD stenosis; s/p 7 stents     Primary osteoarthritis of left hand     2017     Rheumatoid arthritis (H)     follows at [place] yearly      Past Surgical History:   Procedure Laterality Date     ARTHROPLASTY KNEE      2011     ARTHROSCOPY KNEE           BIOPSY BREAST      10/25/95,BRIAN RAMIREZ      SECTION      1974, Section     COLONOSCOPY      05,Normal - Repeat in 10 years     COLONOSCOPY  2016     COLONOSCOPY N/A 2019    Procedure: COLONOSCOPY;  Surgeon: Evelin Thompson MD;   Location: [facility] OR     COLONOSCOPY N/A 1/9/2019    Procedure: COMBINED COLONOSCOPY, SINGLE OR MULTIPLE BIOPSY/POLYPECTOMY BY BIOPSY;  Surgeon: Evelin Thompson MD;  Location: [facility] OR     ESOPHAGOSCOPY, GASTROSCOPY, DUODENOSCOPY (EGD), COMBINED      5/2/2011,erosive gastritis;bx;consider MRI/A;[facility]     EXTRACTION(S) DENTAL      1970     HEART CATH, ANGIOPLASTY      10/29/2013,ADÁN to proximal left anterior descending     OTHER SURGICAL HISTORY      2007/2013,051339,OTHER     OTHER SURGICAL HISTORY      01/2014,OXN584,TOTAL SHOULDER ARTHROPLASTY,Right     OTHER SURGICAL HISTORY      04/14/2016,33115.0,[place] COLONOSCOPY REMOVE ELIZA POLYP LESN SNARE,repeat 2019, precancerous polyps     ZZ STRESS LEXISCAN TEST  08/2018    With Dr. Irwin - normal      Allergies   Allergen Reactions     Food      Macadamia nuts make mouth itch     Losartan      Allergic rxn with hives and angioedema suspected to be from alternative manufacture of the effient or the losartan     Enalapril Cough      Social History     Tobacco Use     Smoking status: Never     Smokeless tobacco: Never     Tobacco comments:     no ecig   Substance Use Topics     Alcohol use: Not Currently     Comment: Maybe 2 drinks a month      Wt Readings from Last 1 Encounters:   01/03/23 88.5 kg (195 lb 3.2 oz)        Anesthesia Evaluation   Pt has had prior anesthetic.         ROS/MED HX  ENT/Pulmonary:  - neg pulmonary ROS     Neurologic:     (+) peripheral neuropathy,     Cardiovascular:     (+) hypertension--CAD --stent-7     METS/Exercise Tolerance: >4 METS    Hematologic:  - neg hematologic  ROS     Musculoskeletal: Comment: Gout    Rheumatoid arthritis   (+) arthritis,     GI/Hepatic:  - neg GI/hepatic ROS     Renal/Genitourinary:     (+) renal disease,     Endo:  - neg endo ROS     Psychiatric/Substance Use:  - neg psychiatric ROS     Infectious Disease:  - neg infectious disease ROS     Malignancy:  - neg malignancy ROS     Other:  - neg other ROS          Physical  Exam    Airway        Mallampati: II   TM distance: > 3 FB   Neck ROM: full   Mouth opening: > 3 cm    Respiratory Devices and Support         Dental  no notable dental history         Cardiovascular   cardiovascular exam normal       Rhythm and rate: regular and normal     Pulmonary   pulmonary exam normal        breath sounds clear to auscultation           OUTSIDE LABS:  CBC:   Lab Results   Component Value Date    WBC 12.3 (H) 01/09/2023    WBC 15.9 (H) 01/03/2023    HGB 13.4 01/09/2023    HGB 13.5 01/03/2023    HCT 41.8 01/09/2023    HCT 41.0 01/03/2023     01/09/2023     01/03/2023     BMP:   Lab Results   Component Value Date     01/03/2023     06/18/2021    POTASSIUM 3.7 01/03/2023    POTASSIUM 3.4 (L) 06/18/2021    CHLORIDE 102 01/03/2023    CHLORIDE 101 06/18/2021    CO2 27 01/03/2023    CO2 30 06/18/2021    BUN 20.1 01/03/2023    BUN 22 06/18/2021    CR 0.89 01/09/2023    CR 0.86 01/03/2023    GLC 94 01/03/2023     06/18/2021     COAGS:   Lab Results   Component Value Date    INR 1.0 01/16/2015     POC: No results found for: BGM, HCG, HCGS  HEPATIC:   Lab Results   Component Value Date    ALBUMIN 3.9 06/18/2021    PROTTOTAL 7.0 06/18/2021    ALT 14 06/18/2021    AST 20 01/09/2023    ALKPHOS 55 06/18/2021    BILITOTAL 0.8 06/18/2021     OTHER:   Lab Results   Component Value Date    A1C 5.4 06/18/2021    MONI 9.9 01/03/2023    PHOS 3.3 11/16/2015    AMYLASE 47 11/06/2018    CRP 2.0 04/26/2021    SED 12 01/17/2019       Anesthesia Plan    ASA Status:  3   NPO Status:  NPO Appropriate    Anesthesia Type: MAC.     - Reason for MAC: chronic cardiopulmonary disease              Consents    Anesthesia Plan(s) and associated risks, benefits, and realistic alternatives discussed. Questions answered and patient/representative(s) expressed understanding.     - Discussed: Risks, Benefits and Alternatives for the PROCEDURE were discussed     - Discussed with:  Patient          Postoperative Care            Comments:                David Kellerman, APRN CRNA

## 2023-01-13 NOTE — INTERVAL H&P NOTE
The History and Physical has been reviewed, the patient has been examined and no changes have occurred in the patient's condition since the H & P was completed.     Clinical Conditions Present on Arrival:  Clinically Significant Risk Factors Present on Admission

## 2023-01-13 NOTE — BRIEF OP NOTE
[facility]    Brief Operative Note    Pre-operative diagnosis: Carpal tunnel syndrome, right [G56.01]  Post-operative diagnosis Same as pre-operative diagnosis    Procedure: Procedure(s):  RELEASE, CARPAL TUNNEL, ENDOSCOPIC  Surgeon: Surgeon(s) and Role:     * Danial Stanley MD - Primary     * David Cosme PA  Anesthesia: MAC with Local   Estimated Blood Loss: None    Drains: None  Specimens: * No specimens in log *  Findings:   None.  Complications: None.  Implants: * No implants in log *

## 2023-01-13 NOTE — OR NURSING
Sandra has been discharged to home at 1255 via w/c accompanied by Melissa OSCAR.    Written discharge instructions were provided to patient.  Prescriptions were none.  Patient states their pain is 0/10, and denies any nausea or dizziness upon discharge.    Patient and adult caring for them verbalize understanding of discharge instructions including no driving until tomorrow and no longer taking narcotic pain medications - no operating mechanical equipment and no making any important decisions.They understand reason for discharge, and necessary follow-up appointments.

## 2023-01-13 NOTE — OP NOTE
Procedure Date: 01/13/2023    PREOPERATIVE DIAGNOSIS:  Right carpal tunnel syndrome.    POSTOPERATIVE DIAGNOSIS:  Right carpal tunnel syndrome.    PROCEDURE PERFORMED:  Right endoscopic carpal tunnel release.    SURGEON:  Danial Stanley MD    ASSISTANT:  David Cosme PA-C    ANESTHESIA:  Local with IV sedation.    INDICATIONS FOR PROCEDURE:  This is a 77-year-old female with EMG-confirmed right carpal tunnel syndrome.  She desired release and elected to proceed.    DESCRIPTION OF PROCEDURE:  Following medical clearance, the patient was brought to the operating room and placed on the operating table.  Pneumatic tourniquet was placed about the right upper extremity.  The right upper extremity was prepped and draped in sterile manner.  Timeout procedure was performed, confirming surgical site, patient and procedure.  Right upper extremity was then elevated, exsanguinated, and tourniquet was inflated to 250 mmHg.  A sterile marking pen was used to jeana the skin incision.  Local anesthetic without epinephrine was instilled into the planned incision site.  Incision was made, carried sharply down through skin through subcutaneous tissue.  Careful spreading was performed.  Antebrachial fascia was identified and a distally based U-shaped flap was elevated.  Spatula was placed and the transverse carpal ligament, followed by sequential dilators.  The endoscope was introduced into the wrist.  The distal aspect of transverse carpal ligament was well visualized.  Palmar pressure applied, blade was elevated and the transverse carpal ligament released.  Following this, through the endoscopic portal, both sides of the transverse carpal could be identified.  The endoscope was then withdrawn.  The antebrachial fascia was approximated with tenotomy scissors.  Tourniquet was deflated.  Circulation returned promptly to hand and fingers.  Hemostasis was obtained with direct pressure.  Skin was closed with Prolene suture.  Sterile  dressing was applied.  The patient was brought to recovery in stable condition.    Danial Stanley MD        D: 2023   T: 2023   MT: NED    Name:     JACKIE PAL  MRN:      [id number]        Account:        [id number]   :      1945           Procedure Date: 2023     Document: [id number]

## 2023-01-13 NOTE — ANESTHESIA CARE TRANSFER NOTE
Patient: Margaret Batista    Procedure: Procedure(s):  RELEASE, CARPAL TUNNEL, ENDOSCOPIC       Diagnosis: Carpal tunnel syndrome, right [G56.01]  Diagnosis Additional Information: No value filed.    Anesthesia Type:   MAC     Note:    Oropharynx: oropharynx clear of all foreign objects  Level of Consciousness: awake  Oxygen Supplementation: nasal cannula  Level of Supplemental Oxygen (L/min / FiO2): 2  Independent Airway: airway patency satisfactory and stable  Dentition: dentition unchanged  Vital Signs Stable: post-procedure vital signs reviewed and stable  Report to RN Given: handoff report given  Patient transferred to: Phase II    Handoff Report: Identifed the Patient, Identified the Reponsible Provider, Reviewed the pertinent medical history, Discussed the surgical course, Reviewed Intra-OP anesthesia mangement and issues during anesthesia, Set expectations for post-procedure period and Allowed opportunity for questions and acknowledgement of understanding      Vitals:  Vitals Value Taken Time   BP     Temp     Pulse     Resp     SpO2         Electronically Signed By: JODEE Delarosa CRNA  January 13, 2023  11:56 AM

## 2023-01-13 NOTE — ANESTHESIA POSTPROCEDURE EVALUATION
Patient: Margaret Batista    Procedure: Procedure(s):  RELEASE, CARPAL TUNNEL, ENDOSCOPIC       Anesthesia Type:  MAC    Note:  Disposition: Outpatient   Postop Pain Control:            Sign Out: Well controlled pain   PONV: No   Neuro/Psych:             Sign Out: Acceptable/Baseline neuro status   Airway/Respiratory:             Sign Out: Acceptable/Baseline resp. status   CV/Hemodynamics:             Sign Out: Acceptable CV status   Other NRE: NONE   DID A NON-ROUTINE EVENT OCCUR? No           Last vitals:  Vitals Value Taken Time   /66 01/13/23 1200   Temp     Pulse 64 01/13/23 1200   Resp 16 01/13/23 1200   SpO2 99 % 01/13/23 1237   Vitals shown include unvalidated device data.    Electronically Signed By: David Kellerman, APRN CRNA  January 13, 2023  2:02 PM

## 2023-01-13 NOTE — DISCHARGE INSTRUCTIONS
[facility]  Adult Discharge Orders & Instructions    ________________________________________________________________          For 12 hours after surgery  Get plenty of rest.  A responsible adult must stay with you for at least 12 hours after you leave the hospital.   You may feel lightheaded.  IF so, sit for a few minutes before standing.  Have someone help you get up.   You may have a slight fever. Call the doctor if your fever is over 101 F (38.3 C) (taken under the tongue) or lasts longer than 24 hours.  You may have a dry mouth, a sore throat, muscle aches or trouble sleeping.  These should go away after 24 hours.  Do not make important or legal decisions.  6.   Do not drive or use heavy equipment.  If you have weakness or tingling, don't drive or use heavy equipment until this feeling goes away.    To contact a doctor, call   [phone]_______________________   Surgeon Contact Information  If you have questions or concerns related to your procedure please contact your surgeon through [facility] at [phone].

## 2023-01-20 ENCOUNTER — OFFICE VISIT (OUTPATIENT)
Dept: ORTHOPEDICS | Facility: OTHER | Age: 78
End: 2023-01-20
Attending: NURSE PRACTITIONER
Payer: COMMERCIAL

## 2023-01-20 DIAGNOSIS — G56.03 BILATERAL CARPAL TUNNEL SYNDROME: Primary | ICD-10-CM

## 2023-01-20 PROCEDURE — G0463 HOSPITAL OUTPT CLINIC VISIT: HCPCS

## 2023-01-20 PROCEDURE — 99024 POSTOP FOLLOW-UP VISIT: CPT | Performed by: SPECIALIST

## 2023-01-20 NOTE — PROGRESS NOTES
Visit Date: 2023    HISTORY OF PRESENTING ILLNESS:  Sandra returns for followup status post right endoscopic carpal tunnel release.  Her symptoms are improving.    PHYSICAL EXAMINATION:  Physical examination reveals her incision to be healing nicely.  There is no evidence of infection.  Her digital range of motion is full.    IMPRESSION:  Status post right endoscopic carpal tunnel release 2023.    PLAN:  We discussed proceeding with the left side at her convenience.  She will think about things and let us know how she would like to proceed.    Danial Stanley MD        D: 2023   T: 2023   MT: [place]    Name:     JACKIE PAL  MRN:      [id number]        Account:    [id number]   :      1945           Visit Date: 2023     Document: [id number]

## 2023-01-20 NOTE — PROGRESS NOTES
Patient is here for follow up on her right endoscopic carpal tunnel release.  Nancy Daniel LPN .....................1/20/2023 9:00 AM

## 2023-04-13 DIAGNOSIS — E78.5 HYPERLIPIDEMIA, UNSPECIFIED HYPERLIPIDEMIA TYPE: ICD-10-CM

## 2023-04-17 RX ORDER — ATORVASTATIN CALCIUM 40 MG/1
TABLET, FILM COATED ORAL
Qty: 90 TABLET | Refills: 3 | Status: SHIPPED | OUTPATIENT
Start: 2023-04-17 | End: 2024-04-30

## 2023-04-17 NOTE — TELEPHONE ENCOUNTER
[facility] Mail order sent Rx request for the following:    ATORVASTATIN TAB 40MG  Last Prescription Date:   6/21/22  Last Fill Qty/Refills:         90, R-3    Last Office Visit:              1/9/23   Future Office visit:           None   Salma Smith RN on 4/17/2023 at 11:05 AM

## 2023-04-25 ENCOUNTER — LAB (OUTPATIENT)
Dept: LAB | Facility: OTHER | Age: 78
End: 2023-04-25
Payer: COMMERCIAL

## 2023-04-25 DIAGNOSIS — M06.4 INFLAMMATORY POLYARTHROPATHY (H): ICD-10-CM

## 2023-04-25 LAB
AST SERPL W P-5'-P-CCNC: 23 U/L (ref 10–35)
BASOPHILS # BLD AUTO: 0 10E3/UL (ref 0–0.2)
BASOPHILS NFR BLD AUTO: 0 %
CREAT SERPL-MCNC: 1.02 MG/DL (ref 0.51–0.95)
EOSINOPHIL # BLD AUTO: 0.4 10E3/UL (ref 0–0.7)
EOSINOPHIL NFR BLD AUTO: 3 %
ERYTHROCYTE [DISTWIDTH] IN BLOOD BY AUTOMATED COUNT: 15.9 % (ref 10–15)
GFR SERPL CREATININE-BSD FRML MDRD: 57 ML/MIN/1.73M2
HCT VFR BLD AUTO: 41.7 % (ref 35–47)
HGB BLD-MCNC: 13.7 G/DL (ref 11.7–15.7)
IMM GRANULOCYTES # BLD: 0.1 10E3/UL
IMM GRANULOCYTES NFR BLD: 1 %
LYMPHOCYTES # BLD AUTO: 3.1 10E3/UL (ref 0.8–5.3)
LYMPHOCYTES NFR BLD AUTO: 27 %
MCH RBC QN AUTO: 31.5 PG (ref 26.5–33)
MCHC RBC AUTO-ENTMCNC: 32.9 G/DL (ref 31.5–36.5)
MCV RBC AUTO: 96 FL (ref 78–100)
MONOCYTES # BLD AUTO: 0.7 10E3/UL (ref 0–1.3)
MONOCYTES NFR BLD AUTO: 6 %
NEUTROPHILS # BLD AUTO: 7.5 10E3/UL (ref 1.6–8.3)
NEUTROPHILS NFR BLD AUTO: 63 %
NRBC # BLD AUTO: 0 10E3/UL
NRBC BLD AUTO-RTO: 0 /100
PLATELET # BLD AUTO: 188 10E3/UL (ref 150–450)
RBC # BLD AUTO: 4.35 10E6/UL (ref 3.8–5.2)
WBC # BLD AUTO: 11.8 10E3/UL (ref 4–11)

## 2023-04-25 PROCEDURE — 82565 ASSAY OF CREATININE: CPT | Mod: ZL

## 2023-04-25 PROCEDURE — 36415 COLL VENOUS BLD VENIPUNCTURE: CPT | Mod: ZL

## 2023-04-25 PROCEDURE — 84450 TRANSFERASE (AST) (SGOT): CPT | Mod: ZL

## 2023-04-25 PROCEDURE — 85025 COMPLETE CBC W/AUTO DIFF WBC: CPT | Mod: ZL

## 2023-04-26 ENCOUNTER — TELEPHONE (OUTPATIENT)
Dept: INTERNAL MEDICINE | Facility: OTHER | Age: 78
End: 2023-04-26
Payer: COMMERCIAL

## 2023-04-26 NOTE — TELEPHONE ENCOUNTER
States she had labs done on 4/25 and was told that there were some abnormal results. Would like to know if RKV could look at them and see if it's ok for her to wait until her appt on 5/9. Please call

## 2023-04-27 NOTE — TELEPHONE ENCOUNTER
After verifying last name and  patient notifed of the below information.   Claudia Rahman LPN on 2023 at 10:42 AM

## 2023-05-09 ENCOUNTER — OFFICE VISIT (OUTPATIENT)
Dept: INTERNAL MEDICINE | Facility: OTHER | Age: 78
End: 2023-05-09
Attending: NURSE PRACTITIONER
Payer: COMMERCIAL

## 2023-05-09 ENCOUNTER — HOSPITAL ENCOUNTER (OUTPATIENT)
Dept: GENERAL RADIOLOGY | Facility: OTHER | Age: 78
Discharge: HOME | End: 2023-05-09
Attending: NURSE PRACTITIONER
Payer: COMMERCIAL

## 2023-05-09 VITALS
HEART RATE: 64 BPM | SYSTOLIC BLOOD PRESSURE: 136 MMHG | RESPIRATION RATE: 16 BRPM | TEMPERATURE: 97 F | BODY MASS INDEX: 31.86 KG/M2 | WEIGHT: 197.4 LBS | DIASTOLIC BLOOD PRESSURE: 88 MMHG | OXYGEN SATURATION: 99 %

## 2023-05-09 DIAGNOSIS — R05.2 SUBACUTE COUGH: ICD-10-CM

## 2023-05-09 DIAGNOSIS — D72.829 LEUKOCYTOSIS, UNSPECIFIED TYPE: ICD-10-CM

## 2023-05-09 DIAGNOSIS — D72.829 LEUKOCYTOSIS, UNSPECIFIED TYPE: Primary | ICD-10-CM

## 2023-05-09 DIAGNOSIS — R29.898 RIGHT HAND WEAKNESS: ICD-10-CM

## 2023-05-09 DIAGNOSIS — I83.893 VARICOSE VEINS OF BOTH LEGS WITH EDEMA: ICD-10-CM

## 2023-05-09 DIAGNOSIS — I10 PRIMARY HYPERTENSION: ICD-10-CM

## 2023-05-09 DIAGNOSIS — R05.2 SUBACUTE COUGH: Primary | ICD-10-CM

## 2023-05-09 DIAGNOSIS — E78.5 HYPERLIPIDEMIA, UNSPECIFIED HYPERLIPIDEMIA TYPE: ICD-10-CM

## 2023-05-09 DIAGNOSIS — G56.01 CARPAL TUNNEL SYNDROME OF RIGHT WRIST: ICD-10-CM

## 2023-05-09 LAB
CHOLEST SERPL-MCNC: 143 MG/DL
CREAT UR-MCNC: 193.2 MG/DL
ERYTHROCYTE [DISTWIDTH] IN BLOOD BY AUTOMATED COUNT: 16.2 % (ref 10–15)
HCT VFR BLD AUTO: 43.6 % (ref 35–47)
HDLC SERPL-MCNC: 54 MG/DL
HGB BLD-MCNC: 14.2 G/DL (ref 11.7–15.7)
HOLD SPECIMEN: NORMAL
LDLC SERPL CALC-MCNC: 62 MG/DL
MCH RBC QN AUTO: 31.8 PG (ref 26.5–33)
MCHC RBC AUTO-ENTMCNC: 32.6 G/DL (ref 31.5–36.5)
MCV RBC AUTO: 98 FL (ref 78–100)
MICROALBUMIN UR-MCNC: 21 MG/L
MICROALBUMIN/CREAT UR: 10.87 MG/G CR (ref 0–25)
NONHDLC SERPL-MCNC: 89 MG/DL
PLATELET # BLD AUTO: 202 10E3/UL (ref 150–450)
RBC # BLD AUTO: 4.46 10E6/UL (ref 3.8–5.2)
TRIGL SERPL-MCNC: 137 MG/DL
WBC # BLD AUTO: 12.6 10E3/UL (ref 4–11)

## 2023-05-09 PROCEDURE — G0463 HOSPITAL OUTPT CLINIC VISIT: HCPCS | Mod: 25

## 2023-05-09 PROCEDURE — 85027 COMPLETE CBC AUTOMATED: CPT | Mod: ZL | Performed by: NURSE PRACTITIONER

## 2023-05-09 PROCEDURE — 99214 OFFICE O/P EST MOD 30 MIN: CPT | Performed by: NURSE PRACTITIONER

## 2023-05-09 PROCEDURE — 36415 COLL VENOUS BLD VENIPUNCTURE: CPT | Mod: ZL | Performed by: NURSE PRACTITIONER

## 2023-05-09 PROCEDURE — 71046 X-RAY EXAM CHEST 2 VIEWS: CPT

## 2023-05-09 PROCEDURE — G0463 HOSPITAL OUTPT CLINIC VISIT: HCPCS

## 2023-05-09 PROCEDURE — 82570 ASSAY OF URINE CREATININE: CPT | Mod: ZL | Performed by: NURSE PRACTITIONER

## 2023-05-09 PROCEDURE — 80061 LIPID PANEL: CPT | Mod: ZL | Performed by: NURSE PRACTITIONER

## 2023-05-09 ASSESSMENT — ENCOUNTER SYMPTOMS
SHORTNESS OF BREATH: 0
CHEST TIGHTNESS: 0
UNEXPECTED WEIGHT CHANGE: 0
CHILLS: 0
FEVER: 0
COUGH: 1
WEAKNESS: 1
NUMBNESS: 1

## 2023-05-09 ASSESSMENT — PAIN SCALES - GENERAL: PAINLEVEL: NO PAIN (0)

## 2023-05-09 NOTE — PROGRESS NOTES
ICD-10-CM    1. Subacute cough  R05.2 CBC with platelets     XR Chest 2 Views      2. Hyperlipidemia, unspecified hyperlipidemia type  E78.5 Lipid Profile      3. Primary hypertension  I10 Albumin Random Urine Quantitative with Creat Ratio      4. Leukocytosis, unspecified type  D72.829       5. Right hand weakness  R29.898 Physical Therapy Referral      6. Carpal tunnel syndrome of right wrist  G56.01 Physical Therapy Referral      7. Varicose veins of both legs with edema  I83.893         Plan:  We will obtain chest x-ray at this visit.  Suspect this likely is allergy related.  Likely will need to restart Zyrtec.  Check fasting lipids today, continue statin.  Blood pressure well controlled.  Renal function at baseline with slight decline in GFR at 57 but similar to previous.  We will check urine microalbumin.  Will recheck WBC.  This has been trending downward.  May need peripheral smear.  Referral to physical therapy.  I like to see her back in 6 weeks for follow-up.  Recommend compression stockings to help with edema and stasis changes.  Patient due for Medicare wellness visit, will see her again in 6 weeks.    Sylvie Flores is a 77 year old, presenting for the following health issues:  Cough and Results         View : No data to display.              She is here today for a few concerns.  She has had a cough since December.  She has postnasal drainage.  She stopped Zyrtec several months ago.  History of tobacco exposure as a child from both of her parents but has never had any personal history of tobacco use.  Reports weakness in the right hand.  This has been occurring for quite some time.  She had carpal tunnel release surgery right hand but still has not regained all the strength.  The weakness was prior to carpal tunnel surgery.  She has not done physical therapy.  She finds that sometimes it is hard to lift things because her hand feels weak.  She was seen by rheumatologist recently and had lab  work completed.  She was told there was some abnormal labs.  WBC was increased at 11.8 which was down from previous.  White count has been trending a bit high over the past 6 months.  Creatinine was also slightly increased at 1.02 but not much different than baseline.  GFR 57 which is similar to previous.  She also has chronic edema both lower legs.  She has noticed chronic purple discoloration of her feet and lower legs.  She has multiple varicose veins.  She has compression stockings but does not wear those.  Wondering what she can do to help with the discoloration and the swelling.    History of Present Illness       Reason for visit:  Cough  Symptom onset:  More than a month  Symptoms include:  Cough  Symptom intensity:  Moderate  Symptom progression:  Staying the same  Had these symptoms before:  No    She eats 2-3 servings of fruits and vegetables daily.She consumes 1 sweetened beverage(s) daily.She exercises with enough effort to increase her heart rate 9 or less minutes per day.  She exercises with enough effort to increase her heart rate 3 or less days per week.   She is taking medications regularly.               Review of Systems   Constitutional: Negative for chills, fever and unexpected weight change.   Respiratory: Positive for cough. Negative for chest tightness and shortness of breath.    Cardiovascular: Positive for peripheral edema. Negative for chest pain.   Neurological: Positive for weakness and numbness.        Right hand prior to CP release surgery            Objective    /88 (BP Location: Right arm, Patient Position: Sitting, Cuff Size: Adult Large)   Pulse 64   Temp 97  F (36.1  C) (Tympanic)   Resp 16   Wt 89.5 kg (197 lb 6.4 oz)   LMP  (LMP Unknown)   SpO2 99%   BMI 31.86 kg/m    Body mass index is 31.86 kg/m .  Physical Exam   Pleasant female no acute distress.  Affect normal.  Alert and oriented x4.  Hand strength bilateral is equal.  Intact range of motion of both wrist and  hands.  Lung fields clear to auscultation.  Cardiovascular regular, no S3.  Neck supple without adenopathy.  Bilateral extremities with 1+ edema.  Multiple varicose veins and mild red-purple chronic discoloration secondary to stasis.

## 2023-05-09 NOTE — NURSING NOTE
Patient presents to clinic today for Lab results and chronic cough.    Medication Review: complete    /88 (BP Location: Right arm, Patient Position: Sitting, Cuff Size: Adult Large)   Pulse 64   Temp 97  F (36.1  C) (Tympanic)   Resp 16   Wt 89.5 kg (197 lb 6.4 oz)   LMP  (LMP Unknown)   SpO2 99%   BMI 31.86 kg/m       FOOD SECURITY SCREENING QUESTIONS:  The next two questions are to help us understand your food security.  If you are feeling you need any assistance in this area, we have resources available to support you today.    Hunger Vital Signs:  Within the past 12 months we worried whether our food would run out before we got money to buy more. Never  Within the past 12 months the food we bought just didn't last and we didn't have money to get more. Never    Cecy Bledsoe LPN, on 5/9/2023 at 7:54 AM

## 2023-05-10 ENCOUNTER — TELEPHONE (OUTPATIENT)
Dept: INTERNAL MEDICINE | Facility: OTHER | Age: 78
End: 2023-05-10
Payer: COMMERCIAL

## 2023-05-10 ENCOUNTER — LAB (OUTPATIENT)
Dept: LAB | Facility: OTHER | Age: 78
End: 2023-05-10
Attending: NURSE PRACTITIONER
Payer: COMMERCIAL

## 2023-05-10 DIAGNOSIS — D72.829 LEUKOCYTOSIS, UNSPECIFIED TYPE: ICD-10-CM

## 2023-05-10 DIAGNOSIS — G56.01 CARPAL TUNNEL SYNDROME OF RIGHT WRIST: ICD-10-CM

## 2023-05-10 DIAGNOSIS — R29.898 RIGHT HAND WEAKNESS: Primary | ICD-10-CM

## 2023-05-10 LAB
BASOPHILS # BLD AUTO: 0.1 10E3/UL (ref 0–0.2)
BASOPHILS NFR BLD AUTO: 1 %
EOSINOPHIL # BLD AUTO: 0.3 10E3/UL (ref 0–0.7)
EOSINOPHIL NFR BLD AUTO: 3 %
ERYTHROCYTE [DISTWIDTH] IN BLOOD BY AUTOMATED COUNT: 16.2 % (ref 10–15)
HCT VFR BLD AUTO: 43.5 % (ref 35–47)
HGB BLD-MCNC: 14.2 G/DL (ref 11.7–15.7)
IMM GRANULOCYTES # BLD: 0.1 10E3/UL
IMM GRANULOCYTES NFR BLD: 1 %
LYMPHOCYTES # BLD AUTO: 3.3 10E3/UL (ref 0.8–5.3)
LYMPHOCYTES NFR BLD AUTO: 31 %
MCH RBC QN AUTO: 31.6 PG (ref 26.5–33)
MCHC RBC AUTO-ENTMCNC: 32.6 G/DL (ref 31.5–36.5)
MCV RBC AUTO: 97 FL (ref 78–100)
MONOCYTES # BLD AUTO: 0.5 10E3/UL (ref 0–1.3)
MONOCYTES NFR BLD AUTO: 4 %
NEUTROPHILS # BLD AUTO: 6.4 10E3/UL (ref 1.6–8.3)
NEUTROPHILS NFR BLD AUTO: 60 %
NRBC # BLD AUTO: 0 10E3/UL
NRBC BLD AUTO-RTO: 0 /100
PLATELET # BLD AUTO: 207 10E3/UL (ref 150–450)
RBC # BLD AUTO: 4.49 10E6/UL (ref 3.8–5.2)
RETICS # AUTO: 0.08 10E6/UL (ref 0.03–0.1)
RETICS/RBC NFR AUTO: 1.9 % (ref 0.5–2)
WBC # BLD AUTO: 10.6 10E3/UL (ref 4–11)

## 2023-05-10 PROCEDURE — 85025 COMPLETE CBC W/AUTO DIFF WBC: CPT | Mod: ZL

## 2023-05-10 PROCEDURE — 85045 AUTOMATED RETICULOCYTE COUNT: CPT | Mod: ZL

## 2023-05-10 PROCEDURE — 88184 FLOWCYTOMETRY/ TC 1 MARKER: CPT

## 2023-05-10 PROCEDURE — 88185 FLOWCYTOMETRY/TC ADD-ON: CPT

## 2023-05-10 PROCEDURE — 36415 COLL VENOUS BLD VENIPUNCTURE: CPT | Mod: ZL

## 2023-05-10 NOTE — TELEPHONE ENCOUNTER
Good Morning,   Could we please get this patient's PT order changed to OT as our OT's treat wrists and hands.     Thank you!    Lizett Bee on 5/10/2023 at 11:47 AM

## 2023-05-17 DIAGNOSIS — D72.820 MONOCLONAL B-CELL LYMPHOCYTOSIS: Primary | ICD-10-CM

## 2023-05-17 LAB — PATH REPORT.FINAL DX SPEC: NORMAL

## 2023-05-19 ENCOUNTER — TELEPHONE (OUTPATIENT)
Dept: ONCOLOGY | Facility: OTHER | Age: 78
End: 2023-05-19
Payer: COMMERCIAL

## 2023-05-19 NOTE — TELEPHONE ENCOUNTER
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

## 2023-05-25 ENCOUNTER — THERAPY VISIT (OUTPATIENT)
Dept: OCCUPATIONAL THERAPY | Facility: OTHER | Age: 78
End: 2023-05-25
Attending: NURSE PRACTITIONER
Payer: COMMERCIAL

## 2023-05-25 DIAGNOSIS — G56.01 CARPAL TUNNEL SYNDROME OF RIGHT WRIST: ICD-10-CM

## 2023-05-25 DIAGNOSIS — R29.898 RIGHT HAND WEAKNESS: ICD-10-CM

## 2023-05-25 PROCEDURE — 97110 THERAPEUTIC EXERCISES: CPT | Mod: GO | Performed by: OCCUPATIONAL THERAPIST

## 2023-05-25 PROCEDURE — 97165 OT EVAL LOW COMPLEX 30 MIN: CPT | Mod: GO | Performed by: OCCUPATIONAL THERAPIST

## 2023-05-25 NOTE — PROGRESS NOTES
OCCUPATIONAL THERAPY EVALUATION  Type of Visit: Evaluation    See electronic medical record for Abuse and Falls Screening details.    Subjective      Presenting condition or subjective complaint: Pt reports her right hadn and wrist have been weak since her carpal tunnel suregery  Date of onset: 01/13/23    Relevant medical history: Arthritis; Heart problems; High blood pressure; Numbness or tingling in perianal area; Overweight   Dates & types of surgery: Stents, carpal tunnel, knee replacement (2), shoulder replacements    Prior diagnostic imaging/testing results: Other Carpal tunnel surgery   Prior therapy history for the same diagnosis, illness or injury: No      Prior Level of Function   ADL: Independent  IADL: Driving, Finances, Housekeeping, Laundry, Meal preparation    Living Environment  Social support: With a significant other or spouse   Type of home: House; 2-story   Stairs to enter the home: Yes 14 Is there a railing: Yes   Ramp: No   Stairs inside the home: Yes 14 Is there a railing: Yes   Help at home: None  Equipment owned:       Employment: No      Patient goals for therapy: Firmly hold things    Pain assessment: Pain denied  See objective evaluation for additional pain details     Objective     SENSATION: Pt reports tingling in digit 1-3 and diminished touch     RANGE OF MOTION: Wrist and finger ROM are WNL  STRENGTH:   Pain: - none + mild ++ moderate +++ severe  Strength Scale: 0-5/5 Left Right    Strength (lbs) 35 31   Lateral Pinch (lbs) 10 7   3 Point Pinch (lbs) 13 9        Hand Dominance: Right  MUSCLE TONE: WNL  COORDINATION: WFL    ACTIVITY TOLERANCE: Good    INSTRUMENTAL ACTIVITIES OF DAILY LIVING (IADL):    Meal Planning/Prep: difficulty lifting pots an pans    Assessment & Plan   CLINICAL IMPRESSIONS   Medical Diagnosis: R29.898 (ICD-10-CM) - Right hand weakness  G56.01 (ICD-10-CM) - Carpal tunnel syndrome of right wrist    Treatment Diagnosis: Impaired home mangement     Impression/Assessment: Pt is a 77 year old female presenting to Occupational Therapy due to right hand weakness.  The following significant findings have been identified: Impaired sensation and Impaired strength.  These identified deficits interfere with their ability to perform self care tasks and household chores as compared to previous level of function.     Clinical Decision Making (Complexity):   Assessment of Occupational Performance: 1-3 Performance Deficits  Occupational Performance Limitations: meal preparation and cleanup and shopping  Clinical Decision Making (Complexity): Low complexity    PLAN OF CARE  Treatment Interventions:   Modalities: Hot Pack, Paraffin, Ultrasound  Interventions: Therapeutic Activity, Therapeutic Exercise, Manual Therapy, Orthotic Fitting/Training    Long Term Goals   OT Goal 1  Goal Identifier: HEP  Goal Description: Pt will be educate in home exercies iwth proper technique and progression  Rationale: In order to maximize safety and independence with performance of self-care activities  Target Date: 06/08/23  OT Goal 2  Goal Identifier: Hand strength  Goal Description: Pt will have rigth hand strength wiht in 2 pound of left  Rationale: In order to consistently apply specific precautions during ADL/IADL performance  Target Date: 06/29/23  OT Goal 3  Goal Identifier: Wrist strength  Goal Description: Pt will have right wrist extension strength of 5/ currently 4/5  Rationale: In order to maximize safety and independence with performance of self-care activities  Target Date: 07/06/23      Frequency of Treatment: 1-2x/week  Duration of Treatment: 8 weeks     Recommended Referrals to Other Professionals:   Education Assessment: Learner/Method: Patient     Risks and benefits of evaluation/treatment have been explained.   Patient/Family/caregiver agrees with Plan of Care.     Evaluation Time:    OT Eval, Low Complexity Minutes (37365): 15       Signing Clinician: Edilia Cooley  OT        [facility]                                                                                   OUTPATIENT OCCUPATIONAL THERAPY      PLAN OF TREATMENT FOR OUTPATIENT REHABILITATION   Patient's Last Name, First Name, Margaret De La Torre YOB: 1945   Provider's Name   [facility]   Medical Record No.  [id number]     Onset Date: 01/13/23 Start of Care Date: 05/25/23     Medical Diagnosis:  R29.898 (ICD-10-CM) - Right hand weakness  G56.01 (ICD-10-CM) - Carpal tunnel syndrome of right wrist      OT Treatment Diagnosis:  Impaired home mangement Plan of Treatment  Frequency/Duration:1-2x/week/8 weeks    Certification date from 05/25/23   To 07/20/23        See note for plan of treatment details and functional goals     Edilia Cooley OT                         I CERTIFY THE NEED FOR THESE SERVICES FURNISHED UNDER        THIS PLAN OF TREATMENT AND WHILE UNDER MY CARE .             Physician Signature               Date    X_____________________________________________________                        Referring Provider:  Swetha Maxwell      Initial Assessment  See Epic Evaluation- 05/25/23

## 2023-05-26 ENCOUNTER — ONCOLOGY VISIT (OUTPATIENT)
Dept: ONCOLOGY | Facility: OTHER | Age: 78
End: 2023-05-26
Attending: NURSE PRACTITIONER
Payer: COMMERCIAL

## 2023-05-26 VITALS
WEIGHT: 192 LBS | SYSTOLIC BLOOD PRESSURE: 126 MMHG | TEMPERATURE: 97.5 F | BODY MASS INDEX: 30.99 KG/M2 | OXYGEN SATURATION: 98 % | RESPIRATION RATE: 18 BRPM | DIASTOLIC BLOOD PRESSURE: 88 MMHG | HEART RATE: 64 BPM

## 2023-05-26 DIAGNOSIS — D72.820 MONOCLONAL B-CELL LYMPHOCYTOSIS: ICD-10-CM

## 2023-05-26 PROCEDURE — 99205 OFFICE O/P NEW HI 60 MIN: CPT | Performed by: INTERNAL MEDICINE

## 2023-05-26 PROCEDURE — G0463 HOSPITAL OUTPT CLINIC VISIT: HCPCS

## 2023-05-26 ASSESSMENT — PAIN SCALES - GENERAL: PAINLEVEL: NO PAIN (1)

## 2023-05-26 NOTE — NURSING NOTE
Chief Complaint   Patient presents with     Hematology     CONSULT:  Monoclonal B-cell lymphocytosis     Medication Reconciliation: complete    Tran Carmen CMA (AAMA)

## 2023-05-31 ENCOUNTER — LAB (OUTPATIENT)
Dept: LAB | Facility: OTHER | Age: 78
End: 2023-05-31
Payer: COMMERCIAL

## 2023-05-31 ENCOUNTER — THERAPY VISIT (OUTPATIENT)
Dept: OCCUPATIONAL THERAPY | Facility: OTHER | Age: 78
End: 2023-05-31
Attending: NURSE PRACTITIONER
Payer: COMMERCIAL

## 2023-05-31 DIAGNOSIS — R29.898 RIGHT HAND WEAKNESS: ICD-10-CM

## 2023-05-31 DIAGNOSIS — M06.4 INFLAMMATORY POLYARTHROPATHY (H): Primary | ICD-10-CM

## 2023-05-31 LAB
AST SERPL W P-5'-P-CCNC: 27 U/L (ref 10–35)
CREAT SERPL-MCNC: 1.05 MG/DL (ref 0.51–0.95)
CRP SERPL-MCNC: <3 MG/L
ERYTHROCYTE [DISTWIDTH] IN BLOOD BY AUTOMATED COUNT: 16.1 % (ref 10–15)
ERYTHROCYTE [SEDIMENTATION RATE] IN BLOOD BY WESTERGREN METHOD: 6 MM/HR (ref 0–30)
GFR SERPL CREATININE-BSD FRML MDRD: 55 ML/MIN/1.73M2
HCT VFR BLD AUTO: 43.6 % (ref 35–47)
HGB BLD-MCNC: 14 G/DL (ref 11.7–15.7)
MCH RBC QN AUTO: 31.7 PG (ref 26.5–33)
MCHC RBC AUTO-ENTMCNC: 32.1 G/DL (ref 31.5–36.5)
MCV RBC AUTO: 99 FL (ref 78–100)
PLATELET # BLD AUTO: 239 10E3/UL (ref 150–450)
RBC # BLD AUTO: 4.42 10E6/UL (ref 3.8–5.2)
WBC # BLD AUTO: 12.5 10E3/UL (ref 4–11)

## 2023-05-31 PROCEDURE — 82565 ASSAY OF CREATININE: CPT | Mod: ZL

## 2023-05-31 PROCEDURE — 86140 C-REACTIVE PROTEIN: CPT | Mod: ZL

## 2023-05-31 PROCEDURE — 85027 COMPLETE CBC AUTOMATED: CPT | Mod: ZL

## 2023-05-31 PROCEDURE — 84450 TRANSFERASE (AST) (SGOT): CPT | Mod: ZL

## 2023-05-31 PROCEDURE — 85652 RBC SED RATE AUTOMATED: CPT | Mod: ZL

## 2023-05-31 PROCEDURE — 36415 COLL VENOUS BLD VENIPUNCTURE: CPT | Mod: ZL

## 2023-05-31 PROCEDURE — 97110 THERAPEUTIC EXERCISES: CPT | Mod: GO | Performed by: OCCUPATIONAL THERAPIST

## 2023-06-01 NOTE — PROGRESS NOTES
HEMATOLOGY CONSULT NOTE  May 26, 2023    Reason for referral: Monoclonal B-cell lymphocytosis    Referring provider: Swetha Maxwell NP    HISTORY OF PRESENT ILLNESS  Margaret Batista is a 77 year old female with with PMH as stated below who is seen in the oncology clinic for monoclonal B-cell lymphocytosis:    Her history in short is as follows    Review of labs done in epic show intermittent elevated white blood cell count dating back to 1/25/2021.  At that time her WBC count was 12.8.  More recently on 5/10/2023 her white cell count was 10.6.    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    She is doing well.  Denies any recurrent infections or recent infections, denies any weight loss, appetite loss, early satiety or night sweats.  She is overall in good health.  She has a history of rheumatoid arthritis and is currently on methotrexate and Plaquenil.    REVIEW OF SYSTEMS  A 12-point ROS negative except as in HPI      Current Outpatient Medications   Medication Sig Dispense Refill     allopurinol (ZYLOPRIM) 300 MG tablet TAKE 1 TABLET DAILY 90 tablet 3     aspirin EC 81 MG EC tablet Take 1 tablet by mouth daily       atenolol (TENORMIN) 100 MG tablet Take 1 tablet (100 mg) by mouth daily 90 tablet 3     atorvastatin (LIPITOR) 40 MG tablet TAKE 1 TABLET DAILY 90 tablet 3     cetirizine (ZYRTEC) 10 MG tablet Take 1 tablet (10 mg) by mouth daily       Cholecalciferol (VITAMIN D3) 2000 UNITS CAPS Take 2,000 Units by mouth daily       folic acid (FOLVITE) 1 MG tablet TAKE 1 TABLET DAILY 90 tablet 3     hydrochlorothiazide (HYDRODIURIL) 25 MG tablet Take 1 tablet (25 mg) by mouth daily 90 tablet 3     hydroxychloroquine (PLAQUENIL) 200 MG tablet 400 mg daily alternating with 200 mg daily (Patient taking differently: Take 200 mg by  mouth daily Every other day. One day 200mg next day 400mg)       methotrexate 2.5 MG tablet Take 20 mg by mouth once a week       nitroGLYcerin (NITROSTAT) 0.4 MG sublingual tablet Place 1 tablet (0.4 mg) under the tongue every 5 minutes as needed for chest pain (For chest pain x 3 doses) 30 tablet 3     ranitidine (ZANTAC) 150 MG capsule Take 1 capsule by mouth 2 times daily as needed (Patient not taking: Reported on 5/9/2023)         Allergies   Allergen Reactions     Food      Macadamia nuts make mouth itch     Losartan      Allergic rxn with hives and angioedema suspected to be from alternative manufacture of the effient or the losartan     Enalapril Cough     Immunization History   Administered Date(s) Administered     COVID-19 Bivalent 12+ (Pfizer) 09/28/2022     COVID-19 MONOVALENT 12+ (Pfizer) 05/07/2021, 06/03/2021, 01/03/2022     FLU 6-35 months 10/05/2010, 11/17/2011     Flu, Unspecified 02/29/2008     Influenza (High Dose) 3 valent vaccine 10/15/2015, 10/26/2016, 11/13/2017, 10/24/2018, 10/08/2019     Influenza (IIV3) PF 12/03/2004, 10/25/2006, 02/29/2008, 10/05/2010, 11/17/2011, 10/05/2012, 10/11/2013     Influenza Vaccine 65+ (Fluzone HD) 09/16/2020, 10/21/2021, 11/16/2022     Influenza Vaccine >6 months (Alfuria,Fluzone) 10/10/2014     Pneumo Conj 13-V (2010&after) 11/16/2015     Pneumococcal 23 valent 10/05/2012     TDAP Vaccine (Boostrix) 08/29/2007, 05/29/2020     Tdap (Adult) Unspecified Formulation 10/02/1996     Triamcinolone Acetonide 10/13/2014, 12/01/2014     Zoster recombinant adjuvanted (SHINGRIX) 05/29/2020, 09/22/2020     Zoster vaccine, live 02/23/2011       Past Medical History:   Diagnosis Date     Atherosclerotic heart disease of native coronary artery without angina pectoris     -s/p ADÁN to the mid-LAD, mid-circumflex, mid-right coronary artery, proximal right coronary  artery, and PTCA of a marginal branch of the right coronary artery 04/30/2007. -s/p ADÁN to proximal left anterior  descending 10/29/13.     Contusion of abdominal wall          Essential (primary) hypertension     2006     Gout     No Comments Provided     Hyperlipidemia     2007     Localized swelling, mass, or lump of upper extremity     2017     Other specified counseling     10/28/2013,Patient has identified Health Care Agent(s): Yes Add Health Care Agents: Yes   Health Care Agent(s): Primary Health Care Agent: Jay Batista  Relationship:  Phone:   Secondary Health Care Agent:  Relationship: Daughter Phone:   Conservator:  Relationship:  Phone:   Guardian: Relationship:  Phone:    Patient has Advance Care Plan Documents (Health Care Directive, POLST): No, refe*     Polyosteoarthritis     No Comments Provided     Postmenopausal bleeding     No Comments Provided     Presence of coronary angioplasty implant and graft     ,unstable angina; severe prox LAD stenosis; s/p 7 stents     Primary osteoarthritis of left hand     2017     Rheumatoid arthritis (H)     follows at [facility]       Past Surgical History:   Procedure Laterality Date     ARTHROPLASTY KNEE      2011     ARTHROSCOPY KNEE           BIOPSY BREAST      10/25/95,BRIAN RAMIREZ      SECTION      1974, Section     COLONOSCOPY      05,Normal - Repeat in 10 years     COLONOSCOPY  2016     COLONOSCOPY N/A 2019    Procedure: COLONOSCOPY;  Surgeon: Evelin Thompson MD;  Location:  OR     COLONOSCOPY N/A 2019    Procedure: COMBINED COLONOSCOPY, SINGLE OR MULTIPLE BIOPSY/POLYPECTOMY BY BIOPSY;  Surgeon: Evelin Thompson MD;  Location:  OR     ENDOSCOPIC RELEASE CARPAL TUNNEL Right 2023    Procedure: RELEASE, CARPAL TUNNEL, ENDOSCOPIC;  Surgeon: Danial Stanley MD;  Location:  OR     ESOPHAGOSCOPY, GASTROSCOPY, DUODENOSCOPY (EGD), COMBINED      2011,erosive gastritis;bx;consider MRI/A;[facility]     EXTRACTION(S) DENTAL      1970     HEART CATH, ANGIOPLASTY       10/29/2013,ADÁN to proximal left anterior descending     OTHER SURGICAL HISTORY      2007/2013,133709,OTHER     OTHER SURGICAL HISTORY      01/2014,[id number],TOTAL SHOULDER ARTHROPLASTY,Right     OTHER SURGICAL HISTORY      04/14/2016,20172.0,SD COLONOSCOPY REMOVE ELIZA POLYP LESN SNARE,repeat 2019, precancerous polyps     ZZ STRESS LEXISCAN TEST  08/2018    With Dr. Irwin - normal       SOCIAL HISTORY  History   Smoking Status     Never   Smokeless Tobacco     Never    Social History    Substance and Sexual Activity      Alcohol use: Yes        Comment: 2 drinks a month     History   Drug Use No       FAMILY HISTORY  Family History   Problem Relation Age of Onset     Heart Disease Mother         Heart Disease     Hypertension Mother         Hypertension     Other - See Comments Mother         Stroke     Breast Cancer Mother 53        Cancer-breast     Arthritis Father         Arthritis     Cancer Father         Cancer     Heart Disease Father         Heart Disease     Hypertension Father         Hypertension     Atrial fibrillation Sister      Melanoma Brother      Heart Disease Brother         Heart Disease       PHYSICAL EXAMINATION  /88   Pulse 64   Temp 97.5  F (36.4  C) (Tympanic)   Resp 18   Wt 87.1 kg (192 lb)   LMP  (LMP Unknown)   SpO2 98%   BMI 30.99 kg/m    Wt Readings from Last 2 Encounters:   05/26/23 87.1 kg (192 lb)   05/09/23 89.5 kg (197 lb 6.4 oz)     Physical Exam  Constitutional:       Appearance: Normal appearance.   Pulmonary:      Effort: Pulmonary effort is normal.   Lymphadenopathy:      Cervical: No cervical adenopathy.      Right cervical: No superficial cervical adenopathy.     Left cervical: No superficial cervical adenopathy.      Upper Body:      Right upper body: No supraclavicular or axillary adenopathy.      Left upper body: No supraclavicular or axillary adenopathy.   Neurological:      General: No focal deficit present.      Mental Status: She is alert and oriented  to person, place, and time.     Laboratory and imaging:     Latest Reference Range & Units 05/10/23 12:39   WBC 4.0 - 11.0 10e3/uL 10.6   Hemoglobin 11.7 - 15.7 g/dL 14.2   Hematocrit 35.0 - 47.0 % 43.5   Platelet Count 150 - 450 10e3/uL 207       ASSESSMENT AND PLAN    1.  Monoclonal B-cell lymphocytosis:    Found to have intermittent leukocytosis dating back to 1/25/2021.  Leukocytosis is mostly generalized and there is no increase in absolute lymphocyte count.    A peripheral smear was ordered by her PCP and a flow cytometry was done by pathology which showed a monotypic B-cell population with the absolute monotypic B-cell count of 1300.  Therefore she meets criteria for monocytic B-cell lymphocytosis.    She does not have any new B symptoms currently.  Will order CT chest abdomen and pelvis and soft tissue neck to evaluate for any adenopathy.    At this point would recommend monitoring and will follow-up in clinic in 6 months with repeat CBCD.  She will get the CT scans now.    All questions answered    Total time spent on the patient on day of encounter was 60 minutes doing chart review, review of test results, interpretation of results, patient visit and documentation.      Virgen Pitts MD

## 2023-06-08 ENCOUNTER — HOSPITAL ENCOUNTER (OUTPATIENT)
Dept: CT IMAGING | Facility: OTHER | Age: 78
Discharge: HOME | End: 2023-06-08
Attending: INTERNAL MEDICINE
Payer: COMMERCIAL

## 2023-06-08 DIAGNOSIS — D72.820 MONOCLONAL B-CELL LYMPHOCYTOSIS: Primary | ICD-10-CM

## 2023-06-08 DIAGNOSIS — I65.23 CAROTID ATHEROSCLEROSIS, BILATERAL: ICD-10-CM

## 2023-06-08 DIAGNOSIS — D72.820 MONOCLONAL B-CELL LYMPHOCYTOSIS: ICD-10-CM

## 2023-06-08 DIAGNOSIS — N28.89 RENAL MASS: ICD-10-CM

## 2023-06-08 PROCEDURE — 70491 CT SOFT TISSUE NECK W/DYE: CPT

## 2023-06-08 PROCEDURE — 74177 CT ABD & PELVIS W/CONTRAST: CPT

## 2023-06-08 PROCEDURE — 250N000011 HC RX IP 250 OP 636: Performed by: INTERNAL MEDICINE

## 2023-06-08 RX ORDER — IOPAMIDOL 755 MG/ML
100 INJECTION, SOLUTION INTRAVASCULAR ONCE
Status: COMPLETED | OUTPATIENT
Start: 2023-06-08 | End: 2023-06-08

## 2023-06-08 RX ADMIN — IOPAMIDOL 100 ML: 755 INJECTION, SOLUTION INTRAVENOUS at 10:01

## 2023-06-08 NOTE — Clinical Note
Hi for Ms. Batista I put in a thyroid ultrasound and carotid ultrasound and a referral to urology.  I think she can see Dr. Mcclain in [place]

## 2023-06-08 NOTE — Clinical Note
Hi,  I had ordered CT scans for Ms. Batista for monoclonal B-cell lymphocytosis.  It did not show any adenopathy however did she have multiple other findings like a thyroid nodule, bilateral carotid atherosclerosis and and a renal mass.  I ordered carotid ultrasound for her but I would defer further management to you,  For the thyroid nodule I did order a thyroid ultrasound and referred her to urology for the renal mass.

## 2023-06-08 NOTE — PROGRESS NOTES
1.  Has the patient had a previous reaction to IV contrast? no    2.  Does the patient have kidney disease? Yes - CKD3 - GFR 55    3.  Is the patient on dialysis? no    If YES to any of these questions, exam will be reviewed with a Radiologist before administering contrast.    IV Contrast- Discharge Instructions After Your CT Scan      The IV contrast you received today will be filtered from your bloodstream by your kidneys during the next 24 hours and pass from the body in urine.  You will not be aware of this process and your urine will not change in color.  To help this process you should drink at least 4 additional glasses of water or juice today.  This reduces stress on your kidneys.    Most contrast reactions are immediate.  Should you develop symptoms of concern after discharge, contact the department at the number below.  After hours you should contact your personal physician.  If you develop breathing distress or wheezing, call 911.

## 2023-06-08 NOTE — PROGRESS NOTES
CT scan of the neck, chest and abdomen done for monoclonal B-cell lymphocytosis did not show any adenopathy.  However did show a thyroid nodule, carotid atherosclerosis bilaterally and right renal mass.  Results discussed with Ms. Batista.  US thyroid, Carotid US and urology referral placed.

## 2023-06-09 DIAGNOSIS — N28.89 RENAL MASS: Primary | ICD-10-CM

## 2023-06-19 ASSESSMENT — ENCOUNTER SYMPTOMS
ARTHRALGIAS: 1
EYE PAIN: 0
HEMATOCHEZIA: 0
FREQUENCY: 0
DIARRHEA: 0
DIZZINESS: 0
JOINT SWELLING: 1
SHORTNESS OF BREATH: 0
CONSTIPATION: 0
HEADACHES: 0
PARESTHESIAS: 0
NAUSEA: 0
DYSURIA: 0
ABDOMINAL PAIN: 0
FEVER: 0
WEAKNESS: 0
COUGH: 1
PALPITATIONS: 0
HEARTBURN: 0
MYALGIAS: 0
SORE THROAT: 0
CHILLS: 0
NERVOUS/ANXIOUS: 0
HEMATURIA: 0

## 2023-06-19 ASSESSMENT — ACTIVITIES OF DAILY LIVING (ADL): CURRENT_FUNCTION: NO ASSISTANCE NEEDED

## 2023-06-20 ENCOUNTER — OFFICE VISIT (OUTPATIENT)
Dept: INTERNAL MEDICINE | Facility: OTHER | Age: 78
End: 2023-06-20
Attending: NURSE PRACTITIONER
Payer: COMMERCIAL

## 2023-06-20 VITALS
TEMPERATURE: 97.3 F | SYSTOLIC BLOOD PRESSURE: 136 MMHG | BODY MASS INDEX: 30.42 KG/M2 | HEIGHT: 67 IN | HEART RATE: 71 BPM | DIASTOLIC BLOOD PRESSURE: 84 MMHG | OXYGEN SATURATION: 100 % | WEIGHT: 193.8 LBS | RESPIRATION RATE: 16 BRPM

## 2023-06-20 DIAGNOSIS — I10 PRIMARY HYPERTENSION: ICD-10-CM

## 2023-06-20 DIAGNOSIS — N28.89 RENAL MASS: ICD-10-CM

## 2023-06-20 DIAGNOSIS — N18.31 STAGE 3A CHRONIC KIDNEY DISEASE (H): ICD-10-CM

## 2023-06-20 DIAGNOSIS — E04.1 THYROID NODULE: ICD-10-CM

## 2023-06-20 DIAGNOSIS — I65.29 STENOSIS OF CAROTID ARTERY, UNSPECIFIED LATERALITY: ICD-10-CM

## 2023-06-20 DIAGNOSIS — M10.9 GOUT, UNSPECIFIED CAUSE, UNSPECIFIED CHRONICITY, UNSPECIFIED SITE: ICD-10-CM

## 2023-06-20 DIAGNOSIS — I10 ESSENTIAL HYPERTENSION: ICD-10-CM

## 2023-06-20 DIAGNOSIS — M05.79 RHEUMATOID ARTHRITIS INVOLVING MULTIPLE SITES WITH POSITIVE RHEUMATOID FACTOR (H): ICD-10-CM

## 2023-06-20 DIAGNOSIS — I25.10 ASCVD (ARTERIOSCLEROTIC CARDIOVASCULAR DISEASE): ICD-10-CM

## 2023-06-20 DIAGNOSIS — Z00.00 ENCOUNTER FOR MEDICARE ANNUAL WELLNESS EXAM: Primary | ICD-10-CM

## 2023-06-20 DIAGNOSIS — D72.820 MONOCLONAL B-CELL LYMPHOCYTOSIS: ICD-10-CM

## 2023-06-20 LAB
ALBUMIN UR-MCNC: 30 MG/DL
APPEARANCE UR: CLEAR
BACTERIA #/AREA URNS HPF: ABNORMAL /HPF
BILIRUB UR QL STRIP: NEGATIVE
COLOR UR AUTO: ABNORMAL
GLUCOSE UR STRIP-MCNC: NEGATIVE MG/DL
HGB UR QL STRIP: NEGATIVE
HYALINE CASTS: 1 /LPF
KETONES UR STRIP-MCNC: NEGATIVE MG/DL
LEUKOCYTE ESTERASE UR QL STRIP: ABNORMAL
MUCOUS THREADS #/AREA URNS LPF: PRESENT /LPF
NITRATE UR QL: NEGATIVE
PH UR STRIP: 7 [PH] (ref 5–9)
RBC URINE: 2 /HPF
SP GR UR STRIP: 1.02 (ref 1–1.03)
SQUAMOUS EPITHELIAL: 12 /HPF
UROBILINOGEN UR STRIP-MCNC: NORMAL MG/DL
WBC URINE: 15 /HPF

## 2023-06-20 PROCEDURE — G0439 PPPS, SUBSEQ VISIT: HCPCS | Performed by: NURSE PRACTITIONER

## 2023-06-20 PROCEDURE — 87086 URINE CULTURE/COLONY COUNT: CPT | Mod: ZL | Performed by: NURSE PRACTITIONER

## 2023-06-20 PROCEDURE — 81001 URINALYSIS AUTO W/SCOPE: CPT | Mod: ZL | Performed by: NURSE PRACTITIONER

## 2023-06-20 PROCEDURE — 99214 OFFICE O/P EST MOD 30 MIN: CPT | Mod: 25 | Performed by: NURSE PRACTITIONER

## 2023-06-20 PROCEDURE — G0463 HOSPITAL OUTPT CLINIC VISIT: HCPCS

## 2023-06-20 RX ORDER — HYDROCHLOROTHIAZIDE 25 MG/1
25 TABLET ORAL DAILY
Qty: 90 TABLET | Refills: 3 | Status: SHIPPED | OUTPATIENT
Start: 2023-06-20 | End: 2024-05-23

## 2023-06-20 RX ORDER — ATENOLOL 100 MG/1
100 TABLET ORAL DAILY
Qty: 90 TABLET | Refills: 3 | Status: SHIPPED | OUTPATIENT
Start: 2023-06-20 | End: 2024-04-30

## 2023-06-20 ASSESSMENT — ENCOUNTER SYMPTOMS
EYE PAIN: 0
DYSURIA: 0
NAUSEA: 0
DIARRHEA: 0
PARESTHESIAS: 0
HEADACHES: 0
HEMATOCHEZIA: 0
SHORTNESS OF BREATH: 0
CONSTIPATION: 0
FREQUENCY: 0
ARTHRALGIAS: 1
JOINT SWELLING: 1
FEVER: 0
SORE THROAT: 0
WEAKNESS: 0
COUGH: 1
HEMATURIA: 0
NERVOUS/ANXIOUS: 0
ABDOMINAL PAIN: 0
HEARTBURN: 0
MYALGIAS: 0
CHILLS: 0
DIZZINESS: 0
PALPITATIONS: 0

## 2023-06-20 ASSESSMENT — ACTIVITIES OF DAILY LIVING (ADL): CURRENT_FUNCTION: NO ASSISTANCE NEEDED

## 2023-06-20 ASSESSMENT — PAIN SCALES - GENERAL: PAINLEVEL: NO PAIN (1)

## 2023-06-20 NOTE — NURSING NOTE
"Chief Complaint   Patient presents with     Medicare Visit       FOOD SECURITY SCREENING QUESTIONS  Hunger Vital Signs:  Within the past 12 months we worried whether our food would run out before we got money to buy more. Never  Within the past 12 months the food we bought just didn't last and we didn't have money to get more. Never  Claudia Rahman LPN 6/20/2023 7:44 AM      Initial BP (!) 142/82 (BP Location: Right arm, Patient Position: Sitting, Cuff Size: Adult Large)   Pulse 71   Temp 97.3  F (36.3  C) (Tympanic)   Resp 16   Ht 1.7 m (5' 6.93\")   Wt 87.9 kg (193 lb 12.8 oz)   LMP  (LMP Unknown)   SpO2 100%   BMI 30.42 kg/m   Estimated body mass index is 30.42 kg/m  as calculated from the following:    Height as of this encounter: 1.7 m (5' 6.93\").    Weight as of this encounter: 87.9 kg (193 lb 12.8 oz).  Medication Reconciliation: complete    Claudia Rahman LPN  "

## 2023-06-20 NOTE — PROGRESS NOTES
"SUBJECTIVE:   Sandra is a 77 year old who presents for Preventive Visit.      6/20/2023     7:40 AM   Additional Questions   Roomed by Claudia JACOBO LPN     Are you in the first 12 months of your Medicare coverage?  No    Patient is seen today for Medicare wellness visit and chronic disease management.  She has had intermittent mild leukocytosis and recently was referred by this provider to hematology.  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  She had further work-up which showed 18 mm right renal mass, thyroid nodule and atherosclerotic carotid plaque burden.  She was referred to urology however that appointment has not yet been scheduled.  She is scheduled for ultrasound of thyroid and carotid ultrasound.  She has ASCVD.  She is normally followed by cardiology but has not seen them in a few years.  She would like to get a follow-up appoint with Dr. Padilla once again.  She has been taking Lipitor 40 mg daily and aspirin 81 mg daily.  She is followed by [facility] rheumatology.  She saw a rheumatology provider recently and continues on same medication but had noticed there had not been a uric acid level drawn in quite some time and patient has had intermittent great toe pain and thought uric acid would be beneficial.  Rheumatology did not order the uric acid level.  Patient will be due for rheumatology labs again in September 2023.  Last colonoscopy was 2019 and normal.  Dr. Thompson recommended no further follow-up secondary to age.  Patient is asymptomatic.  She is scheduled for mammogram.    Healthy Habits:     In general, how would you rate your overall health?  Good    Frequency of exercise:  1 day/week    Duration of exercise:  15-30 minutes    Do you usually eat at least 4 servings of fruit and vegetables a day, include whole grains    & fiber and avoid regularly eating high fat or \"junk\" foods?  Yes    Taking medications regularly:  Yes    Medication side effects:  None    " Ability to successfully perform activities of daily living:  No assistance needed    Home Safety:  No safety concerns identified    Hearing Impairment:  No hearing concerns    In the past 6 months, have you been bothered by leaking of urine?  No    In general, how would you rate your overall mental or emotional health?  Excellent      PHQ-2 Total Score: 0        Have you ever done Advance Care Planning? (For example, a Health Directive, POLST, or a discussion with a medical provider or your loved ones about your wishes): Yes, advance care planning is on file.       Fall risk  Fallen 2 or more times in the past year?: No  Any fall with injury in the past year?: No    Cognitive Screening   1) Repeat 3 items (Leader, Season, Table)    2) Clock draw: NORMAL  3) 3 item recall: Recalls 2 objects   Results: NORMAL clock, 1-2 items recalled: COGNITIVE IMPAIRMENT LESS LIKELY    Mini-CogTM Copyright S Ashley. Licensed by the author for use in [facility]; reprinted with permission (alex@[facility]). All rights reserved.      Do you have sleep apnea, excessive snoring or daytime drowsiness?: no    Reviewed and updated as needed this visit by clinical staff   Tobacco  Allergies  Meds   Med Hx  Surg Hx  Fam Hx  Soc Hx        Reviewed and updated as needed this visit by Provider   Tobacco     Med Hx  Surg Hx  Fam Hx         Social History     Tobacco Use     Smoking status: Never     Passive exposure: Past     Smokeless tobacco: Never     Tobacco comments:     Passive exposure in childhood home.    Vaping Use     Vaping status: Never Used   Substance Use Topics     Alcohol use: Yes     Comment: 2 drinks a month             6/19/2023     9:42 AM   Alcohol Use   Prescreen: >3 drinks/day or >7 drinks/week? No     Do you have a current opioid prescription? No  Do you use any other controlled substances or medications that are not prescribed by a provider? None          Current providers sharing in care for this  patient include:   Patient Care Team:  Swetha Maxwell NP as PCP - General (Internal Medicine)  Swetha Maxwell NP as Assigned PCP  Danial Stanley MD as Assigned Musculoskeletal Provider  Cornel Saucedo MD as Assigned Surgical Provider  Danial Stanley MD as MD (Orthopaedic Surgery)    The following health maintenance items are reviewed in Epic and correct as of today:  Health Maintenance   Topic Date Due     HEPATITIS C SCREENING  10/08/2029 (Originally 12/28/1963)     BMP  01/03/2024     LIPID  05/09/2024     MICROALBUMIN  05/09/2024     HEMOGLOBIN  05/31/2024     MEDICARE ANNUAL WELLNESS VISIT  06/20/2024     FALL RISK ASSESSMENT  06/20/2024     ADVANCE CARE PLANNING  06/20/2028     COLORECTAL CANCER SCREENING  01/09/2029     DTAP/TDAP/TD IMMUNIZATION (4 - Td or Tdap) 05/29/2030     DEXA  11/28/2031     PHQ-2 (once per calendar year)  Completed     INFLUENZA VACCINE  Completed     Pneumococcal Vaccine: 65+ Years  Completed     URINALYSIS  Completed     ZOSTER IMMUNIZATION  Completed     COVID-19 Vaccine  Completed     IPV IMMUNIZATION  Aged Out     MENINGITIS IMMUNIZATION  Aged Out     MAMMO SCREENING  Discontinued     Lab work is in process  Labs reviewed in EPIC      Continue annual screening  Pertinent mammograms are reviewed under the imaging tab.    Review of Systems   Constitutional: Negative for chills and fever.   HENT: Negative for congestion, ear pain, hearing loss and sore throat.    Eyes: Negative for pain and visual disturbance.   Respiratory: Positive for cough. Negative for shortness of breath.    Cardiovascular: Positive for peripheral edema. Negative for chest pain and palpitations.   Gastrointestinal: Negative for abdominal pain, constipation, diarrhea, heartburn, hematochezia and nausea.   Breasts:  Negative for tenderness and discharge.   Genitourinary: Negative for dysuria, frequency, genital sores, hematuria, pelvic pain, urgency, vaginal bleeding and vaginal  "discharge.   Musculoskeletal: Positive for arthralgias and joint swelling. Negative for myalgias.   Skin: Negative for rash.   Neurological: Negative for dizziness, weakness, headaches and paresthesias.   Psychiatric/Behavioral: Negative for mood changes. The patient is not nervous/anxious.          OBJECTIVE:   /84   Pulse 71   Temp 97.3  F (36.3  C) (Tympanic)   Resp 16   Ht 1.7 m (5' 6.93\")   Wt 87.9 kg (193 lb 12.8 oz)   LMP 01/01/1998 (Within Months)   SpO2 100%   Breastfeeding No   BMI 30.42 kg/m   Estimated body mass index is 30.42 kg/m  as calculated from the following:    Height as of this encounter: 1.7 m (5' 6.93\").    Weight as of this encounter: 87.9 kg (193 lb 12.8 oz).  Physical Exam  Pleasant female in no acute distress.  Affect normal.  Alert and oriented x4.  Skin intact.  TMs clear.  Neck supple.  No thyromegaly or tenderness.  No carotid bruits.  Lung fields clear to auscultation throughout.  Cardiovascular regular rate and rhythm with no murmur or S3.  Abdomen is soft and without masses, tenderness and organomegaly.  Extremities with trace bilateral edema.  She has chronic tenderness with palpation to the right of the thoracolumbar spine and paraspinal musculature.  No CVA tenderness.  DP PT intact.        ASSESSMENT / PLAN:       ICD-10-CM    1. Encounter for Medicare annual wellness exam  Z00.00       2. Monoclonal B-cell lymphocytosis  D72.820       3. Renal mass  N28.89 UA with Microscopic reflex to Culture     UA with Microscopic reflex to Culture      4. Thyroid nodule  E04.1       5. Stenosis of carotid artery, unspecified laterality  I65.29 Adult Cardiology Eval  Referral      6. ASCVD (arteriosclerotic cardiovascular disease)  I25.10 Adult Cardiology Eval  Referral      7. Primary hypertension  I10       8. Stage 3a chronic kidney disease (H)  N18.31       9. Rheumatoid arthritis involving multiple sites with positive rheumatoid factor (H)  M05.79     " "  10. Gout, unspecified cause, unspecified chronicity, unspecified site  M10.9 Uric acid      11. Essential hypertension  I10 hydrochlorothiazide (HYDRODIURIL) 25 MG tablet     atenolol (TENORMIN) 100 MG tablet        Plan:  Labs today for urinalysis secondary to CT findings of 18 mm right upper pole renal mass.  She has been referred to urology but will have scheduling call and check on this since patient has not yet been called with an appointment.  She is referred back to Dr. Padilla.  She has ASCVD and evidence of carotid artery plaque burden.  Has scheduled ultrasound of carotid and thyroid for nodule.  Dr. Padilla likely will follow-up on carotid ultrasound otherwise this provider can review as well.  Uric acid order placed for when she has rheumatology labs again in September.  Does not sound as though she is actually having gout flares.  Medication refills are completed.  She is scheduled for mammogram.  Has aged out of colon cancer screening.    Patient has been advised of split billing requirements and indicates understanding: Yes      COUNSELING:  Reviewed preventive health counseling, as reflected in patient instructions       Regular exercise       Aspirin prophylaxis        Colon cancer screening       Advanced Planning       BMI:   Estimated body mass index is 30.42 kg/m  as calculated from the following:    Height as of this encounter: 1.7 m (5' 6.93\").    Weight as of this encounter: 87.9 kg (193 lb 12.8 oz).   Weight management plan: Discussed healthy diet and exercise guidelines      She reports that she has never smoked. She has been exposed to tobacco smoke. She has never used smokeless tobacco.      Appropriate preventive services were discussed with this patient, including applicable screening as appropriate for cardiovascular disease, diabetes, osteopenia/osteoporosis, and glaucoma.  As appropriate for age/gender, discussed screening for colorectal cancer, prostate cancer, breast cancer, and " cervical cancer. Checklist reviewing preventive services available has been given to the patient.    Reviewed patients plan of care and provided an AVS. The Complex Care Plan (for patients with higher acuity and needing more deliberate coordination of services) for Margaret meets the Care Plan requirement. This Care Plan has been established and reviewed with the Patient.          Swetha Maxwell NP  [facility]    Identified Health Risks:

## 2023-06-20 NOTE — PATIENT INSTRUCTIONS
Patient Education   Personalized Prevention Plan  You are due for the preventive services outlined below.  Your care team is available to assist you in scheduling these services.  If you have already completed any of these items, please share that information with your care team to update in your medical record.  Health Maintenance Due   Topic Date Due     Colorectal Cancer Screening  01/09/2022       Exercise for a Healthier Heart  You may wonder how you can improve the health of your heart. If you re thinking about exercise, you re on the right track. You don t need to become an athlete. But you do need a certain amount of brisk exercise to help strengthen your heart. If you have been diagnosed with a heart condition, your healthcare provider may advise exercise to help your condition. To help make exercise a habit, choose safe, fun activities.      Exercise with a friend. When activity is fun, you're more likely to stick with it.     Before you start  Check with your healthcare provider before starting an exercise program. This is especially important if you haven't been active for a while. It's also important if you have a long-term (chronic) health problem such as heart disease, diabetes, or obesity. Also check with your provider if you're at high risk for having these problems.   Why exercise?  Exercising regularly offers many healthy rewards. It can help you do all of these:     Improve your blood cholesterol level to help prevent further heart trouble.    Lower your blood pressure to help prevent a stroke or heart attack.    Control diabetes or reduce your risk of getting this disease.    Improve your heart and lung function.    Reach and stay at a healthy weight.    Make your muscles stronger so you can stay active.    Prevent falls and fractures by slowing the loss of bone mass (osteoporosis).    Manage stress better.    Improve your sense of self and your body image.  Exercise tips      Ease into your  routine. Set small goals. Then build on them. Talk with your healthcare provider first before starting an exercise routine if you're not sure what your activity level should be.    Exercise on most days. Aim for a total of at least 150 minutes (2 hours and 30 minutes) or more of moderate-intensity aerobic activity each week. You could also do 75 minutes (1 hour and 15 minutes) or more of vigorous-intensity aerobic activity each week. Or try for a combination of both. Moderate activity means that you breathe heavier and your heart rate increases, but you can still talk. Think about doing at least 30 minutes of moderate exercise, 5 times a week. It's OK to work up to the 30-minute period over time. Examples of moderate-intensity activity are brisk walking, gardening, and water aerobics.    Step up your daily activity level.  Along with your exercise program, try being more active the whole day. Walk instead of drive. Or park further away so that you take more steps each day. Do more household tasks or yard work. You may not be able to meet the advised amount of physical activity. But doing some moderate- or vigorous-intensity aerobic activity can help reduce your risk for heart disease. Your healthcare provider can help you figure out what is best for you.    Choose 1 or more activities you enjoy.  Walking is one of the easiest things you can do. You can also try swimming, riding a bike, dancing, or taking an exercise class.    Call 911  Call 911 right away if any of these occur:     Chest pain that doesn't go away quickly with rest    New burning, tightness, pressure, or heaviness in your chest, neck, shoulders, back, or arms    Abnormal or severe shortness of breath    A very fast or irregular heartbeat (palpitations)    Fainting  When to call your healthcare provider  Call your healthcare provider if you have any of these:     Dizziness or lightheadedness    Mild shortness of breath or chest pain    Increased or  new joint or muscle pain    Jose Maria last reviewed this educational content on 7/1/2022 2000-2022 [employer]. All rights reserved. This information is not intended as a substitute for professional medical care. Always follow your healthcare professional's instructions.

## 2023-06-20 NOTE — PROGRESS NOTES
"    She is at risk for lack of exercise and has been provided with information to increase physical activity for the benefit of her well-being.  Answers for HPI/ROS submitted by the patient on 6/19/2023  In general, how would you rate your overall physical health?: good  Frequency of exercise:: 1 day/week  Do you usually eat at least 4 servings of fruit and vegetables a day, include whole grains & fiber, and avoid regularly eating high fat or \"junk\" foods? : Yes  Taking medications regularly:: Yes  Medication side effects:: None  Activities of Daily Living: no assistance needed  Home safety: no safety concerns identified  Hearing Impairment:: no hearing concerns  In the past 6 months, have you been bothered by leaking of urine?: No  abdominal pain: No  Blood in stool: No  Blood in urine: No  chest pain: No  chills: No  congestion: No  constipation: No  cough: Yes  diarrhea: No  dizziness: No  ear pain: No  eye pain: No  nervous/anxious: No  fever: No  frequency: No  genital sores: No  headaches: No  hearing loss: No  heartburn: No  arthralgias: Yes  joint swelling: Yes  peripheral edema: Yes  mood changes: No  myalgias: No  nausea: No  dysuria: No  palpitations: No  Skin sensation changes: No  sore throat: No  urgency: No  rash: No  shortness of breath: No  visual disturbance: No  weakness: No  pelvic pain: No  vaginal bleeding: No  vaginal discharge: No  tenderness: No  breast discharge: No  In general, how would you rate your overall mental or emotional health?: excellent  Duration of exercise:: 15-30 minutes        "

## 2023-06-21 ENCOUNTER — TELEPHONE (OUTPATIENT)
Dept: INTERNAL MEDICINE | Facility: OTHER | Age: 78
End: 2023-06-21
Payer: COMMERCIAL

## 2023-06-21 NOTE — TELEPHONE ENCOUNTER
Date of birth and last name verified.    A referral has been sent to .  [facility]  Urology clinic  [facility], [address] [place] [address] [phone]    Patient states understanding and will call them.    Rodolfo Navas .......  6/21/2023  11:09 AM

## 2023-06-21 NOTE — TELEPHONE ENCOUNTER
Patient called and requested a call back regarding the telephone number for the urologist in [place]. Patient is wondering if RK wants her to call them.    Okay to leave detailed message.    Ela Andre on 6/21/2023 at 10:43 AM

## 2023-06-22 LAB — BACTERIA UR CULT: NO GROWTH

## 2023-06-27 ENCOUNTER — HOSPITAL ENCOUNTER (OUTPATIENT)
Dept: ULTRASOUND IMAGING | Facility: OTHER | Age: 78
Discharge: HOME | End: 2023-06-27
Attending: INTERNAL MEDICINE
Payer: COMMERCIAL

## 2023-06-27 DIAGNOSIS — I65.23 CAROTID ATHEROSCLEROSIS, BILATERAL: ICD-10-CM

## 2023-06-27 DIAGNOSIS — D72.820 MONOCLONAL B-CELL LYMPHOCYTOSIS: ICD-10-CM

## 2023-06-27 PROCEDURE — 76536 US EXAM OF HEAD AND NECK: CPT

## 2023-06-27 PROCEDURE — 93880 EXTRACRANIAL BILAT STUDY: CPT

## 2023-06-28 ENCOUNTER — PATIENT OUTREACH (OUTPATIENT)
Dept: ONCOLOGY | Facility: OTHER | Age: 78
End: 2023-06-28
Payer: COMMERCIAL

## 2023-06-28 DIAGNOSIS — E04.1 THYROID NODULE: Primary | ICD-10-CM

## 2023-06-28 NOTE — PROGRESS NOTES
ENT referral placed. patient also requests that referral to urology be sent to [facility]. She stated that the soonest she could be seen at [facility] was August.  Savana Zarate RN...........6/28/2023 8:21 AM

## 2023-06-30 ENCOUNTER — TELEPHONE (OUTPATIENT)
Dept: ONCOLOGY | Facility: OTHER | Age: 78
End: 2023-06-30
Payer: COMMERCIAL

## 2023-06-30 NOTE — TELEPHONE ENCOUNTER
Patient called to talk to [place] in oncology. She states they were going to be setting up some appointments for her. Please call.    Anabell Dennis on 6/30/2023 at 1:47 PM

## 2023-06-30 NOTE — TELEPHONE ENCOUNTER
After birth date and last name were verified, patient states Savana was going to check on a possible urology consult in [place].  She would like to get in sooner than the 8/8/23 consult she has now.  I did not see anything upon review.  Patient is ok with Savana calling her back on Monday.

## 2023-07-03 NOTE — TELEPHONE ENCOUNTER
Referral sent to [facility] urology.  Savana Zarate RN...........7/3/2023 10:03 AM

## 2023-07-05 ENCOUNTER — TELEPHONE (OUTPATIENT)
Dept: INTERNAL MEDICINE | Facility: OTHER | Age: 78
End: 2023-07-05
Payer: COMMERCIAL

## 2023-07-05 ENCOUNTER — VIRTUAL VISIT (OUTPATIENT)
Dept: FAMILY MEDICINE | Facility: OTHER | Age: 78
End: 2023-07-05
Attending: PHYSICIAN ASSISTANT
Payer: COMMERCIAL

## 2023-07-05 DIAGNOSIS — U07.1 INFECTION DUE TO 2019 NOVEL CORONAVIRUS: Primary | ICD-10-CM

## 2023-07-05 PROCEDURE — G0463 HOSPITAL OUTPT CLINIC VISIT: HCPCS | Mod: TEL

## 2023-07-05 PROCEDURE — 99441 PR PHYSICIAN TELEPHONE EVALUATION 5-10 MIN: CPT | Mod: TEL | Performed by: PHYSICIAN ASSISTANT

## 2023-07-05 NOTE — PATIENT INSTRUCTIONS
If you have no symptoms or symptoms are resolving after 5 days, you may leave the house per CDC guidelines. Continue to wear a mask around others for 5 days.     Continue symptomatic remedies such as salt water gargles, increase hydration, rest, alternating Tylenol and ibuprofen if able/needed, Vicks Vapor rub on the chest and other symptomatic remedies.    Paxlovid: goal of Paxlovid to reduce hospitalization risk.   - We discussed risks, benefits, renal dosing, medication interactions for Paxlovid.     If you do take the Paxlovid, please stop your atorvastatin/Lipitor (cholesterol medication) for an entire 5 days you are on Paxlovid + an additional 5 days (total of 10 days)

## 2023-07-05 NOTE — TELEPHONE ENCOUNTER
Patient called to report she has direct contact with her son who was diagnosed with covid. Patient states symptoms started today- is wishing to discuss with a provider antiviral for covid. Was transferred to appointment line    Corinne R Thayer, RN on 7/5/2023 at 10:37 AM

## 2023-07-05 NOTE — NURSING NOTE
Patient presents to clinic today for Exposure to Covid. Son tested positive on 7/4/2023. Wants Paxlovid for her family.     Medication Review: complete    LMP 01/01/1998 (Within Months)      FOOD SECURITY SCREENING QUESTIONS:  The next two questions are to help us understand your food security.  If you are feeling you need any assistance in this area, we have resources available to support you today.    Hunger Vital Signs:  Within the past 12 months we worried whether our food would run out before we got money to buy more. Never  Within the past 12 months the food we bought just didn't last and we didn't have money to get more. Never    Cecy NOAM Bledsoe LPN, on 7/5/2023 at 11:50 AM

## 2023-07-05 NOTE — PROGRESS NOTES
"Sandra is a 77 year old who is being evaluated via a billable telephone visit.      What phone number would you like to be contacted at? [phone]  How would you like to obtain your AVS? MyChart  {PROVIDER LOCATION On-site should be selected for visits conducted from your clinic location or adjoining [facility] hospital, academic office, or other nearby [facility] building. Off-site should be selected for all other provider locations, including home:264589}  Distant Location (provider location):  {virtual location provider:264459}    {PROVIDER CHARTING PREFERENCE:202353}    Subjective   Sandra is a 77 year old, presenting for the following health issues:  Consult (Son tested positive for Covid 19 yesterday. Has been in her household for about a week. )        6/20/2023     7:40 AM   Additional Questions   Roomed by Claudia JACOBO LPN     HPI     {SUPERLIST (Optional):600611}  {additonal problems for provider to add (Optional):257180}      Review of Systems   {ROS COMP (Optional):952146}      Objective    Vitals - Patient Reported  Pain Score: No Pain (0)        Physical Exam   {GENERAL APPEARANCE:50::\"healthy\",\"alert\",\"no distress\"}  PSYCH: Alert and oriented times 3; coherent speech, normal   rate and volume, able to articulate logical thoughts, able   to abstract reason, no tangential thoughts, no hallucinations   or delusions  Her affect is { :9652545::\"normal\"}  RESP: No cough, no audible wheezing, able to talk in full sentences  Remainder of exam unable to be completed due to telephone visits    {Diagnostic Test Results (Optional):652040}    {AMBULATORY ATTESTATION (Optional):147389}        Phone call duration: *** minutes    "

## 2023-07-05 NOTE — TELEPHONE ENCOUNTER
Patient called and wants to talk to a nurse about covid symptoms.        Ivette Contreras on 7/5/2023 at 10:17 AM

## 2023-07-05 NOTE — PROGRESS NOTES
Sandra is a 77 year old who is being evaluated via a billable telephone visit.      What phone number would you like to be contacted at? home  How would you like to obtain your AVS? MyChart    Distant Location (provider location):  On-site    1. Infection due to 2019 novel coronavirus  - nirmatrelvir and ritonavir (PAXLOVID) 150 mg/100 mg therapy pack; Take 2 tablets by mouth 2 times daily for 5 days (Take one tablet of Nirmatelvir and 1 tablet of Ritonavir twice daily for 5 days)  Dispense: 20 tablet; Refill: 0  - COVID - If you have no symptoms or symptoms are resolving after 5 days, you may leave the house per CDC guidelines. Continue to wear a mask around others for 5 days.     Continue symptomatic remedies such as salt water gargles, increase hydration, rest, alternating Tylenol and ibuprofen if able/needed, Vicks Vapor rub on the chest and other symptomatic remedies.    Paxlovid: goal of Paxlovid to reduce hospitalization risk.   - We discussed risks, benefits, renal dosing, medication interactions for Paxlovid. Patient opted accepted Paxlovid prescription.     Subjective   Sandra is a 77 year old, presenting for the following health issues:  Consult (Son tested positive for Covid 19 yesterday. Has been in her household for about a week. )        6/20/2023     7:40 AM   Additional Questions   Roomed by Claudia JACOBO LPN     HPI     Exposure to COVID,  and daughter-in-law are symptomatic, son tested positive yesterday.  She is high risk due to comorbid conditions.  She would like to discuss options for prophylaxis/treatment.  She does not currently have any symptoms but is worried due to comorbid conditions that she may develop these due to her exposures.  She last had COVID in either 2021 or 2022.  She has received her initial series +2 boosters.    Review of Systems   Constitutional, HEENT, cardiovascular, pulmonary, GI, , musculoskeletal, neuro, skin, endocrine and psych systems are negative, except as  otherwise noted.        Objective    Vitals - Patient Reported  Pain Score: No Pain (0)    Physical Exam   GEN: healthy, alert and no distress  PSYCH: Alert and oriented times 3; coherent speech, normal   rate and volume, able to articulate logical thoughts, able   to abstract reason, no tangential thoughts, no hallucinations   or delusions  Her affect is normal  RESP: No cough, no audible wheezing, able to talk in full sentences  Remainder of exam unable to be completed due to telephone visits    Phone call duration: 6 minutes

## 2023-07-13 ENCOUNTER — HOSPITAL ENCOUNTER (OUTPATIENT)
Dept: MAMMOGRAPHY | Facility: OTHER | Age: 78
Discharge: HOME | End: 2023-07-13
Attending: NURSE PRACTITIONER | Admitting: NURSE PRACTITIONER
Payer: COMMERCIAL

## 2023-07-13 DIAGNOSIS — Z12.31 VISIT FOR SCREENING MAMMOGRAM: ICD-10-CM

## 2023-07-13 PROCEDURE — 77067 SCR MAMMO BI INCL CAD: CPT

## 2023-08-01 ENCOUNTER — OFFICE VISIT (OUTPATIENT)
Dept: OTOLARYNGOLOGY | Facility: OTHER | Age: 78
End: 2023-08-01
Attending: OTOLARYNGOLOGY
Payer: COMMERCIAL

## 2023-08-01 ENCOUNTER — TRANSFERRED RECORDS (OUTPATIENT)
Dept: HEALTH INFORMATION MANAGEMENT | Facility: CLINIC | Age: 78
End: 2023-08-01

## 2023-08-01 DIAGNOSIS — E04.1 THYROID NODULE: Primary | ICD-10-CM

## 2023-08-01 PROCEDURE — G0463 HOSPITAL OUTPT CLINIC VISIT: HCPCS

## 2023-08-01 NOTE — PROGRESS NOTES
document embedded image  Patient Name: JACKIE PAL    Address: [address]     YOB: 1945    [place] [address]    MR Number: [id number]    Phone: [phone]  PCP: ROSAURA Barnett            Appointment Date: 08/01/23   Visit Provider: Tomy Mckenzie MD    cc: ROSAURA Barnett; ~    ENT Progress Note  Intake  Visit Reasons: Thyroid consult / [facility]    HPI  History of Present Illness  Chief complaint:  Thyroid nodule    History  The patient is a 77-year-old female who is had a left thyroid nodule identified on CT imaging performed for other reasons.  Subsequent ultrasound confirmed a 4+ cm mass in the inferior aspect of the left lobe of the thyroid.  She has several other smaller nodules present as well.  She denies swallowing issues or breathing issues.  She was unaware of the mass prior to her imaging.    Exam  Neck-there is no palpable adenopathy in the neck.  I can palpate the left thyroid mass when she swallows and raises the mass up out of her mediastinum.  Indirect laryngoscopy reveals intact laryngeal function without mucosal lesion  Nasal-no obstruction or purulence  Head and neck integument-Clear  General-the patient appears well and in no distress  Neuro-there are no focal cranial nerve deficits  CT-I personally reviewed her CT from [facility] and can appreciate the mass in the inferior aspect of the left lobe of the thyroid.  It does extend into the upper mediastinum.  There is no evidence of invasion of surrounding tissues.  I can see no evidence of adenopathy.    A&P  Assessment & Plan  (1) Left thyroid nodule:        Status: Chronic        Code(s):  E04.1 - Nontoxic single thyroid nodule  I would advise ultrasound-directed needle biopsy of this mass at her convenience.  She needs thyroid function studies to be performed as well.  I will contact her with results of these studies as they return.               Tomy Mckenzie MD    Filed:  08/03/23 1033    <Electronically signed by Tomy Mckenzie MD> 08/04/23 [id number]

## 2023-08-02 DIAGNOSIS — E04.1 NONTOXIC SINGLE THYROID NODULE: Primary | ICD-10-CM

## 2023-08-10 ENCOUNTER — LAB (OUTPATIENT)
Dept: LAB | Facility: OTHER | Age: 78
End: 2023-08-10
Attending: OTOLARYNGOLOGY
Payer: COMMERCIAL

## 2023-08-10 DIAGNOSIS — E04.1 NONTOXIC SINGLE THYROID NODULE: ICD-10-CM

## 2023-08-10 LAB
T3FREE SERPL-MCNC: 3.3 PG/ML (ref 2–4.4)
T4 FREE SERPL-MCNC: 1.52 NG/DL (ref 0.9–1.7)
TSH SERPL DL<=0.005 MIU/L-ACNC: 0.36 UIU/ML (ref 0.3–4.2)

## 2023-08-10 PROCEDURE — 84439 ASSAY OF FREE THYROXINE: CPT | Mod: ZL

## 2023-08-10 PROCEDURE — 84443 ASSAY THYROID STIM HORMONE: CPT | Mod: ZL

## 2023-08-10 PROCEDURE — 36415 COLL VENOUS BLD VENIPUNCTURE: CPT | Mod: ZL

## 2023-08-10 PROCEDURE — 84481 FREE ASSAY (FT-3): CPT | Mod: ZL

## 2023-08-14 NOTE — PROGRESS NOTES
[facility] HEART [facility]   CARDIOLOGY CONSULT     Margaret Batista   1945  [id number]    Swetha Maxwell     Chief Complaint   Patient presents with    Consult For     Carotids, CAD          HPI:   Mrs. Batista 77-year-old female who is being seen by cardiology establish care.  She had an ultrasound of carotid arteries, showing shadowing within her left carotid artery.  She was referred to cardiology as a result.  She also has a history of x3 cardiac catheterizations in the past with stenting at x2 different procedures.  She has been found to be resistant to Plavix Slee at [facility]    Adilene is being seen in follow-up.  She was referred by oncology related to the ultrasound of her carotid arteries.  She did not have any significant carotid artery stenosis.  There was some irregular shadowing of plaque within the left internal carotid artery.  She had ABIs on 8/16/2021 which were normal following the left and right leg but decreased bilateral TBI.    She has a history of coronary disease.  She had cardiac catheterization in April, 2007.  She had a drug-eluting stent to the mid LAD, left circumflex, and RCA.  She did note a cardiac catheterization on 10/29/2013.  Proximal LAD with 95% stenosis, she had stenting to this vessel.  All remaining vessels/stents were patent.  Most recently, she had a cardiac catheterization on 2/4/2019 through [facility].  Her LAD, circumflex, and RCA stents were patent.  No sore placed.      She is not having any anginal symptoms or chest pain.  She gets occasional sharp pain to her chest that last less than a second.  Otherwise, she has no cardiac complaints.    She has a history of hypertension and blood pressure has been uncontrolled.  At that time of consultation, her blood pressure was elevated 154/88 on hydrochlorothiazide 25 mg daily and atenolol 100 mg daily.  Imdur 30 mg daily added on 8/15/2023 as she has had intolerances/allergies to ARB's in the past.    IMAGING RESULTS:   1.  Atherosclerotic heart disease:   A. Cardiac catheterization 2007: Patient received drug eluting stent to the mid LAD, left circumflex, and RCA arteries, as well as the proximal RCA artery.   B. Negative nuclear stress test at [facility], 2010. EF was 70%.   C. Cardiac catheterization 10/29/2013: Proximal LAD 95%, drug eluting stent placed. Stenosis is from the proximal edge of the mid-LAD stent so now 2 stents overlap. Her ramus, circumflex and RCA stents are all patent. All remaining coronary arteries patent.   D. Echocardiogram 2013: Normal LV size and function. EF 60%. No significant valvular abnormalities.   E. Tested at [facility] as Plavix resistant, must be on Effient.   F. Negative nuclear stress test 2018.  2. Hyperlipidemia:   A. 2013: Total cholesterol is 142, triglycerides 131, HDL 52, LDL 64.   B. 2016: Total cholesterol 136, triglycerides 122, HDL 48, LDL 64.   C. 2019: Total cholesterol 135, triglycerides 124, HDL 45, LDL 65.  3. Denies tobacco use or diabetes.  4. Family history of premature coronary artery disease. Her mother  in her 60s from heart disease. Maternal grandfather  in his 40s from heart disease.   5. Denies obesity.     US carotid arteries on 2023:  No ultrasound evidence of hemo-dynamically significant stenosis.   Irregular, shadowing plaque within the proximal left internal carotid artery.    ERMELINDA on 2021:  Right leg: Resting ERMELINDA is 1.03, Normal   Left leg: Resting ERMELINDA is 1.02, Normal   Bilateral TBI is 0.52, mildly decreased.    CTA coronaries on 2019 at [facility]:  1.  Patent multivessel coronary artery stents.       A.  Proximal to mid LAD stents are patent.       B.  Mid circumflex stents are patent.       C.  RCA stents are patent.       D.  Diffuse native left main, proximal LAD, diagonal disease without severe stenosis.   2.  Descending thoracic aortic atherosclerosis.   3.  Generous IVC and SVC suggesting  possible increased right atrial   pressure.   4. Comment: The findings were discussed with Dr. Irwin over the phone, and reviewed with the patient in person.   5.  Please see separate radiology dictation for non-cardiovascular   findings.     Cardiac cath on 10/29/2013 at [facility]:   95% stenosis in the Proximal LAD at proximal end of previously   placed stent with patent stents in mid-LAD    Patent stent in proximal Cx    Patent Ramus (first Diagonal) with patent stent    Patent stents in prox and mid-RCA )dominant) with mild in-stent restenosis     CURRENT MEDICATIONS:   Prior to Admission medications    Medication Sig Start Date End Date Taking? Authorizing Provider   allopurinol (ZYLOPRIM) 300 MG tablet TAKE 1 TABLET DAILY 11/4/22   Swetha Maxwell NP   aspirin EC 81 MG EC tablet Take 1 tablet by mouth daily 7/15/14   Reported, Patient   atenolol (TENORMIN) 100 MG tablet Take 1 tablet (100 mg) by mouth daily 6/20/23   Swetha Maxwell NP   atorvastatin (LIPITOR) 40 MG tablet TAKE 1 TABLET DAILY 4/17/23   Swetha Maxwell NP   cetirizine (ZYRTEC) 10 MG tablet Take 1 tablet (10 mg) by mouth daily 1/21/20   Swetha Maxwell NP   Cholecalciferol (VITAMIN D3) 2000 UNITS CAPS Take 2,000 Units by mouth daily 10/25/13   Reported, Patient   folic acid (FOLVITE) 1 MG tablet TAKE 1 TABLET DAILY 11/30/20   Fawn Bradley NP   hydrochlorothiazide (HYDRODIURIL) 25 MG tablet Take 1 tablet (25 mg) by mouth daily 6/20/23   Swetha Maxwell NP   hydroxychloroquine (PLAQUENIL) 200 MG tablet 400 mg daily alternating with 200 mg daily  Patient taking differently: Take 200 mg by mouth daily Every other day. One day 200mg next day 400mg 10/8/19   Swetha Maxwell NP   methotrexate 2.5 MG tablet Take 20 mg by mouth once a week 5/29/20   Reported, Patient   nitroGLYcerin (NITROSTAT) 0.4 MG sublingual tablet Place 1 tablet (0.4 mg) under the tongue every 5 minutes as needed for chest pain (For  chest pain x 3 doses) 9/16/20   Swetha Maxwell, NP       ALLERGIES:   Allergies   Allergen Reactions    Food      Macadamia nuts make mouth itch    Losartan      Allergic rxn with hives and angioedema suspected to be from alternative manufacture of the effient or the losartan    Plavix [Clopidogrel]      Plavix resistant-tested at [facility]    Enalapril Cough        PAST MEDICAL HISTORY:   Past Medical History:   Diagnosis Date    Atherosclerotic heart disease of native coronary artery without angina pectoris     -s/p ADÁN to the mid-LAD, mid-circumflex, mid-right coronary artery, proximal right coronary  artery, and PTCA of a marginal branch of the right coronary artery 04/30/2007. -s/p ADÁN to proximal left anterior descending 10/29/13.    Contusion of abdominal wall     2014    Essential (primary) hypertension     12/14/2006    Gout     No Comments Provided    Hyperlipidemia     4/4/2007    Localized swelling, mass, or lump of upper extremity     5/26/2017    Other specified counseling     10/28/2013,Patient has identified Health Care Agent(s): Yes Add Health Care Agents: Yes   Health Care Agent(s): Primary Health Care Agent: Jay Batista  Relationship:  Phone:   Secondary Health Care Agent:  Relationship: Daughter Phone:   Conservator:  Relationship:  Phone:   Guardian: Relationship:  Phone:    Patient has Advance Care Plan Documents (Health Care Directive, POLST): No, refe*    Polyosteoarthritis     No Comments Provided    Postmenopausal bleeding     No Comments Provided    Presence of coronary angioplasty implant and graft     2007/2013,unstable angina; severe prox LAD stenosis; s/p 7 stents    Primary osteoarthritis of left hand     5/30/2017    Rheumatoid arthritis (H)     follows at [facility] yearly        PAST SURGICAL HISTORY:   Past Surgical History:   Procedure Laterality Date    ARTHROPLASTY KNEE      1/13/2011    ARTHROSCOPY KNEE      1985/2002    BIOPSY BREAST      10/25/95,BRIAN RAMIREZ      SECTION      1974, Section    COLONOSCOPY      05,Normal - Repeat in 10 years    COLONOSCOPY  2016    COLONOSCOPY N/A 2019    Procedure: COLONOSCOPY;  Surgeon: Evelin Thompson MD;  Location: [facility] OR    COLONOSCOPY N/A 2019    Procedure: COMBINED COLONOSCOPY, SINGLE OR MULTIPLE BIOPSY/POLYPECTOMY BY BIOPSY;  Surgeon: Evelin Thompson MD;  Location: [facility] OR    ENDOSCOPIC RELEASE CARPAL TUNNEL Right 2023    Procedure: RELEASE, CARPAL TUNNEL, ENDOSCOPIC;  Surgeon: Danial Stanley MD;  Location: [facility] OR    ESOPHAGOSCOPY, GASTROSCOPY, DUODENOSCOPY (EGD), COMBINED      2011,erosive gastritis;bx;consider MRI/A;[facility]    EXTRACTION(S) DENTAL      1970    HEART CATH, ANGIOPLASTY      10/29/2013,ADÁN to proximal left anterior descending    OTHER SURGICAL HISTORY      ,[id number],OTHER    OTHER SURGICAL HISTORY      2014,CKP727,TOTAL SHOULDER ARTHROPLASTY,Right    OTHER SURGICAL HISTORY      2016,98715.0,[facility] COLONOSCOPY REMOVE ELIZA POLYP LESN SNARE,repeat , precancerous polyps    ZZ STRESS LEXISCAN TEST  2018    With Dr. Irwin - normal        FAMILY HISTORY:   Family History   Problem Relation Age of Onset    Heart Disease Mother         Heart Disease    Hypertension Mother         Hypertension    Other - See Comments Mother         Stroke    Breast Cancer Mother 53        Cancer-breast    Arthritis Father         Arthritis    Cancer Father         Cancer    Heart Disease Father         Heart Disease    Hypertension Father         Hypertension    Atrial fibrillation Sister     Melanoma Brother     Heart Disease Brother         Heart Disease        SOCIAL HISTORY:   Social History     Socioeconomic History    Marital status:      Spouse name: rashaad   Tobacco Use    Smoking status: Never     Passive exposure: Past    Smokeless tobacco: Never    Tobacco comments:     Passive exposure in childhood home.    Vaping Use    Vaping Use: Never used    Substance and Sexual Activity    Alcohol use: Yes     Comment: 2 drinks a month    Drug use: No    Sexual activity: Yes     Partners: Male     Birth control/protection: Post-menopausal   Social History Narrative    . Moved to [place], [place] Summer of 2013 from [place], [place]. Has a daughter (lives in [place]) and son (who lives in [place]). Patient retired. 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 Never smoked. Rare alcohol use.          ROS:   CONSTITUTIONAL: No weight loss, fever, chills, weakness or fatigue.   CARDIOVASCULAR: No chest pain, chest pressure or chest discomfort. No palpitations or lower extremity edema.   RESPIRATORY: No shortness of breath, dyspnea upon exertion, cough or sputum production.   NEUROLOGICAL: No headache, lightheadedness, dizziness, syncope, ataxia or weakness.   HEMATOLOGIC: No anemia, bleeding or bruising.         PHYSICAL EXAM:   GENERAL: The patient is a well-developed, well-nourished, in no apparent distress. Alert and oriented x3.   HEART: Regular rate and rhythm, S1S2 present without murmur, rub or gallop.   LUNGS: Respirations regular and unlabored. Clear to auscultation.   EXTREMITIES: No peripheral edema present.   SKIN: No jaundice. No rashes or visible skin lesions present.        LAB RESULTS:   Lab on 08/10/2023   Component Date Value Ref Range Status    TSH 08/10/2023 0.36  0.30 - 4.20 uIU/mL Final    Free T4 08/10/2023 1.52  0.90 - 1.70 ng/dL Final    T3 Free 08/10/2023 3.3  2.0 - 4.4 pg/mL Final   Office Visit on 06/20/2023   Component Date Value Ref Range Status    Color Urine 06/20/2023 Light Yellow  Colorless, Straw, Light Yellow, Yellow Final    Appearance Urine 06/20/2023 Clear  Clear Final    Glucose Urine 06/20/2023 Negative  Negative mg/dL Final    Bilirubin Urine 06/20/2023 Negative  Negative Final    Ketones Urine 06/20/2023 Negative  Negative mg/dL Final    Specific Gravity Urine 06/20/2023 1.022  1.000 - 1.030 Final     Blood Urine 06/20/2023 Negative  Negative Final    pH Urine 06/20/2023 7.0  5.0 - 9.0 Final    Protein Albumin Urine 06/20/2023 30 (A)  Negative mg/dL Final    Urobilinogen Urine 06/20/2023 Normal  Normal, 2.0 mg/dL Final    Nitrite Urine 06/20/2023 Negative  Negative Final    Leukocyte Esterase Urine 06/20/2023 Large (A)  Negative Final    Bacteria Urine 06/20/2023 Few (A)  None Seen /HPF Final    Mucus Urine 06/20/2023 Present (A)  None Seen /LPF Final    RBC Urine 06/20/2023 2  <=2 /HPF Final    WBC Urine 06/20/2023 15 (H)  <=5 /HPF Final    Squamous Epithelials Urine 06/20/2023 12 (H)  <=1 /HPF Final    Hyaline Casts Urine 06/20/2023 1  <=2 /LPF Final    Culture 06/20/2023 No Growth   Final          ASSESSMENT:       ICD-10-CM    1. ASCVD (arteriosclerotic cardiovascular disease)  I25.10 Adult Cardiology [facility] Referral     EKG 12-lead, tracing only     nitroGLYcerin (NITROSTAT) 0.4 MG sublingual tablet     aspirin (ASA) 81 MG chewable tablet      2. Mixed hyperlipidemia  E78.2       3. Stage 3a chronic kidney disease (H)  N18.31       4. Benign essential hypertension  I10 isosorbide mononitrate (IMDUR) 30 MG 24 hr tablet      5. Coronary artery disease involving native coronary artery of native heart without angina pectoris  I25.10       6. History of coronary artery stent placement in 2007 and 2013  Z95.5       7. History of cardiac cath  Z98.890             PLAN:   1.  Concern for carotid artery stenosis: She did not have any significant stenosis on 6/27/2023.  Findings discussed.  2.  CAD: History of cardiac cath x3.  She had x1 in 2007, 2013, and 2019.  She has stenting in both 2007 and 2013 but no stent in 2019.  Denies anginal symptoms.  Discussed the importance of medications.  Her SL nitro has not been filled since 2020.  Refill aspirin.  3.  Hypertension: Currently on hydrochlorothiazide 25 mg daily and atenolol 100 mg daily.  Started on Imdur 30 mg daily.  Does not check her blood pressure at  home.  4.  CKD-3: Briefly discussed.  5.  Follow-up in 1 year or sooner with issues.    Total time spent on day of visit, including review of tests, obtaining/reviewing separately obtained history, ordering medications/tests/procedures, communicating with PCP/consultants, and documenting in electronic medical record: 45 minutes.        Thank you for allowing me to participate in the care of your patient. Please do not hesitate to contact me if you have any questions.     Mars Padilla, DO

## 2023-08-15 ENCOUNTER — OFFICE VISIT (OUTPATIENT)
Dept: CARDIOLOGY | Facility: OTHER | Age: 78
End: 2023-08-15
Attending: INTERNAL MEDICINE
Payer: COMMERCIAL

## 2023-08-15 VITALS
BODY MASS INDEX: 30.76 KG/M2 | WEIGHT: 196 LBS | OXYGEN SATURATION: 97 % | TEMPERATURE: 97.6 F | SYSTOLIC BLOOD PRESSURE: 154 MMHG | HEIGHT: 67 IN | HEART RATE: 67 BPM | RESPIRATION RATE: 16 BRPM | DIASTOLIC BLOOD PRESSURE: 90 MMHG

## 2023-08-15 DIAGNOSIS — N18.31 STAGE 3A CHRONIC KIDNEY DISEASE (H): ICD-10-CM

## 2023-08-15 DIAGNOSIS — I25.10 ASCVD (ARTERIOSCLEROTIC CARDIOVASCULAR DISEASE): Primary | ICD-10-CM

## 2023-08-15 DIAGNOSIS — I25.10 CORONARY ARTERY DISEASE INVOLVING NATIVE CORONARY ARTERY OF NATIVE HEART WITHOUT ANGINA PECTORIS: ICD-10-CM

## 2023-08-15 DIAGNOSIS — Z98.890 HISTORY OF CARDIAC CATH: ICD-10-CM

## 2023-08-15 DIAGNOSIS — E78.2 MIXED HYPERLIPIDEMIA: ICD-10-CM

## 2023-08-15 DIAGNOSIS — I10 BENIGN ESSENTIAL HYPERTENSION: ICD-10-CM

## 2023-08-15 DIAGNOSIS — Z95.5 HISTORY OF CORONARY ARTERY STENT PLACEMENT: ICD-10-CM

## 2023-08-15 LAB
ATRIAL RATE - MUSE: 64 BPM
DIASTOLIC BLOOD PRESSURE - MUSE: NORMAL MMHG
INTERPRETATION ECG - MUSE: NORMAL
P AXIS - MUSE: -3 DEGREES
PR INTERVAL - MUSE: 168 MS
QRS DURATION - MUSE: 82 MS
QT - MUSE: 422 MS
QTC - MUSE: 435 MS
R AXIS - MUSE: -33 DEGREES
SYSTOLIC BLOOD PRESSURE - MUSE: NORMAL MMHG
T AXIS - MUSE: 0 DEGREES
VENTRICULAR RATE- MUSE: 64 BPM

## 2023-08-15 PROCEDURE — 93010 ELECTROCARDIOGRAM REPORT: CPT | Performed by: INTERNAL MEDICINE

## 2023-08-15 PROCEDURE — G0463 HOSPITAL OUTPT CLINIC VISIT: HCPCS | Mod: 25

## 2023-08-15 PROCEDURE — 99204 OFFICE O/P NEW MOD 45 MIN: CPT | Performed by: INTERNAL MEDICINE

## 2023-08-15 PROCEDURE — 93005 ELECTROCARDIOGRAM TRACING: CPT | Performed by: INTERNAL MEDICINE

## 2023-08-15 RX ORDER — ISOSORBIDE MONONITRATE 30 MG/1
30 TABLET, EXTENDED RELEASE ORAL DAILY
Qty: 90 TABLET | Refills: 3 | Status: SHIPPED | OUTPATIENT
Start: 2023-08-15

## 2023-08-15 RX ORDER — ASPIRIN 81 MG/1
81 TABLET, CHEWABLE ORAL DAILY
Qty: 90 TABLET | Refills: 3 | Status: SHIPPED | OUTPATIENT
Start: 2023-08-15

## 2023-08-15 RX ORDER — NITROGLYCERIN 0.4 MG/1
0.4 TABLET SUBLINGUAL EVERY 5 MIN PRN
Qty: 30 TABLET | Refills: 3 | Status: SHIPPED | OUTPATIENT
Start: 2023-08-15

## 2023-08-15 ASSESSMENT — PAIN SCALES - GENERAL: PAINLEVEL: NO PAIN (0)

## 2023-08-15 NOTE — NURSING NOTE
"Patient comes In for consult to establish care.  Jesi Wu LPN ....................8/15/2023   12:55 PM  Chief Complaint   Patient presents with    Consult For     Carotids, CAD       Initial BP (!) 154/88 (BP Location: Right arm, Patient Position: Sitting, Cuff Size: Adult Large)   Pulse 67   Temp 97.6  F (36.4  C) (Temporal)   Resp 16   Ht 1.7 m (5' 6.93\")   Wt 88.9 kg (196 lb)   LMP 01/01/1998 (Within Months)   SpO2 97%   BMI 30.76 kg/m   Estimated body mass index is 30.76 kg/m  as calculated from the following:    Height as of this encounter: 1.7 m (5' 6.93\").    Weight as of this encounter: 88.9 kg (196 lb).  Meds Reconciled: complete  Pt is on Aspirin  Pt is on a Statin  PHQ and/or DIEGO reviewed. Pt referred to PCP/MH Provider as appropriate.    Jesi Wu LPN      "

## 2023-08-24 NOTE — PROGRESS NOTES
05/31/23 0500   Appointment Info   Treating Provider Edilia Cooley OTR/L   Visits Used 2   Medical Diagnosis R29.898 (ICD-10-CM) - Right hand weakness  G56.01 (ICD-10-CM) - Carpal tunnel syndrome of right wrist   OT Tx Diagnosis Impaired home mangement   Progress Note/Certification   Start Of Care Date 05/25/23   Onset of Illness/Injury or Date of Surgery 01/13/23   Therapy Frequency 1-2x/week   Predicted Duration 8 weeks   Certification date from 05/25/23   Certification date to 07/20/23   OT Goal 1   Goal Identifier HEP   Goal Description Pt will be educate in home exercies iwth proper technique and progression   Rationale In order to maximize safety and independence with performance of self-care activities   Target Date 06/08/23   OT Goal 2   Goal Identifier Hand strength   Goal Description Pt will have rigth hand strength wiht in 2 pound of left   Rationale In order to consistently apply specific precautions during ADL/IADL performance   Target Date 06/29/23   OT Goal 3   Goal Identifier Wrist strength   Goal Description Pt will have right wrist extension strength of 5/ currently 4/5   Rationale In order to maximize safety and independence with performance of self-care activities   Target Date 07/06/23   Subjective Report   Subjective Report Pt reports she had no pain in either hand. She is using the putty for exercises.   Therapeutic Procedure/Exercise   Therapeutic Procedure: strength, endurance, ROM, flexibillity minutes (80143) 40   Ther Proc 1 - Details Pt completed wrist strengthening with 1#  in all planes 15 x, yellow therabar for resistive wrist flexion ans extension in vertical ans horizontal planes. pronation and supination. 10x stretch to hand intrinsics and into prosupination.   Skilled Intervention edcuate in proper exercies with good technique and progression   Patient Response/Progress Pt is pleased with progress. She chooses to cancel next apointment and be seen in a week.   Education    Learner/Method Patient   Plan   Home program wrist strengthening in all planes with 1#, yellow putty and blue sponge for pinch and gri   Plan for next session assess jenny and sterngth progress as tolerated   Total Session Time   Timed Code Treatment Minutes 40   Total Treatment Time (sum of timed and untimed services) 40         DISCHARGE  Reason for Discharge: Patient has failed to schedule further appointments.       Discharge Plan:     Referring Provider:  Swetha Maxwell

## 2023-08-28 ENCOUNTER — LAB (OUTPATIENT)
Dept: LAB | Facility: OTHER | Age: 78
End: 2023-08-28
Payer: COMMERCIAL

## 2023-08-28 DIAGNOSIS — M10.9 GOUT, UNSPECIFIED CAUSE, UNSPECIFIED CHRONICITY, UNSPECIFIED SITE: ICD-10-CM

## 2023-08-28 DIAGNOSIS — M06.4 INFLAMMATORY POLYARTHRITIS (H): ICD-10-CM

## 2023-08-28 LAB
AST SERPL W P-5'-P-CCNC: 26 U/L (ref 0–45)
BASOPHILS # BLD AUTO: 0 10E3/UL (ref 0–0.2)
BASOPHILS NFR BLD AUTO: 0 %
CREAT SERPL-MCNC: 0.93 MG/DL (ref 0.51–0.95)
EOSINOPHIL # BLD AUTO: 0.3 10E3/UL (ref 0–0.7)
EOSINOPHIL NFR BLD AUTO: 3 %
ERYTHROCYTE [DISTWIDTH] IN BLOOD BY AUTOMATED COUNT: 15.5 % (ref 10–15)
GFR SERPL CREATININE-BSD FRML MDRD: 63 ML/MIN/1.73M2
HCT VFR BLD AUTO: 41.8 % (ref 35–47)
HGB BLD-MCNC: 13.9 G/DL (ref 11.7–15.7)
IMM GRANULOCYTES # BLD: 0.1 10E3/UL
IMM GRANULOCYTES NFR BLD: 1 %
LYMPHOCYTES # BLD AUTO: 2.9 10E3/UL (ref 0.8–5.3)
LYMPHOCYTES NFR BLD AUTO: 29 %
MCH RBC QN AUTO: 32.3 PG (ref 26.5–33)
MCHC RBC AUTO-ENTMCNC: 33.3 G/DL (ref 31.5–36.5)
MCV RBC AUTO: 97 FL (ref 78–100)
MONOCYTES # BLD AUTO: 0.6 10E3/UL (ref 0–1.3)
MONOCYTES NFR BLD AUTO: 6 %
NEUTROPHILS # BLD AUTO: 5.9 10E3/UL (ref 1.6–8.3)
NEUTROPHILS NFR BLD AUTO: 61 %
NRBC # BLD AUTO: 0 10E3/UL
NRBC BLD AUTO-RTO: 0 /100
PLATELET # BLD AUTO: 203 10E3/UL (ref 150–450)
RBC # BLD AUTO: 4.3 10E6/UL (ref 3.8–5.2)
URATE SERPL-MCNC: 4.1 MG/DL (ref 2.4–5.7)
WBC # BLD AUTO: 9.7 10E3/UL (ref 4–11)

## 2023-08-28 PROCEDURE — 84550 ASSAY OF BLOOD/URIC ACID: CPT | Mod: ZL

## 2023-08-28 PROCEDURE — 84450 TRANSFERASE (AST) (SGOT): CPT | Mod: ZL

## 2023-08-28 PROCEDURE — 36415 COLL VENOUS BLD VENIPUNCTURE: CPT | Mod: ZL

## 2023-08-28 PROCEDURE — 82565 ASSAY OF CREATININE: CPT | Mod: ZL

## 2023-08-28 PROCEDURE — 85025 COMPLETE CBC W/AUTO DIFF WBC: CPT | Mod: ZL

## 2023-09-20 ENCOUNTER — TRANSFERRED RECORDS (OUTPATIENT)
Dept: HEALTH INFORMATION MANAGEMENT | Facility: OTHER | Age: 78
End: 2023-09-20
Payer: COMMERCIAL

## 2023-10-31 ENCOUNTER — TELEPHONE (OUTPATIENT)
Dept: ONCOLOGY | Facility: OTHER | Age: 78
End: 2023-10-31
Payer: COMMERCIAL

## 2023-10-31 DIAGNOSIS — D72.820 MONOCLONAL B-CELL LYMPHOCYTOSIS: Primary | ICD-10-CM

## 2023-11-03 ENCOUNTER — LAB (OUTPATIENT)
Dept: LAB | Facility: OTHER | Age: 78
End: 2023-11-03
Attending: INTERNAL MEDICINE
Payer: COMMERCIAL

## 2023-11-03 DIAGNOSIS — D72.820 MONOCLONAL B-CELL LYMPHOCYTOSIS: ICD-10-CM

## 2023-11-03 LAB
BASOPHILS # BLD AUTO: 0.1 10E3/UL (ref 0–0.2)
BASOPHILS NFR BLD AUTO: 1 %
EOSINOPHIL # BLD AUTO: 0.4 10E3/UL (ref 0–0.7)
EOSINOPHIL NFR BLD AUTO: 3 %
ERYTHROCYTE [DISTWIDTH] IN BLOOD BY AUTOMATED COUNT: 15 % (ref 10–15)
HCT VFR BLD AUTO: 43.1 % (ref 35–47)
HGB BLD-MCNC: 13.9 G/DL (ref 11.7–15.7)
HOLD SPECIMEN: NORMAL
IMM GRANULOCYTES # BLD: 0 10E3/UL
IMM GRANULOCYTES NFR BLD: 0 %
LYMPHOCYTES # BLD AUTO: 4.5 10E3/UL (ref 0.8–5.3)
LYMPHOCYTES NFR BLD AUTO: 38 %
MCH RBC QN AUTO: 31.5 PG (ref 26.5–33)
MCHC RBC AUTO-ENTMCNC: 32.3 G/DL (ref 31.5–36.5)
MCV RBC AUTO: 98 FL (ref 78–100)
MONOCYTES # BLD AUTO: 0.4 10E3/UL (ref 0–1.3)
MONOCYTES NFR BLD AUTO: 4 %
NEUTROPHILS # BLD AUTO: 6.5 10E3/UL (ref 1.6–8.3)
NEUTROPHILS NFR BLD AUTO: 54 %
NRBC # BLD AUTO: 0 10E3/UL
NRBC BLD AUTO-RTO: 0 /100
PLATELET # BLD AUTO: 205 10E3/UL (ref 150–450)
RBC # BLD AUTO: 4.41 10E6/UL (ref 3.8–5.2)
WBC # BLD AUTO: 11.8 10E3/UL (ref 4–11)

## 2023-11-03 PROCEDURE — 36415 COLL VENOUS BLD VENIPUNCTURE: CPT | Mod: ZL

## 2023-11-03 PROCEDURE — 85025 COMPLETE CBC W/AUTO DIFF WBC: CPT | Mod: ZL

## 2023-11-10 ENCOUNTER — ONCOLOGY VISIT (OUTPATIENT)
Dept: ONCOLOGY | Facility: OTHER | Age: 78
End: 2023-11-10
Attending: INTERNAL MEDICINE
Payer: COMMERCIAL

## 2023-11-10 VITALS
HEART RATE: 70 BPM | SYSTOLIC BLOOD PRESSURE: 140 MMHG | OXYGEN SATURATION: 100 % | WEIGHT: 197 LBS | TEMPERATURE: 97.7 F | BODY MASS INDEX: 30.92 KG/M2 | DIASTOLIC BLOOD PRESSURE: 84 MMHG | RESPIRATION RATE: 16 BRPM

## 2023-11-10 DIAGNOSIS — D72.820 MONOCLONAL B-CELL LYMPHOCYTOSIS: Primary | ICD-10-CM

## 2023-11-10 PROCEDURE — G0463 HOSPITAL OUTPT CLINIC VISIT: HCPCS

## 2023-11-10 PROCEDURE — 99214 OFFICE O/P EST MOD 30 MIN: CPT | Performed by: INTERNAL MEDICINE

## 2023-11-10 NOTE — NURSING NOTE
Chief Complaint   Patient presents with    Oncology Clinic Visit     Medication Reconciliation: complete    Uzma Lepe CMA (AAMA) 11/10/2023 10:05 AM

## 2023-11-10 NOTE — PROGRESS NOTES
HEMATOLOGY FOLLOW UP NOTE  Nov 10, 2023    Reason for follow up: Monoclonal B-cell lymphocytosis      HISTORY OF PRESENT ILLNESS  Margaret Batista is a 77 year old female with with PMH as stated below who is seen in the oncology clinic for monoclonal B-cell lymphocytosis:    Her history in short is as follows    Review of labs done in epic show intermittent elevated white blood cell count dating back to 1/25/2021.  At that time her WBC count was 12.8.  More recently on 5/10/2023 her white cell count was 10.6.    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    6/8/2023: CT soft tissue neck with contrast: No cervical adenopathy. There is a large nodule in the left lobe of the thyroid, for which a dedicated thyroid ultrasound could be considered.There is moderate to severe calcified atherosclerosis of the carotid bulbs bilaterally. CT angiogram or carotid ultrasound could be  considered for further evaluation.    6/8/2023: CT chest, abdomen and pelvis with contrast:No adenopathy in the chest, abdomen, or pelvis. No splenomegaly.There is an enhancing 18 mm mass in the upper pole of the right kidney  concerning for renal cell carcinoma.    Interim history:     Ms. Batista is doing very well.  She denies any weight loss or appetite loss.  Denies any night sweats.  Denies any fever or chills.  Denies any recurrent infection.    REVIEW OF SYSTEMS  A 12-point ROS negative except as in HPI      Current Outpatient Medications   Medication Sig Dispense Refill    allopurinol (ZYLOPRIM) 300 MG tablet TAKE 1 TABLET DAILY 90 tablet 3    aspirin (ASA) 81 MG chewable tablet Take 1 tablet (81 mg) by mouth daily 90 tablet 3    atenolol (TENORMIN) 100 MG tablet Take 1 tablet (100 mg) by mouth daily 90 tablet 3    atorvastatin (LIPITOR) 40 MG tablet TAKE 1  TABLET DAILY 90 tablet 3    cetirizine (ZYRTEC) 10 MG tablet Take 1 tablet (10 mg) by mouth daily      Cholecalciferol (VITAMIN D3) 2000 UNITS CAPS Take 2,000 Units by mouth daily      folic acid (FOLVITE) 1 MG tablet TAKE 1 TABLET DAILY 90 tablet 3    hydrochlorothiazide (HYDRODIURIL) 25 MG tablet Take 1 tablet (25 mg) by mouth daily 90 tablet 3    hydroxychloroquine (PLAQUENIL) 200 MG tablet 400 mg daily alternating with 200 mg daily (Patient taking differently: Take 200 mg by mouth daily Every other day. One day 200mg next day 400mg)      isosorbide mononitrate (IMDUR) 30 MG 24 hr tablet Take 1 tablet (30 mg) by mouth daily 90 tablet 3    methotrexate 2.5 MG tablet Take 20 mg by mouth once a week      nitroGLYcerin (NITROSTAT) 0.4 MG sublingual tablet Place 1 tablet (0.4 mg) under the tongue every 5 minutes as needed for chest pain (For chest pain x 3 doses) 30 tablet 3       Allergies   Allergen Reactions    Food      Macadamia nuts make mouth itch    Losartan      Allergic rxn with hives and angioedema suspected to be from alternative manufacture of the effient or the losartan    Plavix [Clopidogrel]      Plavix resistant-tested at [facility]    Enalapril Cough     Immunization History   Administered Date(s) Administered    COVID-19 Bivalent 12+ (Pfizer) 09/28/2022    COVID-19 MONOVALENT 12+ (Pfizer) 05/07/2021, 06/03/2021, 01/03/2022    FLU 6-35 months 10/05/2010, 11/17/2011    Flu, Unspecified 02/29/2008    Influenza (High Dose) 3 valent vaccine 10/15/2015, 10/26/2016, 11/13/2017, 10/24/2018, 10/08/2019    Influenza (IIV3) PF 12/03/2004, 10/25/2006, 02/29/2008, 10/05/2010, 11/17/2011, 10/05/2012, 10/11/2013    Influenza Vaccine 65+ (Fluzone HD) 09/16/2020, 10/21/2021, 11/16/2022    Influenza Vaccine >6 months (Alfuria,Fluzone) 10/10/2014    Pneumo Conj 13-V (2010&after) 11/16/2015    Pneumococcal 23 valent 10/05/2012    TDAP Vaccine (Boostrix) 08/29/2007, 05/29/2020    Tdap (Adult) Unspecified Formulation  10/02/1996    Triamcinolone Acetonide 10/13/2014, 2014    Zoster recombinant adjuvanted (SHINGRIX) 2020, 2020    Zoster vaccine, live 2011       Past Medical History:   Diagnosis Date    Atherosclerotic heart disease of native coronary artery without angina pectoris     -s/p ADÁN to the mid-LAD, mid-circumflex, mid-right coronary artery, proximal right coronary  artery, and PTCA of a marginal branch of the right coronary artery 2007. -s/p ADÁN to proximal left anterior descending 10/29/13.    Contusion of abdominal wall         Essential (primary) hypertension     2006    Gout     No Comments Provided    Hyperlipidemia     2007    Localized swelling, mass, or lump of upper extremity     2017    Other specified counseling     10/28/2013,Patient has identified Health Care Agent(s): Yes Add Health Care Agents: Yes   Health Care Agent(s): Primary Health Care Agent: Jay Batista  Relationship:  Phone:   Secondary Health Care Agent:  Relationship: Daughter Phone:   Conservator:  Relationship:  Phone:   Guardian: Relationship:  Phone:    Patient has Advance Care Plan Documents (Health Care Directive, POLST): No, refe*    Polyosteoarthritis     No Comments Provided    Postmenopausal bleeding     No Comments Provided    Presence of coronary angioplasty implant and graft     ,unstable angina; severe prox LAD stenosis; s/p 7 stents    Primary osteoarthritis of left hand     2017    Rheumatoid arthritis (H)     follows at [place] yearly       Past Surgical History:   Procedure Laterality Date    ARTHROPLASTY KNEE      2011    ARTHROSCOPY KNEE          BIOPSY BREAST      10/25/95,BRIAN RAMIREZ     SECTION      1974, Section    COLONOSCOPY      05,Normal - Repeat in 10 years    COLONOSCOPY  2016    COLONOSCOPY N/A 2019    Procedure: COLONOSCOPY;  Surgeon: Evelin Thompson MD;  Location:  OR     COLONOSCOPY N/A 1/9/2019    Procedure: COMBINED COLONOSCOPY, SINGLE OR MULTIPLE BIOPSY/POLYPECTOMY BY BIOPSY;  Surgeon: Evelin Thompson MD;  Location: [facility] OR    ENDOSCOPIC RELEASE CARPAL TUNNEL Right 1/13/2023    Procedure: RELEASE, CARPAL TUNNEL, ENDOSCOPIC;  Surgeon: Danial Stanley MD;  Location:  OR    ESOPHAGOSCOPY, GASTROSCOPY, DUODENOSCOPY (EGD), COMBINED      5/2/2011,erosive gastritis;bx;consider MRI/A;[facility]    EXTRACTION(S) DENTAL      1970    HEART CATH, ANGIOPLASTY      10/29/2013,ADÁN to proximal left anterior descending    OTHER SURGICAL HISTORY      2007/2013,662033,OTHER    OTHER SURGICAL HISTORY      01/2014,[id number],TOTAL SHOULDER ARTHROPLASTY,Right    OTHER SURGICAL HISTORY      04/14/2016,71679.0,SD COLONOSCOPY REMOVE ELIZA POLYP LESN SNARE,repeat 2019, precancerous polyps    ZZ STRESS LEXISCAN TEST  08/2018    With Dr. Irwin - normal       SOCIAL HISTORY  History   Smoking Status    Never   Smokeless Tobacco    Never    Social History    Substance and Sexual Activity      Alcohol use: Yes        Comment: 2 drinks a month     History   Drug Use No       FAMILY HISTORY  Family History   Problem Relation Age of Onset    Heart Disease Mother         Heart Disease    Hypertension Mother         Hypertension    Other - See Comments Mother         Stroke    Breast Cancer Mother 53        Cancer-breast    Arthritis Father         Arthritis    Cancer Father         Cancer    Heart Disease Father         Heart Disease    Hypertension Father         Hypertension    Atrial fibrillation Sister     Melanoma Brother     Heart Disease Brother         Heart Disease       PHYSICAL EXAMINATION  BP (!) 140/84 (BP Location: Right arm, Patient Position: Sitting, Cuff Size: Adult Large)   Pulse 70   Temp 97.7  F (36.5  C) (Tympanic)   Resp 16   Wt 89.4 kg (197 lb)   LMP 01/01/1998 (Within Months)   SpO2 100%   BMI 30.92 kg/m    Wt Readings from Last 2 Encounters:   11/10/23 89.4 kg (197 lb)   08/15/23 88.9 kg  (196 lb)     Physical Exam  Constitutional:       Appearance: Normal appearance.   Pulmonary:      Effort: Pulmonary effort is normal.   Lymphadenopathy:      Cervical: No cervical adenopathy.      Right cervical: No superficial cervical adenopathy.     Left cervical: No superficial cervical adenopathy.      Upper Body:      Right upper body: No supraclavicular or axillary adenopathy.      Left upper body: No supraclavicular or axillary adenopathy.   Neurological:      General: No focal deficit present.      Mental Status: She is alert and oriented to person, place, and time.     Laboratory and imaging:     Latest Reference Range & Units 11/03/23 08:29   WBC 4.0 - 11.0 10e3/uL 11.8 (H)   Hemoglobin 11.7 - 15.7 g/dL 13.9   Hematocrit 35.0 - 47.0 % 43.1   Platelet Count 150 - 450 10e3/uL 205   (H): Data is abnormally high  ASSESSMENT AND PLAN    1.  Monoclonal B-cell lymphocytosis:    Found to have intermittent leukocytosis dating back to 1/25/2021.  Leukocytosis is mostly generalized and there is no increase in absolute lymphocyte count.    A peripheral smear was ordered by her PCP and a flow cytometry was done by pathology which showed a monotypic B-cell population with the absolute monotypic B-cell count of 1300.  Therefore she meets criteria for monocytic B-cell lymphocytosis.    She does not have any new B symptoms currently.  No adenopathy on scans.     Was found to have other additional incidental findings on CT scans. She has seen ENT and Urology for the thyroid nodule and renal mass respectively. Also underwent US carotid which did not show significant stenosis.     Her labs done on 11/3/2023 show a WBC count of 11.8 with a normal absolute lymphocyte count.  Hemoglobin is 13.9 and platelet count is 205.    For now we will continue to monitor.  Follow-up in clinic in 6 months with labs.    Total time spent on the patient on day of encounter was 30 minutes doing chart review, review of test results,  interpretation of results, patient visit and documentation.      Virgen Pitts MD

## 2023-12-05 DIAGNOSIS — M10.9 GOUT, UNSPECIFIED CAUSE, UNSPECIFIED CHRONICITY, UNSPECIFIED SITE: ICD-10-CM

## 2023-12-06 RX ORDER — ALLOPURINOL 300 MG/1
TABLET ORAL
Qty: 90 TABLET | Refills: 1 | Status: SHIPPED | OUTPATIENT
Start: 2023-12-06 | End: 2024-06-12

## 2023-12-06 NOTE — TELEPHONE ENCOUNTER
[facility] sent Rx request for the following:      Requested Prescriptions   Pending Prescriptions Disp Refills    allopurinol (ZYLOPRIM) 300 MG tablet [Pharmacy Med Name: ALLOPURINOL  TAB 300MG] 90 tablet 3     Sig: TAKE 1 TABLET DAILY       Gout Agents Protocol Failed - 12/6/2023  4:10 PM        Failed - ALT on file in past 12 months     Recent Labs   Lab Test 06/18/21  0942 11/06/18  1016 12/20/17  1026   ALT 14   < >  --    GICHALT  --   --  18    < > = values in this interval not displayed.        Last Prescription Date:   11/4/22  Last Fill Qty/Refills:         90, R-3    Last Office Visit:              6/20/23   Future Office visit:           None    Per LOV note:  Return in about 53 weeks (around 6/25/2024) for Annual Wellness Visit.     Unable to complete prescription refill per RN Medication Refill Policy.     Hodan Nguyen RN .............. 12/6/2023  4:16 PM

## 2023-12-13 ENCOUNTER — LAB (OUTPATIENT)
Dept: LAB | Facility: OTHER | Age: 78
End: 2023-12-13
Attending: NURSE PRACTITIONER
Payer: COMMERCIAL

## 2023-12-13 ENCOUNTER — ALLIED HEALTH/NURSE VISIT (OUTPATIENT)
Dept: FAMILY MEDICINE | Facility: OTHER | Age: 78
End: 2023-12-13
Attending: NURSE PRACTITIONER
Payer: COMMERCIAL

## 2023-12-13 DIAGNOSIS — M06.4 INFLAMMATORY POLYARTHRITIS (H): ICD-10-CM

## 2023-12-13 DIAGNOSIS — Z23 NEED FOR PROPHYLACTIC VACCINATION AND INOCULATION AGAINST INFLUENZA: Primary | ICD-10-CM

## 2023-12-13 LAB
AST SERPL W P-5'-P-CCNC: 24 U/L (ref 0–45)
BASOPHILS # BLD AUTO: 0.1 10E3/UL (ref 0–0.2)
BASOPHILS NFR BLD AUTO: 1 %
CREAT SERPL-MCNC: 1.07 MG/DL (ref 0.51–0.95)
EGFRCR SERPLBLD CKD-EPI 2021: 54 ML/MIN/1.73M2
EOSINOPHIL # BLD AUTO: 0.2 10E3/UL (ref 0–0.7)
EOSINOPHIL NFR BLD AUTO: 1 %
ERYTHROCYTE [DISTWIDTH] IN BLOOD BY AUTOMATED COUNT: 15.9 % (ref 10–15)
HCT VFR BLD AUTO: 45.7 % (ref 35–47)
HGB BLD-MCNC: 15 G/DL (ref 11.7–15.7)
IMM GRANULOCYTES # BLD: 0.1 10E3/UL
IMM GRANULOCYTES NFR BLD: 0 %
LYMPHOCYTES # BLD AUTO: 6.9 10E3/UL (ref 0.8–5.3)
LYMPHOCYTES NFR BLD AUTO: 42 %
MCH RBC QN AUTO: 31.5 PG (ref 26.5–33)
MCHC RBC AUTO-ENTMCNC: 32.8 G/DL (ref 31.5–36.5)
MCV RBC AUTO: 96 FL (ref 78–100)
MONOCYTES # BLD AUTO: 0.7 10E3/UL (ref 0–1.3)
MONOCYTES NFR BLD AUTO: 4 %
NEUTROPHILS # BLD AUTO: 8.6 10E3/UL (ref 1.6–8.3)
NEUTROPHILS NFR BLD AUTO: 52 %
NRBC # BLD AUTO: 0 10E3/UL
NRBC BLD AUTO-RTO: 0 /100
PLATELET # BLD AUTO: 232 10E3/UL (ref 150–450)
RBC # BLD AUTO: 4.76 10E6/UL (ref 3.8–5.2)
WBC # BLD AUTO: 16.8 10E3/UL (ref 4–11)

## 2023-12-13 PROCEDURE — 84450 TRANSFERASE (AST) (SGOT): CPT | Mod: ZL

## 2023-12-13 PROCEDURE — G0008 ADMIN INFLUENZA VIRUS VAC: HCPCS

## 2023-12-13 PROCEDURE — 36415 COLL VENOUS BLD VENIPUNCTURE: CPT | Mod: ZL

## 2023-12-13 PROCEDURE — 82565 ASSAY OF CREATININE: CPT | Mod: ZL

## 2023-12-13 PROCEDURE — G0463 HOSPITAL OUTPT CLINIC VISIT: HCPCS | Mod: 25

## 2023-12-13 PROCEDURE — 85025 COMPLETE CBC W/AUTO DIFF WBC: CPT | Mod: ZL

## 2023-12-13 NOTE — PROGRESS NOTES
Here for flu shot. Fluzone 65+ given in right deltoid. No adverse reactions observed.     ROULA BORJAS RN on 12/13/2023 at 1:26 PM

## 2024-01-30 ENCOUNTER — OFFICE VISIT (OUTPATIENT)
Dept: INTERNAL MEDICINE | Facility: OTHER | Age: 79
End: 2024-01-30
Attending: NURSE PRACTITIONER
Payer: COMMERCIAL

## 2024-01-30 VITALS
HEART RATE: 67 BPM | RESPIRATION RATE: 18 BRPM | WEIGHT: 195.2 LBS | DIASTOLIC BLOOD PRESSURE: 78 MMHG | HEIGHT: 67 IN | BODY MASS INDEX: 30.64 KG/M2 | OXYGEN SATURATION: 97 % | TEMPERATURE: 97.9 F | SYSTOLIC BLOOD PRESSURE: 134 MMHG

## 2024-01-30 DIAGNOSIS — N18.31 STAGE 3A CHRONIC KIDNEY DISEASE (H): ICD-10-CM

## 2024-01-30 DIAGNOSIS — M05.79 RHEUMATOID ARTHRITIS INVOLVING MULTIPLE SITES WITH POSITIVE RHEUMATOID FACTOR (H): ICD-10-CM

## 2024-01-30 DIAGNOSIS — D84.9 IMMUNOCOMPROMISED (H): ICD-10-CM

## 2024-01-30 DIAGNOSIS — I13.0 HYPERTENSIVE HEART AND CHRONIC KIDNEY DISEASE WITH HEART FAILURE AND STAGE 1 THROUGH STAGE 4 CHRONIC KIDNEY DISEASE, OR UNSPECIFIED CHRONIC KIDNEY DISEASE (H): ICD-10-CM

## 2024-01-30 DIAGNOSIS — D72.820 MONOCLONAL B-CELL LYMPHOCYTOSIS: ICD-10-CM

## 2024-01-30 DIAGNOSIS — N28.89 RENAL MASS: ICD-10-CM

## 2024-01-30 DIAGNOSIS — I10 BENIGN ESSENTIAL HYPERTENSION: Primary | ICD-10-CM

## 2024-01-30 DIAGNOSIS — E04.1 THYROID NODULE: ICD-10-CM

## 2024-01-30 DIAGNOSIS — I25.10 CORONARY ARTERY DISEASE INVOLVING NATIVE CORONARY ARTERY OF NATIVE HEART WITHOUT ANGINA PECTORIS: ICD-10-CM

## 2024-01-30 LAB
ANION GAP SERPL CALCULATED.3IONS-SCNC: 9 MMOL/L (ref 7–15)
BUN SERPL-MCNC: 24.5 MG/DL (ref 8–23)
CALCIUM SERPL-MCNC: 10 MG/DL (ref 8.8–10.2)
CHLORIDE SERPL-SCNC: 101 MMOL/L (ref 98–107)
CREAT SERPL-MCNC: 1.11 MG/DL (ref 0.51–0.95)
DEPRECATED HCO3 PLAS-SCNC: 31 MMOL/L (ref 22–29)
EGFRCR SERPLBLD CKD-EPI 2021: 51 ML/MIN/1.73M2
GLUCOSE SERPL-MCNC: 96 MG/DL (ref 70–99)
POTASSIUM SERPL-SCNC: 4 MMOL/L (ref 3.4–5.3)
SODIUM SERPL-SCNC: 141 MMOL/L (ref 135–145)

## 2024-01-30 PROCEDURE — G0463 HOSPITAL OUTPT CLINIC VISIT: HCPCS

## 2024-01-30 PROCEDURE — 36415 COLL VENOUS BLD VENIPUNCTURE: CPT | Mod: ZL | Performed by: NURSE PRACTITIONER

## 2024-01-30 PROCEDURE — 99214 OFFICE O/P EST MOD 30 MIN: CPT | Performed by: NURSE PRACTITIONER

## 2024-01-30 PROCEDURE — 80048 BASIC METABOLIC PNL TOTAL CA: CPT | Mod: ZL | Performed by: NURSE PRACTITIONER

## 2024-01-30 ASSESSMENT — PAIN SCALES - GENERAL: PAINLEVEL: NO PAIN (1)

## 2024-01-30 NOTE — PROGRESS NOTES
ICD-10-CM    1. Benign essential hypertension  I10 Basic metabolic panel     Basic metabolic panel      2. Coronary artery disease involving native coronary artery of native heart without angina pectoris  I25.10       3. Hypertensive 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 and stage 1 through stage 4 chronic kidney disease, or unspecified chronic kidney disease (H)  I13.0       4. Stage 3a chronic kidney disease (H)  N18.31       5. Renal mass  N28.89 Adult Urology  Referral      6. Monoclonal B-cell lymphocytosis  D72.820 Adult Urology  Referral      7. Rheumatoid arthritis involving multiple sites with positive rheumatoid factor (H)  M05.79       8. Immunocompromised (H24)  D84.9       9. Thyroid nodule  E04.1          Plan:  1.  Blood pressure well-controlled.  Continue with current medications including the Imdur 30 mg daily.  Continue to check blood pressure at home and if outside of range either follow-up in clinic or with cardiology.  2.  Labs today for BMP due to history of stage III chronic kidney disease.  3.  She is referred to [facility] urology to establish care for right renal mass and second opinion.  4.  Continue to follow with oncology for B-cell lymphocytosis.  5.  Continues on same medications for RA and follows with rheumatology.  6.  Patient is provided with Dr. Mckenzie's office number so she can give his office a call back in regards to follow-up thyroid nodule.    Sylvie Flores is a 78 year old, presenting for the following health issues:  Hypertension, Hand Problem (Right Hand Weakness), and Referral (Possible urology referral)        1/30/2024     1:11 PM   Additional Questions   Roomed by RAMONE Matthews     She is here today for follow-up.  She was seen by cardiology recently and blood pressure was a little elevated.  She was started on Imdur 30 mg daily.  She has been checking her blood pressure at home and has been around 140/80.  It has been good in  "the clinic however.  She is wondering if maybe she needs a new blood pressure machine.  It was ranging around 150 systolic before Imdur was started.  She also was seen by urology for 18 mm right renal mass.  He recommended ongoing surveillance for CT imaging as this was very small.  She reports urologist moved away and now she needs to find a new neurologist.  She is asymptomatic of renal mass.  She is due to have kidney function checked and electrolytes for lab work.  Also received a phone call for ENT to schedule follow-up appointment.  She had thyroid nodule evaluation last fall.  She is needing the telephone number to get in contact with the ENT.  Also is followed by Dr. Pitts due for B-cell lymphocytosis.  Currently undergoing surveillance, has had normal absolute lymphocytes with mild chronic elevation of WBC.  Also continues to follow with rheumatology for RA, no recent changes to immunosuppressive medications.    History of Present Illness       CKD: She uses over the counter pain medication, including tylenol, a few times a week.    Hypertension: She presents for follow up of hypertension.  She does not check blood pressure  regularly outside of the clinic. Outside blood pressures have been over 140/90. She does not follow a low salt diet.     She eats 2-3 servings of fruits and vegetables daily.She consumes 1 sweetened beverage(s) daily.She exercises with enough effort to increase her heart rate 9 or less minutes per day.  She exercises with enough effort to increase her heart rate 3 or less days per week.   She is taking medications regularly.               Review of systems:  Denies chest pain, shortness of breath, flank pain, hematuria      Objective    /78   Pulse 67   Temp 97.9  F (36.6  C) (Temporal)   Resp 18   Ht 1.702 m (5' 7\")   Wt 88.5 kg (195 lb 3.2 oz)   LMP 01/01/1998 (Within Months)   SpO2 97%   Breastfeeding No   BMI 30.57 kg/m    Body mass index is 30.57 kg/m .  Physical Exam "   Pleasant female no acute distress.  Affect normal.  Alert and oriented x 4.  Lung fields clear to auscultation.  Cardiovascular regular rate and rhythm.  Blood pressure rechecked and within normal range.    Recent oncology and ENT visit notes including imaging and labs reviewed and discussed            Signed Electronically by: Swetha Maxwell NP

## 2024-01-30 NOTE — NURSING NOTE
"Chief Complaint   Patient presents with    Hypertension    Hand Problem     Right Hand Weakness    Referral     Possible urology referral         Initial /76 (BP Location: Right arm, Patient Position: Sitting, Cuff Size: Adult Large)   Pulse 67   Temp 97.9  F (36.6  C) (Temporal)   Resp 18   Ht 1.702 m (5' 7\")   Wt 88.5 kg (195 lb 3.2 oz)   LMP 01/01/1998 (Within Months)   SpO2 97%   Breastfeeding No   BMI 30.57 kg/m   Estimated body mass index is 30.57 kg/m  as calculated from the following:    Height as of this encounter: 1.702 m (5' 7\").    Weight as of this encounter: 88.5 kg (195 lb 3.2 oz).       FOOD SECURITY SCREENING QUESTIONS:    The next two questions are to help us understand your food security.  If you are feeling you need any assistance in this area, we have resources available to support you today.    Hunger Vital Signs:  Within the past 12 months we worried whether our food would run out before we got money to buy more. Never  Within the past 12 months the food we bought just didn't last and we didn't have money to get more. Never  Taisha Mendoza LPN on 1/30/2024 at 1:17 PM     Taisha Mendoza   "

## 2024-04-03 ENCOUNTER — LAB (OUTPATIENT)
Dept: LAB | Facility: OTHER | Age: 79
End: 2024-04-03
Payer: COMMERCIAL

## 2024-04-03 DIAGNOSIS — M06.4 INFLAMMATORY POLYARTHRITIS (H): ICD-10-CM

## 2024-04-03 LAB
AST SERPL W P-5'-P-CCNC: 20 U/L (ref 0–45)
BASOPHILS # BLD AUTO: 0.1 10E3/UL (ref 0–0.2)
BASOPHILS NFR BLD AUTO: 1 %
CREAT SERPL-MCNC: 1.02 MG/DL (ref 0.51–0.95)
EGFRCR SERPLBLD CKD-EPI 2021: 56 ML/MIN/1.73M2
EOSINOPHIL # BLD AUTO: 0.4 10E3/UL (ref 0–0.7)
EOSINOPHIL NFR BLD AUTO: 3 %
ERYTHROCYTE [DISTWIDTH] IN BLOOD BY AUTOMATED COUNT: 14.6 % (ref 10–15)
HCT VFR BLD AUTO: 44.2 % (ref 35–47)
HGB BLD-MCNC: 14.2 G/DL (ref 11.7–15.7)
IMM GRANULOCYTES # BLD: 0.1 10E3/UL
IMM GRANULOCYTES NFR BLD: 1 %
LYMPHOCYTES # BLD AUTO: 3.9 10E3/UL (ref 0.8–5.3)
LYMPHOCYTES NFR BLD AUTO: 30 %
MCH RBC QN AUTO: 32.1 PG (ref 26.5–33)
MCHC RBC AUTO-ENTMCNC: 32.1 G/DL (ref 31.5–36.5)
MCV RBC AUTO: 100 FL (ref 78–100)
MONOCYTES # BLD AUTO: 0.7 10E3/UL (ref 0–1.3)
MONOCYTES NFR BLD AUTO: 5 %
NEUTROPHILS # BLD AUTO: 7.9 10E3/UL (ref 1.6–8.3)
NEUTROPHILS NFR BLD AUTO: 61 %
NRBC # BLD AUTO: 0 10E3/UL
NRBC BLD AUTO-RTO: 0 /100
PLATELET # BLD AUTO: 221 10E3/UL (ref 150–450)
RBC # BLD AUTO: 4.42 10E6/UL (ref 3.8–5.2)
WBC # BLD AUTO: 13.1 10E3/UL (ref 4–11)

## 2024-04-03 PROCEDURE — 82565 ASSAY OF CREATININE: CPT | Mod: ZL

## 2024-04-03 PROCEDURE — 85048 AUTOMATED LEUKOCYTE COUNT: CPT | Mod: ZL

## 2024-04-03 PROCEDURE — 84450 TRANSFERASE (AST) (SGOT): CPT | Mod: ZL

## 2024-04-03 PROCEDURE — 36415 COLL VENOUS BLD VENIPUNCTURE: CPT | Mod: ZL

## 2024-04-13 ENCOUNTER — APPOINTMENT (OUTPATIENT)
Dept: CT IMAGING | Facility: OTHER | Age: 79
End: 2024-04-13
Attending: STUDENT IN AN ORGANIZED HEALTH CARE EDUCATION/TRAINING PROGRAM
Payer: COMMERCIAL

## 2024-04-13 ENCOUNTER — HOSPITAL ENCOUNTER (EMERGENCY)
Facility: OTHER | Age: 79
Discharge: ANOTHER HOSPITAL | End: 2024-04-14
Attending: STUDENT IN AN ORGANIZED HEALTH CARE EDUCATION/TRAINING PROGRAM | Admitting: STUDENT IN AN ORGANIZED HEALTH CARE EDUCATION/TRAINING PROGRAM
Payer: COMMERCIAL

## 2024-04-13 DIAGNOSIS — G45.9 TIA (TRANSIENT ISCHEMIC ATTACK): ICD-10-CM

## 2024-04-13 DIAGNOSIS — R47.89 WORD FINDING DIFFICULTY: ICD-10-CM

## 2024-04-13 LAB
ANION GAP SERPL CALCULATED.3IONS-SCNC: 9 MMOL/L (ref 7–15)
APTT PPP: 22 SECONDS (ref 22–38)
BASOPHILS # BLD AUTO: 0.1 10E3/UL (ref 0–0.2)
BASOPHILS NFR BLD AUTO: 1 %
BUN SERPL-MCNC: 23.2 MG/DL (ref 8–23)
CALCIUM SERPL-MCNC: 9.8 MG/DL (ref 8.8–10.2)
CHLORIDE SERPL-SCNC: 97 MMOL/L (ref 98–107)
CHOLEST SERPL-MCNC: 138 MG/DL
CREAT SERPL-MCNC: 1.01 MG/DL (ref 0.51–0.95)
DEPRECATED HCO3 PLAS-SCNC: 29 MMOL/L (ref 22–29)
EGFRCR SERPLBLD CKD-EPI 2021: 57 ML/MIN/1.73M2
EOSINOPHIL # BLD AUTO: 0.4 10E3/UL (ref 0–0.7)
EOSINOPHIL NFR BLD AUTO: 4 %
ERYTHROCYTE [DISTWIDTH] IN BLOOD BY AUTOMATED COUNT: 14.4 % (ref 10–15)
GLUCOSE BLDC GLUCOMTR-MCNC: 117 MG/DL (ref 70–99)
GLUCOSE SERPL-MCNC: 105 MG/DL (ref 70–99)
HBA1C MFR BLD: 5.4 % (ref 4–6.2)
HCT VFR BLD AUTO: 41 % (ref 35–47)
HDLC SERPL-MCNC: 47 MG/DL
HGB BLD-MCNC: 13.3 G/DL (ref 11.7–15.7)
IMM GRANULOCYTES # BLD: 0.2 10E3/UL
IMM GRANULOCYTES NFR BLD: 2 %
INR PPP: 0.99 (ref 0.85–1.15)
LDLC SERPL CALC-MCNC: 60 MG/DL
LYMPHOCYTES # BLD AUTO: 4.2 10E3/UL (ref 0.8–5.3)
LYMPHOCYTES NFR BLD AUTO: 38 %
MCH RBC QN AUTO: 31.8 PG (ref 26.5–33)
MCHC RBC AUTO-ENTMCNC: 32.4 G/DL (ref 31.5–36.5)
MCV RBC AUTO: 98 FL (ref 78–100)
MONOCYTES # BLD AUTO: 0.5 10E3/UL (ref 0–1.3)
MONOCYTES NFR BLD AUTO: 5 %
NEUTROPHILS # BLD AUTO: 5.8 10E3/UL (ref 1.6–8.3)
NEUTROPHILS NFR BLD AUTO: 52 %
NONHDLC SERPL-MCNC: 91 MG/DL
NRBC # BLD AUTO: 0 10E3/UL
NRBC BLD AUTO-RTO: 0 /100
PLATELET # BLD AUTO: 202 10E3/UL (ref 150–450)
POTASSIUM SERPL-SCNC: 3.7 MMOL/L (ref 3.4–5.3)
RBC # BLD AUTO: 4.18 10E6/UL (ref 3.8–5.2)
SODIUM SERPL-SCNC: 135 MMOL/L (ref 135–145)
TRIGL SERPL-MCNC: 153 MG/DL
TROPONIN T SERPL HS-MCNC: 20 NG/L
WBC # BLD AUTO: 11.2 10E3/UL (ref 4–11)

## 2024-04-13 PROCEDURE — G0425 INPT/ED TELECONSULT30: HCPCS | Mod: G0 | Performed by: PSYCHIATRY & NEUROLOGY

## 2024-04-13 PROCEDURE — 36415 COLL VENOUS BLD VENIPUNCTURE: CPT | Performed by: STUDENT IN AN ORGANIZED HEALTH CARE EDUCATION/TRAINING PROGRAM

## 2024-04-13 PROCEDURE — 85610 PROTHROMBIN TIME: CPT | Performed by: STUDENT IN AN ORGANIZED HEALTH CARE EDUCATION/TRAINING PROGRAM

## 2024-04-13 PROCEDURE — 93010 ELECTROCARDIOGRAM REPORT: CPT | Performed by: INTERNAL MEDICINE

## 2024-04-13 PROCEDURE — 99292 CRITICAL CARE ADDL 30 MIN: CPT | Performed by: STUDENT IN AN ORGANIZED HEALTH CARE EDUCATION/TRAINING PROGRAM

## 2024-04-13 PROCEDURE — 250N000011 HC RX IP 250 OP 636: Performed by: STUDENT IN AN ORGANIZED HEALTH CARE EDUCATION/TRAINING PROGRAM

## 2024-04-13 PROCEDURE — 82374 ASSAY BLOOD CARBON DIOXIDE: CPT | Performed by: STUDENT IN AN ORGANIZED HEALTH CARE EDUCATION/TRAINING PROGRAM

## 2024-04-13 PROCEDURE — 250N000013 HC RX MED GY IP 250 OP 250 PS 637: Performed by: STUDENT IN AN ORGANIZED HEALTH CARE EDUCATION/TRAINING PROGRAM

## 2024-04-13 PROCEDURE — 84484 ASSAY OF TROPONIN QUANT: CPT | Performed by: STUDENT IN AN ORGANIZED HEALTH CARE EDUCATION/TRAINING PROGRAM

## 2024-04-13 PROCEDURE — 70450 CT HEAD/BRAIN W/O DYE: CPT | Mod: TC

## 2024-04-13 PROCEDURE — 96374 THER/PROPH/DIAG INJ IV PUSH: CPT | Mod: XU | Performed by: STUDENT IN AN ORGANIZED HEALTH CARE EDUCATION/TRAINING PROGRAM

## 2024-04-13 PROCEDURE — 80061 LIPID PANEL: CPT | Performed by: STUDENT IN AN ORGANIZED HEALTH CARE EDUCATION/TRAINING PROGRAM

## 2024-04-13 PROCEDURE — 250N000011 HC RX IP 250 OP 636: Mod: JZ | Performed by: STUDENT IN AN ORGANIZED HEALTH CARE EDUCATION/TRAINING PROGRAM

## 2024-04-13 PROCEDURE — 85025 COMPLETE CBC W/AUTO DIFF WBC: CPT | Performed by: STUDENT IN AN ORGANIZED HEALTH CARE EDUCATION/TRAINING PROGRAM

## 2024-04-13 PROCEDURE — 83036 HEMOGLOBIN GLYCOSYLATED A1C: CPT | Mod: GZ | Performed by: STUDENT IN AN ORGANIZED HEALTH CARE EDUCATION/TRAINING PROGRAM

## 2024-04-13 PROCEDURE — 99291 CRITICAL CARE FIRST HOUR: CPT | Mod: 25 | Performed by: STUDENT IN AN ORGANIZED HEALTH CARE EDUCATION/TRAINING PROGRAM

## 2024-04-13 PROCEDURE — 82962 GLUCOSE BLOOD TEST: CPT | Mod: GZ

## 2024-04-13 PROCEDURE — 85730 THROMBOPLASTIN TIME PARTIAL: CPT | Performed by: STUDENT IN AN ORGANIZED HEALTH CARE EDUCATION/TRAINING PROGRAM

## 2024-04-13 PROCEDURE — 99285 EMERGENCY DEPT VISIT HI MDM: CPT | Performed by: STUDENT IN AN ORGANIZED HEALTH CARE EDUCATION/TRAINING PROGRAM

## 2024-04-13 PROCEDURE — 93005 ELECTROCARDIOGRAM TRACING: CPT | Performed by: STUDENT IN AN ORGANIZED HEALTH CARE EDUCATION/TRAINING PROGRAM

## 2024-04-13 PROCEDURE — 70496 CT ANGIOGRAPHY HEAD: CPT | Mod: TC

## 2024-04-13 RX ORDER — ONDANSETRON 4 MG/1
4 TABLET, ORALLY DISINTEGRATING ORAL EVERY 6 HOURS PRN
Status: CANCELLED | OUTPATIENT
Start: 2024-04-13

## 2024-04-13 RX ORDER — ACETAMINOPHEN 650 MG/1
650 SUPPOSITORY RECTAL EVERY 4 HOURS PRN
Status: CANCELLED | OUTPATIENT
Start: 2024-04-13

## 2024-04-13 RX ORDER — ATORVASTATIN CALCIUM 40 MG/1
80 TABLET, FILM COATED ORAL EVERY EVENING
Status: CANCELLED | OUTPATIENT
Start: 2024-04-13

## 2024-04-13 RX ORDER — LABETALOL HYDROCHLORIDE 5 MG/ML
10 INJECTION, SOLUTION INTRAVENOUS EVERY 10 MIN PRN
Status: DISCONTINUED | OUTPATIENT
Start: 2024-04-13 | End: 2024-04-14 | Stop reason: HOSPADM

## 2024-04-13 RX ORDER — LABETALOL HYDROCHLORIDE 5 MG/ML
20 INJECTION, SOLUTION INTRAVENOUS ONCE
Status: COMPLETED | OUTPATIENT
Start: 2024-04-13 | End: 2024-04-13

## 2024-04-13 RX ORDER — SODIUM CHLORIDE 9 MG/ML
INJECTION, SOLUTION INTRAVENOUS CONTINUOUS PRN
Status: DISCONTINUED | OUTPATIENT
Start: 2024-04-13 | End: 2024-04-14 | Stop reason: HOSPADM

## 2024-04-13 RX ORDER — ASPIRIN 81 MG/1
81 TABLET ORAL DAILY
Status: CANCELLED | OUTPATIENT
Start: 2024-04-14

## 2024-04-13 RX ORDER — ASPIRIN 81 MG/1
324 TABLET, CHEWABLE ORAL ONCE
Status: COMPLETED | OUTPATIENT
Start: 2024-04-13 | End: 2024-04-13

## 2024-04-13 RX ORDER — HYDRALAZINE HYDROCHLORIDE 20 MG/ML
10 INJECTION INTRAMUSCULAR; INTRAVENOUS EVERY 10 MIN PRN
Status: DISCONTINUED | OUTPATIENT
Start: 2024-04-13 | End: 2024-04-14 | Stop reason: HOSPADM

## 2024-04-13 RX ORDER — OLANZAPINE 10 MG/1
5 INJECTION, POWDER, LYOPHILIZED, FOR SOLUTION INTRAMUSCULAR ONCE
Status: DISCONTINUED | OUTPATIENT
Start: 2024-04-13 | End: 2024-04-14

## 2024-04-13 RX ORDER — ACETAMINOPHEN 325 MG/1
650 TABLET ORAL EVERY 4 HOURS PRN
Status: CANCELLED | OUTPATIENT
Start: 2024-04-13

## 2024-04-13 RX ORDER — ONDANSETRON 2 MG/ML
4 INJECTION INTRAMUSCULAR; INTRAVENOUS EVERY 6 HOURS PRN
Status: CANCELLED | OUTPATIENT
Start: 2024-04-13

## 2024-04-13 RX ORDER — IOPAMIDOL 755 MG/ML
75 INJECTION, SOLUTION INTRAVASCULAR ONCE
Status: COMPLETED | OUTPATIENT
Start: 2024-04-13 | End: 2024-04-13

## 2024-04-13 RX ADMIN — LABETALOL HYDROCHLORIDE 20 MG: 5 INJECTION, SOLUTION INTRAVENOUS at 23:53

## 2024-04-13 RX ADMIN — TICAGRELOR 180 MG: 90 TABLET ORAL at 23:28

## 2024-04-13 RX ADMIN — IOPAMIDOL 75 ML: 755 INJECTION, SOLUTION INTRAVENOUS at 22:26

## 2024-04-13 RX ADMIN — ASPIRIN 81 MG CHEWABLE TABLET 324 MG: 81 TABLET CHEWABLE at 22:20

## 2024-04-13 ASSESSMENT — ACTIVITIES OF DAILY LIVING (ADL)
ADLS_ACUITY_SCORE: 35
ADLS_ACUITY_SCORE: 35

## 2024-04-13 ASSESSMENT — COLUMBIA-SUICIDE SEVERITY RATING SCALE - C-SSRS
1. IN THE PAST MONTH, HAVE YOU WISHED YOU WERE DEAD OR WISHED YOU COULD GO TO SLEEP AND NOT WAKE UP?: NO
2. HAVE YOU ACTUALLY HAD ANY THOUGHTS OF KILLING YOURSELF IN THE PAST MONTH?: NO
6. HAVE YOU EVER DONE ANYTHING, STARTED TO DO ANYTHING, OR PREPARED TO DO ANYTHING TO END YOUR LIFE?: NO

## 2024-04-14 VITALS
HEART RATE: 80 BPM | RESPIRATION RATE: 24 BRPM | WEIGHT: 198.19 LBS | OXYGEN SATURATION: 96 % | DIASTOLIC BLOOD PRESSURE: 83 MMHG | TEMPERATURE: 97.4 F | SYSTOLIC BLOOD PRESSURE: 167 MMHG | HEIGHT: 67 IN | BODY MASS INDEX: 31.11 KG/M2

## 2024-04-14 LAB
ATRIAL RATE - MUSE: 77 BPM
DIASTOLIC BLOOD PRESSURE - MUSE: NORMAL MMHG
HOLD SPECIMEN: NORMAL
INTERPRETATION ECG - MUSE: NORMAL
P AXIS - MUSE: 14 DEGREES
PR INTERVAL - MUSE: 162 MS
QRS DURATION - MUSE: 92 MS
QT - MUSE: 400 MS
QTC - MUSE: 452 MS
R AXIS - MUSE: -35 DEGREES
SYSTOLIC BLOOD PRESSURE - MUSE: NORMAL MMHG
T AXIS - MUSE: 21 DEGREES
TROPONIN T SERPL HS-MCNC: 20 NG/L
VENTRICULAR RATE- MUSE: 77 BPM

## 2024-04-14 PROCEDURE — 250N000013 HC RX MED GY IP 250 OP 250 PS 637: Performed by: STUDENT IN AN ORGANIZED HEALTH CARE EDUCATION/TRAINING PROGRAM

## 2024-04-14 PROCEDURE — 84484 ASSAY OF TROPONIN QUANT: CPT | Performed by: STUDENT IN AN ORGANIZED HEALTH CARE EDUCATION/TRAINING PROGRAM

## 2024-04-14 PROCEDURE — 96376 TX/PRO/DX INJ SAME DRUG ADON: CPT | Mod: XU | Performed by: STUDENT IN AN ORGANIZED HEALTH CARE EDUCATION/TRAINING PROGRAM

## 2024-04-14 PROCEDURE — 250N000011 HC RX IP 250 OP 636: Mod: JZ

## 2024-04-14 PROCEDURE — 37195 THROMBOLYTIC THERAPY STROKE: CPT | Performed by: STUDENT IN AN ORGANIZED HEALTH CARE EDUCATION/TRAINING PROGRAM

## 2024-04-14 PROCEDURE — 36415 COLL VENOUS BLD VENIPUNCTURE: CPT | Performed by: STUDENT IN AN ORGANIZED HEALTH CARE EDUCATION/TRAINING PROGRAM

## 2024-04-14 PROCEDURE — 96375 TX/PRO/DX INJ NEW DRUG ADDON: CPT | Mod: XU | Performed by: STUDENT IN AN ORGANIZED HEALTH CARE EDUCATION/TRAINING PROGRAM

## 2024-04-14 RX ORDER — LORAZEPAM 0.5 MG/1
0.5 TABLET ORAL ONCE
Status: COMPLETED | OUTPATIENT
Start: 2024-04-14 | End: 2024-04-14

## 2024-04-14 RX ADMIN — Medication 22.5 MG: at 00:04

## 2024-04-14 RX ADMIN — LORAZEPAM 0.5 MG: 0.5 TABLET ORAL at 00:36

## 2024-04-14 RX ADMIN — LABETALOL HYDROCHLORIDE 20 MG: 5 INJECTION, SOLUTION INTRAVENOUS at 01:38

## 2024-04-14 ASSESSMENT — ACTIVITIES OF DAILY LIVING (ADL): ADLS_ACUITY_SCORE: 35

## 2024-04-14 NOTE — ED NOTES
No changes in patient's symptoms, as they continue change frequently.  VSS.  Pupils equal and reactive.

## 2024-04-14 NOTE — ED NOTES
DAYNE Ortega from  called and given report.  All questions answered.  Belongings sent home with  EXCEPT patient's cell phone and glasses were kept with patient.

## 2024-04-14 NOTE — ED NOTES
Neuro's WNL except patient does have some word finding difficulty.  No changes since patient has been initially assessed.

## 2024-04-14 NOTE — ED NOTES
Patient's speech is garbled for periods of time and then she goes back to more clear speech.  Drift continues to be noted to right arm.  Headache worse with lights on, lights dimmed again after assessment.  Pupils are equal and reactive.  All other neuro's WNL.  Patient's gums also noted to be bleeding, as well as a prior IV start attempt on her right arm.  Pressure dressing applied to right AC and MD notified of patient's neuro's and bleeding gums.  MD in room discussing POC with patient.  Patient has done well with updating staff with changes in her symptoms.

## 2024-04-14 NOTE — ED NOTES
IV placed by provider with Ultrasound.   CT was notified.    Lori Hatch RN on 4/13/2024 at 10:13 PM

## 2024-04-14 NOTE — ED NOTES
Pt has been accepted to [facility]  Waiting for bed assignment  Lori Htach RN on 4/14/2024 at 12:48 AM

## 2024-04-14 NOTE — ED PROVIDER NOTES
Emergency Department Provider Note  : 1945 Age: 78 year old Sex: female MRN: [id number]    Chief Complaint   Patient presents with    Altered Mental Status    Slurred Speech     Medical Decision Making / Assessment / Plan   78 year old female with history of hypertension, CAD with multiple stents, CKD presenting with acute onset of word finding difficulty,  gait instability, and arm weakness.  Vitals notable for hypertension of 185/130, no tachycardia. Afebrile.    Last known well approx 8:30 pm. We called a stroke code, CT head Noncon was negative.  CTA with no large vessel occlusion.  We discussed the case with telestroke neurology at 9:30 PM, awaiting results for CTA discussed that we will give aspirin load and second antiplatelet agent.  Patient developed at 11:10 PM increasing heaviness of her right arm and increasing word finding difficulty, discussed case with telestroke neurology team, we will give Brilinta load 180 mg followed by 90 mg twice daily along with daily aspirin.     EKG without evidence of A-fib.  Reviewed CTA with the radiology team and they note CTA neck with severe left ICA stenosis and moderate right-sided ICA stenosis.     Plan to admit to the ICU at [facility] for TIA evaluation. Signed out to [employer].    Patient is developing worsening right-sided weakness, discussed with neurology team and will give TNK, discussed at 11:45 PM. Giving Labetalol for BP >185 mmHg. TNK given at 12:07am.    Patient was signed out to Dr. Martinez at 12:14pm with impending transfer to higher level of care at [facility] for serial neurological evaluations.    ED Course as of 24 2338   Sat 2024   2310 CT Head w/o Contrast  IMPRESSION:   1.   No acute intracranial abnormality.   2.   Mild cerebral atrophy and scattered nonspecific white matter change.   3.   Remote left lacunar infarct.   4.   Sequela of remote granulomatous disease.    8 CTA Head Neck with Contrast  IMPRESSION:    1.   Retropharyngeal carotid course bilaterally, with moderate right and severe   left proximal internal carotid artery luminal narrowing as above, without   occlusion.   2.   Severe luminal narrowing of the right external carotid artery near the   origin, without occlusion.   3.   Similar nodular thyroid gland with calcification compared with 06/08/2023   CT given differences in imaging technique. Correlation with patient history,   prior dedicated imaging if available, or follow-up with nonemergent dedicated   thyroid ultrasound may be helpful for further evaluation as clinically   indicated.    2338 Cholesterol: 138   2338 Triglycerides(!): 153   2338 HDL Cholesterol(!): 47   2338 LDL Cholesterol Calculated: 60   2338 Hemoglobin A1C: 5.4     Final diagnoses:   TIA (transient ischemic attack)   Word finding difficulty       Jone Vanessa MD  4/13/2024   Emergency Department    Subjective   Margaret is a 78 year old female with a PMHx CAD status post ADÁN 2007 and 2013, CKD, rheumatoid arthritis who presents at  9:20 PM with acute onset of word finding difficulty and gait instability at home while watching the game after 8 PM tonight.  She was talking on the phone with her sister and noticed she was having some trouble coming up with words.  She also looked at some tax documents that her  showed her and was having trouble interpreting them which is not normal for her.  Patient also was having some reported weakness in her upper extremities and felt like her gait was unsteady.    She has no history of prior stroke.    She denies any fever, chills, nausea, vomiting, diarrhea, dysuria or leg swelling.    I have reviewed the Medications, Allergies, Past Medical and Surgical History, and Social History in the Epic System and with family.    Review of Systems:  Please see Subjective / HPI for pertinent positives and negatives. All other systems reviewed and found to be negative.      Objective   Patient  "Vitals for the past 24 hrs:   BP Temp Temp src Pulse Resp SpO2 Height Weight   04/13/24 2300 (!) 154/94 -- -- 75 (!) 36 98 % -- --   04/13/24 2245 (!) 165/82 -- -- 68 15 99 % -- --   04/13/24 2234 (!) 167/95 -- -- 72 13 97 % -- --   04/13/24 2230 (!) 167/95 -- -- 71 -- 95 % -- --   04/13/24 2215 (!) 163/100 -- -- 70 15 96 % -- --   04/13/24 2200 -- -- -- 71 17 98 % -- --   04/13/24 2157 -- -- -- 72 -- 98 % 1.702 m (5' 7\") 88.5 kg (195 lb)   04/13/24 2145 (!) 185/89 -- -- 75 18 90 % -- --   04/13/24 2130 (!) 186/94 -- -- 78 14 97 % -- --   04/13/24 2124 -- -- -- 77 25 97 % -- --   04/13/24 2118 (!) 185/130 97.4  F (36.3  C) Tympanic 77 16 96 % -- 88.5 kg (195 lb)   Physical Exam:     General: Awake, alert, in no acute respiratory distress.  Head: Normocephalic, atraumatic.  Eyes: No conjunctival injection and normal lids. PERRL, EOMI.  ENT: Moist membranes, external ear appears normal.   Chest/Respiratory: Unlabored respiratory effort. Equal chest rise.  Cardiovascular: Peripheral pulses present.  Abdominal: Soft, non-distended, non-tender.  Extremities: No obvious long bone deformity.  Neurological: GCS 15, nystagmus with left gaze  Alert, oriented x3. CRANIAL NERVES:  Pupils are equal, round, reactive to light.  Extraocular movements full.  Visual fields full.  Facial sensation, movement normal.  Palate moves symmetrically.  Tongue midline.  Neck strength was normal.  No bruits. Motor 5/5. Good finger-nose-finger, fine finger movement, heel-shin maneuver, sensation to light touch.  Skin: Warm, no rashes, lesions, or bruising.   Psychiatric: Appropriate affect.     Procedures / Critical Care   Procedures  Orders Placed This Encounter   Procedures    CT Head w/o Contrast    CTA Head Neck with Contrast    CT Head Perfusion w Contrast    Glucose by meter    Basic metabolic panel    INR    Partial thromboplastin time    Troponin T, High Sensitivity    Extra Tube ([place] Draw)    CBC with platelets and differential    " Extra Red Top Tube    Hemoglobin A1c    Lipid Profile    Extra Tube ([place] Draw)    Extra Urine Collection    EKG 12-lead, tracing only    Glucose monitor nursing POCT    Vital Signs    Neuro Checks    Cardiac Continuous Monitoring    Pulse oximetry nursing    Dysphagia Screen    Neurology IP Stroke Consult Patient to be seen: STAT within 1 hr; [facility] Call back #: [phone]    Oxygen: Nasal cannula, Oxygen mask    CBC with Platelets & Differential     RESULTS: As noted above.  The patient has stroke symptoms:         ED Stroke specific documentation           NIHSS PDF     Patient last known well time: 8:30pm  ED Provider first to bedside at: 9:17 pm  CT Results received at: 9:50pm    Thrombolytics:   Not given due to:   - minor/isolated/quickly resolving symptoms (NIH 0)    If treating with thrombolytics: Ensure SBP<180 and DBP<105 prior to treatment with thrombolytics.  Administering thrombolytics after treatment with IV labetalol, hydralazine, or nicardipine is reasonable once BP control is established.    Endovascular Retrieval:  Not initiated due to absence of proximal vessel occlusion    National Institutes of Health Stroke Scale (Baseline)  Time Performed: 9:30pm     Score    Level of consciousness: (0)   Alert, keenly responsive    LOC questions: (0)   Answers both questions correctly    LOC commands: (0)   Performs both tasks correctly    Best gaze: (0)   Normal    Visual: (0)   No visual loss    Facial palsy: (0)   Normal symmetrical movements    Motor arm (left): (0)   No drift    Motor arm (right): (0)   No drift    Motor leg (left): (0)   No drift    Motor leg (right): (0)   No drift    Limb ataxia: (0)   Absent    Sensory: (0)   Normal- no sensory loss    Best language: (0)   Normal- no aphasia    Dysarthria: (0)   Normal    Extinction and inattention: (0)   No abnormality        Total Score:  0      Stroke Mimics were considered (including migraine headache, seizure disorder, hypoglycemia (or  hyperglycemia), head or spinal trauma, CNS infection, Toxin ingestion and shock state (e.g. sepsis) .    Evaluation/Treatement was delayed due to: Obtaining CTA with IV required serial IV attempts          Medical/Surgical History:  Past Medical History:   Diagnosis Date    Atherosclerotic heart disease of native coronary artery without angina pectoris     -s/p ADÁN to the mid-LAD, mid-circumflex, mid-right coronary artery, proximal right coronary  artery, and PTCA of a marginal branch of the right coronary artery 2007. -s/p ADÁN to proximal left anterior descending 10/29/13.    Contusion of abdominal wall         Essential (primary) hypertension     2006    Gout     No Comments Provided    Hyperlipidemia     2007    Localized swelling, mass, or lump of upper extremity     2017    Other specified counseling     10/28/2013,Patient has identified Health Care Agent(s): Yes Add Health Care Agents: Yes   Health Care Agent(s): Primary Health Care Agent: Jay Batista  Relationship:  Phone:   Secondary Health Care Agent:  Relationship: Daughter Phone:   Conservator:  Relationship:  Phone:   Guardian: Relationship:  Phone:    Patient has Advance Care Plan Documents (Health Care Directive, POLST): No, refe*    Polyosteoarthritis     No Comments Provided    Postmenopausal bleeding     No Comments Provided    Presence of coronary angioplasty implant and graft     ,unstable angina; severe prox LAD stenosis; s/p 7 stents    Primary osteoarthritis of left hand     2017    Rheumatoid arthritis (H)     follows at [facility] yearly     Past Surgical History:   Procedure Laterality Date    ARTHROPLASTY KNEE      2011    ARTHROSCOPY KNEE          BIOPSY BREAST      10/25/95,BRIAN RAMIREZ     SECTION      1974, Section    COLONOSCOPY      05,Normal - Repeat in 10 years    COLONOSCOPY  2016    COLONOSCOPY N/A 2019    Procedure:  COLONOSCOPY;  Surgeon: Evelin Thompson MD;  Location: [facility] OR    COLONOSCOPY N/A 1/9/2019    Procedure: COMBINED COLONOSCOPY, SINGLE OR MULTIPLE BIOPSY/POLYPECTOMY BY BIOPSY;  Surgeon: Evelin Thompson MD;  Location: [facility] OR    ENDOSCOPIC RELEASE CARPAL TUNNEL Right 1/13/2023    Procedure: RELEASE, CARPAL TUNNEL, ENDOSCOPIC;  Surgeon: Danial Stanley MD;  Location: [facility] OR    ESOPHAGOSCOPY, GASTROSCOPY, DUODENOSCOPY (EGD), COMBINED      5/2/2011,erosive gastritis;bx;consider MRI/A;[facility]    EXTRACTION(S) DENTAL      1970    HEART CATH, ANGIOPLASTY      10/29/2013,ADÁN to proximal left anterior descending    OTHER SURGICAL HISTORY      2007/2013,556247,OTHER    OTHER SURGICAL HISTORY      01/2014,[id number],TOTAL SHOULDER ARTHROPLASTY,Right    OTHER SURGICAL HISTORY      04/14/2016,87867.0,WI COLONOSCOPY REMOVE ELIZA POLYP LESN SNARE,repeat 2019, precancerous polyps    ZZ STRESS LEXISCAN TEST  08/2018    With Dr. Irwin - normal       Medications:  No current facility-administered medications for this encounter.     Current Outpatient Medications   Medication Sig Dispense Refill    atenolol (TENORMIN) 100 MG tablet Take 1 tablet (100 mg) by mouth daily 90 tablet 3    allopurinol (ZYLOPRIM) 300 MG tablet TAKE 1 TABLET DAILY 90 tablet 1    aspirin (ASA) 81 MG chewable tablet Take 1 tablet (81 mg) by mouth daily 90 tablet 3    atorvastatin (LIPITOR) 40 MG tablet TAKE 1 TABLET DAILY 90 tablet 3    cetirizine (ZYRTEC) 10 MG tablet Take 1 tablet (10 mg) by mouth daily      Cholecalciferol (VITAMIN D3) 2000 UNITS CAPS Take 2,000 Units by mouth daily      folic acid (FOLVITE) 1 MG tablet TAKE 1 TABLET DAILY 90 tablet 3    hydrochlorothiazide (HYDRODIURIL) 25 MG tablet Take 1 tablet (25 mg) by mouth daily 90 tablet 3    hydroxychloroquine (PLAQUENIL) 200 MG tablet 400 mg daily alternating with 200 mg daily (Patient taking differently: Take 200 mg by mouth daily Every other day. One day 200mg next day 400mg)      isosorbide mononitrate  (IMDUR) 30 MG 24 hr tablet Take 1 tablet (30 mg) by mouth daily 90 tablet 3    methotrexate 2.5 MG tablet Take 20 mg by mouth once a week      nitroGLYcerin (NITROSTAT) 0.4 MG sublingual tablet Place 1 tablet (0.4 mg) under the tongue every 5 minutes as needed for chest pain (For chest pain x 3 doses) 30 tablet 3     Allergies:  Food, Losartan, Plavix [clopidogrel], and Enalapril    Nursing notes were reviewed.  Past medical history, past surgical history, problem list, family history, social history, medication list, and allergies were reviewed as documented in epic snapshot. Relevant review of systems are documented within the HPI above.       Jone Vanessa MD  04/14/24 0017       Jone Vanessa MD  04/14/24 0018

## 2024-04-14 NOTE — ED PROVIDER NOTES
ED PROVIDER NOTE  Patient Name: Margaret Batista  MRN: [id number]    CC:    Chief Complaint   Patient presents with    Altered Mental Status    Slurred Speech       Patient signed out to me by my partner Dr. Vanessa.    Patient is a 78-year-old female, prior to arrival had symptoms of word finding difficulty and then later right upper extremity weakness.  Stroke code was called, CT revealed notably severe left internal carotid stenosis and remote left lacunar infarct.  She does have significant factors including CAD status post PCI's.  During her care here she developed right upper extremity weakness, decision was made to give TNK per stroke neurology.  Brilinta was given in favor of Plavix given her history of resistance.  She received Ativan 0.5 mg for significant anxiety and for patient comfort.  Labetalol 20 mg were given prior to my care as well as aspirin 324 mg.    Patient was transferred to the outside hospitalist Dr. Pradhan at [facility].  Patient sent in stable condition.    Diagnosis  Ischemic stroke    ED Events, Labs and Imaging:  ED Course as of 04/14/24 0047   Sat Apr 13, 2024   2310 CT Head w/o Contrast  IMPRESSION:   1.   No acute intracranial abnormality.   2.   Mild cerebral atrophy and scattered nonspecific white matter change.   3.   Remote left lacunar infarct.   4.   Sequela of remote granulomatous disease.    2338 CTA Head Neck with Contrast  IMPRESSION:   1.   Retropharyngeal carotid course bilaterally, with moderate right and severe   left proximal internal carotid artery luminal narrowing as above, without   occlusion.   2.   Severe luminal narrowing of the right external carotid artery near the   origin, without occlusion.   3.   Similar nodular thyroid gland with calcification compared with 06/08/2023   CT given differences in imaging technique. Correlation with patient history,   prior dedicated imaging if available, or follow-up with nonemergent dedicated   thyroid  ultrasound may be helpful for further evaluation as clinically   indicated.    2338 Cholesterol: 138   2338 Triglycerides(!): 153   2338 HDL Cholesterol(!): 47   2338 LDL Cholesterol Calculated: 60   2338 Hemoglobin A1C: 5.4   Sun Apr 14, 2024   0023 WBC(!): 11.2   0024 Hemoglobin: 13.3   0044 Dr. Pradhan [facility]         Joel Martinez MD  Emergency Medicine    Dictation Disclaimer: Some notes are completed with voice-recognition dictation software. Errors are generally corrected in real time. Please contact me via Epic staff message if you note any errors requiring clarification.     Joel Martinez MD  04/14/24 0049       Joel Martinez MD  04/16/24 [id number]

## 2024-04-14 NOTE — ED NOTES
Neuro's improved.  No drift noted to right arm, speech is clear. Headache mild.  Pupils are equal and reactive.  Symptoms appear to be waxing and waning.

## 2024-04-14 NOTE — ED NOTES
"Patient up to the bathroom, relatively steady on her feet at her baseline.  When she returned to her room she reported that she felt like her right arm is \"dead weight\".  She is able to elevate it and keep it elevated, but she is not able to lift it up as high as her left arm, which is new for her.  She also continues to have noticeable word finding difficulty or that she will say the wrong words.  Per patient, this is not normal for her either.  Spoke with MD about this, awaiting further orders. Other neuro's assessed and are normal.  Patient is PERRL, strength is equal bilaterally.  No numbness or tingling noted.  Headache is slightly worse, however, BP has improved.  "

## 2024-04-14 NOTE — ED TRIAGE NOTES
"Pt was watching a sporting event on TV.  After the game sometime after 8pm, pt started to talk on the phone with her sister.  Pt realized that she was having a difficult time finding her words.   Her  came up to her and she was having a hard time following conversation.  Per , pt is \"normally sharp\".  Pt is having increased weakness in her upper extremities.   Pt felt that her gait was not steady as well  BP (!) 185/130   Pulse 77   Temp 97.4  F (36.3  C) (Tympanic)   Resp 16   Wt 88.5 kg (195 lb)   LMP 01/01/1998 (Within Months)   SpO2 96%   BMI 30.54 kg/m   Lori Hatch RN on 4/13/2024 at 9:28 PM       Triage Assessment (Adult)       Row Name 04/13/24 2124 04/13/24 2118       Triage Assessment    Airway WDL WDL WDL       Respiratory WDL    Respiratory WDL WDL --       Skin Circulation/Temperature WDL    Skin Circulation/Temperature WDL WDL --       Cardiac WDL    Cardiac WDL X  HTN --    Cardiac Rhythm NSR --       Peripheral/Neurovascular WDL    Peripheral Neurovascular WDL WDL --       Cognitive/Neuro/Behavioral WDL    Cognitive/Neuro/Behavioral WDL WDL --                    "

## 2024-04-14 NOTE — ED NOTES
Stroke deescalated at this time.  Primary nurse notified.   Lori Hatch RN on 4/13/2024 at 10:52 PM

## 2024-04-14 NOTE — ED NOTES
Neuro's unchanged.  Patient having word finding difficulty or using the wrong words.  Left arm drift noted, slight improvement in strength of lifting arm.  No other neuro abnormalities noted.

## 2024-04-14 NOTE — CONSULTS
"      [facility]     Stroke Code Note          History of Present Illness     Chief Complaint: Altered Mental Status and Slurred Speech      Margaret Batista is a 78 year old female with HTN, HLD , CAD s/p stent on ASA 81 mg  comes  with confusion, word finding difficulty, tingling on left side of body, gait instability starting at 8.30 pm today on 4/13/24.Her symptoms resolved in ED.LKW- 8.30 pm, 4/13/24. Stroke code was initially de-escalated as her symptoms have resolved, However, her symptoms were fluctuating and was noted to have NIHSS of 2 for paraphasic errors and right arm drift over tele.         Past Medical History     Stroke risk factors:  HTN, HLD , CAD s/p stent     Preadmission antithrombotic regimen: ASA 81 mg    Modified [place] Score (Pre-morbid)    -  0                   Assessment and Plan       1.  Acute Ischemic stroke, left MCA territory     Intravenous Thrombolysis  Risks (including potential for bleeding and death), benefits, and alternatives to thrombolytic therapy were discussed with Patient and Family. Prior to tenecteplase (TNK) administration, the following issues were addressed:   - hypertension requiring aggressive control with IV medications  - TNK given at 12.07 am, 4/14/24.     Endovascular Treatment  Not initiated due to absence of proximal vessel occlusion     Plan:  - Use orderset: \"Ischemic Stroke Post-Thrombolytics/Thrombectomy ICU Admission\"  - Neurochecks and vital signs per post-thrombolytic orders and monitor closely for any evidence of CNS hemorrhage, bleeding, or orolingual angioedema  - Goal  / 105 mmHg  - Hold all antithrombotic and anticoagulant medications for 24 hrs post-thrombolytic  - Hold pharmacologic VTE prophylaxis for 24 hrs post-thrombolytic  - Statin:  after lipid profile  - Repeat Head CT 24 hrs post-thrombolytic  - MRI Brain with and without contrast    - Telemetry, EKG  - Bedside Glucose Monitoring  - A1c, Lipid Panel, " "Troponin x 3  - PT/OT/SLP  - Stroke Education  - Euthermia, Euglycemia  - Echo  ___________________________________________________________________    The Stroke Staff is Dr. Cornelius.    Nu Dodd MD  Vascular Neurology Fellow    To page me or covering stroke neurology team member, click here: [facility]COM  Choose \"On Call\" tab at top, then select \"NEUROLOGY/ALL SITES\" from middle drop-down box, press Enter, then look for \"stroke\" or \"telestroke\" for your site.  ___________________________________________________________________        Imaging/Labs   (personally reviewed yes)    CT head: No hemorrhage  CTA head/neck:  Retropharyngeal carotid course bilaterally, with moderate right and severe left proximal internal carotid artery luminal narrowing as above, without   occlusion.  Severe luminal narrowing of the right external carotid artery near the   origin, without occlusion.            Physical Examination     BP: (!) 156/86   Pulse: 78   Resp: 12   Temp: 97.4  F (36.3  C)   Temp src: Tympanic   SpO2: 96 %   O2 Device: None (Room air)   Weight: 89.9 kg (198 lb 3.1 oz)    Wt Readings from Last 2 Encounters:   04/13/24 89.9 kg (198 lb 3.1 oz)   01/30/24 88.5 kg (195 lb 3.2 oz)       Neuro Exam  Mental Status:  alert, oriented x 3, follows commands, naming and repetition normal  paraphasic errors noted.  Cranial Nerves:  visual fields intact (tested by nurse), EOMI with normal smooth pursuit, facial sensation intact and symmetric (tested by nurse), facial movements symmetric, hearing not formally tested but intact to conversation, no dysarthria, shoulder shrug equal bilaterally, tongue protrusion midline  Motor:  no abnormal movements , right arm drift  Reflexes:  unable to test (telestroke)  Sensory:  light touch sensation intact and symmetric throughout upper and lower extremities (assessed by nurse), no extinction on double simultaneous stimulation (assessed by nurse)  Coordination:  normal finger-to-nose and " heel-to-shin bilaterally without dysmetria, rapid alternating movements symmetric  Station/Gait:  unable to test due to telestroke        Stroke Scales       NIHSS  1a. Level of Consciousness 0-->Alert, keenly responsive   1b. LOC Questions 0-->Answers both questions correctly   1c. LOC Commands 0-->Performs both tasks correctly   2.   Best Gaze 0-->Normal   3.   Visual 0-->No visual loss   4.   Facial Palsy 0-->Normal symmetrical movements   5a. Motor Arm, Left 0-->No drift, limb holds 90 (or 45) degrees for full 10 secs   5b. Motor Arm, Right 1-->Drift, limb holds 90 (or 45) degrees, but drifts down before full 10 secs, does not hit bed or other support   6a. Motor Leg, Left 0-->No drift, leg holds 30 degree position for full 5 secs   6b. Motor Leg, right 0-->No drift, leg holds 30 degree position for full 5 secs   7.   Limb Ataxia 0-->Absent   8.   Sensory 0-->Normal, no sensory loss   9.   Best Language 1-->Mild-to-moderate aphasia, some obvious loss of fluency or facility of comprehension, without significant limitation on ideas expressed or form of expression. Reduction of speech and/or comprehension, however, makes conversation. . . (see row details)   10. Dysarthria 0-->Normal   11. Extinction and Inattention  0-->No abnormality   Total 2 (04/13/24 6793)            Labs     CBC  Lab Results   Component Value Date    HGB 13.3 04/13/2024    HCT 41.0 04/13/2024    WBC 11.2 (H) 04/13/2024     04/13/2024       BMP  Lab Results   Component Value Date     04/13/2024    POTASSIUM 3.7 04/13/2024    CHLORIDE 97 (L) 04/13/2024    CO2 29 04/13/2024    BUN 23.2 (H) 04/13/2024    CR 1.01 (H) 04/13/2024     (H) 04/13/2024    MONI 9.8 04/13/2024       INR  INR   Date Value Ref Range Status   04/13/2024 0.99 0.85 - 1.15 Final     INR - Historical   Date Value Ref Range Status   01/16/2015 1.0 <1.3        Data   Stroke Code Data  (for stroke code with tele)  Stroke code activated 04/13/24  2120   First  "stroke provider response 04/13/24 2122   Video start time 04/13/24 2330   Video end time 04/14/24  0010   Last known normal 04/13/24 2030   Time of discovery (or onset of symptoms)  04/13/24 2030   Head CT read by Stroke Neuro Provider 04/13/24 2143   Was stroke code de-escalated? No           Telestroke Service Details  Type of service telemedicine diagnostic assessment of acute neurological changes   Reason telemedicine is appropriate patient requires assessment with a specialist for diagnosis and treatment of neurological symptoms   Mode of transmission secure interactive audio and video communication per St[facility]   Originating site (patient location) [facility]    Distant site (provider location) Provider remote site        Clinically Significant Risk Factors Present on Admission                # Drug Induced Platelet Defect: home medication list includes an antiplatelet medication   # Hypertension: Noted on problem list      # Obesity: Estimated body mass index is 31.04 kg/m  as calculated from the following:    Height as of this encounter: 1.702 m (5' 7\").    Weight as of this encounter: 89.9 kg (198 lb 3.1 oz).           # CKD, : Will monitor and treat as appropriate  - last Cr =  1.01 mg/dL (Ref range: 0.51 - 0.95 mg/dL)  - last GFR = 57 mL/min/1.73m2 (Ref range: >60 mL/min/1.73m2)       Time Spent on this Encounter   Billing:    "

## 2024-04-14 NOTE — ED NOTES
Spoke with patient about supporting her jacinta.  Offered to call Father Hayden for an anointing of the sick, patient and  accepting of this and Father Hayden was called.

## 2024-04-14 NOTE — ED NOTES
This writer attempted IV x1.   2 another nurses attempted IV x1 all without success.   Provider came in with ultrasound and attempted IV.  Was unsuccessful.   Pt went to CT for initial head CT scan at this time.   Lori Hatch RN on 4/13/2024 at 9:42 PM

## 2024-04-14 NOTE — ED NOTES
Improvement noted in speech, though some word finding noted but it is mild.  Her speech is faster.  Headache is very mild.  All other deficits noted.

## 2024-04-26 ENCOUNTER — TELEPHONE (OUTPATIENT)
Dept: MEDSURG UNIT | Facility: OTHER | Age: 79
End: 2024-04-26
Payer: COMMERCIAL

## 2024-04-26 NOTE — TELEPHONE ENCOUNTER
After verify last name and date of birth, spoke with patient's  and states patient would like an appointment with [facility] and not [facility].    Hanna Hutchins LPN on 4/26/2024 at 3:29 PM

## 2024-04-26 NOTE — TELEPHONE ENCOUNTER
Patient has been scheduled for an appointment with BEK[facility] on 4/30/24 at 10:20     Hanna Hutchins LPN on 4/26/2024 at 3:39 PM

## 2024-04-30 ENCOUNTER — OFFICE VISIT (OUTPATIENT)
Dept: INTERNAL MEDICINE | Facility: OTHER | Age: 79
End: 2024-04-30
Attending: NURSE PRACTITIONER
Payer: COMMERCIAL

## 2024-04-30 VITALS
RESPIRATION RATE: 16 BRPM | HEIGHT: 67 IN | WEIGHT: 198 LBS | DIASTOLIC BLOOD PRESSURE: 84 MMHG | HEART RATE: 67 BPM | BODY MASS INDEX: 31.08 KG/M2 | OXYGEN SATURATION: 98 % | SYSTOLIC BLOOD PRESSURE: 150 MMHG | TEMPERATURE: 97.2 F

## 2024-04-30 DIAGNOSIS — D72.820 MONOCLONAL B-CELL LYMPHOCYTOSIS: ICD-10-CM

## 2024-04-30 DIAGNOSIS — M05.79 RHEUMATOID ARTHRITIS INVOLVING MULTIPLE SITES WITH POSITIVE RHEUMATOID FACTOR (H): ICD-10-CM

## 2024-04-30 DIAGNOSIS — J30.89 SEASONAL ALLERGIC RHINITIS DUE TO OTHER ALLERGIC TRIGGER: ICD-10-CM

## 2024-04-30 DIAGNOSIS — N28.89 RIGHT RENAL MASS: ICD-10-CM

## 2024-04-30 DIAGNOSIS — I63.512 ACUTE ISCHEMIC LEFT MCA STROKE (H): Primary | ICD-10-CM

## 2024-04-30 DIAGNOSIS — I65.22 LEFT CAROTID STENOSIS: ICD-10-CM

## 2024-04-30 DIAGNOSIS — R05.3 CHRONIC COUGH: ICD-10-CM

## 2024-04-30 DIAGNOSIS — I13.0 HYPERTENSIVE HEART AND CHRONIC KIDNEY DISEASE WITH HEART FAILURE AND STAGE 1 THROUGH STAGE 4 CHRONIC KIDNEY DISEASE, OR UNSPECIFIED CHRONIC KIDNEY DISEASE (H): ICD-10-CM

## 2024-04-30 LAB
BASOPHILS # BLD AUTO: 0.1 10E3/UL (ref 0–0.2)
BASOPHILS NFR BLD AUTO: 1 %
EOSINOPHIL # BLD AUTO: 0.3 10E3/UL (ref 0–0.7)
EOSINOPHIL NFR BLD AUTO: 3 %
ERYTHROCYTE [DISTWIDTH] IN BLOOD BY AUTOMATED COUNT: 15 % (ref 10–15)
HCT VFR BLD AUTO: 41.7 % (ref 35–47)
HGB BLD-MCNC: 13.2 G/DL (ref 11.7–15.7)
IMM GRANULOCYTES # BLD: 0.1 10E3/UL
IMM GRANULOCYTES NFR BLD: 1 %
LDH SERPL L TO P-CCNC: 244 U/L (ref 0–250)
LYMPHOCYTES # BLD AUTO: 3 10E3/UL (ref 0.8–5.3)
LYMPHOCYTES NFR BLD AUTO: 24 %
MCH RBC QN AUTO: 31.4 PG (ref 26.5–33)
MCHC RBC AUTO-ENTMCNC: 31.7 G/DL (ref 31.5–36.5)
MCV RBC AUTO: 99 FL (ref 78–100)
MONOCYTES # BLD AUTO: 0.6 10E3/UL (ref 0–1.3)
MONOCYTES NFR BLD AUTO: 5 %
NEUTROPHILS # BLD AUTO: 8.5 10E3/UL (ref 1.6–8.3)
NEUTROPHILS NFR BLD AUTO: 68 %
NRBC # BLD AUTO: 0 10E3/UL
NRBC BLD AUTO-RTO: 0 /100
PLATELET # BLD AUTO: 269 10E3/UL (ref 150–450)
RBC # BLD AUTO: 4.21 10E6/UL (ref 3.8–5.2)
WBC # BLD AUTO: 12.5 10E3/UL (ref 4–11)

## 2024-04-30 PROCEDURE — 85025 COMPLETE CBC W/AUTO DIFF WBC: CPT | Mod: ZL | Performed by: NURSE PRACTITIONER

## 2024-04-30 PROCEDURE — 36415 COLL VENOUS BLD VENIPUNCTURE: CPT | Mod: ZL | Performed by: NURSE PRACTITIONER

## 2024-04-30 PROCEDURE — 83615 LACTATE (LD) (LDH) ENZYME: CPT | Mod: ZL | Performed by: NURSE PRACTITIONER

## 2024-04-30 PROCEDURE — G0463 HOSPITAL OUTPT CLINIC VISIT: HCPCS

## 2024-04-30 PROCEDURE — 99215 OFFICE O/P EST HI 40 MIN: CPT | Performed by: NURSE PRACTITIONER

## 2024-04-30 RX ORDER — ALBUTEROL SULFATE 90 UG/1
2 AEROSOL, METERED RESPIRATORY (INHALATION) EVERY 6 HOURS PRN
Qty: 18 G | Refills: 11 | Status: SHIPPED | OUTPATIENT
Start: 2024-04-30 | End: 2024-08-14

## 2024-04-30 RX ORDER — ATORVASTATIN CALCIUM 80 MG/1
80 TABLET, FILM COATED ORAL DAILY
COMMUNITY
Start: 2024-04-26 | End: 2024-06-18

## 2024-04-30 RX ORDER — ALBUTEROL SULFATE 90 UG/1
2 AEROSOL, METERED RESPIRATORY (INHALATION)
COMMUNITY
Start: 2024-04-24 | End: 2024-04-30

## 2024-04-30 RX ORDER — ATENOLOL 50 MG/1
50 TABLET ORAL DAILY
Status: SHIPPED
Start: 2024-04-30 | End: 2024-06-18

## 2024-04-30 RX ORDER — CETIRIZINE HYDROCHLORIDE 10 MG/1
10 TABLET ORAL DAILY
Status: SHIPPED
Start: 2024-04-30 | End: 2024-08-08

## 2024-04-30 RX ORDER — ACETAMINOPHEN 325 MG/1
650 TABLET ORAL
Status: ON HOLD | COMMUNITY
Start: 2024-04-26 | End: 2024-07-19

## 2024-04-30 RX ORDER — MAGNESIUM HYDROXIDE/ALUMINUM HYDROXICE/SIMETHICONE 120; 1200; 1200 MG/30ML; MG/30ML; MG/30ML
15 SUSPENSION ORAL
Status: ON HOLD | COMMUNITY
Start: 2024-04-23 | End: 2024-07-19

## 2024-04-30 RX ORDER — ATENOLOL 50 MG/1
TABLET ORAL
COMMUNITY
Start: 2024-04-26 | End: 2024-04-30

## 2024-04-30 ASSESSMENT — PAIN SCALES - GENERAL: PAINLEVEL: NO PAIN (0)

## 2024-04-30 NOTE — NURSING NOTE
"Chief Complaint   Patient presents with    Hospital F/U       FOOD SECURITY SCREENING QUESTIONS  Hunger Vital Signs:  Within the past 12 months we worried whether our food would run out before we got money to buy more. Never  Within the past 12 months the food we bought just didn't last and we didn't have money to get more. Never  Claudia Rahman LPN 4/30/2024 10:26 AM      Initial BP (!) 148/90 (BP Location: Right arm, Patient Position: Sitting, Cuff Size: Adult Large)   Pulse 67   Temp 97.2  F (36.2  C) (Tympanic)   Resp 16   Ht 1.702 m (5' 7\")   Wt 89.8 kg (198 lb)   LMP 01/01/1998 (Within Months)   SpO2 98%   BMI 31.01 kg/m   Estimated body mass index is 31.01 kg/m  as calculated from the following:    Height as of this encounter: 1.702 m (5' 7\").    Weight as of this encounter: 89.8 kg (198 lb).  Medication Reconciliation: complete    Claudia Rahman LPN    "

## 2024-04-30 NOTE — PROGRESS NOTES
ICD-10-CM    1. Acute ischemic left MCA stroke (H)  I63.512 ticagrelor (BRILINTA) 60 MG tablet      2. Left carotid stenosis  I65.22 ticagrelor (BRILINTA) 60 MG tablet      3. Hypertensive 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 and stage 1 through stage 4 chronic kidney disease, or unspecified chronic kidney disease (H)  I13.0 atenolol (TENORMIN) 50 MG tablet      4. Rheumatoid arthritis involving multiple sites with positive rheumatoid factor (H)  M05.79       5. Right renal mass  N28.89 CT Abdomen Pelvis w Contrast      6. Chronic cough  R05.3 albuterol (PROAIR HFA/PROVENTIL HFA/VENTOLIN HFA) 108 (90 Base) MCG/ACT inhaler      7. Seasonal allergic rhinitis due to other allergic trigger  J30.89 cetirizine (ZYRTEC) 10 MG tablet      8. 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  If blood pressure greater than 135/85 then restart hydrochlorothiazide.  Keep follow-up appointment with neurology.  Continue to work with PT, OT and speech therapy.  Continue to follow with rheumatology.  Keep appointment for urology in August.  Order placed for CT abdomen and pelvis with contrast for July 2024 to follow-up on renal mass.  Chronic cough treatment directed by [facility] was to start Zyrtec and use albuterol inhaler.  She states the albuterol makes her feel anxious.  She would like to just restart Zyrtec at this time and see if that helps and then add in the albuterol if no improvement.  If not finding improvement in the next 2 to 3 weeks then would recommend further workup of the chronic cough.  She did have chest x-ray at [facility].  Continue to follow with hematology.  Hematology labs completed today.    Time spent today on history intake, record review, physical examination, reviewing lab and diagnostic studies, counseling and care coordination: 46  minutes.   Subjective   Sandra is a 78 year old, presenting for the following health issues:  Hospital F/U      4/30/2024    10:17 AM   Additional Questions   Roomed by Claudia ANDERSON     History of Present Illness       Vascular Disease:  She presents for follow up of vascular disease.      She takes daily aspirin.She exercises with enough effort to increase her heart rate 60 or more minutes per day.       She is here today to follow-up from recent hospitalization for left MCA stroke and left ICA angioplasty.  She has gained strength and right upper and lower extremity.  Word finding has improved.  She is still tired.  She had acute rehab stay at [facility].  She will be starting outpatient PT, OT and speech therapy.  Atorvastatin was increased to 80 mg daily, Brilinta twice daily started, HCTZ was placed on hold due to soft pressures and it was also recommended that she start Zyrtec and albuterol inhaler for chronic cough.  She does not have prescription for the albuterol and will be picking up Zyrtec at [facility].  The chronic cough has been occurring for more than 6 months.  Has rheumatoid arthritis and follows with rheumatology.  On Plaquenil and methotrexate.  Taking folic acid supplement.  Has renal mass and has appointment with urologist.  This had to be canceled while she was hospitalized.  It was rescheduled for August.  She is due for CT abdomen and pelvis with contrast in July.  She would like to get an order placed to have imaging completed.      Hospital Follow-up Visit:    Hospital/Nursing Home/IP Rehab Facility:  [facility]   Date of Admission: 4/13/24  Date of Discharge: 4/24/24  Reason(s) for Admission: TIA  Was the patient in the ICU or did the patient experience delirium during hospitalization?  Yes       Do you have any other stressors you would like to discuss with your provider? No    Problems taking medications regularly:  None  Medication changes since discharge: None  Problems adhering to  "non-medication therapy:  None    Summary of hospitalization:  CareEverywhere information obtained and reviewed  Diagnostic Tests/Treatments reviewed.  Follow up needed: Hypertension management  Other Healthcare Providers Involved in Patient s Care:         Specialist appointment - neurology and Physical Therapy  Update since discharge: improved.         Plan of care communicated with patient and family                   Review of Systems  Constitutional, HEENT, cardiovascular, pulmonary, gi and gu systems are negative, except as otherwise noted.      Objective    BP (!) 148/90 (BP Location: Right arm, Patient Position: Sitting, Cuff Size: Adult Large)   Pulse 67   Temp 97.2  F (36.2  C) (Tympanic)   Resp 16   Ht 1.702 m (5' 7\")   Wt 89.8 kg (198 lb)   LMP 01/01/1998 (Within Months)   SpO2 98%   BMI 31.01 kg/m    Body mass index is 31.01 kg/m .  Physical Exam   Pleasant female no acute distress.  Ambulating safely with a walker.  She was able to move from chair to examination table and back without assistance.  Slight weakness of right lower extremity.  Lung fields clear to auscultation.  Cardiovascular regular.    Hospital discharge summary notes, laboratory diagnostic studies all reviewed and discussed.  Recent rheumatology and urology notes reviewed and discussed.            Signed Electronically by: Swetha Maxwell NP    "

## 2024-05-10 ENCOUNTER — ONCOLOGY VISIT (OUTPATIENT)
Dept: ONCOLOGY | Facility: OTHER | Age: 79
End: 2024-05-10
Attending: INTERNAL MEDICINE
Payer: COMMERCIAL

## 2024-05-10 VITALS
TEMPERATURE: 97.5 F | OXYGEN SATURATION: 97 % | SYSTOLIC BLOOD PRESSURE: 138 MMHG | HEART RATE: 72 BPM | WEIGHT: 195 LBS | DIASTOLIC BLOOD PRESSURE: 84 MMHG | BODY MASS INDEX: 30.54 KG/M2 | RESPIRATION RATE: 16 BRPM

## 2024-05-10 DIAGNOSIS — D72.820 MONOCLONAL B-CELL LYMPHOCYTOSIS: Primary | ICD-10-CM

## 2024-05-10 PROCEDURE — 99214 OFFICE O/P EST MOD 30 MIN: CPT | Performed by: INTERNAL MEDICINE

## 2024-05-10 PROCEDURE — G0463 HOSPITAL OUTPT CLINIC VISIT: HCPCS | Performed by: INTERNAL MEDICINE

## 2024-05-10 ASSESSMENT — PAIN SCALES - GENERAL: PAINLEVEL: NO PAIN (0)

## 2024-05-10 NOTE — NURSING NOTE
"Oncology Rooming Note    May 10, 2024 10:07 AM   Margaret Batista is a 78 year old female who presents for:    Chief Complaint   Patient presents with    Hematology     Monoclonal B-cell lymphocytosis     Initial Vitals: /84 (BP Location: Right arm, Patient Position: Sitting, Cuff Size: Adult Large)   Pulse 72   Temp 97.5  F (36.4  C) (Tympanic)   Resp 16   Wt 88.5 kg (195 lb)   LMP 01/01/1998 (Within Months)   SpO2 97%   BMI 30.54 kg/m   Estimated body mass index is 30.54 kg/m  as calculated from the following:    Height as of 4/30/24: 1.702 m (5' 7\").    Weight as of this encounter: 88.5 kg (195 lb). Body surface area is 2.05 meters squared.  No Pain (0) Comment: Data Unavailable   Patient's last menstrual period was 01/01/1998 (within months).  Allergies reviewed: Yes  Medications reviewed: No    Medications: Medication refills not needed today.  Pharmacy name entered into EPIC:    [facility] [place] - [address] [place] [address] [facility] - [facility] AT PORTAL TO [facility] SITES    Frailty Screening:   Is the patient here for a new oncology consult visit in cancer care? 2. No      Clinical concerns: nothing specific     Uzma Lepe CMA ([facility]) 5/10/2024 10:07 AM  "

## 2024-05-10 NOTE — PROGRESS NOTES
HEMATOLOGY FOLLOW UP NOTE  May 10, 2024    Reason for follow up: Monoclonal B-cell lymphocytosis      HISTORY OF PRESENT ILLNESS  Margaret Batista is a 78 year old female with with PMH as stated below who is seen in the oncology clinic for monoclonal B-cell lymphocytosis:    Her history in short is as follows    Review of labs done in epic show intermittent elevated white blood cell count dating back to 1/25/2021.  At that time her WBC count was 12.8.  More recently on 5/10/2023 her white cell count was 10.6.    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    6/8/2023: CT soft tissue neck with contrast: No cervical adenopathy. There is a large nodule in the left lobe of the thyroid, for which a dedicated thyroid ultrasound could be considered.There is moderate to severe calcified atherosclerosis of the carotid bulbs bilaterally. CT angiogram or carotid ultrasound could be  considered for further evaluation.    6/8/2023: CT chest, abdomen and pelvis with contrast:No adenopathy in the chest, abdomen, or pelvis. No splenomegaly.There is an enhancing 18 mm mass in the upper pole of the right kidney  concerning for renal cell carcinoma.    Interim history:     Ms. Batista is doing very well.  Has been having fatigue. She denies any weight loss or appetite loss.  Denies any night sweats.  Denies any fever or chills.  Denies any recurrent infection. Was recently admitted to [facility] with stroke. Fatigue has been worse since then. Slowly getting better.     REVIEW OF SYSTEMS  A 12-point ROS negative except as in HPI      Current Outpatient Medications   Medication Sig Dispense Refill    albuterol (PROAIR HFA/PROVENTIL HFA/VENTOLIN HFA) 108 (90 Base) MCG/ACT inhaler Inhale 2 puffs into the lungs every 6 hours as needed for shortness of breath,  wheezing or cough 18 g 11    allopurinol (ZYLOPRIM) 300 MG tablet TAKE 1 TABLET DAILY 90 tablet 1    aspirin (ASA) 81 MG chewable tablet Take 1 tablet (81 mg) by mouth daily 90 tablet 3    atenolol (TENORMIN) 50 MG tablet Take 1 tablet (50 mg) by mouth daily      atorvastatin (LIPITOR) 80 MG tablet Take 80 mg by mouth daily      acetaminophen (TYLENOL) 325 MG tablet Take 650 mg by mouth      alum & mag hydroxide-simethicone (MAALOX) 200-200-20 MG/5ML SUSP suspension Take 15 mLs by mouth      cetirizine (ZYRTEC) 10 MG tablet Take 1 tablet (10 mg) by mouth daily      Cholecalciferol (VITAMIN D3) 2000 UNITS CAPS Take 2,000 Units by mouth daily      folic acid (FOLVITE) 1 MG tablet TAKE 1 TABLET DAILY 90 tablet 3    hydrochlorothiazide (HYDRODIURIL) 25 MG tablet Take 1 tablet (25 mg) by mouth daily (Patient not taking: Reported on 4/30/2024) 90 tablet 3    hydroxychloroquine (PLAQUENIL) 200 MG tablet 400 mg daily alternating with 200 mg daily (Patient taking differently: Take 200 mg by mouth daily Every other day. One day 200mg next day 400mg)      isosorbide mononitrate (IMDUR) 30 MG 24 hr tablet Take 1 tablet (30 mg) by mouth daily 90 tablet 3    methotrexate 2.5 MG tablet Take 20 mg by mouth once a week      nitroGLYcerin (NITROSTAT) 0.4 MG sublingual tablet Place 1 tablet (0.4 mg) under the tongue every 5 minutes as needed for chest pain (For chest pain x 3 doses) 30 tablet 3    ticagrelor (BRILINTA) 60 MG tablet Take 1 tablet (60 mg) by mouth 2 times daily 180 tablet 4       Allergies   Allergen Reactions    Food      Macadamia nuts make mouth itch    Losartan      Allergic rxn with hives and angioedema suspected to be from alternative manufacture of the effient or the losartan    Plavix [Clopidogrel]      Plavix resistant-tested at [place]    [facility]     Immunization History   Administered Date(s) Administered    COVID-19 Bivalent 12+ (Pfizer) 09/28/2022    COVID-19 MONOVALENT 12+ (Pfizer) 05/07/2021,  06/03/2021, 01/03/2022    Flu, Unspecified 02/29/2008    Influenza (High Dose) 3 valent vaccine 10/15/2015, 10/26/2016, 11/13/2017, 10/24/2018, 10/08/2019    Influenza (IIV3) PF 12/03/2004, 10/25/2006, 02/29/2008, 10/05/2010, 11/17/2011, 10/05/2012, 10/11/2013    Influenza Vaccine 65+ (Fluzone HD) 09/16/2020, 10/21/2021, 11/16/2022, 12/13/2023    Influenza Vaccine >6 months,quad, PF 10/10/2014    Influenza, seasonal, injectable, PF 10/05/2010, 11/17/2011    Pneumo Conj 13-V (2010&after) 11/16/2015    Pneumococcal 23 valent 10/05/2012    TDAP Vaccine (Boostrix) 08/29/2007, 05/29/2020    Tdap (Adult) Unspecified Formulation 10/02/1996    Triamcinolone Acetonide 10/13/2014, 12/01/2014    Zoster recombinant adjuvanted (SHINGRIX) 05/29/2020, 09/22/2020    Zoster vaccine, live 02/23/2011       Past Medical History:   Diagnosis Date    Atherosclerotic heart disease of native coronary artery without angina pectoris     -s/p ADÁN to the mid-LAD, mid-circumflex, mid-right coronary artery, proximal right coronary  artery, and PTCA of a marginal branch of the right coronary artery 04/30/2007. -s/p ADÁN to proximal left anterior descending 10/29/13.    Contusion of abdominal wall     2014    Essential (primary) hypertension     12/14/2006    Gout     No Comments Provided    Hyperlipidemia     4/4/2007    Localized swelling, mass, or lump of upper extremity     5/26/2017    Other specified counseling     10/28/2013,Patient has identified Health Care Agent(s): Yes Add Health Care Agents: Yes   Health Care Agent(s): Primary Health Care Agent: Jay Batista  Relationship:  Phone:   Secondary Health Care Agent:  Relationship: Daughter Phone:   Conservator:  Relationship:  Phone:   Guardian: Relationship:  Phone:    Patient has Advance Care Plan Documents (Health Care Directive, POLST): No, refe*    Polyosteoarthritis     No Comments Provided    Postmenopausal bleeding     No Comments Provided    Presence of coronary angioplasty  implant and graft     ,unstable angina; severe prox LAD stenosis; s/p 7 stents    Primary osteoarthritis of left hand     2017    Rheumatoid arthritis (H)     follows at [place] yearly       Past Surgical History:   Procedure Laterality Date    ARTHROPLASTY KNEE      2011    ARTHROSCOPY KNEE          BIOPSY BREAST      10/25/95,BRIAN RAMIREZ    CAROTID ART[facility]     [facility]      [facility], Section    COLONOSCOPY      05,Normal - Repeat in 10 years    COLONOSCOPY  2016    COLONOSCOPY N/A 2019    Procedure: COLONOSCOPY;  Surgeon: Evelin Thompson MD;  Location: [facility] OR    COLONOSCOPY N/A 2019    Procedure: COMBINED COLONOSCOPY, SINGLE OR MULTIPLE BIOPSY/POLYPECTOMY BY BIOPSY;  Surgeon: Evelin Thompson MD;  Location: [facility] OR    ENDOSCOPIC RELEASE CARPAL TUNNEL Right 2023    Procedure: RELEASE, CARPAL TUNNEL, ENDOSCOPIC;  Surgeon: Danial Stanley MD;  Location:  OR    ESOPHAGOSCOPY, GASTROSCOPY, DUODENOSCOPY (EGD), COMBINED      2011,erosive gastritis;bx;consider MRI/A;[facility]    EXTRACTION(S) DENTAL      1970    HEART CATH, ANGIOPLASTY      10/29/2013,ADÁN to proximal left anterior descending    OTHER SURGICAL HISTORY      ,400878,OTHER    OTHER SURGICAL HISTORY      2014,DNU368,TOTAL SHOULDER ARTHROPLASTY,Right    OTHER SURGICAL HISTORY      2016,88824.0,CO COLONOSCOPY REMOVE ELIZA POLYP LESN SNARE,repeat , precancerous polyps    ZZ STRESS LEXISCAN TEST  2018    With Dr. Irwin - normal       SOCIAL HISTORY  History   Smoking Status    Never   Smokeless Tobacco    Never    Social History    Substance and Sexual Activity      Alcohol use: Yes        Comment: 2 drinks a month     History   Drug Use No       FAMILY HISTORY  Family History   Problem Relation Age of Onset    Heart Disease Mother         Heart Disease    Hypertension Mother         Hypertension    Other -  See Comments Mother         Stroke    Breast Cancer Mother 53        Cancer-breast    Arthritis Father         Arthritis    Cancer Father         Cancer    Heart Disease Father         Heart Disease    Hypertension Father         Hypertension    Atrial fibrillation Sister     Melanoma Brother     Heart Disease Brother         Heart Disease       PHYSICAL EXAMINATION  LMP 01/01/1998 (Within Months)   Wt Readings from Last 2 Encounters:   04/30/24 89.8 kg (198 lb)   04/13/24 89.9 kg (198 lb 3.1 oz)     Physical Exam  Constitutional:       Appearance: Normal appearance.   Pulmonary:      Effort: Pulmonary effort is normal.   Lymphadenopathy:      Cervical: No cervical adenopathy.      Right cervical: No superficial cervical adenopathy.     Left cervical: No superficial cervical adenopathy.      Upper Body:      Right upper body: No supraclavicular or axillary adenopathy.      Left upper body: No supraclavicular or axillary adenopathy.   Neurological:      Mental Status: She is alert and oriented to person, place, and time.     Laboratory and imaging:     Latest Reference Range & Units 04/30/24 11:19   Lactate Dehydrogenase 0 - 250 U/L 244   WBC 4.0 - 11.0 10e3/uL 12.5 (H)   Hemoglobin 11.7 - 15.7 g/dL 13.2   Hematocrit 35.0 - 47.0 % 41.7   Platelet Count 150 - 450 10e3/uL 269   (H): Data is abnormally high    1.  Monoclonal B-cell lymphocytosis:    Found to have intermittent leukocytosis dating back to 1/25/2021.  Leukocytosis is mostly generalized and there is no increase in absolute lymphocyte count.    A peripheral smear was ordered by her PCP and a flow cytometry was done by pathology which showed a monotypic B-cell population with the absolute monotypic B-cell count of 1300.  Therefore she meets criteria for monocytic B-cell lymphocytosis.    She does not have any new B symptoms currently.  No adenopathy on scans.     Was found to have other additional incidental findings on CT scans. She has seen ENT and Urology  for the thyroid nodule and renal mass respectively. Also underwent US carotid which did not show significant stenosis. She was recently admitted to [place] with stroke.     Labs checked on 4/30/2024 show a WBC count of 12.5. Her ANC is slightly elevated at 8.5. ALC is WNL. Hemoglobin is 13.2 and platelet count is 269. Counts have been stable and she is asymptomatic.     Given that she is doing well, will see her back in 1 year with CBCD, CMP and LDH. B symptoms discussed. She should let us know if she develops any.     Total time spent on the patient on day of encounter was 30 minutes doing chart review, review of test results, interpretation of results, patient visit and documentation.       Virgen Pitts MD

## 2024-05-18 ENCOUNTER — APPOINTMENT (OUTPATIENT)
Dept: CT IMAGING | Facility: OTHER | Age: 79
End: 2024-05-18
Attending: INTERNAL MEDICINE
Payer: COMMERCIAL

## 2024-05-18 ENCOUNTER — HOSPITAL ENCOUNTER (EMERGENCY)
Facility: OTHER | Age: 79
Discharge: ANOTHER HOSPITAL | End: 2024-05-19
Attending: INTERNAL MEDICINE | Admitting: INTERNAL MEDICINE
Payer: COMMERCIAL

## 2024-05-18 VITALS
WEIGHT: 195 LBS | BODY MASS INDEX: 30.61 KG/M2 | SYSTOLIC BLOOD PRESSURE: 145 MMHG | TEMPERATURE: 97.6 F | HEART RATE: 74 BPM | HEIGHT: 67 IN | RESPIRATION RATE: 13 BRPM | DIASTOLIC BLOOD PRESSURE: 86 MMHG | OXYGEN SATURATION: 97 %

## 2024-05-18 DIAGNOSIS — R41.0 CONFUSION: Primary | ICD-10-CM

## 2024-05-18 DIAGNOSIS — M05.79 RHEUMATOID ARTHRITIS INVOLVING MULTIPLE SITES WITH POSITIVE RHEUMATOID FACTOR (H): ICD-10-CM

## 2024-05-18 DIAGNOSIS — D84.9 IMMUNOCOMPROMISED (H): ICD-10-CM

## 2024-05-18 DIAGNOSIS — Z86.73 HISTORY OF ISCHEMIC LEFT MCA STROKE: ICD-10-CM

## 2024-05-18 DIAGNOSIS — R51.9 ACUTE INTRACTABLE HEADACHE, UNSPECIFIED HEADACHE TYPE: ICD-10-CM

## 2024-05-18 DIAGNOSIS — R47.89 WORD FINDING DIFFICULTY: ICD-10-CM

## 2024-05-18 LAB
ALBUMIN UR-MCNC: NEGATIVE MG/DL
ANION GAP SERPL CALCULATED.3IONS-SCNC: 12 MMOL/L (ref 7–15)
APPEARANCE UR: CLEAR
APTT PPP: 26 SECONDS (ref 22–38)
BASOPHILS # BLD AUTO: 0.1 10E3/UL (ref 0–0.2)
BASOPHILS NFR BLD AUTO: 0 %
BILIRUB UR QL STRIP: NEGATIVE
BUN SERPL-MCNC: 17.9 MG/DL (ref 8–23)
CALCIUM SERPL-MCNC: 9.3 MG/DL (ref 8.8–10.2)
CHLORIDE SERPL-SCNC: 105 MMOL/L (ref 98–107)
COLOR UR AUTO: YELLOW
CREAT SERPL-MCNC: 0.96 MG/DL (ref 0.51–0.95)
DEPRECATED HCO3 PLAS-SCNC: 22 MMOL/L (ref 22–29)
EGFRCR SERPLBLD CKD-EPI 2021: 61 ML/MIN/1.73M2
EOSINOPHIL # BLD AUTO: 0.6 10E3/UL (ref 0–0.7)
EOSINOPHIL NFR BLD AUTO: 5 %
ERYTHROCYTE [DISTWIDTH] IN BLOOD BY AUTOMATED COUNT: 15.1 % (ref 10–15)
GLUCOSE BLDC GLUCOMTR-MCNC: 90 MG/DL (ref 70–99)
GLUCOSE SERPL-MCNC: 95 MG/DL (ref 70–99)
GLUCOSE UR STRIP-MCNC: NEGATIVE MG/DL
HCT VFR BLD AUTO: 38.4 % (ref 35–47)
HGB BLD-MCNC: 12.3 G/DL (ref 11.7–15.7)
HGB UR QL STRIP: NEGATIVE
IMM GRANULOCYTES # BLD: 0.1 10E3/UL
IMM GRANULOCYTES NFR BLD: 1 %
INR PPP: 0.98 (ref 0.85–1.15)
KETONES UR STRIP-MCNC: NEGATIVE MG/DL
LEUKOCYTE ESTERASE UR QL STRIP: ABNORMAL
LYMPHOCYTES # BLD AUTO: 3.1 10E3/UL (ref 0.8–5.3)
LYMPHOCYTES NFR BLD AUTO: 26 %
MCH RBC QN AUTO: 31.1 PG (ref 26.5–33)
MCHC RBC AUTO-ENTMCNC: 32 G/DL (ref 31.5–36.5)
MCV RBC AUTO: 97 FL (ref 78–100)
MONOCYTES # BLD AUTO: 0.6 10E3/UL (ref 0–1.3)
MONOCYTES NFR BLD AUTO: 5 %
NEUTROPHILS # BLD AUTO: 7.7 10E3/UL (ref 1.6–8.3)
NEUTROPHILS NFR BLD AUTO: 64 %
NITRATE UR QL: NEGATIVE
NRBC # BLD AUTO: 0 10E3/UL
NRBC BLD AUTO-RTO: 0 /100
PH UR STRIP: 6 [PH] (ref 5–9)
PLATELET # BLD AUTO: 173 10E3/UL (ref 150–450)
POTASSIUM SERPL-SCNC: 3.8 MMOL/L (ref 3.4–5.3)
RBC # BLD AUTO: 3.96 10E6/UL (ref 3.8–5.2)
RBC URINE: 1 /HPF
SODIUM SERPL-SCNC: 139 MMOL/L (ref 135–145)
SP GR UR STRIP: 1.02 (ref 1–1.03)
SQUAMOUS EPITHELIAL: 1 /HPF
TROPONIN T SERPL HS-MCNC: 20 NG/L
UROBILINOGEN UR STRIP-MCNC: NORMAL MG/DL
WBC # BLD AUTO: 12 10E3/UL (ref 4–11)
WBC URINE: 6 /HPF

## 2024-05-18 PROCEDURE — 81001 URINALYSIS AUTO W/SCOPE: CPT | Performed by: INTERNAL MEDICINE

## 2024-05-18 PROCEDURE — 85610 PROTHROMBIN TIME: CPT | Performed by: INTERNAL MEDICINE

## 2024-05-18 PROCEDURE — 250N000013 HC RX MED GY IP 250 OP 250 PS 637: Performed by: INTERNAL MEDICINE

## 2024-05-18 PROCEDURE — 93005 ELECTROCARDIOGRAM TRACING: CPT

## 2024-05-18 PROCEDURE — 258N000003 HC RX IP 258 OP 636: Performed by: INTERNAL MEDICINE

## 2024-05-18 PROCEDURE — 84484 ASSAY OF TROPONIN QUANT: CPT | Performed by: INTERNAL MEDICINE

## 2024-05-18 PROCEDURE — 250N000011 HC RX IP 250 OP 636: Performed by: INTERNAL MEDICINE

## 2024-05-18 PROCEDURE — 96365 THER/PROPH/DIAG IV INF INIT: CPT | Mod: XU

## 2024-05-18 PROCEDURE — 85025 COMPLETE CBC W/AUTO DIFF WBC: CPT | Performed by: INTERNAL MEDICINE

## 2024-05-18 PROCEDURE — 80048 BASIC METABOLIC PNL TOTAL CA: CPT | Performed by: INTERNAL MEDICINE

## 2024-05-18 PROCEDURE — 36415 COLL VENOUS BLD VENIPUNCTURE: CPT | Performed by: INTERNAL MEDICINE

## 2024-05-18 PROCEDURE — 250N000011 HC RX IP 250 OP 636: Mod: JZ | Performed by: INTERNAL MEDICINE

## 2024-05-18 PROCEDURE — 87086 URINE CULTURE/COLONY COUNT: CPT | Performed by: INTERNAL MEDICINE

## 2024-05-18 PROCEDURE — 93010 ELECTROCARDIOGRAM REPORT: CPT | Performed by: STUDENT IN AN ORGANIZED HEALTH CARE EDUCATION/TRAINING PROGRAM

## 2024-05-18 PROCEDURE — 82962 GLUCOSE BLOOD TEST: CPT

## 2024-05-18 PROCEDURE — 70450 CT HEAD/BRAIN W/O DYE: CPT

## 2024-05-18 PROCEDURE — 99291 CRITICAL CARE FIRST HOUR: CPT | Mod: 25

## 2024-05-18 PROCEDURE — 70496 CT ANGIOGRAPHY HEAD: CPT

## 2024-05-18 PROCEDURE — 85730 THROMBOPLASTIN TIME PARTIAL: CPT | Performed by: INTERNAL MEDICINE

## 2024-05-18 PROCEDURE — 99285 EMERGENCY DEPT VISIT HI MDM: CPT | Performed by: INTERNAL MEDICINE

## 2024-05-18 RX ORDER — AMLODIPINE BESYLATE 5 MG/1
5 TABLET ORAL ONCE
Status: COMPLETED | OUTPATIENT
Start: 2024-05-18 | End: 2024-05-18

## 2024-05-18 RX ORDER — IOPAMIDOL 755 MG/ML
75 INJECTION, SOLUTION INTRAVASCULAR ONCE
Status: COMPLETED | OUTPATIENT
Start: 2024-05-18 | End: 2024-05-18

## 2024-05-18 RX ADMIN — LEVETIRACETAM 1000 MG: 100 INJECTION, SOLUTION INTRAVENOUS at 22:08

## 2024-05-18 RX ADMIN — TICAGRELOR 90 MG: 90 TABLET ORAL at 21:57

## 2024-05-18 RX ADMIN — AMLODIPINE BESYLATE 5 MG: 5 TABLET ORAL at 21:57

## 2024-05-18 RX ADMIN — IOPAMIDOL 75 ML: 755 INJECTION, SOLUTION INTRAVENOUS at 19:23

## 2024-05-18 ASSESSMENT — ACTIVITIES OF DAILY LIVING (ADL)
ADLS_ACUITY_SCORE: 35

## 2024-05-18 ASSESSMENT — COLUMBIA-SUICIDE SEVERITY RATING SCALE - C-SSRS
6. HAVE YOU EVER DONE ANYTHING, STARTED TO DO ANYTHING, OR PREPARED TO DO ANYTHING TO END YOUR LIFE?: NO
2. HAVE YOU ACTUALLY HAD ANY THOUGHTS OF KILLING YOURSELF IN THE PAST MONTH?: NO
1. IN THE PAST MONTH, HAVE YOU WISHED YOU WERE DEAD OR WISHED YOU COULD GO TO SLEEP AND NOT WAKE UP?: NO

## 2024-05-18 NOTE — CONSULTS
"  [facility]    Stroke Telephone Note    I was called by Wesley Cheney on 05/18/24 regarding patient Margaret Batista. The patient is a 78 year old female presented with headache, gait abnormality , nausea and word finding difficulty .She had a left MCA stroke about a month ago. NIHSS- 0    Vitals  BP: (!) 147/85   Pulse: 74   Resp: 24   Temp: 97.6  F (36.4  C)   Weight: 88.5 kg (195 lb)      CT/CTA pending    Impression  #Possible Acute ischemic stroke    Recommendations  - Pending CT head w/o contrast and CTA head and neck .    My recommendations are based on the information provided over the phone by Margaret Batista's in-person providers. They are not intended to replace the clinical judgment of her in-person providers. I was not requested to personally see or examine the patient at this time.     The Stroke Staff is Dr. Brown.    Nu Dodd MD  Vascular Neurology Fellow    To page me or covering stroke neurology team member, click here: AMCOM  Choose \"On Call\" tab at top, then select \"NEUROLOGY/ALL SITES\" from middle drop-down box, press Enter, then look for \"stroke\" or \"telestroke\" for your site.   "

## 2024-05-18 NOTE — ED TRIAGE NOTES
"Pt presents to ED via private car with c/o headache, nausea, and word finding difficulty. LKW 1500 per .Pt has hx of CVA BP (!) 173/104   Pulse 78   Temp 97.6  F (36.4  C) (Tympanic)   Resp 18   Ht 1.702 m (5' 7\")   Wt 88.5 kg (195 lb)   LMP 01/01/1998 (Within Months)   SpO2 95%   BMI 30.54 kg/m         Triage Assessment (Adult)       Row Name 05/18/24 1744          Triage Assessment    Airway WDL WDL        Respiratory WDL    Respiratory WDL WDL        Skin Circulation/Temperature WDL    Skin Circulation/Temperature WDL WDL        Cardiac WDL    Cardiac WDL WDL        Peripheral/Neurovascular WDL    Peripheral Neurovascular WDL WDL        Cognitive/Neuro/Behavioral WDL    Cognitive/Neuro/Behavioral WDL X;speech     Speech other (see comments)  word finding difficulty                     "

## 2024-05-18 NOTE — ED PROVIDER NOTES
Emergency Department Provider Note  : 1945 Age: 78 year old Sex: female MRN: [id number]    Chief Complaint   Patient presents with    Altered Mental Status       Medical Decision Making / Assessment / Plan   78 year old female presenting with confusion, altered mental status, headache, nausea, word finding difficulty -TIA versus seizure    ED Course as of 24 2143   Sat May 18, 2024   1748 Brought in by EMS.  Approximately 4 PM was last known well.  Developed frontal headache and nausea today.  No blurred vision.  Some word finding difficulties noted in the emergency room at 1740 shortly after arrival by EMS.  Has history of left MCA stroke.  She is immunocompromised, has rheumatoid arthritis.  Suspect TIA.  She has a stroke score of 0.  No large focal defects.  History of stroke 30 days ago.  EMS report was that she was not acting right and was having equal but unsteady gait.   175 TIA/stroke workup.  None code stroke order set.     Blood pressure is elevated 173/104.  EKG obtained.  Findings show sinus rhythm with rate of 72.  Left axis deviation.  Abnormal EKG.    Currently has very mild frontal headache.     is available.  States that she is having confusion, not at her baseline at this time.  During her emergency room and subsequent hospitalization about a month ago, was given TNK, she developed 3 small hemorrhages, up to maximum of 8 mm.  Subsequently underwent carotid stent placement after hospital discharge.    Basic metabolic panel shows creatinine 0.96.  Otherwise normal.  Potassium 3.8.  Troponin 20.  INR 0.98.  Glucose 90.  PTT 26.  White count 12,000.  Hemoglobin 12.3.  Platelet count 173.    Head CT and CTA head and neck shows old left basal ganglier infarctions.  No acute infarctions.  No significant flow-limiting stenosis noted in the head or neck.     Urinalysis ordered.    Urinalysis appears negative.  No obvious infection.    Spoke with stroke  neurology.  They recommend continuing aspirin, Brilinta.  Recommend brain MRI.  Recommend EEG.  Unfortunately we have no EEG at [facility] and MRI is not available until Monday.  Recommend patient transfer at this time.  Call placed to [facility] hospitalist.  Patient was hospitalized there about a month ago with left MCA stroke/occlusion, status post TNK with post TNK treatment showing 3 small hemorrhages.  Subsequently treated with left carotid stent.   2135 Patient accepted for transfer to [facility], Dr. Chan.   Patient normally takes 60 mg of Brilinta at home.  We only have 90 mg.  90 mg x 1 dose ordered.  1000 mg IV Keppra ordered.   2141 Blood pressure is climbing.  Now 181/92.  5 mg oral amlodipine ordered.        New Prescriptions    No medications on file       Final diagnoses:   Confusion   Acute intractable headache, unspecified headache type   Word finding difficulty   Immunocompromised (H24)   Rheumatoid arthritis involving multiple sites with positive rheumatoid factor (H)   History of ischemic left MCA stroke       Wesley Cheney MD  5/18/2024   Emergency Department    Sylvie Robles is a 78 year old female who presents at  5:36 PM with concern for TIA/stroke.  She had a stroke about 30 days ago.  Reported that she was having unsteady gait, not acting right.  Frontal headache starting about 3 PM today.  No blurred vision.  Brief episode of word finding difficulties in emergency room.  History of left MCA stroke recently.  Immunocompromised status with rheumatoid arthritis.    Stroke score 0.    I have reviewed the Medications, Allergies, Past Medical and Surgical History, and Social History in the Epic System and with family.    Review of Systems:  Please see Subjective / HPI for pertinent positives and negatives. All other systems reviewed and found to be negative.      Objective   Patient Vitals for the past 24 hrs:   BP Temp Temp src Pulse Resp SpO2 Height Weight  "  05/18/24 2125 (!) 186/93 -- -- 75 13 97 % -- --   05/18/24 2110 (!) 173/89 -- -- 68 18 95 % -- --   05/18/24 2100 (!) 181/92 -- -- 71 12 96 % -- --   05/18/24 2043 (!) 163/92 -- -- 71 -- 95 % -- --   05/18/24 2033 (!) 146/118 -- -- 72 10 98 % -- --   05/18/24 2014 (!) 159/83 -- -- 72 19 95 % -- --   05/18/24 2004 (!) 158/79 -- -- 72 16 96 % -- --   05/18/24 1944 (!) 141/94 -- -- 70 20 99 % -- --   05/18/24 1914 (!) 152/106 -- -- 74 16 -- -- --   05/18/24 1855 (!) 160/83 -- -- 74 10 -- -- --   05/18/24 1845 (!) 141/96 -- -- 75 12 -- -- --   05/18/24 1825 (!) 157/89 -- -- 70 15 -- -- --   05/18/24 1815 (!) 156/95 -- -- 72 12 -- -- --   05/18/24 1800 (!) 147/85 -- -- 74 24 -- -- --   05/18/24 1745 (!) 166/106 -- -- 73 -- 98 % -- --   05/18/24 1740 (!) 173/104 -- -- 77 -- 98 % -- --   05/18/24 1737 (!) 173/104 97.6  F (36.4  C) Tympanic 78 18 95 % 1.702 m (5' 7\") 88.5 kg (195 lb)     Physical Exam:     General: Awake, alert, in no acute respiratory distress.  Head: Normocephalic, atraumatic.  Eyes: No conjunctival injection and normal lids. PERRL, EOMI.  ENT: Moist membranes, external ear appears normal.   Chest/Respiratory: Unlabored respiratory effort. Equal chest rise, clear bilaterally.  Cardiovascular: Peripheral pulses present, regular rate and rhythm.  Abdominal: Soft, non-distended, non-tender.  Extremities: No obvious long bone deformity.  Neurological: GCS 15, extremities without gross deficit.  Occasional stuttering or word finding difficulties.  Skin: Warm, no rashes, lesions, or bruising.   Psychiatric: Appropriate affect.     Procedures / Critical Care   Procedures    Aggregate Critical Care Time: None.     Orders Placed This Encounter   Procedures    CT Head w/o Contrast    CTA Head Neck with Contrast    Basic metabolic panel    INR    Partial thromboplastin time    Troponin T, High Sensitivity    CBC with platelets and differential    Glucose by meter    UA with Microscopic reflex to Culture    EKG " 12-lead, tracing only    Glucose monitor nursing POCT    Vital Signs    Neuro Checks    Cardiac Continuous Monitoring    Pulse oximetry nursing    Dysphagia Screen    Neurology IP Stroke Consult Patient to be seen: STAT within 1 hr; [facility] Call back #: [phone]    Oxygen: Nasal cannula, Oxygen mask    Urine Culture    CBC with Platelets & Differential       RESULTS: As noted above.          Medical/Surgical History:  Past Medical History:   Diagnosis Date    Atherosclerotic heart disease of native coronary artery without angina pectoris     -s/p ADÁN to the mid-LAD, mid-circumflex, mid-right coronary artery, proximal right coronary  artery, and PTCA of a marginal branch of the right coronary artery 04/30/2007. -s/p ADÁN to proximal left anterior descending 10/29/13.    Contusion of abdominal wall     2014    Essential (primary) hypertension     12/14/2006    Gout     No Comments Provided    Hyperlipidemia     4/4/2007    Localized swelling, mass, or lump of upper extremity     5/26/2017    Other specified counseling     10/28/2013,Patient has identified Health Care Agent(s): Yes Add Health Care Agents: Yes   Health Care Agent(s): Primary Health Care Agent: Jay Batista  Relationship:  Phone:   Secondary Health Care Agent:  Relationship: Daughter Phone:   Conservator:  Relationship:  Phone:   Guardian: Relationship:  Phone:    Patient has Advance Care Plan Documents (Health Care Directive, POLST): No, refe*    Polyosteoarthritis     No Comments Provided    Postmenopausal bleeding     No Comments Provided    Presence of coronary angioplasty implant and graft     2007/2013,unstable angina; severe prox LAD stenosis; s/p 7 stents    Primary osteoarthritis of left hand     5/30/2017    Rheumatoid arthritis (H)     follows at [place] yearly     Past Surgical History:   Procedure Laterality Date    ARTHROPLASTY KNEE      1/13/2011    ARTHROSCOPY KNEE      1985/2002    BIOPSY BREAST      10/25/95,BRIAN CALIX  R    CAROTID ARTERY ANGIOPLASTY Left 04/15/2024    [facility]     SECTION      1974, Section    COLONOSCOPY      05,Normal - Repeat in 10 years    COLONOSCOPY  2016    COLONOSCOPY N/A 2019    Procedure: COLONOSCOPY;  Surgeon: Evelin Thompson MD;  Location: [facility] OR    COLONOSCOPY N/A 2019    Procedure: COMBINED COLONOSCOPY, SINGLE OR MULTIPLE BIOPSY/POLYPECTOMY BY BIOPSY;  Surgeon: Evelin Thomspon MD;  Location: [facility] OR    ENDOSCOPIC RELEASE CARPAL TUNNEL Right 2023    Procedure: RELEASE, CARPAL TUNNEL, ENDOSCOPIC;  Surgeon: Danial Stanley MD;  Location: [facility] OR    ESOPHAGOSCOPY, GASTROSCOPY, DUODENOSCOPY (EGD), COMBINED      2011,erosive gastritis;bx;consider MRI/A;Bravo    EXTRACTION(S) DENTAL      1970    HEART CATH, ANGIOPLASTY      10/29/2013,ADÁN to proximal left anterior descending    OTHER SURGICAL HISTORY      ,801186,OTHER    OTHER SURGICAL HISTORY      2014,GJC534,TOTAL SHOULDER ARTHROPLASTY,Right    OTHER SURGICAL HISTORY      2016,20047.0,[place] COLONOSCOPY REMOVE ELIZA POLYP LESN SNARE,repeat , precancerous polyps    ZZ STRESS LEXISCAN TEST  2018    With Dr. Irwin - normal       Medications:  Current Facility-Administered Medications   Medication Dose Route Frequency Provider Last Rate Last Admin    amLODIPine (NORVASC) tablet 5 mg  5 mg Oral Once Wesley Cheney MD        levETIRAcetam (KEPPRA) 1,000 mg in sodium chloride 0.9 % 100 mL intermittent infusion  1,000 mg Intravenous Once Wesley Cheney MD        ticagrelor (BRILINTA) tablet 90 mg  90 mg Oral Once Wesley Cheney MD         Current Outpatient Medications   Medication Sig Dispense Refill    acetaminophen (TYLENOL) 325 MG tablet Take 650 mg by mouth      albuterol (PROAIR HFA/PROVENTIL HFA/VENTOLIN HFA) 108 (90 Base) MCG/ACT inhaler Inhale 2 puffs into the lungs every 6 hours as needed for shortness of breath, wheezing or cough 18 g 11    allopurinol  (ZYLOPRIM) 300 MG tablet TAKE 1 TABLET DAILY 90 tablet 1    alum & mag hydroxide-simethicone (MAALOX) 200-200-20 MG/5ML SUSP suspension Take 15 mLs by mouth      aspirin (ASA) 81 MG chewable tablet Take 1 tablet (81 mg) by mouth daily 90 tablet 3    atenolol (TENORMIN) 50 MG tablet Take 1 tablet (50 mg) by mouth daily      atorvastatin (LIPITOR) 80 MG tablet Take 80 mg by mouth daily      cetirizine (ZYRTEC) 10 MG tablet Take 1 tablet (10 mg) by mouth daily      Cholecalciferol (VITAMIN D3) 2000 UNITS CAPS Take 2,000 Units by mouth daily      folic acid (FOLVITE) 1 MG tablet TAKE 1 TABLET DAILY 90 tablet 3    hydrochlorothiazide (HYDRODIURIL) 25 MG tablet Take 1 tablet (25 mg) by mouth daily (Patient not taking: Reported on 4/30/2024) 90 tablet 3    hydroxychloroquine (PLAQUENIL) 200 MG tablet 400 mg daily alternating with 200 mg daily (Patient taking differently: Take 200 mg by mouth daily Every other day. One day 200mg next day 400mg)      isosorbide mononitrate (IMDUR) 30 MG 24 hr tablet Take 1 tablet (30 mg) by mouth daily 90 tablet 3    methotrexate 2.5 MG tablet Take 20 mg by mouth once a week      nitroGLYcerin (NITROSTAT) 0.4 MG sublingual tablet Place 1 tablet (0.4 mg) under the tongue every 5 minutes as needed for chest pain (For chest pain x 3 doses) 30 tablet 3    ticagrelor (BRILINTA) 60 MG tablet Take 1 tablet (60 mg) by mouth 2 times daily 180 tablet 4       Allergies:  Food, Losartan, Plavix [clopidogrel], and Enalapril    Nursing notes were reviewed.  Past medical history, past surgical history, problem list, family history, social history, medication list, and allergies were reviewed as documented in epic snapshot. Relevant review of systems are documented within the HPI above.         Wesley Cheney MD  05/18/24 6139

## 2024-05-19 NOTE — PROGRESS NOTES
Reviewed CT and CTA head and neck.   No acute lesions,   Stent patent.   Patient is on ASA 81 mg + Ticagrelor 60 mg bid continue. Continue statin.   reported that the patient is still not back to baseline.  Will need MRI brain wo contrast  Will also need an EEG.  Other labs: TSH, vit b12, folate, UA.

## 2024-05-19 NOTE — PROGRESS NOTES
1.  Has the patient had a previous reaction to IV contrast? No    2.  Does the patient have kidney disease? Per Dr. Shravan burns to bypass waiting on updated GFR for scan today.    3.  Is the patient on dialysis? No    If YES to any of these questions, exam will be reviewed with a Radiologist before administering contrast.      IV Contrast- Discharge Instructions After Your CT Scan      The IV contrast you received today will be filtered from your bloodstream by your kidneys during the next 24 hours and pass from the body in urine.  You will not be aware of this process and your urine will not change in color.  To help this process you should drink at least 4 additional glasses of water or juice today.  This reduces stress on your kidneys.    Most contrast reactions are immediate.  Should you develop symptoms of concern after discharge, contact the department at the number below.  After hours you should contact your personal physician.  If you develop breathing distress or wheezing, call 911.

## 2024-05-20 ENCOUNTER — TELEPHONE (OUTPATIENT)
Dept: MEDSURG UNIT | Facility: OTHER | Age: 79
End: 2024-05-20
Payer: COMMERCIAL

## 2024-05-20 LAB
ATRIAL RATE - MUSE: 72 BPM
BACTERIA UR CULT: NORMAL
DIASTOLIC BLOOD PRESSURE - MUSE: NORMAL MMHG
INTERPRETATION ECG - MUSE: NORMAL
P AXIS - MUSE: 18 DEGREES
PR INTERVAL - MUSE: 166 MS
QRS DURATION - MUSE: 86 MS
QT - MUSE: 412 MS
QTC - MUSE: 451 MS
R AXIS - MUSE: -32 DEGREES
SYSTOLIC BLOOD PRESSURE - MUSE: NORMAL MMHG
T AXIS - MUSE: -2 DEGREES
VENTRICULAR RATE- MUSE: 72 BPM

## 2024-05-20 NOTE — TELEPHONE ENCOUNTER
Reason for call: Patient wanting a work in appointment.    Is the appointment for a Hospital Follow up?  YES     Patient is having the following symptoms and/or what is the appt for:  Hospital follow up - [place]    The patient is requesting an appointment with  [facility]    Was an appointment offered for this call? Yes    If Yes, what is the date of the appointment?  05/23/2024 with [facility]     Preferred method for responding to this message: Telephone Call    Phone number patient can be reached at? Cell number on file:    Telephone Information:   Jay Olmos (spouse) [phone]       If we can't reach you directly, may we leave a detailed response at the number you provided? No    Can this message wait until your PCP/provider returns if unavailable today? Yes          Ela Andre on 5/20/2024 at 9:40 AM

## 2024-05-21 ENCOUNTER — TELEPHONE (OUTPATIENT)
Dept: MEDSURG UNIT | Facility: OTHER | Age: 79
End: 2024-05-21
Payer: COMMERCIAL

## 2024-05-21 DIAGNOSIS — I65.22 LEFT CAROTID STENOSIS: ICD-10-CM

## 2024-05-21 DIAGNOSIS — I63.512 ACUTE ISCHEMIC LEFT MCA STROKE (H): ICD-10-CM

## 2024-05-21 NOTE — TELEPHONE ENCOUNTER
Patient notified and will keep appointment with Pal Cortes MD.   Claudia Rahman LPN on 5/21/2024 at 1:06 PM

## 2024-05-21 NOTE — TELEPHONE ENCOUNTER
Reason for call: Medication or medication refill    Have you contacted your pharmacy regarding this refill? Yes     If No, direct the patient to contact the Pharmacy and discontinue telephone note using Erroneous Encounter.  If Yes, continue.    Name of medication requested: Brilinta    How many days of medication do you have left? 6 days    What pharmacy do you use? [facility]     Preferred method for responding to this message: Telephone Call    Phone number patient can be reached at: Cell number on file:    Telephone Information:   Mobile [phone]       If we cannot reach you directly, may we leave a detailed response at the number you provided? Justina Addison on 5/21/2024 at 1:43 PM

## 2024-05-21 NOTE — TELEPHONE ENCOUNTER
Called and spoke with patient and patients  .  They would like Rx for Brilinta to be sent to [facility].  Will send remaining refills there.  Brilinta 60 mg take one tablet twice daily #180x4 refills,noted as sent to [facility] pharmacy on 4/30/24    Arminda Cosme RN on 5/21/2024 at 1:59 PM

## 2024-05-23 ENCOUNTER — OFFICE VISIT (OUTPATIENT)
Dept: PEDIATRICS | Facility: OTHER | Age: 79
End: 2024-05-23
Attending: INTERNAL MEDICINE
Payer: COMMERCIAL

## 2024-05-23 VITALS
WEIGHT: 193 LBS | RESPIRATION RATE: 16 BRPM | SYSTOLIC BLOOD PRESSURE: 138 MMHG | BODY MASS INDEX: 30.29 KG/M2 | HEIGHT: 67 IN | HEART RATE: 84 BPM | DIASTOLIC BLOOD PRESSURE: 86 MMHG | TEMPERATURE: 98.1 F | OXYGEN SATURATION: 99 %

## 2024-05-23 DIAGNOSIS — M06.9 RHEUMATOID ARTHRITIS INVOLVING MULTIPLE SITES, UNSPECIFIED WHETHER RHEUMATOID FACTOR PRESENT (H): ICD-10-CM

## 2024-05-23 DIAGNOSIS — R05.3 CHRONIC COUGH: ICD-10-CM

## 2024-05-23 DIAGNOSIS — Z86.73 HISTORY OF ISCHEMIC LEFT MCA STROKE: ICD-10-CM

## 2024-05-23 DIAGNOSIS — K21.00 GASTROESOPHAGEAL REFLUX DISEASE WITH ESOPHAGITIS, UNSPECIFIED WHETHER HEMORRHAGE: ICD-10-CM

## 2024-05-23 DIAGNOSIS — E66.09 NON MORBID OBESITY DUE TO EXCESS CALORIES: ICD-10-CM

## 2024-05-23 DIAGNOSIS — I63.81 BASAL GANGLIA INFARCTION (H): ICD-10-CM

## 2024-05-23 DIAGNOSIS — Z09 HOSPITAL DISCHARGE FOLLOW-UP: Primary | ICD-10-CM

## 2024-05-23 DIAGNOSIS — Z95.820 S/P ANGIOPLASTY WITH STENT: ICD-10-CM

## 2024-05-23 DIAGNOSIS — I65.23 BILATERAL CAROTID ARTERY STENOSIS: ICD-10-CM

## 2024-05-23 DIAGNOSIS — G21.8: ICD-10-CM

## 2024-05-23 DIAGNOSIS — I67.89 CEREBRAL MICROVASCULAR DISEASE: ICD-10-CM

## 2024-05-23 DIAGNOSIS — G45.9 TIA (TRANSIENT ISCHEMIC ATTACK): ICD-10-CM

## 2024-05-23 DIAGNOSIS — I25.10 ASCVD (ARTERIOSCLEROTIC CARDIOVASCULAR DISEASE): ICD-10-CM

## 2024-05-23 DIAGNOSIS — I63.81: ICD-10-CM

## 2024-05-23 PROCEDURE — 99496 TRANSJ CARE MGMT HIGH F2F 7D: CPT | Performed by: INTERNAL MEDICINE

## 2024-05-23 PROCEDURE — G0463 HOSPITAL OUTPT CLINIC VISIT: HCPCS

## 2024-05-23 PROCEDURE — 99215 OFFICE O/P EST HI 40 MIN: CPT | Performed by: INTERNAL MEDICINE

## 2024-05-23 PROCEDURE — G0463 HOSPITAL OUTPT CLINIC VISIT: HCPCS | Performed by: INTERNAL MEDICINE

## 2024-05-23 RX ORDER — NITROFURANTOIN 25; 75 MG/1; MG/1
100 CAPSULE ORAL 2 TIMES DAILY
COMMUNITY
Start: 2024-05-19 | End: 2024-05-24

## 2024-05-23 ASSESSMENT — PAIN SCALES - GENERAL: PAINLEVEL: NO PAIN (0)

## 2024-05-23 NOTE — PATIENT INSTRUCTIONS
-- PT/OT/speech as planned at [facility]   -- Heart event monitor as planned at [facility]   -- Daily cardiovascular exercise, goal 30-45 min brisk walk 5 days/week     -- Use famotidine (Pepcid) 20 mg twice daily as needed for heart burn, upset stomach.   -- Antacids are okay to use as well as needed (eg Tums)   -- Avoid trigger foods (eg spicy foods, caffeine, alcohol -- see longer list below)   -- Larger meal earlier in the day, smallest meal later in the day   -- Avoid NSAIDs (eg ibuprofen/Advil, naproxen/Aleve)   -- Work on healthy weight   -- Reduce stress in your life   -- If symptoms persist or worsen, return as we would consider obtaining endoscopy       Patient Education   Lifestyle Changes for Controlling GERD  When you have GERD, stomach acid feels as if it s backing up toward your mouth. Whether or not you take medicine to control your GERD, your symptoms can often be improved with lifestyle changes. Talk to your healthcare provider about the following suggestions. These suggestions may help you get relief from your symptoms.      Raise your head  Reflux is more likely to strike when you re lying down flat, because stomach fluid can flow backward more easily. Raising the head of your bed 4 to 6 inches can help. To do this:  Slide blocks or books under the legs at the head of your bed. Or, place a wedge under the mattress. [employer] can make a suitable wedge for you. The wedge should run from your waist to the top of your head.  Don t just prop your head on several pillows. This increases pressure on your stomach. It can make GERD worse.  Watch your eating habits  Certain foods may increase the acid in your stomach or relax the lower esophageal sphincter. This makes GERD more likely. It s best to avoid the following if they cause you symptoms:  Coffee, tea, and carbonated drinks (with and without caffeine)  Fatty, fried, or spicy food  Mint, chocolate, onions, and tomatoes  Peppermint  Any other  foods that seem to irritate your stomach or cause you pain  Relieve the pressure  Tips include the following:  Eat smaller meals, even if you have to eat more often.  Don t lie down right after you eat. Wait a few hours for your stomach to empty.  Avoid tight belts and tight-fitting clothes.  Lose excess weight.  Tobacco and alcohol  Avoid smoking tobacco and drinking alcohol. They can make GERD symptoms worse.  Date Last Reviewed: 7/1/2016 2000-2017 [employer]. [address] [place] [address]. All rights reserved. This information is not intended as a substitute for professional medical care. Always follow your healthcare professional's instructions.

## 2024-05-23 NOTE — NURSING NOTE
"Chief Complaint   Patient presents with    Hospital F/U     UTI       Initial /86   Pulse 84   Temp 98.1  F (36.7  C) (Tympanic)   Resp 16   Ht 1.702 m (5' 7\")   Wt 87.5 kg (193 lb)   LMP 01/01/1998 (Within Months)   SpO2 99%   Breastfeeding No   BMI 30.23 kg/m   Estimated body mass index is 30.23 kg/m  as calculated from the following:    Height as of this encounter: 1.702 m (5' 7\").    Weight as of this encounter: 87.5 kg (193 lb).  Medication Review: complete    The next two questions are to help us understand your food security.  If you are feeling you need any assistance in this area, we have resources available to support you today.          1/30/2024   SDOH- Food Insecurity   Within the past 12 months, did you worry that your food would run out before you got money to buy more? N   Within the past 12 months, did the food you bought just not last and you didn t have money to get more? N         Health Care Directive:  Patient has a Health Care Directive on file      Norma J. Gosselin, LPN      "

## 2024-05-23 NOTE — PROGRESS NOTES
Assessment & Plan   1. Hospital discharge follow-up    2. TIA (transient ischemic attack)  Today she describes acute onset of worsening of the weakness in her right lower extremity.  She brought a walker because she was worried she would not be able to walk in.  Her strength feels back to normal now.  We discussed the waxing and waning nature of cerebrovascular disease.  We discussed the possibility of further evaluation today including but not limited to transfer to emergency department, CT scan of head, transfer to higher level of care which the patient declined.  We discussed modifiable risk factors including physical and Occupational Therapy, speech therapy, she is on high intensity statin with atorvastatin 80 mg.  She continues on antiplatelet activity.  Her blood pressures are well-controlled.  She does not have diabetes.  Rheumatoid arthritis remains a big risk factor.    3. History of ischemic left MCA stroke  4. Basal ganglia infarction (H)  5. 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  I believe this is likely from a parkinsonism related to her previous basal ganglia infarct.    6. Rheumatoid arthritis involving multiple sites, unspecified whether rheumatoid factor present (H)  Follows with rheumatology    7. Cerebral microvascular disease  Findings on cranial imaging.  Discussed cognitive impairment.    8. Bilateral carotid artery stenosis  Follows with vascular surgery    9. ASCVD (arteriosclerotic cardiovascular disease)  10. S/P angioplasty with stent  Continue antiplatelet    11. Non morbid obesity due to excess calories  12. Chronic cough  13. Gastroesophageal reflux disease with esophagitis, unspecified whether hemorrhage  She is describing a month-long chronic cough no change in recent memory.  No history of serious allergies.  Does not endorse postnasal drainage.  Having some heartburn and took some Tums.  Recommend a trial of  famotidine and other lifestyle modifications to avoid refluxing.  Close follow-up if symptoms persist or worsen.  Considered swallow study which has been recently completed.      Ms. Batista was recently hospitalized for cerebrovascular accident, appears to be doing well since discharge.  Discharge summary and recommendations for outpatient provider are reviewed. Based on what occurred in the visit today:  Previous medication(s) were discontinued or altered? No  Previous medication(s) were suspended pending consultation? No  New medication(s) started? Yes     -- Medication reconciliation and management was performed today    Patient Instructions    -- PT/OT/speech as planned at [facility]   -- Heart event monitor as planned at [facility]   -- Daily cardiovascular exercise, goal 30-45 min brisk walk 5 days/week     -- Use famotidine (Pepcid) 20 mg twice daily as needed for heart burn, upset stomach.   -- Antacids are okay to use as well as needed (eg Tums)   -- Avoid trigger foods (eg spicy foods, caffeine, alcohol -- see longer list below)   -- Larger meal earlier in the day, smallest meal later in the day   -- Avoid NSAIDs (eg ibuprofen/Advil, naproxen/Aleve)   -- Work on healthy weight   -- Reduce stress in your life   -- If symptoms persist or worsen, return as we would consider obtaining endoscopy       Patient Education  Lifestyle Changes for Controlling GERD  When you have GERD, stomach acid feels as if it s backing up toward your mouth. Whether or not you take medicine to control your GERD, your symptoms can often be improved with lifestyle changes. Talk to your healthcare provider about the following suggestions. These suggestions may help you get relief from your symptoms.      Raise your head  Reflux is more likely to strike when you re lying down flat, because stomach fluid can flow backward more easily. Raising the head of your bed 4 to 6 inches can help. To do this:  Slide blocks or books under the legs at  "the head of your bed. Or, place a wedge under the mattress. Many foam stores can make a suitable wedge for you. The wedge should run from your waist to the top of your head.  Don t just prop your head on several pillows. This increases pressure on your stomach. It can make GERD worse.  Watch your eating habits  Certain foods may increase the acid in your stomach or relax the lower esophageal sphincter. This makes GERD more likely. It s best to avoid the following if they cause you symptoms:  Coffee, tea, and carbonated drinks (with and without caffeine)  Fatty, fried, or spicy food  Mint, chocolate, onions, and tomatoes  Peppermint  Any other foods that seem to irritate your stomach or cause you pain  Relieve the pressure  Tips include the following:  Eat smaller meals, even if you have to eat more often.  Don t lie down right after you eat. Wait a few hours for your stomach to empty.  Avoid tight belts and tight-fitting clothes.  Lose excess weight.  Tobacco and alcohol  Avoid smoking tobacco and drinking alcohol. They can make GERD symptoms worse.  Date Last Reviewed: 7/1/2016 2000-2017 [employer]. [address] [place] [address]. All rights reserved. This information is not intended as a substitute for professional medical care. Always follow your healthcare professional's instructions.        Return in about 3 months (around 8/23/2024), or if symptoms worsen or fail to improve, for Suma.    Pal Alonso MD, FAAP, FACP  Internal Medicine & Pediatrics    Subjective   Margaret Batista is a 78 year old female who presents for hospital discharge follow-up.    Hospital: [facility]  Date of discharge: 5/19/24  Date of post discharge contact: 5/20/24    Objective   Vitals: /86   Pulse 84   Temp 98.1  F (36.7  C) (Tympanic)   Resp 16   Ht 1.702 m (5' 7\")   Wt 87.5 kg (193 lb)   LMP 01/01/1998 (Within Months)   SpO2 99%   Breastfeeding No   BMI 30.23 kg/m  "     HEENT: no posterior erythema  Neuro: Symmetric palate elevation    Review and Analysis of Data   I personally reviewed the following:  External notes: Yes [place]'s discharge summary  Results: Yes local lab, remote lab and imaging  Use of an independent historian: Yes  [email or web address]  Independent review of a test performed by another physician: No  Discussion of management with another physician: No  Moderate risk of morbidity from additional diagnostic testing and/or treatment.    Billing:   Type of Medical Decision Making Face-to-Face Visit within 7 Days Face-to-Face Visit within 14 days   Moderate Complexity [id number]   High Complexity [id number]

## 2024-06-07 ENCOUNTER — TELEPHONE (OUTPATIENT)
Dept: INTERNAL MEDICINE | Facility: OTHER | Age: 79
End: 2024-06-07
Payer: COMMERCIAL

## 2024-06-07 NOTE — TELEPHONE ENCOUNTER
Patient has had a stroke and just found out that her granddaughter was diagnosed with shingles. Her granddaughter has been treated for said ailment, but patient is supposed to attend a wedding this weekend that her granddaughter will be at and she is wondering if she will be okay to attend the wedding and be around her granddaughter. Please call.    Nazanin Katz on 6/7/2024 at 10:14 AM

## 2024-06-07 NOTE — TELEPHONE ENCOUNTER
Discussed with patient. Her granddaughter was diagnosed with shingles and given treatment this morning. Patient has received the shingles vaccine. Explained shingles itself is not considered contagious, but that the virus which causes shingles can be transmitted through contact with open shingles blisters. It typically passes from someone with shingles to someone who has never had chickenpox. If a person has had chickenpox, they usually have antibodies against the virus in their body. Patient states she thinks she had chickenpox but is unsure. Advised that the grand daughter be instructed to keep the affected areas (ribcage) covered and that she avoid itching them as that could lead to lead to exposure. Also reassured that you cannot contract the virus through saliva (coughing) or nasal secretions (sneezing) contact except in rare cases. No further questions at this time. Felicia Workman RN on 6/7/2024 at 2:16 PM

## 2024-06-09 DIAGNOSIS — M10.9 GOUT, UNSPECIFIED CAUSE, UNSPECIFIED CHRONICITY, UNSPECIFIED SITE: ICD-10-CM

## 2024-06-12 RX ORDER — ALLOPURINOL 300 MG/1
TABLET ORAL
Qty: 90 TABLET | Refills: 1 | Status: SHIPPED | OUTPATIENT
Start: 2024-06-12

## 2024-06-12 NOTE — TELEPHONE ENCOUNTER
[facility] sent Rx request for the following:      Requested Prescriptions   Pending Prescriptions Disp Refills    allopurinol (ZYLOPRIM) 300 MG tablet [Pharmacy Med Name: ALLOPURINOL  TAB 300MG] 90 tablet 1     Sig: TAKE 1 TABLET DAILY       Gout Agents Protocol Failed - 6/12/2024  1:54 PM   Last Prescription Date:   12/6/23  Last Fill Qty/Refills:         90, R-1    Last Office Visit:              5/23/24   Future Office visit:             Next 5 appointments (look out 90 days)      Jul 08, 2024  9:20 AM  (Arrive by 9:05 AM)  Provider Visit with Swetha Maxwell NP  [facility] ([facility] ) [address]  [place] [id number] [phone]             Salma Smith RN on 6/12/2024 at 1:54 PM

## 2024-06-18 DIAGNOSIS — I25.10 ASCVD (ARTERIOSCLEROTIC CARDIOVASCULAR DISEASE): Primary | ICD-10-CM

## 2024-06-18 DIAGNOSIS — I13.0 HYPERTENSIVE HEART AND CHRONIC KIDNEY DISEASE WITH HEART FAILURE AND STAGE 1 THROUGH STAGE 4 CHRONIC KIDNEY DISEASE, OR UNSPECIFIED CHRONIC KIDNEY DISEASE (H): ICD-10-CM

## 2024-06-20 RX ORDER — ATENOLOL 50 MG/1
50 TABLET ORAL DAILY
Qty: 90 TABLET | Refills: 0 | Status: ON HOLD | OUTPATIENT
Start: 2024-06-20 | End: 2024-07-24

## 2024-06-20 RX ORDER — ATORVASTATIN CALCIUM 80 MG/1
80 TABLET, FILM COATED ORAL DAILY
Qty: 90 TABLET | Refills: 0 | Status: SHIPPED | OUTPATIENT
Start: 2024-06-20

## 2024-06-20 NOTE — TELEPHONE ENCOUNTER
[id number] [employer] [facility] [place] sent Rx request for the following:      Requested Prescriptions   Pending Prescriptions Disp Refills    atenolol (TENORMIN) 50 MG tablet 90 tablet 4     Sig: Take 1 tablet (50 mg) by mouth daily   No print out      atorvastatin (LIPITOR) 80 MG tablet 90 tablet 4     Sig: Take 1 tablet (80 mg) by mouth daily   Historical    Last Office Visit:              5/23/24   Future Office visit:           7/824    Per LOV note:    Return in about 3 months (around 8/23/2024), or if symptoms worsen or fail to improve, for Suma.    Unable to complete prescription refill per RN Medication Refill Policy. Hodan Nguyen RN .............. 6/20/2024  11:39 AM

## 2024-07-03 ENCOUNTER — DOCUMENTATION ONLY (OUTPATIENT)
Dept: INTERNAL MEDICINE | Facility: OTHER | Age: 79
End: 2024-07-03
Payer: COMMERCIAL

## 2024-07-03 DIAGNOSIS — I10 BENIGN ESSENTIAL HYPERTENSION: Primary | ICD-10-CM

## 2024-07-03 NOTE — PROGRESS NOTES
Will forward to [facility]-Dr Cheney to review.    Cecy Bledsoe LPN on 7/3/2024 at 4:28 PM

## 2024-07-03 NOTE — PROGRESS NOTES
Patient has lab appointment.  Need orders ASAP.  Thank you!  Patient needs a Creatinine for ct the order in her chart for it is .

## 2024-07-08 ENCOUNTER — OFFICE VISIT (OUTPATIENT)
Dept: INTERNAL MEDICINE | Facility: OTHER | Age: 79
End: 2024-07-08
Attending: NURSE PRACTITIONER
Payer: COMMERCIAL

## 2024-07-08 VITALS
TEMPERATURE: 97.3 F | SYSTOLIC BLOOD PRESSURE: 132 MMHG | HEART RATE: 56 BPM | HEIGHT: 67 IN | RESPIRATION RATE: 16 BRPM | DIASTOLIC BLOOD PRESSURE: 86 MMHG | BODY MASS INDEX: 29.51 KG/M2 | OXYGEN SATURATION: 97 % | WEIGHT: 188 LBS

## 2024-07-08 DIAGNOSIS — N28.89 RIGHT RENAL MASS: ICD-10-CM

## 2024-07-08 DIAGNOSIS — I63.81 BASAL GANGLIA INFARCTION (H): ICD-10-CM

## 2024-07-08 DIAGNOSIS — I63.512 ACUTE ISCHEMIC LEFT MCA STROKE (H): Primary | ICD-10-CM

## 2024-07-08 DIAGNOSIS — F43.22 ADJUSTMENT DISORDER WITH ANXIOUS MOOD: ICD-10-CM

## 2024-07-08 DIAGNOSIS — R47.89 WORD FINDING DIFFICULTY: ICD-10-CM

## 2024-07-08 DIAGNOSIS — N18.31 STAGE 3A CHRONIC KIDNEY DISEASE (H): ICD-10-CM

## 2024-07-08 DIAGNOSIS — I25.10 ASCVD (ARTERIOSCLEROTIC CARDIOVASCULAR DISEASE): ICD-10-CM

## 2024-07-08 DIAGNOSIS — M06.9 RHEUMATOID ARTHRITIS INVOLVING MULTIPLE SITES, UNSPECIFIED WHETHER RHEUMATOID FACTOR PRESENT (H): Chronic | ICD-10-CM

## 2024-07-08 PROCEDURE — 99214 OFFICE O/P EST MOD 30 MIN: CPT | Performed by: NURSE PRACTITIONER

## 2024-07-08 PROCEDURE — G0463 HOSPITAL OUTPT CLINIC VISIT: HCPCS

## 2024-07-08 PROCEDURE — G2211 COMPLEX E/M VISIT ADD ON: HCPCS | Performed by: NURSE PRACTITIONER

## 2024-07-08 ASSESSMENT — PAIN SCALES - GENERAL: PAINLEVEL: MILD PAIN (2)

## 2024-07-08 NOTE — NURSING NOTE
"Chief Complaint   Patient presents with    Follow Up       FOOD SECURITY SCREENING QUESTIONS  Hunger Vital Signs:  Within the past 12 months we worried whether our food would run out before we got money to buy more. Never  Within the past 12 months the food we bought just didn't last and we didn't have money to get more. Never  Claudia Rahman LPN 7/8/2024 9:19 AM      Initial /86 (BP Location: Right arm, Patient Position: Sitting, Cuff Size: Adult Large)   Pulse 56   Temp 97.3  F (36.3  C) (Tympanic)   Resp 16   Ht 1.702 m (5' 7\")   Wt 85.3 kg (188 lb)   LMP 01/01/1998 (Within Months)   SpO2 97%   BMI 29.44 kg/m   Estimated body mass index is 29.44 kg/m  as calculated from the following:    Height as of this encounter: 1.702 m (5' 7\").    Weight as of this encounter: 85.3 kg (188 lb).  Medication Reconciliation: complete    Claudia Rahman LPN    "

## 2024-07-08 NOTE — PROGRESS NOTES
ICD-10-CM    1. Acute ischemic left MCA stroke (H)  I63.512       2. Basal ganglia infarction (H)  I63.81       3. ASCVD (arteriosclerotic cardiovascular disease)  I25.10       4. Rheumatoid arthritis involving multiple sites, unspecified whether rheumatoid factor present (H)  M06.9       5. Word finding difficulty  R47.89       6. Stage 3a chronic kidney disease (H)  N18.31       7. Right renal mass  N28.89       8. Adjustment disorder with anxious mood  F43.22          Plan:  Continue with PT, OT and speech therapy.  She is making remarkable gains in aphasia, impaired gait and right hand weakness.  Keep appointment with neurology.  Continue Brilinta, aspirin, statin in hypertension medications.  Keep appointment for CT abdomen and pelvis to follow right renal mass.  Continue to follow with cardiology.  Recommended starting SSRI or SNRI for treatment of anxiety however she would like to think about this a little longer.  She has an appointment with neurology next week and she can discuss at that visit as well.  Otherwise she can contact me through MyChart or phone call and can prescribe medication.    The longitudinal plan of care for the diagnosis(es)/condition(s) as documented were addressed during this visit. Due to the added complexity in care, I will continue to support Sandra in the subsequent management and with ongoing continuity of care.  Subjective   Sandra is a 78 year old, presenting for the following health issues:  Follow Up      7/8/2024     9:17 AM   Additional Questions   Roomed by Claudia ANDERSON     She is here today to follow-up acute left MCA and basal ganglia infarcts.  These occurred in April and May.  Her  accompanies her to this visit and he states she has made remarkable gains.  Speech, word finding, gait and hand strength have improved significantly.  Patient gets anxious and frustrated especially with word finding.  Sometimes she feels like she is not making any improvements.  Her   has noticed that she turned small events into big events due to anxiety.  She is sleeping well at night.  She is not on any medications for anxiety or depression.  She is taking medications as prescribed and is on high-dose statin, aspirin and Brilinta.  She has appointment with neurology next week.  She is followed by cardiology.  She also has stage III chronic kidney disease and a right renal lesion.  She is due to have CT abdomen and pelvis in August.  No flank pain or hematuria.  No GI upset or bleeding.  She continues to work with PT, OT and speech therapy.  She is using a walker.  She has not had any falls or injuries.  A couple of days ago she was able to have a telephone conversation with her son which lasted between 20-30 minutes and he had reported to her that he was impressed by the improvement with word finding and aphasia.    History of Present Illness       Vascular Disease:  She presents for follow up of vascular disease.      She takes daily aspirin.    She eats 0-1 servings of fruits and vegetables daily.   She is taking medications regularly.                 Review of Systems  Constitutional, neuro, ENT, endocrine, pulmonary, cardiac, gastrointestinal, genitourinary, musculoskeletal, integument and psychiatric systems are negative, except as otherwise noted.      Objective    /86 (BP Location: Right arm, Patient Position: Sitting, Cuff Size: Adult Large)   Pulse 56   Temp 97.3  F (36.3  C) (Tympanic)   Resp 16   Wt 85.3 kg (188 lb)   LMP 01/01/1998 (Within Months)   SpO2 97%   BMI 29.44 kg/m    Body mass index is 29.44 kg/m .  Physical Exam   Pleasant female who does become tearful throughout the conversation.  Seems anxious.  Accompanied by .  Mild right-sided facial droop.  Neck supple without adenopathy.  No thyromegaly.  Lung fields clear to auscultation throughout.  Cardiovascular regular rate and rhythm with no murmur, S3 or S4 auscultated.  Abdomen is soft and  nontender.  No suprapubic discomfort.  Extremities with 1+ bilateral edema.  Varicose veins present.  Dependent rubor noted.  No ulcerations.  DP PT intact.    [facility] note reviewed and discussed.,  Patient had questions about these results.    Follow-up hospital appointment with Dr. Cortes visit notes reviewed and discussed.  Recent laboratory diagnostic studies all reviewed and discussed.            Signed Electronically by: Swetha Maxwell NP

## 2024-07-15 ENCOUNTER — HOSPITAL ENCOUNTER (OUTPATIENT)
Dept: CT IMAGING | Facility: OTHER | Age: 79
Discharge: HOME | End: 2024-07-15
Attending: NURSE PRACTITIONER
Payer: COMMERCIAL

## 2024-07-15 ENCOUNTER — LAB (OUTPATIENT)
Dept: LAB | Facility: OTHER | Age: 79
End: 2024-07-15
Attending: NURSE PRACTITIONER
Payer: COMMERCIAL

## 2024-07-15 DIAGNOSIS — I10 BENIGN ESSENTIAL HYPERTENSION: ICD-10-CM

## 2024-07-15 DIAGNOSIS — N28.89 RIGHT RENAL MASS: ICD-10-CM

## 2024-07-15 LAB
CREAT SERPL-MCNC: 1.01 MG/DL (ref 0.51–0.95)
EGFRCR SERPLBLD CKD-EPI 2021: 57 ML/MIN/1.73M2

## 2024-07-15 PROCEDURE — 74178 CT ABD&PLV WO CNTR FLWD CNTR: CPT

## 2024-07-15 PROCEDURE — 36415 COLL VENOUS BLD VENIPUNCTURE: CPT | Mod: ZL

## 2024-07-15 PROCEDURE — 82565 ASSAY OF CREATININE: CPT | Mod: ZL

## 2024-07-15 PROCEDURE — 250N000011 HC RX IP 250 OP 636: Performed by: NURSE PRACTITIONER

## 2024-07-15 RX ORDER — IOPAMIDOL 755 MG/ML
108 INJECTION, SOLUTION INTRAVASCULAR ONCE
Status: COMPLETED | OUTPATIENT
Start: 2024-07-15 | End: 2024-07-15

## 2024-07-15 RX ADMIN — IOPAMIDOL 108 ML: 755 INJECTION, SOLUTION INTRAVENOUS at 11:06

## 2024-07-15 NOTE — PROGRESS NOTES
1.  Has the patient had a previous reaction to IV contrast? No    2.  Does the patient have kidney disease? Yes- stage 3- GFR 57    3.  Is the patient on dialysis? No    If YES to any of these questions, exam will be reviewed with a Radiologist before administering contrast.  IV Contrast- Discharge Instructions After Your CT Scan      The IV contrast you received today will be filtered from your bloodstream by your kidneys during the next 24 hours and pass from the body in urine.  You will not be aware of this process and your urine will not change in color.  To help this process you should drink at least 4 additional glasses of water or juice today.  This reduces stress on your kidneys.    Most contrast reactions are immediate.  Should you develop symptoms of concern after discharge, contact the department at the number below.  After hours you should contact your personal physician.  If you develop breathing distress or wheezing, call 911.

## 2024-07-18 ENCOUNTER — APPOINTMENT (OUTPATIENT)
Dept: GENERAL RADIOLOGY | Facility: OTHER | Age: 79
DRG: 522 | End: 2024-07-18
Attending: FAMILY MEDICINE
Payer: COMMERCIAL

## 2024-07-18 ENCOUNTER — APPOINTMENT (OUTPATIENT)
Dept: CT IMAGING | Facility: OTHER | Age: 79
DRG: 522 | End: 2024-07-18
Attending: FAMILY MEDICINE
Payer: COMMERCIAL

## 2024-07-18 ENCOUNTER — HOSPITAL ENCOUNTER (INPATIENT)
Facility: OTHER | Age: 79
LOS: 5 days | Discharge: NURSING, REHAB OR LONG-TERM CARE | DRG: 522 | End: 2024-07-24
Attending: FAMILY MEDICINE | Admitting: INTERNAL MEDICINE
Payer: COMMERCIAL

## 2024-07-18 DIAGNOSIS — Z96.642 S/P TOTAL LEFT HIP ARTHROPLASTY: ICD-10-CM
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
## 2024-07-18 DIAGNOSIS — Z79.01 LONG TERM (CURRENT) USE OF ANTICOAGULANTS: ICD-10-CM
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
## 2024-07-18 DIAGNOSIS — S72.002A CLOSED FRACTURE OF NECK OF LEFT FEMUR, INITIAL ENCOUNTER (H): Primary | ICD-10-CM

## 2024-07-18 DIAGNOSIS — R51.9 FACIAL PAIN: ICD-10-CM

## 2024-07-18 DIAGNOSIS — I13.0 HYPERTENSIVE HEART AND CHRONIC KIDNEY DISEASE WITH HEART FAILURE AND STAGE 1 THROUGH STAGE 4 CHRONIC KIDNEY DISEASE, OR UNSPECIFIED CHRONIC KIDNEY DISEASE (H): ICD-10-CM

## 2024-07-18 DIAGNOSIS — I69.951 HEMIPLGA FOL UNSP CEREBVASC DISEASE AFF RIGHT DOMINANT SIDE (H): ICD-10-CM

## 2024-07-18 DIAGNOSIS — S72.002A LEFT DISPLACED FEMORAL NECK FRACTURE (H): ICD-10-CM

## 2024-07-18 PROCEDURE — 73502 X-RAY EXAM HIP UNI 2-3 VIEWS: CPT | Mod: TC

## 2024-07-18 PROCEDURE — 99285 EMERGENCY DEPT VISIT HI MDM: CPT | Mod: 25 | Performed by: FAMILY MEDICINE

## 2024-07-18 PROCEDURE — 99285 EMERGENCY DEPT VISIT HI MDM: CPT | Performed by: FAMILY MEDICINE

## 2024-07-18 PROCEDURE — 70450 CT HEAD/BRAIN W/O DYE: CPT | Mod: TC

## 2024-07-18 ASSESSMENT — COLUMBIA-SUICIDE SEVERITY RATING SCALE - C-SSRS
1. IN THE PAST MONTH, HAVE YOU WISHED YOU WERE DEAD OR WISHED YOU COULD GO TO SLEEP AND NOT WAKE UP?: NO
6. HAVE YOU EVER DONE ANYTHING, STARTED TO DO ANYTHING, OR PREPARED TO DO ANYTHING TO END YOUR LIFE?: NO
2. HAVE YOU ACTUALLY HAD ANY THOUGHTS OF KILLING YOURSELF IN THE PAST MONTH?: NO

## 2024-07-18 ASSESSMENT — ACTIVITIES OF DAILY LIVING (ADL): ADLS_ACUITY_SCORE: 39

## 2024-07-19 ENCOUNTER — ANESTHESIA EVENT (OUTPATIENT)
Dept: SURGERY | Facility: OTHER | Age: 79
DRG: 522 | End: 2024-07-19
Payer: COMMERCIAL

## 2024-07-19 PROBLEM — S72.002A LEFT DISPLACED FEMORAL NECK FRACTURE (H): Status: ACTIVE | Noted: 2024-07-19

## 2024-07-19 LAB
ABO/RH(D): NORMAL
ALBUMIN SERPL BCG-MCNC: 3.5 G/DL (ref 3.5–5.2)
ALP SERPL-CCNC: 86 U/L (ref 40–150)
ALT SERPL W P-5'-P-CCNC: 22 U/L (ref 0–50)
ANION GAP SERPL CALCULATED.3IONS-SCNC: 9 MMOL/L (ref 7–15)
ANTIBODY SCREEN: NEGATIVE
AST SERPL W P-5'-P-CCNC: 27 U/L (ref 0–45)
BASOPHILS # BLD AUTO: 0 10E3/UL (ref 0–0.2)
BASOPHILS NFR BLD AUTO: 0 %
BILIRUB SERPL-MCNC: 0.8 MG/DL
BUN SERPL-MCNC: 13.8 MG/DL (ref 8–23)
CALCIUM SERPL-MCNC: 9.7 MG/DL (ref 8.8–10.4)
CHLORIDE SERPL-SCNC: 103 MMOL/L (ref 98–107)
CREAT SERPL-MCNC: 0.91 MG/DL (ref 0.51–0.95)
EGFRCR SERPLBLD CKD-EPI 2021: 65 ML/MIN/1.73M2
EOSINOPHIL # BLD AUTO: 0.2 10E3/UL (ref 0–0.7)
EOSINOPHIL NFR BLD AUTO: 1 %
ERYTHROCYTE [DISTWIDTH] IN BLOOD BY AUTOMATED COUNT: 16.1 % (ref 10–15)
GLUCOSE SERPL-MCNC: 134 MG/DL (ref 70–99)
HCO3 SERPL-SCNC: 26 MMOL/L (ref 22–29)
HCT VFR BLD AUTO: 38.7 % (ref 35–47)
HGB BLD-MCNC: 12.2 G/DL (ref 11.7–15.7)
HOLD SPECIMEN: NORMAL
HOLD SPECIMEN: NORMAL
IMM GRANULOCYTES # BLD: 0.1 10E3/UL
IMM GRANULOCYTES NFR BLD: 1 %
INR PPP: 1.06 (ref 0.85–1.15)
LYMPHOCYTES # BLD AUTO: 2 10E3/UL (ref 0.8–5.3)
LYMPHOCYTES NFR BLD AUTO: 14 %
MCH RBC QN AUTO: 30.6 PG (ref 26.5–33)
MCHC RBC AUTO-ENTMCNC: 31.5 G/DL (ref 31.5–36.5)
MCV RBC AUTO: 97 FL (ref 78–100)
MONOCYTES # BLD AUTO: 0.2 10E3/UL (ref 0–1.3)
MONOCYTES NFR BLD AUTO: 1 %
NEUTROPHILS # BLD AUTO: 12.1 10E3/UL (ref 1.6–8.3)
NEUTROPHILS NFR BLD AUTO: 83 %
NRBC # BLD AUTO: 0 10E3/UL
NRBC BLD AUTO-RTO: 0 /100
PLATELET # BLD AUTO: 213 10E3/UL (ref 150–450)
POTASSIUM SERPL-SCNC: 4.4 MMOL/L (ref 3.4–5.3)
PROT SERPL-MCNC: 6.4 G/DL (ref 6.4–8.3)
RBC # BLD AUTO: 3.99 10E6/UL (ref 3.8–5.2)
SODIUM SERPL-SCNC: 138 MMOL/L (ref 135–145)
SPECIMEN EXPIRATION DATE: NORMAL
WBC # BLD AUTO: 14.6 10E3/UL (ref 4–11)

## 2024-07-19 PROCEDURE — 86900 BLOOD TYPING SEROLOGIC ABO: CPT | Performed by: INTERNAL MEDICINE

## 2024-07-19 PROCEDURE — 250N000011 HC RX IP 250 OP 636: Performed by: INTERNAL MEDICINE

## 2024-07-19 PROCEDURE — 85610 PROTHROMBIN TIME: CPT | Performed by: INTERNAL MEDICINE

## 2024-07-19 PROCEDURE — 120N000001 HC R&B MED SURG/OB

## 2024-07-19 PROCEDURE — 96374 THER/PROPH/DIAG INJ IV PUSH: CPT | Performed by: FAMILY MEDICINE

## 2024-07-19 PROCEDURE — 36415 COLL VENOUS BLD VENIPUNCTURE: CPT | Performed by: INTERNAL MEDICINE

## 2024-07-19 PROCEDURE — 36415 COLL VENOUS BLD VENIPUNCTURE: CPT | Performed by: FAMILY MEDICINE

## 2024-07-19 PROCEDURE — 250N000011 HC RX IP 250 OP 636: Performed by: FAMILY MEDICINE

## 2024-07-19 PROCEDURE — 250N000013 HC RX MED GY IP 250 OP 250 PS 637: Performed by: INTERNAL MEDICINE

## 2024-07-19 PROCEDURE — 258N000003 HC RX IP 258 OP 636: Performed by: FAMILY MEDICINE

## 2024-07-19 PROCEDURE — 85025 COMPLETE CBC W/AUTO DIFF WBC: CPT | Performed by: FAMILY MEDICINE

## 2024-07-19 PROCEDURE — 80053 COMPREHEN METABOLIC PANEL: CPT | Performed by: FAMILY MEDICINE

## 2024-07-19 PROCEDURE — 96376 TX/PRO/DX INJ SAME DRUG ADON: CPT | Performed by: FAMILY MEDICINE

## 2024-07-19 PROCEDURE — 99223 1ST HOSP IP/OBS HIGH 75: CPT | Mod: AI | Performed by: INTERNAL MEDICINE

## 2024-07-19 RX ORDER — HYDROMORPHONE HYDROCHLORIDE 4 MG/1
4 TABLET ORAL EVERY 4 HOURS PRN
Status: DISCONTINUED | OUTPATIENT
Start: 2024-07-19 | End: 2024-07-24 | Stop reason: HOSPADM

## 2024-07-19 RX ORDER — LIDOCAINE 40 MG/G
CREAM TOPICAL
Status: DISCONTINUED | OUTPATIENT
Start: 2024-07-19 | End: 2024-07-23

## 2024-07-19 RX ORDER — NALOXONE HYDROCHLORIDE 0.4 MG/ML
0.4 INJECTION, SOLUTION INTRAMUSCULAR; INTRAVENOUS; SUBCUTANEOUS
Status: DISCONTINUED | OUTPATIENT
Start: 2024-07-19 | End: 2024-07-24 | Stop reason: HOSPADM

## 2024-07-19 RX ORDER — SENNOSIDES 8.6 MG
650 CAPSULE ORAL EVERY 8 HOURS PRN
Status: ON HOLD | COMMUNITY
End: 2024-07-24

## 2024-07-19 RX ORDER — HYDRALAZINE HYDROCHLORIDE 20 MG/ML
10 INJECTION INTRAMUSCULAR; INTRAVENOUS EVERY 4 HOURS PRN
Status: DISCONTINUED | OUTPATIENT
Start: 2024-07-19 | End: 2024-07-24 | Stop reason: HOSPADM

## 2024-07-19 RX ORDER — LANOLIN ALCOHOL/MO/W.PET/CERES
3 CREAM (GRAM) TOPICAL
Status: DISCONTINUED | OUTPATIENT
Start: 2024-07-19 | End: 2024-07-24 | Stop reason: HOSPADM

## 2024-07-19 RX ORDER — ONDANSETRON 4 MG/1
4 TABLET, ORALLY DISINTEGRATING ORAL EVERY 6 HOURS PRN
Status: DISCONTINUED | OUTPATIENT
Start: 2024-07-19 | End: 2024-07-24 | Stop reason: HOSPADM

## 2024-07-19 RX ORDER — ATENOLOL 50 MG/1
50 TABLET ORAL DAILY
Status: DISCONTINUED | OUTPATIENT
Start: 2024-07-19 | End: 2024-07-20

## 2024-07-19 RX ORDER — ACETAMINOPHEN 325 MG/1
650 TABLET ORAL EVERY 4 HOURS PRN
Status: DISCONTINUED | OUTPATIENT
Start: 2024-07-22 | End: 2024-07-19

## 2024-07-19 RX ORDER — HYDROMORPHONE HCL IN WATER/PF 6 MG/30 ML
0.2 PATIENT CONTROLLED ANALGESIA SYRINGE INTRAVENOUS
Status: DISCONTINUED | OUTPATIENT
Start: 2024-07-19 | End: 2024-07-24 | Stop reason: HOSPADM

## 2024-07-19 RX ORDER — MAGNESIUM HYDROXIDE/ALUMINUM HYDROXICE/SIMETHICONE 120; 1200; 1200 MG/30ML; MG/30ML; MG/30ML
15 SUSPENSION ORAL EVERY 6 HOURS PRN
Status: DISCONTINUED | OUTPATIENT
Start: 2024-07-19 | End: 2024-07-19

## 2024-07-19 RX ORDER — LORATADINE 10 MG/1
10 TABLET ORAL DAILY
Status: DISCONTINUED | OUTPATIENT
Start: 2024-07-20 | End: 2024-07-24 | Stop reason: HOSPADM

## 2024-07-19 RX ORDER — CETIRIZINE HYDROCHLORIDE 10 MG/1
10 TABLET ORAL DAILY
Status: DISCONTINUED | OUTPATIENT
Start: 2024-07-19 | End: 2024-07-19

## 2024-07-19 RX ORDER — ALBUTEROL SULFATE 90 UG/1
2 AEROSOL, METERED RESPIRATORY (INHALATION) EVERY 6 HOURS PRN
Status: DISCONTINUED | OUTPATIENT
Start: 2024-07-19 | End: 2024-07-19

## 2024-07-19 RX ORDER — ONDANSETRON 4 MG/1
4 TABLET, ORALLY DISINTEGRATING ORAL EVERY 6 HOURS PRN
Status: DISCONTINUED | OUTPATIENT
Start: 2024-07-19 | End: 2024-07-22

## 2024-07-19 RX ORDER — HYDROMORPHONE HYDROCHLORIDE 1 MG/ML
0.5 INJECTION, SOLUTION INTRAMUSCULAR; INTRAVENOUS; SUBCUTANEOUS EVERY 30 MIN PRN
Status: COMPLETED | OUTPATIENT
Start: 2024-07-19 | End: 2024-07-19

## 2024-07-19 RX ORDER — ALLOPURINOL 300 MG/1
300 TABLET ORAL DAILY
Status: DISCONTINUED | OUTPATIENT
Start: 2024-07-19 | End: 2024-07-24 | Stop reason: HOSPADM

## 2024-07-19 RX ORDER — ACETAMINOPHEN 325 MG/1
650 TABLET ORAL EVERY 4 HOURS PRN
Status: DISCONTINUED | OUTPATIENT
Start: 2024-07-19 | End: 2024-07-22

## 2024-07-19 RX ORDER — ACETAMINOPHEN 325 MG/1
975 TABLET ORAL EVERY 8 HOURS
Status: COMPLETED | OUTPATIENT
Start: 2024-07-19 | End: 2024-07-22

## 2024-07-19 RX ORDER — ISOSORBIDE MONONITRATE 30 MG/1
30 TABLET, EXTENDED RELEASE ORAL DAILY
Status: DISCONTINUED | OUTPATIENT
Start: 2024-07-19 | End: 2024-07-24 | Stop reason: HOSPADM

## 2024-07-19 RX ORDER — HYDROMORPHONE HCL IN WATER/PF 6 MG/30 ML
0.4 PATIENT CONTROLLED ANALGESIA SYRINGE INTRAVENOUS
Status: DISCONTINUED | OUTPATIENT
Start: 2024-07-19 | End: 2024-07-24 | Stop reason: HOSPADM

## 2024-07-19 RX ORDER — AMOXICILLIN 250 MG
1 CAPSULE ORAL 2 TIMES DAILY PRN
Status: DISCONTINUED | OUTPATIENT
Start: 2024-07-19 | End: 2024-07-24 | Stop reason: HOSPADM

## 2024-07-19 RX ORDER — HYDRALAZINE HYDROCHLORIDE 10 MG/1
10 TABLET, FILM COATED ORAL EVERY 4 HOURS PRN
Status: DISCONTINUED | OUTPATIENT
Start: 2024-07-19 | End: 2024-07-24 | Stop reason: HOSPADM

## 2024-07-19 RX ORDER — HYDROXYCHLOROQUINE SULFATE 200 MG/1
200 TABLET, FILM COATED ORAL EVERY OTHER DAY
Status: DISCONTINUED | OUTPATIENT
Start: 2024-07-19 | End: 2024-07-24 | Stop reason: HOSPADM

## 2024-07-19 RX ORDER — ACETAMINOPHEN 650 MG/1
650 SUPPOSITORY RECTAL EVERY 4 HOURS PRN
Status: DISCONTINUED | OUTPATIENT
Start: 2024-07-19 | End: 2024-07-22

## 2024-07-19 RX ORDER — CALCIUM CARBONATE 500 MG/1
1000 TABLET, CHEWABLE ORAL 4 TIMES DAILY PRN
Status: DISCONTINUED | OUTPATIENT
Start: 2024-07-19 | End: 2024-07-24 | Stop reason: HOSPADM

## 2024-07-19 RX ORDER — ATORVASTATIN CALCIUM 40 MG/1
80 TABLET, FILM COATED ORAL DAILY
Status: DISCONTINUED | OUTPATIENT
Start: 2024-07-19 | End: 2024-07-24 | Stop reason: HOSPADM

## 2024-07-19 RX ORDER — NITROGLYCERIN 0.4 MG/1
0.4 TABLET SUBLINGUAL EVERY 5 MIN PRN
Status: DISCONTINUED | OUTPATIENT
Start: 2024-07-19 | End: 2024-07-24 | Stop reason: HOSPADM

## 2024-07-19 RX ORDER — ONDANSETRON 2 MG/ML
4 INJECTION INTRAMUSCULAR; INTRAVENOUS EVERY 6 HOURS PRN
Status: DISCONTINUED | OUTPATIENT
Start: 2024-07-19 | End: 2024-07-24 | Stop reason: HOSPADM

## 2024-07-19 RX ORDER — SODIUM CHLORIDE 9 MG/ML
INJECTION, SOLUTION INTRAVENOUS CONTINUOUS
Status: DISCONTINUED | OUTPATIENT
Start: 2024-07-19 | End: 2024-07-23

## 2024-07-19 RX ORDER — ONDANSETRON 2 MG/ML
4 INJECTION INTRAMUSCULAR; INTRAVENOUS EVERY 6 HOURS PRN
Status: DISCONTINUED | OUTPATIENT
Start: 2024-07-19 | End: 2024-07-22

## 2024-07-19 RX ORDER — NALOXONE HYDROCHLORIDE 0.4 MG/ML
0.2 INJECTION, SOLUTION INTRAMUSCULAR; INTRAVENOUS; SUBCUTANEOUS
Status: DISCONTINUED | OUTPATIENT
Start: 2024-07-19 | End: 2024-07-24 | Stop reason: HOSPADM

## 2024-07-19 RX ORDER — HYDROMORPHONE HYDROCHLORIDE 2 MG/1
2 TABLET ORAL EVERY 4 HOURS PRN
Status: DISCONTINUED | OUTPATIENT
Start: 2024-07-19 | End: 2024-07-24 | Stop reason: HOSPADM

## 2024-07-19 RX ORDER — FOLIC ACID 1 MG/1
1000 TABLET ORAL DAILY
Status: DISCONTINUED | OUTPATIENT
Start: 2024-07-20 | End: 2024-07-24 | Stop reason: HOSPADM

## 2024-07-19 RX ORDER — AMOXICILLIN 250 MG
2 CAPSULE ORAL 2 TIMES DAILY PRN
Status: DISCONTINUED | OUTPATIENT
Start: 2024-07-19 | End: 2024-07-24 | Stop reason: HOSPADM

## 2024-07-19 RX ADMIN — ACETAMINOPHEN 975 MG: 325 TABLET, FILM COATED ORAL at 09:56

## 2024-07-19 RX ADMIN — HYDROMORPHONE HYDROCHLORIDE 0.4 MG: 0.2 INJECTION, SOLUTION INTRAMUSCULAR; INTRAVENOUS; SUBCUTANEOUS at 12:30

## 2024-07-19 RX ADMIN — HYDROMORPHONE HYDROCHLORIDE 0.4 MG: 0.2 INJECTION, SOLUTION INTRAMUSCULAR; INTRAVENOUS; SUBCUTANEOUS at 09:54

## 2024-07-19 RX ADMIN — HYDROMORPHONE HYDROCHLORIDE 0.4 MG: 0.2 INJECTION, SOLUTION INTRAMUSCULAR; INTRAVENOUS; SUBCUTANEOUS at 16:25

## 2024-07-19 RX ADMIN — SODIUM CHLORIDE: 9 INJECTION, SOLUTION INTRAVENOUS at 10:02

## 2024-07-19 RX ADMIN — ACETAMINOPHEN 975 MG: 325 TABLET, FILM COATED ORAL at 17:58

## 2024-07-19 RX ADMIN — ATORVASTATIN CALCIUM 80 MG: 40 TABLET, FILM COATED ORAL at 22:03

## 2024-07-19 RX ADMIN — ALLOPURINOL 300 MG: 300 TABLET ORAL at 14:13

## 2024-07-19 RX ADMIN — HYDROMORPHONE HYDROCHLORIDE 0.5 MG: 1 INJECTION, SOLUTION INTRAMUSCULAR; INTRAVENOUS; SUBCUTANEOUS at 01:08

## 2024-07-19 RX ADMIN — ISOSORBIDE MONONITRATE 30 MG: 30 TABLET, EXTENDED RELEASE ORAL at 14:13

## 2024-07-19 RX ADMIN — HYDROXYCHLOROQUINE SULFATE 200 MG: 200 TABLET ORAL at 22:03

## 2024-07-19 RX ADMIN — ATENOLOL 50 MG: 50 TABLET ORAL at 14:13

## 2024-07-19 RX ADMIN — HYDROMORPHONE HYDROCHLORIDE 4 MG: 4 TABLET ORAL at 20:26

## 2024-07-19 RX ADMIN — HYDRALAZINE HYDROCHLORIDE 10 MG: 20 INJECTION INTRAMUSCULAR; INTRAVENOUS at 09:51

## 2024-07-19 RX ADMIN — HYDROMORPHONE HYDROCHLORIDE 0.5 MG: 1 INJECTION, SOLUTION INTRAMUSCULAR; INTRAVENOUS; SUBCUTANEOUS at 08:21

## 2024-07-19 RX ADMIN — SODIUM CHLORIDE: 9 INJECTION, SOLUTION INTRAVENOUS at 17:58

## 2024-07-19 RX ADMIN — HYDROMORPHONE HYDROCHLORIDE 0.5 MG: 1 INJECTION, SOLUTION INTRAMUSCULAR; INTRAVENOUS; SUBCUTANEOUS at 03:46

## 2024-07-19 RX ADMIN — ONDANSETRON 4 MG: 2 INJECTION INTRAMUSCULAR; INTRAVENOUS at 11:12

## 2024-07-19 RX ADMIN — SODIUM CHLORIDE: 9 INJECTION, SOLUTION INTRAVENOUS at 03:13

## 2024-07-19 ASSESSMENT — ACTIVITIES OF DAILY LIVING (ADL)
ADLS_ACUITY_SCORE: 27
ADLS_ACUITY_SCORE: 27
ADLS_ACUITY_SCORE: 39
ADLS_ACUITY_SCORE: 39
ADLS_ACUITY_SCORE: 27
ADLS_ACUITY_SCORE: 39
ADLS_ACUITY_SCORE: 27
ADLS_ACUITY_SCORE: 39
ADLS_ACUITY_SCORE: 39
ADLS_ACUITY_SCORE: 27
ADLS_ACUITY_SCORE: 39
ADLS_ACUITY_SCORE: 26
ADLS_ACUITY_SCORE: 27
ADLS_ACUITY_SCORE: 39
ADLS_ACUITY_SCORE: 26
ADLS_ACUITY_SCORE: 39

## 2024-07-19 NOTE — ED PROVIDER NOTES
"[facility]  Emergency Department Visit Note    Hip Pain, Fall, and Trauma      History of Present Illness     HPI:  Margaret Batista is a 78 year old female presenting with patient was in her kitchen making toast this evening.  She was not using her walker because her kitchen is very narrow and she can hang onto the countertops.  There are no rugs present in her kitchen or around the house.  She had a stroke recently and has residual right-sided arm and leg weakness and word finding difficulties.  She is on a blood thinner after the stroke as well.  Her  heard the fall.  She feels she hit her forehead but has minimal pain and no headache.  No nausea or vomiting.  She does not think she lost consciousness.  She also has left-sided hip and leg pain and was not able to ambulate.  The ambulance was called.  She initially had shoulder pain on the right but can now rotate and move this arm and shoulder around normally with minimal pain.  No abrasions or bruising.  No abdominal pain.  No back or neck pain.  She is moving her head and neck around normally.      Review of Systems:  10 point review of systems obtained and pertinent positive and negative findings noted in HPI. Review of systems otherwise negative.  Medications:  Reviewed in Epic Allergies:  Reviewed in Epic Problem List:  Reviewed in Epic   Past Medical History:  Reviewed in Epic Past Surgical History:  Reviewed in Epic Social History:  Reviewed in Epic       Physical Exam   Vital signs: BP (!) 132/98   Pulse 64   Temp 97.8  F (36.6  C) (Tympanic)   Resp (!) 8   Ht 1.702 m (5' 7\")   Wt 83.9 kg (185 lb)   LMP 01/01/1998 (Within Months)   SpO2 93%   BMI 28.98 kg/m    Physical Exam  Constitutional:       General: She is not in acute distress.     Appearance: She is not diaphoretic.   HENT:      Head: Normocephalic and atraumatic.      Mouth/Throat:      Pharynx: Oropharynx is clear.   Eyes:      Extraocular Movements: " Extraocular movements intact.      Conjunctiva/sclera: Conjunctivae normal.      Pupils: Pupils are equal, round, and reactive to light.   Cardiovascular:      Rate and Rhythm: Normal rate and regular rhythm.      Heart sounds: Normal heart sounds.   Pulmonary:      Effort: No respiratory distress.      Breath sounds: Normal breath sounds.   Chest:      Chest wall: No tenderness.   Abdominal:      General: Bowel sounds are normal.      Palpations: Abdomen is soft.      Tenderness: There is no abdominal tenderness.   Musculoskeletal:      Cervical back: Normal range of motion. No tenderness.      Thoracic back: No tenderness.      Lumbar back: No tenderness.      Comments: Patient has tenderness over her left upper lateral hip, the left leg is shortened and externally rotated.  Posterior tibial pulses are 1+ bilaterally, I was not able to get a consistent dorsalis pedis pulse.  Patient reports she has neuropathy in her feet and they are normally quite cold and without much for sensation.  They are both without cyanosis.  Right shoulder with normal passive and active range of motion without bony tenderness.   Skin:     Capillary Refill: Capillary refill takes less than 2 seconds.      Findings: No abrasion, bruising or laceration.   Neurological:      Mental Status: She is alert and oriented to person, place, and time.      Comments: Right-sided weakness against gravity evident in the right leg, right and left hand  strengths are equal.  The patient does have word finding difficulties, per her  this is her baseline.           ED Course     ED Course as of 07/19/24 0651   Thu Jul 18, 2024   2337 Left femoral neck fracture on non-hemiplegic side.  Dr. Mustapha Solorio of orthopedics consulted and recommended transfer to medicine service at [facility] for medical clearance and likely surgical intervention.  Contacted [facility], She is on a waiting list for a bed.   Fri Jul 19, 2024   0107 Contacting [facility] for  transfer.  Technical difficulties with pushing images at this time.     Procedures                Trauma Summary Disposition     Patient is trauma admission:  Trauma  Evaluation    Intent to transfer: 7/18/2024 @ 11:41 PM    Spine  Backboard removal time: Backboard not applied   C-collar and immobilization: not indicated, cleared.  Full Primary and Secondary survey with appropriate immobilization of spine completed in exam section.     Neuro  GCS at arrival:  Motor 6=Obeys commands   Verbal 5=Oriented   Eye Opening 4=Spontaneous   Total: 15     GCS at disposition: unchanged    ED Procedures completed  none           No results found for this or any previous visit (from the past 24 hour(s)).    Medications - No data to display    Medical Decision Making     Margaret Batista is a 78 year old female presenting with a fall from standing resulting in a left sided femoral neck hip fracture.  This is on the patient's left side.  She already had right sided weakness from a recent stroke and gait instability with required walker usage and ongoing PT.  She is on brillinta and ASA 81mg as well for antiplatelet therapy.  Head CT was unremarkable which was performed due to reported head injury.  Orthopedics was consulted and she was accepted for surgery early this AM.      Patient given instructions on follow-up and warning signs for which to return to ED. All questions were answered and the patient is comfortable with plan for discharge. The patient was discharged in stable condition or transferred in as stable condition as possible.    I have reviewed the patient's Medical Imaging and Medical Records.  I have reviewed the nursing notes.  I have reviewed the findings, diagnosis, plan and need for follow up with the patient.    Diagnosis & Disposition     Diagnosis:  1. Left displaced femoral neck fracture (H)        Discharge disposition:  Transferred plan to [place], [place].           [facility]  Department  Pretty oFote MD  Family Medicine     Pretty Foote MD  07/19/24 0661

## 2024-07-19 NOTE — ED TRIAGE NOTES
Pt comes in by ambulance. She fell at home walking around the house and landed on her left hip an hit her head.She is on blood thinners. Her left leg is shortened and externally rotated. Pedal pulses intact. 1 mg of dilaudid given by EMS via IV.     Triage Assessment (Adult)       Row Name 07/18/24 0249          Triage Assessment    Airway WDL WDL        Respiratory WDL    Respiratory WDL WDL        Skin Circulation/Temperature WDL    Skin Circulation/Temperature WDL WDL        Cardiac WDL    Cardiac WDL WDL        Peripheral/Neurovascular WDL    Peripheral Neurovascular WDL WDL        Cognitive/Neuro/Behavioral WDL    Cognitive/Neuro/Behavioral WDL WDL

## 2024-07-19 NOTE — ED NOTES
Called [facility] transfer line for transport. They state there computer system is down and are only able to do emergent transfers. I asked if they could call for mutual aid. They said they were not able to do that. I asked if I could call around for mutual aid, they said that would be ok.     I called [facility] and they would be able to transfer at 0900, I told them I would call them back if a ride was needed.

## 2024-07-19 NOTE — ED NOTES
Updated pt that our house supervisor spoke with the director of surgery and was told to hold the pt in the ER and the orthopedic surgeon would be able to fit her in for surgery today. I repeated this back and confirmed with her that surgery could happen here today.   Updated pt. Called [facility] to cancel transport, and call [facility] to cancel the pending transfer.

## 2024-07-19 NOTE — ED PROVIDER NOTES
Care of patient turned over to myself at shift change pending admission for left hip fracture.  Previous provider had spoken to ortho and arranged for admission to have this repaired today.  As she is on Brilinta, however, this cannot be done until Monday.  I therefor called and spoke to Dr. Ludwig, hospitalist, who has accepted the patient for admission over the weekend until this can be repaired on Monday.    (S72.002A) Left displaced femoral neck fracture (H)           Jarod Reyes MD  07/19/24 0778

## 2024-07-19 NOTE — H&P
"[facility]    History and Physical - Hospitalist Service       Date of Admission:  7/18/2024    Assessment & Plan      Margaret Batista is a 78 year old female admitted on 7/18/2024. She was admitted thru the ED after a femoral neck fracture from a fall.    Left Femoral Neck Fracture  - after a slip and fall at home  - ortho has been contacted with plan for surgery on Monday (on Brelynta and ASA)  Plan:  Ortho to see   Pain control    Vascular Disease: ASCVD and Carotid   - cardiac stent x 7, carotid stent after CVA x 1  - no cardiopulmonary symptoms at this time with exertion  Plan:  Hold brelynta  Hold ASA  Cont BB, Imdur, Statin     H/O Gout  - no current issues  Plan:  Cont Allopurinol    HTN  - severely elevated now, asympomatic, likely due to pain   Plan:  Cont home meds, add hydralazine prn     RA  - cont home plaquenil and methotrexate   - stable     Renal Mass  - noted on recent CT to be slowly enlarging - need to clarify plan with her as she does need to be seen by urology for this   - her creat is normal         Diet: Combination Diet Low Saturated Fat Na <2400mg Diet, No Caffeine Diet    DVT Prophylaxis: Pneumatic Compression Devices  Slaughter Catheter: Not present  Lines: None     Cardiac Monitoring: None  Code Status: Full Code      Clinically Significant Risk Factors Present on Admission                # Drug Induced Platelet Defect: home medication list includes an antiplatelet medication   # Hypertension: Noted on problem list             # Overweight: Estimated body mass index is 28.98 kg/m  as calculated from the following:    Height as of this encounter: 1.702 m (5' 7\").    Weight as of this encounter: 83.9 kg (185 lb).              Disposition Plan     Medically Ready for Discharge: Anticipated in 2-4 Days           Cornel Ludwig MD  Hospitalist Service  [facility]  Securely message with [employer] (more info)  Text page via [facility] Paging/Directory "     ______________________________________________________________________    Chief Complaint   Left Leg Pain    History is obtained from the patient    History of Present Illness   Margaret Batista is a 78 year old female who was in her usual state of health until earlier this morning when she was making toast in the kitchen, she moved in the kitchen and slipped or tripped and fell with immediate left leg pain.  She came to the ED where she was found to have a left femoral neck fracture - ortho has been called and due to the fact that she is on breylnta and ASA they requested that she be admitted to the hospital with a planned surgery for Monday.    She denies any LOC, no CP or Sob.    No fever or chills.    She is in moderate pain and the dialudid given in the ED helped a lot.    No HA or vision changes.  Her BP is noted in the chart.      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 -s/p ADÁN to proximal left anterior descending 10/29/13.    Contusion of abdominal wall     2014    Essential (primary) hypertension     12/14/2006    Gout     No Comments Provided    Hyperlipidemia     4/4/2007    Localized swelling, mass, or lump of upper extremity     5/26/2017    Other specified counseling     10/28/2013,Patient has identified Health Care Agent(s): Yes Add Health Care Agents: Yes   Health Care Agent(s): Primary Health Care Agent: Jay Batista  Relationship:  Phone:   Secondary Health Care Agent:  Relationship: Daughter Phone:   Conservator:  Relationship:  Phone:   Guardian: Relationship:  Phone:    Patient has Advance Care Plan Documents (Health Care Directive, POLST): No, refe*    Polyosteoarthritis     No Comments Provided     Postmenopausal bleeding     No Comments Provided    Presence of coronary angioplasty implant and graft     ,unstable angina; severe prox LAD stenosis; s/p 7 stents    Primary osteoarthritis of left hand     2017    Rheumatoid arthritis (H)     follows at [place] yearly       Past Surgical History   Past Surgical History:   Procedure Laterality Date    ARTHROPLASTY KNEE      2011    ARTHROSCOPY KNEE          BIOPSY BREAST      10/25/95,BRIAN RAMIREZ    CAROTID ARTERY ANGIOPLASTY Left 04/15/2024    [facility]     SECTION      , Section    COLONOSCOPY      05,Normal - Repeat in 10 years    COLONOSCOPY  2016    COLONOSCOPY N/A 2019    Procedure: COLONOSCOPY;  Surgeon: Evelin Thompson MD;  Location:  OR    COLONOSCOPY N/A 2019    Procedure: COMBINED COLONOSCOPY, SINGLE OR MULTIPLE BIOPSY/POLYPECTOMY BY BIOPSY;  Surgeon: Evelin Thompson MD;  Location:  OR    ENDOSCOPIC RELEASE CARPAL TUNNEL Right 2023    Procedure: RELEASE, CARPAL TUNNEL, ENDOSCOPIC;  Surgeon: Danial Stanley MD;  Location:  OR    ESOPHAGOSCOPY, GASTROSCOPY, DUODENOSCOPY (EGD), COMBINED      2011,erosive gastritis;bx;consider MRI/A;[facility]    EXTRACTION(S) DENTAL      1970    HEART CATH, ANGIOPLASTY      10/29/2013,ADÁN to proximal left anterior descending    OTHER SURGICAL HISTORY      ,[id number],OTHER    OTHER SURGICAL HISTORY      2014,[id number],TOTAL SHOULDER ARTHROPLASTY,Right    OTHER SURGICAL HISTORY      2016,94145.0,IL COLONOSCOPY REMOVE ELIZA POLYP LESN SNARE,repeat , precancerous polyps    ZZ STRESS LEXISCAN TEST  2018    With Dr. Irwin - sonja       Prior to Admission Medications   Prior to Admission Medications   Prescriptions Last Dose Informant Patient Reported? Taking?   acetaminophen (TYLENOL) 325 MG tablet   Yes No   Sig: Take 650 mg by mouth   albuterol (PROAIR HFA/PROVENTIL HFA/VENTOLIN HFA) 108 (90 Base)  MCG/ACT inhaler   No No   Sig: Inhale 2 puffs into the lungs every 6 hours as needed for shortness of breath, wheezing or cough   allopurinol (ZYLOPRIM) 300 MG tablet   No No   Sig: TAKE 1 TABLET DAILY   alum & mag hydroxide-simethicone (MAALOX) 200-200-20 MG/5ML SUSP suspension   Yes No   Sig: Take 15 mLs by mouth   aspirin (ASA) 81 MG chewable tablet   No No   Sig: Take 1 tablet (81 mg) by mouth daily   atenolol (TENORMIN) 50 MG tablet   No No   Sig: Take 1 tablet (50 mg) by mouth daily   atorvastatin (LIPITOR) 80 MG tablet   No No   Sig: Take 1 tablet (80 mg) by mouth daily   cetirizine (ZYRTEC) 10 MG tablet   No No   Sig: Take 1 tablet (10 mg) by mouth daily   folic acid (FOLVITE) 1 MG tablet   No No   Sig: TAKE 1 TABLET DAILY   hydroxychloroquine (PLAQUENIL) 200 MG tablet   No No   Si mg daily alternating with 200 mg daily   Patient taking differently: Take 200 mg by mouth daily Every other day. One day 200mg next day 400mg   isosorbide mononitrate (IMDUR) 30 MG 24 hr tablet   No No   Sig: Take 1 tablet (30 mg) by mouth daily   methotrexate 2.5 MG tablet   Yes No   Sig: Take 20 mg by mouth once a week   nitroGLYcerin (NITROSTAT) 0.4 MG sublingual tablet   No No   Sig: Place 1 tablet (0.4 mg) under the tongue every 5 minutes as needed for chest pain (For chest pain x 3 doses)   ticagrelor (BRILINTA) 60 MG tablet   No No   Sig: Take 1 tablet (60 mg) by mouth 2 times daily      Facility-Administered Medications: None        Review of Systems    The 10 point Review of Systems is negative other than noted in the HPI or here.      Physical Exam   Vital Signs: Temp: 97.5  F (36.4  C) Temp src: Tympanic BP: (!) 204/97 Pulse: 77   Resp: 16 SpO2: 95 % O2 Device: None (Room air)    Weight: 185 lbs 0 oz    Constitutional: awake, alert, cooperative, no apparent distress, and appears stated age  Eyes: Lids and lashes normal, pupils equal, round and reactive to light, extra ocular muscles intact, sclera clear,  conjunctiva normal  ENT: Normocephalic, without obvious abnormality, atraumatic, sinuses nontender on palpation, external ears without lesions, oral pharynx with moist mucous membranes, tonsils without erythema or exudates, gums normal and good dentition.  Hematologic / Lymphatic: no cervical lymphadenopathy  Respiratory: No increased work of breathing, good air exchange, clear to auscultation bilaterally, no crackles or wheezing  Cardiovascular: Normal apical impulse, regular rate and rhythm, normal S1 and S2, no S3 or S4, and no murmur noted  GI: No scars, normal bowel sounds, soft, non-distended, non-tender, no masses palpated, no hepatosplenomegally  Skin: no bruising or bleeding, normal skin color, texture, turgor, and no redness, warmth, or swelling  Musculoskeletal: externally rotated left left  Neuropsychiatric: General: normal, calm, and normal eye contact    Medical Decision Making         Data     I have personally reviewed the following data over the past 24 hrs:    14.6 (H)  \   12.2   / 213     138 103 13.8 /  134 (H)   4.4 26 0.91 \     ALT: 22 AST: 27 AP: 86 TBILI: 0.8   ALB: 3.5 TOT PROTEIN: 6.4 LIPASE: N/A       Imaging results reviewed over the past 24 hrs:   Recent Results (from the past 24 hour(s))   CT Head w/o Contrast    Narrative    PROCEDURE INFORMATION:   Exam: CT Head Without Contrast   Exam date and time: 7/18/2024 10:29 PM   Age: 78 years old   Clinical indication: Injury or trauma; Fracture, pathological and other: Fall,   hit head, on blood thinners.     TECHNIQUE:   Imaging protocol: Computed tomography of the head without contrast.   Radiation optimization: All CT scans at this facility use at least one of these   dose optimization techniques: automated exposure control; mA and/or kV   adjustment per patient size (includes targeted exams where dose is matched to   clinical indication); or iterative reconstruction.     COMPARISON:   CT HEAD W/O CONTRAST 5/18/2024 7:14 PM      FINDINGS:   Brain: Age consistent cerebral and cerebellar atrophy. Age consistent   periventricular white matter abnormality. No intracranial hemorrhage or   intracranial mass. No shift of the midline structures.  Possible old granuloma   within the left sylvian fissure again noted.  Cerebral ventricles: No ventriculomegaly.   Paranasal sinuses: Sphenoid sinus mucosal thickening.   Mastoid air cells: Visualized mastoid air cells are well aerated.   Bones: Unremarkable. No acute fracture.   Soft tissues: Unremarkable.       Impression    IMPRESSION:   No acute intracranial pathology. No intracranial traumatic injury.    Current examination is unchanged from prior examination.     THIS DOCUMENT HAS BEEN ELECTRONICALLY SIGNED BY DEVON HANKS MD   XR Pelvis and Hip Left 2 Views    Narrative    PROCEDURE INFORMATION:   Exam: XR Left Hip   Exam date and time: 7/18/2024 10:52 PM   Age: 78 years old   Clinical indication: Injury or trauma; Fall; Fracture of pelvis \T\ hip; Left;   Traumatic fracture; Neck of femur; Not specified; Additional info: Fall,   shorten leg, externally rotated. Lateral hip pain.     TECHNIQUE:   Imaging protocol: Radiologic exam of the left hip.   Views: 2 or 3 views hip with pelvis when performed.     COMPARISON:   CT ABDOMEN WO W PELVIS W CONTRAST 7/15/2024 10:14 AM     FINDINGS:   Bones/joints: Mild degenerative change of the hip joint with narrowing of the   joint space and acetabular overgrowth. Transverse fracture of the neck of the   left femur with superior displacement of the distal fracture fragment.    Fracture does not involve the articular surface.  Soft tissues: Soft tissue edema overlies the fracture site.       Impression    IMPRESSION:   Transverse fracture of the neck of the left femur with superior displacement of   the distal fracture fragment.     THIS DOCUMENT HAS BEEN ELECTRONICALLY SIGNED BY DEVON HANKS MD

## 2024-07-19 NOTE — ED NOTES
Pt reporting L hip pain rated 3/10. States she will use her call light if she thinks she needs pain medications. She was updated on her hip fracture and that we are waiting to hear back from orthopedics. She has no questions at this time, if she is unable to get surgery done here she would like to transfer to [facility]

## 2024-07-19 NOTE — PROGRESS NOTES
"[facility] ADMISSION NOTE    Patient admitted to room 333 at approximately 0900 via cart from emergency room. Patient was accompanied by transport tech and daughter.     Verbal SBAR report received from Ashley OSCAR prior to patient arrival.     Patient trasferred to bed via sheet Patient alert and oriented X 3. Pain is controlled with current analgesics.  Medication(s) being used: narcotic analgesics including hydromorphone (Dilaudid).  . Admission vital signs: Blood pressure (!) 132/98, pulse 69, temperature 97.8  F (36.6  C), temperature source Tympanic, resp. rate 14, height 1.702 m (5' 7\"), weight 83.9 kg (185 lb), last menstrual period 01/01/1998, SpO2 97%, not currently breastfeeding. Patient was oriented to plan of care, call light, bed controls, tv, telephone, bathroom, and visiting hours.     Risk Assessment    The following safety risks were identified during admission: fall. Yellow risk band applied: YES.     Skin Initial Assessment    This writer admitted this patient and completed a full skin assessment and Jeramy score in the Adult PCS flowsheet.   Photo documentation of skin problem and/or wound competed via [employer] application (located under Media):  N/A    Appropriate interventions initiated as needed.     Secondary skin check completed by NA.         Education    Patient has a [place] to Observation order: Yes  Observation education completed and documented: N/A      Cornel Perry RN      "

## 2024-07-19 NOTE — PHARMACY-ADMISSION MEDICATION HISTORY
Pharmacist Admission Medication History    Admission medication history is complete. The information provided in this note is only as accurate as the sources available at the time of the update.    Information Source(s): Patient, Family member, Hospital records, and CareEverywhere/SureScripts via in-person    Pertinent Information: Discussed medications with patient and - patient had morning medications 7/18, but no PM medications. Reviewed [facility] AVS from April 2024- patient has been using to set up medications/assist with timing of medications.  -Patient is alternating Plaquenil with 1 tablet (200 mg) every other day with 2 tablets (400 mg) every other day - unsure if she took 1 or 2 tablets 7/17 PM (last dose PTA).    Changes made to PTA medication list:  Added: None  Deleted: Maalox  Changed: APAP to arthritis strength    Allergies reviewed with patient and updates made in EHR: yes    Medication History Completed By: Elizabeth Ochoa [facility] 7/19/2024 1:29 PM    PTA Med List   Medication Sig Last Dose    acetaminophen (TYLENOL 8 HOUR) 650 MG CR tablet Take 650 mg by mouth every 8 hours as needed for mild pain Past Week at PRN    albuterol (PROAIR HFA/PROVENTIL HFA/VENTOLIN HFA) 108 (90 Base) MCG/ACT inhaler Inhale 2 puffs into the lungs every 6 hours as needed for shortness of breath, wheezing or cough More than a month at PRN- never used    allopurinol (ZYLOPRIM) 300 MG tablet TAKE 1 TABLET DAILY 7/18/2024 at AM    aspirin (ASA) 81 MG chewable tablet Take 1 tablet (81 mg) by mouth daily 7/18/2024 at AM    atenolol (TENORMIN) 50 MG tablet Take 1 tablet (50 mg) by mouth daily 7/18/2024 at AM    atorvastatin (LIPITOR) 80 MG tablet Take 1 tablet (80 mg) by mouth daily 7/17/2024 at PM    cetirizine (ZYRTEC) 10 MG tablet Take 1 tablet (10 mg) by mouth daily 7/18/2024 at AM    folic acid (FOLVITE) 1 MG tablet TAKE 1 TABLET DAILY 7/18/2024 at AM    hydroxychloroquine (PLAQUENIL) 200 MG tablet 400 mg daily  alternating with 200 mg daily 7/17/2024 at PM    isosorbide mononitrate (IMDUR) 30 MG 24 hr tablet Take 1 tablet (30 mg) by mouth daily 7/18/2024 at AM    methotrexate 2.5 MG tablet Take 20 mg by mouth once a week 7/15/2024 at AM    nitroGLYcerin (NITROSTAT) 0.4 MG sublingual tablet Place 1 tablet (0.4 mg) under the tongue every 5 minutes as needed for chest pain (For chest pain x 3 doses) More than a month at PRN- never used    ticagrelor (BRILINTA) 60 MG tablet Take 1 tablet (60 mg) by mouth 2 times daily 7/18/2024 at AM

## 2024-07-19 NOTE — PROGRESS NOTES
07/19/24 1100   Valuables   Patient Belongings remains with patient   Patient Belongings Remaining with Patient clothing;jewelry   Did you bring any home meds/supplements to the hospital?  No       [facility] will make every effort per our policy to help keep your items safe while in the hospital.  If you choose to keep any items at the bedside, we cannot be held responsible for any items that are lost or broken.      List items sent to safe: None    I have reviewed my belongings list on admission and verify that it is correct.     Patient signature_______________________________  Date/Time_____________________    2nd Staff person if patient unable to sign __________________________  Date/Time ______________________      I have received all my belongings noted above at discharge.    Patient signature________________________________  Date/Time  __________________________

## 2024-07-19 NOTE — PROGRESS NOTES
Pt is A&Ox4, VSS, afebrile, on room air.  Had episode of high bp 200's/90's gave 10mg hydralizine and BP josef since.  Pain controlled with dilaudid 0.4 mg.  Slaughter patent and draining yellow urine.  Pt is on bedrest due to broken hip, surgery set for Monday.    Cornel Perry RN on 7/19/2024 at 2:51 PM

## 2024-07-20 LAB
ALBUMIN SERPL BCG-MCNC: 3.1 G/DL (ref 3.5–5.2)
ALP SERPL-CCNC: 76 U/L (ref 40–150)
ALT SERPL W P-5'-P-CCNC: 13 U/L (ref 0–50)
ANION GAP SERPL CALCULATED.3IONS-SCNC: 7 MMOL/L (ref 7–15)
AST SERPL W P-5'-P-CCNC: 19 U/L (ref 0–45)
BILIRUB SERPL-MCNC: 0.7 MG/DL
BUN SERPL-MCNC: 11.5 MG/DL (ref 8–23)
CALCIUM SERPL-MCNC: 8.9 MG/DL (ref 8.8–10.4)
CHLORIDE SERPL-SCNC: 106 MMOL/L (ref 98–107)
CREAT SERPL-MCNC: 0.86 MG/DL (ref 0.51–0.95)
EGFRCR SERPLBLD CKD-EPI 2021: 69 ML/MIN/1.73M2
ERYTHROCYTE [DISTWIDTH] IN BLOOD BY AUTOMATED COUNT: 16.4 % (ref 10–15)
GLUCOSE SERPL-MCNC: 101 MG/DL (ref 70–99)
HCO3 SERPL-SCNC: 25 MMOL/L (ref 22–29)
HCT VFR BLD AUTO: 34.7 % (ref 35–47)
HGB BLD-MCNC: 10.9 G/DL (ref 11.7–15.7)
MCH RBC QN AUTO: 30.6 PG (ref 26.5–33)
MCHC RBC AUTO-ENTMCNC: 31.4 G/DL (ref 31.5–36.5)
MCV RBC AUTO: 98 FL (ref 78–100)
PLATELET # BLD AUTO: 179 10E3/UL (ref 150–450)
POTASSIUM SERPL-SCNC: 3.9 MMOL/L (ref 3.4–5.3)
PROT SERPL-MCNC: 5.5 G/DL (ref 6.4–8.3)
RBC # BLD AUTO: 3.56 10E6/UL (ref 3.8–5.2)
SODIUM SERPL-SCNC: 138 MMOL/L (ref 135–145)
WBC # BLD AUTO: 10.8 10E3/UL (ref 4–11)

## 2024-07-20 PROCEDURE — 80053 COMPREHEN METABOLIC PANEL: CPT | Performed by: INTERNAL MEDICINE

## 2024-07-20 PROCEDURE — 85027 COMPLETE CBC AUTOMATED: CPT | Performed by: INTERNAL MEDICINE

## 2024-07-20 PROCEDURE — 258N000003 HC RX IP 258 OP 636: Performed by: INTERNAL MEDICINE

## 2024-07-20 PROCEDURE — 258N000003 HC RX IP 258 OP 636: Performed by: FAMILY MEDICINE

## 2024-07-20 PROCEDURE — 120N000001 HC R&B MED SURG/OB

## 2024-07-20 PROCEDURE — 36415 COLL VENOUS BLD VENIPUNCTURE: CPT | Performed by: INTERNAL MEDICINE

## 2024-07-20 PROCEDURE — 250N000013 HC RX MED GY IP 250 OP 250 PS 637: Performed by: INTERNAL MEDICINE

## 2024-07-20 PROCEDURE — 99231 SBSQ HOSP IP/OBS SF/LOW 25: CPT | Performed by: INTERNAL MEDICINE

## 2024-07-20 RX ORDER — HYDROXYCHLOROQUINE SULFATE 200 MG/1
400 TABLET, FILM COATED ORAL EVERY OTHER DAY
Status: DISCONTINUED | OUTPATIENT
Start: 2024-07-20 | End: 2024-07-24 | Stop reason: HOSPADM

## 2024-07-20 RX ADMIN — HYDROMORPHONE HYDROCHLORIDE 2 MG: 2 TABLET ORAL at 23:17

## 2024-07-20 RX ADMIN — FOLIC ACID 1000 MCG: 1 TABLET ORAL at 09:06

## 2024-07-20 RX ADMIN — ACETAMINOPHEN 975 MG: 325 TABLET, FILM COATED ORAL at 06:33

## 2024-07-20 RX ADMIN — SODIUM CHLORIDE: 9 INJECTION, SOLUTION INTRAVENOUS at 09:06

## 2024-07-20 RX ADMIN — HYDROXYCHLOROQUINE SULFATE 400 MG: 200 TABLET ORAL at 21:39

## 2024-07-20 RX ADMIN — SODIUM CHLORIDE: 9 INJECTION, SOLUTION INTRAVENOUS at 01:09

## 2024-07-20 RX ADMIN — ATORVASTATIN CALCIUM 80 MG: 40 TABLET, FILM COATED ORAL at 21:38

## 2024-07-20 RX ADMIN — SODIUM CHLORIDE: 9 INJECTION, SOLUTION INTRAVENOUS at 20:15

## 2024-07-20 RX ADMIN — HYDROMORPHONE HYDROCHLORIDE 4 MG: 4 TABLET ORAL at 09:05

## 2024-07-20 RX ADMIN — HYDROMORPHONE HYDROCHLORIDE 4 MG: 4 TABLET ORAL at 00:47

## 2024-07-20 RX ADMIN — ATENOLOL 50 MG: 50 TABLET ORAL at 09:06

## 2024-07-20 RX ADMIN — ACETAMINOPHEN 975 MG: 325 TABLET, FILM COATED ORAL at 13:53

## 2024-07-20 RX ADMIN — ISOSORBIDE MONONITRATE 30 MG: 30 TABLET, EXTENDED RELEASE ORAL at 09:05

## 2024-07-20 RX ADMIN — ACETAMINOPHEN 975 MG: 325 TABLET, FILM COATED ORAL at 21:38

## 2024-07-20 RX ADMIN — ALLOPURINOL 300 MG: 300 TABLET ORAL at 09:05

## 2024-07-20 RX ADMIN — LORATADINE 10 MG: 10 TABLET ORAL at 09:05

## 2024-07-20 ASSESSMENT — ACTIVITIES OF DAILY LIVING (ADL)
ADLS_ACUITY_SCORE: 28
ADLS_ACUITY_SCORE: 27
ADLS_ACUITY_SCORE: 28
ADLS_ACUITY_SCORE: 28
ADLS_ACUITY_SCORE: 27
ADLS_ACUITY_SCORE: 28
ADLS_ACUITY_SCORE: 27
ADLS_ACUITY_SCORE: 28
ADLS_ACUITY_SCORE: 27
ADLS_ACUITY_SCORE: 28
ADLS_ACUITY_SCORE: 28
ADLS_ACUITY_SCORE: 27
ADLS_ACUITY_SCORE: 28
ADLS_ACUITY_SCORE: 27
ADLS_ACUITY_SCORE: 27
ADLS_ACUITY_SCORE: 28

## 2024-07-20 NOTE — PLAN OF CARE
A&oX4. VSS except hypertensive, afebrile. Pain left hip range 2-7/10 managed with PRN oral Dilaudid and cold therapy. Chronic RUE numbness per pt from past stroke. LS clear. Incentive spirometer instruction provided on shift. Pt weight shifting in bed and on air mattress. Koenig catheter in place and draining. Red tinged urine on shift, provider notified - see previous note. CMS intact LLE. Urine color yellow toward end of shift. Family at bedside.     Temp: 97.3  F (36.3  C) Temp src: Tympanic BP: (!) 173/87 Pulse: 73   Resp: 22 SpO2: 95 % O2 Device: None (Room air)      Yulissa Carroll RN .......  7/20/2024  6:45 AM        Goal Outcome Evaluation:      Plan of Care Reviewed With: patient, spouse    Overall Patient Progress: no change    Outcome Evaluation: pain management, bed bound, weight shifting, koenig catheter cares

## 2024-07-20 NOTE — PLAN OF CARE
Goal Outcome Evaluation:      Plan of Care Reviewed With: patient, spouse    Overall Patient Progress: no change     A/O, VSS. Pain currently managed with scheduled tylenol, ice and prn dilaudid. Slaughter in place with adequate urine output. SCDs in place. CMS intact to LLE. Assist of 2 for repositioning. Plan for surgery on Monday, blood thinners being held.

## 2024-07-20 NOTE — PLAN OF CARE
Bp is elevated pt [id number] pain at this time  in room visitng pt is afebrile and is resting comfortably appetitie is fair, a & o x3, pt is scheduled to have surgery on Monday I was told by prior nurse to wait and release signed and held orders for Sunday night  Goal Outcome Evaluation:      Plan of Care Reviewed With: patient    Overall Patient Progress: no changeOverall Patient Progress: no change

## 2024-07-20 NOTE — PLAN OF CARE
SAFETY CHECKLIST  ID Bands and Risk clasps correct and in place (DNR, Fall risk, Allergy, Latex, Limb):  Yes  All Lines Reconciled and labeled correctly: Yes  Whiteboard updated:Yes  Environmental interventions: Yes  Verify Tele #: N/A    Yulissa Carroll RN .......  7/19/2024  7:21 PM

## 2024-07-20 NOTE — PROGRESS NOTES
[facility]    Medicine Progress Note - Hospitalist Service    Date of Admission:  7/18/2024    Assessment & Plan      Margaret Batista is a 78 year old female admitted on 7/18/2024. She was admitted thru the ED after a femoral neck fracture from a fall.    Left Femoral Neck Fracture  - after a slip and fall at home  - ortho has been contacted with plan for surgery on Monday (on Brilinta and ASA)  - pain well controlled today   - blood loss (decrease in Hgb) noted, will recheck tomorrow   Plan:  Confirmed with ortho that surgery is planned for Monday, will place NPO on 7/21 after dinner     Vascular Disease: ASCVD and Carotid   - cardiac stent x 7, carotid stent after CVA x 1  - no cardiopulmonary symptoms PTA with exertion  Plan:  Hold brelynta  Hold ASA  Cont BB, Imdur, Statin     Osteopenia  - noted on DEXA done in 2016, none since  - needs baseline DEXA after discharge and then to be started on treatment     H/O Gout  - no current issues  Plan:  Cont Allopurinol    HTN  - severely elevated at admit, however continuing to be above goal   Plan:  Will increase atenolol, continue current imdur     RA  - cont home plaquenil and methotrexate   - stable     Renal Mass  - noted on recent CT to be slowly enlarging   - she has outpt follow up with Urology scheduled for early August (Dr. Pinto)        Diet: Combination Diet Low Saturated Fat Na <2400mg Diet, No Caffeine Diet    DVT Prophylaxis: Pneumatic Compression Devices  Slaughter Catheter: PRESENT, indication: Other (Comment) (Hip fracture, Bedrest)  Lines: None     Cardiac Monitoring: None  Code Status: Full Code      Clinically Significant Risk Factors           # Hypercalcemia: corrected calcium is >10.1, will monitor as appropriate    # Hypoalbuminemia: Lowest albumin = 3.1 g/dL at 7/20/2024  6:27 AM, will monitor as appropriate     # Hypertension: Noted on problem list              # Overweight: Estimated body mass index is 28.98 kg/m  as  "calculated from the following:    Height as of this encounter: 1.702 m (5' 7\").    Weight as of this encounter: 83.9 kg (185 lb)., PRESENT ON ADMISSION            Disposition Plan     Medically Ready for Discharge: Anticipated in 2-4 Days             Cornel Ludwig MD  Hospitalist Service  [facility]  Securely message with [employer] (more info)  Text page via [employer] Paging/Directory   ______________________________________________________________________    Interval History   Pain controlled, no complaints today     Physical Exam   Vital Signs: Temp: 98.3  F (36.8  C) Temp src: Tympanic BP: (!) 162/86 Pulse: 71   Resp: 18 SpO2: 96 % O2 Device: None (Room air)    Weight: 185 lbs 0 oz    Constitutional: awake, alert, cooperative, no apparent distress, and appears stated age  Hematologic / Lymphatic: no cervical lymphadenopathy  Respiratory: No increased work of breathing, good air exchange, clear to auscultation bilaterally, no crackles or wheezing  Cardiovascular: Normal apical impulse, regular rate and rhythm, normal S1 and S2, no S3 or S4, and no murmur noted  GI: No scars, normal bowel sounds, soft, non-distended, non-tender, no masses palpated, no hepatosplenomegally  Musculoskeletal: LLE externally rotated   Neuropsychiatric: General: normal, calm, and normal eye contact    Medical Decision Making       25 MINUTES SPENT BY ME on the date of service doing chart review, history, exam, documentation & further activities per the note.      Data     I have personally reviewed the following data over the past 24 hrs:    10.8  \   10.9 (L)   / 179     138 106 11.5 /  101 (H)   3.9 25 0.86 \     ALT: 13 AST: 19 AP: 76 TBILI: 0.7   ALB: 3.1 (L) TOT PROTEIN: 5.5 (L) LIPASE: N/A       Imaging results reviewed over the past 24 hrs:   No results found for this or any previous visit (from the past 24 hour(s)).  "

## 2024-07-20 NOTE — PLAN OF CARE
"Provider notified of patient's urine in koenig catheter bag being red tinged with blood sediment in koenig catheter line. No indication of prior bloody urine in charting, notes or from report. Pt denies pain with catheter. Catheter is in place and draining adequately. Provider responded stating \"hematuria could be due to renal mass, placement, or anticoagulation. Watch for now.\"     Yulissa Carroll RN .......  7/19/2024  8:50 PM    "

## 2024-07-21 ENCOUNTER — APPOINTMENT (OUTPATIENT)
Dept: OCCUPATIONAL THERAPY | Facility: OTHER | Age: 79
DRG: 522 | End: 2024-07-21
Attending: INTERNAL MEDICINE
Payer: COMMERCIAL

## 2024-07-21 LAB
HGB BLD-MCNC: 11.3 G/DL (ref 11.7–15.7)
POTASSIUM SERPL-SCNC: 3.7 MMOL/L (ref 3.4–5.3)

## 2024-07-21 PROCEDURE — 120N000001 HC R&B MED SURG/OB

## 2024-07-21 PROCEDURE — 85018 HEMOGLOBIN: CPT | Performed by: INTERNAL MEDICINE

## 2024-07-21 PROCEDURE — 250N000011 HC RX IP 250 OP 636: Performed by: INTERNAL MEDICINE

## 2024-07-21 PROCEDURE — 84132 ASSAY OF SERUM POTASSIUM: CPT | Performed by: INTERNAL MEDICINE

## 2024-07-21 PROCEDURE — 36415 COLL VENOUS BLD VENIPUNCTURE: CPT | Performed by: INTERNAL MEDICINE

## 2024-07-21 PROCEDURE — 258N000003 HC RX IP 258 OP 636: Performed by: INTERNAL MEDICINE

## 2024-07-21 PROCEDURE — 250N000013 HC RX MED GY IP 250 OP 250 PS 637: Performed by: INTERNAL MEDICINE

## 2024-07-21 PROCEDURE — 99232 SBSQ HOSP IP/OBS MODERATE 35: CPT | Performed by: INTERNAL MEDICINE

## 2024-07-21 RX ORDER — HEPARIN SODIUM 5000 [USP'U]/.5ML
5000 INJECTION, SOLUTION INTRAVENOUS; SUBCUTANEOUS ONCE
Status: COMPLETED | OUTPATIENT
Start: 2024-07-21 | End: 2024-07-21

## 2024-07-21 RX ORDER — ATENOLOL 50 MG/1
100 TABLET ORAL DAILY
Status: DISCONTINUED | OUTPATIENT
Start: 2024-07-21 | End: 2024-07-24 | Stop reason: HOSPADM

## 2024-07-21 RX ORDER — CYCLOBENZAPRINE HCL 10 MG
10 TABLET ORAL EVERY 8 HOURS PRN
Status: DISCONTINUED | OUTPATIENT
Start: 2024-07-21 | End: 2024-07-24 | Stop reason: HOSPADM

## 2024-07-21 RX ORDER — ATORVASTATIN CALCIUM 40 MG/1
80 TABLET, FILM COATED ORAL EVERY EVENING
Status: DISCONTINUED | OUTPATIENT
Start: 2024-07-21 | End: 2024-07-21

## 2024-07-21 RX ADMIN — FOLIC ACID 1000 MCG: 1 TABLET ORAL at 09:54

## 2024-07-21 RX ADMIN — HYDROMORPHONE HYDROCHLORIDE 4 MG: 4 TABLET ORAL at 08:01

## 2024-07-21 RX ADMIN — CYCLOBENZAPRINE 10 MG: 10 TABLET, FILM COATED ORAL at 16:33

## 2024-07-21 RX ADMIN — ACETAMINOPHEN 975 MG: 325 TABLET, FILM COATED ORAL at 21:05

## 2024-07-21 RX ADMIN — CYCLOBENZAPRINE 10 MG: 10 TABLET, FILM COATED ORAL at 08:01

## 2024-07-21 RX ADMIN — ACETAMINOPHEN 975 MG: 325 TABLET, FILM COATED ORAL at 14:52

## 2024-07-21 RX ADMIN — HYDROXYCHLOROQUINE SULFATE 200 MG: 200 TABLET ORAL at 21:05

## 2024-07-21 RX ADMIN — LORATADINE 10 MG: 10 TABLET ORAL at 09:54

## 2024-07-21 RX ADMIN — ACETAMINOPHEN 975 MG: 325 TABLET, FILM COATED ORAL at 06:58

## 2024-07-21 RX ADMIN — ALLOPURINOL 300 MG: 300 TABLET ORAL at 09:54

## 2024-07-21 RX ADMIN — HYDROMORPHONE HYDROCHLORIDE 4 MG: 4 TABLET ORAL at 20:48

## 2024-07-21 RX ADMIN — HEPARIN SODIUM 5000 UNITS: 10000 INJECTION, SOLUTION INTRAVENOUS; SUBCUTANEOUS at 12:27

## 2024-07-21 RX ADMIN — SODIUM CHLORIDE: 9 INJECTION, SOLUTION INTRAVENOUS at 09:22

## 2024-07-21 RX ADMIN — SODIUM CHLORIDE: 9 INJECTION, SOLUTION INTRAVENOUS at 21:06

## 2024-07-21 RX ADMIN — ATORVASTATIN CALCIUM 80 MG: 40 TABLET, FILM COATED ORAL at 21:05

## 2024-07-21 RX ADMIN — ATENOLOL 100 MG: 50 TABLET ORAL at 09:54

## 2024-07-21 RX ADMIN — ISOSORBIDE MONONITRATE 30 MG: 30 TABLET, EXTENDED RELEASE ORAL at 09:54

## 2024-07-21 ASSESSMENT — ACTIVITIES OF DAILY LIVING (ADL)
ADLS_ACUITY_SCORE: 28
ADLS_ACUITY_SCORE: 34
ADLS_ACUITY_SCORE: 28
ADLS_ACUITY_SCORE: 28
ADLS_ACUITY_SCORE: 34

## 2024-07-21 NOTE — PLAN OF CARE
"Goal Outcome Evaluation:    BP (!) 168/79 (BP Location: Left arm, Patient Position: Semi-Vieira's, Cuff Size: Adult Large)   Pulse 74   Temp 98.6  F (37  C) (Tympanic)   Resp 18   Ht 1.702 m (5' 7\")   Wt 83.9 kg (185 lb)   LMP 01/01/1998 (Within Months)   SpO2 96%   BMI 28.98 kg/m      A/O, pleasant and cooperative.  Pain improved with scheduled Tylenol, ice and prn po Dilaudid. She has been having occasional muscle spasms but started on prn Flexeril this AM with relief. CMS intact to BLE. More agreeable to reposition today.  No skin breakdown noted. Large hematoma to posterior left hip.  Attempted to have a BM this AM on the bedpan with no results. Slaughter in place with adequate urine output. Continues to have poor appetite. Anxious to have surgery tomorrow.   "

## 2024-07-21 NOTE — PLAN OF CARE
"A&Ox4. VSS except hypertensive, afebrile. Pain 3-4/10 on shift managed with PRN oral Dilaudid and scheduled Tylenol, ice therapy, and repositioning. Pt states \"twinges\" of pain happening intermittently to LLE where pain goes up to 7/10 and lasts for about 15 seconds. Provider informed. Pt wondering about muscle relaxant for this. Provider encouraged pt to stretch leg, rub leg, and try ice. Ice therapy did help with these episodes somewhat. Educated pt on flexing at the ankle and moving foot around in that way to stretch calf muscles, pt also stated this helped. CMS intact LLE. Large area of bruising on posterior side of left hip. Koenig catheter in place, adequate output and yellow in color. Weight shifting in bed and on air mattress. Family at bedside.     Goal Outcome Evaluation:      Plan of Care Reviewed With: patient    Overall Patient Progress: no changeOverall Patient Progress: no change    Outcome Evaluation: pain management, bed bound, koenig catheter in place, leg elevation      "

## 2024-07-21 NOTE — PLAN OF CARE
SAFETY CHECKLIST  ID Bands and Risk clasps correct and in place (DNR, Fall risk, Allergy, Latex, Limb):  Yes  All Lines Reconciled and labeled correctly: Yes  Whiteboard updated:Yes  Environmental interventions: Yes  Verify Tele #: N/A    Yulissa Carroll RN .......  7/20/2024  7:07 PM

## 2024-07-21 NOTE — PROGRESS NOTES
[facility]    Medicine Progress Note - Hospitalist Service    Date of Admission:  7/18/2024    Assessment & Plan      Margaret Batista is a 78 year old female admitted on 7/18/2024. She was admitted thru the ED with a femoral neck fracture from a fall at home.    Left Femoral Neck Fracture  - after a slip and fall at home  - Dr. Topete planning for surgery on Monday (on Brilinta and ASA at baseline so surgery held until 7/22)  - pain well controlled today, however did have muscle spasms over noc  - slight hgb drop, however now stable at 11.3  Plan:  Add flexeril  Continue pain control  Surgery tomorrow (NPO after midnight)    Vascular Disease: ASCVD and Carotid   - cardiac stent x 7, carotid stent after CVA x 1  - no cardiopulmonary symptoms prior to admission with exertion  Plan:  Hold brilinta and ASA, would like to resume after surgery as soon as possible  Cont BB, Imdur, Statin     Osteopenia  - noted on DEXA done in 2016, non imaging or treatment   - needs repeat DEXA after discharge and then to be started on treatment     H/O Gout  - no current issues  Plan:  Cont Allopurinol    HTN  - severely elevated at admit and continues to be elevated above target  Plan:  Will increase Atenolol further to 100 mg/d, continue current imdur   Might need additional agent added, consider ACE    RA  - cont home plaquenil and methotrexate   - stable     Renal Mass, probable RCC  - noted on recent CT to be slowly enlarging   - she has outpt follow up with Urology scheduled for early August (Dr. Pinto)        Diet: Combination Diet Low Saturated Fat Na <2400mg Diet, No Caffeine Diet    DVT Prophylaxis: Pneumatic Compression Devices - now that Hgb has stabilized, will give 1x dose of subcutaneous heparin today at noon as she has been in bed x 48 hours.    Slaughter Catheter: PRESENT, indication: Immobilization due to spinal injuries or other multiple traumatic injuries  Lines: None     Cardiac Monitoring:  "None  Code Status: Full Code      Clinically Significant Risk Factors              # Hypoalbuminemia: Lowest albumin = 3.1 g/dL at 7/20/2024  6:27 AM, will monitor as appropriate     # Hypertension: Noted on problem list              # Overweight: Estimated body mass index is 28.98 kg/m  as calculated from the following:    Height as of this encounter: 1.702 m (5' 7\").    Weight as of this encounter: 83.9 kg (185 lb)., PRESENT ON ADMISSION            Disposition Plan     Medically Ready for Discharge: Anticipated in 2-4 Days             Cornel Ludwig MD  Hospitalist Service  [facility]  Securely message with [employer] (more info)  Text page via [employer] Paging/Directory   ______________________________________________________________________    Interval History   Spasms in left leg/hip over night    Physical Exam   Vital Signs: Temp: 97.9  F (36.6  C) Temp src: Tympanic BP: (!) 180/77 Pulse: 78   Resp: 20 SpO2: 95 % O2 Device: None (Room air)    Weight: 185 lbs 0 oz    Constitutional: awake, alert, cooperative, no apparent distress, and appears stated age  Hematologic / Lymphatic: no cervical lymphadenopathy  Respiratory: No increased work of breathing, good air exchange, clear to auscultation bilaterally, no crackles or wheezing  Cardiovascular: Normal apical impulse, regular rate and rhythm, normal S1 and S2, no S3 or S4, and no murmur noted  GI: No scars, normal bowel sounds, soft, non-distended, non-tender, no masses palpated, no hepatosplenomegally  Musculoskeletal: LLE externally rotated   Neuropsychiatric: General: normal, calm, and normal eye contact    Medical Decision Making       25 MINUTES SPENT BY ME on the date of service doing chart review, history, exam, documentation & further activities per the note.      Data     I have personally reviewed the following data over the past 24 hrs:    N/A  \   11.3 (L)   / N/A     N/A N/A N/A /  N/A   3.7 N/A N/A \       Imaging results reviewed over " the past 24 hrs:   No results found for this or any previous visit (from the past 24 hour(s)).

## 2024-07-22 ENCOUNTER — APPOINTMENT (OUTPATIENT)
Dept: GENERAL RADIOLOGY | Facility: OTHER | Age: 79
DRG: 522 | End: 2024-07-22
Attending: ORTHOPAEDIC SURGERY
Payer: COMMERCIAL

## 2024-07-22 ENCOUNTER — ANESTHESIA (OUTPATIENT)
Dept: SURGERY | Facility: OTHER | Age: 79
DRG: 522 | End: 2024-07-22
Payer: COMMERCIAL
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
## 2024-07-22 PROBLEM — Z79.01 LONG TERM (CURRENT) USE OF ANTICOAGULANTS: Status: ACTIVE | Noted: 2024-07-22
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
## 2024-07-22 PROBLEM — R51.9 FACIAL PAIN: Status: ACTIVE | Noted: 2024-07-22

## 2024-07-22 PROBLEM — I69.951: Status: ACTIVE | Noted: 2024-07-22
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
## 2024-07-22 LAB
GLUCOSE BLDC GLUCOMTR-MCNC: 91 MG/DL (ref 70–99)
POTASSIUM SERPL-SCNC: 4.3 MMOL/L (ref 3.4–5.3)

## 2024-07-22 PROCEDURE — 710N000010 HC RECOVERY PHASE 1, LEVEL 2, PER MIN: Performed by: ORTHOPAEDIC SURGERY

## 2024-07-22 PROCEDURE — 250N000025 HC SEVOFLURANE, PER MIN: Performed by: ORTHOPAEDIC SURGERY

## 2024-07-22 PROCEDURE — 258N000003 HC RX IP 258 OP 636: Performed by: NURSE ANESTHETIST, CERTIFIED REGISTERED

## 2024-07-22 PROCEDURE — 0SRB04A REPLACEMENT OF LEFT HIP JOINT WITH CERAMIC ON POLYETHYLENE SYNTHETIC SUBSTITUTE, UNCEMENTED, OPEN APPROACH: ICD-10-PCS | Performed by: ORTHOPAEDIC SURGERY

## 2024-07-22 PROCEDURE — 272N000001 HC OR GENERAL SUPPLY STERILE: Performed by: ORTHOPAEDIC SURGERY

## 2024-07-22 PROCEDURE — C1776 JOINT DEVICE (IMPLANTABLE): HCPCS | Performed by: ORTHOPAEDIC SURGERY

## 2024-07-22 PROCEDURE — 999N000141 HC STATISTIC PRE-PROCEDURE NURSING ASSESSMENT: Performed by: ORTHOPAEDIC SURGERY

## 2024-07-22 PROCEDURE — 258N000003 HC RX IP 258 OP 636

## 2024-07-22 PROCEDURE — 250N000013 HC RX MED GY IP 250 OP 250 PS 637: Performed by: INTERNAL MEDICINE

## 2024-07-22 PROCEDURE — 258N000003 HC RX IP 258 OP 636: Performed by: ORTHOPAEDIC SURGERY

## 2024-07-22 PROCEDURE — 999N000063 XR PELVIS PORT 1/2 VIEWS

## 2024-07-22 PROCEDURE — 250N000011 HC RX IP 250 OP 636

## 2024-07-22 PROCEDURE — 250N000009 HC RX 250

## 2024-07-22 PROCEDURE — 36416 COLLJ CAPILLARY BLOOD SPEC: CPT | Performed by: INTERNAL MEDICINE

## 2024-07-22 PROCEDURE — 27130 TOTAL HIP ARTHROPLASTY: CPT

## 2024-07-22 PROCEDURE — 120N000001 HC R&B MED SURG/OB

## 2024-07-22 PROCEDURE — 360N000084 HC SURGERY LEVEL 4 W/ FLUORO, PER MIN: Performed by: ORTHOPAEDIC SURGERY

## 2024-07-22 PROCEDURE — 99100 ANES PT EXTEME AGE<1 YR&>70: CPT

## 2024-07-22 PROCEDURE — 250N000011 HC RX IP 250 OP 636: Performed by: ORTHOPAEDIC SURGERY

## 2024-07-22 PROCEDURE — 999N000180 XR SURGERY CARM FLUORO LESS THAN 5 MIN: Mod: TC

## 2024-07-22 PROCEDURE — 27130 TOTAL HIP ARTHROPLASTY: CPT | Mod: LT | Performed by: ORTHOPAEDIC SURGERY

## 2024-07-22 PROCEDURE — 84132 ASSAY OF SERUM POTASSIUM: CPT | Performed by: INTERNAL MEDICINE

## 2024-07-22 PROCEDURE — C1713 ANCHOR/SCREW BN/BN,TIS/BN: HCPCS | Performed by: ORTHOPAEDIC SURGERY

## 2024-07-22 PROCEDURE — 99231 SBSQ HOSP IP/OBS SF/LOW 25: CPT | Mod: 25 | Performed by: STUDENT IN AN ORGANIZED HEALTH CARE EDUCATION/TRAINING PROGRAM

## 2024-07-22 PROCEDURE — 99222 1ST HOSP IP/OBS MODERATE 55: CPT | Mod: 25 | Performed by: ORTHOPAEDIC SURGERY

## 2024-07-22 PROCEDURE — 250N000013 HC RX MED GY IP 250 OP 250 PS 637: Performed by: ORTHOPAEDIC SURGERY

## 2024-07-22 PROCEDURE — 258N000001 HC RX 258: Performed by: ORTHOPAEDIC SURGERY

## 2024-07-22 PROCEDURE — 370N000017 HC ANESTHESIA TECHNICAL FEE, PER MIN: Performed by: ORTHOPAEDIC SURGERY

## 2024-07-22 DEVICE — 6.5MM LOW PROFILE HEX SCREW 25MM
Type: IMPLANTABLE DEVICE | Site: HIP | Status: FUNCTIONAL
Brand: TRIDENT II

## 2024-07-22 DEVICE — TRIDENT X3 0 DEGREE POLYETHYLENE INSERT
Type: IMPLANTABLE DEVICE | Site: HIP | Status: FUNCTIONAL
Brand: TRIDENT X3 INSERT

## 2024-07-22 DEVICE — CERAMIC V40 FEMORAL HEAD
Type: IMPLANTABLE DEVICE | Site: HIP | Status: FUNCTIONAL
Brand: BIOLOX

## 2024-07-22 RX ORDER — LIDOCAINE HYDROCHLORIDE 20 MG/ML
INJECTION, SOLUTION INFILTRATION; PERINEURAL PRN
Status: DISCONTINUED | OUTPATIENT
Start: 2024-07-22 | End: 2024-07-22

## 2024-07-22 RX ORDER — ONDANSETRON 4 MG/1
4 TABLET, ORALLY DISINTEGRATING ORAL EVERY 30 MIN PRN
Status: DISCONTINUED | OUTPATIENT
Start: 2024-07-22 | End: 2024-07-22 | Stop reason: HOSPADM

## 2024-07-22 RX ORDER — FENTANYL CITRATE 50 UG/ML
INJECTION, SOLUTION INTRAMUSCULAR; INTRAVENOUS PRN
Status: DISCONTINUED | OUTPATIENT
Start: 2024-07-22 | End: 2024-07-22

## 2024-07-22 RX ORDER — HYDROMORPHONE HCL IN WATER/PF 6 MG/30 ML
0.2 PATIENT CONTROLLED ANALGESIA SYRINGE INTRAVENOUS
Status: DISCONTINUED | OUTPATIENT
Start: 2024-07-22 | End: 2024-07-22

## 2024-07-22 RX ORDER — OXYCODONE HYDROCHLORIDE 5 MG/1
10 TABLET ORAL
Status: DISCONTINUED | OUTPATIENT
Start: 2024-07-22 | End: 2024-07-22 | Stop reason: HOSPADM

## 2024-07-22 RX ORDER — SODIUM CHLORIDE, SODIUM LACTATE, POTASSIUM CHLORIDE, CALCIUM CHLORIDE 600; 310; 30; 20 MG/100ML; MG/100ML; MG/100ML; MG/100ML
INJECTION, SOLUTION INTRAVENOUS CONTINUOUS
Status: DISCONTINUED | OUTPATIENT
Start: 2024-07-22 | End: 2024-07-22 | Stop reason: HOSPADM

## 2024-07-22 RX ORDER — OXYCODONE HYDROCHLORIDE 5 MG/1
5 TABLET ORAL EVERY 4 HOURS PRN
Status: DISCONTINUED | OUTPATIENT
Start: 2024-07-22 | End: 2024-07-22

## 2024-07-22 RX ORDER — ONDANSETRON 4 MG/1
4 TABLET, ORALLY DISINTEGRATING ORAL EVERY 6 HOURS PRN
Status: DISCONTINUED | OUTPATIENT
Start: 2024-07-22 | End: 2024-07-22

## 2024-07-22 RX ORDER — ONDANSETRON 2 MG/ML
4 INJECTION INTRAMUSCULAR; INTRAVENOUS EVERY 6 HOURS PRN
Status: DISCONTINUED | OUTPATIENT
Start: 2024-07-22 | End: 2024-07-22

## 2024-07-22 RX ORDER — HYDROMORPHONE HCL IN WATER/PF 6 MG/30 ML
0.2 PATIENT CONTROLLED ANALGESIA SYRINGE INTRAVENOUS EVERY 5 MIN PRN
Status: DISCONTINUED | OUTPATIENT
Start: 2024-07-22 | End: 2024-07-22 | Stop reason: HOSPADM

## 2024-07-22 RX ORDER — FENTANYL CITRATE 50 UG/ML
25 INJECTION, SOLUTION INTRAMUSCULAR; INTRAVENOUS EVERY 5 MIN PRN
Status: DISCONTINUED | OUTPATIENT
Start: 2024-07-22 | End: 2024-07-22 | Stop reason: HOSPADM

## 2024-07-22 RX ORDER — AMOXICILLIN 250 MG
1 CAPSULE ORAL 2 TIMES DAILY
Status: DISCONTINUED | OUTPATIENT
Start: 2024-07-22 | End: 2024-07-24 | Stop reason: HOSPADM

## 2024-07-22 RX ORDER — BUPIVACAINE HYDROCHLORIDE 2.5 MG/ML
INJECTION, SOLUTION INFILTRATION; PERINEURAL PRN
Status: DISCONTINUED | OUTPATIENT
Start: 2024-07-22 | End: 2024-07-22 | Stop reason: HOSPADM

## 2024-07-22 RX ORDER — CEFAZOLIN SODIUM/WATER 2 G/20 ML
2 SYRINGE (ML) INTRAVENOUS EVERY 8 HOURS
Status: COMPLETED | OUTPATIENT
Start: 2024-07-23 | End: 2024-07-23

## 2024-07-22 RX ORDER — LIDOCAINE 40 MG/G
CREAM TOPICAL
Status: DISCONTINUED | OUTPATIENT
Start: 2024-07-22 | End: 2024-07-24 | Stop reason: HOSPADM

## 2024-07-22 RX ORDER — FENTANYL CITRATE 50 UG/ML
50 INJECTION, SOLUTION INTRAMUSCULAR; INTRAVENOUS EVERY 5 MIN PRN
Status: DISCONTINUED | OUTPATIENT
Start: 2024-07-22 | End: 2024-07-22 | Stop reason: HOSPADM

## 2024-07-22 RX ORDER — GLYCOPYRROLATE 0.2 MG/ML
INJECTION, SOLUTION INTRAMUSCULAR; INTRAVENOUS PRN
Status: DISCONTINUED | OUTPATIENT
Start: 2024-07-22 | End: 2024-07-22

## 2024-07-22 RX ORDER — CEFAZOLIN SODIUM/WATER 2 G/20 ML
2 SYRINGE (ML) INTRAVENOUS
Status: DISCONTINUED | OUTPATIENT
Start: 2024-07-22 | End: 2024-07-22 | Stop reason: HOSPADM

## 2024-07-22 RX ORDER — SODIUM CHLORIDE, SODIUM LACTATE, POTASSIUM CHLORIDE, CALCIUM CHLORIDE 600; 310; 30; 20 MG/100ML; MG/100ML; MG/100ML; MG/100ML
INJECTION, SOLUTION INTRAVENOUS CONTINUOUS
Status: DISCONTINUED | OUTPATIENT
Start: 2024-07-22 | End: 2024-07-24 | Stop reason: HOSPADM

## 2024-07-22 RX ORDER — HYDROMORPHONE HCL IN WATER/PF 6 MG/30 ML
0.4 PATIENT CONTROLLED ANALGESIA SYRINGE INTRAVENOUS
Status: DISCONTINUED | OUTPATIENT
Start: 2024-07-22 | End: 2024-07-22

## 2024-07-22 RX ORDER — POLYETHYLENE GLYCOL 3350 17 G/17G
17 POWDER, FOR SOLUTION ORAL DAILY
Status: DISCONTINUED | OUTPATIENT
Start: 2024-07-23 | End: 2024-07-24 | Stop reason: HOSPADM

## 2024-07-22 RX ORDER — KETAMINE HYDROCHLORIDE 10 MG/ML
INJECTION INTRAMUSCULAR; INTRAVENOUS PRN
Status: DISCONTINUED | OUTPATIENT
Start: 2024-07-22 | End: 2024-07-22

## 2024-07-22 RX ORDER — PROCHLORPERAZINE MALEATE 5 MG
5 TABLET ORAL EVERY 6 HOURS PRN
Status: DISCONTINUED | OUTPATIENT
Start: 2024-07-22 | End: 2024-07-24 | Stop reason: HOSPADM

## 2024-07-22 RX ORDER — PROPOFOL 10 MG/ML
INJECTION, EMULSION INTRAVENOUS PRN
Status: DISCONTINUED | OUTPATIENT
Start: 2024-07-22 | End: 2024-07-22

## 2024-07-22 RX ORDER — ACETAMINOPHEN 325 MG/1
650 TABLET ORAL EVERY 4 HOURS PRN
Status: DISCONTINUED | OUTPATIENT
Start: 2024-07-25 | End: 2024-07-24 | Stop reason: HOSPADM

## 2024-07-22 RX ORDER — DEXAMETHASONE SODIUM PHOSPHATE 10 MG/ML
4 INJECTION, SOLUTION INTRAMUSCULAR; INTRAVENOUS
Status: DISCONTINUED | OUTPATIENT
Start: 2024-07-22 | End: 2024-07-22 | Stop reason: HOSPADM

## 2024-07-22 RX ORDER — CEFAZOLIN SODIUM/WATER 2 G/20 ML
2 SYRINGE (ML) INTRAVENOUS SEE ADMIN INSTRUCTIONS
Status: DISCONTINUED | OUTPATIENT
Start: 2024-07-22 | End: 2024-07-22 | Stop reason: HOSPADM

## 2024-07-22 RX ORDER — NALOXONE HYDROCHLORIDE 0.4 MG/ML
0.1 INJECTION, SOLUTION INTRAMUSCULAR; INTRAVENOUS; SUBCUTANEOUS
Status: DISCONTINUED | OUTPATIENT
Start: 2024-07-22 | End: 2024-07-22 | Stop reason: HOSPADM

## 2024-07-22 RX ORDER — OXYCODONE HYDROCHLORIDE 5 MG/1
10 TABLET ORAL EVERY 4 HOURS PRN
Status: DISCONTINUED | OUTPATIENT
Start: 2024-07-22 | End: 2024-07-22

## 2024-07-22 RX ORDER — GLYCINE 1.5 G/100ML
SOLUTION IRRIGATION PRN
Status: DISCONTINUED | OUTPATIENT
Start: 2024-07-22 | End: 2024-07-22 | Stop reason: HOSPADM

## 2024-07-22 RX ORDER — TRANEXAMIC ACID 650 MG/1
1950 TABLET ORAL ONCE
Status: COMPLETED | OUTPATIENT
Start: 2024-07-22 | End: 2024-07-22

## 2024-07-22 RX ORDER — ONDANSETRON 2 MG/ML
INJECTION INTRAMUSCULAR; INTRAVENOUS PRN
Status: DISCONTINUED | OUTPATIENT
Start: 2024-07-22 | End: 2024-07-22

## 2024-07-22 RX ORDER — ONDANSETRON 2 MG/ML
4 INJECTION INTRAMUSCULAR; INTRAVENOUS EVERY 30 MIN PRN
Status: DISCONTINUED | OUTPATIENT
Start: 2024-07-22 | End: 2024-07-22 | Stop reason: HOSPADM

## 2024-07-22 RX ORDER — OXYCODONE HYDROCHLORIDE 5 MG/1
5 TABLET ORAL
Status: DISCONTINUED | OUTPATIENT
Start: 2024-07-22 | End: 2024-07-22 | Stop reason: HOSPADM

## 2024-07-22 RX ORDER — LIDOCAINE 40 MG/G
CREAM TOPICAL
Status: DISCONTINUED | OUTPATIENT
Start: 2024-07-22 | End: 2024-07-22

## 2024-07-22 RX ORDER — HYDROMORPHONE HCL IN WATER/PF 6 MG/30 ML
0.4 PATIENT CONTROLLED ANALGESIA SYRINGE INTRAVENOUS EVERY 5 MIN PRN
Status: DISCONTINUED | OUTPATIENT
Start: 2024-07-22 | End: 2024-07-22 | Stop reason: HOSPADM

## 2024-07-22 RX ORDER — BISACODYL 10 MG
10 SUPPOSITORY, RECTAL RECTAL DAILY PRN
Status: DISCONTINUED | OUTPATIENT
Start: 2024-07-25 | End: 2024-07-24 | Stop reason: HOSPADM

## 2024-07-22 RX ORDER — ACETAMINOPHEN 325 MG/1
975 TABLET ORAL EVERY 8 HOURS
Status: DISCONTINUED | OUTPATIENT
Start: 2024-07-23 | End: 2024-07-24 | Stop reason: HOSPADM

## 2024-07-22 RX ORDER — FENTANYL CITRATE 50 UG/ML
25 INJECTION, SOLUTION INTRAMUSCULAR; INTRAVENOUS
Status: DISCONTINUED | OUTPATIENT
Start: 2024-07-22 | End: 2024-07-22 | Stop reason: HOSPADM

## 2024-07-22 RX ORDER — DEXAMETHASONE SODIUM PHOSPHATE 4 MG/ML
INJECTION, SOLUTION INTRA-ARTICULAR; INTRALESIONAL; INTRAMUSCULAR; INTRAVENOUS; SOFT TISSUE PRN
Status: DISCONTINUED | OUTPATIENT
Start: 2024-07-22 | End: 2024-07-22

## 2024-07-22 RX ADMIN — LORATADINE 10 MG: 10 TABLET ORAL at 09:20

## 2024-07-22 RX ADMIN — PHENYLEPHRINE HYDROCHLORIDE 100 MCG: 10 INJECTION INTRAVENOUS at 16:37

## 2024-07-22 RX ADMIN — MIDAZOLAM HYDROCHLORIDE 2 MG: 1 INJECTION, SOLUTION INTRAMUSCULAR; INTRAVENOUS at 15:48

## 2024-07-22 RX ADMIN — PHENYLEPHRINE HYDROCHLORIDE 100 MCG: 10 INJECTION INTRAVENOUS at 17:18

## 2024-07-22 RX ADMIN — PHENYLEPHRINE HYDROCHLORIDE 100 MCG: 10 INJECTION INTRAVENOUS at 16:22

## 2024-07-22 RX ADMIN — SODIUM CHLORIDE, POTASSIUM CHLORIDE, SODIUM LACTATE AND CALCIUM CHLORIDE: 600; 310; 30; 20 INJECTION, SOLUTION INTRAVENOUS at 19:31

## 2024-07-22 RX ADMIN — DEXMEDETOMIDINE HYDROCHLORIDE 4 MCG: 100 INJECTION, SOLUTION INTRAVENOUS at 17:22

## 2024-07-22 RX ADMIN — SODIUM CHLORIDE, SODIUM LACTATE, POTASSIUM CHLORIDE, AND CALCIUM CHLORIDE: 600; 310; 30; 20 INJECTION, SOLUTION INTRAVENOUS at 16:13

## 2024-07-22 RX ADMIN — GLYCOPYRROLATE 0.1 MG: 0.2 INJECTION, SOLUTION INTRAMUSCULAR; INTRAVENOUS at 17:43

## 2024-07-22 RX ADMIN — ATORVASTATIN CALCIUM 80 MG: 40 TABLET, FILM COATED ORAL at 21:36

## 2024-07-22 RX ADMIN — GLYCOPYRROLATE 0.1 MG: 0.2 INJECTION, SOLUTION INTRAMUSCULAR; INTRAVENOUS at 16:37

## 2024-07-22 RX ADMIN — LIDOCAINE HYDROCHLORIDE 40 MG: 20 INJECTION, SOLUTION INFILTRATION; PERINEURAL at 15:55

## 2024-07-22 RX ADMIN — PHENYLEPHRINE HYDROCHLORIDE 50 MCG: 10 INJECTION INTRAVENOUS at 17:33

## 2024-07-22 RX ADMIN — Medication 10 MG: at 17:22

## 2024-07-22 RX ADMIN — Medication 20 MG: at 15:55

## 2024-07-22 RX ADMIN — TRANEXAMIC ACID 1950 MG: 650 TABLET ORAL at 07:53

## 2024-07-22 RX ADMIN — SODIUM CHLORIDE, SODIUM LACTATE, POTASSIUM CHLORIDE, AND CALCIUM CHLORIDE: 600; 310; 30; 20 INJECTION, SOLUTION INTRAVENOUS at 07:43

## 2024-07-22 RX ADMIN — GLYCOPYRROLATE 0.2 MG: 0.2 INJECTION, SOLUTION INTRAMUSCULAR; INTRAVENOUS at 15:55

## 2024-07-22 RX ADMIN — FOLIC ACID 1000 MCG: 1 TABLET ORAL at 09:20

## 2024-07-22 RX ADMIN — PHENYLEPHRINE HYDROCHLORIDE 100 MCG: 10 INJECTION INTRAVENOUS at 17:02

## 2024-07-22 RX ADMIN — ROCURONIUM BROMIDE 10 MG: 50 INJECTION, SOLUTION INTRAVENOUS at 16:31

## 2024-07-22 RX ADMIN — HYDROXYCHLOROQUINE SULFATE 400 MG: 200 TABLET ORAL at 21:36

## 2024-07-22 RX ADMIN — ALLOPURINOL 300 MG: 300 TABLET ORAL at 09:20

## 2024-07-22 RX ADMIN — ATENOLOL 100 MG: 50 TABLET ORAL at 09:20

## 2024-07-22 RX ADMIN — Medication 2 G: at 15:35

## 2024-07-22 RX ADMIN — PHENYLEPHRINE HYDROCHLORIDE 100 MCG: 10 INJECTION INTRAVENOUS at 16:13

## 2024-07-22 RX ADMIN — DEXAMETHASONE SODIUM PHOSPHATE 4 MG: 4 INJECTION, SOLUTION INTRA-ARTICULAR; INTRALESIONAL; INTRAMUSCULAR; INTRAVENOUS; SOFT TISSUE at 15:55

## 2024-07-22 RX ADMIN — FENTANYL CITRATE 25 MCG: 50 INJECTION INTRAMUSCULAR; INTRAVENOUS at 17:22

## 2024-07-22 RX ADMIN — DEXMEDETOMIDINE HYDROCHLORIDE 4 MCG: 100 INJECTION, SOLUTION INTRAVENOUS at 16:33

## 2024-07-22 RX ADMIN — HYDROMORPHONE HYDROCHLORIDE 1 MG: 1 INJECTION, SOLUTION INTRAMUSCULAR; INTRAVENOUS; SUBCUTANEOUS at 16:30

## 2024-07-22 RX ADMIN — PHENYLEPHRINE HYDROCHLORIDE 50 MCG: 10 INJECTION INTRAVENOUS at 17:28

## 2024-07-22 RX ADMIN — SUGAMMADEX 100 MG: 100 INJECTION, SOLUTION INTRAVENOUS at 17:49

## 2024-07-22 RX ADMIN — SENNOSIDES AND DOCUSATE SODIUM 1 TABLET: 50; 8.6 TABLET ORAL at 21:35

## 2024-07-22 RX ADMIN — ONDANSETRON HYDROCHLORIDE 4 MG: 2 SOLUTION INTRAMUSCULAR; INTRAVENOUS at 15:55

## 2024-07-22 RX ADMIN — FENTANYL CITRATE 25 MCG: 50 INJECTION INTRAMUSCULAR; INTRAVENOUS at 16:30

## 2024-07-22 RX ADMIN — FENTANYL CITRATE 25 MCG: 50 INJECTION INTRAMUSCULAR; INTRAVENOUS at 15:55

## 2024-07-22 RX ADMIN — PHENYLEPHRINE HYDROCHLORIDE 100 MCG: 10 INJECTION INTRAVENOUS at 17:06

## 2024-07-22 RX ADMIN — ACETAMINOPHEN 650 MG: 325 TABLET, FILM COATED ORAL at 19:36

## 2024-07-22 RX ADMIN — PHENYLEPHRINE HYDROCHLORIDE 100 MCG: 10 INJECTION INTRAVENOUS at 17:42

## 2024-07-22 RX ADMIN — ROCURONIUM BROMIDE 40 MG: 50 INJECTION, SOLUTION INTRAVENOUS at 15:55

## 2024-07-22 RX ADMIN — FENTANYL CITRATE 25 MCG: 50 INJECTION INTRAMUSCULAR; INTRAVENOUS at 15:48

## 2024-07-22 RX ADMIN — PHENYLEPHRINE HYDROCHLORIDE 100 MCG: 10 INJECTION INTRAVENOUS at 16:38

## 2024-07-22 RX ADMIN — ISOSORBIDE MONONITRATE 30 MG: 30 TABLET, EXTENDED RELEASE ORAL at 09:20

## 2024-07-22 RX ADMIN — PROPOFOL 120 MG: 10 INJECTION, EMULSION INTRAVENOUS at 15:55

## 2024-07-22 RX ADMIN — HYDROMORPHONE HYDROCHLORIDE 2 MG: 2 TABLET ORAL at 08:12

## 2024-07-22 RX ADMIN — ACETAMINOPHEN 975 MG: 325 TABLET, FILM COATED ORAL at 06:21

## 2024-07-22 RX ADMIN — Medication 10 MG: at 16:33

## 2024-07-22 RX ADMIN — PHENYLEPHRINE HYDROCHLORIDE 100 MCG: 10 INJECTION INTRAVENOUS at 17:47

## 2024-07-22 ASSESSMENT — ACTIVITIES OF DAILY LIVING (ADL)
ADLS_ACUITY_SCORE: 40
ADLS_ACUITY_SCORE: 34
ADLS_ACUITY_SCORE: 40
ADLS_ACUITY_SCORE: 34
ADLS_ACUITY_SCORE: 34
ADLS_ACUITY_SCORE: 40
ADLS_ACUITY_SCORE: 40
ADLS_ACUITY_SCORE: 34
ADLS_ACUITY_SCORE: 40
ADLS_ACUITY_SCORE: 34
ADLS_ACUITY_SCORE: 40
ADLS_ACUITY_SCORE: 40

## 2024-07-22 NOTE — ANESTHESIA CARE TRANSFER NOTE
Patient: Margaret Batista    Procedure: Procedure(s):  ARTHROPLASTY, HIP, TOTAL, DIRECT ANTERIOR APPROACH       Diagnosis: Left displaced femoral neck fracture (H) [S72.002A]  Diagnosis Additional Information: No value filed.    Anesthesia Type:   General     Note:    Oropharynx: spontaneously breathing and oropharynx clear of all foreign objects  Level of Consciousness: awake and drowsy  Oxygen Supplementation: face mask  Level of Supplemental Oxygen (L/min / FiO2): 5  Independent Airway: airway patency satisfactory and stable  Dentition: dentition unchanged  Vital Signs Stable: post-procedure vital signs reviewed and stable  Report to RN Given: handoff report given  Patient transferred to: PACU    Handoff Report: Identifed the Patient, Identified the Reponsible Provider, Reviewed the pertinent medical history, Discussed the surgical course, Reviewed Intra-OP anesthesia mangement and issues during anesthesia, Set expectations for post-procedure period and Allowed opportunity for questions and acknowledgement of understanding    Vitals:  Vitals Value Taken Time   /87 07/22/24 1820   Temp 98.78  F (37.1  C) 07/22/24 1820   Pulse 86 07/22/24 1821   Resp 11 07/22/24 1821   SpO2 98 % 07/22/24 1821   Vitals shown include unfiled device data.    Electronically Signed By: JODEE Holland CRNA  July 22, 2024  6:22 PM

## 2024-07-22 NOTE — PLAN OF CARE
Patient reports no pain at rest but rated 5 with movement. 2 mg of oral hydromorphone was given for pain prior to repositioning. Patient then reported pain of 4 out of 10 with repositioning.

## 2024-07-22 NOTE — CARE PLAN
VS recorded, SBP elevated ranging from 169-174. Patient denies chest pain and SOB with elevated pressures. Reports pain, is well managed after oral hydromorphone 2 mg. Patient has increased leg pain to hip with movement. CMS per baseline. A&OX4.

## 2024-07-22 NOTE — ANESTHESIA POSTPROCEDURE EVALUATION
Patient: Margaret Batista    Procedure: Procedure(s):  ARTHROPLASTY, HIP, TOTAL, DIRECT ANTERIOR APPROACH       Anesthesia Type:  General    Note:  Disposition: Inpatient   Postop Pain Control: Uneventful            Sign Out: Well controlled pain   PONV: No   Neuro/Psych: Uneventful            Sign Out: Acceptable/Baseline neuro status   Airway/Respiratory: Uneventful            Sign Out: Acceptable/Baseline resp. status   CV/Hemodynamics: Uneventful            Sign Out: Acceptable CV status; No obvious hypovolemia; No obvious fluid overload   Other NRE: NONE   DID A NON-ROUTINE EVENT OCCUR? No           Last vitals:  Vitals Value Taken Time   /82 07/22/24 1845   Temp 97.7  F (36.5  C) 07/22/24 1848   Pulse 83 07/22/24 1849   Resp 9 07/22/24 1849   SpO2 95 % 07/22/24 1849   Vitals shown include unfiled device data.    Electronically Signed By: JODEE Holland CRNA  July 22, 2024  6:50 PM

## 2024-07-22 NOTE — PLAN OF CARE
A&Ox4. VSS except hypertensive, afebrile. Denies pain managed with PRN oral Dilaudid and scheduled Tylenol, ice therapy and repositioning. No painful spasms overnight. LS clear. CMS intact to LLE. Continues to be large area of bruising of posterior side of left hip. Koenig catheter in place with adequate output. Continues to weight shift in bed on air mattress. Family at bedside. Plan for left total hip replacement with Dr. Topete today. Pt has been NPO since midnight.     Temp: 98.1  F (36.7  C) Temp src: Tympanic BP: (!) 174/96 Pulse: 75   Resp: 18 SpO2: 95 % O2 Device: None (Room air)      Yulissa Carroll RN .......  7/22/2024  6:47 AM      Goal Outcome Evaluation:      Plan of Care Reviewed With: patient    Overall Patient Progress: improving    Outcome Evaluation: pain management, koenig catheter in place, surgery today

## 2024-07-22 NOTE — PLAN OF CARE
SAFETY CHECKLIST  ID Bands and Risk clasps correct and in place (DNR, Fall risk, Allergy, Latex, Limb):  Yes  All Lines Reconciled and labeled correctly: Yes  Whiteboard updated:Yes  Environmental interventions: Yes  Verify Tele #: N/A    Yulissa Carroll RN .......  7/21/2024  7:29 PM

## 2024-07-22 NOTE — PROGRESS NOTES
Interdisciplinary Discharge Planning Note    Anticipated Discharge Date:7/24    Anticipated Discharge Location: SNF    Clinical Needs Before Discharge:   Pain control, surgery today, PT/OT eval    Treatment Needs After Discharge:  rehab (PT, OT, ST)    Potential Barriers to Discharge: None Identified     ALISA Triana  7/22/2024,  12:15 PM

## 2024-07-22 NOTE — PLAN OF CARE
Goal Outcome Evaluation:       Patient stated that she was feeling comfortable in bed. Patient requested that she did not want to reposition. Patient has been slightly shifting her weight in bed today. Benefits of every two hour repositioning reviewed and risks of refusal reviewed with patient and patient verbalizes understanding.

## 2024-07-22 NOTE — PROGRESS NOTES
[facility]    Medicine Progress Note - Hospitalist Service    Date of Admission:  7/18/2024    Assessment & Plan      Margaret Batista is a 78 year old female admitted on 7/18/2024. She was admitted thru the ED with a femoral neck fracture from a fall at home.    Left Femoral Neck Fracture  - after a slip and fall at home  - Dr. Topete planning for surgery on Monday (on Brilinta and ASA at baseline so surgery held until 7/22)  - pain well controlled today, however did have muscle spasms over noc  - slight hgb drop, however now stable at 11.3  Plan:  Add flexeril  Continue pain control  Surgery this afternoon  PT/OT/SW    Vascular Disease: ASCVD and Carotid   - cardiac stent x 7, carotid stent after CVA x 1  - no cardiopulmonary symptoms prior to admission with exertion  Plan:  Hold brilinta and ASA, would like to resume after surgery as soon as possible  Cont BB, Imdur, Statin     Osteopenia  - noted on DEXA done in 2016, non imaging or treatment   - needs repeat DEXA after discharge and then to be started on treatment     H/O Gout  - no current issues  Plan:  Cont Allopurinol    HTN  - severely elevated at admit and continues to be elevated above target, likely related to pain, will adjust regimen after surgery.   Plan:  Will increase Atenolol further to 100 mg/d, continue current imdur   Might need additional agent added, consider ACE    RA  - cont home plaquenil and methotrexate   - stable     Renal Mass, probable RCC  - noted on recent CT to be slowly enlarging   - she has outpt follow up with Urology scheduled for early August (Dr. Pinto)        Diet: NPO per Anesthesia Guidelines for Procedure/Surgery Except for: Meds    DVT Prophylaxis: Pneumatic Compression Devices - now that Hgb has stabilized, will give 1x dose of subcutaneous heparin today at noon as she has been in bed x 48 hours.    Slaughter Catheter: PRESENT, indication: Immobilization due to spinal injuries or other multiple  "traumatic injuries  Lines: None     Cardiac Monitoring: None  Code Status: Full Code      Clinically Significant Risk Factors              # Hypoalbuminemia: Lowest albumin = 3.1 g/dL at 7/20/2024  6:27 AM, will monitor as appropriate     # Hypertension: Noted on problem list              # Overweight: Estimated body mass index is 28.98 kg/m  as calculated from the following:    Height as of this encounter: 1.702 m (5' 7\").    Weight as of this encounter: 83.9 kg (185 lb)., PRESENT ON ADMISSION            Disposition Plan     Medically Ready for Discharge: Anticipated in 2-4 Days             Derick Iraheta MD  Hospitalist Service  [facility]  Securely message with [employer] (more info)  Text page via [employer] Paging/Directory   ______________________________________________________________________    Interval History   Pain controlled today.   Muscle relaxer seemed to help quite a bit.  No fevers or chills  Recent stroke affected her right side, left side now fractured, discussed TCU stay after hospitalization.     Physical Exam   Vital Signs: Temp: 98.6  F (37  C) Temp src: Tympanic BP: (!) 169/84 Pulse: 76   Resp: 14 SpO2: 95 % O2 Device: None (Room air)    Weight: 185 lbs 0 oz    Constitutional: awake, alert, cooperative, no apparent distress, and appears stated age  Hematologic / Lymphatic: no cervical lymphadenopathy  Respiratory: No increased work of breathing, good air exchange, clear to auscultation bilaterally, no crackles or wheezing  Cardiovascular: Normal apical impulse, regular rate and rhythm, normal S1 and S2, no S3 or S4, and no murmur noted  GI: No scars, normal bowel sounds, soft, non-distended, non-tender, no masses palpated, no hepatosplenomegally  Musculoskeletal: LLE externally rotated   Neuropsychiatric: General: normal, calm, and normal eye contact    Medical Decision Making       29 MINUTES SPENT BY ME on the date of service doing chart review, history, exam, documentation & " further activities per the note.      Data     I have personally reviewed the following data over the past 24 hrs:    N/A  \   N/A   / N/A     N/A N/A N/A /  N/A   4.3 N/A N/A \       Imaging results reviewed over the past 24 hrs:   No results found for this or any previous visit (from the past 24 hour(s)).

## 2024-07-22 NOTE — ANESTHESIA PROCEDURE NOTES
Airway         Procedure Start/Stop Times: 7/22/2024 3:58 PM  Staff -        CRNA: Jb Tucker APRN CRNA       Performed By: CRNA  Consent for Airway        Urgency: elective  Indications and Patient Condition       Indications for airway management: domenica-procedural       Induction type:intravenous       Mask difficulty assessment: 1 - vent by mask    Final Airway Details       Final airway type: endotracheal airway       Successful airway: ETT - single  Endotracheal Airway Details        ETT size (mm): 7.0       Cuffed: yes       Successful intubation technique: video laryngoscopy (1st attempt with DL, Grade 3 view with greenfield 2)       VL Blade Size: Glidescope 3       Grade View of Cords: 1       Adjucts: stylet       Position: Center       Measured from: lips       Secured at (cm): 22       Bite block used: None    Post intubation assessment        Placement verified by: capnometry, equal breath sounds and chest rise        Number of attempts at approach: 1       Number of other approaches attempted: 1       Secured with: tape       Ease of procedure: easy       Dentition: Intact and Unchanged    Medication(s) Administered   Medication Administration Time: 7/22/2024 3:58 PM

## 2024-07-22 NOTE — OP NOTE
Preop Dx: Left displaced femoral neck fracture    Postop Dx: Left displaced femoral neck fracture    Procedure: Left total hip arthroplasty    Surgeon: Shashi Topete MD    Assistant: KENNEDY De La Rosa/RN    Anesthesia: General    Indications: Patient is a 78-year-old female who fell on 18 July 2024.  She presented to [facility] and was found that she had a displaced femoral neck fracture on the left.  She was not able to undergo surgery secondary to taking her Brilinta that morning, and antiplatelet medication.  Given that she would have to wait 3 days for surgery she was retained at [facility] for me to perform her total hip arthroplasty today 22 July 2024.  All the risks and benefits surgical intervention were discussed with her and she wished to proceed.    Description: Patient taken to the operating room and after adequate induction anesthesia was positioned, prepped and draped in use sterile fashion the operative table.  A universal portable time was initiated once all in the room in agreement an incision was made in the anterior of the hip just lateral to the line between the ASIS and the superior pole the patella.  This is carried down through the subcutaneous tissues and down to the fascial layer.  The fascial layer was incised in line with the limb and blunt dissection was made down to the anterior of the femur and the hip capsule.  The inferior circumflex vessels were ligated and cauterized.  We placed retractors superior and inferior to the capsule.  We then teed the capsule and placed the retractors deeper on the femoral neck.  Given that this was a subcapital fracture of the femoral neck had to be osteotomized and this was done under x-ray to ensure that we had an anatomic neck cut.  This was performed and the head was removed with slight difficulty given that this was a subcapital fracture and it did not want to leave the acetabulum.  Once we remove the femoral head, we reamed  to a size 48 to get down to bone and a size 50 to remove as much of the cortical bone as possible.  Size 50 fit nicely and was trialed.  We chose this as our final acetabular implant.  A 50 cup was impacted into place and a single 25 mm screw was placed.  The dome hole cover was placed as was the polyethylene liner to accept a 36 mm femoral head.  We then turned our attention to the femur.  The femur was elevated is much as possible out of the wound and 90 degrees of abduction.  We are able to clear the capsule from the neck and this allowed us to lateralize the femur to make room for the posterior greater trochanter.  Once this was accomplished retractors were placed deeper and we broached although up to a size 5 broach.  We then calcar planed and trialed with a +2.5 millimeter head.  We then measured our length and this was perfect.  This was our final construct.  We then dislocated the hip placing our retractors and elevating the femoral neck as well is extending the hip and abducting it utilizing the Enders table.  Final implants were impacted into the proximal femur, a size 5 Imperial insignia stem with a +2.5 mm Biolox 36 mm head.  The hip was reduced final x-rays were taken.  The wound was copiously irrigated and the fascial layer closed with #1 Vicryl.  Deep subcutaneous fat was closed #1 Vicryl.  2-0 Vicryl is used subcutaneous skin and staples used to close the wound.  An Aquacel dressing was applied and she was taken stable initial postanesthesia care unit.

## 2024-07-22 NOTE — CONSULTS
Subjective:    78-year-old female who fell on 7/19/2024.  She had immediate pain and inability to bear weight on the left lower extremity.  X-rays revealed that she had a displaced femoral neck fracture of her left hip.  She was on an antiplatelet medication which precluded her from having surgery on the day of the incident.  She was admitted to [facility] and I was asked to consult on Monday    Objective:    On examination today she is neuro and vascularly intact in the left lower extremity.  She can wiggle her toes.  She has no motion of the hip secondary to pain.    X-ray examination shows a displaced left femoral neck fracture    Assessment:    78-year-old female with displaced left femoral neck fracture    Plan:    The on-call physician decided that this would be best served with a total hip arthroplasty so that will be the plan.  She may have done fine with a hemiarthroplasty however the x-rays mistakenly were read as having osteoarthritis within the left hip.  Regardless she will probably have less pain with a total hip arthroplasty and so we will go ahead and execute the plan for her today.  All the risks and benefits surgical invention were discussed with her and she does wish to proceed.

## 2024-07-22 NOTE — ANESTHESIA PREPROCEDURE EVALUATION
Anesthesia Pre-Procedure Evaluation    Patient: Margaret Batista   MRN: [id number] : 1945        Procedure : Procedure(s):  ARTHROPLASTY, HIP, TOTAL, DIRECT ANTERIOR APPROACH          Past Medical History:   Diagnosis Date    Atherosclerotic heart disease of native coronary artery without angina pectoris     -s/p ADÁN to the mid-LAD, mid-circumflex, mid-right coronary artery, proximal right coronary  artery, and PTCA of a marginal branch of the right coronary artery 2007. -s/p ADÁN to proximal left anterior descending 10/29/13.    Contusion of abdominal wall         Essential (primary) hypertension     2006    Gout     No Comments Provided    Hyperlipidemia     2007    Localized swelling, mass, or lump of upper extremity     2017    Other specified counseling     10/28/2013,Patient has identified Health Care Agent(s): Yes Add Health Care Agents: Yes   Health Care Agent(s): Primary Health Care Agent: Jay Batista  Relationship:  Phone:   Secondary Health Care Agent:  Relationship: Daughter Phone:   Conservator:  Relationship:  Phone:   Guardian: Relationship:  Phone:    Patient has Advance Care Plan Documents (Health Care Directive, POLST): No, refe*    Polyosteoarthritis     No Comments Provided    Postmenopausal bleeding     No Comments Provided    Presence of coronary angioplasty implant and graft     ,unstable angina; severe prox LAD stenosis; s/p 7 stents    Primary osteoarthritis of left hand     2017    Rheumatoid arthritis (H)     follows at [place] [facility] yearly      Past Surgical History:   Procedure Laterality Date    ARTHROPLASTY KNEE      2011    ARTHROSCOPY KNEE          BIOPSY BREAST      10/25/95,[facility]RIAN RAMIREZ    CAROTID ARTERY ANGIOPLASTY Left 04/15/2024    [facility]     SECTION      1974, Section    COLONOSCOPY      05,Normal - Repeat in 10 years    COLONOSCOPY  2016    COLONOSCOPY N/A  01/09/2019    Procedure: COLONOSCOPY;  Surgeon: Evelin Thompson MD;  Location:  OR    COLONOSCOPY N/A 01/09/2019    Procedure: COMBINED COLONOSCOPY, SINGLE OR MULTIPLE BIOPSY/POLYPECTOMY BY BIOPSY;  Surgeon: Evelin Thompson MD;  Location: [facility] OR    ENDOSCOPIC RELEASE CARPAL TUNNEL Right 01/13/2023    Procedure: RELEASE, CARPAL TUNNEL, ENDOSCOPIC;  Surgeon: Danial Stanley MD;  Location:  OR    ESOPHAGOSCOPY, GASTROSCOPY, DUODENOSCOPY (EGD), COMBINED      5/2/2011,erosive gastritis;bx;consider MRI/A;[facility]    EXTRACTION(S) DENTAL      1970    HEART CATH, ANGIOPLASTY      10/29/2013,ADÁN to proximal left anterior descending    OTHER SURGICAL HISTORY      2007/2013,426101,OTHER    OTHER SURGICAL HISTORY      01/2014,DCS246,TOTAL SHOULDER ARTHROPLASTY,Right    OTHER SURGICAL HISTORY      04/14/2016,99692.0,IN COLONOSCOPY REMOVE ELIZA POLYP LESN SNARE,repeat 2019, precancerous polyps    ZZ STRESS LEXISCAN TEST  08/2018    With Dr. Irwin - normal      Allergies   Allergen Reactions    Plavix [Clopidogrel]      Plavix resistant-tested at [facility]    Enalapril Cough    Food      Macadamia nuts make mouth itch    Losartan      Allergic rxn with hives and angioedema suspected to be from alternative manufacture of the effient or the losartan      Social History     Tobacco Use    Smoking status: Never     Passive exposure: Past    Smokeless tobacco: Never    Tobacco comments:     Passive exposure in childhood home.    Substance Use Topics    Alcohol use: Yes     Comment: 2 drinks a month      Wt Readings from Last 1 Encounters:   07/18/24 83.9 kg (185 lb)        Anesthesia Evaluation   Pt has had prior anesthetic. Type: General and Regional.    No history of anesthetic complications       ROS/MED HX  ENT/Pulmonary:     (+)     AJ risk factors, snores loudly, hypertension, obese,                                Neurologic:     (+)    peripheral neuropathy,      CVA (roughly April 2024), date: 4/2024, with deficits, - Right sided  deficit and some aphasia at times.                   Cardiovascular: Comment: Hx of carotid enterectomy left side     (+) Dyslipidemia hypertension (174/77)- -  CAD -  - stent- 7  Taking blood thinners Pt has received instructions:                             Previous cardiac testing   Echo: Date: Results:  Narrative & Impression  PROCEDURE: CTA HEAD NECK W CONTRAST 5/18/2024 7:44 PM     HISTORY: Acute neuro deficit, stroke/TIA suspected     COMPARISONS: None.     Meds/Dose Given: isovue 370 75ml     TECHNIQUE: CT angiogram of the neck and CT angiogram of the brain with  sagittal and coronal mip reconstructions     FINDINGS: CT angiogram the neck: There is atherosclerotic plaquing in  the brachiocephalic artery. The right subclavian and right vertebral  arteries are widely patent. The right common carotid artery is  tortuous. There is heavy calcific plaquing at the right carotid  bifurcation. There is a 50% stenosis noted in the distal common  carotid artery. There is less than 50% stenosis noted in the proximal  internal carotid artery. Beyond the bifurcation the internal carotid  artery is widely patent to the skull base. The proximal left common  carotid artery is widely patent. There is a carotid stent on the left.  The stented segment is patent. Beyond the stented segment of the  internal carotid artery is widely patent to the skull base. The left  subclavian and left vertebral arteries are widely patent.     CT angiogram of the brain: The distal vertebral basilar superior  cerebellar and posterior cerebral arteries are widely patent. There is  atherosclerotic plaquing in the carotid siphons. The supraclinoid  internal carotid arteries appear normal. Both anterior and middle  cerebral arteries are widely patent.     The muscles of mastication and the parapharyngeal spaces are normal.  The salivary glands are normal. The larynx and upper trachea is  normal. There is a left thyroid nodule with some  peripheral  calcifications measuring approximately 3 cm in diameter. The cervical  and upper mediastinal lymph nodes are normal in caliber. Lung apices  are clear.                                                                        IMPRESSION: No hemodynamically significant stenoses or occlusions are  identified in the arteries of the neck and brain      EF 60-65%      Stress Test:  Date: Results:    ECG Reviewed:  Date: Results:  72 Sinus rhythm   Left axis deviation   Abnormal ECG   When compared with ECG of 13-APR-2024 21:22,   No significant change was found   Confirmed by MD MARILY, ANNETTE ([id number]) on 5/20/2024 12:58:55 PM     Cath:  Date: Results:      METS/Exercise Tolerance: >4 METS    Hematologic:     (+)      anemia,          Musculoskeletal: Comment: Gout  (+)  arthritis,             GI/Hepatic:     (+) GERD, Asymptomatic on medication,                  Renal/Genitourinary: Comment: Mass on kidney, further follow up required.     (+) renal disease, type: CRI, Pt does not require dialysis,           Endo:     (+)               Obesity,       Psychiatric/Substance Use:  - neg psychiatric ROS     Infectious Disease:  - neg infectious disease ROS     Malignancy:  - neg malignancy ROS     Other:  - neg other ROS   (-) Any chance pregnant       Physical Exam    Airway        Mallampati: II   TM distance: > 3 FB   Neck ROM: full   Mouth opening: > 3 cm    Respiratory Devices and Support         Dental       (+) Minor Abnormalities - some fillings, tiny chips      Cardiovascular   cardiovascular exam normal       Rhythm and rate: regular and normal     Pulmonary   pulmonary exam normal        breath sounds clear to auscultation           OUTSIDE LABS:  CBC:   Lab Results   Component Value Date    WBC 10.8 07/20/2024    WBC 14.6 (H) 07/19/2024    HGB 11.3 (L) 07/21/2024    HGB 10.9 (L) 07/20/2024    HCT 34.7 (L) 07/20/2024    HCT 38.7 07/19/2024     07/20/2024     07/19/2024     BMP:   Lab Results  "  Component Value Date     07/20/2024     07/19/2024    POTASSIUM 4.3 07/22/2024    POTASSIUM 3.7 07/21/2024    CHLORIDE 106 07/20/2024    CHLORIDE 103 07/19/2024    CO2 25 07/20/2024    CO2 26 07/19/2024    BUN 11.5 07/20/2024    BUN 13.8 07/19/2024    CR 0.86 07/20/2024    CR 0.91 07/19/2024    GLC 91 07/22/2024     (H) 07/20/2024     COAGS:   Lab Results   Component Value Date    PTT 26 05/18/2024    INR 1.06 07/19/2024     POC: No results found for: \"BGM\", \"HCG\", \"HCGS\"  HEPATIC:   Lab Results   Component Value Date    ALBUMIN 3.1 (L) 07/20/2024    PROTTOTAL 5.5 (L) 07/20/2024    ALT 13 07/20/2024    AST 19 07/20/2024    ALKPHOS 76 07/20/2024    BILITOTAL 0.7 07/20/2024     OTHER:   Lab Results   Component Value Date    A1C 5.4 04/13/2024    MONI 8.9 07/20/2024    PHOS 3.3 11/16/2015    AMYLASE 47 11/06/2018    TSH 0.36 08/10/2023    T4 1.52 08/10/2023    CRP 2.0 04/26/2021    SED 6 05/31/2023       Anesthesia Plan    ASA Status:  3    NPO Status:  NPO Appropriate    Anesthesia Type: General.     - Airway: ETT   Induction: Propofol, Intravenous.   Maintenance: Balanced.        Consents    Anesthesia Plan(s) and associated risks, benefits, and realistic alternatives discussed. Questions answered and patient/representative(s) expressed understanding.     - Discussed: Risks, Benefits and Alternatives for BOTH SEDATION and the PROCEDURE were discussed     - Discussed with:  Patient      - Specific Concerns: Aspiration, heart attack, or stroke..     - Extended Intubation/Ventilatory Support Discussed: No.      - Patient is DNR/DNI Status: No     Use of blood products discussed: No .     Postoperative Care    Pain management: Multi-modal analgesia.   PONV prophylaxis: Ondansetron (or other 5HT-3), Dexamethasone or Solumedrol     Comments:               Jb Garrett, APRN CRNA    I have reviewed the pertinent notes and labs in the chart from the past 30 days and (re)examined the patient.  Any updates " or changes from those notes are reflected in this note.

## 2024-07-22 NOTE — PROGRESS NOTES
:     Met with patient in room to discuss discharge planning needs. Patient is agreeable to short term rehab. Patient requested a referral be sent to [facility].     Pt/family was given the Medicare Compare list for SNF, with associated star ratings to assist with choice for referrals/discharge planning Yes    Education was given to pt/family that star ratings are updated/maintained by Medicare and can be reviewed by visiting www.medicare.gov Yes    A referral has been sent and Tana at [facility] has been notified.     ALISA Triana on 7/22/2024 at 12:37 PM     :     [facility] has accepted patient. Patients insurance requires a prior auth. This cannot be completed until PT/OT Evaluates patient.     ALISA Triana on 7/22/2024 at 2:57 PM

## 2024-07-22 NOTE — BRIEF OP NOTE
[facility]    Brief Operative Note    Pre-operative diagnosis: Left displaced femoral neck fracture (H) [S72.002A]  Post-operative diagnosis Same as pre-operative diagnosis    Procedure: ARTHROPLASTY, HIP, TOTAL, DIRECT ANTERIOR APPROACH, Left - Hip    Surgeon: Surgeons and Role:     * Shashi Topete MD - Primary     * David Cosme PA - Assisting  Anesthesia: General   Estimated Blood Loss: 200 ml    Drains: None  Specimens: * No specimens in log *  Findings:   None.  Complications: None.  Implants:   Implant Name Type Inv. Item Serial No.  Lot No. LRB No. Used Action   HEX DOME HOLE PLUG    [employer] [id number] Left 1 Implanted   TRIDENT CLUSTERHOLE ACETABULAR SHELL    ABRAM [id number] Left 1 Implanted   IMP SCR STRK LOW PROFILE HEX 6.5X25MM [id number] - [id number] Metallic Hardware/[place] IMP SCR STRK LOW PROFILE HEX 6.5X25MM [id number]  [employer] [id number] Left 1 Implanted   INSERT ACE 36MM 0DEG X3 [id number] - [id number] Total Joint Component/Insert INSERT ACE 36MM 0DEG X3 [id number]  [employer] [id number] Left 1 Implanted   CERAMIC FEMORAL HEAD    ABRAM [id number] Left 1 Implanted   HIP STEM STANDARD OFFSET    ABRAM [id number] Left 1 Implanted

## 2024-07-23 ENCOUNTER — APPOINTMENT (OUTPATIENT)
Dept: PHYSICAL THERAPY | Facility: OTHER | Age: 79
DRG: 522 | End: 2024-07-23
Attending: INTERNAL MEDICINE
Payer: COMMERCIAL

## 2024-07-23 LAB
ANION GAP SERPL CALCULATED.3IONS-SCNC: 11 MMOL/L (ref 7–15)
BUN SERPL-MCNC: 17.9 MG/DL (ref 8–23)
CALCIUM SERPL-MCNC: 9.2 MG/DL (ref 8.8–10.4)
CHLORIDE SERPL-SCNC: 103 MMOL/L (ref 98–107)
CREAT SERPL-MCNC: 0.82 MG/DL (ref 0.51–0.95)
EGFRCR SERPLBLD CKD-EPI 2021: 73 ML/MIN/1.73M2
ERYTHROCYTE [DISTWIDTH] IN BLOOD BY AUTOMATED COUNT: 16 % (ref 10–15)
GLUCOSE BLDC GLUCOMTR-MCNC: 164 MG/DL (ref 70–99)
GLUCOSE SERPL-MCNC: 164 MG/DL (ref 70–99)
HCO3 SERPL-SCNC: 24 MMOL/L (ref 22–29)
HCT VFR BLD AUTO: 35.1 % (ref 35–47)
HGB BLD-MCNC: 11.6 G/DL (ref 11.7–15.7)
MAGNESIUM SERPL-MCNC: 2.1 MG/DL (ref 1.7–2.3)
MCH RBC QN AUTO: 30.8 PG (ref 26.5–33)
MCHC RBC AUTO-ENTMCNC: 33 G/DL (ref 31.5–36.5)
MCV RBC AUTO: 93 FL (ref 78–100)
PLATELET # BLD AUTO: 215 10E3/UL (ref 150–450)
POTASSIUM SERPL-SCNC: 3.9 MMOL/L (ref 3.4–5.3)
RBC # BLD AUTO: 3.77 10E6/UL (ref 3.8–5.2)
SODIUM SERPL-SCNC: 138 MMOL/L (ref 135–145)
WBC # BLD AUTO: 16.1 10E3/UL (ref 4–11)

## 2024-07-23 PROCEDURE — 250N000013 HC RX MED GY IP 250 OP 250 PS 637: Performed by: INTERNAL MEDICINE

## 2024-07-23 PROCEDURE — 85041 AUTOMATED RBC COUNT: CPT | Performed by: STUDENT IN AN ORGANIZED HEALTH CARE EDUCATION/TRAINING PROGRAM

## 2024-07-23 PROCEDURE — 97116 GAIT TRAINING THERAPY: CPT | Mod: GP

## 2024-07-23 PROCEDURE — 97161 PT EVAL LOW COMPLEX 20 MIN: CPT | Mod: GP

## 2024-07-23 PROCEDURE — 250N000013 HC RX MED GY IP 250 OP 250 PS 637: Performed by: ORTHOPAEDIC SURGERY

## 2024-07-23 PROCEDURE — 120N000001 HC R&B MED SURG/OB

## 2024-07-23 PROCEDURE — 99232 SBSQ HOSP IP/OBS MODERATE 35: CPT | Mod: 24 | Performed by: STUDENT IN AN ORGANIZED HEALTH CARE EDUCATION/TRAINING PROGRAM

## 2024-07-23 PROCEDURE — 83735 ASSAY OF MAGNESIUM: CPT | Performed by: STUDENT IN AN ORGANIZED HEALTH CARE EDUCATION/TRAINING PROGRAM

## 2024-07-23 PROCEDURE — 250N000011 HC RX IP 250 OP 636: Performed by: ORTHOPAEDIC SURGERY

## 2024-07-23 PROCEDURE — 36415 COLL VENOUS BLD VENIPUNCTURE: CPT | Performed by: STUDENT IN AN ORGANIZED HEALTH CARE EDUCATION/TRAINING PROGRAM

## 2024-07-23 PROCEDURE — 80048 BASIC METABOLIC PNL TOTAL CA: CPT | Performed by: STUDENT IN AN ORGANIZED HEALTH CARE EDUCATION/TRAINING PROGRAM

## 2024-07-23 PROCEDURE — 97530 THERAPEUTIC ACTIVITIES: CPT | Mod: GP

## 2024-07-23 RX ORDER — ASPIRIN 81 MG/1
81 TABLET, CHEWABLE ORAL DAILY
Status: DISCONTINUED | OUTPATIENT
Start: 2024-07-24 | End: 2024-07-24 | Stop reason: HOSPADM

## 2024-07-23 RX ADMIN — LORATADINE 10 MG: 10 TABLET ORAL at 10:29

## 2024-07-23 RX ADMIN — SENNOSIDES AND DOCUSATE SODIUM 1 TABLET: 50; 8.6 TABLET ORAL at 21:54

## 2024-07-23 RX ADMIN — SENNOSIDES AND DOCUSATE SODIUM 1 TABLET: 50; 8.6 TABLET ORAL at 07:19

## 2024-07-23 RX ADMIN — ALLOPURINOL 300 MG: 300 TABLET ORAL at 10:29

## 2024-07-23 RX ADMIN — FOLIC ACID 1000 MCG: 1 TABLET ORAL at 10:29

## 2024-07-23 RX ADMIN — Medication 2 G: at 00:56

## 2024-07-23 RX ADMIN — ACETAMINOPHEN 975 MG: 325 TABLET, FILM COATED ORAL at 21:54

## 2024-07-23 RX ADMIN — HYDROXYCHLOROQUINE SULFATE 200 MG: 200 TABLET ORAL at 21:54

## 2024-07-23 RX ADMIN — ACETAMINOPHEN 975 MG: 325 TABLET, FILM COATED ORAL at 14:37

## 2024-07-23 RX ADMIN — POLYETHYLENE GLYCOL 3350 17 G: 17 POWDER, FOR SOLUTION ORAL at 10:29

## 2024-07-23 RX ADMIN — ISOSORBIDE MONONITRATE 30 MG: 30 TABLET, EXTENDED RELEASE ORAL at 10:29

## 2024-07-23 RX ADMIN — ATENOLOL 100 MG: 50 TABLET ORAL at 10:29

## 2024-07-23 RX ADMIN — Medication 2 G: at 07:19

## 2024-07-23 RX ADMIN — ATORVASTATIN CALCIUM 80 MG: 40 TABLET, FILM COATED ORAL at 21:54

## 2024-07-23 RX ADMIN — ACETAMINOPHEN 975 MG: 325 TABLET, FILM COATED ORAL at 07:18

## 2024-07-23 RX ADMIN — ACETAMINOPHEN 975 MG: 325 TABLET, FILM COATED ORAL at 00:54

## 2024-07-23 ASSESSMENT — ACTIVITIES OF DAILY LIVING (ADL)
ADLS_ACUITY_SCORE: 40
ADLS_ACUITY_SCORE: 37
ADLS_ACUITY_SCORE: 40
ADLS_ACUITY_SCORE: 37
ADLS_ACUITY_SCORE: 40
ADLS_ACUITY_SCORE: 37
ADLS_ACUITY_SCORE: 40
ADLS_ACUITY_SCORE: 37
ADLS_ACUITY_SCORE: 40
ADLS_ACUITY_SCORE: 40
ADLS_ACUITY_SCORE: 37
ADLS_ACUITY_SCORE: 37
ADLS_ACUITY_SCORE: 40

## 2024-07-23 NOTE — PROGRESS NOTES
:     Patient has been accepted to [facility]. Patient will need a prior authorization from her insurance. This has been submitted.      will continue to follow for discharge planning needs.     ALISA Triana on 7/23/2024 at 9:41 AM

## 2024-07-23 NOTE — PROGRESS NOTES
Interdisciplinary Discharge Planning Note    Anticipated Discharge Date:7/24-7/25    Anticipated Discharge Location: [facility]     Clinical Needs Before Discharge:   Pain control, PT/OT     Treatment Needs After Discharge:  rehab (PT, OT, ST)    Potential Barriers to Discharge: Patients insurance requires a prior authorization     ALISA Triana  7/23/2024,  11:49 AM

## 2024-07-23 NOTE — OR NURSING
PACU Transfer Note    Margaret Batista was transferred to [facility] via bed .  Equipment used for transport:  bed .  Accompanied by:  laith russ  Prescriptions were:     PACU Respiratory Event Documentation     1) Episodes of Apnea greater than or equal to 10 seconds: no    2) Bradypnea - less than 8 breaths per minute: no    3) Pain score on 0 to 10 scale: no    4) Pain-sedation mismatch (yes or no): no    5) Repeated 02 desaturation less than 90% (yes or no): no    Anesthesia notified? (yes or no): no    Any of the above events occuring repeatedly in separate 30 minute intervals may be considered recurrent PACU respiratory events.    Patient stable and meets phase 1 discharge criteria for transport from PACU.    Report to teagan adan

## 2024-07-23 NOTE — PROGRESS NOTES
[facility]    Medicine Progress Note - Hospitalist Service    Date of Admission:  7/18/2024    Assessment & Plan      Margaret Batista is a 78 year old female admitted on 7/18/2024. She was admitted through the ED with a femoral neck fracture from a fall at home. Recent CVA with residual right sided weakness. She underwent left total anterior hip arthoplasty with Dr. Topete 7/22/24 and is doing well postoperatively. Plan for TCU placement on discharge, awaiting prior authorization.    Left Femoral Neck Fracture  - after a slip and fall at home confounded by recent stroke  - total hip arhroplasty 7/22/24 with Dr. Topete   - pain well controlled today, however did have muscle spasms over noc  - slight hgb drop, however now stable at 11.3  Plan:  PRN flexeril  Continue pain control  PT/OT/SW    Vascular Disease: ASCVD and Carotid   - cardiac stent x 7, carotid stent after CVA x 1  - no cardiopulmonary symptoms prior to admission with exertion  Plan:  Resume aspirin and brillinta  Cont BB, Imdur, Statin     Osteopenia  - noted on DEXA done in 2016, non imaging or treatment   - needs repeat DEXA after discharge and then to be started on treatment     H/O Gout  - no current issues  Plan:  Cont Allopurinol    HTN  - severely elevated at admit and continues to be elevated above target, likely related to pain, will adjust regimen after surgery.   Plan:  Will increase Atenolol further to 100 mg/d, continue current imdur   Might need additional agent added, consider ACE  -held imdur 7/23/2024 due to lower pressures    RA  - cont home plaquenil and methotrexate   - stable     Renal Mass, probable RCC  - noted on recent CT to be slowly enlarging   - she has outpt follow up with Urology scheduled for early August (Dr. Pinto)        Diet: Advance Diet as Tolerated: Regular Diet Adult    DVT Prophylaxis: Pneumatic Compression Devices  Slaughter Catheter: Not present  Lines: None     Cardiac Monitoring:  "None  Code Status: Full Code      Clinically Significant Risk Factors              # Hypoalbuminemia: Lowest albumin = 3.1 g/dL at 7/20/2024  6:27 AM, will monitor as appropriate     # Hypertension: Noted on problem list              # Overweight: Estimated body mass index is 28.98 kg/m  as calculated from the following:    Height as of this encounter: 1.702 m (5' 7\").    Weight as of this encounter: 83.9 kg (185 lb).             Disposition Plan     Medically Ready for Discharge: Anticipated in 2-4 Days             Derick Iraheta MD  Hospitalist Service  [facility]  Securely message with [employer] (more info)  Text page via [email or web address] Paging/Directory   ______________________________________________________________________    Interval History   Pain controlled today.   Moving well with PT  Discussed TCU versus ARU, would prefer TCU locally.      Physical Exam   Vital Signs: Temp: 97.7  F (36.5  C) Temp src: Tympanic BP: (!) 155/59 Pulse: 72   Resp: 18 SpO2: 96 % O2 Device: None (Room air) Oxygen Delivery: 1 LPM  Weight: 185 lbs 0 oz    Constitutional: awake, alert, cooperative, no apparent distress, and appears stated age  Hematologic / Lymphatic: no cervical lymphadenopathy  Respiratory: No increased work of breathing, good air exchange, clear to auscultation bilaterally, no crackles or wheezing  Cardiovascular: Normal apical impulse, regular rate and rhythm, normal S1 and S2, no S3 or S4, and no murmur noted  GI: No scars, normal bowel sounds, soft, non-distended, non-tender, no masses palpated, no hepatosplenomegally  Musculoskeletal: bandage CDI  Neuropsychiatric: General: normal, calm, and normal eye contact    Medical Decision Making       35 MINUTES SPENT BY ME on the date of service doing chart review, history, exam, documentation & further activities per the note.      Data     I have personally reviewed the following data over the past 24 hrs:    16.1 (H)  \   11.6 (L)   / 215     138 103 " 17.9 /  164 (H)   3.9 24 0.82 \       Imaging results reviewed over the past 24 hrs:   Recent Results (from the past 24 hour(s))   XR Surgery SALLY L/T 5 Min Fluoro    Narrative    This exam was marked as non-reportable because it will not be read by a   radiologist or a [place] non-radiologist provider.         XR Pelvis Port 1/2 Views    Narrative    XR PELVIS PORT 1/2 VIEWS, 7/22/2024 6:19 PM    History: Female, age 78 years; Post OP Check; Left displaced femoral  neck fracture (H)    Comparison: None.    Technique: Single view was obtained.    FINDINGS: The metallic arthroplasty device is satisfactorily  positioned. Surrounding bone and overlying soft tissues appear grossly  intact.      Impression    IMPRESSION:   Status post metallic arthroplasty. No acute complication.    NILSA SAGE MD         SYSTEM ID:  [id number]

## 2024-07-23 NOTE — PHARMACY
Pharmacy - Transfer Medication Reconciliation     The patient's transfer medication orders have been compared to the medication administration record and to the Prior to Admissions Medications list - any noted discrepancies were resolved with the MD.     Thank you. Pharmacy will continue to monitor.     Misti Cam [facility] ....................  7/23/2024   7:33 AM

## 2024-07-23 NOTE — PLAN OF CARE
Patient VS stable. Afebrile. Pressure systolic in 90s this AM. IMDUR 30 mg oral held per doctor [place]. BP rechecked with increase in systolic pressures to baseline. Patient ambulating with assist of two to the bathroom with walker and transfer belt. Catheter removed at 0800. Bladder scan completed with 120 ML of volume. Patient had urine output. Aquacel reapplied. Stiches intact with no drainage noted. Family at bedside. CMS intact. +1 edema in ankles and feet bilaterally.

## 2024-07-23 NOTE — PLAN OF CARE
Goal Outcome Evaluation:    Pt arrived back from Surgery later this evening. Pt is A&O, VSS, and Pleasant. Pain is well controlled with PRN/scheduled tylenol. Ice packs applied to incision site. Site is clean, Dry and Intact. Slaughter is patent. Pt 98% on room air. All questions and concerns addressed.      Plan of Care Reviewed With: patient, parent, child    Overall Patient Progress: improving       Christen Wagner RN on 7/23/2024 at 1:50 AM

## 2024-07-23 NOTE — PHARMACY
"Pharmacy: Patient Own Medication Identification    The requirements of Policy 13-03 from the Pharmacy Policy Manual have been met and the patient will be allowed to use their own supply of: Brilinta 60 mg; 60T Exp 2026-10-31.    Misael Calvo RPH has verified the name, dose, route, and directions for each medication. The integrity has been assessed and deemed safe.     The product(s) have been labeled as being inspected by a pharmacist and the medication has been placed in the patient-specific bin in the medication room.    Nursing will remove medication at the appropriately scheduled times and document the administration on the MAR with the designation of \"patient own\".    Nursing to return medication back to patient at time of discharge.     Misael Calvo RPH ....................  7/23/2024   6:03 PM   "

## 2024-07-23 NOTE — PROGRESS NOTES
07/23/24 0841   Appointment Info   Signing Clinician's Name / Credentials (PT) Grey Lombardi MPT   Living Environment   People in Home spouse   Current Living Arrangements house   Home Accessibility no concerns   Transportation Anticipated family or friend will provide   Self-Care   Usual Activity Tolerance good   Current Activity Tolerance fair   Equipment Currently Used at Home walker, rolling   Fall history within last six months yes   Number of times patient has fallen within last six months 1   General Information   Referring Physician Schotl   Patient/Family Therapy Goals Statement (PT) return home following short term rehab   Existing Precautions/Restrictions fall  (left femoral neck fx s/p DIVYA)   Weight-Bearing Status - LLE weight-bearing as tolerated   Weight-Bearing Status - RLE full weight-bearing   Cognition   Affect/Mental Status (Cognition) WFL   Orientation Status (Cognition) oriented x 4   Follows Commands (Cognition) WFL   Pain Assessment   Patient Currently in Pain Yes, see Vital Sign flowsheet   Integumentary/Edema   Integumentary/Edema no deficits were identifed   Posture    Posture Forward head position;Protracted shoulders   Range of Motion (ROM)   Range of Motion ROM is WFL   Strength (Manual Muscle Testing)   Strength (Manual Muscle Testing) strength is WFL   Strength Comments however, right UE and LE are approximately 1 grade weaker than her left secondary to recent CVA (left hip mm not tested post-op)   Bed Mobility   Impairments Contributing to Impaired Bed Mobility pain;decreased strength   Comment, (Bed Mobility) moderate assist of 1 for supine<>sit   Transfers   Impairments Contributing to Impaired Transfers pain;decreased strength   Comment, (Transfers) sit to stand with minimal assist of 1; stand pivot with minimal assist to CGA of 1 using a Fww for stability   Gait/Stairs (Locomotion)   [place] Level (Gait) minimum assist (75% patient effort)   Assistive Device (Gait) walker,  front-wheeled   Distance in Feet (Gait) 20   Pattern (Gait) step-to   Deviations/Abnormal Patterns (Gait) right sided deviations   Maintains Weight-bearing Status (Gait) able to maintain   Comment, (Gait/Stairs) patient fatigues easily with gait activities; verbal cues to promote increased erect posture and stability   Balance   Balance Comments fair with use of Fww; fatigue noted to decrease patient's dynamic stability   Sensory Examination   Sensory Perception Comments patient reports neuropathy in both of hands and feet   Coordination   Coordination no deficits were identified   Muscle Tone   Muscle Tone no deficits were identified   Clinical Impression   Criteria for Skilled Therapeutic Intervention Yes, treatment indicated   PT Diagnosis (PT) imipaired mobility   Influenced by the following impairments fatigue, weakness and pain   Functional limitations due to impairments activity/gait tolerance and stability   Clinical Presentation (PT Evaluation Complexity) evolving   Clinical Decision Making (Complexity) moderate complexity   Planned Therapy Interventions (PT) bed mobility training;gait training;transfer training   Risk & Benefits of therapy have been explained evaluation/treatment results reviewed;risks/benefits reviewed;patient   PT Total Evaluation Time   PT Eval, Low Complexity Minutes (99243) 15   Physical Therapy Goals   PT Frequency Daily   PT Predicted Duration/Target Date for Goal Attainment 07/26/24   PT Goals Bed Mobility;Transfers;Gait   PT: Bed Mobility Minimal assist   PT: Transfers Supervision/stand-by assist;Assistive device   PT: Gait Supervision/stand-by assist;Rolling walker;50 feet   PT Discharge Planning   PT Plan Continue PT   PT Discharge Recommendation (DC Rec) Transitional Care Facility;Acute Rehab Center-Motivated patient will benefit from intensive, interdisciplinary therapy.  Anticipate will be able to tolerate 3 hours of therapy per day   PT Rationale for DC Rec Patient will  certainly benefit from continued PT/OT and possibly speech to promote strength, stabiltiy and safe mobility (prior level of function) allowing her to return home; she may benefit from ARU consideration as she and her spouse are very motivated to progress patient's function   PT Brief overview of current status Very pleasant patient demonstrating latent effects of her prior CVA; DIVYA performed yesterday resulting in fatigue with decreased gait tolerance and stability; patient presently requiring assistance for all mobilities due to weakness, fatigue and post-op pain;   PT Equipment Needed at Discharge walker, rolling   Total Session Time   Total Session Time (sum of timed and untimed services) 15

## 2024-07-23 NOTE — PROGRESS NOTES
SAFETY CHECKLIST  ID Bands and Risk clasps correct and in place (DNR, Fall risk, Allergy, Latex, Limb):  Yes  All Lines Reconciled and labeled correctly: Yes  Whiteboard updated:Yes  Environmental interventions: Yes    Guillermina Laguerre RN on 7/23/2024 at 7:34 AM

## 2024-07-24 ENCOUNTER — APPOINTMENT (OUTPATIENT)
Dept: PHYSICAL THERAPY | Facility: OTHER | Age: 79
DRG: 522 | End: 2024-07-24
Payer: COMMERCIAL

## 2024-07-24 ENCOUNTER — APPOINTMENT (OUTPATIENT)
Dept: OCCUPATIONAL THERAPY | Facility: OTHER | Age: 79
DRG: 522 | End: 2024-07-24
Attending: ORTHOPAEDIC SURGERY
Payer: COMMERCIAL

## 2024-07-24 VITALS
HEIGHT: 67 IN | SYSTOLIC BLOOD PRESSURE: 112 MMHG | DIASTOLIC BLOOD PRESSURE: 70 MMHG | TEMPERATURE: 97.6 F | OXYGEN SATURATION: 98 % | WEIGHT: 185 LBS | HEART RATE: 74 BPM | BODY MASS INDEX: 29.03 KG/M2 | RESPIRATION RATE: 20 BRPM

## 2024-07-24 PROBLEM — Z96.642 S/P TOTAL LEFT HIP ARTHROPLASTY: Status: ACTIVE | Noted: 2024-07-24

## 2024-07-24 LAB
GLUCOSE BLDC GLUCOMTR-MCNC: 98 MG/DL (ref 70–99)
HGB BLD-MCNC: 10.3 G/DL (ref 11.7–15.7)
HOLD SPECIMEN: NORMAL

## 2024-07-24 PROCEDURE — 97165 OT EVAL LOW COMPLEX 30 MIN: CPT | Mod: GO | Performed by: OCCUPATIONAL THERAPIST

## 2024-07-24 PROCEDURE — 97530 THERAPEUTIC ACTIVITIES: CPT | Mod: GP

## 2024-07-24 PROCEDURE — 250N000013 HC RX MED GY IP 250 OP 250 PS 637: Performed by: INTERNAL MEDICINE

## 2024-07-24 PROCEDURE — 99239 HOSP IP/OBS DSCHRG MGMT >30: CPT | Mod: 24 | Performed by: STUDENT IN AN ORGANIZED HEALTH CARE EDUCATION/TRAINING PROGRAM

## 2024-07-24 PROCEDURE — 250N000013 HC RX MED GY IP 250 OP 250 PS 637: Performed by: STUDENT IN AN ORGANIZED HEALTH CARE EDUCATION/TRAINING PROGRAM

## 2024-07-24 PROCEDURE — 85018 HEMOGLOBIN: CPT | Performed by: ORTHOPAEDIC SURGERY

## 2024-07-24 PROCEDURE — 250N000013 HC RX MED GY IP 250 OP 250 PS 637: Performed by: ORTHOPAEDIC SURGERY

## 2024-07-24 PROCEDURE — 36415 COLL VENOUS BLD VENIPUNCTURE: CPT | Performed by: ORTHOPAEDIC SURGERY

## 2024-07-24 PROCEDURE — 97535 SELF CARE MNGMENT TRAINING: CPT | Mod: GO | Performed by: OCCUPATIONAL THERAPIST

## 2024-07-24 PROCEDURE — 97116 GAIT TRAINING THERAPY: CPT | Mod: GP

## 2024-07-24 RX ORDER — ACETAMINOPHEN 325 MG/1
975 TABLET ORAL EVERY 8 HOURS
Qty: 250 TABLET | Refills: 2 | Status: SHIPPED | OUTPATIENT
Start: 2024-07-24

## 2024-07-24 RX ORDER — HYDROMORPHONE HYDROCHLORIDE 2 MG/1
2 TABLET ORAL EVERY 4 HOURS PRN
Qty: 24 TABLET | Refills: 0 | Status: SHIPPED | OUTPATIENT
Start: 2024-07-24 | End: 2024-08-20

## 2024-07-24 RX ORDER — POLYETHYLENE GLYCOL 3350 17 G/17G
17 POWDER, FOR SOLUTION ORAL DAILY PRN
Qty: 510 G | Refills: 1 | Status: SHIPPED | OUTPATIENT
Start: 2024-07-24

## 2024-07-24 RX ORDER — CYCLOBENZAPRINE HCL 5 MG
5 TABLET ORAL EVERY 8 HOURS PRN
Qty: 30 TABLET | Refills: 1 | Status: SHIPPED | OUTPATIENT
Start: 2024-07-24 | End: 2024-08-14

## 2024-07-24 RX ORDER — ATENOLOL 100 MG/1
100 TABLET ORAL DAILY
Qty: 90 TABLET | Refills: 1 | Status: SHIPPED | OUTPATIENT
Start: 2024-07-24

## 2024-07-24 RX ADMIN — FOLIC ACID 1000 MCG: 1 TABLET ORAL at 09:59

## 2024-07-24 RX ADMIN — SENNOSIDES AND DOCUSATE SODIUM 1 TABLET: 50; 8.6 TABLET ORAL at 09:59

## 2024-07-24 RX ADMIN — HYDROMORPHONE HYDROCHLORIDE 2 MG: 2 TABLET ORAL at 00:44

## 2024-07-24 RX ADMIN — ALLOPURINOL 300 MG: 300 TABLET ORAL at 09:59

## 2024-07-24 RX ADMIN — LORATADINE 10 MG: 10 TABLET ORAL at 10:00

## 2024-07-24 RX ADMIN — POLYETHYLENE GLYCOL 3350 17 G: 17 POWDER, FOR SOLUTION ORAL at 10:00

## 2024-07-24 RX ADMIN — ACETAMINOPHEN 975 MG: 325 TABLET, FILM COATED ORAL at 05:13

## 2024-07-24 RX ADMIN — ATENOLOL 100 MG: 50 TABLET ORAL at 09:59

## 2024-07-24 RX ADMIN — ISOSORBIDE MONONITRATE 30 MG: 30 TABLET, EXTENDED RELEASE ORAL at 09:59

## 2024-07-24 RX ADMIN — ASPIRIN 81 MG CHEWABLE TABLET 81 MG: 81 TABLET CHEWABLE at 10:00

## 2024-07-24 ASSESSMENT — ACTIVITIES OF DAILY LIVING (ADL)
ADLS_ACUITY_SCORE: 37
ADLS_ACUITY_SCORE: 37
ADLS_ACUITY_SCORE: 38
ADLS_ACUITY_SCORE: 37
ADLS_ACUITY_SCORE: 38
ADLS_ACUITY_SCORE: 38
ADLS_ACUITY_SCORE: 37

## 2024-07-24 NOTE — PROGRESS NOTES
SAFETY CHECKLIST  ID Bands and Risk clasps correct and in place (DNR, Fall risk, Allergy, Latex, Limb):  Yes  All Lines Reconciled and labeled correctly: Yes  Whiteboard updated:Yes  Environmental interventions: Yes

## 2024-07-24 NOTE — PROGRESS NOTES
07/24/24 1000   Appointment Info   Signing Clinician's Name / Credentials (OT) Edilia Cooley OTR/L   Living Environment   People in Home spouse   Current Living Arrangements house   Home Accessibility no concerns   Transportation Anticipated family or friend will provide   Self-Care   Usual Activity Tolerance good   Current Activity Tolerance fair   Equipment Currently Used at Home walker, rolling   Fall history within last six months yes   Number of times patient has fallen within last six months 1   General Information   Left Upper Extremity (Weight-bearing Status) full weight-bearing (FWB)   Right Upper Extremity (Weight-bearing Status) full weight-bearing (FWB)   Left Lower Extremity (Weight-bearing Status) weight-bearing as tolerated (WBAT)   Right Lower Extremity (Weight-bearing Status) full weight-bearing (FWB)   Pain Assessment   Patient Currently in Pain Yes, see Vital Sign flowsheet   Range of Motion Comprehensive   General Range of Motion no range of motion deficits identified   Strength Comprehensive (MMT)   Comment, General Manual Muscle Testing (MMT) Assessment rt side weakness from recent stroke 4-/5 Left DIVYA resulting in weakness   Bed Mobility   Comment (Bed Mobility) Assist of  1   Transfers   Transfer Comments FWW and CGA, MIn/Mod A for sit to stand   Activities of Daily Living   BADL Assessment/Intervention lower body dressing;toileting;bathing   Bathing Assessment/Intervention   Position (Bathing) supported sitting   [place] Level (Bathing) lower body;moderate assist (50% patient effort)   Comment, (Bathing) Assist for feet and left leg   Assistive Devices (Bathing) grab bar, tub/shower;shower chair   Lower Body Dressing Assessment/Training   Position (Lower Body Dressing) edge of bed sitting   [place] Level (Lower Body Dressing) pants/bottoms;maximum assist (25% patient effort)   Toileting   Position (Toileting) unsupported sitting   Assistive Devices (Toileting) grab bar,  toilet   [place] Level (Toileting) minimum assist (75% patient effort)   Clinical Impression   Criteria for Skilled Therapeutic Interventions Met (OT) Evaluation only   OT Diagnosis impaired self cares and santa emangement   Influenced by the following impairments pain, weakness   OT Problem List-Impairments impacting ADL activity tolerance impaired;pain;strength   Assessment of Occupational Performance 1-3 Performance Deficits   Identified Performance Deficits showering, LB dressing, functiona activitiy tolerance   Planned Therapy Interventions (OT) ADL retraining;progressive activity/exercise   Clinical Decision Making Complexity (OT) problem focused assessment/low complexity   Risk & Benefits of therapy have been explained evaluation/treatment results reviewed   Clinical Impression Comments Pt fatgiues quickly   OT Total Evaluation Time   OT Eval, Low Complexity Minutes (32835) 15   OT Goals   Therapy Frequency (OT) One time eval and treatment   OT Predicted Duration/Target Date for Goal Attainment 07/24/24   OT Goals Hygiene/Grooming;Toilet Transfer/Toileting;Upper Body Dressing   OT: Hygiene/Grooming supervision/stand-by assist   OT: Upper Body Dressing Supervision/stand-by assist   OT: Toilet Transfer/Toileting Minimal assist   Interventions   Interventions Quick Adds Self-Care/Home Management   Self-Care/Home Management   Self-Care/Home Mgmt/ADL, Compensatory, Meal Prep Minutes (68501) 40   Symptoms Noted During/After Treatment (Meal Preparation/Planning Training) fatigue;increased pain   Treatment Detail/Skilled Intervention Pt showered whle sitting on shower chair. assist for LB and non reachable areas   [place] Level (Bathing Training) moderate assist (50% patient effort)   Assistance (Bathing Training) 1 person assist   Assistive Device (Bathing Training) grab bars;tub seat   [place] Level (Upper Body Dressing Training) minimum assist (75% patient effort)   Assistance (Upper Body Dressing  Training) set-up required   Lower Body Dressing Training Assistance maximum assist (25% patient effort)   Lower Body Dressing Training Assistance 1 person assist   OT Discharge Planning   OT Plan Eval and treat only   OT Discharge Recommendation (DC Rec) Transitional Care Facility   OT Rationale for DC Rec Pt would benefit from skilled OT in rehab settig to imrpove strength and indepdence for safe return home. ifeoma is motivated to improve her independence.   OT Brief overview of current status showering with assist for LB dressing, fatigued and weakness following   Total Session Time   Timed Code Treatment Minutes 40   Total Session Time (sum of timed and untimed services) 55   Psychosocial Support   Trust Relationship/Rapport care explained;questions encouraged

## 2024-07-24 NOTE — PROGRESS NOTES
07/24/24 1006   Appointment Info   Signing Clinician's Name / Credentials (PT) Grey Lombardi MPT   Interventions   Interventions Quick Adds Gait Training;Therapeutic Activity   Therapeutic Activity   Symptoms Noted During/After Treatment Fatigue;Increased pain   Treatment Detail/Skilled Intervention bed mobiliites with moderate assist; sit to stand with minimal assist; stand pivot with CGA and use of Fww   Gait Training   Symptoms Noted During/After Treatment (Gait Training) fatigue;increased pain   Treatment Detail/Skilled Intervention ambulation within patient's room   Distance in Feet 20 x 2   [place] Level (Gait Training) contact guard  (verbal cues for increased erect posture)   Physical Assistance Level (Gait Training) 1 person assist   Weight Bearing (Gait Training) weight-bearing as tolerated   Assistive Device (Gait Training) rolling walker   Pattern Analysis (Gait Training) 2-point gait   Gait Analysis Deviations decreased mirza;decreased velocity of limb motion;decreased step length   Impairments (Gait Analysis/Training) balance impaired;pain;strength decreased   PT Discharge Planning   PT Plan Patient is discharging   PT Discharge Recommendation (DC Rec) Transitional Care Facility   PT Rationale for DC Rec to promote strength, stability and safe mobility   PT Brief overview of current status Very pleasant patient demonstrating latent effects of her prior CVA; DIVYA performed on Monday resulting in fatigue with decreased gait tolerance and stability; patient presently requiring assistance for all mobilities due to weakness, fatigue and post-op pain;   PT Equipment Needed at Discharge walker, rolling

## 2024-07-24 NOTE — PLAN OF CARE
"Goal Outcome Evaluation:      Plan of Care Reviewed With: patient    Overall Patient Progress: improvingOverall Patient Progress: improving    Outcome Evaluation: Pain management, mobility, and activity improving    Pt Aox4, able to make needs known  VSS, tolerating RA  Tolerating diet order  Continent of B&B  Ast x1 FWW/ gait belt  BP (!) 145/68 (BP Location: Left arm, Patient Position: Semi-Vieira's, Cuff Size: Adult Regular)   Pulse 64   Temp 97  F (36.1  C) (Tympanic)   Resp 16   Ht 1.702 m (5' 7\")   Wt 83.9 kg (185 lb)   LMP 01/01/1998 (Within Months)   SpO2 96%   BMI 28.98 kg/m      Kris Gómez RN on 7/24/2024 at 5:10 AM   "

## 2024-07-24 NOTE — PROGRESS NOTES
Attempted to call report to [facility]. Left voicemail for [place] nurse manager to return call.

## 2024-07-24 NOTE — PLAN OF CARE
VSS and recorded. Denies pain. Alert and oriented. Ambulating in room with assist of one and walker. CMS is intact to bilateral lower extremities.

## 2024-07-24 NOTE — PHARMACY - DISCHARGE MEDICATION RECONCILIATION AND EDUCATION
Pharmacy:  Discharge Counseling and Medication Reconciliation    Margaret Batista  [address] [facility] [id number] [phone] (home)   78 year old female  PCP: Swetha Maxwell    Allergies: Plavix [clopidogrel], Enalapril, Food, and Losartan    Discharge Counseling:    Pharmacist met with patient (and/or family) today to review the medication portion of the After Visit Summary (with an emphasis on NEW medications) and to address patient's questions/concerns.    Summary of Education: Met with patient and spouse to discuss new medications: scheduled acetaminophen, prn Miralax, cyclobenzaprine and hydromorphone.  Also discussed increase in atenolol back to 100 mg daily.  Patient to discharge to  for rehab.  Patient's own ticagrelor was returned prior to discharge.     Materials Provided:  MedCounselor sheets printed from Clinical Pharmacology on: hydromorphone, cyclobenzaprine    Discharge Medication Reconciliation:    It has been determined that the patient has an adequate supply of medications available or which can be obtained from the patient's preferred pharmacy, which HE/SHE has confirmed as: [id number] [An updated medication list will be faxed to the patient's pharmacy.]    Thank you for the consult.    Misti Cam RPH........July 24, 2024 9:09 AM

## 2024-07-24 NOTE — PROGRESS NOTES
:     Patient received prior authorization from her insurance.     Plan for patient to discharge to [facility] today. Reached out to [facility] to set up a transportation time.     PAS Completed: [id number]     ALISA Triana on 7/24/2024 at 8:13 AM     :     [facility] will transport patient at 1030.     Update given to patient.     No further needs from  at this time.     ALISA Triana on 7/24/2024 at 9:06 AM

## 2024-07-24 NOTE — PHARMACY - DISCHARGE MEDICATION RECONCILIATION
[facility]  Part of [address] [address]    July 24, 2024    Dear Pharmacist,    Your customer, Margaret Batista, born on 1945, was recently discharged from [facility].  We have updated her medication list and want to alert you to the following:       Review of your medicines        START taking        Dose / Directions   acetaminophen 325 MG tablet  Commonly known as: TYLENOL  Used for: Closed fracture of neck of left femur, initial encounter (H)  Replaces: Tylenol 8 Hour 650 MG CR tablet      Dose: 975 mg  Take 3 tablets (975 mg) by mouth every 8 hours  Quantity: 250 tablet  Refills: 2     cyclobenzaprine 5 MG tablet  Commonly known as: FLEXERIL      Dose: 5 mg  Take 1 tablet (5 mg) by mouth every 8 hours as needed for muscle spasms  Quantity: 30 tablet  Refills: 1     HYDROmorphone 2 MG tablet  Commonly known as: DILAUDID  Used for: Closed fracture of neck of left femur, initial encounter (H)      Dose: 2 mg  Take 1 tablet (2 mg) by mouth every 4 hours as needed for moderate to severe pain  Quantity: 24 tablet  Refills: 0     polyethylene glycol 17 GM/Dose powder  Commonly known as: MIRALAX  Used for: Closed fracture of neck of left femur, initial encounter (H)      Dose: 17 g  Take 17 g by mouth daily as needed  Quantity: 510 g  Refills: 1            CONTINUE these medicines which may have CHANGED, or have new prescriptions. If we are uncertain of the size of tablets/capsules you have at home, strength may be listed as something that might have changed.        Dose / Directions   atenolol 100 MG tablet  Commonly known as: TENORMIN  This may have changed:   medication strength  how much to take  Used for: Hypertensive 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 and stage 1 through stage 4 chronic kidney disease, or unspecified chronic kidney disease (H)      Dose: 100 mg  Take 1 tablet (100 mg) by mouth  daily  Quantity: 90 tablet  Refills: 1            CONTINUE these medicines which have NOT CHANGED        Dose / Directions   albuterol 108 (90 Base) MCG/ACT inhaler  Commonly known as: PROAIR HFA/PROVENTIL HFA/VENTOLIN HFA  Used for: Chronic cough      Dose: 2 puff  Inhale 2 puffs into the lungs every 6 hours as needed for shortness of breath, wheezing or cough  Quantity: 18 g  Refills: 11     allopurinol 300 MG tablet  Commonly known as: ZYLOPRIM  Used for: Gout, unspecified cause, unspecified chronicity, unspecified site      TAKE 1 TABLET DAILY  Quantity: 90 tablet  Refills: 1     aspirin 81 MG chewable tablet  Commonly known as: ASA  Used for: ASCVD (arteriosclerotic cardiovascular disease)      Dose: 81 mg  Take 1 tablet (81 mg) by mouth daily  Quantity: 90 tablet  Refills: 3     atorvastatin 80 MG tablet  Commonly known as: LIPITOR  Used for: ASCVD (arteriosclerotic cardiovascular disease)      Dose: 80 mg  Take 1 tablet (80 mg) by mouth daily  Quantity: 90 tablet  Refills: 0     cetirizine 10 MG tablet  Commonly known as: zyrTEC  Used for: Seasonal allergic rhinitis due to other allergic trigger      Dose: 10 mg  Take 1 tablet (10 mg) by mouth daily  Refills: 0     folic acid 1 MG tablet  Commonly known as: FOLVITE  Used for: Rheumatoid arthritis involving multiple sites with positive rheumatoid factor (H)      TAKE 1 TABLET DAILY  Quantity: 90 tablet  Refills: 3     hydroxychloroquine 200 MG tablet  Commonly known as: PLAQUENIL  Used for: Rheumatoid arthritis involving multiple sites with positive rheumatoid factor (H)      400 mg daily alternating with 200 mg daily  Refills: 0     isosorbide mononitrate 30 MG 24 hr tablet  Commonly known as: IMDUR  Used for: Benign essential hypertension      Dose: 30 mg  Take 1 tablet (30 mg) by mouth daily  Quantity: 90 tablet  Refills: 3     methotrexate 2.5 MG tablet      Dose: 20 mg  Take 20 mg by mouth once a week  Refills: 0     nitroGLYcerin 0.4 MG sublingual  tablet  Commonly known as: NITROSTAT  Used for: ASCVD (arteriosclerotic cardiovascular disease)      Dose: 0.4 mg  Place 1 tablet (0.4 mg) under the tongue every 5 minutes as needed for chest pain (For chest pain x 3 doses)  Quantity: 30 tablet  Refills: 3     ticagrelor 60 MG tablet  Commonly known as: BRILINTA  Used for: Acute ischemic left MCA stroke (H), Left carotid stenosis      Dose: 60 mg  Take 1 tablet (60 mg) by mouth 2 times daily  Quantity: 180 tablet  Refills: 4            STOP taking      Tylenol 8 Hour 650 MG CR tablet  Generic drug: acetaminophen  Replaced by: acetaminophen 325 MG tablet                  Where to get your medicines        These medications were sent to [id number] - [place] - [address]  [address] [place] [facility] [address]      Phone: [phone]   acetaminophen 325 MG tablet  atenolol 100 MG tablet  cyclobenzaprine 5 MG tablet  HYDROmorphone 2 MG tablet  polyethylene glycol 17 GM/Dose powder         We also reviewed Margaret Batista's allergy list and updated it as needed:  Allergies: Plavix [clopidogrel], Enalapril, Food, and Losartan    Thank you for continuing to care for Margaret Batista.  We look forward to working together with you in the future.    Sincerely,  Misti Cam [facility]

## 2024-07-24 NOTE — DISCHARGE SUMMARY
[facility]    Discharge Summary  Hospitalist    Date of Admission:  7/18/2024  Date of Discharge:  7/24/2024  Discharging Provider: Derick Iraheta MD  Date of Service (when I saw the patient): 07/24/24    Discharge Diagnoses   Principal Problem:    Left displaced femoral neck fracture (H)    Date Noted: 7/19/2024  Active Problems:    Long term (current) use of anticoagulants    Date Noted: 7/22/2024    Facial pain    Date Noted: 7/22/2024    Activities involving dishwashing    Date Noted: 7/22/2024    Kitchen of non-institutional residence as the place of occurrence of the external cause    Date Noted: 7/22/2024    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 She was admitted through the ED with a femoral neck fracture from a fall at home. Recent CVA with residual right sided weakness. She underwent left total anterior hip arthoplasty for surgical repair with Dr. Topete 7/22/24, delay in repair due to brillinta and is doing well postoperatively. [facility] to returning home.     Nursing home provider should follow up on blood pressure regimen and make adjustments as necessary. Atenolol was increased to 100mg on discharge.    Hospital Course   Margaret Batista was admitted on 7/18/2024.  The following problems were addressed during her hospitalization:    Left Femoral Neck Fracture  - after a slip and fall at home confounded by recent stroke  - total hip arhroplasty 7/22/24 with Dr. Topete   - pain well controlled today  Plan:  PRN flexeril, acetaminophen and PO hydromorphone  Continue pain control  PT/OT/SW     Vascular Disease: ASCVD and Carotid   - cardiac stent x 7, carotid stent  after CVA x 1  - no cardiopulmonary symptoms prior to admission with exertion  Plan:  Resume aspirin and brillinta  Cont BB, Imdur, Statin      Osteopenia  - noted on DEXA done in 2016, non imaging or treatment   - needs repeat DEXA after discharge and then to be started on treatment      H/O Gout  - no current issues  Plan:  Cont Allopurinol     HTN  - severely elevated at admit and continues to be elevated above target, likely related to pain, will adjust regimen after surgery.   Plan:  Will increase Atenolol further to 100 mg/d, continue current imdur   Might need additional agent added, consider ACE       RA  - cont home plaquenil and methotrexate   - stable      Renal Mass, probable RCC  - noted on recent CT to be slowly enlarging   - she has outpt follow up with Urology scheduled for early August (Dr. Pinto)    Derick Iraheta MD    Significant Results and Procedures   Total hip arthroplasty Anterior 7/22/24 DR. Shashi Topete    Pending Results   These results will be followed up by   Unresulted Labs Ordered in the Past 30 Days of this Admission       No orders found from 6/18/2024 to 7/19/2024.            Code Status   Full Code       Primary Care Physician   Swetha Maxwell    Physical Exam   Temp: 98.2  F (36.8  C) Temp src: Tympanic BP: (!) 157/61 Pulse: 68   Resp: 20 SpO2: 97 % O2 Device: None (Room air)    Vitals:    07/18/24 2209   Weight: 83.9 kg (185 lb)     Vital Signs with Ranges  Temp:  [97  F (36.1  C)-98.8  F (37.1  C)] 98.2  F (36.8  C)  Pulse:  [64-76] 68  Resp:  [16-20] 20  BP: ()/(58-73) 157/61  SpO2:  [94 %-97 %] 97 %  I/O last 3 completed shifts:  In: 480 [P.O.:480]  Out: 750 [Urine:750]    Constitutional: awake, alert, cooperative, no apparent distress, and appears stated age  Hematologic / Lymphatic: no cervical lymphadenopathy  Respiratory: No increased work of breathing, good air exchange, clear to auscultation bilaterally, no crackles or wheezing  Cardiovascular: Normal apical  impulse, regular rate and rhythm, normal S1 and S2, no S3 or S4, and no murmur noted  GI: No scars, normal bowel sounds, soft, non-distended, non-tender, no masses palpated, no hepatosplenomegally  Musculoskeletal: bandage CDI  Neuropsychiatric: General: normal, calm, and normal eye contact    Discharge Disposition   Discharged to home  Condition at discharge: Stable    Consultations This Hospital Stay   PHYSICAL THERAPY ADULT IP CONSULT  OCCUPATIONAL THERAPY ADULT IP CONSULT  ORTHOPEDIC SURGERY IP CONSULT  SPEECH LANGUAGE PATH ADULT IP CONSULT  SOCIAL WORK IP CONSULT  PHYSICAL THERAPY ADULT IP CONSULT  OCCUPATIONAL THERAPY ADULT IP CONSULT  PHYSICAL THERAPY ADULT IP CONSULT  OCCUPATIONAL THERAPY ADULT IP CONSULT    Time Spent on this Encounter   IDerick MD, personally saw the patient today and spent greater than 30 minutes discharging this patient.    Discharge Orders      General info for SNF    Length of Stay Estimate: Short Term Care: Estimated # of Days <30  Condition at Discharge: Improving  Level of care:skilled   Rehabilitation Potential: Excellent  Admission H&P remains valid and up-to-date: Yes  Recent Chemotherapy: N/A  Use Nursing Home Standing Orders: Yes     Mantoux instructions    Give two-step Mantoux (PPD) Per Facility Policy Yes     Follow Up and recommended labs and tests    Follow up with Nursing home physician.  No follow up labs or test are needed.     Reason for your hospital stay    You were in the hospital for a hip fracture. It was surgically repaired and you will be discharged to [facility] for rehab prior to returning home.     Activity - Up with nursing assistance     Full Code     Physical Therapy Adult Consult    Evaluate and treat as clinically indicated.    Reason:  hip fracture, recent stroke     Occupational Therapy Adult Consult    Evaluate and treat as clinically indicated.    Reason:  Hip fracture, recent stroke     Crutches DME    DME Documentation: Describe the  reason for need to support medical necessity: Impaired gait status post hip surgery. I, the undersigned, certify that the above prescribed supplies are medically necessary for this patient and is both reasonable and necessary in reference to accepted standards of medical practice in the treatment of this patient's condition and is not prescribed as a convenience.     Cane DME    DME Documentation: Describe the reason for need to support medical necessity: Impaired gait status post hip surgery. I, the undersigned, certify that the above prescribed supplies are medically necessary for this patient and is both reasonable and necessary in reference to accepted standards of medical practice in the treatment of this patient's condition and is not prescribed as a convenience.     Walker DME    DME Documentation: Describe the reason for need to support medical necessity: Impaired gait status post hip surgery. I, the undersigned, certify that the above prescribed supplies are medically necessary for this patient and is both reasonable and necessary in reference to accepted standards of medical practice in the treatment of this patient's condition and is not prescribed as a convenience.     Diet    Follow this diet upon discharge: Orders Placed This Encounter      Advance Diet as Tolerated: Regular Diet Adult     Discharge Medications   Current Discharge Medication List        START taking these medications    Details   acetaminophen (TYLENOL) 325 MG tablet Take 3 tablets (975 mg) by mouth every 8 hours  Qty: 250 tablet, Refills: 2    Associated Diagnoses: Closed fracture of neck of left femur, initial encounter (H)      cyclobenzaprine (FLEXERIL) 5 MG tablet Take 1 tablet (5 mg) by mouth every 8 hours as needed for muscle spasms  Qty: 30 tablet, Refills: 1    Associated Diagnoses: S/P total left hip arthroplasty      HYDROmorphone (DILAUDID) 2 MG tablet Take 1 tablet (2 mg) by mouth every 4 hours as needed for moderate to  severe pain  Qty: 24 tablet, Refills: 0    Associated Diagnoses: Closed fracture of neck of left femur, initial encounter (H)      polyethylene glycol (MIRALAX) 17 GM/Dose powder Take 17 g by mouth daily as needed  Qty: 510 g, Refills: 1    Associated Diagnoses: Closed fracture of neck of left femur, initial encounter (H)           CONTINUE these medications which have CHANGED    Details   atenolol (TENORMIN) 100 MG tablet Take 1 tablet (100 mg) by mouth daily  Qty: 90 tablet, Refills: 1    Associated Diagnoses: Hypertensive 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 and stage 1 through stage 4 chronic kidney disease, or unspecified chronic kidney disease (H)           CONTINUE these medications which have NOT CHANGED    Details   albuterol (PROAIR HFA/PROVENTIL HFA/VENTOLIN HFA) 108 (90 Base) MCG/ACT inhaler Inhale 2 puffs into the lungs every 6 hours as needed for shortness of breath, wheezing or cough  Qty: 18 g, Refills: 11    Comments: Pharmacy may dispense brand covered by insurance (Proair, or proventil or ventolin or generic albuterol inhaler)  Associated Diagnoses: Chronic cough      allopurinol (ZYLOPRIM) 300 MG tablet TAKE 1 TABLET DAILY  Qty: 90 tablet, Refills: 1    Associated Diagnoses: Gout, unspecified cause, unspecified chronicity, unspecified site      aspirin (ASA) 81 MG chewable tablet Take 1 tablet (81 mg) by mouth daily  Qty: 90 tablet, Refills: 3    Associated Diagnoses: ASCVD (arteriosclerotic cardiovascular disease)      atorvastatin (LIPITOR) 80 MG tablet Take 1 tablet (80 mg) by mouth daily  Qty: 90 tablet, Refills: 0    Associated Diagnoses: ASCVD (arteriosclerotic cardiovascular disease)      cetirizine (ZYRTEC) 10 MG tablet Take 1 tablet (10 mg) by mouth daily    Associated Diagnoses: Seasonal allergic rhinitis due to other allergic trigger      folic acid (FOLVITE) 1 MG tablet TAKE 1 TABLET DAILY  Qty: 90 tablet, Refills: 3    Associated Diagnoses: Rheumatoid arthritis  involving multiple sites with positive rheumatoid factor (H)      hydroxychloroquine (PLAQUENIL) 200 MG tablet 400 mg daily alternating with 200 mg daily    Associated Diagnoses: Rheumatoid arthritis involving multiple sites with positive rheumatoid factor (H)      isosorbide mononitrate (IMDUR) 30 MG 24 hr tablet Take 1 tablet (30 mg) by mouth daily  Qty: 90 tablet, Refills: 3    Associated Diagnoses: Benign essential hypertension      methotrexate 2.5 MG tablet Take 20 mg by mouth once a week      nitroGLYcerin (NITROSTAT) 0.4 MG sublingual tablet Place 1 tablet (0.4 mg) under the tongue every 5 minutes as needed for chest pain (For chest pain x 3 doses)  Qty: 30 tablet, Refills: 3    Associated Diagnoses: ASCVD (arteriosclerotic cardiovascular disease)      ticagrelor (BRILINTA) 60 MG tablet Take 1 tablet (60 mg) by mouth 2 times daily  Qty: 180 tablet, Refills: 4    Associated Diagnoses: Acute ischemic left MCA stroke (H); Left carotid stenosis           STOP taking these medications       acetaminophen (TYLENOL 8 HOUR) 650 MG CR tablet Comments:   Reason for Stopping:             Allergies   Allergies   Allergen Reactions    Plavix [Clopidogrel]      Plavix resistant-tested at [facility] Cough    Food      Macadamia nuts make mouth itch    Losartan      Allergic rxn with hives and angioedema suspected to be from alternative manufacture of the effient or the losartan     Data   Most Recent 3 CBC's:  Recent Labs   Lab Test 07/24/24  0606 07/23/24  0636 07/21/24  0606 07/20/24  0627 07/19/24  0016   WBC  --  16.1*  --  10.8 14.6*   HGB 10.3* 11.6* 11.3* 10.9* 12.2   MCV  --  93  --  98 97   PLT  --  215  --  179 213      Most Recent 3 BMP's:  Recent Labs   Lab Test 07/24/24  0618 07/23/24  0636 07/23/24  0617 07/22/24  1125 07/22/24  0553 07/21/24  0606 07/20/24  0627 07/19/24  0016   NA  --  138  --   --   --   --  138 138   POTASSIUM  --  3.9  --   --  4.3 3.7 3.9 4.4   CHLORIDE  --  103  --   --   --    --  106 103   CO2  --  24  --   --   --   --  25 26   BUN  --  17.9  --   --   --   --  11.5 13.8   CR  --  0.82  --   --   --   --  0.86 0.91   ANIONGAP  --  11  --   --   --   --  7 9   MONI  --  9.2  --   --   --   --  8.9 9.7   GLC 98 164* 164*   < >  --   --  101* 134*    < > = values in this interval not displayed.     Most Recent 2 LFT's:  Recent Labs   Lab Test 07/20/24 0627 07/19/24  0016   AST 19 27   ALT 13 22   ALKPHOS 76 86   BILITOTAL 0.7 0.8     Most Recent INR's and Anticoagulation Dosing History:  Anticoagulation Dose History          Latest Ref Rng & Units 1/16/2015 4/13/2024 5/18/2024 7/19/2024   Recent Dosing and Labs   INR 0.85 - 1.15 1.0  0.99  0.98  1.06       Details                 Most Recent 3 Troponin's:No lab results found.  Most Recent Cholesterol Panel:  Recent Labs   Lab Test 04/13/24 2208   CHOL 138   LDL 60   HDL 47*   TRIG 153*     Most Recent 6 Bacteria Isolates From Any Culture (See EPIC Reports for Culture Details):No lab results found.  Most Recent TSH, T4 and A1c Labs:  Recent Labs   Lab Test 04/13/24  2208 08/10/23  0942   TSH  --  0.36   T4  --  1.52   A1C 5.4  --      Results for orders placed or performed during the hospital encounter of 07/18/24   CT Head w/o Contrast    Narrative    PROCEDURE INFORMATION:   Exam: CT Head Without Contrast   Exam date and time: 7/18/2024 10:29 PM   Age: 78 years old   Clinical indication: Injury or trauma; Fracture, pathological and other: Fall,   hit head, on blood thinners.     TECHNIQUE:   Imaging protocol: Computed tomography of the head without contrast.   Radiation optimization: All CT scans at this facility use at least one of these   dose optimization techniques: automated exposure control; mA and/or kV   adjustment per patient size (includes targeted exams where dose is matched to   clinical indication); or iterative reconstruction.     COMPARISON:   CT HEAD W/O CONTRAST 5/18/2024 7:14 PM     FINDINGS:   Brain: Age consistent  cerebral and cerebellar atrophy. Age consistent   periventricular white matter abnormality. No intracranial hemorrhage or   intracranial mass. No shift of the midline structures.  Possible old granuloma   within the left sylvian fissure again noted.  Cerebral ventricles: No ventriculomegaly.   Paranasal sinuses: Sphenoid sinus mucosal thickening.   Mastoid air cells: Visualized mastoid air cells are well aerated.   Bones: Unremarkable. No acute fracture.   Soft tissues: Unremarkable.       Impression    IMPRESSION:   No acute intracranial pathology. No intracranial traumatic injury.    Current examination is unchanged from prior examination.     THIS DOCUMENT HAS BEEN ELECTRONICALLY SIGNED BY DEVON HANKS MD   XR Pelvis and Hip Left 2 Views    Narrative    PROCEDURE INFORMATION:   Exam: XR Left Hip   Exam date and time: 7/18/2024 10:52 PM   Age: 78 years old   Clinical indication: Injury or trauma; Fall; Fracture of pelvis \T\ hip; Left;   Traumatic fracture; Neck of femur; Not specified; Additional info: Fall,   shorten leg, externally rotated. Lateral hip pain.     TECHNIQUE:   Imaging protocol: Radiologic exam of the left hip.   Views: 2 or 3 views hip with pelvis when performed.     COMPARISON:   CT ABDOMEN WO W PELVIS W CONTRAST 7/15/2024 10:14 AM     FINDINGS:   Bones/joints: Mild degenerative change of the hip joint with narrowing of the   joint space and acetabular overgrowth. Transverse fracture of the neck of the   left femur with superior displacement of the distal fracture fragment.    Fracture does not involve the articular surface.  Soft tissues: Soft tissue edema overlies the fracture site.       Impression    IMPRESSION:   Transverse fracture of the neck of the left femur with superior displacement of   the distal fracture fragment.     THIS DOCUMENT HAS BEEN ELECTRONICALLY SIGNED BY DEVON HANKS MD   XR Surgery SALLY L/T 5 Min Fluoro    Narrative    This exam was marked as  non-reportable because it will not be read by a   radiologist or a [facility] non-radiologist provider.         XR Pelvis Port 1/2 Views    Narrative    XR PELVIS PORT 1/2 VIEWS, 7/22/2024 6:19 PM    History: Female, age 78 years; Post OP Check; Left displaced femoral  neck fracture (H)    Comparison: None.    Technique: Single view was obtained.    FINDINGS: The metallic arthroplasty device is satisfactorily  positioned. Surrounding bone and overlying soft tissues appear grossly  intact.      Impression    IMPRESSION:   Status post metallic arthroplasty. No acute complication.    NILSA SAGE MD         SYSTEM ID:  [id number]

## 2024-07-25 ENCOUNTER — PATIENT OUTREACH (OUTPATIENT)
Dept: INTERNAL MEDICINE | Facility: OTHER | Age: 79
End: 2024-07-25
Payer: COMMERCIAL

## 2024-07-25 ENCOUNTER — DOCUMENTATION ONLY (OUTPATIENT)
Dept: OTHER | Facility: CLINIC | Age: 79
End: 2024-07-25
Payer: COMMERCIAL

## 2024-07-25 NOTE — TELEPHONE ENCOUNTER
Transitional Care Management    Patient discharged to skilled nursing facility for short term rehab. No TCM call required per policy.   Felicia Workman RN on 7/25/2024 at 10:57 AM

## 2024-07-30 ENCOUNTER — NURSING HOME VISIT (OUTPATIENT)
Dept: GERIATRICS | Facility: OTHER | Age: 79
End: 2024-07-30
Payer: COMMERCIAL

## 2024-07-30 DIAGNOSIS — Z96.642 S/P TOTAL LEFT HIP ARTHROPLASTY: Primary | ICD-10-CM

## 2024-07-30 DIAGNOSIS — S72.002A LEFT DISPLACED FEMORAL NECK FRACTURE (H): ICD-10-CM

## 2024-07-30 DIAGNOSIS — J30.89 NON-SEASONAL ALLERGIC RHINITIS DUE TO OTHER ALLERGIC TRIGGER: ICD-10-CM

## 2024-07-30 DIAGNOSIS — I10 BENIGN ESSENTIAL HYPERTENSION: ICD-10-CM

## 2024-07-30 DIAGNOSIS — N28.89 RIGHT RENAL MASS: ICD-10-CM

## 2024-07-30 DIAGNOSIS — I65.22 LEFT CAROTID STENOSIS: ICD-10-CM

## 2024-07-30 DIAGNOSIS — Z87.39 HX OF GOUT: ICD-10-CM

## 2024-07-30 DIAGNOSIS — M06.9 RHEUMATOID ARTHRITIS INVOLVING BOTH HANDS, UNSPECIFIED WHETHER RHEUMATOID FACTOR PRESENT (H): Chronic | ICD-10-CM

## 2024-07-30 DIAGNOSIS — N28.89 RENAL MASS: Primary | ICD-10-CM

## 2024-07-30 DIAGNOSIS — I69.951 HEMIPLGA FOL UNSP CEREBVASC DISEASE AFF RIGHT DOMINANT SIDE (H): ICD-10-CM

## 2024-07-30 DIAGNOSIS — Z86.73 HISTORY OF ISCHEMIC LEFT MCA STROKE: ICD-10-CM

## 2024-07-30 DIAGNOSIS — Z95.5 HISTORY OF CORONARY ARTERY STENT PLACEMENT: ICD-10-CM

## 2024-07-30 PROCEDURE — 99310 SBSQ NF CARE HIGH MDM 45: CPT | Mod: 24 | Performed by: NURSE PRACTITIONER

## 2024-07-30 NOTE — PROGRESS NOTES
[facility]  Hospital follow up/Initial Assessment    Patient Name: Margaret Batista   : 1945  MRN: [id number]    Place of Service: [facility]  DOS: 2024    CC: Hospital follow up/Initial Assessment    HPI:  Margaret Batista is a 78 year old female with PMH of multiple chronic medical concerns, who is seen today regarding pt was hospitalized - at [facility] after a fall and sustained a left displaced femoral neck fracture. She underwent a left hip arthroplasty on  with Dr Topete. Recent hx of CVA affecting right side/hemiplegia. Ortho follow up Aug 5th.    Hx significant vascular disease/ASCVD/Carotid disease: carotid stents, resumed on aspirin 81 mg daily and brilinta. Also taking statin, Imdur, and atenolol (noted dose increase to 100 mg daily in hospital).     Osteopenia: needs DEXA outpatient  Gout: currently taking allopurinol   Rheumatoid arthritis: currently taking hydryoxychloroquine and methotrexate  Hypertension: had quite elevated blood pressures during hospital stay, possibly due to pain. Atenolol was increased to 100 mg daily.   Renal mass: thought to possibly be Renal cell carcinoma. She has urology appointment Aug 8th    BP in rehab has been very well controlled ranging 116-138, a couple in 150s  HR 70-80s  Oxygenation stable on room air  Weights have remained stable 189-190#    Pt reports her pain is well controlled. She is progressing in therapy and wondering when she can go home. She denies constipation, diarrhea, no urinary concerns. NO chest pain. At times feels she is breathing shallow but thinks this is different after her stroke 2 months ago. She has mild right sided hemiplegia most affected in right leg.   RA affects her hands the most--she reports annual follow up with rheumatology at [facility] in September but symptoms controlled with plaquenil and methotrexate.   BP is very well controlled on current med regimen.   Allergic  rhinitis most affected by a dry cough that tends to go year round.       Multidisciplinary notes, laboratory values, medications, vital signs, weight and orders arereviewed from nursing home records. I have reviewed the patient s medical history and updated the computerized patient record.     PMH:  Past Medical History:   Diagnosis Date    Atherosclerotic heart disease of native coronary artery without angina pectoris     -s/p ADÁN to the mid-LAD, mid-circumflex, mid-right coronary artery, proximal right coronary  artery, and PTCA of a marginal branch of the right coronary artery 04/30/2007. -s/p ADÁN to proximal left anterior descending 10/29/13.    Contusion of abdominal wall     2014    Essential (primary) hypertension     12/14/2006    Gout     No Comments Provided    Hyperlipidemia     4/4/2007    Localized swelling, mass, or lump of upper extremity     5/26/2017    Other specified counseling     10/28/2013,Patient has identified Health Care Agent(s): Yes Add Health Care Agents: Yes   Health Care Agent(s): Primary Health Care Agent: Jay Batista  Relationship:  Phone:   Secondary Health Care Agent:  Relationship: Daughter Phone:   Conservator:  Relationship:  Phone:   Guardian: Relationship:  Phone:    Patient has Advance Care Plan Documents (Health Care Directive, POLST): No, refe*    Polyosteoarthritis     No Comments Provided    Postmenopausal bleeding     No Comments Provided    Presence of coronary angioplasty implant and graft     2007/2013,unstable angina; severe prox LAD stenosis; s/p 7 stents    Primary osteoarthritis of left hand     5/30/2017    Rheumatoid arthritis (H)     follows at [place] yearly       Medications:  Current Outpatient Medications   Medication Sig Dispense Refill    acetaminophen (TYLENOL) 325 MG tablet Take 3 tablets (975 mg) by mouth every 8 hours 250 tablet 2    albuterol (PROAIR HFA/PROVENTIL HFA/VENTOLIN HFA) 108 (90 Base) MCG/ACT inhaler Inhale 2 puffs into the lungs every  6 hours as needed for shortness of breath, wheezing or cough 18 g 11    allopurinol (ZYLOPRIM) 300 MG tablet TAKE 1 TABLET DAILY 90 tablet 1    aspirin (ASA) 81 MG chewable tablet Take 1 tablet (81 mg) by mouth daily 90 tablet 3    atenolol (TENORMIN) 100 MG tablet Take 1 tablet (100 mg) by mouth daily 90 tablet 1    atorvastatin (LIPITOR) 80 MG tablet Take 1 tablet (80 mg) by mouth daily 90 tablet 0    cetirizine (ZYRTEC) 10 MG tablet Take 1 tablet (10 mg) by mouth daily      cyclobenzaprine (FLEXERIL) 5 MG tablet Take 1 tablet (5 mg) by mouth every 8 hours as needed for muscle spasms 30 tablet 1    folic acid (FOLVITE) 1 MG tablet TAKE 1 TABLET DAILY 90 tablet 3    HYDROmorphone (DILAUDID) 2 MG tablet Take 1 tablet (2 mg) by mouth every 4 hours as needed for moderate to severe pain 24 tablet 0    hydroxychloroquine (PLAQUENIL) 200 MG tablet 400 mg daily alternating with 200 mg daily      isosorbide mononitrate (IMDUR) 30 MG 24 hr tablet Take 1 tablet (30 mg) by mouth daily 90 tablet 3    methotrexate 2.5 MG tablet Take 20 mg by mouth once a week      nitroGLYcerin (NITROSTAT) 0.4 MG sublingual tablet Place 1 tablet (0.4 mg) under the tongue every 5 minutes as needed for chest pain (For chest pain x 3 doses) 30 tablet 3    polyethylene glycol (MIRALAX) 17 GM/Dose powder Take 17 g by mouth daily as needed 510 g 1    ticagrelor (BRILINTA) 60 MG tablet Take 1 tablet (60 mg) by mouth 2 times daily 180 tablet 4      Medication reconciliation complete between [facility] record and [facility] MAR.     Allergies:  Allergies   Allergen Reactions    Plavix [Clopidogrel]      Plavix resistant-tested at [place]    Enalapril Cough    Food      Macadamia nuts make mouth itch    Losartan      Allergic rxn with hives and angioedema suspected to be from alternative manufacture of the effient or the losartan       Review of Systems:  See HPI    Vital Signs:  Temp 97.3   Pulse 69   Respirations 18   /61   Oxygen Sats 96%   Weight  "189.2#    Physical Exam:     Pleasant and alert without distress.    Sclera nonicteric, conjunctiva non-inflamed.  Skin color pink. Mucous membranes moist.   Lungs clear to auscultation throughout. No wheezes or rales noted.   Cardiovascular regular, S1, S2 auscultated. No murmur noted.  Abdomen soft and without tenderness   Extremities with mod edema left leg/mild bruising noted on shin/knee. CMS intact    Able to move upper and lower extremities       New Labs/Diagnostics:  Lab Results   Component Value Date    WBC 16.1 07/23/2024    WBC 11.1 06/18/2021     Lab Results   Component Value Date    RBC 3.77 07/23/2024    RBC 4.83 06/18/2021     Lab Results   Component Value Date    HGB 10.3 07/24/2024    HGB 14.8 06/18/2021     Lab Results   Component Value Date    HCT 35.1 07/23/2024    HCT 46.0 06/18/2021     No components found for: \"MCT\"  Lab Results   Component Value Date    MCV 93 07/23/2024    MCV 95 06/18/2021     Lab Results   Component Value Date    MCH 30.8 07/23/2024    MCH 30.6 06/18/2021     Lab Results   Component Value Date    MCHC 33.0 07/23/2024    MCHC 32.2 06/18/2021     Lab Results   Component Value Date    RDW 16.0 07/23/2024    RDW 14.6 06/18/2021     Lab Results   Component Value Date     07/23/2024     06/18/2021     Last Comprehensive Metabolic Panel:  Sodium   Date Value Ref Range Status   07/23/2024 138 135 - 145 mmol/L Final   06/18/2021 139 134 - 144 mmol/L Final     Potassium   Date Value Ref Range Status   07/23/2024 3.9 3.4 - 5.3 mmol/L Final   06/18/2021 3.4 (L) 3.5 - 5.1 mmol/L Final     Chloride   Date Value Ref Range Status   07/23/2024 103 98 - 107 mmol/L Final   06/18/2021 101 98 - 107 mmol/L Final     Carbon Dioxide   Date Value Ref Range Status   06/18/2021 30 21 - 31 mmol/L Final     Carbon Dioxide (CO2)   Date Value Ref Range Status   07/23/2024 24 22 - 29 mmol/L Final     Anion Gap   Date Value Ref Range Status   07/23/2024 11 7 - 15 mmol/L Final   06/18/2021 8 " 3 - 14 mmol/L Final     Glucose   Date Value Ref Range Status   06/18/2021 100 70 - 105 mg/dL Final     GLUCOSE BY METER POCT   Date Value Ref Range Status   07/24/2024 98 70 - 99 mg/dL Final     Urea Nitrogen   Date Value Ref Range Status   07/23/2024 17.9 8.0 - 23.0 mg/dL Final   06/18/2021 22 7 - 25 mg/dL Final     Creatinine   Date Value Ref Range Status   07/23/2024 0.82 0.51 - 0.95 mg/dL Final   06/18/2021 0.99 0.60 - 1.20 mg/dL Final     GFR Estimate   Date Value Ref Range Status   07/23/2024 73 >60 mL/min/1.73m2 Final     Comment:     eGFR calculated using 2021 CKD-EPI equation.   06/18/2021 55 (L) >60 mL/min/[1.73_m2] Final     Calcium   Date Value Ref Range Status   07/23/2024 9.2 8.8 - 10.4 mg/dL Final     Comment:     Reference intervals for this test were updated on 7/16/2024 to reflect our healthy population more accurately. There may be differences in the flagging of prior results with similar values performed with this method. Those prior results can be interpreted in the context of the updated reference intervals.   06/18/2021 9.7 8.6 - 10.3 mg/dL Final     Bilirubin Total   Date Value Ref Range Status   07/20/2024 0.7 <=1.2 mg/dL Final   06/18/2021 0.8 0.3 - 1.0 mg/dL Final     Alkaline Phosphatase   Date Value Ref Range Status   07/20/2024 76 40 - 150 U/L Final   06/18/2021 55 34 - 104 U/L Final     ALT   Date Value Ref Range Status   07/20/2024 13 0 - 50 U/L Final   06/18/2021 14 7 - 52 U/L Final     AST   Date Value Ref Range Status   07/20/2024 19 0 - 45 U/L Final   06/18/2021 18 13 - 39 U/L Final     Magnesium   Date Value Ref Range Status   07/23/2024 2.1 1.7 - 2.3 mg/dL Final        Assessment/Plan:  (Z96.642) S/P total left hip arthroplasty  (primary encounter diagnosis)  (S72.002A) Left displaced femoral neck fracture (H)  Comment: healing  Plan: ortho follow up Aug 5th; cont scheduled tylenol, dilaudid PRN, miralax PRN  Cont aspirin and brilinta      (I69.951) Hemiplga fol unsp cerebvasc  disease aff right dominant side (H)  (Z86.73) History of ischemic left MCA stroke  Comment: occurred about 2 months ago  Plan: cont aspirin and brilinta    (Z87.39) Hx of gout  Comment: noted  Plan: cont allopurinol      (Z95.5) History of coronary artery stent placement in 2007 and 2013  Comment: noted  Plan: monitor    (N28.89) Right renal mass  Comment: possible RCC  Plan: consult with Dr Pinto Aug 8    (I10) Benign essential hypertension  Comment: controlled   Plan: cont atenolol 100 mg daily, imdur, statin, aspirin    (I65.22) Left carotid stenosis  Comment: hx stent placement  Plan: cont aspirin, brilinta      (M06.9) Rheumatoid arthritis involving both hands, unspecified whether rheumatoid factor present (H)  Comment: chronic--reports hands are most affected  Plan: cont hydroxychloroquine, methotrexate, folic acid; follows with rheumatology annually at [facility] (next appt in September)    (J30.89) Non-seasonal allergic rhinitis due to other allergic trigger  Comment: Dry cough  Plan: cont zyrtec        A total of 65 minutes spent by me on the date of the encounter doing chart review from [facility] discharge summary, review of test results, interpretation of tests, review of medications, patient visit, and documentation.    JODEE Simmons, CNP ....................  7/30/2024   9:14 AM

## 2024-08-01 ENCOUNTER — NURSING HOME VISIT (OUTPATIENT)
Dept: GERIATRICS | Facility: OTHER | Age: 79
End: 2024-08-01
Payer: COMMERCIAL

## 2024-08-01 ENCOUNTER — LAB REQUISITION (OUTPATIENT)
Dept: LAB | Facility: OTHER | Age: 79
End: 2024-08-01
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
## 2024-08-01 DIAGNOSIS — Z71.89 GOALS OF CARE, COUNSELING/DISCUSSION: ICD-10-CM

## 2024-08-01 DIAGNOSIS — M79.18 LEFT BUTTOCK PAIN: ICD-10-CM

## 2024-08-01 LAB
ANION GAP SERPL CALCULATED.3IONS-SCNC: 11 MMOL/L (ref 7–15)
BASOPHILS # BLD AUTO: 0.1 10E3/UL (ref 0–0.2)
BASOPHILS NFR BLD AUTO: 0 %
BUN SERPL-MCNC: 12.9 MG/DL (ref 8–23)
CALCIUM SERPL-MCNC: 9.8 MG/DL (ref 8.8–10.4)
CHLORIDE SERPL-SCNC: 102 MMOL/L (ref 98–107)
CREAT SERPL-MCNC: 1 MG/DL (ref 0.51–0.95)
EGFRCR SERPLBLD CKD-EPI 2021: 58 ML/MIN/1.73M2
EOSINOPHIL # BLD AUTO: 0.7 10E3/UL (ref 0–0.7)
EOSINOPHIL NFR BLD AUTO: 4 %
ERYTHROCYTE [DISTWIDTH] IN BLOOD BY AUTOMATED COUNT: 16.9 % (ref 10–15)
GLUCOSE SERPL-MCNC: 101 MG/DL (ref 70–99)
HCO3 SERPL-SCNC: 26 MMOL/L (ref 22–29)
HCT VFR BLD AUTO: 35.6 % (ref 35–47)
HGB BLD-MCNC: 11.2 G/DL (ref 11.7–15.7)
IMM GRANULOCYTES # BLD: 0.2 10E3/UL
IMM GRANULOCYTES NFR BLD: 1 %
LYMPHOCYTES # BLD AUTO: 2.8 10E3/UL (ref 0.8–5.3)
LYMPHOCYTES NFR BLD AUTO: 16 %
MCH RBC QN AUTO: 30.6 PG (ref 26.5–33)
MCHC RBC AUTO-ENTMCNC: 31.5 G/DL (ref 31.5–36.5)
MCV RBC AUTO: 97 FL (ref 78–100)
MONOCYTES # BLD AUTO: 0.6 10E3/UL (ref 0–1.3)
MONOCYTES NFR BLD AUTO: 3 %
NEUTROPHILS # BLD AUTO: 13.1 10E3/UL (ref 1.6–8.3)
NEUTROPHILS NFR BLD AUTO: 76 %
NRBC # BLD AUTO: 0 10E3/UL
NRBC BLD AUTO-RTO: 0 /100
PLATELET # BLD AUTO: 388 10E3/UL (ref 150–450)
POTASSIUM SERPL-SCNC: 4.5 MMOL/L (ref 3.4–5.3)
RBC # BLD AUTO: 3.66 10E6/UL (ref 3.8–5.2)
SODIUM SERPL-SCNC: 139 MMOL/L (ref 135–145)
WBC # BLD AUTO: 17.4 10E3/UL (ref 4–11)

## 2024-08-01 PROCEDURE — 99310 SBSQ NF CARE HIGH MDM 45: CPT | Mod: 24 | Performed by: NURSE PRACTITIONER

## 2024-08-01 PROCEDURE — 85025 COMPLETE CBC W/AUTO DIFF WBC: CPT | Performed by: NURSE PRACTITIONER

## 2024-08-01 PROCEDURE — 80048 BASIC METABOLIC PNL TOTAL CA: CPT | Performed by: NURSE PRACTITIONER

## 2024-08-01 NOTE — PROGRESS NOTES
[facility] Services  Acute Care Visit    Patient Name: Margaret Batista   : 1945  MRN: [id number]    Place of Service: [facility]  DOS: 2024    CC: fall/left buttocks pain    HPI:  Margaret Batista is a 78 year old female with PMH of multiple chronic medical concerns, who is seen today regarding pt was deemed independent in her room per therapy yesterday and this morning she was up getting ready for the day and walking back to her bed from bathroom with her walker when her legs felt weak and she fell backwards. She was close enough to a wall so she fell against the wall and slid down landing on her left buttocks. She had surgery on left hip which is why she is in rehab currently.   She did not hit her head. She denies feeling dizzy or light-headed.   BP during her stay in rehab has been 110-140s. She had one lower one noted last evening  at 6pm was 100/60. Atenolol was increased in hospital due to hypertension.     HR has been stable 60-70s    Pt initially was having 8/10 pain in low back per nursing assessment at time of fall so EMS was called. They arrived and talked with patient and she decided she did not want to go to ED. She received tylenol 975 mg this morning and now she has minimal pain. She was using ice to left buttocks. She has no radiating pain or increased weakness/numbness/tingling in either leg. Denies low back pain. PT was at bedside during assessment and helped her stand and she was able to walk with walker and assisted with PT.       Multidisciplinary notes, laboratory values, medications, vital signs, weight and orders arereviewed from nursing home records. I have reviewed the patient s medical history and updated the computerized patient record.     PMH:  Past Medical History:   Diagnosis Date    Atherosclerotic heart disease of native coronary artery without angina pectoris     -s/p ADÁN to the mid-LAD, mid-circumflex, mid-right coronary artery, proximal  right coronary  artery, and PTCA of a marginal branch of the right coronary artery 04/30/2007. -s/p ADÁN to proximal left anterior descending 10/29/13.    Contusion of abdominal wall     2014    Essential (primary) hypertension     12/14/2006    Gout     No Comments Provided    Hyperlipidemia     4/4/2007    Localized swelling, mass, or lump of upper extremity     5/26/2017    Other specified counseling     10/28/2013,Patient has identified Health Care Agent(s): Yes Add Health Care Agents: Yes   Health Care Agent(s): Primary Health Care Agent: Jay Batista  Relationship:  Phone:   Secondary Health Care Agent:  Relationship: Daughter Phone:   Conservator:  Relationship:  Phone:   Guardian: Relationship:  Phone:    Patient has Advance Care Plan Documents (Health Care Directive, POLST): No, refe*    Polyosteoarthritis     No Comments Provided    Postmenopausal bleeding     No Comments Provided    Presence of coronary angioplasty implant and graft     2007/2013,unstable angina; severe prox LAD stenosis; s/p 7 stents    Primary osteoarthritis of left hand     5/30/2017    Rheumatoid arthritis (H)     follows at [place] yearly       Medications:  Current Outpatient Medications   Medication Sig Dispense Refill    acetaminophen (TYLENOL) 325 MG tablet Take 3 tablets (975 mg) by mouth every 8 hours 250 tablet 2    albuterol (PROAIR HFA/PROVENTIL HFA/VENTOLIN HFA) 108 (90 Base) MCG/ACT inhaler Inhale 2 puffs into the lungs every 6 hours as needed for shortness of breath, wheezing or cough 18 g 11    allopurinol (ZYLOPRIM) 300 MG tablet TAKE 1 TABLET DAILY 90 tablet 1    aspirin (ASA) 81 MG chewable tablet Take 1 tablet (81 mg) by mouth daily 90 tablet 3    atenolol (TENORMIN) 100 MG tablet Take 1 tablet (100 mg) by mouth daily 90 tablet 1    atorvastatin (LIPITOR) 80 MG tablet Take 1 tablet (80 mg) by mouth daily 90 tablet 0    cetirizine (ZYRTEC) 10 MG tablet Take 1 tablet (10 mg) by mouth daily      cyclobenzaprine  (FLEXERIL) 5 MG tablet Take 1 tablet (5 mg) by mouth every 8 hours as needed for muscle spasms 30 tablet 1    folic acid (FOLVITE) 1 MG tablet TAKE 1 TABLET DAILY 90 tablet 3    HYDROmorphone (DILAUDID) 2 MG tablet Take 1 tablet (2 mg) by mouth every 4 hours as needed for moderate to severe pain 24 tablet 0    hydroxychloroquine (PLAQUENIL) 200 MG tablet 400 mg daily alternating with 200 mg daily      isosorbide mononitrate (IMDUR) 30 MG 24 hr tablet Take 1 tablet (30 mg) by mouth daily 90 tablet 3    methotrexate 2.5 MG tablet Take 20 mg by mouth once a week      nitroGLYcerin (NITROSTAT) 0.4 MG sublingual tablet Place 1 tablet (0.4 mg) under the tongue every 5 minutes as needed for chest pain (For chest pain x 3 doses) 30 tablet 3    polyethylene glycol (MIRALAX) 17 GM/Dose powder Take 17 g by mouth daily as needed 510 g 1    ticagrelor (BRILINTA) 60 MG tablet Take 1 tablet (60 mg) by mouth 2 times daily 180 tablet 4      Medication reconciliation complete between Epic record and NH MAR.     Allergies:  Allergies   Allergen Reactions    Plavix [Clopidogrel]      Plavix resistant-tested at [place]    [facility]    Food      Macadamia nuts make mouth itch    Losartan      Allergic rxn with hives and angioedema suspected to be from alternative manufacture of the effient or the losartan       Review of Systems:  See HPI    Vital Signs:  Temp 97.6   Pulse 77   Respirations 16   /74   Oxygen Sats 95%   Weight 186.5#      Labs:  Last Comprehensive Metabolic Panel:  Lab Results   Component Value Date     08/01/2024    POTASSIUM 4.5 08/01/2024    CHLORIDE 102 08/01/2024    CO2 26 08/01/2024    ANIONGAP 11 08/01/2024     (H) 08/01/2024    BUN 12.9 08/01/2024    CR 1.00 (H) 08/01/2024    GFRESTIMATED 58 (L) 08/01/2024    MONI 9.8 08/01/2024       Lab Results   Component Value Date    WBC 17.4 08/01/2024    WBC 11.1 06/18/2021     Lab Results   Component Value Date    RBC 3.66 08/01/2024    RBC 4.83  "06/18/2021     Lab Results   Component Value Date    HGB 11.2 08/01/2024    HGB 14.8 06/18/2021     Lab Results   Component Value Date    HCT 35.6 08/01/2024    HCT 46.0 06/18/2021     No components found for: \"MCT\"  Lab Results   Component Value Date    MCV 97 08/01/2024    MCV 95 06/18/2021     Lab Results   Component Value Date    MCH 30.6 08/01/2024    MCH 30.6 06/18/2021     Lab Results   Component Value Date    MCHC 31.5 08/01/2024    MCHC 32.2 06/18/2021     Lab Results   Component Value Date    RDW 16.9 08/01/2024    RDW 14.6 06/18/2021     Lab Results   Component Value Date     08/01/2024     06/18/2021     WBC are chronically high. Mildly higher than baseline but she has no infectious symptoms--denies cough, congestion, chills, fever, no UTI symptoms, no skin concerns. Hip incision is covered but no redness or drainage noted surrounding dressing. Pain is controlled. She feels well and recovering in rehab. Platelets are stable.   Reviewed labs with patient.       Physical Exam:  Pleasant and alert without distress.    Sclera nonicteric, conjunctiva non-inflamed.  Skin color pink. Mucous membranes moist.   Abdomen soft and without tenderness   Extremities with mod edema left leg.     Gait is stable.   Able to move upper and lower extremities   Left buttocks/lumbar/sacral spine no tender areas to palpation  No bruising noted on left buttocks  Dressing to left anterior hip area intact with old blood noted        Assessment/Plan:  (W19.XXXA) Fall, initial encounter  (primary encounter diagnosis)  Comment: legs got weak when up walking and fell backwards against wall and slid down landing on left buttocks  Plan: PT/OT, did not hit head    (M79.18) Left buttock pain  Comment: improving to pain almost gone-back to baseline pain she had before fall  Plan: No acute concerns at this time for any new fracture she is up walking with PT and doing well.   Tylenol for pain  Cont to monitor and if any new " areas of pain or worsening pain, may need xrays/re-assessment    (Z71.89) Goals of care, counseling/discussion  Comment: reviewed POLST as changed to DNR/DNI  Plan: Pt is clear she does not want a breathing tube and has made the decision to be DNR/DNI  Questions answered. Will change this to reflect her wishes in Epic       present during visit this morning.     A total of 45 minutes spent by me on the date of the encounter doing chart review, review of test results, interpretation of tests, review of medications, patient visit, and documentation.    JODEE Simmons, CNP ....................  8/1/2024   9:30 AM

## 2024-08-05 ENCOUNTER — OFFICE VISIT (OUTPATIENT)
Dept: ORTHOPEDICS | Facility: OTHER | Age: 79
End: 2024-08-05
Attending: ORTHOPAEDIC SURGERY
Payer: COMMERCIAL

## 2024-08-05 ENCOUNTER — HOSPITAL ENCOUNTER (OUTPATIENT)
Dept: GENERAL RADIOLOGY | Facility: OTHER | Age: 79
Discharge: HOME | End: 2024-08-05
Attending: ORTHOPAEDIC SURGERY
Payer: COMMERCIAL

## 2024-08-05 DIAGNOSIS — Z96.642 HISTORY OF LEFT HIP REPLACEMENT: ICD-10-CM

## 2024-08-05 DIAGNOSIS — Z96.642 S/P TOTAL LEFT HIP ARTHROPLASTY: Primary | ICD-10-CM

## 2024-08-05 DIAGNOSIS — Z96.642 S/P TOTAL LEFT HIP ARTHROPLASTY: ICD-10-CM

## 2024-08-05 PROCEDURE — G0463 HOSPITAL OUTPT CLINIC VISIT: HCPCS

## 2024-08-05 PROCEDURE — 99024 POSTOP FOLLOW-UP VISIT: CPT | Performed by: ORTHOPAEDIC SURGERY

## 2024-08-05 PROCEDURE — 99495 TRANSJ CARE MGMT MOD F2F 14D: CPT | Performed by: ORTHOPAEDIC SURGERY

## 2024-08-05 PROCEDURE — 73502 X-RAY EXAM HIP UNI 2-3 VIEWS: CPT

## 2024-08-05 NOTE — PROGRESS NOTES
Subjective:    78-year-old female who is now 2 weeks status post left total hip arthroplasty for displaced femoral neck fracture.  She is doing really well at this point.  No real complaints with her left hip.  She has significant pain across bilateral buttock because of a fall that she had in the hospital.    Objective:    On examination today 78-year-old female in no acute stress.  Very pleasant examination.  Wound is completely benign.  No evidence for infection.  She is seen today in a wheelchair secondary to the fall that she had previously.    Assessment:    78-year-old female doing well status post total of arthroplasty for displaced femoral neck fracture left hip    Plan:    Will going to see her back in approximately 4 weeks for her total hip.  X-rays at that time.

## 2024-08-08 ENCOUNTER — OFFICE VISIT (OUTPATIENT)
Dept: UROLOGY | Facility: OTHER | Age: 79
End: 2024-08-08
Attending: NURSE PRACTITIONER
Payer: COMMERCIAL

## 2024-08-08 ENCOUNTER — LAB (OUTPATIENT)
Dept: LAB | Facility: OTHER | Age: 79
End: 2024-08-08
Attending: UROLOGY
Payer: COMMERCIAL

## 2024-08-08 VITALS
RESPIRATION RATE: 20 BRPM | HEART RATE: 60 BPM | HEIGHT: 67 IN | OXYGEN SATURATION: 97 % | BODY MASS INDEX: 28.56 KG/M2 | WEIGHT: 182 LBS

## 2024-08-08 DIAGNOSIS — J30.89 SEASONAL ALLERGIC RHINITIS DUE TO OTHER ALLERGIC TRIGGER: ICD-10-CM

## 2024-08-08 DIAGNOSIS — N28.89 RIGHT RENAL MASS: ICD-10-CM

## 2024-08-08 DIAGNOSIS — N28.89 RENAL MASS: ICD-10-CM

## 2024-08-08 PROCEDURE — 99214 OFFICE O/P EST MOD 30 MIN: CPT | Performed by: UROLOGY

## 2024-08-08 PROCEDURE — G0463 HOSPITAL OUTPT CLINIC VISIT: HCPCS | Performed by: UROLOGY

## 2024-08-08 RX ORDER — CETIRIZINE HYDROCHLORIDE 5 MG/1
5 TABLET ORAL DAILY
COMMUNITY
Start: 2024-08-08

## 2024-08-08 NOTE — NURSING NOTE
"Chief Complaint   Patient presents with    Consult     Renal mass lymphocystis        Initial Pulse 60   Resp 20   Ht 1.702 m (5' 7\")   Wt 82.6 kg (182 lb)   LMP 01/01/1998 (Within Months)   SpO2 97%   BMI 28.51 kg/m   Estimated body mass index is 28.51 kg/m  as calculated from the following:    Height as of this encounter: 1.702 m (5' 7\").    Weight as of this encounter: 82.6 kg (182 lb).  Medication Reconciliation: complete   post void residual = 0 ml    Ania Contreras LPN    "

## 2024-08-08 NOTE — NURSING NOTE
Patient reports has not had much to drink today and is having increased back. Is to leave nursing home tomorrow . Ania Contreras LPN ....................8/8/2024  3:25 PM

## 2024-08-08 NOTE — PROGRESS NOTES
Chief Complaint: No chief complaint on file.  .    HPI: Ms. 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    History otherwise includes mixed hyperlipidemia, arteriosclerotic cardiovascular disease, hypertension, previous TIA with cerebral microvascular disease and CKD stage III.    Recent hip fracture with repair.  She is still recovering from this.    Denies gross hematuria or flank pain.    Was seen by Dr. Lars Davis DO on 1/4/2024 who recommended active surveillance versus possible ablation.  He recommended repeat imaging given the mass was small, measuring 19 mm in size.  It has grown approximately 3 to 4 mm in size since its initial diagnosis.    CT on 7/15/2024 again with a 2.2 cm right upper pole renal mass amenable to cryoablation.  No evidence of metastasis.      Past Medical History:   Diagnosis Date    Atherosclerotic heart disease of native coronary artery without angina pectoris     -s/p ADÁN to the mid-LAD, mid-circumflex, mid-right coronary artery, proximal right coronary  artery, and PTCA of a marginal branch of the right coronary artery 04/30/2007. -s/p ADÁN to proximal left anterior descending 10/29/13.    Contusion of abdominal wall     2014    Essential (primary) hypertension     12/14/2006    Gout     No Comments Provided    Hyperlipidemia     4/4/2007    Localized swelling, mass, or lump of upper extremity     5/26/2017    Other specified counseling     10/28/2013,Patient has identified Health Care Agent(s): Yes Add Health Care Agents: Yes   Health Care Agent(s): Primary Health Care Agent: Jay Batista  Relationship:  Phone:   Secondary Health Care Agent:  Relationship: Daughter Phone:   Conservator:  Relationship:  Phone:   Guardian: Relationship:  Phone:    Patient  has Advance Care Plan Documents (Health Care Directive, POLST): No, refe*    Polyosteoarthritis     No Comments Provided    Postmenopausal bleeding     No Comments Provided    Presence of coronary angioplasty implant and graft     ,unstable angina; severe prox LAD stenosis; s/p 7 stents    Primary osteoarthritis of left hand     2017    Rheumatoid arthritis (H)     follows at [facility]       Past Surgical History:   Procedure Laterality Date    ARTHROPLASTY HIP ANTERIOR Left 2024    Procedure: ARTHROPLASTY, HIP, TOTAL, DIRECT ANTERIOR APPROACH;  Surgeon: Shashi Topeet MD;  Location: [facility] OR    ARTHROPLASTY KNEE      2011    ARTHROSCOPY KNEE          BIOPSY BREAST      10/25/95,[facility]  DR. YOGI RAMIREZ    CAROTID ARTERY ANGIOPLASTY Left 04/15/2024    [facility]     SECTION      , Section    COLONOSCOPY      05,Normal - Repeat in 10 years    COLONOSCOPY  2016    COLONOSCOPY N/A 2019    Procedure: COLONOSCOPY;  Surgeon: Evelin Thompson MD;  Location:  OR    COLONOSCOPY N/A 2019    Procedure: COMBINED COLONOSCOPY, SINGLE OR MULTIPLE BIOPSY/POLYPECTOMY BY BIOPSY;  Surgeon: Evelin Thompson MD;  Location:  OR    ENDOSCOPIC RELEASE CARPAL TUNNEL Right 2023    Procedure: RELEASE, CARPAL TUNNEL, ENDOSCOPIC;  Surgeon: Danial Stanley MD;  Location:  OR    ESOPHAGOSCOPY, GASTROSCOPY, DUODENOSCOPY (EGD), COMBINED      2011,erosive gastritis;bx;consider MRI/A;[facility]    EXTRACTION(S) DENTAL      1970    HEART CATH, ANGIOPLASTY      10/29/2013,ADÁN to proximal left anterior descending    OTHER SURGICAL HISTORY      ,[id number],OTHER    OTHER SURGICAL HISTORY      2014,[id number],TOTAL SHOULDER ARTHROPLASTY,Right    OTHER SURGICAL HISTORY      2016,34056.0,MD COLONOSCOPY REMOVE ELIZA POLYP LESN SNARE,repeat , precancerous polyps    ZZ STRESS LEXISCAN TEST  2018    With Dr. Irwin - sonja       FAMILY  HISTORY: Denies a family history of urologic cancer.    SOCIAL HISTORY:    reports that she has never smoked. She has been exposed to tobacco smoke. She has never used smokeless tobacco.    Current Outpatient Medications   Medication Sig Dispense Refill    acetaminophen (TYLENOL) 325 MG tablet Take 3 tablets (975 mg) by mouth every 8 hours 250 tablet 2    albuterol (PROAIR HFA/PROVENTIL HFA/VENTOLIN HFA) 108 (90 Base) MCG/ACT inhaler Inhale 2 puffs into the lungs every 6 hours as needed for shortness of breath, wheezing or cough 18 g 11    allopurinol (ZYLOPRIM) 300 MG tablet TAKE 1 TABLET DAILY 90 tablet 1    aspirin (ASA) 81 MG chewable tablet Take 1 tablet (81 mg) by mouth daily 90 tablet 3    atenolol (TENORMIN) 100 MG tablet Take 1 tablet (100 mg) by mouth daily 90 tablet 1    atorvastatin (LIPITOR) 80 MG tablet Take 1 tablet (80 mg) by mouth daily 90 tablet 0    cetirizine (ZYRTEC) 5 MG tablet Take 1 tablet (5 mg) by mouth daily      cyclobenzaprine (FLEXERIL) 5 MG tablet Take 1 tablet (5 mg) by mouth every 8 hours as needed for muscle spasms 30 tablet 1    folic acid (FOLVITE) 1 MG tablet TAKE 1 TABLET DAILY 90 tablet 3    HYDROmorphone (DILAUDID) 2 MG tablet Take 1 tablet (2 mg) by mouth every 4 hours as needed for moderate to severe pain 24 tablet 0    hydroxychloroquine (PLAQUENIL) 200 MG tablet 400 mg daily alternating with 200 mg daily      isosorbide mononitrate (IMDUR) 30 MG 24 hr tablet Take 1 tablet (30 mg) by mouth daily 90 tablet 3    methotrexate 2.5 MG tablet Take 20 mg by mouth once a week      nitroGLYcerin (NITROSTAT) 0.4 MG sublingual tablet Place 1 tablet (0.4 mg) under the tongue every 5 minutes as needed for chest pain (For chest pain x 3 doses) 30 tablet 3    polyethylene glycol (MIRALAX) 17 GM/Dose powder Take 17 g by mouth daily as needed 510 g 1    ticagrelor (BRILINTA) 60 MG tablet Take 1 tablet (60 mg) by mouth 2 times daily 180 tablet 4       ALLERGIES: Plavix [clopidogrel],  Enalapril, Food, and Losartan     GENERAL PHYSICAL EXAM:   Vitals: LMP 01/01/1998 (Within Months)   There is no height or weight on file to calculate BMI.    GENERAL: Well groomed, well developed, well nourished female in NAD.  In a wheelchair  HEENT: Normal  CV:  Warm extremities   RESPIRATORY: Normal respiratory effort.    MS: Moving upper extremities well  NEURO: Alert and oriented x 3.  PSYCH: Normal mood and affect, pleasant and agreeable during interview and exam.   present for the exam    RADIOLOGY: The following tests were reviewed:  CT ABDOMEN WO & W & PELVIS W CONTRAST 7/15/2024     Exam reason: follow up renal mass, right; Right renal mass     Technique: Initial noncontrast images of the abdomen were obtained.  Using helical CT technique, axial images of the abdomen and pelvis  were obtained with IV contrast during the venous phase and 5 minute  delayed images of the abdomen were also obtained.  Coronal and  sagittal reconstructions also performed. This CT was performed using  one or more of the following dose reduction techniques: automated  exposure control, adjustment of the mA and/or kV according to patient  size, and/or use of iterative reconstruction technique.     Meds/Contrast: Isovue 370, 108ml     Comparison: 1/4/2024, 6/8/2023, 8/1/2022      FINDINGS:     ABDOMEN:     Liver: No mass or any significant abnormality.  Gallbladder: There is a dependent gallstone in the gallbladder. No  gallbladder wall thickening or pericholecystic fat stranding.   Bile Ducts: No biliary ductal dilation.   Spleen:  No splenomegaly or focal lesion.  Pancreas: No mass, ductal dilatation, or inflammatory changes.  Kidneys: There is a slowly enlarging enhancing mass in the upper pole  of the right kidney measuring up to 2.2 cm, previously 1.8 cm on  6/8/2023. There are parapelvic cysts bilaterally. No hydronephrosis.  Adrenals:  No nodules.   Lymph Nodes: No adenopathy.   Vascular: No aortic aneurysm.    Abdominal Wall: No acute findings.      PELVIS:   No mass or adenopathy.      Bowel/Mesentery/Peritoneum:   -No bowel obstruction.   -Normal appendix.  -No acute inflammatory findings.  -No ascites.     Visualized portions of the Chest: No consolidation or pleural  effusion.  Musculoskeletal: No acute osseous abnormalities. Multilevel  degenerative changes of the spine.                                                                       IMPRESSION:  There is a slowly enlarging enhancing mass in the upper pole of the  right kidney concerning for renal cell carcinoma, measuring up to 2.2  cm on the current exam, 1.8 cm on 6/8/2023.     Cholelithiasis without evidence of acute cholecystitis.     NBA MULLINS MD     LABS: The last test results for Ms. Margaret Batista were reviewed.   No results found for this or any previous visit (from the past 24 hour(s)).    BMP -   Recent Labs   Lab Test 08/01/24  1100 07/24/24  0618 07/23/24  0636 07/22/24  1125 07/22/24  0553 07/21/24  0606 07/20/24  0627     --  138  --   --   --  138   POTASSIUM 4.5  --  3.9  --  4.3   < > 3.9   CHLORIDE 102  --  103  --   --   --  106   CO2 26  --  24  --   --   --  25   BUN 12.9  --  17.9  --   --   --  11.5   CR 1.00*  --  0.82  --   --   --  0.86   * 98 164*   < >  --   --  101*   MONI 9.8  --  9.2  --   --   --  8.9   MAG  --   --  2.1  --   --   --   --     < > = values in this interval not displayed.       CBC -   Recent Labs   Lab Test 08/01/24  1100 07/24/24  0606 07/23/24  0636 07/21/24  0606 07/20/24  0627   WBC 17.4*  --  16.1*  --  10.8   HGB 11.2* 10.3* 11.6*   < > 10.9*     --  215  --  179    < > = values in this interval not displayed.       ASSESSMENT:   2.2 cm right renal mass    PLAN:   We had an extensive discussion about the significance of a localized, enhancing kidney mass.  We discussed that approximately 80% of enhancing renal masses turn out to be kidney cancer upon removal, while 20% are  found to be benign.  Although certain features such as size, gender and tumor characteristics can change these risks.     We discussed the role of renal mass biopsy including the fact that renal mass biopsy is safe with current techniques, it can be inaccurate and it can be non-diagnostic.  Furthermore, the majority of patients find they ultimately need to proceed with treatment anyway.       We discussed the options for treatment of a localized kidney mass including active surveillance, thermal ablation, and surgical extirpation.  Surgical removal has the best track record and close to a 99% chance of cure for T1a lesions compared to 90% cure with thermal ablation and is generally preferred.  Furthermore, thermal ablation is not optimal for larger masses or centrally located masses.       We discussed the role of observation and the low risk of metastases associated with lesions less than 2-3 cm in size and the potential for slow/stable growth in many cases.  However, the unpredictable natural history of these lesions was mentioned as a drawback with this approach and the lack of effective therapy in the event of systemic progression.  In general, this approach is most favored for those with limited life expectancy and/or those with indeterminate (< 2-3 cm) renal masses.    I explained to patient that on active surveillance, a lesion this small would be expected to remain stable and not grow over time in approximately one-third of patients. I explained that for a lesion this small, lack of growth means that there would be a very low risk of progression of disease, and a low risk of adverse outcomes. We discussed the fact that for lesions that do grow, the average rate of growth is 2 to 3 mm per year, and we discussed the fact that lesions that have the potential to metastasize in the seem to grow much faster at approximately 8 mm per year or more. I explained that a period of surveillance followed by active therapy  based on finding growth appears to result in treatment being just as efficacious than as it would be if it had been offered in the original setting.      Furthermore, we discussed the relative merits of partial nephrectomy vs. radical nephrectomy and the theoretical basis behind maximal nephron preservation.  There is some data suggesting there are long term survival advantages and equivalent cancer control with nephron sparing surgery.  We also discussed the advantages and disadvantages and roles of open surgery vs. laparoscopic (and Da Chuy assisted) surgery. Risks and benefits of robotic partial nephrectomy were discussed in detail. Risks of surgery including bleeding, infection, MI, stroke, DVT/PE, bowel injury and injury to other surrounding structures were discussed. In addition, we discussed potential for positive surgical margins, urine leak, vascular injury, and potential need for radical nephrectomy or conversion to an open procedure.      Patient is still recovering from her hip fracture and is not in any position to treat her mass.  She is more comfortable with active surveillance for now especially given the size his small and it has not grown more than 4 mm in 1 years duration.    I recommend renal ultrasound in 6 months with chest x-ray and reevaluation.  UA  was ordered today    All questions were addressed.    Will Pinto MD   [facility]

## 2024-08-12 ENCOUNTER — TELEPHONE (OUTPATIENT)
Dept: INTERNAL MEDICINE | Facility: OTHER | Age: 79
End: 2024-08-12
Payer: COMMERCIAL

## 2024-08-12 ENCOUNTER — TELEPHONE (OUTPATIENT)
Dept: INTERNAL MEDICINE | Facility: OTHER | Age: 79
End: 2024-08-12

## 2024-08-12 ENCOUNTER — HOSPITAL ENCOUNTER (OUTPATIENT)
Dept: GENERAL RADIOLOGY | Facility: OTHER | Age: 79
Discharge: HOME | End: 2024-08-12
Payer: COMMERCIAL

## 2024-08-12 ENCOUNTER — OFFICE VISIT (OUTPATIENT)
Dept: FAMILY MEDICINE | Facility: OTHER | Age: 79
End: 2024-08-12
Attending: FAMILY MEDICINE
Payer: COMMERCIAL

## 2024-08-12 VITALS
WEIGHT: 180 LBS | HEART RATE: 73 BPM | HEIGHT: 67 IN | BODY MASS INDEX: 28.25 KG/M2 | TEMPERATURE: 97.7 F | SYSTOLIC BLOOD PRESSURE: 156 MMHG | DIASTOLIC BLOOD PRESSURE: 88 MMHG | RESPIRATION RATE: 16 BRPM | OXYGEN SATURATION: 96 %
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
## 2024-08-12 DIAGNOSIS — E44.0 MODERATE PROTEIN-CALORIE MALNUTRITION (H): ICD-10-CM

## 2024-08-12 DIAGNOSIS — I63.512 ACUTE ISCHEMIC LEFT MCA STROKE (H): Primary | ICD-10-CM

## 2024-08-12 DIAGNOSIS — I12.9 HYPERTENSIVE CHRONIC KIDNEY DISEASE WITH STAGE 1 THROUGH STAGE 4 CHRONIC KIDNEY DISEASE, OR UNSPECIFIED CHRONIC KIDNEY DISEASE: ICD-10-CM

## 2024-08-12 DIAGNOSIS — M54.50 ACUTE MIDLINE LOW BACK PAIN WITHOUT SCIATICA: ICD-10-CM

## 2024-08-12 DIAGNOSIS — R63.4 WEIGHT LOSS: ICD-10-CM

## 2024-08-12 DIAGNOSIS — S32.010A COMPRESSION FRACTURE OF L1 VERTEBRA, INITIAL ENCOUNTER (H): Primary | ICD-10-CM

## 2024-08-12 DIAGNOSIS — R63.0 DECREASED APPETITE: ICD-10-CM

## 2024-08-12 PROCEDURE — G0463 HOSPITAL OUTPT CLINIC VISIT: HCPCS

## 2024-08-12 PROCEDURE — 72070 X-RAY EXAM THORAC SPINE 2VWS: CPT

## 2024-08-12 PROCEDURE — 99213 OFFICE O/P EST LOW 20 MIN: CPT

## 2024-08-12 PROCEDURE — G0463 HOSPITAL OUTPT CLINIC VISIT: HCPCS | Mod: 25

## 2024-08-12 PROCEDURE — 72100 X-RAY EXAM L-S SPINE 2/3 VWS: CPT

## 2024-08-12 RX ORDER — ASPIRIN 81 MG/1
81 TABLET ORAL
COMMUNITY
Start: 2024-07-25 | End: 2024-08-14

## 2024-08-12 ASSESSMENT — PAIN SCALES - GENERAL: PAINLEVEL: MODERATE PAIN (4)

## 2024-08-12 NOTE — TELEPHONE ENCOUNTER
Patient's , Jay, called.  Patient is currently at therapy. Patient's history is that she had a stroke 3 months ago so has been receiving therapy. Two weeks ago, patient fell and broke her femur. She has right sided weakness. She fell in rehab and did something to her back. She usually does not complain of much pain.  She has been functioning since she fell but now her pain she rated it a 10/10.   would like to have her checked out.  Since there are no openings in the clinic today, it was recommended that he bring her to either [facility] or the Emergency Room if it is urgent. He verbalized his understanding and stated he would bring her to [facility] for evaluation.  Rose Acuna RN on 8/12/2024 at 11:00 AM

## 2024-08-12 NOTE — PROGRESS NOTES
ASSESSMENT/PLAN:    I have reviewed the nursing notes.  I have reviewed the findings, diagnosis, plan and need for follow up with the patient.    1. Compression fracture of L1 vertebra, initial encounter (H)  2. Acute midline low back pain without sciatica  3. Accidental fall, initial encounter  - XR Lumbar Spine 2/3 Views  - XR Thoracic Spine 2 Views    Patient presents with low back pain after falling into a wall last week.  Patient also recently had a left hip replacement after she fell due to weakness from a stroke.  Thoracic and lumbar x-rays indicate a nonacute appearing anterior wedge type fracture seen at L1.  Discussed results with patient and her family in clinic.  Discussed that this fracture most likely occurred when she fell the first time and fractured her hip.  No imaging was done to assess her spine at that time.  Advised that patient have repeat imaging done in 4 to 6 weeks to make sure that the fracture is continuing to heal.  Continue taking her Dilaudid as prescribed.  May also take Tylenol as needed.  Alternate between ice and heat.  Patient is also going through physical therapy.    Discussed warning signs/symptoms indicative of need to f/u    Follow up if symptoms persist or worsen or concerns    I explained my diagnostic considerations and recommendations to the patient, her son and , who voiced understanding and agreement with the treatment plan. All questions were answered. We discussed potential side effects of any prescribed or recommended therapies, as well as expectations for response to treatments.    Sim Hernandez, JODEE CNP  8/12/2024  1:05 PM    HPI:    Margaret Batista is a 78 year old female accompanied by her son and  who presents to [facility] today for concerns of back pain    Back Pain  Onset: 5-6 day(s) ago   Patient had a stroke recently and fell and fractured her left hip.  She had a left hip replacement and then was in a rehab facility.  While in the  rehab facility she was doing her daily cares in the bathroom and fell backwards into the wall and slid to the floor.  She has been having low back pain since that time.  She recently had a left hip and pelvis x-ray which was negative for any abnormalities.  Description:   Location of pain: low back bilateral  Radiation:None  Character of pain: Dull ache with occasional sharp pain  Pain free between episodes: no  Any injury? ( trauma, lifting, bending, twisting?): YES- see above  Work Injury (Work Comp?): No  Intensity: 4/10        History:  Able to sleep?: yes but pain is affecting her sleep  Able to work?: not applicable        History of back problems before: no prior back problems        Pain-free between episodes: NO    Any previous evaluations for back pain: no  Any previous MRI or X-rays: no  Any surgery: No  Any cancer history: No  Accompanying Signs & Symptoms:   Fever: No  Numbness or tingling in arms, legs, feet: No  Weakness in arms, legs, feet: YES- due to recent stroke, right side  Dysuria: No  Blood in urine: No  Urinary incontinence: No  Bowel incontinence: No  Weight loss: No  Precipitating and/or Alleviating factors:    Does rest help: yes  Certain positions make it better or worse: yes  Does bending over, or twisting make it worse: YES  Progression of Symptoms since onset: (better, worse, same): better  Therapies tried and outcome:  physical therapy, rest, cold therapy, heat therapy, pain medications, and NSAIDS with moderate  relief    Past Medical History:   Diagnosis Date    Atherosclerotic heart disease of native coronary artery without angina pectoris     -s/p ADÁN to the mid-LAD, mid-circumflex, mid-right coronary artery, proximal right coronary  artery, and PTCA of a marginal branch of the right coronary artery 04/30/2007. -s/p ADÁN to proximal left anterior descending 10/29/13.    Contusion of abdominal wall     2014    Essential (primary) hypertension     12/14/2006    Gout     No Comments  Provided    Hyperlipidemia     2007    Localized swelling, mass, or lump of upper extremity     2017    Other specified counseling     10/28/2013,Patient has identified Health Care Agent(s): Yes Add Health Care Agents: Yes   Health Care Agent(s): Primary Health Care Agent: Jay Batista  Relationship:  Phone:   Secondary Health Care Agent:  Relationship: Daughter Phone:   Conservator:  Relationship:  Phone:   Guardian: Relationship:  Phone:    Patient has Advance Care Plan Documents (Health Care Directive, POLST): No, refe*    Polyosteoarthritis     No Comments Provided    Postmenopausal bleeding     No Comments Provided    Presence of coronary angioplasty implant and graft     ,unstable angina; severe prox LAD stenosis; s/p 7 stents    Primary osteoarthritis of left hand     2017    Rheumatoid arthritis (H)     follows at [facility]     Past Surgical History:   Procedure Laterality Date    ARTHROPLASTY HIP ANTERIOR Left 2024    Procedure: ARTHROPLASTY, HIP, TOTAL, DIRECT ANTERIOR APPROACH;  Surgeon: Shashi oTpete MD;  Location:  OR    ARTHROPLASTY KNEE      2011    ARTHROSCOPY KNEE          BIOPSY BREAST      10/25/95,BRIAN RAMIREZ    CAROTID ARTERY ANGIOPLASTY Left 04/15/2024    [facility]     SECTION      1974, Section    COLONOSCOPY      05,Normal - Repeat in 10 years    COLONOSCOPY  2016    COLONOSCOPY N/A 2019    Procedure: COLONOSCOPY;  Surgeon: Evelin Thompson MD;  Location:  OR    COLONOSCOPY N/A 2019    Procedure: COMBINED COLONOSCOPY, SINGLE OR MULTIPLE BIOPSY/POLYPECTOMY BY BIOPSY;  Surgeon: Evelin Thompson MD;  Location:  OR    ENDOSCOPIC RELEASE CARPAL TUNNEL Right 2023    Procedure: RELEASE, CARPAL TUNNEL, ENDOSCOPIC;  Surgeon: Danial Stanley MD;  Location:  OR    ESOPHAGOSCOPY, GASTROSCOPY, DUODENOSCOPY (EGD), COMBINED      2011,erosive gastritis;bx;consider  MRI/A;[facility]    EXTRACTION(S) DENTAL      1970    HEART CATH, ANGIOPLASTY      10/29/2013,ADÁN to proximal left anterior descending    OTHER SURGICAL HISTORY      2007/2013,[id number],OTHER    OTHER SURGICAL HISTORY      01/2014,[id number],TOTAL SHOULDER ARTHROPLASTY,Right    OTHER SURGICAL HISTORY      04/14/2016,55727.0,[place] COLONOSCOPY REMOVE ELIZA POLYP LESN SNARE,repeat 2019, precancerous polyps    ZZ STRESS LEXISCAN TEST  08/2018    With Dr. Irwin - normal     Social History     Tobacco Use    Smoking status: Never     Passive exposure: Past    Smokeless tobacco: Never    Tobacco comments:     Passive exposure in childhood home.    Substance Use Topics    Alcohol use: Yes     Comment: 2 drinks a month     Current Outpatient Medications   Medication Sig Dispense Refill    acetaminophen (TYLENOL) 325 MG tablet Take 3 tablets (975 mg) by mouth every 8 hours 250 tablet 2    allopurinol (ZYLOPRIM) 300 MG tablet TAKE 1 TABLET DAILY 90 tablet 1    aspirin (ASA) 81 MG chewable tablet Take 1 tablet (81 mg) by mouth daily 90 tablet 3    atenolol (TENORMIN) 100 MG tablet Take 1 tablet (100 mg) by mouth daily 90 tablet 1    atorvastatin (LIPITOR) 80 MG tablet Take 1 tablet (80 mg) by mouth daily 90 tablet 0    cetirizine (ZYRTEC) 5 MG tablet Take 1 tablet (5 mg) by mouth daily      folic acid (FOLVITE) 1 MG tablet TAKE 1 TABLET DAILY 90 tablet 3    HYDROmorphone (DILAUDID) 2 MG tablet Take 1 tablet (2 mg) by mouth every 4 hours as needed for moderate to severe pain 24 tablet 0    hydroxychloroquine (PLAQUENIL) 200 MG tablet 400 mg daily alternating with 200 mg daily      isosorbide mononitrate (IMDUR) 30 MG 24 hr tablet Take 1 tablet (30 mg) by mouth daily 90 tablet 3    methotrexate 2.5 MG tablet Take 20 mg by mouth once a week      nitroGLYcerin (NITROSTAT) 0.4 MG sublingual tablet Place 1 tablet (0.4 mg) under the tongue every 5 minutes as needed for chest pain (For chest pain x 3 doses) 30 tablet 3    polyethylene glycol  "(MIRALAX) 17 GM/Dose powder Take 17 g by mouth daily as needed 510 g 1    ticagrelor (BRILINTA) 60 MG tablet Take 1 tablet (60 mg) by mouth 2 times daily 180 tablet 4    albuterol (PROAIR HFA/PROVENTIL HFA/VENTOLIN HFA) 108 (90 Base) MCG/ACT inhaler Inhale 2 puffs into the lungs every 6 hours as needed for shortness of breath, wheezing or cough (Patient not taking: Reported on 8/8/2024) 18 g 11    aspirin 81 MG EC tablet Take 81 mg by mouth (Patient not taking: Reported on 8/12/2024)      Cetirizine HCl (ZYRTEC ALLERGY PO) Take 5 mg by mouth (Patient not taking: Reported on 8/12/2024)      cyclobenzaprine (FLEXERIL) 5 MG tablet Take 1 tablet (5 mg) by mouth every 8 hours as needed for muscle spasms (Patient not taking: Reported on 8/12/2024) 30 tablet 1     Allergies   Allergen Reactions    Plavix [Clopidogrel]      Plavix resistant-tested at [place]    Enalapril Cough    Food      Macadamia nuts make mouth itch    Losartan      Allergic rxn with hives and angioedema suspected to be from alternative manufacture of the effient or the losartan     Past medical history, past surgical history, current medications and allergies reviewed and accurate to the best of my knowledge.      ROS:  Refer to HPI    BP (!) 156/88   Pulse 73   Temp 97.7  F (36.5  C) (Tympanic)   Resp 16   Ht 1.702 m (5' 7\")   Wt 81.6 kg (180 lb)   LMP 01/01/1998 (Within Months)   SpO2 96%   BMI 28.19 kg/m      EXAM:  General Appearance: Well appearing 78 year old female, appropriate appearance for age. No acute distress   Musculoskeletal:    Thoracic/Lumbar Spine:  Inspection:    Lordosis: Normal   Kyphosis: Normal  Tenderness: thoracic spinous processes  ROM: unable to assess due to patient's mobility limitations from her recent stroke and hip replacement surgery    Patient knows own name, what time of day it is and what building we are in. Gets in and out of chair unassisted. Sensation at UE and LE intact to light touch.     Neuro: Alert and " oriented to person, place, and time.    Psychological: normal affect, alert, oriented, and pleasant.     Xray:  Results for orders placed or performed in visit on 08/12/24   XR Lumbar Spine 2/3 Views     Status: None    Narrative    Exam: XR LUMBAR SPINE 2/3 VIEWS     History:Female, age 78 years, Acute midline low back pain without  sciatica; Accidental fall, initial encounter    Comparison:  CT scan abdomen and pelvis 7/15/2024    Technique: Three views are submitted.    Findings: Bones are osteopenic. There is a nonacute appearing wedge  type fracture at L1 which is a new finding compared to the 7/15/2024  CT scan. Vertebral bodies and posterior elements are well aligned.  Degenerative changes and abdominal aortic calcifications are similar  in appearance.           Impression    Impression:  Nonacute appearing compression fracture at L1 is a new finding dating  back to 7/15/2024. Cannot exclude an acute/subacute component. CT  versus MRI may be helpful in better characterizing this fracture.    NILSA SAGE MD         SYSTEM ID:  [id number]   XR Thoracic Spine 2 Views     Status: None    Narrative    Exam: XR THORACIC SPINE 2 VIEWS     History:Female, age 78 years, Acute midline low back pain without  sciatica; Accidental fall, initial encounter    Comparison:  No relevant prior imaging.    Technique: Two views are submitted    Findings: Bones are osteopenic. An nonacute appearing anterior wedge  type fractures seen at L1. There is no evidence of an acute or  subacute rib fracture. Vertebral bodies and posterior elements are  well aligned.           Impression    Impression:  No evidence of acute or subacute bony abnormality.     Nonacute appearing L1 compression fracture.    NILSA SAGE MD         SYSTEM ID:  [id number]

## 2024-08-12 NOTE — NURSING NOTE
"Chief Complaint   Patient presents with    Musculoskeletal Problem     Fell against the wall about a week ago   Patient had a stroke 3 mo ago  Rehab at [facility] to home- fell in kitchen that resulted in hip fracture and replacement.  [facility] for rehab - fell against wall in bathroom while there  No xrays done after fall against wall.  PT at [facility] recommended evaluation and xray.      Initial BP (!) 156/88   Pulse 73   Temp 97.7  F (36.5  C) (Tympanic)   Resp 16   Ht 1.702 m (5' 7\")   Wt 81.6 kg (180 lb)   LMP 01/01/1998 (Within Months)   SpO2 96%   BMI 28.19 kg/m   Estimated body mass index is 28.19 kg/m  as calculated from the following:    Height as of this encounter: 1.702 m (5' 7\").    Weight as of this encounter: 81.6 kg (180 lb).     Advance Care Directive on file? y    FOOD SECURITY SCREENING QUESTIONS:    The next two questions are to help us understand your food security.  If you are feeling you need any assistance in this area, we have resources available to support you today.    Hunger Vital Signs:  Within the past 12 months we worried whether our food would run out before we got money to buy more. Never  Within the past 12 months the food we bought just didn't last and we didn't have money to get more. Never  Sofía Salazar LPN,JUSTIN on 8/12/2024 at 1:04 PM      Sofía Salazar LPN     "

## 2024-08-12 NOTE — TELEPHONE ENCOUNTER
Spoke with patient (I also spoke with son whom was also present on speaker phone with patient). Patient is only eating appx 700-800 calories per day per son as she has a very decreased appetite post stroke. Trying to eat nutrient dense foods, but difficult for patient to eat/wanting to eat. States that patient is trying to gain strength and muscle post stroke to get stronger. Would really like to consult with dietician to help with this. Unsure of where to go, no preference, but would like to stay locally. Please advise on referral. Pending if appropriate.     Physical Therapy is also requesting right AFO as well from [facility] to help post stroke, would this be something patient could qualify for/have? Or needs appt?   Antonia Arnold LPN...................8/12/2024   4:02 PM

## 2024-08-12 NOTE — TELEPHONE ENCOUNTER
Left message to call back for more information about dietician, etc. I'm at [phone] or can call [facility] nurse ext if unavailable  Antonia Arnold LPN 8/12/2024   1:34 PM

## 2024-08-12 NOTE — TELEPHONE ENCOUNTER
Physical Therapist is requesting orders for the following:    Right AFO from [facility]    Patient is also requesting a referral for a dietician-no specifics as to provider or location given.    Cecy Castro on 8/12/2024 at 11:21 AM

## 2024-08-13 ENCOUNTER — TELEPHONE (OUTPATIENT)
Dept: INTERNAL MEDICINE | Facility: OTHER | Age: 79
End: 2024-08-13
Payer: COMMERCIAL

## 2024-08-13 NOTE — TELEPHONE ENCOUNTER
Patient just learned patient has a non-acute L1 compression fracture. She is off meds and is having difficulty laying in bed, etc. Wondering if she should be seen sooner than in a week. He is with his mother, who would be available to speak to as well. Please advise.    Anabell Dennis on 8/13/2024 at 3:39 PM

## 2024-08-13 NOTE — TELEPHONE ENCOUNTER
Orders faxed to [facility]  [phone]   Faced Confirmed

## 2024-08-19 ENCOUNTER — TELEPHONE (OUTPATIENT)
Dept: INTERNAL MEDICINE | Facility: OTHER | Age: 79
End: 2024-08-19
Payer: COMMERCIAL

## 2024-08-19 NOTE — TELEPHONE ENCOUNTER
After verifying last name and  patient notified of the below information.   Claudia Rahman RN ............ 2024 12:18 PM

## 2024-08-20 ENCOUNTER — OFFICE VISIT (OUTPATIENT)
Dept: INTERNAL MEDICINE | Facility: OTHER | Age: 79
End: 2024-08-20
Attending: NURSE PRACTITIONER
Payer: COMMERCIAL

## 2024-08-20 VITALS
DIASTOLIC BLOOD PRESSURE: 87 MMHG | WEIGHT: 179.6 LBS | TEMPERATURE: 97.7 F | RESPIRATION RATE: 16 BRPM | SYSTOLIC BLOOD PRESSURE: 143 MMHG | HEART RATE: 76 BPM | OXYGEN SATURATION: 100 % | BODY MASS INDEX: 28.19 KG/M2 | HEIGHT: 67 IN

## 2024-08-20 DIAGNOSIS — M06.9 RHEUMATOID ARTHRITIS INVOLVING MULTIPLE SITES, UNSPECIFIED WHETHER RHEUMATOID FACTOR PRESENT (H): Chronic | ICD-10-CM

## 2024-08-20 DIAGNOSIS — I25.10 ASCVD (ARTERIOSCLEROTIC CARDIOVASCULAR DISEASE): ICD-10-CM

## 2024-08-20 DIAGNOSIS — S32.010D COMPRESSION FRACTURE OF L1 VERTEBRA WITH ROUTINE HEALING: ICD-10-CM

## 2024-08-20 DIAGNOSIS — I69.951 HEMIPLGA FOL UNSP CEREBVASC DISEASE AFF RIGHT DOMINANT SIDE (H): ICD-10-CM

## 2024-08-20 DIAGNOSIS — D72.820 MONOCLONAL B-CELL LYMPHOCYTOSIS: ICD-10-CM

## 2024-08-20 DIAGNOSIS — I63.512 ACUTE ISCHEMIC LEFT MCA STROKE (H): Primary | ICD-10-CM

## 2024-08-20 LAB
BASOPHILS # BLD AUTO: 0.1 10E3/UL (ref 0–0.2)
BASOPHILS NFR BLD AUTO: 0 %
EOSINOPHIL # BLD AUTO: 0.8 10E3/UL (ref 0–0.7)
EOSINOPHIL NFR BLD AUTO: 5 %
ERYTHROCYTE [DISTWIDTH] IN BLOOD BY AUTOMATED COUNT: 17.9 % (ref 10–15)
HCT VFR BLD AUTO: 39.9 % (ref 35–47)
HGB BLD-MCNC: 12.4 G/DL (ref 11.7–15.7)
IMM GRANULOCYTES # BLD: 0.1 10E3/UL
IMM GRANULOCYTES NFR BLD: 1 %
LYMPHOCYTES # BLD AUTO: 3.4 10E3/UL (ref 0.8–5.3)
LYMPHOCYTES NFR BLD AUTO: 22 %
MCH RBC QN AUTO: 30.6 PG (ref 26.5–33)
MCHC RBC AUTO-ENTMCNC: 31.1 G/DL (ref 31.5–36.5)
MCV RBC AUTO: 99 FL (ref 78–100)
MONOCYTES # BLD AUTO: 0.7 10E3/UL (ref 0–1.3)
MONOCYTES NFR BLD AUTO: 5 %
NEUTROPHILS # BLD AUTO: 10.7 10E3/UL (ref 1.6–8.3)
NEUTROPHILS NFR BLD AUTO: 68 %
NRBC # BLD AUTO: 0 10E3/UL
NRBC BLD AUTO-RTO: 0 /100
PLATELET # BLD AUTO: 229 10E3/UL (ref 150–450)
RBC # BLD AUTO: 4.05 10E6/UL (ref 3.8–5.2)
WBC # BLD AUTO: 15.7 10E3/UL (ref 4–11)

## 2024-08-20 PROCEDURE — G0463 HOSPITAL OUTPT CLINIC VISIT: HCPCS | Mod: 25

## 2024-08-20 PROCEDURE — G0463 HOSPITAL OUTPT CLINIC VISIT: HCPCS

## 2024-08-20 PROCEDURE — 36415 COLL VENOUS BLD VENIPUNCTURE: CPT | Mod: ZL | Performed by: NURSE PRACTITIONER

## 2024-08-20 PROCEDURE — 85004 AUTOMATED DIFF WBC COUNT: CPT | Mod: ZL | Performed by: NURSE PRACTITIONER

## 2024-08-20 PROCEDURE — 99214 OFFICE O/P EST MOD 30 MIN: CPT | Mod: 24 | Performed by: NURSE PRACTITIONER

## 2024-08-20 PROCEDURE — G2211 COMPLEX E/M VISIT ADD ON: HCPCS | Performed by: NURSE PRACTITIONER

## 2024-08-20 ASSESSMENT — PAIN SCALES - GENERAL: PAINLEVEL: MODERATE PAIN (4)

## 2024-08-20 ASSESSMENT — ENCOUNTER SYMPTOMS: BACK PAIN: 1

## 2024-08-20 NOTE — NURSING NOTE
"Chief Complaint   Patient presents with    Recheck Medication    Back Pain     Patient started using lidocaine patches on her back    Discharge Summary Nursing Home       Initial BP (!) 143/87 (BP Location: Right arm, Patient Position: Sitting, Cuff Size: Adult Regular)   Pulse 76   Temp 97.7  F (36.5  C) (Tympanic)   Resp 16   Ht 1.702 m (5' 7\")   Wt 81.5 kg (179 lb 9.6 oz)   LMP 01/01/1998 (Within Months)   SpO2 100%   Breastfeeding No   BMI 28.13 kg/m   Estimated body mass index is 28.13 kg/m  as calculated from the following:    Height as of this encounter: 1.702 m (5' 7\").    Weight as of this encounter: 81.5 kg (179 lb 9.6 oz).  Medication Review: complete    The next two questions are to help us understand your food security.  If you are feeling you need any assistance in this area, we have resources available to support you today.          1/30/2024   SDOH- Food Insecurity   Within the past 12 months, did you worry that your food would run out before you got money to buy more? N   Within the past 12 months, did the food you bought just not last and you didn t have money to get more? N            Health Care Directive:  Patient has a Health Care Directive on file      Rekha Cordero      "

## 2024-08-20 NOTE — PROGRESS NOTES
ICD-10-CM    1. Acute ischemic left MCA stroke (H)  I63.512       2. 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 ASCVD (arteriosclerotic cardiovascular disease)  I25.10       4. Rheumatoid arthritis involving multiple sites, unspecified whether rheumatoid factor present (H)  M06.9       5. Compression fracture of L1 vertebra with routine healing  S32.010D       6. 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  Pain from L1 compression fracture improved.  Continue with acetaminophen for pain relief.  Continue to work with PT and OT and using walker.  At this time risks versus benefits and think it is in her best interest to not proceed with intervention such as kyphoplasty as she would need to hold blood thinners for procedure.  She is in agreement especially since pain is improving.  Last bone density scan 2016.  She has evidence of osteoporotic fractures.  She will discuss with her rheumatologist in regards to recommended treatment.  Will recheck CBC as WBC higher than baseline lymphocytosis.  Continue to follow with oncology.        Sylvie Flores is a 78 year old, presenting for the following health issues:  Recheck Medication, Back Pain (Patient started using lidocaine patches on her back), and Discharge Summary Nursing Home      8/20/2024    10:51 AM   Additional Questions   Accompanied by  - Jay     Back Pain        She is here today for nursing home discharge evaluation.  She worked with PT and OT.  She did hermann some strength.  She is using a walker for ambulation.  She was in her bathroom at the skilled nursing facility getting ready 1 morning and fell backwards into the wall.  She has had pain since that time.  That was on August 10.  She was found to have an L1 compression fracture.  Her  states that over the past 4 days she has had  "significant improvement in her pain.  She is able to transfer herself from seated to standing position and able to sit up in bed on her own without assistance without pain.  She is taking acetaminophen 3 times daily and that has been effective.  She continues to recover from stroke with right hemiplegia and a left hip fracture.  She continues follow with rheumatology.                Objective    BP (!) 143/87 (BP Location: Right arm, Patient Position: Sitting, Cuff Size: Adult Regular)   Pulse 76   Temp 97.7  F (36.5  C) (Tympanic)   Resp 16   Ht 1.702 m (5' 7\")   Wt 81.5 kg (179 lb 9.6 oz)   LMP 01/01/1998 (Within Months)   SpO2 100%   Breastfeeding No   BMI 28.13 kg/m    Body mass index is 28.13 kg/m .  Physical Exam   Pleasant female no acute distress.  Affect normal.  Alert and pleasant.  Accompanied by .  Lung fields clear to auscultation.  Cardiovascular regular.  Tenderness with palpation to the right of L1 vertebrae.  She is able to ambulate with the use of a walker without difficulty.    Recent lab work reviewed with WBC increased to 17.4, slightly increased from baseline.            Signed Electronically by: Swetha Maxwell NP    "

## 2024-08-22 ENCOUNTER — OFFICE VISIT (OUTPATIENT)
Dept: FAMILY MEDICINE | Facility: OTHER | Age: 79
End: 2024-08-22
Payer: COMMERCIAL

## 2024-08-22 DIAGNOSIS — E44.0 MODERATE PROTEIN-CALORIE MALNUTRITION (H): ICD-10-CM

## 2024-08-22 DIAGNOSIS — I12.9 HYPERTENSIVE CHRONIC KIDNEY DISEASE WITH STAGE 1 THROUGH STAGE 4 CHRONIC KIDNEY DISEASE, OR UNSPECIFIED CHRONIC KIDNEY DISEASE: ICD-10-CM

## 2024-08-22 DIAGNOSIS — R63.0 DECREASED APPETITE: ICD-10-CM

## 2024-08-22 DIAGNOSIS — R63.4 WEIGHT LOSS: ICD-10-CM

## 2024-08-22 PROCEDURE — 97802 MEDICAL NUTRITION INDIV IN: CPT | Performed by: DIETITIAN, REGISTERED

## 2024-08-22 NOTE — PROGRESS NOTES
Sandra is here today with her  Jay for MNT related to decreased appetite and wt loss since her stoke in April.     PCP: Swetha Maxwell  Referring provider: Felicia Kumar    Sandra reported a decreased appetite, food just does not sound appealing and she has not been hungry. Her family has been concerned and urged her to come in for ideas. She has a very supportive family and caring . She reported about 15# wt loss in past month following a fall with hip fracture and about 20# since April. She eats small amounts at a time. She does usually drink a protein shake daily. She reported her weight yesterday around 180#.     Dx:   Encounter Diagnoses   Name Primary?    Decreased appetite     Weight loss     Moderate protein-calorie malnutrition (H24)     Hypertensive chronic kidney disease with stage 1 through stage 4 chronic kidney disease, or unspecified chronic kidney disease      Diet: increased calorie and protein, small frequent meals and snacks.     Education today: discussed ways to increase calories at meals, getting more bang for her buck when eating. Reviewed small frequent meals and provided with snack ideas. Discussed portion sizes and serving recommendations for her estimated needs. Encouraged activity as she can tolerate to also help stimulate appetite.     Materials provided: guide to good nutrition, sample meal plans and recipes, snack ideas, high linda/high protein MNT and recipes.     Goals/plan: Sandra will set up a schedule to eat every couple hours. She will work at increasing her calorie/protein at meals and snacks. She will cont with at least 1 protein shake daily. Discussed smoothies and having them on hand for days when she does not feel as well. She will increase her activity as she is able during the day.     Sandra and Jay stated understanding and were appreciative of the information. They had no questions at this time.     Follow up recommended as needed ~3-6 months.  Recommended sooner if wt loss continues.     Time spent: 31 min    Jacquie Aragon RD on 8/22/2024 at 1:40 PM

## 2024-09-13 DIAGNOSIS — Z96.642 S/P TOTAL LEFT HIP ARTHROPLASTY: Primary | ICD-10-CM

## 2024-09-16 ENCOUNTER — OFFICE VISIT (OUTPATIENT)
Dept: ORTHOPEDICS | Facility: OTHER | Age: 79
End: 2024-09-16
Attending: ORTHOPAEDIC SURGERY
Payer: COMMERCIAL

## 2024-09-16 ENCOUNTER — HOSPITAL ENCOUNTER (OUTPATIENT)
Dept: GENERAL RADIOLOGY | Facility: OTHER | Age: 79
Discharge: HOME | End: 2024-09-16
Attending: ORTHOPAEDIC SURGERY
Payer: COMMERCIAL

## 2024-09-16 DIAGNOSIS — Z96.642 S/P TOTAL LEFT HIP ARTHROPLASTY: Primary | ICD-10-CM

## 2024-09-16 DIAGNOSIS — Z96.642 S/P TOTAL LEFT HIP ARTHROPLASTY: ICD-10-CM

## 2024-09-16 PROCEDURE — G0463 HOSPITAL OUTPT CLINIC VISIT: HCPCS

## 2024-09-16 PROCEDURE — 99024 POSTOP FOLLOW-UP VISIT: CPT | Performed by: ORTHOPAEDIC SURGERY

## 2024-09-16 PROCEDURE — 73502 X-RAY EXAM HIP UNI 2-3 VIEWS: CPT

## 2024-09-16 PROCEDURE — 99495 TRANSJ CARE MGMT MOD F2F 14D: CPT | Performed by: ORTHOPAEDIC SURGERY

## 2024-09-16 NOTE — PROGRESS NOTES
Subjective:    78-year-old female who is now 8 weeks status post left total hip arthroplasty for displaced femoral neck fracture.  She is doing exceptionally well at this point.  She has 0 complaints with her hip whatsoever and states that after surgery she really had no pain    Objective:    On examination today is a 78-year-old female in no acute distress.  Very pleasant examination.  She walks with a walker but has no significant pain in her left hip whatsoever.    Assessment:    78-year-old female doing very well status post left total hip arthroplasty for fracture    Plan:    Currently her only issue is low back pain.  We have asked her if she could see a chiropractor for this.  Will see her back in an as-needed basis for this she has any further problems.  We probably need to follow-up with her at the 1 year jeana for it with x-rays of her left hip.

## 2024-09-17 ENCOUNTER — LAB (OUTPATIENT)
Dept: LAB | Facility: OTHER | Age: 79
End: 2024-09-17
Payer: COMMERCIAL

## 2024-09-17 DIAGNOSIS — N18.6 END STAGE RENAL DISEASE (H): ICD-10-CM

## 2024-09-17 DIAGNOSIS — N18.31 STAGE 3A CHRONIC KIDNEY DISEASE (H): ICD-10-CM

## 2024-09-17 DIAGNOSIS — N28.89 RIGHT RENAL MASS: ICD-10-CM

## 2024-09-17 DIAGNOSIS — M19.90 INFLAMMATORY ARTHRITIS: Primary | ICD-10-CM

## 2024-09-17 DIAGNOSIS — D84.9 IMMUNOCOMPROMISED (H): ICD-10-CM

## 2024-09-17 LAB
AST SERPL W P-5'-P-CCNC: 23 U/L (ref 0–45)
BASOPHILS # BLD AUTO: 0.1 10E3/UL (ref 0–0.2)
BASOPHILS NFR BLD AUTO: 0 %
CREAT SERPL-MCNC: 0.9 MG/DL (ref 0.51–0.95)
EGFRCR SERPLBLD CKD-EPI 2021: 66 ML/MIN/1.73M2
EOSINOPHIL # BLD AUTO: 0.3 10E3/UL (ref 0–0.7)
EOSINOPHIL NFR BLD AUTO: 2 %
ERYTHROCYTE [DISTWIDTH] IN BLOOD BY AUTOMATED COUNT: 16.2 % (ref 10–15)
ERYTHROCYTE [SEDIMENTATION RATE] IN BLOOD BY WESTERGREN METHOD: 17 MM/HR (ref 0–30)
HCT VFR BLD AUTO: 39.6 % (ref 35–47)
HGB BLD-MCNC: 12.3 G/DL (ref 11.7–15.7)
HOLD SPECIMEN: NORMAL
IMM GRANULOCYTES # BLD: 0.2 10E3/UL
IMM GRANULOCYTES NFR BLD: 1 %
LYMPHOCYTES # BLD AUTO: 3 10E3/UL (ref 0.8–5.3)
LYMPHOCYTES NFR BLD AUTO: 23 %
MCH RBC QN AUTO: 30.8 PG (ref 26.5–33)
MCHC RBC AUTO-ENTMCNC: 31.1 G/DL (ref 31.5–36.5)
MCV RBC AUTO: 99 FL (ref 78–100)
MONOCYTES # BLD AUTO: 0.6 10E3/UL (ref 0–1.3)
MONOCYTES NFR BLD AUTO: 5 %
NEUTROPHILS # BLD AUTO: 8.8 10E3/UL (ref 1.6–8.3)
NEUTROPHILS NFR BLD AUTO: 68 %
NRBC # BLD AUTO: 0 10E3/UL
NRBC BLD AUTO-RTO: 0 /100
PLATELET # BLD AUTO: 239 10E3/UL (ref 150–450)
RBC # BLD AUTO: 4 10E6/UL (ref 3.8–5.2)
WBC # BLD AUTO: 12.9 10E3/UL (ref 4–11)

## 2024-09-17 PROCEDURE — 87340 HEPATITIS B SURFACE AG IA: CPT | Mod: ZL

## 2024-09-17 PROCEDURE — 86141 C-REACTIVE PROTEIN HS: CPT | Mod: ZL

## 2024-09-17 PROCEDURE — 86803 HEPATITIS C AB TEST: CPT | Mod: ZL,GZ

## 2024-09-17 PROCEDURE — 86706 HEP B SURFACE ANTIBODY: CPT | Mod: ZL

## 2024-09-17 PROCEDURE — 84450 TRANSFERASE (AST) (SGOT): CPT | Mod: ZL

## 2024-09-17 PROCEDURE — 82565 ASSAY OF CREATININE: CPT | Mod: ZL

## 2024-09-17 PROCEDURE — 85652 RBC SED RATE AUTOMATED: CPT | Mod: ZL

## 2024-09-17 PROCEDURE — 36415 COLL VENOUS BLD VENIPUNCTURE: CPT | Mod: ZL

## 2024-09-17 PROCEDURE — 85041 AUTOMATED RBC COUNT: CPT | Mod: ZL

## 2024-09-18 LAB
CRP SERPL HS-MCNC: 1.43 MG/L
HBV SURFACE AB SERPL IA-ACNC: <3.5 M[IU]/ML
HBV SURFACE AB SERPL IA-ACNC: NONREACTIVE M[IU]/ML
HBV SURFACE AG SERPL QL IA: NONREACTIVE
HCV AB SERPL QL IA: NONREACTIVE

## 2024-09-19 ENCOUNTER — OFFICE VISIT (OUTPATIENT)
Dept: INTERNAL MEDICINE | Facility: OTHER | Age: 79
End: 2024-09-19
Attending: NURSE PRACTITIONER
Payer: COMMERCIAL

## 2024-09-19 VITALS
DIASTOLIC BLOOD PRESSURE: 86 MMHG | SYSTOLIC BLOOD PRESSURE: 138 MMHG | RESPIRATION RATE: 16 BRPM | TEMPERATURE: 97.8 F | WEIGHT: 177.4 LBS | HEIGHT: 67 IN | BODY MASS INDEX: 27.84 KG/M2 | HEART RATE: 79 BPM | OXYGEN SATURATION: 97 %

## 2024-09-19 DIAGNOSIS — I63.512 ACUTE ISCHEMIC LEFT MCA STROKE (H): Primary | ICD-10-CM

## 2024-09-19 DIAGNOSIS — S32.010D COMPRESSION FRACTURE OF L1 VERTEBRA WITH ROUTINE HEALING: ICD-10-CM

## 2024-09-19 DIAGNOSIS — M06.9 RHEUMATOID ARTHRITIS INVOLVING MULTIPLE SITES, UNSPECIFIED WHETHER RHEUMATOID FACTOR PRESENT (H): Chronic | ICD-10-CM

## 2024-09-19 PROCEDURE — G2211 COMPLEX E/M VISIT ADD ON: HCPCS | Performed by: NURSE PRACTITIONER

## 2024-09-19 PROCEDURE — G0463 HOSPITAL OUTPT CLINIC VISIT: HCPCS

## 2024-09-19 PROCEDURE — 99214 OFFICE O/P EST MOD 30 MIN: CPT | Mod: 24 | Performed by: NURSE PRACTITIONER

## 2024-09-19 ASSESSMENT — PAIN SCALES - GENERAL: PAINLEVEL: MILD PAIN (2)

## 2024-09-19 NOTE — PROGRESS NOTES
ICD-10-CM    1. Acute ischemic left MCA stroke (H)  I63.512       2. Compression fracture of L1 vertebra with routine healing  S32.010D       3. Rheumatoid arthritis involving multiple sites, unspecified whether rheumatoid factor present (H)  M06.9          Plan:  Continue to work with PT and OT.  I did spend some time today explaining Plaquenil dosing.  She seemed to understand.  Continue to use walker for ambulation.  Keep appointment with rheumatologist tomorrow.  Would benefit from biphosphonate therapy.  She will discuss this further with rheumatologist.  Follow-up in 1 month for Medicare wellness visit and to follow-up on osteoporosis and rheumatology appointment.    The longitudinal plan of care for the diagnosis(es)/condition(s) as documented were addressed during this visit. Due to the added complexity in care, I will continue to support Sandra in the subsequent management and with ongoing continuity of care.    Sylvie Flores is a 78 year old, presenting for the following health issues:  Follow Up (Compression fracture)      9/19/2024     8:30 AM   Additional Questions   Roomed by Claudia OSCAR     HPI   She is here today for follow-up on L1 compression fracture and CVA.  She has been working with PT and OT.  She tells me that her back pain has improved significantly and barely even visible now.  She has appointment with her rheumatologist tomorrow.  She is finding that right leg and right arm are improving from previous CVA.  She still has some numbness and tingling in the right hand but is no longer dropping objects.  No difficulty speaking or swallowing.  She does have some trouble with memory and computing.  Her  has been trying to get her to understand the dosing of her Plaquenil.  She has trouble understanding that if it is taken every other day she will alternate 2 pills with 1 pill.  He would like me to review this with her.  He also had blood work completed yesterday by rheumatologist in  "preparation for appointment tomorrow.  Will be discussing osteoporosis treatment with rheumatologist tomorrow.                Objective    /86 (BP Location: Right arm, Patient Position: Sitting, Cuff Size: Adult Regular)   Pulse 79   Temp 97.8  F (36.6  C) (Tympanic)   Resp 16   Ht 1.702 m (5' 7\")   Wt 80.5 kg (177 lb 6.4 oz)   LMP 01/01/1998 (Within Months)   SpO2 97%   BMI 27.78 kg/m    Body mass index is 27.78 kg/m .  Physical Exam   Pleasant female no acute distress.  Affect normal.  Alert and oriented x 4.  No pain with palpation to L1 region.  She is ambulating with the use of a walker.  Improved strength and coordination of right upper and lower extremity.    Labs in [facility] reviewed            Signed Electronically by: Swetha Maxwell NP    "

## 2024-09-19 NOTE — NURSING NOTE
"Chief Complaint   Patient presents with    Follow Up     Compression fracture       FOOD SECURITY SCREENING QUESTIONS  Hunger Vital Signs:  Within the past 12 months we worried whether our food would run out before we got money to buy more. Never  Within the past 12 months the food we bought just didn't last and we didn't have money to get more. Never  Claudia Rahman RN 9/19/2024 8:34 AM      Initial /86 (BP Location: Right arm, Patient Position: Sitting, Cuff Size: Adult Regular)   Pulse 79   Temp 97.8  F (36.6  C) (Tympanic)   Resp 16   Ht 1.702 m (5' 7\")   Wt 80.5 kg (177 lb 6.4 oz)   LMP 01/01/1998 (Within Months)   SpO2 97%   BMI 27.78 kg/m   Estimated body mass index is 27.78 kg/m  as calculated from the following:    Height as of this encounter: 1.702 m (5' 7\").    Weight as of this encounter: 80.5 kg (177 lb 6.4 oz).  Medication Reconciliation: complete    Claudia Rahman RN    "

## 2024-09-21 ENCOUNTER — HEALTH MAINTENANCE LETTER (OUTPATIENT)
Age: 79
End: 2024-09-21

## 2024-09-30 ENCOUNTER — TELEPHONE (OUTPATIENT)
Dept: INTERNAL MEDICINE | Facility: OTHER | Age: 79
End: 2024-09-30
Payer: COMMERCIAL

## 2024-09-30 DIAGNOSIS — M06.9 RHEUMATOID ARTHRITIS INVOLVING MULTIPLE SITES, UNSPECIFIED WHETHER RHEUMATOID FACTOR PRESENT (H): Primary | ICD-10-CM

## 2024-09-30 NOTE — TELEPHONE ENCOUNTER
Patient calling to request (per [facility]) a bone density test and also to check her Vit D level.    Cecy Castro on 9/30/2024 at 10:39 AM

## 2024-10-01 ENCOUNTER — MEDICAL CORRESPONDENCE (OUTPATIENT)
Dept: HEALTH INFORMATION MANAGEMENT | Facility: OTHER | Age: 79
End: 2024-10-01
Payer: COMMERCIAL

## 2024-10-01 NOTE — TELEPHONE ENCOUNTER
After verifying patient name and , patient given the information below. Patient has no further questions or concerns at this time.     Mer Schmitt LPN on 10/1/2024 at 10:38 AM   Ext. [phone]

## 2024-10-04 ENCOUNTER — MEDICAL CORRESPONDENCE (OUTPATIENT)
Dept: HEALTH INFORMATION MANAGEMENT | Facility: OTHER | Age: 79
End: 2024-10-04
Payer: COMMERCIAL

## 2024-10-14 DIAGNOSIS — I10 BENIGN ESSENTIAL HYPERTENSION: ICD-10-CM

## 2024-10-14 DIAGNOSIS — Z86.73 HISTORY OF ISCHEMIC LEFT MCA STROKE: ICD-10-CM

## 2024-10-14 DIAGNOSIS — I25.10 ASCVD (ARTERIOSCLEROTIC CARDIOVASCULAR DISEASE): Primary | ICD-10-CM

## 2024-10-14 DIAGNOSIS — Z95.5 HISTORY OF CORONARY ARTERY STENT PLACEMENT: ICD-10-CM

## 2024-10-14 DIAGNOSIS — Z98.890 HISTORY OF CARDIAC CATH: ICD-10-CM

## 2024-10-14 DIAGNOSIS — I25.10 ASCVD (ARTERIOSCLEROTIC CARDIOVASCULAR DISEASE): ICD-10-CM

## 2024-10-14 DIAGNOSIS — Z95.820 S/P ANGIOPLASTY WITH STENT: ICD-10-CM

## 2024-10-14 RX ORDER — ISOSORBIDE MONONITRATE 30 MG/1
30 TABLET, EXTENDED RELEASE ORAL DAILY
Qty: 90 TABLET | Refills: 3 | Status: SHIPPED | OUTPATIENT
Start: 2024-10-14

## 2024-10-14 NOTE — TELEPHONE ENCOUNTER
Requested Prescriptions   Pending Prescriptions Disp Refills    isosorbide mononitrate (IMDUR) 30 MG 24 hr tablet [Pharmacy Med Name: ISOSORB MONO TAB 30MG ER] 90 tablet 3     Sig: TAKE 1 TABLET DAILY       Nitrates Failed - 10/14/2024 12:02 PM        Failed - Always fail criteria - needs review     Review chart. Forward refill request to provider if patient has exceeded one bottle per 3 months.         Passed - Most recent blood pressure under 140/90 in past 12 months     BP Readings from Last 3 Encounters:   09/19/24 138/86   08/20/24 (!) 143/87   08/12/24 (!) 156/88   No data recorded        Passed - Pt is not on erectile dysfunction medications        Passed - Medication is active on med list        Passed - Recent (12 mo) or future (90 days) visit within the authorizing provider's specialty     The patient must have completed an in-person or virtual visit within the past 12 months or has a future visit scheduled within the next 90 days with the authorizing provider s specialty.  Urgent care and e-visits do not quality as an office visit for this protocol.        Passed - Medication indicated for associated diagnosis     Medication is associated with one or more of the following diagnoses:    Anal fissure   Angina pectoris   Cardiac syndrome X   Congestive heart failure   Hypertension   Myocardial infarction   Pulmonary edema   Pulmonary hypertension        Passed - Patient is age 18 or older     Last Written Prescription Date:  8/15/23  Last Fill Quantity: 90,   # refills: 3  Last Office Visit: 8/15/23  Future Office visit:    Next 5 appointments (look out 90 days)      Oct 17, 2024 9:15 AM  (Arrive by 9:00 AM)  Annual Wellness Visit with Swetha Maxwell NP  [facility] ([facility] ) [address]  [place] [id number] [phone]     Routing refill request to provider for review/approval because:  Blood pressure out of range    Overdue to be seen    Unable to complete prescription refill per RN Medication Refill Policy.   Bethany Villeda RN ....................  10/14/2024   4:01 PM

## 2024-10-17 ENCOUNTER — OFFICE VISIT (OUTPATIENT)
Dept: INTERNAL MEDICINE | Facility: OTHER | Age: 79
End: 2024-10-17
Attending: NURSE PRACTITIONER
Payer: COMMERCIAL

## 2024-10-17 VITALS
BODY MASS INDEX: 27.94 KG/M2 | TEMPERATURE: 97.8 F | HEART RATE: 80 BPM | DIASTOLIC BLOOD PRESSURE: 84 MMHG | RESPIRATION RATE: 16 BRPM | HEIGHT: 67 IN | WEIGHT: 178 LBS | SYSTOLIC BLOOD PRESSURE: 134 MMHG | OXYGEN SATURATION: 99 %

## 2024-10-17 DIAGNOSIS — I13.0 HYPERTENSIVE HEART AND CHRONIC KIDNEY DISEASE WITH HEART FAILURE AND STAGE 1 THROUGH STAGE 4 CHRONIC KIDNEY DISEASE, OR UNSPECIFIED CHRONIC KIDNEY DISEASE (H): ICD-10-CM

## 2024-10-17 DIAGNOSIS — M06.9 RHEUMATOID ARTHRITIS INVOLVING MULTIPLE SITES, UNSPECIFIED WHETHER RHEUMATOID FACTOR PRESENT (H): Chronic | ICD-10-CM

## 2024-10-17 DIAGNOSIS — I69.951 HEMIPLGA FOL UNSP CEREBVASC DISEASE AFF RIGHT DOMINANT SIDE (H): ICD-10-CM

## 2024-10-17 DIAGNOSIS — D84.9 IMMUNOCOMPROMISED (H): ICD-10-CM

## 2024-10-17 DIAGNOSIS — N18.31 STAGE 3A CHRONIC KIDNEY DISEASE (H): ICD-10-CM

## 2024-10-17 DIAGNOSIS — G62.9 PERIPHERAL POLYNEUROPATHY: ICD-10-CM

## 2024-10-17 DIAGNOSIS — N28.89 RIGHT RENAL MASS: ICD-10-CM

## 2024-10-17 DIAGNOSIS — Z86.73 HISTORY OF ISCHEMIC LEFT MCA STROKE: ICD-10-CM

## 2024-10-17 DIAGNOSIS — M81.0 AGE-RELATED OSTEOPOROSIS WITHOUT CURRENT PATHOLOGICAL FRACTURE: ICD-10-CM

## 2024-10-17 DIAGNOSIS — M10.9 GOUT, UNSPECIFIED CAUSE, UNSPECIFIED CHRONICITY, UNSPECIFIED SITE: ICD-10-CM

## 2024-10-17 DIAGNOSIS — Z79.01 LONG TERM (CURRENT) USE OF ANTICOAGULANTS: ICD-10-CM

## 2024-10-17 DIAGNOSIS — Z00.00 ENCOUNTER FOR MEDICARE ANNUAL WELLNESS EXAM: Primary | ICD-10-CM

## 2024-10-17 DIAGNOSIS — S32.010D COMPRESSION FRACTURE OF L1 VERTEBRA WITH ROUTINE HEALING: ICD-10-CM

## 2024-10-17 DIAGNOSIS — Z29.11 NEED FOR VACCINATION AGAINST RESPIRATORY SYNCYTIAL VIRUS: ICD-10-CM

## 2024-10-17 DIAGNOSIS — I25.10 ASCVD (ARTERIOSCLEROTIC CARDIOVASCULAR DISEASE): ICD-10-CM

## 2024-10-17 DIAGNOSIS — F32.0 CURRENT MILD EPISODE OF MAJOR DEPRESSIVE DISORDER WITHOUT PRIOR EPISODE (H): ICD-10-CM

## 2024-10-17 PROCEDURE — G0008 ADMIN INFLUENZA VIRUS VAC: HCPCS

## 2024-10-17 PROCEDURE — G0463 HOSPITAL OUTPT CLINIC VISIT: HCPCS | Mod: 25 | Performed by: NURSE PRACTITIONER

## 2024-10-17 PROCEDURE — 99214 OFFICE O/P EST MOD 30 MIN: CPT | Mod: 25 | Performed by: NURSE PRACTITIONER

## 2024-10-17 PROCEDURE — G0439 PPPS, SUBSEQ VISIT: HCPCS | Performed by: NURSE PRACTITIONER

## 2024-10-17 RX ORDER — DULOXETIN HYDROCHLORIDE 20 MG/1
20 CAPSULE, DELAYED RELEASE ORAL DAILY
Qty: 90 CAPSULE | Refills: 4 | Status: SHIPPED | OUTPATIENT
Start: 2024-10-17

## 2024-10-17 RX ORDER — ATORVASTATIN CALCIUM 80 MG/1
80 TABLET, FILM COATED ORAL DAILY
Qty: 90 TABLET | Refills: 4 | Status: SHIPPED | OUTPATIENT
Start: 2024-10-17

## 2024-10-17 RX ORDER — ATENOLOL 100 MG/1
100 TABLET ORAL DAILY
Qty: 90 TABLET | Refills: 4 | Status: SHIPPED | OUTPATIENT
Start: 2024-10-17

## 2024-10-17 RX ORDER — ALLOPURINOL 300 MG/1
1 TABLET ORAL DAILY
Qty: 90 TABLET | Refills: 4 | Status: SHIPPED | OUTPATIENT
Start: 2024-10-17

## 2024-10-17 RX ORDER — NITROGLYCERIN 0.4 MG/1
0.4 TABLET SUBLINGUAL EVERY 5 MIN PRN
Qty: 30 TABLET | Refills: 3 | Status: SHIPPED | OUTPATIENT
Start: 2024-10-17

## 2024-10-17 SDOH — HEALTH STABILITY: PHYSICAL HEALTH: ON AVERAGE, HOW MANY MINUTES DO YOU ENGAGE IN EXERCISE AT THIS LEVEL?: 20 MIN

## 2024-10-17 SDOH — HEALTH STABILITY: PHYSICAL HEALTH: ON AVERAGE, HOW MANY DAYS PER WEEK DO YOU ENGAGE IN MODERATE TO STRENUOUS EXERCISE (LIKE A BRISK WALK)?: 2 DAYS

## 2024-10-17 ASSESSMENT — PAIN SCALES - GENERAL: PAINLEVEL: NO PAIN (0)

## 2024-10-17 ASSESSMENT — SOCIAL DETERMINANTS OF HEALTH (SDOH): HOW OFTEN DO YOU GET TOGETHER WITH FRIENDS OR RELATIVES?: THREE TIMES A WEEK

## 2024-10-17 NOTE — PROGRESS NOTES
Preventive Care Visit  [facility] AND [facility]  Swetha Maxwell NP, Internal Medicine  Oct 17, 2024        ICD-10-CM    1. Encounter for Medicare annual wellness exam  Z00.00       2. Hypertensive 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 and stage 1 through stage 4 chronic kidney disease, or unspecified chronic kidney disease (H)  I13.0 atenolol (TENORMIN) 100 MG tablet     CANCELED: Albumin Random Urine Quantitative with Creat Ratio      3. ASCVD (arteriosclerotic cardiovascular disease)  I25.10 nitroGLYcerin (NITROSTAT) 0.4 MG sublingual tablet      4. History of ischemic left MCA stroke  Z86.73       5. 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 Compression fracture of L1 vertebra with routine healing  S32.010D DX Bone Density     DULoxetine (CYMBALTA) 20 MG capsule      7. Gout, unspecified cause, unspecified chronicity, unspecified site  M10.9 allopurinol (ZYLOPRIM) 300 MG tablet      8. Rheumatoid arthritis involving multiple sites, unspecified whether rheumatoid factor present (H)  M06.9       9. Immunocompromised (H)  D84.9       10. Right renal mass  N28.89       11. Stage 3a chronic kidney disease (H)  N18.31       12. Long term (current) use of anticoagulants  Z79.01       13. Need for vaccination against respiratory syncytial virus  Z29.11 RSV vaccine, bivalent, ABRYSVO, injection      14. Age-related osteoporosis without current pathological fracture  M81.0 DX Bone Density      15. Peripheral polyneuropathy  G62.9 DULoxetine (CYMBALTA) 20 MG capsule      16. Current mild episode of major depressive disorder without prior episode (H)  F32.0 DULoxetine (CYMBALTA) 20 MG capsule         Plan:  -Medicare wellness visit complete.  Follow-up annually.  -Blood pressure initially elevated but within normal range at recheck.  I have asked that she continue to follow her blood pressure.  Goal blood pressure would be 135/85 or better.  She will  either let this provider or cardiologist know if remains elevated.  -Continue to work with PT, OT and SLP.  -Bone density scan ordered.  -Mammogram already scheduled.  -Up-to-date on colon cancer screening.  -Continue to follow with urology for renal mass and stage III chronic kidney disease.  -Continue to follow with rheumatology.  -Up-to-date on all lab work.  Medication refills completed.  -Influenza vaccine provided.  She declined other vaccines.  RSV vaccine was sent to pharmacy in case she decides otherwise.  -Duloxetine 20 mg daily started for chronic back pain from compression fracture, osteoarthritis, peripheral neuropathy and mild depression.  Follow-up in 6-8 weeks, sooner with concerns.  Side effects of medication reviewed and discussed.    Sylvie Flores is a 78 year old, presenting for the following:  Wellness Visit        10/17/2024     9:05 AM   Additional Questions   Roomed by Claudia OSCAR       Health Care Directive  Patient has a Health Care Directive on file  Advance care planning document is on file and is current.    HPI  She is here today for Medicare wellness visit and chronic disease management.  She is slowly recovering from left MCA stroke.  She continues to work with PT, OT and SLP.  She feels that gait and hand coordination is improving.  She is also finding word finding difficulty is less than initially.  She still becomes quite frustrated when she cannot find the right word.  She feels that she is maybe a little depressed since her stroke.  She has been using a walker for ambulation.  She is followed by cardiology for hypertension and ASCVD.  Follows with urology for slow growing right renal mass and stage IIIa chronic kidney disease and follows with rheumatology for rheumatoid arthritis and monitoring of Plaquenil and methotrexate.  She recently had an L1 compression fracture.  She has decided she would like to proceed with getting bone density scan and starting a biphosphonate.  She  would like to discuss this further with her rheumatologist.  She also has known history of peripheral neuropathy and osteoarthritis.    Wellness Visit Notes:    -Mammo done 7/13/2023   (impression: Benign). Routine screening. Patient scheduled for 11/12/24    -DEXA done 2016      -Colon cancer screening done via colonoscopy on 01/09/2019    -Immunizations: Patient is due for the following: Covid, RSV, and Influenza Flu done in clinic today    -Derm: Does patient regularly see dermatologist? Patient has seen dermatology in the past    -Refills pended for requested medications.  -Labs pended.       10/17/2024   General Health   How would you rate your overall physical health? (!) FAIR   Feel stress (tense, anxious, or unable to sleep) Not at all            10/17/2024   Nutrition   Diet: Regular (no restrictions)            10/17/2024   Exercise   Days per week of moderate/strenous exercise 2 days   Average minutes spent exercising at this level 20 min      (!) EXERCISE CONCERN      10/17/2024   Social Factors   Frequency of gathering with friends or relatives Three times a week   Worry food won't last until get money to buy more No   Food not last or not have enough money for food? No   Do you have housing? (Housing is defined as stable permanent housing and does not include staying ouside in a car, in a tent, in an abandoned building, in an overnight shelter, or couch-surfing.) Yes   Are you worried about losing your housing? No   Lack of transportation? No   Unable to get utilities (heat,electricity)? No            10/17/2024   Fall Risk   Fallen 2 or more times in the past year? No   Trouble with walking or balance? Yes              10/17/2024   Activities of Daily Living- Home Safety   Needs help with the following daily activites None of the above   Safety concerns in the home None of the above            10/17/2024   Dental   Dentist two times every year? (!) NO            10/17/2024   Hearing Screening    Hearing concerns? None of the above            10/17/2024   Driving Risk Screening   Patient/family members have concerns about driving (!) DECLINE            10/17/2024   General Alertness/Fatigue Screening   Have you been more tired than usual lately? No            10/17/2024   Urinary Incontinence Screening   Bothered by leaking urine in past 6 months No            10/17/2024   TB Screening   Were you born outside of the [place]? No            Today's PHQ-2 Score:       10/17/2024     8:59 AM   PHQ-2 ( 1999 [employer])   Q1: Little interest or pleasure in doing things 0   Q2: Feeling down, depressed or hopeless 0   PHQ-2 Score 0   Q1: Little interest or pleasure in doing things Not at all   Q2: Feeling down, depressed or hopeless Not at all   PHQ-2 Score 0           10/17/2024   Substance Use   Alcohol more than 3/day or more than 7/wk No   Do you have a current opioid prescription? No   How severe/bad is pain from 1 to 10? 2/10   Do you use any other substances recreationally? No        Social History     Tobacco Use    Smoking status: Never     Passive exposure: Past    Smokeless tobacco: Never    Tobacco comments:     Passive exposure in childhood home.    Vaping Use    Vaping status: Never Used   Substance Use Topics    Alcohol use: Not Currently    Drug use: No           6/7/2022   LAST FHS-7 RESULTS   1st degree relative breast or ovarian cancer Yes    No   Any relative bilateral breast cancer No   Any male have breast cancer No   Any ONE woman have BOTH breast AND ovarian cancer No   Any woman with breast cancer before 50yrs Yes    No   2 or more relatives with breast AND/OR ovarian cancer No   2 or more relatives with breast AND/OR bowel cancer No       Multiple values from one day are sorted in reverse-chronological order        Mammogram Screening - After age 74- determine frequency with patient based on health status, life expectancy and patient goals    ASCVD Risk   The ASCVD Risk score (Justyn KERR,  et al., 2019) failed to calculate for the following reasons:    The patient has a prior MI or stroke diagnosis            Reviewed and updated as needed this visit by Provider                    Past Medical History:   Diagnosis Date    Atherosclerotic heart disease of native coronary artery without angina pectoris     -s/p ADÁN to the mid-LAD, mid-circumflex, mid-right coronary artery, proximal right coronary  artery, and PTCA of a marginal branch of the right coronary artery 04/30/2007. -s/p ADÁN to proximal left anterior descending 10/29/13.    Contusion of abdominal wall     2014    Essential (primary) hypertension     12/14/2006    Gout     No Comments Provided    Hyperlipidemia     4/4/2007    Localized swelling, mass, or lump of upper extremity     5/26/2017    Other specified counseling     10/28/2013,Patient has identified Health Care Agent(s): Yes Add Health Care Agents: Yes   Health Care Agent(s): Primary Health Care Agent: Jay Batista  Relationship:  Phone:   Secondary Health Care Agent:  Relationship: Daughter Phone:   Conservator:  Relationship:  Phone:   Guardian: Relationship:  Phone:    Patient has Advance Care Plan Documents (Health Care Directive, POLST): No, refe*    Polyosteoarthritis     No Comments Provided    Postmenopausal bleeding     No Comments Provided    Presence of coronary angioplasty implant and graft     2007/2013,unstable angina; severe prox LAD stenosis; s/p 7 stents    Primary osteoarthritis of left hand     5/30/2017    Rheumatoid arthritis (H)     follows at [facility] yearly     Past Surgical History:   Procedure Laterality Date    ARTHROPLASTY HIP ANTERIOR Left 7/22/2024    Procedure: ARTHROPLASTY, HIP, TOTAL, DIRECT ANTERIOR APPROACH;  Surgeon: Shashi Topete MD;  Location: [facility] OR    ARTHROPLASTY KNEE      1/13/2011    ARTHROSCOPY KNEE      1985/2002    BIOPSY BREAST      10/25/95,BRIAN RAMIREZ    CAROTID ARTERY ANGIOPLASTY Left 04/15/2024    [facility]      SECTION      , Section    COLONOSCOPY      05,Normal - Repeat in 10 years    COLONOSCOPY  2016    COLONOSCOPY N/A 2019    Procedure: COLONOSCOPY;  Surgeon: Evelin Thompson MD;  Location:  OR    COLONOSCOPY N/A 2019    Procedure: COMBINED COLONOSCOPY, SINGLE OR MULTIPLE BIOPSY/POLYPECTOMY BY BIOPSY;  Surgeon: Evelin Thompson MD;  Location: [facility] OR    ENDOSCOPIC RELEASE CARPAL TUNNEL Right 2023    Procedure: RELEASE, CARPAL TUNNEL, ENDOSCOPIC;  Surgeon: Danial Stanley MD;  Location:  OR    ESOPHAGOSCOPY, GASTROSCOPY, DUODENOSCOPY (EGD), COMBINED      2011,erosive gastritis;bx;consider MRI/A;Bravo    EXTRACTION(S) DENTAL      1970    HEART CATH, ANGIOPLASTY      10/29/2013,ADÁN to proximal left anterior descending    OTHER SURGICAL HISTORY      ,047973,OTHER    OTHER SURGICAL HISTORY      2014,VBV852,TOTAL SHOULDER ARTHROPLASTY,Right    OTHER SURGICAL HISTORY      2016,85205.0,[place] COLONOSCOPY REMOVE ELIZA POLYP LESN SNARE,repeat , precancerous polyps    ZZ STRESS LEXISCAN TEST  2018    With Dr. Irwin - normal     BP Readings from Last 3 Encounters:   10/17/24 134/84   24 138/86   24 (!) 143/87    Wt Readings from Last 3 Encounters:   10/17/24 80.7 kg (178 lb)   24 80.5 kg (177 lb 6.4 oz)   24 81.5 kg (179 lb 9.6 oz)                  Patient Active Problem List   Diagnosis    Allergic rhinitis    ASCVD (arteriosclerotic cardiovascular disease)    DJD (degenerative joint disease) of knee    Hx of gout    Mixed hyperlipidemia    Benign essential hypertension    Mass of finger    Osteopenia    Primary osteoarthritis of both hands    Rheumatoid arthritis (H)    Right shoulder pain    Rotator cuff tendonitis, right    S/P shoulder surgery    Shoulder impingement, right    Status post total shoulder replacement, right    Neuropathy of hand    Chronic kidney disease, stage 3 (H)    Seborrheic keratosis     Immunocompromised (H)    Right hand weakness    History of coronary artery stent placement in  and     History of cardiac cath    Hypertensive 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 and stage 1 through stage 4 chronic kidney disease, or unspecified chronic kidney disease (H)    TIA (transient ischemic attack)    Word finding difficulty    Acute ischemic left MCA stroke (H)    Left carotid stenosis    History of ischemic left MCA stroke    Cerebral microvascular disease    S/P angioplasty with stent    Parkinsonism associated with infarction of basal ganglia (H)    Basal ganglia infarction (H)    Non morbid obesity due to excess calories    Right renal mass    Left displaced femoral neck fracture (H)    Long term (current) use of anticoagulants    Facial pain    Activities involving dishwashing    Kitchen of non-institutional residence as the place of occurrence of the external cause    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 (H)    Accidental fall, initial encounter    S/P total left hip arthroplasty    Compression fracture of L1 vertebra with routine healing     Past Surgical History:   Procedure Laterality Date    ARTHROPLASTY HIP ANTERIOR Left 2024    Procedure: ARTHROPLASTY, HIP, TOTAL, DIRECT ANTERIOR APPROACH;  Surgeon: Shashi Topete MD;  Location:  OR    ARTHROPLASTY KNEE      2011    ARTHROSCOPY KNEE          BIOPSY BREAST      10/25/95,BRIAN RAMIREZ    CAROTID ARTERY ANGIOPLASTY Left 04/15/2024    [facility]     SECTION      1974, Section    COLONOSCOPY      05,Normal - Repeat in 10 years    COLONOSCOPY  2016    COLONOSCOPY N/A 2019    Procedure: COLONOSCOPY;  Surgeon: Evelin Thompson MD;  Location:  OR    COLONOSCOPY N/A 2019    Procedure: COMBINED COLONOSCOPY, SINGLE OR MULTIPLE BIOPSY/POLYPECTOMY BY BIOPSY;  Surgeon: Evelin Thompson MD;  Location:  OR    ENDOSCOPIC RELEASE  CARPAL TUNNEL Right 01/13/2023    Procedure: RELEASE, CARPAL TUNNEL, ENDOSCOPIC;  Surgeon: Danial Stanley MD;  Location: [facility] OR    ESOPHAGOSCOPY, GASTROSCOPY, DUODENOSCOPY (EGD), COMBINED      5/2/2011,erosive gastritis;bx;consider MRI/A;[facility]ravo    EXTRACTION(S) DENTAL      1970    HEART CATH, ANGIOPLASTY      10/29/2013,ADÁN to proximal left anterior descending    OTHER SURGICAL HISTORY      2007/2013,028128,OTHER    OTHER SURGICAL HISTORY      01/2014,[id number],TOTAL SHOULDER ARTHROPLASTY,Right    OTHER SURGICAL HISTORY      04/14/2016,16200.0,[place] COLONOSCOPY REMOVE ELIZA POLYP LESN SNARE,repeat 2019, precancerous polyps    ZZ STRESS LEXISCAN TEST  08/2018    With Dr. Irwin - normal       Social History     Tobacco Use    Smoking status: Never     Passive exposure: Past    Smokeless tobacco: Never    Tobacco comments:     Passive exposure in childhood home.    Substance Use Topics    Alcohol use: Not Currently     Family History   Problem Relation Age of Onset    Heart Disease Mother         Heart Disease    Hypertension Mother         Hypertension    Other - See Comments Mother         Stroke    Breast Cancer Mother 53        Cancer-breast    Arthritis Father         Arthritis    Cancer Father         Cancer    Heart Disease Father         Heart Disease    Hypertension Father         Hypertension    Atrial fibrillation Sister     Melanoma Brother     Heart Disease Brother         Heart Disease         Current Outpatient Medications   Medication Sig Dispense Refill    acetaminophen (TYLENOL) 325 MG tablet Take 3 tablets (975 mg) by mouth every 8 hours 250 tablet 2    allopurinol (ZYLOPRIM) 300 MG tablet Take 1 tablet (300 mg) by mouth daily. 90 tablet 4    aspirin (ASA) 81 MG chewable tablet Take 1 tablet (81 mg) by mouth daily 90 tablet 3    atenolol (TENORMIN) 100 MG tablet Take 1 tablet (100 mg) by mouth daily. 90 tablet 4    cetirizine (ZYRTEC) 5 MG tablet Take 1 tablet (5 mg) by mouth daily      DULoxetine  (CYMBALTA) 20 MG capsule Take 1 capsule (20 mg) by mouth daily. 90 capsule 4    folic acid (FOLVITE) 1 MG tablet TAKE 1 TABLET DAILY 90 tablet 3    hydroxychloroquine (PLAQUENIL) 200 MG tablet 400 mg daily alternating with 200 mg daily      isosorbide mononitrate (IMDUR) 30 MG 24 hr tablet TAKE 1 TABLET DAILY 90 tablet 3    methotrexate 2.5 MG tablet Take 20 mg by mouth once a week      nitroGLYcerin (NITROSTAT) 0.4 MG sublingual tablet Place 1 tablet (0.4 mg) under the tongue every 5 minutes as needed for chest pain. (For chest pain x 3 doses) 30 tablet 3    polyethylene glycol (MIRALAX) 17 GM/Dose powder Take 17 g by mouth daily as needed 510 g 1    RSV vaccine, bivalent, ABRYSVO, injection Inject 0.5 mLs into the muscle once for 1 dose. Pharmacist administered 0.5 mL 0    ticagrelor (BRILINTA) 60 MG tablet Take 1 tablet (60 mg) by mouth 2 times daily 180 tablet 4    atorvastatin (LIPITOR) 80 MG tablet TAKE 1 TABLET DAILY 90 tablet 4     Allergies   Allergen Reactions    Plavix [Clopidogrel]      Plavix resistant-[facility] at [facility]    [facility]    Food      Macadamia nuts make mouth itch    Losartan      Allergic rxn with hives and angioedema suspected to be from alternative manufacture of the effient or the losartan     Current providers sharing in care for this patient include:  Patient Care Team:  Swetha Maxwell NP as PCP - General (Internal Medicine)  Danial Stanley MD as MD (Orthopaedic Surgery)  Virgen Pitts MD as Assigned Cancer Care Provider  Mars Padilla DO as Assigned Heart and Vascular Provider  Tomy Mckenzie MD as Assigned Surgical Provider  Swetha Maxwell NP as Assigned PCP  Shashi Topete MD as Assigned Musculoskeletal Provider    The following health maintenance items are reviewed in Epic and correct as of today:  Health Maintenance   Topic Date Due    HF ACTION PLAN  Never done    RSV VACCINE (1 - 1-dose 75+ series) Never done    COVID-19 Vaccine (5 - 2024-25  "season) 09/01/2024    BMP  02/01/2025    LIPID  04/13/2025    ALT  07/20/2025    CBC  09/17/2025    HEMOGLOBIN  09/17/2025    MEDICARE ANNUAL WELLNESS VISIT  10/17/2025    FALL RISK ASSESSMENT  10/17/2025    GLUCOSE  08/01/2027    COLORECTAL CANCER SCREENING  01/09/2029    ADVANCE CARE PLANNING  07/25/2029    DTAP/TDAP/TD IMMUNIZATION (4 - Td or Tdap) 05/29/2030    DEXA  11/28/2031    TSH W/FREE T4 REFLEX  Completed    HEPATITIS C SCREENING  Completed    INFLUENZA VACCINE  Completed    Pneumococcal Vaccine: 65+ Years  Completed    URINALYSIS  Completed    ZOSTER IMMUNIZATION  Completed    HPV IMMUNIZATION  Aged Out    MENINGITIS IMMUNIZATION  Aged Out    RSV MONOCLONAL ANTIBODY  Aged Out    MICROALBUMIN  Discontinued    MAMMO SCREENING  Discontinued         Review of Systems  Constitutional, HEENT, cardiovascular, pulmonary, GI, , musculoskeletal, neuro, skin, endocrine and psych systems are negative, except as otherwise noted.     Objective    Exam  /84   Pulse 80   Temp 97.8  F (36.6  C) (Tympanic)   Resp 16   Ht 1.702 m (5' 7\")   Wt 80.7 kg (178 lb)   LMP 01/01/1998 (Within Months)   SpO2 99%   BMI 27.88 kg/m     Estimated body mass index is 27.88 kg/m  as calculated from the following:    Height as of this encounter: 1.702 m (5' 7\").    Weight as of this encounter: 80.7 kg (178 lb).    Physical Exam  GENERAL: alert and no distress  EYES: Eyes grossly normal to inspection, PERRL and conjunctivae and sclerae normal  HENT: ear canals and TM's normal, nose and mouth without ulcers or lesions  NECK: no adenopathy, no asymmetry, masses, or scars  RESP: lungs clear to auscultation - no rales, rhonchi or wheezes  CV: regular rate and rhythm, trace peripheral edema  ABDOMEN: soft, nontender, no hepatosplenomegaly, no masses and bowel sounds normal  MS: no gross musculoskeletal defects noted, no edema  SKIN: no suspicious lesions or rashes on exposed skin  NEURO: Normal strength and tone, mentation intact " and speech normal, no word finding difficulty noted at this visit.  PSYCH: mentation appears normal, affect normal/bright.   present for appointment        10/17/2024   Mini Cog   Clock Draw Score 0 Abnormal   3 Item Recall 2 objects recalled   Mini Cog Total Score 2                 Signed Electronically by: Swetha Maxwell NP

## 2024-10-17 NOTE — TELEPHONE ENCOUNTER
[facility] the [facility]      Requested Prescriptions   Pending Prescriptions Disp Refills    atorvastatin (LIPITOR) 80 MG tablet [Pharmacy Med Name: ATORVASTATIN TAB 80MG] 90 tablet 0     Sig: TAKE 1 TABLET DAILY   Last Prescription Date:   6/20/24  Last Fill Qty/Refills:         90, R-0    ASCVD (arteriosclerotic cardiovascular disease) [I25.10]  - Primary     Annual exam today.    Hodan Nguyen RN .............. 10/17/2024  9:44 AM

## 2024-10-17 NOTE — PATIENT INSTRUCTIONS
Patient Education   Preventive Care Advice   This is general advice given by our system to help you stay healthy. However, your care team may have specific advice just for you. Please talk to your care team about your preventive care needs.  Nutrition  Eat 5 or more servings of fruits and vegetables each day.  Try wheat bread, brown rice and whole grain pasta (instead of white bread, rice, and pasta).  Get enough calcium and vitamin D. Check the label on foods and aim for 100% of the RDA (recommended daily allowance).  Lifestyle  Exercise at least 150 minutes each week  (30 minutes a day, 5 days a week).  Do muscle strengthening activities 2 days a week. These help control your weight and prevent disease.  No smoking.  Wear sunscreen to prevent skin cancer.  Have a dental exam and cleaning every 6 months.  Yearly exams  See your health care team every year to talk about:  Any changes in your health.  Any medicines your care team has prescribed.  Preventive care, family planning, and ways to prevent chronic diseases.  Shots (vaccines)   HPV shots (up to age 26), if you've never had them before.  Hepatitis B shots (up to age 59), if you've never had them before.  COVID-19 shot: Get this shot when it's due.  Flu shot: Get a flu shot every year.  Tetanus shot: Get a tetanus shot every 10 years.  Pneumococcal, hepatitis A, and RSV shots: Ask your care team if you need these based on your risk.  Shingles shot (for age 50 and up)  General health tests  Diabetes screening:  Starting at age 35, Get screened for diabetes at least every 3 years.  If you are younger than age 35, ask your care team if you should be screened for diabetes.  Cholesterol test: At age 39, start having a cholesterol test every 5 years, or more often if advised.  Bone density scan (DEXA): At age 50, ask your care team if you should have this scan for osteoporosis (brittle bones).  Hepatitis C: Get tested at least once in your life.  STIs (sexually  transmitted infections)  Before age 24: Ask your care team if you should be screened for STIs.  After age 24: Get screened for STIs if you're at risk. You are at risk for STIs (including HIV) if:  You are sexually active with more than one person.  You don't use condoms every time.  You or a partner was diagnosed with a sexually transmitted infection.  If you are at risk for HIV, ask about PrEP medicine to prevent HIV.  Get tested for HIV at least once in your life, whether you are at risk for HIV or not.  Cancer screening tests  Cervical cancer screening: If you have a cervix, begin getting regular cervical cancer screening tests starting at age 21.  Breast cancer scan (mammogram): If you've ever had breasts, begin having regular mammograms starting at age 40. This is a scan to check for breast cancer.  Colon cancer screening: It is important to start screening for colon cancer at age 45.  Have a colonoscopy test every 10 years (or more often if you're at risk) Or, ask your provider about stool tests like a FIT test every year or Cologuard test every 3 years.  To learn more about your testing options, visit:   .  For help making a decision, visit:   https://bit.ly/tr80972.  Prostate cancer screening test: If you have a prostate, ask your care team if a prostate cancer screening test (PSA) at age 55 is right for you.  Lung cancer screening: If you are a current or former smoker ages 50 to 80, ask your care team if ongoing lung cancer screenings are right for you.  For informational purposes only. Not to replace the advice of your health care provider. Copyright   2023 [employer]. All rights reserved. Clinically reviewed by the [facility] Transitions Program. [employer] [id number] - REV 01/24.

## 2024-10-17 NOTE — NURSING NOTE
"Chief Complaint   Patient presents with    Wellness Visit       FOOD SECURITY SCREENING QUESTIONS  Hunger Vital Signs:  Within the past 12 months we worried whether our food would run out before we got money to buy more. Never  Within the past 12 months the food we bought just didn't last and we didn't have money to get more. Never  Claudia Rahman RN 10/17/2024 9:08 AM      Initial BP (!) 152/90 (BP Location: Right arm, Patient Position: Sitting, Cuff Size: Adult Regular)   Pulse 80   Temp 97.8  F (36.6  C) (Tympanic)   Resp 16   Ht 1.702 m (5' 7\")   Wt 80.7 kg (178 lb)   LMP 01/01/1998 (Within Months)   SpO2 99%   BMI 27.88 kg/m   Estimated body mass index is 27.88 kg/m  as calculated from the following:    Height as of this encounter: 1.702 m (5' 7\").    Weight as of this encounter: 80.7 kg (178 lb).  Medication Reconciliation: complete    Claudia Rahman RN    "

## 2024-10-18 ENCOUNTER — TELEPHONE (OUTPATIENT)
Dept: INTERNAL MEDICINE | Facility: OTHER | Age: 79
End: 2024-10-18
Payer: COMMERCIAL

## 2024-10-18 NOTE — TELEPHONE ENCOUNTER
RKV-patient is looking for an ortho brace for right leg from [facility]     Patient is aware RKV is not in clinic 10.18.24    Please call and advise    Thank You    Donna Cazares on 10/18/2024 at 11:39 AM

## 2024-10-21 NOTE — TELEPHONE ENCOUNTER
Called and spoke with the patient. She states [facility] needs more information.     Claudia Rahman RN on 10/21/2024 at 9:54 AM

## 2024-10-21 NOTE — TELEPHONE ENCOUNTER
Called and spoke with [facility]. They need Swetha DENNIS to sign the orders and diagnosis code. They are going to Fax over new forms.   Claudia Rahman RN on 10/21/2024 at 10:01 AM

## 2024-10-24 ENCOUNTER — NURSE TRIAGE (OUTPATIENT)
Dept: INTERNAL MEDICINE | Facility: OTHER | Age: 79
End: 2024-10-24
Payer: COMMERCIAL

## 2024-10-24 NOTE — TELEPHONE ENCOUNTER
The patient was given cymbalta and she states now she has diarrhea.  Wondering if this is what is causing that.  Please advise.

## 2024-10-24 NOTE — TELEPHONE ENCOUNTER
Home care advice discussed per protocol. Patient will call if diarrhea persists after 7 days to reassess if her symptoms are potentially a side effect of Cymbalta. Patient also advised to call if symptoms suddenly worsen or new symptoms develop. Felicia Workman RN on 10/24/2024 at 4:03 PM      Reason for Disposition   MILD-MODERATE diarrhea (e.g., 1-6 times / day more than normal)    Additional Information   Negative: Shock suspected (e.g., cold/pale/clammy skin, too weak to stand, low BP, rapid pulse)   Negative: Difficult to awaken or acting confused (e.g., disoriented, slurred speech)   Negative: Sounds like a life-threatening emergency to the triager   Negative: Vomiting also present and worse than the diarrhea   Negative: Blood in stool and without diarrhea   Negative: SEVERE abdominal pain (e.g., excruciating) and present > 1 hour   Negative: SEVERE abdominal pain and age > 60 years   Negative: Bloody, black, or tarry bowel movements  (Exception: Chronic-unchanged black-grey bowel movements and is taking iron pills or Pepto-Bismol.)   Negative: SEVERE diarrhea (e.g., 7 or more times / day more than normal) and age > 60 years   Negative: Constant abdominal pain lasting > 2 hours   Negative: Drinking very little and dehydration suspected (e.g., no urine > 12 hours, very dry mouth, very lightheaded)   Negative: Patient sounds very sick or weak to the triager   Negative: SEVERE diarrhea (e.g., 7 or more times / day more than normal) and present > 24 hours (1 day)   Negative: MODERATE diarrhea (e.g., 4-6 times / day more than normal) and present > 48 hours (2 days)   Negative: MODERATE diarrhea (e.g., 4-6 times / day more than normal) and age > 70 years   Negative: Abdominal pain (Exception: Pain clears completely with each passage of diarrhea stool.)   Negative: Fever > 101 F (38.3 C)   Negative: Blood in the stool  (Exception: Only on toilet paper. Reason: Diarrhea can cause rectal irritation with blood on  "wiping.)   Negative: Mucus or pus in stool has been present > 2 days and diarrhea is more than mild   Negative: Weak immune system (e.g., HIV positive, cancer chemo, splenectomy, organ transplant, chronic steroids)   Negative: Travel to a foreign country in past month   Negative: Recent antibiotic therapy (i.e., within last 2 months) and diarrhea present > 3 days since antibiotic was stopped   Negative: Recent hospitalization and diarrhea present > 3 days   Negative: Tube feedings (e.g., nasogastric, g-tube, j-tube)   Negative: MILD diarrhea (e.g., 1-3 or more stools than normal in past 24 hours) diarrhea without known cause and present > 7 days   Negative: Patient wants to be seen   Negative: Diarrhea is a chronic symptom (recurrent or ongoing AND lasting > 4 weeks)   Negative: SEVERE diarrhea (e.g., 7 or more times / day more than normal)    Answer Assessment - Initial Assessment Questions  1. DIARRHEA SEVERITY: \"How bad is the diarrhea?\" \"How many more stools have you had in the past 24 hours than normal?\"     - NO DIARRHEA (SCALE 0)    - MILD (SCALE 1-3): Few loose or mushy BMs; increase of 1-3 stools over normal daily number of stools; mild increase in ostomy output.    -  MODERATE (SCALE 4-7): Increase of 4-6 stools daily over normal; moderate increase in ostomy output.    -  SEVERE (SCALE 8-10; OR \"WORST POSSIBLE\"): Increase of 7 or more stools daily over normal; moderate increase in ostomy output; incontinence.      Between Monday and Tuesday she passed 3 or 4 diarrhea stools and one today.     2. ONSET: \"When did the diarrhea begin?\"       Monday     3. BM CONSISTENCY: \"How loose or watery is the diarrhea?\"       Runny     4. VOMITING: \"Are you also vomiting?\" If Yes, ask: \"How many times in the past 24 hours?\"       Denies     5. ABDOMEN PAIN: \"Are you having any abdomen pain?\" If Yes, ask: \"What does it feel like?\" (e.g., crampy, dull, intermittent, constant)       \"I don't feel good most of the time " "(since starting the medication).\"     6. ABDOMEN PAIN SEVERITY: If present, ask: \"How bad is the pain?\"  (e.g., Scale 1-10; mild, moderate, or severe)    - MILD (1-3): doesn't interfere with normal activities, abdomen soft and not tender to touch     - MODERATE (4-7): interferes with normal activities or awakens from sleep, abdomen tender to touch     - SEVERE (8-10): excruciating pain, doubled over, unable to do any normal activities        N/a     7. ORAL INTAKE: If vomiting, \"Have you been able to drink liquids?\" \"How much liquids have you had in the past 24 hours?\"      N/a     8. HYDRATION: \"Any signs of dehydration?\" (e.g., dry mouth [not just dry lips], too weak to stand, dizziness, new weight loss) \"When did you last urinate?\"      Not well hydrated     9. EXPOSURE: \"Have you traveled to a foreign country recently?\" \"Have you been exposed to anyone with diarrhea?\" \"Could you have eaten any food that was spoiled?\"      Denies     10. ANTIBIOTIC USE: \"Are you taking antibiotics now or have you taken antibiotics in the past 2 months?\"        Denies     11. OTHER SYMPTOMS: \"Do you have any other symptoms?\" (e.g., fever, blood in stool)        Denies     12. PREGNANCY: \"Is there any chance you are pregnant?\" \"When was your last menstrual period?\"        No    Protocols used: Diarrhea-A-OH    "

## 2024-10-25 ENCOUNTER — MEDICAL CORRESPONDENCE (OUTPATIENT)
Dept: HEALTH INFORMATION MANAGEMENT | Facility: OTHER | Age: 79
End: 2024-10-25
Payer: COMMERCIAL

## 2024-11-12 ENCOUNTER — HOSPITAL ENCOUNTER (OUTPATIENT)
Dept: MAMMOGRAPHY | Facility: OTHER | Age: 79
Discharge: HOME | End: 2024-11-12
Attending: NURSE PRACTITIONER | Admitting: NURSE PRACTITIONER
Payer: COMMERCIAL

## 2024-11-12 DIAGNOSIS — Z12.31 VISIT FOR SCREENING MAMMOGRAM: ICD-10-CM

## 2024-11-12 PROCEDURE — 77063 BREAST TOMOSYNTHESIS BI: CPT

## 2024-11-19 ENCOUNTER — TELEPHONE (OUTPATIENT)
Dept: INTERNAL MEDICINE | Facility: OTHER | Age: 79
End: 2024-11-19
Payer: COMMERCIAL

## 2024-11-19 NOTE — TELEPHONE ENCOUNTER
Called and spoke with the patient. She is okay with appointment date and time.   Claudia Rahman RN on 11/19/2024 at 2:27 PM

## 2024-11-19 NOTE — TELEPHONE ENCOUNTER
Tana, physical therapist with [facility], stated insurance requires the patient to have a face to face visit for a right AFO and a new order sent to [facility] in [place] that is signed off on by an MD. Tana is hoping the patient can been seen on 11/26/24.    Okay to leave detailed message.      Ela Andre on 11/19/2024 at 11:31 AM

## 2024-11-26 ENCOUNTER — OFFICE VISIT (OUTPATIENT)
Dept: INTERNAL MEDICINE | Facility: OTHER | Age: 79
End: 2024-11-26
Attending: NURSE PRACTITIONER
Payer: COMMERCIAL

## 2024-11-26 ENCOUNTER — HOSPITAL ENCOUNTER (OUTPATIENT)
Dept: BONE DENSITY | Facility: OTHER | Age: 79
Discharge: HOME | End: 2024-11-26
Attending: NURSE PRACTITIONER
Payer: COMMERCIAL

## 2024-11-26 VITALS
WEIGHT: 176.4 LBS | HEIGHT: 67 IN | TEMPERATURE: 97.8 F | DIASTOLIC BLOOD PRESSURE: 84 MMHG | HEART RATE: 98 BPM | BODY MASS INDEX: 27.69 KG/M2 | RESPIRATION RATE: 16 BRPM | OXYGEN SATURATION: 97 % | SYSTOLIC BLOOD PRESSURE: 136 MMHG

## 2024-11-26 DIAGNOSIS — S32.010D COMPRESSION FRACTURE OF L1 VERTEBRA WITH ROUTINE HEALING: ICD-10-CM

## 2024-11-26 DIAGNOSIS — M81.0 AGE-RELATED OSTEOPOROSIS WITHOUT CURRENT PATHOLOGICAL FRACTURE: ICD-10-CM

## 2024-11-26 DIAGNOSIS — I69.951 HEMIPLGA FOL UNSP CEREBVASC DISEASE AFF RIGHT DOMINANT SIDE (H): ICD-10-CM

## 2024-11-26 DIAGNOSIS — M21.371 RIGHT FOOT DROP: Primary | ICD-10-CM
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
## 2024-11-26 PROCEDURE — 77081 DXA BONE DENSITY APPENDICULR: CPT

## 2024-11-26 PROCEDURE — G0463 HOSPITAL OUTPT CLINIC VISIT: HCPCS | Mod: 25

## 2024-11-26 PROCEDURE — 77080 DXA BONE DENSITY AXIAL: CPT

## 2024-11-26 RX ORDER — ALBUTEROL SULFATE 90 UG/1
2 INHALANT RESPIRATORY (INHALATION)
COMMUNITY
Start: 2024-04-24

## 2024-11-26 ASSESSMENT — PATIENT HEALTH QUESTIONNAIRE - PHQ9
10. IF YOU CHECKED OFF ANY PROBLEMS, HOW DIFFICULT HAVE THESE PROBLEMS MADE IT FOR YOU TO DO YOUR WORK, TAKE CARE OF THINGS AT HOME, OR GET ALONG WITH OTHER PEOPLE: NOT DIFFICULT AT ALL
SUM OF ALL RESPONSES TO PHQ QUESTIONS 1-9: 0
SUM OF ALL RESPONSES TO PHQ QUESTIONS 1-9: 0

## 2024-11-26 ASSESSMENT — PAIN SCALES - GENERAL: PAINLEVEL_OUTOF10: MODERATE PAIN (4)

## 2024-11-26 NOTE — PROGRESS NOTES
"    ICD-10-CM    1. Right foot drop  M21.371 Orthotics, Mastectomy and Custom Compression Orders      2. 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  -Continue to use wheeled walker for ambulation.    The longitudinal plan of care for the diagnosis(es)/condition(s) as documented were addressed during this visit. Due to the added complexity in care, I will continue to support Sandra in the subsequent management and with ongoing continuity of care.    Subjective   Sandra is a 78 year old, presenting for the following health issues:  Clinic Care Coordination - Face To Face        11/26/2024    10:41 AM   Additional Questions   Roomed by Claudia OSCAR     Patient here for a F2F visit. Has had hx of Cva with right hemiparesis. Has right foot drop that is limiting to mobility. [place]od benefit for an AFO. Continues to use wheeled walker for ambulation due to multifactorial balance issues.    History of Present Illness       Reason for visit:  Face to Face                      Objective    /84   Pulse 98   Temp 97.8  F (36.6  C) (Tympanic)   Resp 16   Ht 1.702 m (5' 7\")   Wt 80 kg (176 lb 6.4 oz)   LMP 01/01/1998 (Within Months)   SpO2 97%   BMI 27.63 kg/m    Body mass index is 27.63 kg/m .  Physical Exam   Pleasant female no acute distress.  Affect normal.  Alert and oriented.  Ambulates with the use of a walker.  Right foot 4-5 strength, left foot 5-5 strength.  Mild foot drop noted right foot.            Signed Electronically by: Swetha Maxwell NP    "

## 2024-12-04 ENCOUNTER — OFFICE VISIT (OUTPATIENT)
Dept: INTERNAL MEDICINE | Facility: OTHER | Age: 79
End: 2024-12-04
Attending: NURSE PRACTITIONER
Payer: COMMERCIAL

## 2024-12-04 VITALS
BODY MASS INDEX: 27.78 KG/M2 | OXYGEN SATURATION: 98 % | WEIGHT: 177 LBS | HEART RATE: 70 BPM | TEMPERATURE: 98 F | SYSTOLIC BLOOD PRESSURE: 162 MMHG | RESPIRATION RATE: 18 BRPM | HEIGHT: 67 IN | DIASTOLIC BLOOD PRESSURE: 98 MMHG

## 2024-12-04 DIAGNOSIS — I69.951 HEMIPLGA FOL UNSP CEREBVASC DISEASE AFF RIGHT DOMINANT SIDE (H): ICD-10-CM

## 2024-12-04 DIAGNOSIS — G62.9 PERIPHERAL POLYNEUROPATHY: Primary | ICD-10-CM

## 2024-12-04 DIAGNOSIS — M81.0 AGE-RELATED OSTEOPOROSIS WITHOUT CURRENT PATHOLOGICAL FRACTURE: ICD-10-CM

## 2024-12-04 DIAGNOSIS — I13.0 HYPERTENSIVE HEART AND CHRONIC KIDNEY DISEASE WITH HEART FAILURE AND STAGE 1 THROUGH STAGE 4 CHRONIC KIDNEY DISEASE, OR UNSPECIFIED CHRONIC KIDNEY DISEASE (H): ICD-10-CM

## 2024-12-04 PROCEDURE — G0463 HOSPITAL OUTPT CLINIC VISIT: HCPCS

## 2024-12-04 RX ORDER — ALENDRONATE SODIUM 70 MG/1
70 TABLET ORAL
Qty: 14 TABLET | Refills: 4 | Status: SHIPPED | OUTPATIENT
Start: 2024-12-04

## 2024-12-04 ASSESSMENT — PAIN SCALES - GENERAL: PAINLEVEL_OUTOF10: MILD PAIN (2)

## 2024-12-04 NOTE — PROGRESS NOTES
ICD-10-CM    1. Peripheral polyneuropathy  G62.9       2. Hypertensive 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 and stage 1 through stage 4 chronic kidney disease, or unspecified chronic kidney disease (H)  I13.0       3. 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 Age-related osteoporosis without current pathological fracture  M81.0 alendronate (FOSAMAX) 70 MG tablet         Plan:  -Peripheral neuropathy symptoms greatly improved.  Continue duloxetine 20 mg daily.  -Blood pressure usually well-controlled but elevated today in clinic.  She is ksenia monitor blood pressure daily for the next week and she will update via Trud[employer] unless blood pressures greater than 180/100 then will let us know sooner.  Explained to patient that uncontrolled hypertension increases risk of CVA and she expresses understanding.  -Handicap parking permit paperwork completed.  Patient has history of CVA with right hemiparesis.  Follow-up annually.  I recommend she continue to work with PT, OT and SLP.  Her  will be receiving home care therapy after his surgery.  She will let me know the name of this home health organization and I can place a referral for them to provide care to her as well.  -Start Fosamax 70 mg once weekly.  Side effects of medication reviewed and discussed.  Consider repeating bone density scan in 2-3 years.    The longitudinal plan of care for the diagnosis(es)/condition(s) as documented were addressed during this visit. Due to the added complexity in care, I will continue to support Sandra in the subsequent management and with ongoing continuity of care.    Subjective   Sandra is a 78 year old, presenting for the following health issues:  neuropathy and Back Pain      12/4/2024     9:43 AM   Additional Questions   Roomed by Carito KEARNEY RN   Accompanied by Cheryl, Daughter; Jay,      She is here today to follow-up on peripheral neuropathy, CVA and bone density  "scan results.  She reports peripheral neuropathy symptoms in feet and hand are greatly improved.  She is pleasantly surprised and very pleased with the improvement.  She states she is no longer getting sharp shooting pains in her feet and the numbness is also improved in her hands.  She does not believe that she needs to have higher dose of medication.  She does not need refills of this medication.  She would like to get handicap parking permit.  She had CVA with right hemiparesis recently.  She is not driving at this time and uses a walker for ambulation.  She is still working with PT, OT and SLP however she is going to be moving to the [facility] for a couple of months due to her  having back surgery and will not be able to continue with therapy.  Also had bone density scan recently.  Has history of thoracic compression fracture and hip fracture in the past year.  She has not been on biphosphonate's in the past.  Her rheumatologist had recommended Fosamax.             Objective    BP (!) 162/98 (BP Location: Right arm, Patient Position: Sitting, Cuff Size: Adult Regular)   Pulse 70   Temp 98  F (36.7  C) (Temporal)   Resp 18   Ht 1.702 m (5' 7\")   Wt 80.3 kg (177 lb)   LMP 01/01/1998 (Within Months)   SpO2 98%   BMI 27.72 kg/m    Body mass index is 27.72 kg/m .  Physical Exam   Pleasant female accompanied by her daughter.  No acute distress.  Affect normal.  Alert and oriented x 4.  Lung fields clear to auscultation.  Cardiovascular regular rate and rhythm.            Signed Electronically by: Swetha Maxwell NP    "

## 2025-01-08 ENCOUNTER — TELEPHONE (OUTPATIENT)
Dept: INTERNAL MEDICINE | Facility: OTHER | Age: 80
End: 2025-01-08
Payer: COMMERCIAL

## 2025-01-08 DIAGNOSIS — I69.951 HEMIPLGA FOL UNSP CEREBVASC DISEASE AFF RIGHT DOMINANT SIDE (H): Primary | ICD-10-CM

## 2025-01-08 NOTE — TELEPHONE ENCOUNTER
Staying in the [facility], inquiring if she can get orders for PT for down there    Gunner Hooper on 1/8/2025 at 2:46 PM

## 2025-01-08 NOTE — TELEPHONE ENCOUNTER
Called and spoke with the patient and her . They are currently staying down is the [facility] due to her husbands back surgery. Neither of them are able to drive so she is requesting home care for some physical therapy. They Would like to use [facility]. ([facility]).  Referral and location teed up.   Claudia Rahman RN on 1/8/2025 at 3:45 PM

## 2025-01-23 ENCOUNTER — HOSPITAL ENCOUNTER (OUTPATIENT)
Dept: ULTRASOUND IMAGING | Facility: HOSPITAL | Age: 80
End: 2025-01-23
Attending: UROLOGY
Payer: COMMERCIAL

## 2025-01-23 ENCOUNTER — HOSPITAL ENCOUNTER (OUTPATIENT)
Dept: RADIOLOGY | Facility: HOSPITAL | Age: 80
End: 2025-01-23
Attending: UROLOGY
Payer: COMMERCIAL

## 2025-01-23 DIAGNOSIS — N28.89 RIGHT RENAL MASS: ICD-10-CM

## 2025-01-23 PROCEDURE — 76770 US EXAM ABDO BACK WALL COMP: CPT

## 2025-01-23 PROCEDURE — 71046 X-RAY EXAM CHEST 2 VIEWS: CPT

## 2025-01-23 NOTE — RESULT ENCOUNTER NOTE
Sandra, the ultrasound shows no real significant change in the area on your right kidney when you compare it to this last July.  Overall it is stable.  We will discuss this at your upcoming appointment.  Thanks Dr. Pinto

## 2025-01-23 NOTE — RESULT ENCOUNTER NOTE
Suma,    Can you peak at this Cxray and let me know if you are concerned about the scarring of her left lung base?  She has never smoked according to her.  Not sure if she needs to be worked up with a CT of the chest or such?  If so can you let me know and help with this workup.    Thanks, Kali

## 2025-01-28 ENCOUNTER — TELEPHONE (OUTPATIENT)
Dept: INTERNAL MEDICINE | Facility: OTHER | Age: 80
End: 2025-01-28
Payer: COMMERCIAL

## 2025-01-28 DIAGNOSIS — R93.89 ABNORMAL CXR: Primary | ICD-10-CM

## 2025-01-28 NOTE — TELEPHONE ENCOUNTER
Dr. Pinto did a chest x-ray which showed possible scarring at the left lung base.  This was not seen on previous imaging.  I think it is best if we proceed with getting a chest CT to look at this area further.  Please call let patient know that I ordered chest CT

## 2025-01-29 NOTE — TELEPHONE ENCOUNTER
After verifying last name and  patient notified of the below information.   Claudia Rahman RN ............ 2025 10:15 AM

## 2025-02-03 ENCOUNTER — OFFICE VISIT (OUTPATIENT)
Dept: UROLOGY | Facility: OTHER | Age: 80
End: 2025-02-03
Attending: UROLOGY
Payer: COMMERCIAL

## 2025-02-03 VITALS
BODY MASS INDEX: 26.56 KG/M2 | WEIGHT: 169.6 LBS | OXYGEN SATURATION: 99 % | HEART RATE: 65 BPM | SYSTOLIC BLOOD PRESSURE: 148 MMHG | DIASTOLIC BLOOD PRESSURE: 84 MMHG

## 2025-02-03 DIAGNOSIS — N28.89 RIGHT RENAL MASS: Primary | ICD-10-CM

## 2025-02-03 PROCEDURE — G0463 HOSPITAL OUTPT CLINIC VISIT: HCPCS

## 2025-02-03 ASSESSMENT — PAIN SCALES - GENERAL: PAINLEVEL_OUTOF10: MILD PAIN (1)

## 2025-02-03 NOTE — NURSING NOTE
"Chief Complaint   Patient presents with    Follow Up     6 month follow up for renal mass      Patient presents to the clinic today for a 6 month follow up for renal mass   Initial LMP 01/01/1998 (Within Months)  Estimated body mass index is 27.72 kg/m  as calculated from the following:    Height as of 12/4/24: 1.702 m (5' 7\").    Weight as of 12/4/24: 80.3 kg (177 lb).  Meds Reconciled: complete      Sharda Rivera LPN,LPN on 2/3/2025 at 1:23 PM  Ext. [phone]        Sharda Rivera LPN  "

## 2025-02-03 NOTE — PROGRESS NOTES
Chief Complaint: Follow Up (6 month follow up for renal mass )      HPI: Ms. Margaret Batista is a 79 year old year old female presenting today February 3, 2025 in follow up of a 22 mm right upper pole renal mass with enhancement concerning for renal cell carcinoma.    History of monoclonal B-cell type lymphocytosis with hyperlipidemia, CAD, hypertension and previous TIA with microvascular disease and CKD stage III.    Seen in the past by Dr. Ryan DO who recommended active surveillance.  It is grown maybe 3 mm since its initial diagnosis.    Here today for follow-up.  Doing well.  Denies any bleeding.  Chest x-ray showed some platelike atelectasis or scarring for which her primary has ordered a CT of the chest to be done this week.      Renal ultrasound shows no change in size stone measuring approximately 2.2 cm.  Past Medical History:   Diagnosis Date    Atherosclerotic heart disease of native coronary artery without angina pectoris     -s/p ADÁN to the mid-LAD, mid-circumflex, mid-right coronary artery, proximal right coronary  artery, and PTCA of a marginal branch of the right coronary artery 04/30/2007. -s/p ADÁN to proximal left anterior descending 10/29/13.    Contusion of abdominal wall     2014    Essential (primary) hypertension     12/14/2006    Gout     No Comments Provided    Hyperlipidemia     4/4/2007    Localized swelling, mass, or lump of upper extremity     5/26/2017    Other specified counseling     10/28/2013,Patient has identified Health Care Agent(s): Yes Add Health Care Agents: Yes   Health Care Agent(s): Primary Health Care Agent: Jay Batista  Relationship:  Phone:   Secondary Health Care Agent:  Relationship: Daughter Phone:   Conservator:  Relationship:  Phone:   Guardian: Relationship:  Phone:    Patient has Advance Care Plan Documents (Health Care Directive, POLST): No, refe*    Polyosteoarthritis     No Comments Provided    Postmenopausal bleeding     No Comments Provided     Presence of coronary angioplasty implant and graft     ,unstable angina; severe prox LAD stenosis; s/p 7 stents    Primary osteoarthritis of left hand     2017    Rheumatoid arthritis (H)     follows at [place] yearly       Past Surgical History:   Procedure Laterality Date    ARTHROPLASTY HIP ANTERIOR Left 2024    Procedure: ARTHROPLASTY, HIP, TOTAL, DIRECT ANTERIOR APPROACH;  Surgeon: Shashi Topete MD;  Location:  OR    ARTHROPLASTY KNEE      2011    ARTHROSCOPY KNEE          BIOPSY BREAST      10/25/95,[facility]RI[facility]  DR. YOGI RAMIREZ    CAROTID ARTERY ANGIOPLASTY Left 04/15/2024    [facility]     SECTION      , Section    COLONOSCOPY      05,Normal - Repeat in 10 years    COLONOSCOPY  2016    COLONOSCOPY N/A 2019    Procedure: COLONOSCOPY;  Surgeon: Evelin Thompson MD;  Location:  OR    COLONOSCOPY N/A 2019    Procedure: COMBINED COLONOSCOPY, SINGLE OR MULTIPLE BIOPSY/POLYPECTOMY BY BIOPSY;  Surgeon: Evelin Thompson MD;  Location:  OR    ENDOSCOPIC RELEASE CARPAL TUNNEL Right 2023    Procedure: RELEASE, CARPAL TUNNEL, ENDOSCOPIC;  Surgeon: Danial Stanley MD;  Location:  OR    ESOPHAGOSCOPY, GASTROSCOPY, DUODENOSCOPY (EGD), COMBINED      2011,erosive gastritis;bx;consider MRI/A;[facility]    EXTRACTION(S) DENTAL      1970    HEART CATH, ANGIOPLASTY      10/29/2013,ADÁN to proximal left anterior descending    OTHER SURGICAL HISTORY      ,[id number],OTHER    OTHER SURGICAL HISTORY      2014,[id number],TOTAL SHOULDER ARTHROPLASTY,Right    OTHER SURGICAL HISTORY      2016,59394.0,SC COLONOSCOPY REMOVE ELIZA POLYP LESN SNARE,repeat , precancerous polyps    ZZ STRESS LEXISCAN TEST  2018    With Dr. Irwin - normal       Current Outpatient Medications   Medication Sig Dispense Refill    acetaminophen (TYLENOL) 325 MG tablet Take 3 tablets (975 mg) by mouth every 8 hours 250 tablet 2    albuterol (PROAIR  HFA/PROVENTIL HFA/VENTOLIN HFA) 108 (90 Base) MCG/ACT inhaler Inhale 2 puffs into the lungs.      alendronate (FOSAMAX) 70 MG tablet Take 1 tablet (70 mg) by mouth every 7 days. 14 tablet 4    allopurinol (ZYLOPRIM) 300 MG tablet Take 1 tablet (300 mg) by mouth daily. 90 tablet 4    aspirin (ASA) 81 MG chewable tablet Take 1 tablet (81 mg) by mouth daily 90 tablet 3    atenolol (TENORMIN) 100 MG tablet Take 1 tablet (100 mg) by mouth daily. 90 tablet 4    atorvastatin (LIPITOR) 80 MG tablet TAKE 1 TABLET DAILY 90 tablet 4    cetirizine (ZYRTEC) 5 MG tablet Take 1 tablet (5 mg) by mouth daily      DULoxetine (CYMBALTA) 20 MG capsule Take 1 capsule (20 mg) by mouth daily. 90 capsule 4    folic acid (FOLVITE) 1 MG tablet TAKE 1 TABLET DAILY 90 tablet 3    hydroxychloroquine (PLAQUENIL) 200 MG tablet 400 mg daily alternating with 200 mg daily      isosorbide mononitrate (IMDUR) 30 MG 24 hr tablet TAKE 1 TABLET DAILY 90 tablet 3    methotrexate 2.5 MG tablet Take 20 mg by mouth once a week      nitroGLYcerin (NITROSTAT) 0.4 MG sublingual tablet Place 1 tablet (0.4 mg) under the tongue every 5 minutes as needed for chest pain. (For chest pain x 3 doses) 30 tablet 3    polyethylene glycol (MIRALAX) 17 GM/Dose powder Take 17 g by mouth daily as needed 510 g 1       ALLERGIES: Plavix [clopidogrel], Enalapril, Food, and Losartan     GENERAL PHYSICAL EXAM:   Vitals: BP (!) 148/84 (BP Location: Right arm, Patient Position: Sitting, Cuff Size: Adult Regular)   Pulse 65   Wt 76.9 kg (169 lb 9.6 oz)   LMP 01/01/1998 (Within Months)   SpO2 99%   BMI 26.56 kg/m    Body mass index is 26.56 kg/m .    GENERAL: Well groomed, well developed, well nourished female in NAD.  ENT:  ENT exam normal  CV:  Warm Extremities   RESPIRATORY:  Normal respiratory effort   GI:  Soft, ND, NT  MS: Moving all four  NEURO: Alert and oriented x 3.  PSYCH: Normal mood and affect, pleasant and agreeable during interview and exam.      RADIOLOGY: The  following tests were reviewed: US RENAL COMPLETE NON-VASCULAR  LOCATION: [facility]  DATE: 1/23/2025     INDICATION: Right renal mass.  COMPARISON: CT 6/8/2023.  TECHNIQUE: Routine Bilateral Renal and Bladder Ultrasound.     FINDINGS:     RIGHT KIDNEY: 9.3 cm. Exophytic solid mass arising from the upper pole of the kidney measuring 2.2 x 2.2 x 1.8 cm. The margins of this lesion are ill-defined; therefore, the size might be exaggerated based on the ultrasound measurements. The mass had   measured approximately 1.8 x 1.9 x 1.4 cm on the CT of 6/8/2023. More recent imaging is not available for review. Parapelvic cysts are again noted.     LEFT KIDNEY: 8.8 cm. No hydronephrosis. Parapelvic cysts noted.      BLADDER: Normal.                                                                      IMPRESSION:  1.  Solid exophytic mass upper pole right kidney measuring up to 2.2 cm which has increased slightly since the study from 2023 when it measured 1.9 cm. Based on outside report, this had measured 2.2 cm on 7/15/2024 which is similar in size to the current   ultrasound measurement. This likely reflects a renal cell carcinoma.  2.  Bilateral parapelvic renal cysts.    LABS: The last test results for Ms. Margaret Batista were reviewed.   No results found for this or any previous visit (from the past 24 hours).    BMP -   Recent Labs   Lab Test 09/17/24  1455 08/01/24  1100 07/24/24  0618 07/23/24  0636 07/22/24  1125 07/22/24  0553 07/21/24  0606 07/20/24  0627   NA  --  139  --  138  --   --   --  138   POTASSIUM  --  4.5  --  3.9  --  4.3   < > 3.9   CHLORIDE  --  102  --  103  --   --   --  106   CO2  --  26  --  24  --   --   --  25   BUN  --  12.9  --  17.9  --   --   --  11.5   CR 0.90 1.00*  --  0.82  --   --   --  0.86   GLC  --  101* 98 164*   < >  --   --  101*   MONI  --  9.8  --  9.2  --   --   --  8.9   MAG  --   --   --  2.1  --   --   --   --     < > = values in this interval not  displayed.       CBC -   Recent Labs   Lab Test 09/17/24  1455 08/20/24  1135 08/01/24  1100   WBC 12.9* 15.7* 17.4*   HGB 12.3 12.4 11.2*    229 388       ASSESSMENT:   Right renal mass    PLAN:   Lesion overall stable.  Explained that these can represent cancer given the solid appearance and the blood flow.    The only way to know would be to biopsy at.    We discussed watchful waiting given its small size and lack of change over the last year.  We also discussed ablation as well as nephrectomy.    Given her age the risk of surgery is quite high.    I think we could safely keep an eye on this.  I recommend a CT scan in 6 months with contrast.    She and her  voiced an understanding and agree with that.    20 minutes spent on the date of this encounter doing chart review, history and exam, documentation and further activities as noted above.        Will Pinto MD  [facility]

## 2025-02-10 ENCOUNTER — TELEPHONE (OUTPATIENT)
Dept: INTERNAL MEDICINE | Facility: OTHER | Age: 80
End: 2025-02-10
Payer: COMMERCIAL

## 2025-02-10 NOTE — TELEPHONE ENCOUNTER
Patient returned call and a phone appointment was made with Suma Maxwell for 2.18.2025.  Mansi Schwartz on 2/10/2025 at 3:33 PM

## 2025-02-10 NOTE — TELEPHONE ENCOUNTER
Please set patient up for a phone/telephone visit. There is a lot of information to discuss that requires a visit.

## 2025-02-10 NOTE — TELEPHONE ENCOUNTER
"Called and spoke with the patient. Per Dr. Rivas   \"The scan has a few findings that are not serious but need to be addressed.  She will need an appt.\"    Patient was notified of the above information. [place]. She was wondering if Swetha DENNIS could call about results. Informed patient that I would reach out and see what her options are. Otherwise patient will schedule an appointment for April when they plan on returning.     Claudia Rahman RN on 2/10/2025 at 11:39 AM    "

## 2025-02-17 ENCOUNTER — HOSPITAL ENCOUNTER (INPATIENT)
Facility: HOSPITAL | Age: 80
DRG: 481 | End: 2025-02-17
Attending: EMERGENCY MEDICINE | Admitting: INTERNAL MEDICINE
Payer: COMMERCIAL

## 2025-02-17 ENCOUNTER — APPOINTMENT (OUTPATIENT)
Dept: RADIOLOGY | Facility: HOSPITAL | Age: 80
DRG: 481 | End: 2025-02-17
Attending: EMERGENCY MEDICINE
Payer: COMMERCIAL

## 2025-02-17 ENCOUNTER — APPOINTMENT (OUTPATIENT)
Dept: CT IMAGING | Facility: HOSPITAL | Age: 80
DRG: 481 | End: 2025-02-17
Attending: EMERGENCY MEDICINE
Payer: COMMERCIAL

## 2025-02-17 DIAGNOSIS — S42.201A CLOSED FRACTURE OF PROXIMAL END OF RIGHT HUMERUS, UNSPECIFIED FRACTURE MORPHOLOGY, INITIAL ENCOUNTER: ICD-10-CM

## 2025-02-17 DIAGNOSIS — S72.001A HIP FRACTURE, RIGHT, CLOSED, INITIAL ENCOUNTER (H): Primary | ICD-10-CM

## 2025-02-17 DIAGNOSIS — I25.10 ASCVD (ARTERIOSCLEROTIC CARDIOVASCULAR DISEASE): ICD-10-CM

## 2025-02-17 DIAGNOSIS — S01.01XA SCALP LACERATION, INITIAL ENCOUNTER: ICD-10-CM

## 2025-02-17 PROBLEM — M79.621 PAIN OF RIGHT UPPER ARM: Status: ACTIVE | Noted: 2025-02-17

## 2025-02-17 LAB
BASOPHILS # BLD AUTO: 0 10E3/UL (ref 0–0.2)
BASOPHILS NFR BLD AUTO: 0 %
EOSINOPHIL # BLD AUTO: 0 10E3/UL (ref 0–0.7)
EOSINOPHIL NFR BLD AUTO: 0 %
ERYTHROCYTE [DISTWIDTH] IN BLOOD BY AUTOMATED COUNT: 16.1 % (ref 10–15)
HCT VFR BLD AUTO: 35.9 % (ref 35–47)
HGB BLD-MCNC: 11.2 G/DL (ref 11.7–15.7)
HOLD SPECIMEN: NORMAL
IMM GRANULOCYTES # BLD: 0.1 10E3/UL
IMM GRANULOCYTES NFR BLD: 1 %
LYMPHOCYTES # BLD AUTO: 3.1 10E3/UL (ref 0.8–5.3)
LYMPHOCYTES NFR BLD AUTO: 16 %
MCH RBC QN AUTO: 30.6 PG (ref 26.5–33)
MCHC RBC AUTO-ENTMCNC: 31.2 G/DL (ref 31.5–36.5)
MCV RBC AUTO: 98 FL (ref 78–100)
MONOCYTES # BLD AUTO: 0.7 10E3/UL (ref 0–1.3)
MONOCYTES NFR BLD AUTO: 4 %
NEUTROPHILS # BLD AUTO: 15.5 10E3/UL (ref 1.6–8.3)
NEUTROPHILS NFR BLD AUTO: 80 %
NRBC # BLD AUTO: 0 10E3/UL
NRBC BLD AUTO-RTO: 0 /100
PLATELET # BLD AUTO: 145 10E3/UL (ref 150–450)
RBC # BLD AUTO: 3.66 10E6/UL (ref 3.8–5.2)
WBC # BLD AUTO: 19.4 10E3/UL (ref 4–11)

## 2025-02-17 PROCEDURE — 73060 X-RAY EXAM OF HUMERUS: CPT | Mod: RT

## 2025-02-17 PROCEDURE — 83735 ASSAY OF MAGNESIUM: CPT | Performed by: INTERNAL MEDICINE

## 2025-02-17 PROCEDURE — 96374 THER/PROPH/DIAG INJ IV PUSH: CPT

## 2025-02-17 PROCEDURE — 250N000011 HC RX IP 250 OP 636: Performed by: EMERGENCY MEDICINE

## 2025-02-17 PROCEDURE — 70450 CT HEAD/BRAIN W/O DYE: CPT

## 2025-02-17 PROCEDURE — 99285 EMERGENCY DEPT VISIT HI MDM: CPT | Mod: 25

## 2025-02-17 PROCEDURE — 73502 X-RAY EXAM HIP UNI 2-3 VIEWS: CPT

## 2025-02-17 PROCEDURE — 73552 X-RAY EXAM OF FEMUR 2/>: CPT | Mod: RT

## 2025-02-17 PROCEDURE — 120N000001 HC R&B MED SURG/OB

## 2025-02-17 PROCEDURE — 85004 AUTOMATED DIFF WBC COUNT: CPT | Performed by: INTERNAL MEDICINE

## 2025-02-17 PROCEDURE — 73700 CT LOWER EXTREMITY W/O DYE: CPT | Mod: RT

## 2025-02-17 PROCEDURE — 12001 RPR S/N/AX/GEN/TRNK 2.5CM/<: CPT

## 2025-02-17 PROCEDURE — 36415 COLL VENOUS BLD VENIPUNCTURE: CPT | Performed by: INTERNAL MEDICINE

## 2025-02-17 PROCEDURE — 99223 1ST HOSP IP/OBS HIGH 75: CPT | Performed by: INTERNAL MEDICINE

## 2025-02-17 PROCEDURE — 73030 X-RAY EXAM OF SHOULDER: CPT | Mod: RT

## 2025-02-17 PROCEDURE — 36415 COLL VENOUS BLD VENIPUNCTURE: CPT | Performed by: EMERGENCY MEDICINE

## 2025-02-17 RX ORDER — ONDANSETRON 2 MG/ML
4 INJECTION INTRAMUSCULAR; INTRAVENOUS EVERY 30 MIN PRN
Status: COMPLETED | OUTPATIENT
Start: 2025-02-17 | End: 2025-02-18

## 2025-02-17 RX ORDER — ACETAMINOPHEN 325 MG/1
650 TABLET ORAL EVERY 4 HOURS PRN
Status: DISCONTINUED | OUTPATIENT
Start: 2025-02-17 | End: 2025-02-24 | Stop reason: HOSPADM

## 2025-02-17 RX ORDER — ONDANSETRON 2 MG/ML
4 INJECTION INTRAMUSCULAR; INTRAVENOUS EVERY 6 HOURS PRN
Status: DISCONTINUED | OUTPATIENT
Start: 2025-02-17 | End: 2025-02-24 | Stop reason: HOSPADM

## 2025-02-17 RX ORDER — POLYETHYLENE GLYCOL 3350 17 G/17G
17 POWDER, FOR SOLUTION ORAL DAILY
Status: DISCONTINUED | OUTPATIENT
Start: 2025-02-18 | End: 2025-02-24 | Stop reason: HOSPADM

## 2025-02-17 RX ORDER — HYDROMORPHONE HYDROCHLORIDE 1 MG/ML
0.5 INJECTION, SOLUTION INTRAMUSCULAR; INTRAVENOUS; SUBCUTANEOUS
Status: COMPLETED | OUTPATIENT
Start: 2025-02-17 | End: 2025-02-17

## 2025-02-17 RX ORDER — HYDROXYCHLOROQUINE SULFATE 200 MG/1
400 TABLET, FILM COATED ORAL EVERY OTHER DAY
COMMUNITY

## 2025-02-17 RX ORDER — LIDOCAINE 40 MG/G
CREAM TOPICAL
Status: DISCONTINUED | OUTPATIENT
Start: 2025-02-17 | End: 2025-02-24 | Stop reason: HOSPADM

## 2025-02-17 RX ORDER — ONDANSETRON 4 MG/1
4 TABLET, ORALLY DISINTEGRATING ORAL EVERY 6 HOURS PRN
Status: DISCONTINUED | OUTPATIENT
Start: 2025-02-17 | End: 2025-02-24 | Stop reason: HOSPADM

## 2025-02-17 RX ORDER — HYDROXYCHLOROQUINE SULFATE 200 MG/1
200 TABLET, FILM COATED ORAL DAILY
COMMUNITY

## 2025-02-17 RX ORDER — HYDROMORPHONE HYDROCHLORIDE 1 MG/ML
0.3 INJECTION, SOLUTION INTRAMUSCULAR; INTRAVENOUS; SUBCUTANEOUS
Status: DISCONTINUED | OUTPATIENT
Start: 2025-02-17 | End: 2025-02-19

## 2025-02-17 RX ORDER — ACETAMINOPHEN 500 MG
1000 TABLET ORAL 2 TIMES DAILY PRN
Status: ON HOLD | COMMUNITY
End: 2025-02-21

## 2025-02-17 RX ORDER — ACETAMINOPHEN 650 MG/1
650 SUPPOSITORY RECTAL EVERY 4 HOURS PRN
Status: DISCONTINUED | OUTPATIENT
Start: 2025-02-17 | End: 2025-02-24 | Stop reason: HOSPADM

## 2025-02-17 RX ADMIN — HYDROMORPHONE HYDROCHLORIDE 0.5 MG: 1 INJECTION, SOLUTION INTRAMUSCULAR; INTRAVENOUS; SUBCUTANEOUS at 20:51

## 2025-02-17 RX ADMIN — ONDANSETRON 4 MG: 2 INJECTION, SOLUTION INTRAMUSCULAR; INTRAVENOUS at 19:59

## 2025-02-17 RX ADMIN — HYDROMORPHONE HYDROCHLORIDE 0.5 MG: 1 INJECTION, SOLUTION INTRAMUSCULAR; INTRAVENOUS; SUBCUTANEOUS at 17:22

## 2025-02-17 RX ADMIN — ONDANSETRON 4 MG: 2 INJECTION, SOLUTION INTRAMUSCULAR; INTRAVENOUS at 21:02

## 2025-02-17 ASSESSMENT — ACTIVITIES OF DAILY LIVING (ADL)
ADLS_ACUITY_SCORE: 61

## 2025-02-17 ASSESSMENT — COLUMBIA-SUICIDE SEVERITY RATING SCALE - C-SSRS
6. HAVE YOU EVER DONE ANYTHING, STARTED TO DO ANYTHING, OR PREPARED TO DO ANYTHING TO END YOUR LIFE?: NO
1. IN THE PAST MONTH, HAVE YOU WISHED YOU WERE DEAD OR WISHED YOU COULD GO TO SLEEP AND NOT WAKE UP?: NO
2. HAVE YOU ACTUALLY HAD ANY THOUGHTS OF KILLING YOURSELF IN THE PAST MONTH?: NO

## 2025-02-17 ASSESSMENT — ENCOUNTER SYMPTOMS
NECK PAIN: 0
HEADACHES: 0

## 2025-02-17 NOTE — ED TRIAGE NOTES
Transported via [facility] post fall for evaluation of right shoulder and right hip pain. Patient was attempting to put her shoes on and lost her balance. No LOC and patient is not on blood thinners. Patient hit her head on a cabinet and has hemtoma on right forehead and laceration to back right side of head. Right leg shortened and rotated. EMS gave 0.25 mg dilaudid.      Triage Assessment (Adult)       Row Name 02/17/25 3350          Triage Assessment    Airway WDL WDL        Respiratory WDL    Respiratory WDL WDL        Skin Circulation/Temperature WDL    Skin Circulation/Temperature WDL X  hematoma to  right forehead and laceration on back side head        Cardiac WDL    Cardiac WDL WDL        Peripheral/Neurovascular WDL    Peripheral Neurovascular WDL WDL        Cognitive/Neuro/Behavioral WDL    Cognitive/Neuro/Behavioral WDL WDL

## 2025-02-17 NOTE — ED NOTES
Bed: [facility]-10  Expected date:   Expected time:   Means of arrival:   Comments:  [place]- fall, on thinners, confused, right hip pain

## 2025-02-17 NOTE — ED PROVIDER NOTES
Emergency Department Encounter      NAME: Margaret Batista  AGE: 79 year old female  YOB: 1945  MRN: [id number]  EVALUATION DATE & TIME: 2025  4:25 PM    PCP: Swetha Maxwell    ED PROVIDER: Robert Gonzalez M.D.      Chief Complaint   Patient presents with    Fall    Hip Pain    Shoulder Pain         FINAL IMPRESSION:  1. Hip fracture, right, closed, initial encounter (H)    2. Scalp laceration, initial encounter    3. Closed fracture of proximal end of right humerus, unspecified fracture morphology, initial encounter        MEDICAL DECISION MAKIN:50 PM I met with the patient, obtained history, performed an initial exam, and discussed options and plan for diagnostics and treatment here in the ED.   7:51 PM I discussed the patient with the hospitalist, Dr. Jimenez.  8:06 PM I discussed the patient with [facility], Dr. Milian.  He requested a x-ray of the entire femur and a CT scan of the right hip.     This patient is a 79-year-old female who presents after a fall.  She was unable to stand after the fall and was brought in by paramedics.  She complained of right hip pain and her right leg was externally rotated and shortened.  She also had pain over the entire right humerus and she had a hematoma over the right forehead and crusted blood in the posterior scalp.  A head CT was done which did not show any intracranial injury or skull fracture.  A right humerus x-ray showed a possible proximal humerus fracture.  A right hip x-ray showed a proximal femur fracture.  I spoke with Dr. Milian, [facility] regarding the patient.  They wanted a right femur x-ray as well as a CT scan of the right hip which was ordered.  I also ordered a right shoulder x-ray which confirmed the proximal humerus fracture.  I independently reviewed and interpreted the x-rays.  The patient was fitted for sling with the right arm.  After the blood was cleaned from the scalp there was a 1 cm scalp  laceration which was anesthetized using 1% lidocaine with epi.  I closed the laceration with 3 staples.  Admitted the patient to the hospitalist service for repair of the right hip fracture tomorrow.  Additionally was found that she had some lucencies in the femur.  I reviewed her medical records and it looks like she had a ultrasound of the kidney done in January which showed a mass that had been increasing in size and was thought to be representative of a renal cell carcinoma by the radiologist.  This may explain the lucencies.    Pertinent Labs & Imaging studies reviewed. (See chart for details)      Medical Decision Making     History:  Supplemental history from: Documented in chart, if applicable  External Record(s) reviewed: Documented in chart, if applicable.     Work Up:  Chart documentation includes differential considered and any EKGs or imaging independently interpreted by provider, where specified.  In additional to work up documented, I considered the following work up: Documented in chart, if applicable.     External consultation:  Discussion of management with another provider: Documented in chart, if applicable     Complicating factors:  Care impacted by chronic illness: monoclonal B-cell type lymphocytosis with hyperlipidemia, CAD, hypertension, stroke, CKD stage III, rheumatoid arthritis, shoulder surgery, neuropathy, DJD, and parkinsonism   Care affected by social determinants of health: Access to Medical Care     Disposition considerations: Admit      MEDICATIONS GIVEN IN THE EMERGENCY:  Medications   ondansetron (ZOFRAN) injection 4 mg (4 mg Intravenous $Given 2/17/25 2102)   lidocaine 1 % 0.1-1 mL (has no administration in time range)   lidocaine (LMX4) cream (has no administration in time range)   sodium chloride (PF) 0.9% PF flush 3 mL (has no administration in time range)   sodium chloride (PF) 0.9% PF flush 3 mL (has no administration in time range)   polyethylene glycol (MIRALAX) Packet 17  g (has no administration in time range)   ondansetron (ZOFRAN ODT) ODT tab 4 mg (has no administration in time range)     Or   ondansetron (ZOFRAN) injection 4 mg (has no administration in time range)   acetaminophen (TYLENOL) tablet 650 mg (has no administration in time range)     Or   acetaminophen (TYLENOL) Suppository 650 mg (has no administration in time range)   HYDROmorphone (PF) (DILAUDID) injection 0.3 mg (has no administration in time range)   HYDROmorphone (PF) (DILAUDID) injection 0.5 mg (0.5 mg Intravenous $Given 2/17/25 1722)   HYDROmorphone (PF) (DILAUDID) injection 0.5 mg (0.5 mg Intravenous $Given 2/17/25 2051)       NEW PRESCRIPTIONS STARTED AT TODAY'S ER VISIT:  Current Discharge Medication List             =================================================================    HPI    Patient information was obtained from: Patient    Use of : N/A         Margaret Batista is a 79 year old female with a past medical history of monoclonal B-cell type lymphocytosis with hyperlipidemia, CAD, hypertension, stroke, CKD stage III, rheumatoid arthritis, shoulder surgery, neuropathy, DJD, and parkinsonism who presents to the emergency department via EMS for evaluation of a fall. The patient sustained a fall and fell forwards, hitting her head on the corner of a cabinet. She has lacerations and cuts to her head with crusted blood. Reports right shoulder, arm, and elbow pain. States that she did not attempt to stand or walk after her fall. Her  found her and called EMS immediately. Denies loss of consciousness, headache, or neck pain. The patient has a history of right shoulder surgery, neuropathy in bilateral feet worse on the right, and a stroke in April 2024. Since her stroke, she has had decreased sensation in her right sided extremities.       REVIEW OF SYSTEMS   Review of Systems   Musculoskeletal:  Negative for neck pain.        Positive for right shoulder, arm, and elbow pain.    Neurological:  Negative for headaches.        Negative for loss of consciousness   All other systems reviewed and are negative.       PAST MEDICAL HISTORY:  Past Medical History:   Diagnosis Date    Atherosclerotic heart disease of native coronary artery without angina pectoris     -s/p ADÁN to the mid-LAD, mid-circumflex, mid-right coronary artery, proximal right coronary  artery, and PTCA of a marginal branch of the right coronary artery 2007. -s/p ADÁN to proximal left anterior descending 10/29/13.    Contusion of abdominal wall         Essential (primary) hypertension     2006    Gout     No Comments Provided    Hyperlipidemia     2007    Localized swelling, mass, or lump of upper extremity     2017    Other specified counseling     10/28/2013,Patient has identified Health Care Agent(s): Yes Add Health Care Agents: Yes   Health Care Agent(s): Primary Health Care Agent: Jay Batista  Relationship:  Phone:   Secondary Health Care Agent:  Relationship: Daughter Phone:   Conservator:  Relationship:  Phone:   Guardian: Relationship:  Phone:    Patient has Advance Care Plan Documents (Health Care Directive, POLST): No, refe*    Polyosteoarthritis     No Comments Provided    Postmenopausal bleeding     No Comments Provided    Presence of coronary angioplasty implant and graft     ,unstable angina; severe prox LAD stenosis; s/p 7 stents    Primary osteoarthritis of left hand     2017    Rheumatoid arthritis (H)     follows at [place] yearly       PAST SURGICAL HISTORY:  Past Surgical History:   Procedure Laterality Date    ARTHROPLASTY HIP ANTERIOR Left 2024    Procedure: ARTHROPLASTY, HIP, TOTAL, DIRECT ANTERIOR APPROACH;  Surgeon: Shashi Topete MD;  Location: [facility] OR    ARTHROPLASTY KNEE      2011    ARTHROSCOPY KNEE          BIOPSY BREAST      10/25/95,[facility]Z    CAROTID ARTERY ANGIOPLASTY Left 04/15/2024    [facility]  SECTION      1974, Section    COLONOSCOPY      05,Normal - Repeat in 10 years    COLONOSCOPY  2016    COLONOSCOPY N/A 2019    Procedure: COLONOSCOPY;  Surgeon: Evelin Thompson MD;  Location:  OR    COLONOSCOPY N/A 2019    Procedure: COMBINED COLONOSCOPY, SINGLE OR MULTIPLE BIOPSY/POLYPECTOMY BY BIOPSY;  Surgeon: Evelin Thompson MD;  Location: [facility] OR    ENDOSCOPIC RELEASE CARPAL TUNNEL Right 2023    Procedure: RELEASE, CARPAL TUNNEL, ENDOSCOPIC;  Surgeon: Danial Stanley MD;  Location:  OR    ESOPHAGOSCOPY, GASTROSCOPY, DUODENOSCOPY (EGD), COMBINED      2011,erosive gastritis;bx;consider MRI/A;[facility]    EXTRACTION(S) DENTAL      1970    HEART CATH, ANGIOPLASTY      10/29/2013,ADÁN to proximal left anterior descending    OTHER SURGICAL HISTORY      ,029894,OTHER    OTHER SURGICAL HISTORY      2014,[id number],TOTAL SHOULDER ARTHROPLASTY,Right    OTHER SURGICAL HISTORY      2016,01824.0,ME COLONOSCOPY REMOVE ELIZA POLYP LESN SNARE,repeat , precancerous polyps    ZZ STRESS LEXISCAN TEST  2018    With Dr. Irwin - normal       CURRENT MEDICATIONS:      Current Facility-Administered Medications:     acetaminophen (TYLENOL) tablet 650 mg, 650 mg, Oral, Q4H PRN **OR** acetaminophen (TYLENOL) Suppository 650 mg, 650 mg, Rectal, Q4H PRN, Shannon Tineo MD    HYDROmorphone (PF) (DILAUDID) injection 0.3 mg, 0.3 mg, Intravenous, Q3H PRN, Shannon Tineo MD    lidocaine (LMX4) cream, , Topical, Q1H PRN, Shannon Tineo MD    lidocaine 1 % 0.1-1 mL, 0.1-1 mL, Other, Q1H PRN, Shannon Tineo MD    ondansetron (ZOFRAN ODT) ODT tab 4 mg, 4 mg, Oral, Q6H PRN **OR** ondansetron (ZOFRAN) injection 4 mg, 4 mg, Intravenous, Q6H PRN, Shannon Tineo MD    ondansetron (ZOFRAN) injection 4 mg, 4 mg, Intravenous, Q30 Min PRN, Robert Gonzalez MD, 4 mg at 25    [START ON 2025] polyethylene glycol (MIRALAX) Packet 17 g, 17 g, Oral, Daily, Shannon Tineo  MD NOAM    sodium chloride (PF) 0.9% PF flush 3 mL, 3 mL, Intracatheter, Q8H, Shannon Tineo MD    sodium chloride (PF) 0.9% PF flush 3 mL, 3 mL, Intracatheter, q1 min prn, Shannon Tineo MD    ALLERGIES:  Allergies   Allergen Reactions    Plavix [Clopidogrel]      Plavix resistant-tested at [place]    Enalapril Cough    Food      Macadamia nuts make mouth itch    Losartan      Allergic rxn with hives and angioedema suspected to be from alternative manufacture of the effient or the losartan       FAMILY HISTORY:  Family History   Problem Relation Age of Onset    Heart Disease Mother         Heart Disease    Hypertension Mother         Hypertension    Other - See Comments Mother         Stroke    Breast Cancer Mother 53        Cancer-breast    Arthritis Father         Arthritis    Cancer Father         Cancer    Heart Disease Father         Heart Disease    Hypertension Father         Hypertension    Atrial fibrillation Sister     Melanoma Brother     Heart Disease Brother         Heart Disease       SOCIAL HISTORY:   Social History     Socioeconomic History    Marital status:      Spouse name: rashaad   Tobacco Use    Smoking status: Never     Passive exposure: Past    Smokeless tobacco: Never    Tobacco comments:     Passive exposure in childhood home.    Vaping Use    Vaping status: Never Used   Substance and Sexual Activity    Alcohol use: Not Currently    Drug use: No    Sexual activity: Not Currently     Partners: Male     Birth control/protection: Post-menopausal   Social History Narrative    . Moved to [place], [place] Summer of 2013 from [place], [place]. Has a daughter (lives in [place]) and son (who lives in [place]). Patient retired. 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 Never smoked. Rare alcohol use.     Social Drivers of Health     Financial Resource Strain: Low Risk  (10/17/2024)    Financial Resource Strain     Within the past 12 months, have you or your family  "members you live with been unable to get utilities (heat, electricity) when it was really needed?: No   Food Insecurity: Low Risk  (10/17/2024)    Food Insecurity     Within the past 12 months, did you worry that your food would run out before you got money to buy more?: No     Within the past 12 months, did the food you bought just not last and you didn t have money to get more?: No   Transportation Needs: Low Risk  (10/17/2024)    Transportation Needs     Within the past 12 months, has lack of transportation kept you from medical appointments, getting your medicines, non-medical meetings or appointments, work, or from getting things that you need?: No   Physical Activity: Insufficiently Active (10/17/2024)    Exercise Vital Sign     Days of Exercise per Week: 2 days     Minutes of Exercise per Session: 20 min   Stress: No Stress Concern Present (10/17/2024)    [employer] [place] [employer] - Occupational Stress Questionnaire     Feeling of Stress : Not at all   Social Connections: Unknown (10/17/2024)    Social Connection and Isolation Panel [NHANES]     Frequency of Social Gatherings with Friends and Family: Three times a week   Interpersonal Safety: Low Risk  (11/26/2024)    Interpersonal Safety     Do you feel physically and emotionally safe where you currently live?: Yes     Within the past 12 months, have you been hit, slapped, kicked or otherwise physically hurt by someone?: No     Within the past 12 months, have you been humiliated or emotionally abused in other ways by your partner or ex-partner?: No   Housing Stability: Low Risk  (10/17/2024)    Housing Stability     Do you have housing? : Yes     Are you worried about losing your housing?: No       PHYSICAL EXAM:    Vitals: BP (!) 186/86 (BP Location: Left arm)   Pulse 67   Temp 98.2  F (36.8  C) (Oral)   Resp 22   Ht 1.702 m (5' 7\")   Wt 76.7 kg (169 lb)   LMP 01/01/1998 (Within Months)   SpO2 94%   BMI 26.47 kg/m     Gen:  Alert, " awake, NAD  HENT:  Crusted blood to the posterior scalp, there is a 3 x 4 cm hematoma over the right forehead ,normocephalic.  PERRL.  EOMI.  No periorbital step-offs, depression, tenderness.  No tenderness along the zygomatic arch bilaterally.  Ears atraumatic with no external bleeding or signs of trauma.  No epistaxis.  Clear oropharynx.  Dentition intact.   Respiratory:  Normal respiratory rate.  Lungs CTA.  Chest wall stable to compression.  Nontender chest wall.   Trachea midline.  Cardiovascular:  Regular rate and rhythm.  Palpable radial and DP pulses bilaterally.  Abdomen:  Soft, nontender, normoactive bowel sounds.    Musculoskeletal:  No midline C-spine, T-spine, L-spine tenderness.  No midline spinal step-offs noted.  5/5 strength in all extremities.  No gross deformities noted.  Pelvis stable.  Right arm tenderness from the humeral head to the olecranon, Anterior right hip tenderness, Right leg shortened and externally rotated  Integument:  No ecchymosis, abrasions, hematomas, lacerations noted.    Neuro:  GCS 15, A & O x 3, sensation intact to light touch      LAB:  All pertinent labs reviewed and interpreted.  Labs Ordered and Resulted from Time of ED Arrival to Time of ED Departure - No data to display    RADIOLOGY:  XR Shoulder Right 3 Views   Final Result   IMPRESSION:       Status post right total shoulder arthroplasty. There is an acute appearing, comminuted, mildly displaced periprosthetic fracture of the right proximal humerus.       No dislocation. Mild to moderate acromioclavicular degenerative arthrosis. Diffuse osseous demineralization.      Humerus XR, G/E 2 views, right   Final Result   IMPRESSION: Postop changes of glenohumeral joint replacement. There is bone formation along the greater tuberosity of the humerus without a well-defined fracture lucency on these views, favor chronic although dedicated right shoulder radiographs could    assess further if there is clinical concern for  fracture in this region. Subacromial spur. Bone demineralization.      No evidence of an acute displaced distal right humeral fracture.      XR Pelvis and Hip Right 2 Views   Final Result   IMPRESSION: Acute, displaced and angulated right proximal femoral fracture.      Contralateral left total hip replacement.      Bones are demineralized.      Pathologic fracture is not excluded as there is some vague lucency along the fracture site as well as a partially visualized area of lucency within the proximal femoral shaft measuring approximately 9.6 cm in length. These findings raise concern for    multiple myeloma as well as metastatic disease, which should be excluded.      NOTE: ABNORMAL REPORT      THE DICTATION ABOVE DESCRIBES AN ABNORMALITY FOR WHICH FOLLOW-UP IS NEEDED.       CT Head w/o Contrast   Final Result   IMPRESSION:   1.  No acute intracranial process.      2.  Chronic intracranial changes described above.      XR Femur Right 2 Views    (Results Pending)   CT Hip Right w/o Contrast    (Results Pending)         PROCEDURES:   Procedures   PROCEDURE: Laceration Repair   INDICATIONS: Laceration   PROCEDURE PROVIDER: Dr Serge Gonzalez   SITE: Scalp   TYPE/SIZE: simple, clean, and no foreign body visualized  1 cm (total length)   FUNCTIONAL ASSESSMENT: Distal sensation, circulation, and motor intact   MEDICATION: 1 mLs of 1% Lidocaine with epinephrine   PREPARATION: irrigation with Betadine   DEBRIDEMENT: no debridement   CLOSURE:  Superficial layer closed with 3 staples    Total number of sutures/staples placed: 3           I, Lars Tolliver, am serving as a scribe to document services personally performed by Dr. Robert Gonzalez based on my observation and the provider's statements to me. Robert WINSLOW M.D. attest that Lars Tolliver is acting in a scribe capacity, has observed my performance of the services and has documented them in accordance with my direction.      Robert Gonzalez M.D.  Emergency  [facility] EMERGENCY DEPARTMENT  [facility] [facility] [address]  [phone]  Dept: [phone]     Robert Gonzalez MD  02/18/25 0016

## 2025-02-18 ENCOUNTER — APPOINTMENT (OUTPATIENT)
Dept: RADIOLOGY | Facility: HOSPITAL | Age: 80
DRG: 481 | End: 2025-02-18
Attending: STUDENT IN AN ORGANIZED HEALTH CARE EDUCATION/TRAINING PROGRAM
Payer: COMMERCIAL

## 2025-02-18 ENCOUNTER — ANESTHESIA EVENT (OUTPATIENT)
Dept: SURGERY | Facility: HOSPITAL | Age: 80
End: 2025-02-18
Payer: COMMERCIAL

## 2025-02-18 ENCOUNTER — ANESTHESIA (OUTPATIENT)
Dept: SURGERY | Facility: HOSPITAL | Age: 80
End: 2025-02-18
Payer: COMMERCIAL

## 2025-02-18 LAB
ABO + RH BLD: NORMAL
ALBUMIN SERPL BCG-MCNC: 3.6 G/DL (ref 3.5–5.2)
ALBUMIN UR-MCNC: 10 MG/DL
ALP SERPL-CCNC: 79 U/L (ref 40–150)
ALT SERPL W P-5'-P-CCNC: 13 U/L (ref 0–50)
ANION GAP SERPL CALCULATED.3IONS-SCNC: 3 MMOL/L (ref 7–15)
APPEARANCE UR: CLEAR
AST SERPL W P-5'-P-CCNC: 22 U/L (ref 0–45)
BASOPHILS # BLD AUTO: 0 10E3/UL (ref 0–0.2)
BASOPHILS NFR BLD AUTO: 0 %
BILIRUB SERPL-MCNC: 0.7 MG/DL
BILIRUB UR QL STRIP: NEGATIVE
BLD GP AB SCN SERPL QL: NEGATIVE
BUN SERPL-MCNC: 15.7 MG/DL (ref 8–23)
CALCIUM SERPL-MCNC: 9.5 MG/DL (ref 8.8–10.4)
CHLORIDE SERPL-SCNC: 103 MMOL/L (ref 98–107)
COLOR UR AUTO: ABNORMAL
CREAT SERPL-MCNC: 0.8 MG/DL (ref 0.51–0.95)
EGFRCR SERPLBLD CKD-EPI 2021: 75 ML/MIN/1.73M2
EOSINOPHIL # BLD AUTO: 0 10E3/UL (ref 0–0.7)
EOSINOPHIL NFR BLD AUTO: 0 %
ERYTHROCYTE [DISTWIDTH] IN BLOOD BY AUTOMATED COUNT: 16.2 % (ref 10–15)
GLUCOSE SERPL-MCNC: 136 MG/DL (ref 70–99)
GLUCOSE UR STRIP-MCNC: NEGATIVE MG/DL
HCO3 SERPL-SCNC: 34 MMOL/L (ref 22–29)
HCT VFR BLD AUTO: 34.5 % (ref 35–47)
HGB BLD-MCNC: 11.2 G/DL (ref 11.7–15.7)
HGB UR QL STRIP: NEGATIVE
IMM GRANULOCYTES # BLD: 0.1 10E3/UL
IMM GRANULOCYTES NFR BLD: 1 %
KETONES UR STRIP-MCNC: NEGATIVE MG/DL
LEUKOCYTE ESTERASE UR QL STRIP: NEGATIVE
LYMPHOCYTES # BLD AUTO: 4 10E3/UL (ref 0.8–5.3)
LYMPHOCYTES NFR BLD AUTO: 23 %
MAGNESIUM SERPL-MCNC: 2 MG/DL (ref 1.7–2.3)
MCH RBC QN AUTO: 31.5 PG (ref 26.5–33)
MCHC RBC AUTO-ENTMCNC: 32.5 G/DL (ref 31.5–36.5)
MCV RBC AUTO: 97 FL (ref 78–100)
MONOCYTES # BLD AUTO: 0.8 10E3/UL (ref 0–1.3)
MONOCYTES NFR BLD AUTO: 5 %
NEUTROPHILS # BLD AUTO: 12.7 10E3/UL (ref 1.6–8.3)
NEUTROPHILS NFR BLD AUTO: 72 %
NITRATE UR QL: NEGATIVE
NRBC # BLD AUTO: 0 10E3/UL
NRBC BLD AUTO-RTO: 0 /100
PH UR STRIP: 7 [PH] (ref 5–7)
PLATELET # BLD AUTO: 157 10E3/UL (ref 150–450)
POTASSIUM SERPL-SCNC: 5.1 MMOL/L (ref 3.4–5.3)
PROT SERPL-MCNC: 5.8 G/DL (ref 6.4–8.3)
RBC # BLD AUTO: 3.56 10E6/UL (ref 3.8–5.2)
RBC URINE: 0 /HPF
SODIUM SERPL-SCNC: 140 MMOL/L (ref 135–145)
SP GR UR STRIP: 1.02 (ref 1–1.03)
SPECIMEN EXP DATE BLD: NORMAL
SQUAMOUS EPITHELIAL: <1 /HPF
UROBILINOGEN UR STRIP-MCNC: <2 MG/DL
WBC # BLD AUTO: 17.7 10E3/UL (ref 4–11)
WBC URINE: <1 /HPF

## 2025-02-18 PROCEDURE — 250N000009 HC RX 250: Performed by: STUDENT IN AN ORGANIZED HEALTH CARE EDUCATION/TRAINING PROGRAM

## 2025-02-18 PROCEDURE — 250N000011 HC RX IP 250 OP 636: Performed by: STUDENT IN AN ORGANIZED HEALTH CARE EDUCATION/TRAINING PROGRAM

## 2025-02-18 PROCEDURE — 250N000009 HC RX 250

## 2025-02-18 PROCEDURE — 0QS636Z REPOSITION RIGHT UPPER FEMUR WITH INTRAMEDULLARY INTERNAL FIXATION DEVICE, PERCUTANEOUS APPROACH: ICD-10-PCS | Performed by: STUDENT IN AN ORGANIZED HEALTH CARE EDUCATION/TRAINING PROGRAM

## 2025-02-18 PROCEDURE — 250N000013 HC RX MED GY IP 250 OP 250 PS 637: Performed by: INTERNAL MEDICINE

## 2025-02-18 PROCEDURE — 120N000001 HC R&B MED SURG/OB

## 2025-02-18 PROCEDURE — 258N000003 HC RX IP 258 OP 636: Performed by: STUDENT IN AN ORGANIZED HEALTH CARE EDUCATION/TRAINING PROGRAM

## 2025-02-18 PROCEDURE — 360N000084 HC SURGERY LEVEL 4 W/ FLUORO, PER MIN: Performed by: STUDENT IN AN ORGANIZED HEALTH CARE EDUCATION/TRAINING PROGRAM

## 2025-02-18 PROCEDURE — 82565 ASSAY OF CREATININE: CPT | Performed by: INTERNAL MEDICINE

## 2025-02-18 PROCEDURE — 272N000001 HC OR GENERAL SUPPLY STERILE: Performed by: STUDENT IN AN ORGANIZED HEALTH CARE EDUCATION/TRAINING PROGRAM

## 2025-02-18 PROCEDURE — 86901 BLOOD TYPING SEROLOGIC RH(D): CPT | Performed by: STUDENT IN AN ORGANIZED HEALTH CARE EDUCATION/TRAINING PROGRAM

## 2025-02-18 PROCEDURE — 999N000180 XR SURGERY CARM FLUORO LESS THAN 5 MIN

## 2025-02-18 PROCEDURE — 370N000017 HC ANESTHESIA TECHNICAL FEE, PER MIN: Performed by: STUDENT IN AN ORGANIZED HEALTH CARE EDUCATION/TRAINING PROGRAM

## 2025-02-18 PROCEDURE — 258N000003 HC RX IP 258 OP 636

## 2025-02-18 PROCEDURE — 86923 COMPATIBILITY TEST ELECTRIC: CPT | Performed by: INTERNAL MEDICINE

## 2025-02-18 PROCEDURE — 250N000013 HC RX MED GY IP 250 OP 250 PS 637: Performed by: STUDENT IN AN ORGANIZED HEALTH CARE EDUCATION/TRAINING PROGRAM

## 2025-02-18 PROCEDURE — 81003 URINALYSIS AUTO W/O SCOPE: CPT | Performed by: INTERNAL MEDICINE

## 2025-02-18 PROCEDURE — 999N000141 HC STATISTIC PRE-PROCEDURE NURSING ASSESSMENT: Performed by: STUDENT IN AN ORGANIZED HEALTH CARE EDUCATION/TRAINING PROGRAM

## 2025-02-18 PROCEDURE — 710N000009 HC RECOVERY PHASE 1, LEVEL 1, PER MIN: Performed by: STUDENT IN AN ORGANIZED HEALTH CARE EDUCATION/TRAINING PROGRAM

## 2025-02-18 PROCEDURE — 85048 AUTOMATED LEUKOCYTE COUNT: CPT | Performed by: INTERNAL MEDICINE

## 2025-02-18 PROCEDURE — C1769 GUIDE WIRE: HCPCS | Performed by: STUDENT IN AN ORGANIZED HEALTH CARE EDUCATION/TRAINING PROGRAM

## 2025-02-18 PROCEDURE — 250N000011 HC RX IP 250 OP 636: Performed by: INTERNAL MEDICINE

## 2025-02-18 PROCEDURE — 250N000011 HC RX IP 250 OP 636

## 2025-02-18 PROCEDURE — 36415 COLL VENOUS BLD VENIPUNCTURE: CPT | Performed by: INTERNAL MEDICINE

## 2025-02-18 PROCEDURE — 99232 SBSQ HOSP IP/OBS MODERATE 35: CPT | Performed by: INTERNAL MEDICINE

## 2025-02-18 PROCEDURE — C1713 ANCHOR/SCREW BN/BN,TIS/BN: HCPCS | Performed by: STUDENT IN AN ORGANIZED HEALTH CARE EDUCATION/TRAINING PROGRAM

## 2025-02-18 PROCEDURE — 82310 ASSAY OF CALCIUM: CPT | Performed by: INTERNAL MEDICINE

## 2025-02-18 PROCEDURE — 85004 AUTOMATED DIFF WBC COUNT: CPT | Performed by: INTERNAL MEDICINE

## 2025-02-18 PROCEDURE — 250N000011 HC RX IP 250 OP 636: Performed by: EMERGENCY MEDICINE

## 2025-02-18 PROCEDURE — 250N000025 HC SEVOFLURANE, PER MIN: Performed by: STUDENT IN AN ORGANIZED HEALTH CARE EDUCATION/TRAINING PROGRAM

## 2025-02-18 PROCEDURE — 81001 URINALYSIS AUTO W/SCOPE: CPT | Performed by: INTERNAL MEDICINE

## 2025-02-18 DEVICE — 11MM/130 DEG TI CANN TFNA 400MM/RIGHT - STERILE
Type: IMPLANTABLE DEVICE | Site: HIP | Status: FUNCTIONAL
Brand: TFN-ADVANCE

## 2025-02-18 DEVICE — LOCKING SCREW FOR IM NAIL Ø 5.0MM / L 54MM / XL25/ STER: Type: IMPLANTABLE DEVICE | Site: FEMUR | Status: FUNCTIONAL

## 2025-02-18 DEVICE — TFNA FENESTRATED SCREW 105MM - STERILE
Type: IMPLANTABLE DEVICE | Site: HIP | Status: FUNCTIONAL
Brand: TFN-ADVANCE

## 2025-02-18 DEVICE — LOCKING SCREW FOR IM NAIL Ø 5.0MM / L 48MM / XL25/ STER: Type: IMPLANTABLE DEVICE | Site: FEMUR | Status: FUNCTIONAL

## 2025-02-18 RX ORDER — DEXAMETHASONE SODIUM PHOSPHATE 4 MG/ML
4 INJECTION, SOLUTION INTRA-ARTICULAR; INTRALESIONAL; INTRAMUSCULAR; INTRAVENOUS; SOFT TISSUE
Status: DISCONTINUED | OUTPATIENT
Start: 2025-02-18 | End: 2025-02-18 | Stop reason: HOSPADM

## 2025-02-18 RX ORDER — NITROGLYCERIN 0.4 MG/1
0.4 TABLET SUBLINGUAL EVERY 5 MIN PRN
Status: DISCONTINUED | OUTPATIENT
Start: 2025-02-18 | End: 2025-02-24 | Stop reason: HOSPADM

## 2025-02-18 RX ORDER — NALOXONE HYDROCHLORIDE 0.4 MG/ML
0.1 INJECTION, SOLUTION INTRAMUSCULAR; INTRAVENOUS; SUBCUTANEOUS
Status: DISCONTINUED | OUTPATIENT
Start: 2025-02-18 | End: 2025-02-18 | Stop reason: HOSPADM

## 2025-02-18 RX ORDER — ISOSORBIDE MONONITRATE 30 MG/1
30 TABLET, EXTENDED RELEASE ORAL DAILY
Status: DISCONTINUED | OUTPATIENT
Start: 2025-02-18 | End: 2025-02-24 | Stop reason: HOSPADM

## 2025-02-18 RX ORDER — FLUMAZENIL 0.1 MG/ML
0.2 INJECTION, SOLUTION INTRAVENOUS
Status: DISCONTINUED | OUTPATIENT
Start: 2025-02-18 | End: 2025-02-18 | Stop reason: HOSPADM

## 2025-02-18 RX ORDER — PROPOFOL 10 MG/ML
INJECTION, EMULSION INTRAVENOUS PRN
Status: DISCONTINUED | OUTPATIENT
Start: 2025-02-18 | End: 2025-02-18

## 2025-02-18 RX ORDER — ONDANSETRON 4 MG/1
4 TABLET, ORALLY DISINTEGRATING ORAL EVERY 30 MIN PRN
Status: DISCONTINUED | OUTPATIENT
Start: 2025-02-18 | End: 2025-02-18 | Stop reason: HOSPADM

## 2025-02-18 RX ORDER — OXYCODONE HYDROCHLORIDE 5 MG/1
10 TABLET ORAL
Status: DISCONTINUED | OUTPATIENT
Start: 2025-02-18 | End: 2025-02-18 | Stop reason: HOSPADM

## 2025-02-18 RX ORDER — ONDANSETRON 2 MG/ML
INJECTION INTRAMUSCULAR; INTRAVENOUS PRN
Status: DISCONTINUED | OUTPATIENT
Start: 2025-02-18 | End: 2025-02-18

## 2025-02-18 RX ORDER — PROPOFOL 10 MG/ML
INJECTION, EMULSION INTRAVENOUS CONTINUOUS PRN
Status: DISCONTINUED | OUTPATIENT
Start: 2025-02-18 | End: 2025-02-18

## 2025-02-18 RX ORDER — SODIUM CHLORIDE, SODIUM LACTATE, POTASSIUM CHLORIDE, CALCIUM CHLORIDE 600; 310; 30; 20 MG/100ML; MG/100ML; MG/100ML; MG/100ML
INJECTION, SOLUTION INTRAVENOUS CONTINUOUS
Status: DISCONTINUED | OUTPATIENT
Start: 2025-02-18 | End: 2025-02-18 | Stop reason: HOSPADM

## 2025-02-18 RX ORDER — CEFAZOLIN SODIUM 2 G/100ML
2 INJECTION, SOLUTION INTRAVENOUS EVERY 8 HOURS
Status: COMPLETED | OUTPATIENT
Start: 2025-02-18 | End: 2025-02-19

## 2025-02-18 RX ORDER — FOLIC ACID 1 MG/1
1000 TABLET ORAL DAILY
Status: DISCONTINUED | OUTPATIENT
Start: 2025-02-18 | End: 2025-02-24 | Stop reason: HOSPADM

## 2025-02-18 RX ORDER — ACETAMINOPHEN 325 MG/1
325 TABLET ORAL ONCE
Status: COMPLETED | OUTPATIENT
Start: 2025-02-18 | End: 2025-02-18

## 2025-02-18 RX ORDER — ROPIVACAINE HYDROCHLORIDE 5 MG/ML
INJECTION, SOLUTION EPIDURAL; INFILTRATION; PERINEURAL
Status: COMPLETED | OUTPATIENT
Start: 2025-02-18 | End: 2025-02-18

## 2025-02-18 RX ORDER — FENTANYL CITRATE 50 UG/ML
25 INJECTION, SOLUTION INTRAMUSCULAR; INTRAVENOUS EVERY 5 MIN PRN
Status: DISCONTINUED | OUTPATIENT
Start: 2025-02-18 | End: 2025-02-18 | Stop reason: HOSPADM

## 2025-02-18 RX ORDER — NALOXONE HYDROCHLORIDE 0.4 MG/ML
0.2 INJECTION, SOLUTION INTRAMUSCULAR; INTRAVENOUS; SUBCUTANEOUS
Status: DISCONTINUED | OUTPATIENT
Start: 2025-02-18 | End: 2025-02-24 | Stop reason: HOSPADM

## 2025-02-18 RX ORDER — ENOXAPARIN SODIUM 100 MG/ML
40 INJECTION SUBCUTANEOUS EVERY 24 HOURS
Status: DISCONTINUED | OUTPATIENT
Start: 2025-02-19 | End: 2025-02-24 | Stop reason: HOSPADM

## 2025-02-18 RX ORDER — ACETAMINOPHEN 325 MG/1
975 TABLET ORAL ONCE
Status: DISCONTINUED | OUTPATIENT
Start: 2025-02-18 | End: 2025-02-18

## 2025-02-18 RX ORDER — HYDROMORPHONE HCL IN WATER/PF 6 MG/30 ML
0.2 PATIENT CONTROLLED ANALGESIA SYRINGE INTRAVENOUS EVERY 5 MIN PRN
Status: DISCONTINUED | OUTPATIENT
Start: 2025-02-18 | End: 2025-02-18 | Stop reason: HOSPADM

## 2025-02-18 RX ORDER — HYDRALAZINE HYDROCHLORIDE 20 MG/ML
10 INJECTION INTRAMUSCULAR; INTRAVENOUS EVERY 6 HOURS PRN
Status: DISCONTINUED | OUTPATIENT
Start: 2025-02-18 | End: 2025-02-24 | Stop reason: HOSPADM

## 2025-02-18 RX ORDER — CEFAZOLIN SODIUM/WATER 2 G/20 ML
2 SYRINGE (ML) INTRAVENOUS SEE ADMIN INSTRUCTIONS
Status: DISCONTINUED | OUTPATIENT
Start: 2025-02-18 | End: 2025-02-18 | Stop reason: HOSPADM

## 2025-02-18 RX ORDER — OXYCODONE HYDROCHLORIDE 5 MG/1
5 TABLET ORAL
Status: DISCONTINUED | OUTPATIENT
Start: 2025-02-18 | End: 2025-02-18 | Stop reason: HOSPADM

## 2025-02-18 RX ORDER — ATORVASTATIN CALCIUM 40 MG/1
80 TABLET, FILM COATED ORAL DAILY
Status: DISCONTINUED | OUTPATIENT
Start: 2025-02-18 | End: 2025-02-24 | Stop reason: HOSPADM

## 2025-02-18 RX ORDER — LIDOCAINE 40 MG/G
CREAM TOPICAL
Status: DISCONTINUED | OUTPATIENT
Start: 2025-02-18 | End: 2025-02-24 | Stop reason: HOSPADM

## 2025-02-18 RX ORDER — TRANEXAMIC ACID 650 MG/1
1950 TABLET ORAL ONCE
Status: COMPLETED | OUTPATIENT
Start: 2025-02-18 | End: 2025-02-18

## 2025-02-18 RX ORDER — TRANEXAMIC ACID 100 MG/ML
INJECTION, SOLUTION INTRAVENOUS
Status: DISCONTINUED
Start: 2025-02-18 | End: 2025-02-18 | Stop reason: HOSPADM

## 2025-02-18 RX ORDER — MAGNESIUM HYDROXIDE 1200 MG/15ML
LIQUID ORAL PRN
Status: DISCONTINUED | OUTPATIENT
Start: 2025-02-18 | End: 2025-02-18 | Stop reason: HOSPADM

## 2025-02-18 RX ORDER — LIDOCAINE 40 MG/G
CREAM TOPICAL
Status: DISCONTINUED | OUTPATIENT
Start: 2025-02-18 | End: 2025-02-18 | Stop reason: HOSPADM

## 2025-02-18 RX ORDER — LIDOCAINE HYDROCHLORIDE 10 MG/ML
INJECTION, SOLUTION INFILTRATION; PERINEURAL PRN
Status: DISCONTINUED | OUTPATIENT
Start: 2025-02-18 | End: 2025-02-18

## 2025-02-18 RX ORDER — DULOXETIN HYDROCHLORIDE 20 MG/1
20 CAPSULE, DELAYED RELEASE ORAL DAILY
Status: DISCONTINUED | OUTPATIENT
Start: 2025-02-18 | End: 2025-02-24 | Stop reason: HOSPADM

## 2025-02-18 RX ORDER — NALOXONE HYDROCHLORIDE 0.4 MG/ML
0.4 INJECTION, SOLUTION INTRAMUSCULAR; INTRAVENOUS; SUBCUTANEOUS
Status: DISCONTINUED | OUTPATIENT
Start: 2025-02-18 | End: 2025-02-24 | Stop reason: HOSPADM

## 2025-02-18 RX ORDER — EPHEDRINE SULFATE 50 MG/ML
INJECTION, SOLUTION INTRAMUSCULAR; INTRAVENOUS; SUBCUTANEOUS PRN
Status: DISCONTINUED | OUTPATIENT
Start: 2025-02-18 | End: 2025-02-18

## 2025-02-18 RX ORDER — ONDANSETRON 2 MG/ML
4 INJECTION INTRAMUSCULAR; INTRAVENOUS EVERY 30 MIN PRN
Status: DISCONTINUED | OUTPATIENT
Start: 2025-02-18 | End: 2025-02-18 | Stop reason: HOSPADM

## 2025-02-18 RX ORDER — HYDROMORPHONE HYDROCHLORIDE 1 MG/ML
0.3 INJECTION, SOLUTION INTRAMUSCULAR; INTRAVENOUS; SUBCUTANEOUS ONCE
Status: COMPLETED | OUTPATIENT
Start: 2025-02-18 | End: 2025-02-18

## 2025-02-18 RX ORDER — DEXAMETHASONE SODIUM PHOSPHATE 10 MG/ML
INJECTION, SOLUTION INTRAMUSCULAR; INTRAVENOUS PRN
Status: DISCONTINUED | OUTPATIENT
Start: 2025-02-18 | End: 2025-02-18

## 2025-02-18 RX ORDER — CEFAZOLIN SODIUM/WATER 2 G/20 ML
2 SYRINGE (ML) INTRAVENOUS
Status: COMPLETED | OUTPATIENT
Start: 2025-02-18 | End: 2025-02-18

## 2025-02-18 RX ORDER — ALLOPURINOL 300 MG/1
300 TABLET ORAL DAILY
Status: DISCONTINUED | OUTPATIENT
Start: 2025-02-18 | End: 2025-02-24 | Stop reason: HOSPADM

## 2025-02-18 RX ORDER — ALBUTEROL SULFATE 90 UG/1
2 INHALANT RESPIRATORY (INHALATION) EVERY 6 HOURS PRN
Status: DISCONTINUED | OUTPATIENT
Start: 2025-02-18 | End: 2025-02-24 | Stop reason: HOSPADM

## 2025-02-18 RX ORDER — FENTANYL CITRATE 50 UG/ML
50 INJECTION, SOLUTION INTRAMUSCULAR; INTRAVENOUS EVERY 5 MIN PRN
Status: DISCONTINUED | OUTPATIENT
Start: 2025-02-18 | End: 2025-02-18 | Stop reason: HOSPADM

## 2025-02-18 RX ADMIN — FOLIC ACID 1000 MCG: 1 TABLET ORAL at 19:04

## 2025-02-18 RX ADMIN — ONDANSETRON 4 MG: 2 INJECTION, SOLUTION INTRAMUSCULAR; INTRAVENOUS at 01:28

## 2025-02-18 RX ADMIN — SODIUM CHLORIDE, SODIUM LACTATE, POTASSIUM CHLORIDE, AND CALCIUM CHLORIDE: .6; .31; .03; .02 INJECTION, SOLUTION INTRAVENOUS at 11:44

## 2025-02-18 RX ADMIN — ACETAMINOPHEN 325 MG: 325 TABLET ORAL at 11:14

## 2025-02-18 RX ADMIN — DULOXETINE HYDROCHLORIDE 20 MG: 20 CAPSULE, DELAYED RELEASE PELLETS ORAL at 19:04

## 2025-02-18 RX ADMIN — ALLOPURINOL 300 MG: 300 TABLET ORAL at 19:04

## 2025-02-18 RX ADMIN — PROPOFOL 130 MG: 10 INJECTION, EMULSION INTRAVENOUS at 12:57

## 2025-02-18 RX ADMIN — ROPIVACAINE HYDROCHLORIDE 25 ML: 5 INJECTION, SOLUTION EPIDURAL; INFILTRATION; PERINEURAL at 12:15

## 2025-02-18 RX ADMIN — HYDROMORPHONE HYDROCHLORIDE 0.3 MG: 1 INJECTION, SOLUTION INTRAMUSCULAR; INTRAVENOUS; SUBCUTANEOUS at 02:39

## 2025-02-18 RX ADMIN — LIDOCAINE HYDROCHLORIDE 5 ML: 10 INJECTION, SOLUTION INFILTRATION; PERINEURAL at 12:57

## 2025-02-18 RX ADMIN — Medication 2 G: at 12:57

## 2025-02-18 RX ADMIN — PHENYLEPHRINE HYDROCHLORIDE 100 MCG: 10 INJECTION INTRAVENOUS at 12:57

## 2025-02-18 RX ADMIN — PHENYLEPHRINE HYDROCHLORIDE 0.4 MCG/KG/MIN: 10 INJECTION INTRAVENOUS at 13:18

## 2025-02-18 RX ADMIN — ISOSORBIDE MONONITRATE 30 MG: 30 TABLET, EXTENDED RELEASE ORAL at 19:05

## 2025-02-18 RX ADMIN — PHENYLEPHRINE HYDROCHLORIDE 100 MCG: 10 INJECTION INTRAVENOUS at 14:04

## 2025-02-18 RX ADMIN — ACETAMINOPHEN 650 MG: 325 TABLET ORAL at 21:01

## 2025-02-18 RX ADMIN — DEXAMETHASONE SODIUM PHOSPHATE 10 MG: 10 INJECTION, SOLUTION INTRAMUSCULAR; INTRAVENOUS at 12:57

## 2025-02-18 RX ADMIN — TRANEXAMIC ACID 1950 MG: 650 TABLET ORAL at 11:14

## 2025-02-18 RX ADMIN — ONDANSETRON 4 MG: 2 INJECTION INTRAMUSCULAR; INTRAVENOUS at 13:35

## 2025-02-18 RX ADMIN — ATORVASTATIN CALCIUM 80 MG: 40 TABLET, FILM COATED ORAL at 19:04

## 2025-02-18 RX ADMIN — Medication 10 MG: at 13:40

## 2025-02-18 RX ADMIN — HYDROMORPHONE HYDROCHLORIDE 0.3 MG: 1 INJECTION, SOLUTION INTRAMUSCULAR; INTRAVENOUS; SUBCUTANEOUS at 09:00

## 2025-02-18 RX ADMIN — MIDAZOLAM HYDROCHLORIDE 1 MG: 1 INJECTION, SOLUTION INTRAMUSCULAR; INTRAVENOUS at 12:21

## 2025-02-18 RX ADMIN — Medication 5 MG: at 13:52

## 2025-02-18 RX ADMIN — PHENYLEPHRINE HYDROCHLORIDE 200 MCG: 10 INJECTION INTRAVENOUS at 13:13

## 2025-02-18 RX ADMIN — Medication 5 MG: at 13:55

## 2025-02-18 RX ADMIN — PROPOFOL 150 MCG/KG/MIN: 10 INJECTION, EMULSION INTRAVENOUS at 13:06

## 2025-02-18 RX ADMIN — PHENYLEPHRINE HYDROCHLORIDE 200 MCG: 10 INJECTION INTRAVENOUS at 13:25

## 2025-02-18 RX ADMIN — PHENYLEPHRINE HYDROCHLORIDE 200 MCG: 10 INJECTION INTRAVENOUS at 13:18

## 2025-02-18 RX ADMIN — HYDRALAZINE HYDROCHLORIDE 10 MG: 20 INJECTION INTRAMUSCULAR; INTRAVENOUS at 09:41

## 2025-02-18 RX ADMIN — CEFAZOLIN SODIUM 2 G: 2 INJECTION, SOLUTION INTRAVENOUS at 21:01

## 2025-02-18 RX ADMIN — Medication 5 MG: at 14:04

## 2025-02-18 RX ADMIN — HYDROMORPHONE HYDROCHLORIDE 0.3 MG: 1 INJECTION, SOLUTION INTRAMUSCULAR; INTRAVENOUS; SUBCUTANEOUS at 01:30

## 2025-02-18 RX ADMIN — HYDROMORPHONE HYDROCHLORIDE 0.3 MG: 1 INJECTION, SOLUTION INTRAMUSCULAR; INTRAVENOUS; SUBCUTANEOUS at 05:57

## 2025-02-18 RX ADMIN — ACETAMINOPHEN 650 MG: 325 TABLET ORAL at 08:19

## 2025-02-18 ASSESSMENT — ACTIVITIES OF DAILY LIVING (ADL)
ADLS_ACUITY_SCORE: 44
ADLS_ACUITY_SCORE: 40
ADLS_ACUITY_SCORE: 44
ADLS_ACUITY_SCORE: 61
ADLS_ACUITY_SCORE: 38
ADLS_ACUITY_SCORE: 44
ADLS_ACUITY_SCORE: 44
ADLS_ACUITY_SCORE: 40
ADLS_ACUITY_SCORE: 44
ADLS_ACUITY_SCORE: 40
ADLS_ACUITY_SCORE: 44
ADLS_ACUITY_SCORE: 40
ADLS_ACUITY_SCORE: 61
ADLS_ACUITY_SCORE: 44
ADLS_ACUITY_SCORE: 40
ADLS_ACUITY_SCORE: 44

## 2025-02-18 NOTE — ED NOTES
[facility] ED Handoff Report    ED Chief Complaint: right hip fracture    ED Diagnosis:  (S72.001A) Hip fracture, right, closed, initial encounter (H)  Comment:   Plan: pain has been minimal if not moving and controlled with pain meds    (S01.01XA) Scalp laceration, initial encounter  Comment:    (M79.621) Pain of right upper arm  Comment:        PMH:    Past Medical History:   Diagnosis Date    Atherosclerotic heart disease of native coronary artery without angina pectoris     -s/p ADÁN to the mid-LAD, mid-circumflex, mid-right coronary artery, proximal right coronary  artery, and PTCA of a marginal branch of the right coronary artery 04/30/2007. -s/p ADÁN to proximal left anterior descending 10/29/13.    Contusion of abdominal wall     2014    Essential (primary) hypertension     12/14/2006    Gout     No Comments Provided    Hyperlipidemia     4/4/2007    Localized swelling, mass, or lump of upper extremity     5/26/2017    Other specified counseling     10/28/2013,Patient has identified Health Care Agent(s): Yes Add Health Care Agents: Yes   Health Care Agent(s): Primary Health Care Agent: Jay Batista  Relationship:  Phone:   Secondary Health Care Agent:  Relationship: Daughter Phone:   Conservator:  Relationship:  Phone:   Guardian: Relationship:  Phone:    Patient has Advance Care Plan Documents (Health Care Directive, POLST): No, refe*    Polyosteoarthritis     No Comments Provided    Postmenopausal bleeding     No Comments Provided    Presence of coronary angioplasty implant and graft     2007/2013,unstable angina; severe prox LAD stenosis; s/p 7 stents    Primary osteoarthritis of left hand     5/30/2017    Rheumatoid arthritis (H)     follows at [place] yearly        Code Status:  Prior     Falls Risk: Yes Band: Applied    Current Living Situation/Residence: lives with a significant other     Elimination Status: Continent: Yes     Activity Level: Bedrest; fractured right hip    Patients  "Preferred Language:  English     Needed: No    Vital Signs:  BP (!) 181/96   Pulse 65   Temp 97.6  F (36.4  C) (Oral)   Resp 26   Ht 1.702 m (5' 7\")   Wt 76.7 kg (169 lb)   LMP 01/01/1998 (Within Months)   SpO2 98%   BMI 26.47 kg/m       Cardiac Rhythm:     Pain Score: 3/10    Is the Patient Confused:  No    Last Food or Drink: 02/17/25    Focused Assessment:  Neuro, ortho    Tests Performed: Done: Labs and Imaging    Treatments Provided:  medication given for pain control    Family Dynamics/Concerns: No    Family Updated On Visitor Policy: Yes    Plan of Care Communicated to Family: Yes    Who Was Updated about Plan of Care: patient and spouse    Belongings Checklist Done and Signed by Patient: No    Medications sent with patient: none    Covid: asymptomatic , not tested    Additional Information: Patient has 2 Ivs; one in right AC and one in left AC.     Savana Cervantes RN 2/17/2025 7:36 PM      "

## 2025-02-18 NOTE — ED NOTES
Patient had large episode of emesis; greater than 300 mLs, after being moved for shoulder image. Patient cleaned and new gown placed. Purewick also placed. Zofran given. Pain remains 3/10 when not moving. EDT cleaned patient's head wounds.

## 2025-02-18 NOTE — OP NOTE
PATIENT: Margaret Batista  MR# :   [id number]  DATE OF OPERATION: 02/18/25    SURGEON:  Jb Alston MD     ASSISTANT: KENNEDY Medina     A skilled assistant was required due to the nature of the case for patient position, retraction, exposure, implant placement, closure and dressing application.      Preoperative diagnosis:   Right displaced intertrochanteric femur fracture  Right minimally displaced proximal humerus periprosthetic fracture    Postoperative diagnosis:   Right displaced intertrochanteric femur fracture  Right minimally displaced proximal humerus periprosthetic fracture    Surgical procedure:   Closed reduction and cephalomedullary nail right intertrochanteric femur fracture  Closed treatment of minimally displaced right periprosthetic proximal humerus fracture    Anesthesia:  General with regional block    Drains: None    Estimated blood loss: 300 cc    IV fluids: Please see Anesthesia Report    Complications: None identified intraoperatively     Blood products: none given     Specimens: * No specimens in log *    Findings: Displaced intertrochanteric proximal femur fracture, no obvious pathologic lesions    COMPONENTS IMPLANTED:  Implant Name Type Inv. Item Serial No.  Lot No. LRB No. Used Action   IMP NAIL SYN CAN FEM PROX TFNA [id number] 130D RT 04.037.160S - [id number] [employer] IMP NAIL SYN CAN FEM PROX TFNA [id number] 130D RT 04.037.160S  [employer] [id number] Right 1 Implanted   IMP SCR SYN TFNA FENESTRATED LAG 105MM 04.038.205S - [id number] [employer] IMP SCR SYN TFNA FENESTRATED LAG 105MM 04.038.205S  [employer] [id number] Right 1 Implanted   SCREW BN 48MM 5MM LCK X25 STRL LF IM NL 04.045.048TS - [id number] [employer] SCREW BN 48MM 5MM LCK X25 STRL LF IM NL 04.045.048TS  [employer] [id number] Right 1 Implanted   SCREW BN 54MM 5MM LCK X25 STRL LF IM NL 04.045.054TS - [id number] Metallic [employer]ware/[place] SCREW BN 54MM 5MM LCK  X25 STRL [facility] NL 04.045.054TS  [employer] [id number] Right 1 Implanted       INDICATIONS:    Margaret Batista is a 79 year old female admitted for a right periprosthetic proximal humerus fracture and right displaced intertrochanteric femur fracture.  She has a history of multiple myeloma and recent diagnosis of renal cell carcinoma however CT scan did not show any evidence of pathologic fracture.  No lytic lesions were seen on the scan.  Therefore, given the displacement of the femur fracture, we discussed proceeding with a right hip cephalomedullary nailing.  Her right periprosthetic humerus fracture was minimally displaced and therefore we discussed conservative treatment with a sling for comfort.    Preoperatively, the nature of the procedure, risks and benefits, as well as alternatives including nonsurgical management were discussed in detail with the patient. I reviewed and discussed the patient's condition and relevant images with the patient. We discussed options for further evaluation and treatment, including conservative non-operative management versus surgical intervention.      We also discussed at length the risks and benefits of surgery. Our discussion included but was not limited to the risk of pain, bleeding, infection, blood clots (DVT, PE), wound issues, continued cpain, post-operative joint stiffness, painful arc of motion, difficulty with ambulation, damage to nearby neurovascular structures, and need for further surgeries. The possibility of intra-operative and/or post-operative medical complications such as anesthesia complications or reactions, respiratory and cardiovascular events, stroke, heart attack and/or death were also discussed.     Specific details of the surgical procedure, hospitalization, recovery, rehabilitation, and long-term precautions were also presented. The final choices will be made at the time of procedure to match the anatomy and condition of the bone, ligaments,  tendons, and muscles. All of patients questions were answered preoperatively at which time informed consent was taken.      PROCEDURE:    After proper identification of the patient including verification and marking of the surgical site, the patient was brought to the operating room.  General anesthesia was given without complications and prophylactic IV Ancef was confirmed to have been administered within the appropriate timeframe(s). The patient was placed in the supine position on the [place] table with All bony prominences were well padded.  Under fluoroscopic guidance, a reduction maneuver of inline traction, slight internal rotation and adduction was performed.  AP and lateral x-rays were taken which ensured appropriate reduction of the fracture.  The surgical site which was properly marked, was then prepped and draped in the usual sterile fashion. A timeout was done utilizing protocol to again identify the correct patient and operative site, which was marked appropriately.    A small poke incision was made proximal to the tip of the greater trochanter.  The guidewire was then advanced just medial to the tip of the greater trochanter however this was quite difficult as the greater trochanter had significant comminution and displacement.  A coring reamer was then used to open the proximal femur.  A ball-tipped guidewire was then passed down the entirety of the femur as the decision was made to place a long cephalomedullary nail given her metastatic history.  I proceeded to ream to a 12.5 mm reamer and measured for a 400 mm nail.  An 11 x 400 millimeter nail was then placed.  The nail was then advanced to the desired depth.  I proceeded to make an incision for the cephalomedullary screw.  The triple sleeve was placed and advanced down to the lateral cortex.  A guidewire was then passed along the inferior aspect of the neck.  Appropriate position was confirmed on AP and lateral x-rays.  I then placed 2 percutaneous  wires as antirotation wires within the head.  I then proceeded to drill and placed a 105 mm cephalomedullary screw.  The screw was advanced to minimize the tip apex distance.  I confirmed appropriate position on AP and lateral x-rays.  Then, utilizing a perfect [place] technique, 2 distal interlocking screws were then placed.  The jig was removed and final AP and lateral x-rays were taken.  Wounds were copiously irrigated and closed with 0 Vicryl, 2-0 Vicryl and 3-0 Monocryl sutures along with Dermabond.  Sterile dressings were placed.    The patient was then awakened from anesthesia and transferred the PACU in stable condition.    Sponge, needle, and instrument counts were correct at the conclusion of the procedure. The patient has palpable distal pulses in both lower extremity.     Postoperative Plan:  Pain Control: Continue per pain protocol.  Weight Bearing: Weightbearing as tolerated with the assistance of a walker.  DVT Prophylaxis: Will plan for Lovenox starting on postop day 1 given cancer history.  Would recommend discharge on DOAC x 2 weeks pending risk stratification.  Antibiotics: 24 hours of perioperative antibiotics  GI: Plan for aggressive bowel regimen to prevent constipation from narcotic medications  Lines: HLIV once tolerating PO  PT/OT: Eval and treatment. Will follow up on recommendations.  Follow up:  in 2 weeks in clinic for wound check  Discharge plan: Likely to TCU pending progress with PT    Dispo: stable to pacu    Jb Alston MD  Orthopedic Surgery  [facility]

## 2025-02-18 NOTE — ANESTHESIA PROCEDURE NOTES
"Femoral Procedure Note    Pre-Procedure   Staff -        Anesthesiologist:  Christopher Mcconnell MD       Performed By: anesthesiologist       Procedure Start/Stop Times: 2/18/2025 12:15 PM       Pre-Anesthestic Checklist: patient identified, IV checked, site marked, risks and benefits discussed, informed consent, monitors and equipment checked, pre-op evaluation, at physician/surgeon's request and post-op pain management  Timeout:       Correct Patient: Yes        Correct Procedure: Yes        Correct Site: Yes        Correct Position: Yes        Correct Laterality: Yes        Site Marked: Yes  Procedure Documentation  Procedure: Femoral         Laterality: right       Patient Position: supine       Skin prep: Chloraprep       Needle Type: short bevel       Needle Gauge: 20.        Needle Length (Inches): 6        Ultrasound guided       1. Ultrasound was used to identify targeted nerve, plexus, vascular marker, or fascial plane and place a needle adjacent to it in real-time.       2. Ultrasound was used to visualize the spread of anesthetic in close proximity to the above referenced structure.       3. A permanent image is entered into the patient's record.       4. The visualized anatomic structures appeared normal.       5. There were no apparent abnormal pathologic findings.    Assessment/Narrative         The placement was negative for: blood aspirated, painful injection and site bleeding       Paresthesias: No.       Bolus given via needle..        Secured via.        Insertion/Infusion Method: Single Shot       Complications: none       Injection made incrementally with aspirations every 5 mL.    Medication(s) Administered   Ropivacaine 0.5% PF (Infiltration) - Infiltration   25 mL - 2/18/2025 12:15:00 PM  Medication Administration Time: 2/18/2025 12:15 PM      FOR [facility] ([facility] [place]) ONLY:   Pain Team Contact information: please page the Pain Team Via Zong. Search \"Pain\". During daytime hours, please page " the attending first. At night please page the resident first.

## 2025-02-18 NOTE — PHARMACY-ADMISSION MEDICATION HISTORY
Pharmacist Admission Medication History    Admission medication history is complete. The information provided in this note is only as accurate as the sources available at the time of the update.    Information Source(s): Patient, CareEverywhere/SureScripts, and family members  via in-person    Pertinent Information:   Alendronate - new rx issued 12/4/25; patient reports hasn't started taking yet. Left on list as family expressed interest in starting medication.    Changes made to PTA medication list:  Added: None  Deleted: None  Changed:   Tylenol 975 mg TID -> takes 1000 mg BID PRN pain  Albuterol 2 puffs (added PRN frequency/indication)  Cetirizine daily -> takes at bedtime   Plaquenil 400 mg daily alternating with 200 mg daily -> changed to 400 mg every other day + 200 mg every other day (two separate order entries to facilitate inpatient ordering)    Allergies reviewed with patient and updates made in EHR: yes    Medication History Completed By: Nicollette McMann, RPH 2/17/2025 8:28 PM    PTA Med List   Medication Sig Note Last Dose/Taking    acetaminophen (TYLENOL) 500 MG tablet Take 1,000 mg by mouth 2 times daily as needed for mild pain.  2/17/2025 Morning    albuterol (PROAIR HFA/PROVENTIL HFA/VENTOLIN HFA) 108 (90 Base) MCG/ACT inhaler Inhale 2 puffs into the lungs every 6 hours as needed for wheezing, shortness of breath or cough.  Taking As Needed    alendronate (FOSAMAX) 70 MG tablet Take 1 tablet (70 mg) by mouth every 7 days. 2/17/2025: New rx issued 12/4/2024; patient reports she hasn't started taking yet. Taking    allopurinol (ZYLOPRIM) 300 MG tablet Take 1 tablet (300 mg) by mouth daily.  2/17/2025 Morning    aspirin (ASA) 81 MG chewable tablet Take 1 tablet (81 mg) by mouth daily  2/17/2025 Morning    atenolol (TENORMIN) 100 MG tablet Take 1 tablet (100 mg) by mouth daily.  2/17/2025 Morning    atorvastatin (LIPITOR) 80 MG tablet TAKE 1 TABLET DAILY  2/17/2025 Morning    cetirizine (ZYRTEC) 5 MG  tablet Take 5 mg by mouth at bedtime.  2/16/2025 Bedtime    DULoxetine (CYMBALTA) 20 MG capsule Take 1 capsule (20 mg) by mouth daily.  2/17/2025 Morning    folic acid (FOLVITE) 1 MG tablet TAKE 1 TABLET DAILY  2/17/2025 Morning    hydroxychloroquine (PLAQUENIL) 200 MG tablet Take 400 mg by mouth every other day.  2/16/2025 Morning    hydroxychloroquine (PLAQUENIL) 200 MG tablet Take 200 mg by mouth daily.  2/17/2025 Morning    isosorbide mononitrate (IMDUR) 30 MG 24 hr tablet TAKE 1 TABLET DAILY  2/17/2025 Morning    methotrexate 2.5 MG tablet Take 20 mg by mouth once a week. On Mondays 2/17/2025 Morning    nitroGLYcerin (NITROSTAT) 0.4 MG sublingual tablet Place 1 tablet (0.4 mg) under the tongue every 5 minutes as needed for chest pain. (For chest pain x 3 doses)  Taking As Needed    polyethylene glycol (MIRALAX) 17 GM/Dose powder Take 17 g by mouth daily as needed  Taking As Needed

## 2025-02-18 NOTE — H&P (VIEW-ONLY)
ORTHOPEDIC CONSULTATION    Consultation  Margaret Batista,  1945, MRN [id number]    Pain of right upper arm [M79.621]  Scalp laceration, initial encounter [S01.01XA]  Hip fracture, right, closed, initial encounter (H) [S72.001A]  Closed fracture of proximal end of right humerus, unspecified fracture morphology, initial encounter [S42.201A]    PCP: Swetha Maxwell, [phone]   Code status:  No CPR- Do NOT Intubate       Extended Emergency Contact Information  Primary Emergency Contact: Jay Batista  Address: [address] [place] [address] [facility]  Home Phone: [phone]  Mobile Phone: [phone]  Relation: Spouse  Secondary Emergency Contact: Cheryl Coppola  Address: [address] [place] [address] [facility]  Mobile Phone: [phone]  Relation: Daughter         IMPRESSION: Comminuted, angulated, and displaced intertrochanteric fracture of the right hip       PLAN:  This patient was discussed with Dr. Milian, on-call surgeon for [facility] and they are in agreement with the following plan.   - Discussed treatment options and patient would likely benefit from a right hip ORIF with [facility]. Discussed risks of procedure including but not limited to, injury to nerves, arteries or veins, malunion, nonunion, periprosthetic fracture, DVT or even death with the patient and they are in agreement with proceeding.  - Will take to the OR later today for right proximal femur ORIF with CMN with Dr. Jb Alston.  - Type & Screen, order placed  - Anesthesia to place block for improved pain control  - Medical optimization per Hospitalist. Hgb 11.2. INR 1.06.  - Pain control. Currently well controlled on IV dilaudid.   - NWB right lower extremity.      Thank you for including [facility] in the care of Margaret Batista. It has been a pleasure participating in their care.      CHIEF COMPLAINT: Right hip fracture      HISTORY OF PRESENT  ILLNESS:  The patient is seen in orthopedic consultation at the request of Tod Corona MD. The patient is a 79 year old female with c/o right hip pain. She presented to the ED last night after a mechanical fall at home. Patient was attempting to put on her shoes when she lost her balance and fell onto her right hip. She did hit her head on a cabinet, resulting in a hematoma to her forehead, but she denies LOC. She was unable to weight bear after the fall. Pain localized to the right hip and does radiate into the right thigh. She reports baseline bilateral peripheral neuropathy, right worse than left, but denies new numbness or tingling in the right lower extremity. She denies chest pain, shortness or breath, nausea, lightheadedness, dizziness this morning. Right hip pain currently controlled with IV Dilaudid. Her  and daughter were present for today visit.     Patient also reports right shoulder pain after the fall. History of right shoulder arthroplasty and x-rays of the right shoulder obtained yesterday demonstrate an acute appearing, comminuted, mildly displaced periprosthetic fracture of the right proximal humerus. This was discussed with Dr. Alston and it is recommended the patient remain in her sling at all times and NWB to the right upper extremity. Gentle elbow and wrist ROM is okay. Her shoulder surgery was not done by [facility], and it recommended the patient follow up with her surgeon after discharge.     PAST MEDICAL HISTORY:     Past Medical History:   Diagnosis Date    Atherosclerotic heart disease of native coronary artery without angina pectoris     -s/p ADÁN to the mid-LAD, mid-circumflex, mid-right coronary artery, proximal right coronary  artery, and PTCA of a marginal branch of the right coronary artery 04/30/2007. -s/p ADÁN to proximal left anterior descending 10/29/13.    Contusion of abdominal wall     2014    Essential (primary) hypertension     12/14/2006    Gout      No Comments Provided    Hyperlipidemia     4/4/2007    Localized swelling, mass, or lump of upper extremity     5/26/2017    Other specified counseling     10/28/2013,Patient has identified Health Care Agent(s): Yes Add Health Care Agents: Yes   Health Care Agent(s): Primary Health Care Agent: Jay Batista  Relationship:  Phone:   Secondary Health Care Agent:  Relationship: Daughter Phone:   Conservator:  Relationship:  Phone:   Guardian: Relationship:  Phone:    Patient has Advance Care Plan Documents (Health Care Directive, POLST): No, refe*    Polyosteoarthritis     No Comments Provided    Postmenopausal bleeding     No Comments Provided    Presence of coronary angioplasty implant and graft     2007/2013,unstable angina; severe prox LAD stenosis; s/p 7 stents    Primary osteoarthritis of left hand     5/30/2017    Rheumatoid arthritis (H)     follows at [facility] yearly       ALLERGIES:   Plavix [clopidogrel], Enalapril, Food, and Losartan    MEDICATIONS ON ADMISSION:  Medications were reviewed.  They do include:   Medications Prior to Admission   Medication Sig Dispense Refill Last Dose/Taking    acetaminophen (TYLENOL) 500 MG tablet Take 1,000 mg by mouth 2 times daily as needed for mild pain.   2/17/2025 Morning    albuterol (PROAIR HFA/PROVENTIL HFA/VENTOLIN HFA) 108 (90 Base) MCG/ACT inhaler Inhale 2 puffs into the lungs every 6 hours as needed for wheezing, shortness of breath or cough.   Taking As Needed    alendronate (FOSAMAX) 70 MG tablet Take 1 tablet (70 mg) by mouth every 7 days. 14 tablet 4 Taking    allopurinol (ZYLOPRIM) 300 MG tablet Take 1 tablet (300 mg) by mouth daily. 90 tablet 4 2/17/2025 Morning    aspirin (ASA) 81 MG chewable tablet Take 1 tablet (81 mg) by mouth daily 90 tablet 3 2/17/2025 Morning    atenolol (TENORMIN) 100 MG tablet Take 1 tablet (100 mg) by mouth daily. 90 tablet 4 2/17/2025 Morning    atorvastatin (LIPITOR) 80 MG tablet TAKE 1 TABLET DAILY 90 tablet 4 2/17/2025  Morning    cetirizine (ZYRTEC) 5 MG tablet Take 5 mg by mouth at bedtime.   2/16/2025 Bedtime    DULoxetine (CYMBALTA) 20 MG capsule Take 1 capsule (20 mg) by mouth daily. 90 capsule 4 2/17/2025 Morning    folic acid (FOLVITE) 1 MG tablet TAKE 1 TABLET DAILY 90 tablet 3 2/17/2025 Morning    hydroxychloroquine (PLAQUENIL) 200 MG tablet Take 400 mg by mouth every other day.   2/16/2025 Morning    hydroxychloroquine (PLAQUENIL) 200 MG tablet Take 200 mg by mouth daily.   2/17/2025 Morning    isosorbide mononitrate (IMDUR) 30 MG 24 hr tablet TAKE 1 TABLET DAILY 90 tablet 3 2/17/2025 Morning    methotrexate 2.5 MG tablet Take 20 mg by mouth once a week. On Mondays 2/17/2025 Morning    nitroGLYcerin (NITROSTAT) 0.4 MG sublingual tablet Place 1 tablet (0.4 mg) under the tongue every 5 minutes as needed for chest pain. (For chest pain x 3 doses) 30 tablet 3 Taking As Needed    polyethylene glycol (MIRALAX) 17 GM/Dose powder Take 17 g by mouth daily as needed 510 g 1 Taking As Needed       SOCIAL HISTORY:    reports that she has never smoked. She has been exposed to tobacco smoke. She has never used smokeless tobacco. She reports that she does not currently use alcohol. She reports that she does not use drugs.     FAMILY HISTORY:  Family History   Problem Relation Age of Onset    Heart Disease Mother         Heart Disease    Hypertension Mother         Hypertension    Other - See Comments Mother         Stroke    Breast Cancer Mother 53        Cancer-breast    Arthritis Father         Arthritis    Cancer Father         Cancer    Heart Disease Father         Heart Disease    Hypertension Father         Hypertension    Atrial fibrillation Sister     Melanoma Brother     Heart Disease Brother         Heart Disease       REVIEW OF SYSTEMS:   See Admission History and Physical     Clinically Significant Risk Factors Present on Admission      # Drug Induced Platelet Defect: home medication list includes an antiplatelet  "medication    # Overweight: Estimated body mass index is 26.65 kg/m  as calculated from the following:    Height as of this encounter: 1.702 m (5' 7.01\").    Weight as of this encounter: 77.2 kg (170 lb 3.1 oz).   #Dexa: Pt has a dexa order within past 6 months.   #WBC includes abnormal value:  Recent labs: (last 2 weeks)     02/17/25  2349 02/18/25  0535   WBC 19.4* 17.7*     Risk Factors for Delirium:  - Pt has elevated WBC >11 within the past 24 hours.    #Cognitive impairment:  Noted on problem list     PHYSICAL EXAMINATION:  General: On examination, the patient is resting comfortably, NAD and awake, lying supine in bed and oriented to person, place and time   SKIN: Ecchymosis noted over right hip, skin intact  Pulses:  Palpable bilateral dorsalis pedis pulses.  Sensation: intact and equal bilaterally to the distal lower extremities, feet and toes.  Tenderness: Right lateral hip tenderness noted. No tenderness noted in calfs bilaterally  ROM: Equal ankle plantar and dorsiflexion, moves all toes bilaterally. Unable to lift leg off bed due to pain. Right hip ROM deferred due to known fracture.   Motor:  +5/5 ankle DF, PF, EHL bilaterally.  Right hip and knee not assessed due to fracture. Contralateral hip flexion and knee extension +5/5.   Extremity: Right lower extremity externally rotated & shortened, thigh & calf compartments soft    Temp:  [97.6  F (36.4  C)-98.6  F (37  C)] 98  F (36.7  C)  Pulse:  [60-70] 68  Resp:  [11-34] 20  BP: (164-218)/() 200/87  SpO2:  [93 %-100 %] 97 %    RADIOGRAPHIC EVALUATION:  Radiographs personally reviewed.    CT HIP RIGHT W/O CONTRAST  LOCATION: [facility]  DATE: 2/17/2025     INDICATION: right prox femur fx and lucencies seen on hip xray  COMPARISON: 9/16/2024.  TECHNIQUE: Noncontrast. Axial, sagittal and coronal thin-section reconstruction. Dose reduction techniques were used.      FINDINGS:      BONES:  -The bones are mildly demineralized. " "There is a comminuted intertrochanteric fracture of the right hip with multiple fragments of the greater trochanter present. No definitive osteolytic lesion of the greater trochanter or proximal hip is identified. The   femoral head is intact and seated within the acetabula. Remainder of the proximal femur is normal with no significant periosteal erosive or destructive changes identified. Included bony pelvis is intact. Mild SI joint degeneration.     SOFT TISSUES:  -Soft tissue swelling surrounds the right hip fracture..                                                                      IMPRESSION:  1.  Comminuted intertrochanteric fracture of the right hip with multiple fragments of the greater trochanter present.  2.  No definitive osteolytic lesion of the greater trochanter or proximal femur is identified.        XR FEMUR RIGHT 2 VIEWS  LOCATION: [facility]  DATE: 2/17/2025     INDICATION: prox femur fx seen on hip xray  COMPARISON: None.                                                                      IMPRESSION: Comminuted, angulated, and displaced intertrochanteric fracture right hip. The distal femur is intact. Right knee arthroplasty. Vascular calcifications.      LABORATORY DATA:     Lab Results   Component Value Date    INR 1.06 07/19/2024       Lab Results   Component Value Date    WBC 17.7 02/18/2025    WBC 11.1 06/18/2021     Lab Results   Component Value Date    RBC 3.56 02/18/2025    RBC 4.83 06/18/2021     Lab Results   Component Value Date    HGB 11.2 02/18/2025    HGB 14.8 06/18/2021     Lab Results   Component Value Date    HCT 34.5 02/18/2025    HCT 46.0 06/18/2021     No components found for: \"MCT\"  Lab Results   Component Value Date    MCV 97 02/18/2025    MCV 95 06/18/2021     Lab Results   Component Value Date    MCH 31.5 02/18/2025    MCH 30.6 06/18/2021     Lab Results   Component Value Date    MCHC 32.5 02/18/2025    MCHC 32.2 06/18/2021     Lab Results "   Component Value Date    RDW 16.2 02/18/2025    RDW 14.6 06/18/2021     Lab Results   Component Value Date     02/18/2025     06/18/2021         Mustapha Pena PA-C  [facility]

## 2025-02-18 NOTE — PLAN OF CARE
Problem: Adult Inpatient Plan of Care  Goal: Absence of Hospital-Acquired Illness or Injury  Outcome: Progressing  Intervention: Identify and Manage Fall Risk  Recent Flowsheet Documentation  Taken 2/18/2025 0300 by Karishma Isaacs RN  Safety Promotion/Fall Prevention:   activity supervised   lighting adjusted   safety round/check completed   room near nurse's station  Intervention: Prevent and Manage VTE (Venous Thromboembolism) Risk  Recent Flowsheet Documentation  Taken 2/18/2025 0300 by Karishma Isaacs RN  VTE Prevention/Management: SCDs off (sequential compression devices)  Intervention: Prevent Infection  Recent Flowsheet Documentation  Taken 2/18/2025 0300 by Karishma Isaacs RN  Infection Prevention: rest/sleep promoted  Goal: Optimal Comfort and Wellbeing  Outcome: Progressing  Goal: Readiness for Transition of Care  Intervention: Mutually Develop Transition Plan  Recent Flowsheet Documentation  Taken 2/18/2025 0253 by Karishma Isaacs RN  Equipment Currently Used at Home:   cane, straight   walker, rolling     Problem: Orthopaedic Fracture  Goal: Optimal Pain Control and Function  Outcome: Progressing     Problem: Orthopaedic Fracture  Goal: Absence of Embolism Signs and Symptoms  Intervention: Prevent or Manage Embolism Risk  Recent Flowsheet Documentation  Taken 2/18/2025 0300 by Karishma Isaacs RN  VTE Prevention/Management: SCDs off (sequential compression devices)     Problem: Chronic Kidney Disease  Goal: Electrolyte Balance  Outcome: Progressing     Problem: Comorbidity Management  Goal: Blood Pressure in Desired Range  Outcome: Progressing  Intervention: Maintain Blood Pressure Management  Recent Flowsheet Documentation  Taken 2/18/2025 0300 by Karishma Isaacs RN  Medication Review/Management: medications reviewed     Goal Outcome Evaluation:  Pt is A&Ox4. Pain managed with PRN 0.3mg IV dilaudid & a one time schedule 0.3mg IV dilaudid. Nausea was managed with PRN 4mg IV Zofran. Proved effective. On tele, INA DOYLE @ [facility].  Strict bedrest maintained. Sling on R am. 3 staples on L scalp is intact. Pt unable to void, bladder scan volume - 553ml. [facility] notified. One time straight cath ordered for UA collection. Straight cath output -600. UA sent to lab. CHG wipes done & linen changed. Awaiting to go down for surgery in AM.

## 2025-02-18 NOTE — ANESTHESIA POSTPROCEDURE EVALUATION
Patient: Margaret Batista    Procedure: Procedure(s):  OPEN REDUCTION INTERNAL FIXATION, FRACTURE, FEMUR, PROXIMAL       Anesthesia Type:  MAC    Note:  Disposition: Inpatient   Postop Pain Control: Uneventful            Sign Out: Well controlled pain   PONV: No   Neuro/Psych: Uneventful            Sign Out: Acceptable/Baseline neuro status   Airway/Respiratory: Uneventful            Sign Out: Acceptable/Baseline resp. status   CV/Hemodynamics: Uneventful            Sign Out: Acceptable CV status; No obvious hypovolemia; No obvious fluid overload   Other NRE: NONE   DID A NON-ROUTINE EVENT OCCUR? No           Last vitals:  Vitals Value Taken Time   /69 02/18/25 1445   Temp 36.2  C (97.16  F) 02/18/25 1453   Pulse 75 02/18/25 1453   Resp 14 02/18/25 1453   SpO2 97 % 02/18/25 1453   Vitals shown include unfiled device data.    Electronically Signed By: Christopher Mcconnell MD  February 18, 2025  2:54 PM

## 2025-02-18 NOTE — PROGRESS NOTES
[facility]    Medicine Progress Note - Hospitalist Service    Date of Admission:  2/17/2025    Assessment & Plan   Margaret Batista is a 79 year old female with history of coronary artery disease, hyperlipidemia, hypertension, rheumatoid arthritis, stage III CKD, hypertension, prior CVA, suspected renal cell carcinoma, TIA/CVA and monoclonal B-cell type lymphocytosis admitted with right displaced intertrochanteric fracture and mildly displaced periprosthetic fracture of the right proximal humerus.     Orthopedic surgery service performed surgical repair of right intertrochanteric fracture on 2/18/2025.  Repair of right periprosthetic fracture of the proximal humerus was deferred and being conservatively managed with a shoulder sling.      Right displaced intertrochanteric fracture  Mildly displaced periprosthetic fracture of the right proximal humerus.  Orthopedic surgery service performed surgical repair of right intertrochanteric fracture on 2/18/2025.  Repair of right periprosthetic fracture of the proximal humerus was deferred and being conservatively managed with a shoulder sling.    Analgesics as needed  Postoperative surgical care per orthopedic surgery service  DVT prophylaxis per orthopedic surgery service  PT/OT  Orthopedic surgery [id number] assistance    Stage III CKD  Creatinine is at baseline  Monitor BMP  Avoid nephrotoxin    Leukocytosis  Possibly secondary to induced demargination  Monitor CBC  Consider starting antibiotics if there is evidence of occult infection.    Suspected renal cell carcinoma  Multiple myeloma   Patient wants to follow up with her primary oncologist  Outpatient follow-up with her primary oncologist    Rheumatoid arthritis  Resume PTA hydroxychloroquine in the next few days  Hold PTA methotrexate for now    Hyperlipidemia  Atorvastatin 80 mg daily    Prior CVA  Resume PTA aspirin once cleared by orthopedic surgery service  Continue PTA  "atorvastatin  PT/OT    History of CAD  Resume PTA aspirin once cleared by orthopedic surgery service  Continue PTA atorvastatin  Resume PTA Imdur  Sublingual nitroglycerin as needed      Diet: Advance Diet as Tolerated: Regular Diet Adult    DVT Prophylaxis: Pneumatic Compression Devices  Slaughter Catheter: Not present  Lines: None     Cardiac Monitoring: None  Code Status: No CPR- Do NOT Intubate      Clinically Significant Risk Factors Present on Admission                 # Drug Induced Platelet Defect: home medication list includes an antiplatelet medication   # Hypertension: Noted on problem list           # Overweight: Estimated body mass index is 26.65 kg/m  as calculated from the following:    Height as of this encounter: 1.702 m (5' 7.01\").    Weight as of this encounter: 77.2 kg (170 lb 3.1 oz).              Social Drivers of Health    Alcohol Use: Unknown (5/6/2022)    Received from [facility], [facility]    AUDIT-C     Frequency of Alcohol Consumption: Monthly or less     Frequency of Binge Drinking: Never   Physical Activity: Insufficiently Active (10/17/2024)    Exercise Vital Sign     Days of Exercise per Week: 2 days     Minutes of Exercise per Session: 20 min   Social Connections: Unknown (10/17/2024)    Social Connection and Isolation Panel [NHANES]     Frequency of Social Gatherings with Friends and Family: Three times a week          Disposition Plan     Medically Ready for Discharge: Anticipated in 2-4 Days    Tod William MD  Hospitalist Service  [facility]  Securely message with [employer] (more info)  Text page via [facility] Paging/Directory   ______________________________________________________________________    Interval History   Seen postoperatively on the floor.  Pain is well-controlled.  Spouse at bedside.  She plans to follow-up at [facility] for further management of rheumatoid arthritis, multiple myeloma, and newly diagnosed renal cell carcinoma.    Physical Exam "   Vital Signs: Temp: 97.7  F (36.5  C) Temp src: Oral BP: 133/68 Pulse: 69   Resp: 16 SpO2: 93 % O2 Device: None (Room air) Oxygen Delivery: 2 LPM  Weight: 170 lbs 3.12 oz    General appearance: Awake, Alert, Cooperative, not in any obvious distress and appears stated age   HEENT: Normocephalic, atraumatic, conjunctiva clear without icterus and ears without discharge  Lungs: Clear to auscultation bilaterally, no wheezing, good air exchange, normal work of breathing  Cardiovascular: Regular Rate and Rythm, normal apical impulse, normal S1 and S2, no lower extremity edema bilaterally  Abdomen: Soft, non-tender and Non-distended, active bowel sounds  Skin: Skin color, texture normal and bruising or bleeding. No rashes or lesions over face, neck, arms and legs, turgor normal.  Musculoskeletal: Right shoulder sling in place. Diminished ROM in the right shoulder and hip  Neurologic: Alert & Oriented X 3, Facial symmetry preserved and upper & lower extremities moving well with symmetry  Psychiatric: Calm, normal eye contact and normal affect      Medical Decision Making       45 MINUTES SPENT BY ME on the date of service doing chart review, history, exam, documentation & further activities per the note.      Data   Imaging results reviewed over the past 24 hrs:   Recent Results (from the past 24 hours)   CT Head w/o Contrast    Narrative    EXAM: CT HEAD W/O CONTRAST  LOCATION: [facility]  DATE: 2/17/2025    INDICATION: fell and struck head onto corner of cabinet  COMPARISON: None.  TECHNIQUE: Routine CT Head without IV contrast. Multiplanar reformats. Dose reduction techniques were used.    FINDINGS:  INTRACRANIAL CONTENTS: No intracranial hemorrhage, extraaxial collection, or mass effect.  No CT evidence of acute infarct. Moderate presumed chronic small vessel ischemic changes. Mild to moderate generalized volume loss. No hydrocephalus.     Left cerebral sulci and left anterior circular sulcus  demonstrates several punctate calcifications likely calcified embolic process plaque. Chronic infectious inflammatory process possible.    VISUALIZED ORBITS/SINUSES/MASTOIDS: No intraorbital abnormality. No paranasal sinus mucosal disease. No middle ear or mastoid effusion.    BONES/SOFT TISSUES: Right frontal scalp hematoma. Smaller left scalp hematoma. No acute displaced calvarial fracture.    Bilateral TMJ degenerative joint disease.      Impression    IMPRESSION:  1.  No acute intracranial process.    2.  Chronic intracranial changes described above.   XR Pelvis and Hip Right 2 Views    Narrative    EXAM: XR PELVIS AND HIP RIGHT 2 VIEWS  LOCATION: [facility]  DATE: 2/17/2025    INDICATION: Fell onto right side and has pain in right hip. Right leg is externally rotated and shortened.  COMPARISON: 09/16/2024, 06/08/2023.      Impression    IMPRESSION: Acute, displaced and angulated right proximal femoral fracture.    Contralateral left total hip replacement.    Bones are demineralized.    Pathologic fracture is not excluded as there is some vague lucency along the fracture site as well as a partially visualized area of lucency within the proximal femoral shaft measuring approximately 9.6 cm in length. These findings raise concern for   multiple myeloma as well as metastatic disease, which should be excluded.    NOTE: ABNORMAL REPORT    THE DICTATION ABOVE DESCRIBES AN ABNORMALITY FOR WHICH FOLLOW-UP IS NEEDED.    Humerus XR, G/E 2 views, right    Narrative    EXAM: XR HUMERUS RIGHT G/E 2 VIEWS  LOCATION: [facility]  DATE: 02/17/2025    INDICATION: Fell onto right side and has pain from humeral head to olecranon.  COMPARISON: None.      Impression    IMPRESSION: Postop changes of glenohumeral joint replacement. There is bone formation along the greater tuberosity of the humerus without a well-defined fracture lucency on these views, favor chronic although dedicated  right shoulder radiographs could   assess further if there is clinical concern for fracture in this region. Subacromial spur. Bone demineralization.    No evidence of an acute displaced distal right humeral fracture.   XR Shoulder Right 3 Views    Narrative    EXAM: XR SHOULDER RIGHT G/E 3 VIEWS  LOCATION: [facility]  DATE: 2/17/2025    INDICATION: fell today and has pain in right upper arm. suspicious area seen on humerus xray in proximal humerus  COMPARISON: Humerus radiographs from the same date      Impression    IMPRESSION:     Status post right total shoulder arthroplasty. There is an acute appearing, comminuted, mildly displaced periprosthetic fracture of the right proximal humerus.     No dislocation. Mild to moderate acromioclavicular degenerative arthrosis. Diffuse osseous demineralization.   CT Hip Right w/o Contrast    Narrative    EXAM: CT HIP RIGHT W/O CONTRAST  LOCATION: [facility]  DATE: 2/17/2025    INDICATION: right prox femur fx and lucencies seen on hip xray  COMPARISON: 9/16/2024.  TECHNIQUE: Noncontrast. Axial, sagittal and coronal thin-section reconstruction. Dose reduction techniques were used.     FINDINGS:     BONES:  -The bones are mildly demineralized. There is a comminuted intertrochanteric fracture of the right hip with multiple fragments of the greater trochanter present. No definitive osteolytic lesion of the greater trochanter or proximal hip is identified. The   femoral head is intact and seated within the acetabula. Remainder of the proximal femur is normal with no significant periosteal erosive or destructive changes identified. Included bony pelvis is intact. Mild SI joint degeneration.    SOFT TISSUES:  -Soft tissue swelling surrounds the right hip fracture..      Impression    IMPRESSION:  1.  Comminuted intertrochanteric fracture of the right hip with multiple fragments of the greater trochanter present.  2.  No definitive  "osteolytic lesion of the greater trochanter or proximal femur is identified.         XR Femur Right 2 Views    Narrative    EXAM: XR FEMUR RIGHT 2 VIEWS  LOCATION: [facility]  DATE: 2/17/2025    INDICATION: prox femur fx seen on hip xray  COMPARISON: None.      Impression    IMPRESSION: Comminuted, angulated, and displaced intertrochanteric fracture right hip. The distal femur is intact. Right knee arthroplasty. Vascular calcifications.   POC US Guidance Needle Placement    Narrative    Ultrasound was performed as guidance to an anesthesia procedure.  Click   \"PACS images\" hyperlink below to view any stored images.  For specific   procedure details, view procedure note authored by anesthesia.   XR Surgery SALLY Fluoro L/T 5 Min    Narrative    This exam was marked as non-reportable because it will not be read by a   radiologist or a [facility] non-radiologist provider.           "

## 2025-02-18 NOTE — ANESTHESIA CARE TRANSFER NOTE
Patient: Margaret Batista    Procedure: Procedure(s):  OPEN REDUCTION INTERNAL FIXATION, FRACTURE, FEMUR, PROXIMAL       Diagnosis: Hip fracture, right, closed, initial encounter (H) [S72.001A]  Diagnosis Additional Information: No value filed.    Anesthesia Type:   MAC     Note:    Oropharynx: oropharynx clear of all foreign objects and spontaneously breathing  Level of Consciousness: drowsy  Oxygen Supplementation: face mask  Level of Supplemental Oxygen (L/min / FiO2): 6  Independent Airway: airway patency satisfactory and stable  Dentition: dentition unchanged  Vital Signs Stable: post-procedure vital signs reviewed and stable  Report to RN Given: handoff report given  Patient transferred to: PACU    Handoff Report: Identifed the Patient, Identified the Reponsible Provider, Reviewed the pertinent medical history, Discussed the surgical course, Reviewed Intra-OP anesthesia mangement and issues during anesthesia, Set expectations for post-procedure period and Allowed opportunity for questions and acknowledgement of understanding      Vitals:  Vitals Value Taken Time   /63 02/18/25 1439   Temp 36.2  C (97.16  F) 02/18/25 1442   Pulse 75 02/18/25 1442   Resp 13 02/18/25 1442   SpO2 100 % 02/18/25 1442   Vitals shown include unfiled device data.    Electronically Signed By: JODEE Leung CRNA  February 18, 2025  2:44 PM

## 2025-02-18 NOTE — ED NOTES
Patient had another episode of vomiting. Additional dose of zofran given. Awaiting images and then transfer to floor.

## 2025-02-18 NOTE — ED NOTES
Nausea resolved and pain decreased to 2-3/10.Patient going to imaging and then transferring to floor.

## 2025-02-18 NOTE — INTERVAL H&P NOTE
"I have reviewed the surgical (or preoperative) H&P that is linked to this encounter, and examined the patient. There are no significant changes    Clinical Conditions Present on Arrival:  Clinically Significant Risk Factors Present on Admission                  # Drug Induced Platelet Defect: home medication list includes an antiplatelet medication      # Overweight: Estimated body mass index is 26.65 kg/m  as calculated from the following:    Height as of this encounter: 1.702 m (5' 7.01\").    Weight as of this encounter: 77.2 kg (170 lb 3.1 oz).     78 yo F who sustained a fall yesterday.  She landed on her right side injuring her right shoulder and hip.  She had immediate pain and inability to weight-bear.  She was brought to [facility] ED where x-rays showed a displaced intertrochanteric femur fracture.  She also was found to have a displaced proximal humerus fracture.  She has a history of prior arthroplasty done 10 years ago in [place].  She has a history of bilateral total knee replacements as well as a left total hip replacement done at outside facility.  She lives at home with her  who is here today as well as her daughter.  She has a history of multiple myeloma as well as a recently discovered renal mass.  She is not currently undergoing treatment for the renal mass but is scheduled to follow-up with oncology.    General: Alert female no acute distress  Right lower extremity: Right leg is shortened and externally rotated.  There is bruising over the hip.  Pain with any motion of the leg.  Patient able to flex and extend her toes.  Foot is warm well-perfused with 2+ dorsalis pedis pulse.    2 view x-ray right femur and right hip CT scan shows evidence of a displaced intertrochanteric femur fracture.  No evidence of lytic lesions within the femur.  Prior right total knee arthroplasty and left total hip arthroplasty are seen.    2 view x-ray right shoulder independently reviewed which shows evidence of " a minimally displaced fracture of the greater tuberosity surrounding a total shoulder arthroplasty.  No evidence of loosening of the arthroplasty.    79-year-old female with right displaced intertrochanteric femur fracture and minimally displaced periprosthetic right shoulder fracture.    I had a long discussion with Sandra today regarding her right hip and shoulder imaging.  In regards to her femur, she has a displaced intertrochanteric femur fracture.  On review of the CT scan, I do not see any pathologic lesions.  Therefore, we will plan to proceed with right hip cephalomedullary nailing.  We had a long discussion regarding the risks and benefits of surgery.  We discussed the risks of nonoperative treatment as well.  We discussed risk of bleeding, infection, damage nerves and blood vessels, nonunion, malunion, painful hardware and the need for additional surgeries.  We also discussed risk of medical complications going heart rate, blood clot and even death.  Following discussion, all of her questions were addressed her and her family agreed to proceed with surgery.  In regards to her right shoulder, she will be nonweightbearing.  I have reviewed the films with my sports colleagues who agreed with nonsurgical treatment and nonweightbearing in a sling for comfort.  Right side is marked the correct side and informed consent was obtained.    Jb Alston MD  02/18/25 [facility]

## 2025-02-18 NOTE — H&P
[facility]    History and Physical - Hospitalist Service       Date of Admission:  2/17/2025    Assessment & Plan      Margaret Batista is a 79 year old female with a medical history significant for coronary artery disease, hypertension, prior CVA, CKD stage III, TIA/CVA, history of monoclonal B-cell type lymphocytosis,  and other medical comorbidities who is admitted status post fall with right intertrochanteric hip fracture and an acute appearing, comminuted, mildly displaced periprosthetic fracture of the right proximal humerus.   Patient admitted for open reduction internal fixation in AM.  Patient also found to have right right possible renal cell carcinoma    Patient Active Problem List   Diagnosis    Allergic rhinitis    ASCVD (arteriosclerotic cardiovascular disease)    DJD (degenerative joint disease) of knee    Hx of gout    Mixed hyperlipidemia    Benign essential hypertension    Mass of finger    Osteopenia    Primary osteoarthritis of both hands    Rheumatoid arthritis (H)    Right shoulder pain    Rotator cuff tendonitis, right    S/P shoulder surgery    Shoulder impingement, right    Status post total shoulder replacement, right    Neuropathy of hand    Chronic kidney disease, stage 3 (H)    Seborrheic keratosis    Immunocompromised    Right hand weakness    History of coronary artery stent placement in 2007 and 2013    History of cardiac cath    Hypertensive 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 and stage 1 through stage 4 chronic kidney disease, or unspecified chronic kidney disease (H)    TIA (transient ischemic attack)    Word finding difficulty    Acute ischemic left MCA stroke (H)    Left carotid stenosis    History of ischemic left MCA stroke    Cerebral microvascular disease    S/P angioplasty with stent    Parkinsonism associated with infarction of basal ganglia (H)    Basal ganglia infarction (H)    Non morbid obesity due to excess calories    Right  renal mass    Left displaced femoral neck fracture (H)    Long term (current) use of anticoagulants    Facial pain    Activities involving dishwashing    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 (H)    Accidental fall, initial encounter    S/P total left hip arthroplasty    Compression fracture of L1 vertebra with routine healing    Pain of right upper arm    Scalp laceration, initial encounter    Hip fracture, right, closed, initial encounter (H)        Preop evaluation- Patient is a Mrs. Batista is considered high risk for an intermediate-risk surgical procedure due to her advanced age and complex medical history, including coronary artery disease, prior cerebrovascular accident (CVA), and chronic kidney disease stage III. These factors increase her risk for perioperative complications. However, given the emergency nature of the fracture, proceeding to the operating room is deemed necessary. Careful monitoring and management of her cardiovascular status, renal function, and potential bleeding risks are essential.    Maintain NPO status preoperatively.  Hold anticoagulants prior to surgery.  Complete necessary preoperative laboratory evaluations.  Pain Management:  Provide pain control with Dilaudid as needed and Tramadol.  Implement fall precautions postoperatively.   Mrs. Batista reports a history of frequent falls and ambulates with a walker.  Postoperative Care:    2. Initiate physical and occupational therapy when able, focusing on improving mobility and balance.  Perform neuro checks every 4 hours due to confusion.  Monitor cardiac status and trend troponin levels to manage coronary artery disease.    3.Avoid nephrotoxic medications due to CKD stage III.    4. Renal Mass Evaluation:- patient wants to follow up with her own oncologist     5, Coordinate with Hematology/Oncology for further evaluation and management of the right renal mass, suspected to be renal cell carcinoma.  Mrs. Batista  "is aware of the renal mass and has an appointment with her oncologist to address this issue.  Additional Management:    Monitor for any signs of infection, particularly given recent sore throat symptoms.  Ensure continued communication with the patient regarding her history of multiple myeloma and coordinate physical and occupational therapy as needed.  This comprehensive plan aims to address Mrs. Batista's immediate surgical needs while considering her complex medical history and ongoing health concerns.       Diet:  NPO after midnight  DVT Prophylaxis: Pneumatic Compression Devices  Slaughter Catheter: Not present  Lines: None     Cardiac Monitoring: None  Code Status:  Full code order okay patient glucose is checking    Clinically Significant Risk Factors Present on Admission                 # Drug Induced Platelet Defect: home medication list includes an antiplatelet medication   # Hypertension: Noted on problem list           # Overweight: Estimated body mass index is 26.47 kg/m  as calculated from the following:    Height as of this encounter: 1.702 m (5' 7\").    Weight as of this encounter: 76.7 kg (169 lb).              Disposition Plan     Medically Ready for Discharge: Anticipated in 2-4 Days PT/OT [facility].   Mrs. Batista is considered high risk for an intermediate-risk surgical procedure due to her advanced age and complex medical history, including coronary artery disease, prior cerebrovascular accident (CVA), and chronic kidney disease stage III. These factors increase her risk for perioperative complications. However, given the emergency nature of the fracture, proceeding to the operating room is deemed necessary. Careful monitoring and management of her cardiovascular status, renal function, and potential bleeding risks are essential.    Plan of Care:    Surgical Intervention:    Proceed with open reduction and internal fixation (ORIF) of the right proximal femur fracture.  Consider " preoperative embolization if the renal mass is determined to be metastatic to minimize intraoperative blood loss.  Preoperative Instructions:    Maintain NPO status preoperatively.  Hold anticoagulants prior to surgery.  Complete necessary preoperative laboratory evaluations.  Pain Management:    Provide pain control with Dilaudid as needed and Tramadol.  Implement fall precautions postoperatively. Mrs. Batista reports a history of frequent falls and ambulates with a walker.  Postoperative Care:    Initiate physical and occupational therapy when able, focusing on improving mobility and balance.  Perform neuro checks every 4 hours due to confusion.  Monitor cardiac status and trend troponin levels to manage coronary artery disease.  Avoid nephrotoxic medications due to CKD stage III.  Renal Mass Evaluation:    Coordinate with Hematology/Oncology for further evaluation and management of the right renal mass, suspected to be renal cell carcinoma.  Mrs. Batista is aware of the renal mass and has an appointment with her oncologist to address this issue.  Additional Management:    Monitor for any signs of infection, particularly given recent sore throat symptoms.  Ensure continued communication with the patient regarding her history of multiple myeloma and coordinate physical and occupational therapy as needed.  This comprehensive plan aims to address Mrs. Batista's immediate surgical needs while considering her complex medical history and ongoing health concerns.         Shannon Tineo MD  Hospitalist Service  [facility]  Securely message with [facility] (more info)  Text page via [facility] Paging/Directory     ______________________________________________________________________    Chief Complaint   S/p fall        History of Present Illness   Margaret Batista, a 79-year-old female with a significant medical history, including coronary artery disease, hypertension, prior cerebrovascular accident  (CVA), chronic kidney disease stage III, transient ischemic attack (TIA)/CVA, and monoclonal B-cell type lymphocytosis, presented to the Emergency Department (ED) following a ground-level fall.     Per history, the patient was attempting to put on her shoes when she lost her balance, resulting in a fall. She did not experience any loss of consciousness. During the fall, she struck her head on a cabinet, sustaining a hematoma on the right forehead and a laceration to the back right side of her head. Upon arrival, her right leg was noted to be shortened and externally rotated, and she reported significant pain in the right hip and shoulder. EMS administered 0.25 mg of Dilaudid for pain management.      Mrs. Batista was brought to the ED for evaluation of right shoulder and hip pain following a fall, with a particular concern for a right proximal femur fracture.      In the ED, imaging studies confirmed a right intertrochanteric hip fracture and an acute appearing, comminuted, mildly displaced periprosthetic fracture of the right proximal humerus. Additionally, a CT scan of the head was performed, revealing no acute intracranial process but noted chronic changes. She also experienced episodes of emesis following movement for imaging, which were managed with Zofran.    The hospitalist team was consulted as the primary service, and a recommendation was made for Hematology/Oncology involvement to assess the renal mass found on ultrasound, suspected to be renal cell carcinoma.      Mrs. Batista was admitted for surgical intervention, specifically an open reduction and internal fixation (ORIF) of the right intertrochanteric hip fracture, scheduled for the following morning.        Past Medical History    Past Medical History:   Diagnosis Date    Atherosclerotic heart disease of native coronary artery without angina pectoris     -s/p ADÁN to the mid-LAD, mid-circumflex, mid-right coronary artery, proximal right coronary   artery, and PTCA of a marginal branch of the right coronary artery 04/30/2007. -s/p ADÁN to proximal left anterior descending 10/29/13.    Contusion of abdominal wall     2014    Essential (primary) hypertension     12/14/2006    Gout     No Comments Provided    Hyperlipidemia     4/4/2007    Localized swelling, mass, or lump of upper extremity     5/26/2017    Other specified counseling     10/28/2013,Patient has identified Health Care Agent(s): Yes Add Health Care Agents: Yes   Health Care Agent(s): Primary Health Care Agent: Jay Batista  Relationship:  Phone:   Secondary Health Care Agent:  Relationship: Daughter Phone:   Conservator:  Relationship:  Phone:   Guardian: Relationship:  Phone:    Patient has Advance Care Plan Documents (Health Care Directive, POLST): No, refe*    Polyosteoarthritis     No Comments Provided    Postmenopausal bleeding     No Comments Provided    Presence of coronary angioplasty implant and graft     2007/2013,unstable angina; severe prox LAD stenosis; s/p 7 stents    Primary osteoarthritis of left hand     5/30/2017    Rheumatoid arthritis (H)     follows at [facility] yearly       Active Problems  Problem Noted Date Diagnosed Date   History of compression fracture of spine 09/11/2024     Muscle weakness 08/12/2024     Chronic ischemic left MCA stroke 08/12/2024     History of total hip arthroplasty, left 08/12/2024     Frequent falls 08/12/2024     Impaired functional mobility, balance, gait, and endurance 08/12/2024     History of ischemic left MCA stroke 05/19/2024     Left carotid stenosis 04/14/2024     Monoclonal B-cell lymphocytosis 04/14/2024     Greater trochanteric bursitis of right hip 12/04/2023     Weakness 12/04/2023     Muscle tightness 12/04/2023     Primary osteoarthritis of one knee, right S/P TKA 10/8/2018 07/14/2018     Status post total shoulder replacement, right 05/28/2015     S/P shoulder surgery 02/10/2015     ASCVD (arteriosclerotic cardiovascular disease)  2015     RA (rheumatoid arthritis) 2015     Essential hypertension 2015     Avascular necrosis of humeral head, right 2015     Shoulder impingement, right 2014     Rotator cuff tendonitis, right       Right shoulder pain 10/14/2014     Per history, patient was brought into good      Past Surgical History   Past Surgical History:   Procedure Laterality Date    ARTHROPLASTY HIP ANTERIOR Left 2024    Procedure: ARTHROPLASTY, HIP, TOTAL, DIRECT ANTERIOR APPROACH;  Surgeon: Shashi Topete MD;  Location: [facility] OR    ARTHROPLASTY KNEE      2011    ARTHROSCOPY KNEE          BIOPSY BREAST      10/25/95,[facility]  DR. YOGI RAMIREZ    CAROTID ARTERY ANGIOPLASTY Left 04/15/2024    [facility]     SECTION      , Section    COLONOSCOPY      05,Normal - Repeat in 10 years    COLONOSCOPY  2016    COLONOSCOPY N/A 2019    Procedure: COLONOSCOPY;  Surgeon: Evelin Thompson MD;  Location: [facility] OR    COLONOSCOPY N/A 2019    Procedure: COMBINED COLONOSCOPY, SINGLE OR MULTIPLE BIOPSY/POLYPECTOMY BY BIOPSY;  Surgeon: Evelin Thompson MD;  Location:  OR    ENDOSCOPIC RELEASE CARPAL TUNNEL Right 2023    Procedure: RELEASE, CARPAL TUNNEL, ENDOSCOPIC;  Surgeon: Danial Stanley MD;  Location:  OR    ESOPHAGOSCOPY, GASTROSCOPY, DUODENOSCOPY (EGD), COMBINED      2011,erosive gastritis;bx;consider MRI/A;[facility]    EXTRACTION(S) DENTAL      1970    HEART CATH, ANGIOPLASTY      10/29/2013,ADÁN to proximal left anterior descending    OTHER SURGICAL HISTORY      ,[id number],OTHER    OTHER SURGICAL HISTORY      2014,SBJ067,TOTAL SHOULDER ARTHROPLASTY,Right    OTHER SURGICAL HISTORY      2016,27895.0,OR COLONOSCOPY REMOVE ELIZA POLYP LESN SNARE,repeat , precancerous polyps    ZZ STRESS LEXISCAN TEST  2018    With Dr. Irwin - sonja       Prior to Admission Medications   Prior to Admission Medications   Prescriptions Last  Dose Informant Patient Reported? Taking?   DULoxetine (CYMBALTA) 20 MG capsule 2/17/2025 Morning  No Yes   Sig: Take 1 capsule (20 mg) by mouth daily.   acetaminophen (TYLENOL) 500 MG tablet 2/17/2025 Morning  Yes Yes   Sig: Take 1,000 mg by mouth 2 times daily as needed for mild pain.   albuterol (PROAIR HFA/PROVENTIL HFA/VENTOLIN HFA) 108 (90 Base) MCG/ACT inhaler   Yes Yes   Sig: Inhale 2 puffs into the lungs every 6 hours as needed for wheezing, shortness of breath or cough.   alendronate (FOSAMAX) 70 MG tablet   No Yes   Sig: Take 1 tablet (70 mg) by mouth every 7 days.   allopurinol (ZYLOPRIM) 300 MG tablet 2/17/2025 Morning  No Yes   Sig: Take 1 tablet (300 mg) by mouth daily.   aspirin (ASA) 81 MG chewable tablet 2/17/2025 Morning  No Yes   Sig: Take 1 tablet (81 mg) by mouth daily   atenolol (TENORMIN) 100 MG tablet 2/17/2025 Morning  No Yes   Sig: Take 1 tablet (100 mg) by mouth daily.   atorvastatin (LIPITOR) 80 MG tablet 2/17/2025 Morning  No Yes   Sig: TAKE 1 TABLET DAILY   cetirizine (ZYRTEC) 5 MG tablet 2/16/2025 Bedtime  Yes Yes   Sig: Take 5 mg by mouth at bedtime.   folic acid (FOLVITE) 1 MG tablet 2/17/2025 Morning  No Yes   Sig: TAKE 1 TABLET DAILY   hydroxychloroquine (PLAQUENIL) 200 MG tablet 2/16/2025 Morning  Yes Yes   Sig: Take 400 mg by mouth every other day.   hydroxychloroquine (PLAQUENIL) 200 MG tablet 2/17/2025 Morning  Yes Yes   Sig: Take 200 mg by mouth daily.   isosorbide mononitrate (IMDUR) 30 MG 24 hr tablet 2/17/2025 Morning  No Yes   Sig: TAKE 1 TABLET DAILY   methotrexate 2.5 MG tablet 2/17/2025 Morning  Yes Yes   Sig: Take 20 mg by mouth once a week. On Mondays   nitroGLYcerin (NITROSTAT) 0.4 MG sublingual tablet   No Yes   Sig: Place 1 tablet (0.4 mg) under the tongue every 5 minutes as needed for chest pain. (For chest pain x 3 doses)   polyethylene glycol (MIRALAX) 17 GM/Dose powder   No Yes   Sig: Take 17 g by mouth daily as needed      Facility-Administered Medications:  None        Review of Systems    The 10 point Review of Systems is negative other than noted in the HPI or here.     Social History   I have reviewed this patient's social history and updated it with pertinent information if needed.  Social History     Tobacco Use    Smoking status: Never     Passive exposure: Past    Smokeless tobacco: Never    Tobacco comments:     Passive exposure in childhood home.    Vaping Use    Vaping status: Never Used   Substance Use Topics    Alcohol use: Not Currently    Drug use: No         Family History   I have reviewed this patient's family history and updated it with pertinent information if needed.  Family History   Problem Relation Age of Onset    Heart Disease Mother         Heart Disease    Hypertension Mother         Hypertension    Other - See Comments Mother         Stroke    Breast Cancer Mother 53        Cancer-breast    Arthritis Father         Arthritis    Cancer Father         Cancer    Heart Disease Father         Heart Disease    Hypertension Father         Hypertension    Atrial fibrillation Sister     Melanoma Brother     Heart Disease Brother         Heart Disease         Allergies   Allergies   Allergen Reactions    Plavix [Clopidogrel]      Plavix resistant-tested at [facility]    Enalapril Cough    Food      Macadamia nuts make mouth itch    Losartan      Allergic rxn with hives and angioedema suspected to be from alternative manufacture of the effient or the losartan        Physical Exam   Vital Signs: Temp: 97.6  F (36.4  C) Temp src: Oral BP: (!) 178/84 Pulse: 63   Resp: 26 SpO2: 97 % O2 Device: None (Room air)    Weight: 169 lbs 0 oz      General Aox3, appropriate affect, NAD, on RA  HEENT  MMM, EOMI, PERRL  Chest Adeq E b/l, No wheezing  Heart RRR, No M/R/G  Abd- Soft, NT, BS+  - Deferred,   Extremity- Moving all extremities, No digital clubbing,  1-2+ edema with Limited ROM in RLE  Neuro- Aox3, CN II- XII intact, No peripheral vision loss  P4-/5, in RUE and  RLE  gait not checked  Shortened with external rotation of Right hip    Medical Decision Making       85 MINUTES SPENT BY ME on the date of service doing chart review, history, exam, documentation & further activities per the note.      ------------------ MEDICAL DECISION MAKING ------------------------------------------------------------------------------------------------------  MANAGEMENT DISCUSSED with the following over the past 24 hours: Patient and care team       Data   ------------------------- PAST 24 HR DATA REVIEWED -----------------------------------------------        Imaging results reviewed over the past 24 hrs:   Recent Results (from the past 24 hours)   CT Head w/o Contrast    Narrative    EXAM: CT HEAD W/O CONTRAST  LOCATION: [facility]  DATE: 2/17/2025    INDICATION: fell and struck head onto corner of cabinet  COMPARISON: None.  TECHNIQUE: Routine CT Head without IV contrast. Multiplanar reformats. Dose reduction techniques were used.    FINDINGS:  INTRACRANIAL CONTENTS: No intracranial hemorrhage, extraaxial collection, or mass effect.  No CT evidence of acute infarct. Moderate presumed chronic small vessel ischemic changes. Mild to moderate generalized volume loss. No hydrocephalus.     Left cerebral sulci and left anterior circular sulcus demonstrates several punctate calcifications likely calcified embolic process plaque. Chronic infectious inflammatory process possible.    VISUALIZED ORBITS/SINUSES/MASTOIDS: No intraorbital abnormality. No paranasal sinus mucosal disease. No middle ear or mastoid effusion.    BONES/SOFT TISSUES: Right frontal scalp hematoma. Smaller left scalp hematoma. No acute displaced calvarial fracture.    Bilateral TMJ degenerative joint disease.      Impression    IMPRESSION:  1.  No acute intracranial process.    2.  Chronic intracranial changes described above.   XR Pelvis and Hip Right 2 Views    Narrative    EXAM: XR PELVIS AND HIP RIGHT  2 VIEWS  LOCATION: [facility]  DATE: 2/17/2025    INDICATION: Fell onto right side and has pain in right hip. Right leg is externally rotated and shortened.  COMPARISON: 09/16/2024, 06/08/2023.      Impression    IMPRESSION: Acute, displaced and angulated right proximal femoral fracture.    Contralateral left total hip replacement.    Bones are demineralized.    Pathologic fracture is not excluded as there is some vague lucency along the fracture site as well as a partially visualized area of lucency within the proximal femoral shaft measuring approximately 9.6 cm in length. These findings raise concern for   multiple myeloma as well as metastatic disease, which should be excluded.    NOTE: ABNORMAL REPORT    THE DICTATION ABOVE DESCRIBES AN ABNORMALITY FOR WHICH FOLLOW-UP IS NEEDED.    Humerus XR, G/E 2 views, right    Narrative    EXAM: XR HUMERUS RIGHT G/E 2 VIEWS  LOCATION: [facility]  DATE: 02/17/2025    INDICATION: Fell onto right side and has pain from humeral head to olecranon.  COMPARISON: None.      Impression    IMPRESSION: Postop changes of glenohumeral joint replacement. There is bone formation along the greater tuberosity of the humerus without a well-defined fracture lucency on these views, favor chronic although dedicated right shoulder radiographs could   assess further if there is clinical concern for fracture in this region. Subacromial spur. Bone demineralization.    No evidence of an acute displaced distal right humeral fracture.   XR Shoulder Right 3 Views    Narrative    EXAM: XR SHOULDER RIGHT G/E 3 VIEWS  LOCATION: [facility]  DATE: 2/17/2025    INDICATION: fell today and has pain in right upper arm. suspicious area seen on humerus xray in proximal humerus  COMPARISON: Humerus radiographs from the same date      Impression    IMPRESSION:     Status post right total shoulder arthroplasty. There is an acute appearing,  comminuted, mildly displaced periprosthetic fracture of the right proximal humerus.     No dislocation. Mild to moderate acromioclavicular degenerative arthrosis. Diffuse osseous demineralization.

## 2025-02-18 NOTE — TREATMENT PLAN
80yo F brought in by EMS for GLF and right proximal femur fracture. History of monoclonal B-cell type lymphocytosis with hyperlipidemia, CAD, hypertension and previous TIA with microvascular disease and CKD stage III.     US renal 1/23/25:  1.  Solid exophytic mass upper pole right kidney measuring up to 2.2 cm which has increased slightly since the study from 2023 when it measured 1.9 cm. Based on outside report, this had measured 2.2 cm on 7/15/2024 which is similar in size to the current   ultrasound measurement. This likely reflects a renal cell carcinoma.    A/p:  - Full length femur XR, CT A/P to evaluate for potential pathologic fx extent  - Hospitalist primary, recommend Heme/Onc involvement for workup recommendations  - NPO   - hold anticoags  - preop labs  - possible OR tomorrow for right proximal femur fx CMN only if determined to be metastatic lesion. Consider preoperative embolization to decreased intraoperative blood loss if metastatic RCC.    Dave Milian MD  [facility]

## 2025-02-18 NOTE — ANESTHESIA PROCEDURE NOTES
Airway       Patient location during procedure: OR  Staff -        CRNA: Jb Coyle APRN CRNA       Performed By: CRNA  Consent for Airway        Urgency: elective  Indications and Patient Condition       Indications for airway management: domenica-procedural       Induction type:intravenous       Mask difficulty assessment: 0 - not attempted    Final Airway Details       Final airway type: supraglottic airway    Supraglottic Airway Details        Type: LMA       Brand: Ambu AuraGain       LMA size: 4    Post intubation assessment        Placement verified by: capnometry, equal breath sounds and chest rise        Number of attempts at approach: 1       Number of other approaches attempted: 0       Ease of procedure: easy       Dentition: Intact and Unchanged

## 2025-02-18 NOTE — CONSULTS
ORTHOPEDIC CONSULTATION    Consultation  Margaret Batista,  1945, MRN [id number]    Pain of right upper arm [M79.621]  Scalp laceration, initial encounter [S01.01XA]  Hip fracture, right, closed, initial encounter (H) [S72.001A]  Closed fracture of proximal end of right humerus, unspecified fracture morphology, initial encounter [S42.201A]    PCP: Swetha Maxwell, [phone]   Code status:  No CPR- Do NOT Intubate       Extended Emergency Contact Information  Primary Emergency Contact: Jay Batista  Address: [address] [place] [address] [facility]  Home Phone: [phone]  Mobile Phone: [phone]  Relation: Spouse  Secondary Emergency Contact: Cheryl Coppola  Address: [address] [place] [address] [facility]  Mobile Phone: [phone]  Relation: Daughter         IMPRESSION: Comminuted, angulated, and displaced intertrochanteric fracture of the right hip       PLAN:  This patient was discussed with Dr. Milian, on-call surgeon for [facility] and they are in agreement with the following plan.   - Discussed treatment options and patient would likely benefit from a right hip ORIF with CMN. Discussed risks of procedure including but not limited to, injury to nerves, arteries or veins, malunion, nonunion, periprosthetic fracture, DVT or even death with the patient and they are in agreement with proceeding.  - Will take to the OR later today for right proximal femur ORIF with CMN with Dr. Jb Alston.  - Type & Screen, order placed  - Anesthesia to place block for improved pain control  - Medical optimization per Hospitalist. Hgb 11.2. INR 1.06.  - Pain control. Currently well controlled on IV dilaudid.   - NWB right lower extremity.      Thank you for including [facility] in the care of Margaret Batista. It has been a pleasure participating in their care.      CHIEF COMPLAINT: Right hip fracture      HISTORY OF PRESENT  ILLNESS:  The patient is seen in orthopedic consultation at the request of Tod Corona MD. The patient is a 79 year old female with c/o right hip pain. She presented to the ED last night after a mechanical fall at home. Patient was attempting to put on her shoes when she lost her balance and fell onto her right hip. She did hit her head on a cabinet, resulting in a hematoma to her forehead, but she denies LOC. She was unable to weight bear after the fall. Pain localized to the right hip and does radiate into the right thigh. She reports baseline bilateral peripheral neuropathy, right worse than left, but denies new numbness or tingling in the right lower extremity. She denies chest pain, shortness or breath, nausea, lightheadedness, dizziness this morning. Right hip pain currently controlled with IV Dilaudid. Her  and daughter were present for today visit.     Patient also reports right shoulder pain after the fall. History of right shoulder arthroplasty and x-rays of the right shoulder obtained yesterday demonstrate an acute appearing, comminuted, mildly displaced periprosthetic fracture of the right proximal humerus. This was discussed with Dr. Alston and it is recommended the patient remain in her sling at all times and NWB to the right upper extremity. Gentle elbow and wrist ROM is okay. Her shoulder surgery was not done by [facility], and it recommended the patient follow up with her surgeon after discharge.     PAST MEDICAL HISTORY:     Past Medical History:   Diagnosis Date    Atherosclerotic heart disease of native coronary artery without angina pectoris     -s/p ADÁN to the mid-LAD, mid-circumflex, mid-right coronary artery, proximal right coronary  artery, and PTCA of a marginal branch of the right coronary artery 04/30/2007. -s/p ADÁN to proximal left anterior descending 10/29/13.    Contusion of abdominal wall     2014    Essential (primary) hypertension     12/14/2006    Gout      No Comments Provided    Hyperlipidemia     4/4/2007    Localized swelling, mass, or lump of upper extremity     5/26/2017    Other specified counseling     10/28/2013,Patient has identified Health Care Agent(s): Yes Add Health Care Agents: Yes   Health Care Agent(s): Primary Health Care Agent: Jay Batista  Relationship:  Phone:   Secondary Health Care Agent:  Relationship: Daughter Phone:   Conservator:  Relationship:  Phone:   Guardian: Relationship:  Phone:    Patient has Advance Care Plan Documents (Health Care Directive, POLST): No, refe*    Polyosteoarthritis     No Comments Provided    Postmenopausal bleeding     No Comments Provided    Presence of coronary angioplasty implant and graft     2007/2013,unstable angina; severe prox LAD stenosis; s/p 7 stents    Primary osteoarthritis of left hand     5/30/2017    Rheumatoid arthritis (H)     follows at [facility] yearly       ALLERGIES:   Plavix [clopidogrel], Enalapril, Food, and Losartan    MEDICATIONS ON ADMISSION:  Medications were reviewed.  They do include:   Medications Prior to Admission   Medication Sig Dispense Refill Last Dose/Taking    acetaminophen (TYLENOL) 500 MG tablet Take 1,000 mg by mouth 2 times daily as needed for mild pain.   2/17/2025 Morning    albuterol (PROAIR HFA/PROVENTIL HFA/VENTOLIN HFA) 108 (90 Base) MCG/ACT inhaler Inhale 2 puffs into the lungs every 6 hours as needed for wheezing, shortness of breath or cough.   Taking As Needed    alendronate (FOSAMAX) 70 MG tablet Take 1 tablet (70 mg) by mouth every 7 days. 14 tablet 4 Taking    allopurinol (ZYLOPRIM) 300 MG tablet Take 1 tablet (300 mg) by mouth daily. 90 tablet 4 2/17/2025 Morning    aspirin (ASA) 81 MG chewable tablet Take 1 tablet (81 mg) by mouth daily 90 tablet 3 2/17/2025 Morning    atenolol (TENORMIN) 100 MG tablet Take 1 tablet (100 mg) by mouth daily. 90 tablet 4 2/17/2025 Morning    atorvastatin (LIPITOR) 80 MG tablet TAKE 1 TABLET DAILY 90 tablet 4 2/17/2025  Morning    cetirizine (ZYRTEC) 5 MG tablet Take 5 mg by mouth at bedtime.   2/16/2025 Bedtime    DULoxetine (CYMBALTA) 20 MG capsule Take 1 capsule (20 mg) by mouth daily. 90 capsule 4 2/17/2025 Morning    folic acid (FOLVITE) 1 MG tablet TAKE 1 TABLET DAILY 90 tablet 3 2/17/2025 Morning    hydroxychloroquine (PLAQUENIL) 200 MG tablet Take 400 mg by mouth every other day.   2/16/2025 Morning    hydroxychloroquine (PLAQUENIL) 200 MG tablet Take 200 mg by mouth daily.   2/17/2025 Morning    isosorbide mononitrate (IMDUR) 30 MG 24 hr tablet TAKE 1 TABLET DAILY 90 tablet 3 2/17/2025 Morning    methotrexate 2.5 MG tablet Take 20 mg by mouth once a week. On Mondays 2/17/2025 Morning    nitroGLYcerin (NITROSTAT) 0.4 MG sublingual tablet Place 1 tablet (0.4 mg) under the tongue every 5 minutes as needed for chest pain. (For chest pain x 3 doses) 30 tablet 3 Taking As Needed    polyethylene glycol (MIRALAX) 17 GM/Dose powder Take 17 g by mouth daily as needed 510 g 1 Taking As Needed       SOCIAL HISTORY:    reports that she has never smoked. She has been exposed to tobacco smoke. She has never used smokeless tobacco. She reports that she does not currently use alcohol. She reports that she does not use drugs.     FAMILY HISTORY:  Family History   Problem Relation Age of Onset    Heart Disease Mother         Heart Disease    Hypertension Mother         Hypertension    Other - See Comments Mother         Stroke    Breast Cancer Mother 53        Cancer-breast    Arthritis Father         Arthritis    Cancer Father         Cancer    Heart Disease Father         Heart Disease    Hypertension Father         Hypertension    Atrial fibrillation Sister     Melanoma Brother     Heart Disease Brother         Heart Disease       REVIEW OF SYSTEMS:   See Admission History and Physical     Clinically Significant Risk Factors Present on Admission      # Drug Induced Platelet Defect: home medication list includes an antiplatelet  "medication    # Overweight: Estimated body mass index is 26.65 kg/m  as calculated from the following:    Height as of this encounter: 1.702 m (5' 7.01\").    Weight as of this encounter: 77.2 kg (170 lb 3.1 oz).   #Dexa: Pt has a dexa order within past 6 months.   #WBC includes abnormal value:  Recent labs: (last 2 weeks)     02/17/25  2349 02/18/25  0535   WBC 19.4* 17.7*     Risk Factors for Delirium:  - Pt has elevated WBC >11 within the past 24 hours.    #Cognitive impairment:  Noted on problem list     PHYSICAL EXAMINATION:  General: On examination, the patient is resting comfortably, NAD and awake, lying supine in bed and oriented to person, place and time   SKIN: Ecchymosis noted over right hip, skin intact  Pulses:  Palpable bilateral dorsalis pedis pulses.  Sensation: intact and equal bilaterally to the distal lower extremities, feet and toes.  Tenderness: Right lateral hip tenderness noted. No tenderness noted in calfs bilaterally  ROM: Equal ankle plantar and dorsiflexion, moves all toes bilaterally. Unable to lift leg off bed due to pain. Right hip ROM deferred due to known fracture.   Motor:  +5/5 ankle DF, PF, EHL bilaterally.  Right hip and knee not assessed due to fracture. Contralateral hip flexion and knee extension +5/5.   Extremity: Right lower extremity externally rotated & shortened, thigh & calf compartments soft    Temp:  [97.6  F (36.4  C)-98.6  F (37  C)] 98  F (36.7  C)  Pulse:  [60-70] 68  Resp:  [11-34] 20  BP: (164-218)/() 200/87  SpO2:  [93 %-100 %] 97 %    RADIOGRAPHIC EVALUATION:  Radiographs personally reviewed.    CT HIP RIGHT W/O CONTRAST  LOCATION: [facility]  DATE: 2/17/2025     INDICATION: right prox femur fx and lucencies seen on hip xray  COMPARISON: 9/16/2024.  TECHNIQUE: Noncontrast. Axial, sagittal and coronal thin-section reconstruction. Dose reduction techniques were used.      FINDINGS:      BONES:  -The bones are mildly demineralized. " "There is a comminuted intertrochanteric fracture of the right hip with multiple fragments of the greater trochanter present. No definitive osteolytic lesion of the greater trochanter or proximal hip is identified. The   femoral head is intact and seated within the acetabula. Remainder of the proximal femur is normal with no significant periosteal erosive or destructive changes identified. Included bony pelvis is intact. Mild SI joint degeneration.     SOFT TISSUES:  -Soft tissue swelling surrounds the right hip fracture..                                                                      IMPRESSION:  1.  Comminuted intertrochanteric fracture of the right hip with multiple fragments of the greater trochanter present.  2.  No definitive osteolytic lesion of the greater trochanter or proximal femur is identified.        XR FEMUR RIGHT 2 VIEWS  LOCATION: [facility]  DATE: 2/17/2025     INDICATION: prox femur fx seen on hip xray  COMPARISON: None.                                                                      IMPRESSION: Comminuted, angulated, and displaced intertrochanteric fracture right hip. The distal femur is intact. Right knee arthroplasty. Vascular calcifications.      LABORATORY DATA:     Lab Results   Component Value Date    INR 1.06 07/19/2024       Lab Results   Component Value Date    WBC 17.7 02/18/2025    WBC 11.1 06/18/2021     Lab Results   Component Value Date    RBC 3.56 02/18/2025    RBC 4.83 06/18/2021     Lab Results   Component Value Date    HGB 11.2 02/18/2025    HGB 14.8 06/18/2021     Lab Results   Component Value Date    HCT 34.5 02/18/2025    HCT 46.0 06/18/2021     No components found for: \"MCT\"  Lab Results   Component Value Date    MCV 97 02/18/2025    MCV 95 06/18/2021     Lab Results   Component Value Date    MCH 31.5 02/18/2025    MCH 30.6 06/18/2021     Lab Results   Component Value Date    MCHC 32.5 02/18/2025    MCHC 32.2 06/18/2021     Lab Results "   Component Value Date    RDW 16.2 02/18/2025    RDW 14.6 06/18/2021     Lab Results   Component Value Date     02/18/2025     06/18/2021         Mustapha Pena PA-C  [facility]

## 2025-02-18 NOTE — ANESTHESIA PREPROCEDURE EVALUATION
Anesthesia Pre-Procedure Evaluation    Patient: Margaret Batista   MRN: [id number] : 1945        Procedure : Procedure(s):  OPEN REDUCTION INTERNAL FIXATION, FRACTURE, FEMUR, PROXIMAL          Past Medical History:   Diagnosis Date    Atherosclerotic heart disease of native coronary artery without angina pectoris     -s/p ADÁN to the mid-LAD, mid-circumflex, mid-right coronary artery, proximal right coronary  artery, and PTCA of a marginal branch of the right coronary artery 2007. -s/p ADÁN to proximal left anterior descending 10/29/13.    Contusion of abdominal wall         Essential (primary) hypertension     2006    Gout     No Comments Provided    Hyperlipidemia     2007    Localized swelling, mass, or lump of upper extremity     2017    Other specified counseling     10/28/2013,Patient has identified Health Care Agent(s): Yes Add Health Care Agents: Yes   Health Care Agent(s): Primary Health Care Agent: Jay Batista  Relationship:  Phone:   Secondary Health Care Agent:  Relationship: Daughter Phone:   Conservator:  Relationship:  Phone:   Guardian: Relationship:  Phone:    Patient has Advance Care Plan Documents (Health Care Directive, POLST): No, refe*    Polyosteoarthritis     No Comments Provided    Postmenopausal bleeding     No Comments Provided    Presence of coronary angioplasty implant and graft     ,unstable angina; severe prox LAD stenosis; s/p 7 stents    Primary osteoarthritis of left hand     2017    Rheumatoid arthritis (H)     follows at [facility] yearly      Past Surgical History:   Procedure Laterality Date    ARTHROPLASTY HIP ANTERIOR Left 2024    Procedure: ARTHROPLASTY, HIP, TOTAL, DIRECT ANTERIOR APPROACH;  Surgeon: Shashi Topete MD;  Location: [facility] OR    ARTHROPLASTY KNEE      2011    ARTHROSCOPY KNEE          BIOPSY BREAST      10/25/95,[facility] RAMIREZ    CAROTID ARTERY ANGIOPLASTY Left 04/15/2024    [facility]  [facility]     SECTION      , Section    COLONOSCOPY      05,Normal - Repeat in 10 years    COLONOSCOPY  2016    COLONOSCOPY N/A 2019    Procedure: COLONOSCOPY;  Surgeon: Evelin Thompson MD;  Location:  OR    COLONOSCOPY N/A 2019    Procedure: COMBINED COLONOSCOPY, SINGLE OR MULTIPLE BIOPSY/POLYPECTOMY BY BIOPSY;  Surgeon: Evelin Thompson MD;  Location: [facility] OR    ENDOSCOPIC RELEASE CARPAL TUNNEL Right 2023    Procedure: RELEASE, CARPAL TUNNEL, ENDOSCOPIC;  Surgeon: Danial Stanley MD;  Location:  OR    ESOPHAGOSCOPY, GASTROSCOPY, DUODENOSCOPY (EGD), COMBINED      2011,erosive gastritis;bx;consider MRI/A;[facility]    EXTRACTION(S) DENTAL      1970    HEART CATH, ANGIOPLASTY      10/29/2013,ADÁN to proximal left anterior descending    OTHER SURGICAL HISTORY      ,[id number],OTHER    OTHER SURGICAL HISTORY      2014,[id number],TOTAL SHOULDER ARTHROPLASTY,Right    OTHER SURGICAL HISTORY      2016,12121.0,[place] COLONOSCOPY REMOVE ELIZA POLYP LESN SNARE,repeat , precancerous polyps    ZZ STRESS LEXISCAN TEST  2018    With Dr. Irwin - normal      Allergies   Allergen Reactions    Plavix [Clopidogrel]      Plavix resistant-tested at [place]    Enalapril Cough    Food      Macadamia nuts make mouth itch    Losartan      Allergic rxn with hives and angioedema suspected to be from alternative manufacture of the effient or the losartan      Social History     Tobacco Use    Smoking status: Never     Passive exposure: Past    Smokeless tobacco: Never    Tobacco comments:     Passive exposure in childhood home.    Substance Use Topics    Alcohol use: Not Currently      Wt Readings from Last 1 Encounters:   25 77.2 kg (170 lb 3.1 oz)        Anesthesia Evaluation   Pt has had prior anesthetic. Type: General and Regional.    No history of anesthetic complications       ROS/MED HX  ENT/Pulmonary:     (+)     AJ risk factors, snores loudly,  hypertension, obese,                                Neurologic:     (+)    peripheral neuropathy,      CVA (roughly April 2024), date: 4/2024, with deficits, - R-sided weakness and occasional aphasia.                   Cardiovascular: Comment: Hx of carotid enterectomy left side     TTE 04/2024  Interpretation Summary   The left ventricle is normal size. The left ventricular systolic function is normal. Wall motion is normal.   The right ventricular cavity size is qualitatively normal. Normal right ventricular systolic function.   Left atrium is mildly enlarged by volume.   The left ventricular diastolic function is moderately abnormal (Grade II, pseudonormal) with elevated filling pressures.   There is mild aortic valve calcification.   Mild mitral valve annular calcification.   No significant valvular stenosis or regurgitation.   TR signal inadequate to allow accurate estimate RV systolic pressure.   There is no pericardial effusion.   Technically difficult bubble study but appears grossly negative for significant shunting.   No prior echocardiogram available for comparison.       (+) Dyslipidemia hypertension (174/77)- -  CAD -  - stent- 7 Drug Eluting Stent. Taking blood thinners Pt has received instructions:                             Previous cardiac testing   Echo: Date: Results:  Narrative & Impression  PROCEDURE: CTA HEAD NECK W CONTRAST 5/18/2024 7:44 PM     HISTORY: Acute neuro deficit, stroke/TIA suspected     COMPARISONS: None.     Meds/Dose Given: isovue 370 75ml     TECHNIQUE: CT angiogram of the neck and CT angiogram of the brain with  sagittal and coronal mip reconstructions     FINDINGS: CT angiogram the neck: There is atherosclerotic plaquing in  the brachiocephalic artery. The right subclavian and right vertebral  arteries are widely patent. The right common carotid artery is  tortuous. There is heavy calcific plaquing at the right carotid  bifurcation. There is a 50% stenosis noted in the  distal common  carotid artery. There is less than 50% stenosis noted in the proximal  internal carotid artery. Beyond the bifurcation the internal carotid  artery is widely patent to the skull base. The proximal left common  carotid artery is widely patent. There is a carotid stent on the left.  The stented segment is patent. Beyond the stented segment of the  internal carotid artery is widely patent to the skull base. The left  subclavian and left vertebral arteries are widely patent.     CT angiogram of the brain: The distal vertebral basilar superior  cerebellar and posterior cerebral arteries are widely patent. There is  atherosclerotic plaquing in the carotid siphons. The supraclinoid  internal carotid arteries appear normal. Both anterior and middle  cerebral arteries are widely patent.     The muscles of mastication and the parapharyngeal spaces are normal.  The salivary glands are normal. The larynx and upper trachea is  normal. There is a left thyroid nodule with some peripheral  calcifications measuring approximately 3 cm in diameter. The cervical  and upper mediastinal lymph nodes are normal in caliber. Lung apices  are clear.                                                                        IMPRESSION: No hemodynamically significant stenoses or occlusions are  identified in the arteries of the neck and brain      EF 60-65%      Stress Test:  Date: Results:    ECG Reviewed:  Date: Results:  72 Sinus rhythm   Left axis deviation   Abnormal ECG   When compared with ECG of 13-APR-2024 21:22,   No significant change was found   Confirmed by MD MARILY, Teachey ([id number]) on 5/20/2024 12:58:55 PM     Cath:  Date: Results:   (-) angina (has not used NTG in 20 years) and angina (has not used NTG in 20 years)   METS/Exercise Tolerance: >4 METS    Hematologic:     (+)      anemia,          Musculoskeletal: Comment: Gout  (+)  arthritis (rheumatoid arthritis. no neck involvement),             GI/Hepatic:     (+)  "GERD, Asymptomatic on medication,                  Renal/Genitourinary: Comment: Mass on kidney, serial imaging    (+) renal disease, type: CRI, Pt does not require dialysis,           Endo:     (+)               Obesity,       Psychiatric/Substance Use:  - neg psychiatric ROS     Infectious Disease:  - neg infectious disease ROS     Malignancy:  - neg malignancy ROS     Other:  - neg other ROS   (-) Any chance pregnant       Physical Exam    Airway        Mallampati: III   TM distance: > 3 FB   Neck ROM: full   Mouth opening: > 3 cm    Respiratory Devices and Support         Dental       (+) Minor Abnormalities - some fillings, tiny chips      Cardiovascular   cardiovascular exam normal       Rhythm and rate: regular and normal     Pulmonary   pulmonary exam normal        breath sounds clear to auscultation           OUTSIDE LABS:  CBC:   Lab Results   Component Value Date    WBC 17.7 (H) 02/18/2025    WBC 19.4 (H) 02/17/2025    HGB 11.2 (L) 02/18/2025    HGB 11.2 (L) 02/17/2025    HCT 34.5 (L) 02/18/2025    HCT 35.9 02/17/2025     02/18/2025     (L) 02/17/2025     BMP:   Lab Results   Component Value Date     02/18/2025     08/01/2024    POTASSIUM 5.1 02/18/2025    POTASSIUM 4.5 08/01/2024    CHLORIDE 103 02/18/2025    CHLORIDE 102 08/01/2024    CO2 34 (H) 02/18/2025    CO2 26 08/01/2024    BUN 15.7 02/18/2025    BUN 12.9 08/01/2024    CR 0.80 02/18/2025    CR 0.90 09/17/2024     (H) 02/18/2025     (H) 08/01/2024     COAGS:   Lab Results   Component Value Date    PTT 26 05/18/2024    INR 1.06 07/19/2024     POC: No results found for: \"BGM\", \"HCG\", \"HCGS\"  HEPATIC:   Lab Results   Component Value Date    ALBUMIN 3.6 02/18/2025    PROTTOTAL 5.8 (L) 02/18/2025    ALT 13 02/18/2025    AST 22 02/18/2025    ALKPHOS 79 02/18/2025    BILITOTAL 0.7 02/18/2025     OTHER:   Lab Results   Component Value Date    A1C 5.4 04/13/2024    MONI 9.5 02/18/2025    PHOS 3.3 11/16/2015    MAG 2.0 " "02/17/2025    AMYLASE 47 11/06/2018    TSH 0.36 08/10/2023    T4 1.52 08/10/2023    CRP 2.0 04/26/2021    SED 17 09/17/2024       Anesthesia Plan    ASA Status:  4    NPO Status:  NPO Appropriate    Anesthesia Type: MAC.   Induction: Intravenous, Propofol.   Maintenance: TIVA.        Consents    Anesthesia Plan(s) and associated risks, benefits, and realistic alternatives discussed. Questions answered and patient/representative(s) expressed understanding.     - Discussed: Risks, Benefits and Alternatives for BOTH SEDATION and the PROCEDURE were discussed     - Discussed with:  Patient            Postoperative Care    Pain management: IV analgesics, Oral pain medications, Peripheral nerve block (Single Shot).        Comments:               Christopher Mcconnell MD    I have reviewed the pertinent notes and labs in the chart from the past 30 days and (re)examined the patient.  Any updates or changes from those notes are reflected in this note.    Clinically Significant Risk Factors Present on Admission                 # Drug Induced Platelet Defect: home medication list includes an antiplatelet medication   # Hypertension: Noted on problem list           # Overweight: Estimated body mass index is 26.65 kg/m  as calculated from the following:    Height as of this encounter: 1.702 m (5' 7.01\").    Weight as of this encounter: 77.2 kg (170 lb 3.1 oz).                "

## 2025-02-19 ENCOUNTER — APPOINTMENT (OUTPATIENT)
Dept: PHYSICAL THERAPY | Facility: HOSPITAL | Age: 80
DRG: 481 | End: 2025-02-19
Attending: STUDENT IN AN ORGANIZED HEALTH CARE EDUCATION/TRAINING PROGRAM
Payer: COMMERCIAL

## 2025-02-19 ENCOUNTER — APPOINTMENT (OUTPATIENT)
Dept: CT IMAGING | Facility: HOSPITAL | Age: 80
DRG: 481 | End: 2025-02-19
Payer: COMMERCIAL

## 2025-02-19 ENCOUNTER — APPOINTMENT (OUTPATIENT)
Dept: MRI IMAGING | Facility: HOSPITAL | Age: 80
DRG: 481 | End: 2025-02-19
Payer: COMMERCIAL

## 2025-02-19 ENCOUNTER — APPOINTMENT (OUTPATIENT)
Dept: NEUROLOGY | Facility: HOSPITAL | Age: 80
DRG: 481 | End: 2025-02-19
Attending: PSYCHIATRY & NEUROLOGY
Payer: COMMERCIAL

## 2025-02-19 ENCOUNTER — APPOINTMENT (OUTPATIENT)
Dept: OCCUPATIONAL THERAPY | Facility: HOSPITAL | Age: 80
DRG: 481 | End: 2025-02-19
Attending: STUDENT IN AN ORGANIZED HEALTH CARE EDUCATION/TRAINING PROGRAM
Payer: COMMERCIAL

## 2025-02-19 LAB
ALBUMIN UR-MCNC: 20 MG/DL
APPEARANCE UR: ABNORMAL
BACTERIA #/AREA URNS HPF: ABNORMAL /HPF
BILIRUB UR QL STRIP: NEGATIVE
COLOR UR AUTO: YELLOW
CRP SERPL-MCNC: 79.7 MG/L
ERYTHROCYTE [DISTWIDTH] IN BLOOD BY AUTOMATED COUNT: 16.5 % (ref 10–15)
GLUCOSE BLDC GLUCOMTR-MCNC: 170 MG/DL (ref 70–99)
GLUCOSE UR STRIP-MCNC: NEGATIVE MG/DL
HCT VFR BLD AUTO: 26.9 % (ref 35–47)
HGB BLD-MCNC: 8.6 G/DL (ref 11.7–15.7)
HGB UR QL STRIP: ABNORMAL
HOLD SPECIMEN: NORMAL
KETONES UR STRIP-MCNC: ABNORMAL MG/DL
LACTATE SERPL-SCNC: 3.5 MMOL/L (ref 0.7–2)
LEUKOCYTE ESTERASE UR QL STRIP: ABNORMAL
MCH RBC QN AUTO: 30.9 PG (ref 26.5–33)
MCHC RBC AUTO-ENTMCNC: 32 G/DL (ref 31.5–36.5)
MCV RBC AUTO: 97 FL (ref 78–100)
MUCOUS THREADS #/AREA URNS LPF: PRESENT /LPF
NITRATE UR QL: POSITIVE
PH UR STRIP: 6 [PH] (ref 5–7)
PLATELET # BLD AUTO: 145 10E3/UL (ref 150–450)
PROCALCITONIN SERPL IA-MCNC: 0.16 NG/ML
RBC # BLD AUTO: 2.78 10E6/UL (ref 3.8–5.2)
RBC URINE: 9 /HPF
SP GR UR STRIP: 1.01 (ref 1–1.03)
SQUAMOUS EPITHELIAL: 18 /HPF
UROBILINOGEN UR STRIP-MCNC: <2 MG/DL
WBC # BLD AUTO: 19.3 10E3/UL (ref 4–11)
WBC URINE: >182 /HPF

## 2025-02-19 PROCEDURE — 84145 PROCALCITONIN (PCT): CPT | Performed by: INTERNAL MEDICINE

## 2025-02-19 PROCEDURE — 255N000002 HC RX 255 OP 636

## 2025-02-19 PROCEDURE — 87086 URINE CULTURE/COLONY COUNT: CPT | Performed by: INTERNAL MEDICINE

## 2025-02-19 PROCEDURE — 95819 EEG AWAKE AND ASLEEP: CPT

## 2025-02-19 PROCEDURE — 36415 COLL VENOUS BLD VENIPUNCTURE: CPT | Performed by: INTERNAL MEDICINE

## 2025-02-19 PROCEDURE — 250N000013 HC RX MED GY IP 250 OP 250 PS 637: Performed by: STUDENT IN AN ORGANIZED HEALTH CARE EDUCATION/TRAINING PROGRAM

## 2025-02-19 PROCEDURE — 97162 PT EVAL MOD COMPLEX 30 MIN: CPT | Mod: GP

## 2025-02-19 PROCEDURE — 97166 OT EVAL MOD COMPLEX 45 MIN: CPT | Mod: GO

## 2025-02-19 PROCEDURE — 83605 ASSAY OF LACTIC ACID: CPT | Performed by: INTERNAL MEDICINE

## 2025-02-19 PROCEDURE — 250N000011 HC RX IP 250 OP 636: Mod: JW | Performed by: STUDENT IN AN ORGANIZED HEALTH CARE EDUCATION/TRAINING PROGRAM

## 2025-02-19 PROCEDURE — 95819 EEG AWAKE AND ASLEEP: CPT | Mod: 26 | Performed by: PSYCHIATRY & NEUROLOGY

## 2025-02-19 PROCEDURE — 86140 C-REACTIVE PROTEIN: CPT | Performed by: INTERNAL MEDICINE

## 2025-02-19 PROCEDURE — 97535 SELF CARE MNGMENT TRAINING: CPT | Mod: GO

## 2025-02-19 PROCEDURE — 87040 BLOOD CULTURE FOR BACTERIA: CPT | Performed by: INTERNAL MEDICINE

## 2025-02-19 PROCEDURE — A9585 GADOBUTROL INJECTION: HCPCS

## 2025-02-19 PROCEDURE — 250N000013 HC RX MED GY IP 250 OP 250 PS 637: Performed by: INTERNAL MEDICINE

## 2025-02-19 PROCEDURE — 85027 COMPLETE CBC AUTOMATED: CPT | Performed by: INTERNAL MEDICINE

## 2025-02-19 PROCEDURE — 99232 SBSQ HOSP IP/OBS MODERATE 35: CPT | Performed by: INTERNAL MEDICINE

## 2025-02-19 PROCEDURE — 70496 CT ANGIOGRAPHY HEAD: CPT

## 2025-02-19 PROCEDURE — 250N000011 HC RX IP 250 OP 636: Mod: JZ | Performed by: STUDENT IN AN ORGANIZED HEALTH CARE EDUCATION/TRAINING PROGRAM

## 2025-02-19 PROCEDURE — 97530 THERAPEUTIC ACTIVITIES: CPT | Mod: GP

## 2025-02-19 PROCEDURE — 99223 1ST HOSP IP/OBS HIGH 75: CPT | Performed by: PSYCHIATRY & NEUROLOGY

## 2025-02-19 PROCEDURE — 120N000001 HC R&B MED SURG/OB

## 2025-02-19 PROCEDURE — 70553 MRI BRAIN STEM W/O & W/DYE: CPT

## 2025-02-19 PROCEDURE — 250N000011 HC RX IP 250 OP 636

## 2025-02-19 PROCEDURE — 81001 URINALYSIS AUTO W/SCOPE: CPT | Performed by: INTERNAL MEDICINE

## 2025-02-19 RX ORDER — HYDROMORPHONE HCL IN WATER/PF 6 MG/30 ML
0.2 PATIENT CONTROLLED ANALGESIA SYRINGE INTRAVENOUS EVERY 4 HOURS PRN
Status: DISCONTINUED | OUTPATIENT
Start: 2025-02-19 | End: 2025-02-24 | Stop reason: HOSPADM

## 2025-02-19 RX ORDER — GADOBUTROL 604.72 MG/ML
8 INJECTION INTRAVENOUS ONCE
Status: COMPLETED | OUTPATIENT
Start: 2025-02-19 | End: 2025-02-19

## 2025-02-19 RX ORDER — IOPAMIDOL 755 MG/ML
117 INJECTION, SOLUTION INTRAVASCULAR ONCE
Status: COMPLETED | OUTPATIENT
Start: 2025-02-19 | End: 2025-02-19

## 2025-02-19 RX ORDER — OXYCODONE HYDROCHLORIDE 5 MG/1
5 TABLET ORAL EVERY 4 HOURS PRN
Status: DISCONTINUED | OUTPATIENT
Start: 2025-02-19 | End: 2025-02-24 | Stop reason: HOSPADM

## 2025-02-19 RX ADMIN — OXYCODONE HYDROCHLORIDE 5 MG: 5 TABLET ORAL at 20:39

## 2025-02-19 RX ADMIN — ISOSORBIDE MONONITRATE 30 MG: 30 TABLET, EXTENDED RELEASE ORAL at 08:34

## 2025-02-19 RX ADMIN — ATORVASTATIN CALCIUM 80 MG: 40 TABLET, FILM COATED ORAL at 08:34

## 2025-02-19 RX ADMIN — ALLOPURINOL 300 MG: 300 TABLET ORAL at 08:34

## 2025-02-19 RX ADMIN — POLYETHYLENE GLYCOL 3350 17 G: 17 POWDER, FOR SOLUTION ORAL at 08:35

## 2025-02-19 RX ADMIN — ACETAMINOPHEN 650 MG: 325 TABLET ORAL at 20:39

## 2025-02-19 RX ADMIN — CEFAZOLIN SODIUM 2 G: 2 INJECTION, SOLUTION INTRAVENOUS at 04:55

## 2025-02-19 RX ADMIN — FOLIC ACID 1000 MCG: 1 TABLET ORAL at 08:34

## 2025-02-19 RX ADMIN — HYDROMORPHONE HYDROCHLORIDE 0.3 MG: 1 INJECTION, SOLUTION INTRAMUSCULAR; INTRAVENOUS; SUBCUTANEOUS at 09:18

## 2025-02-19 RX ADMIN — DULOXETINE HYDROCHLORIDE 20 MG: 20 CAPSULE, DELAYED RELEASE PELLETS ORAL at 08:34

## 2025-02-19 RX ADMIN — ENOXAPARIN SODIUM 40 MG: 40 INJECTION SUBCUTANEOUS at 08:39

## 2025-02-19 RX ADMIN — IOPAMIDOL 67 ML: 755 INJECTION, SOLUTION INTRAVENOUS at 05:49

## 2025-02-19 RX ADMIN — GADOBUTROL 8 ML: 604.72 INJECTION INTRAVENOUS at 14:44

## 2025-02-19 ASSESSMENT — ACTIVITIES OF DAILY LIVING (ADL)
ADLS_ACUITY_SCORE: 45
ADLS_ACUITY_SCORE: 43
ADLS_ACUITY_SCORE: 45
ADLS_ACUITY_SCORE: 43
ADLS_ACUITY_SCORE: 45
ADLS_ACUITY_SCORE: 43
ADLS_ACUITY_SCORE: 45
ADLS_ACUITY_SCORE: 43
ADLS_ACUITY_SCORE: 43
ADLS_ACUITY_SCORE: 45
ADLS_ACUITY_SCORE: 43
ADLS_ACUITY_SCORE: 45
ADLS_ACUITY_SCORE: 43
ADLS_ACUITY_SCORE: 45
ADLS_ACUITY_SCORE: 44
ADLS_ACUITY_SCORE: 43

## 2025-02-19 NOTE — PLAN OF CARE
Problem: Adult Inpatient Plan of Care  Goal: Optimal Comfort and Wellbeing  Outcome: Progressing  Problem: Orthopaedic Fracture  Goal: Optimal Pain Control and Function  Outcome: Progressing  Problem: Comorbidity Management  Goal: Blood Pressure in Desired Range  Outcome: Progressing  Problem: Wound  Goal: Improved Oral Intake  Outcome: Progressing     AO, VSS on RA. Pain is minimal + tolerable while at rest, limited mobility d/t pain in BLE from procedure. 1x PRN dilaudid 0.3 given @0918 (during therapy) with relief of symptoms. POD1 ORIF R femur. Stroke-like symptoms resolved. No word finding difficulty noted. CMS + Neuro intact. Tolerating diet well, swallowing w/o issue. CT completed prior AM, MRI completed this afternoon- awaiting results. Swelling decreased in LUE d/t infiltrated PIV. Family present at bedside. [employer] patent. No acute events reported at this time.    Satya Smart RN

## 2025-02-19 NOTE — CONSULTS
"[facility]    Stroke Telephone Note    I was called by Tod William on 02/19/25 regarding patient Margaret Batista. The patient is a 79 year old female with history of coronary artery disease, hyperlipidemia, hypertension, rheumatoid arthritis, stage III CKD, hypertension, prior CVA, suspected renal cell carcinoma, TIA/CVA and monoclonal B-cell type lymphocytosis admitted with right displaced intertrochanteric fracture and mildly displaced periprosthetic fracture of the right proximal humerus. Satus post surgical repair of right intertrochanteric fracture on 2/18/2025.  Repair of right periprosthetic fracture of the proximal humerus. This morning noted word finding difficulties and possible right UE weakness ( difficult to assess due to recent right humerus fracture). LKW at midnight. Code stroke activated. Per local provider, patient is still able to talk but needs time to find words.     Vitals  BP: (!) 155/74   Pulse: 96   Resp: 16   Temp: 98.3  F (36.8  C)   Weight: 77.2 kg (170 lb 3.1 oz)    Stroke Code Data (for stroke code without tele)  Stroke code activated 02/19/25  0506   Stroke provider first response 02/19/25  0507   Last known normal 02/19/25  0000      Time of discovery (or onset of symptoms) 02/19/25  0500   Head CT read by Stroke Neuro Provider 02/19/25  0535   Was stroke code de-escalated? Yes  02/19/25  0600     Imaging Findings  CT head: no bleeding   CTA head/neck: No lvo. Seems to have an stenosed but  patent LICA stent     Intravenous Thrombolysis  Not given due to:   - unclear or unfavorable risk-benefit profile for extended window thrombolysis beyond the conventional 4.5 hour time window    Endovascular Treatment  Not initiated due to absence of proximal vessel occlusion    Impression  Acute onset Word finding difficulties    Recommendations   - Use orderset: \"Ischemic Stroke Routine Admission\" or \"Ischemic Stroke No Thrombolytics/No Thrombectomy ICU " "Admission\"  - Place Neurology IP Stroke Consult order   - Permissive HTN; goal SBP < 220 mmHg  - MRI Brain with and without contrast   - Telemetry, EKG  - Bedside Glucose Monitoring  - A1c, Lipid Panel, Troponin x 3  - PT/OT/SLP  - Stroke Education  - Euthermia, Euglycemia    My recommendations are based on the information provided over the phone by Margaret Batista's in-person providers. They are not intended to replace the clinical judgment of her in-person providers. I was not requested to personally see or examine the patient at this time.     José Antonio Sands MD  Vascular Neurology    To page me or covering stroke neurology team member, click here: [facility]  Choose \"On Call\" tab at top, then select \"NEUROLOGY/ALL SITES\" from middle drop-down box, press Enter, then look for \"stroke\" or \"telestroke\" for your site.   "

## 2025-02-19 NOTE — PLAN OF CARE
Problem: Adult Inpatient Plan of Care  Goal: Plan of Care Review  Description: The Plan of Care Review/Shift note should be completed every shift.  The Outcome Evaluation is a brief statement about your assessment that the patient is improving, declining, or no change.  This information will be displayed automatically on your shift  note.  Outcome: Progressing     Problem: Orthopaedic Fracture  Goal: Absence of Embolism Signs and Symptoms  Outcome: Progressing     Problem: Comorbidity Management  Goal: Blood Pressure in Desired Range  Outcome: Progressing     Problem: Adult Inpatient Plan of Care  Goal: Absence of Hospital-Acquired Illness or Injury  Intervention: Prevent Skin Injury  Recent Flowsheet Documentation  Taken 2/18/2025 1914 by Jessy Cardozo, RN  Body Position: supine  Taken 2/18/2025 1714 by Jessy Cardozo, RN  Body Position: refuses positioning     Problem: Orthopaedic Fracture  Goal: Optimal Functional Ability  Intervention: Optimize Functional Ability  Recent Flowsheet Documentation  Taken 2/18/2025 1914 by Jessy Cardozo, RN  Positioning/Transfer Devices:   pillows   in use  Taken 2/18/2025 1714 by Jessy Cardozo RN  Positioning/Transfer Devices:   pillows   in use     Problem: Wound  Goal: Skin Health and Integrity  Intervention: Optimize Skin Protection  Recent Flowsheet Documentation  Taken 2/18/2025 1914 by Jessy Cardozo, RN  Head of Bed (HOB) Positioning: HOB at 30-45 degrees     Problem: Mobility Impairment  Goal: Optimal Mobility  Intervention: Optimize Mobility  Recent Flowsheet Documentation  Taken 2/18/2025 1914 by Jessy Cardozo, RN  Positioning/Transfer Devices:   pillows   in use  Taken 2/18/2025 1714 by Jessy Cardozo, DAYNE  Positioning/Transfer Devices:   pillows   in use   Goal Outcome Evaluation:                  Pt. Alert and oriented. Had minimal pain of shoulder. Tylenol given. IV abx administered. Refused getting out of bed. Purewick in place. RA. Continue with POC.

## 2025-02-19 NOTE — PROGRESS NOTES
Will not see today:  PAIN MANAGEMENT SERVICE CHART CHECK - late consult placed will see tomorrow    This patient's chart has been reviewed by the Pain Service. Consulted for intractable pain following right hip fracture repair.  Right humeral fracture is being managed conservatively.  Please assist with management of pain.    Recommendations for HMS to consider overnight - will see patient tomorrow AM.     RN's use oxycodone first line - reserve IV second line if po tried and ineffective after 60 mins  Oxy and IV dilaudid both ordered for moderate pain prn - change oxy to moderate to severe pain first line, reserve IV for severe pain second line or if unable to take PO  Consider scheduling APAP 975 mg po TID    Thank you!      Tahira LiraD BCPS  Acute Care Pain Management Program  Page via Scope 5 or epic messaging

## 2025-02-19 NOTE — PLAN OF CARE
"  Problem: Adult Inpatient Plan of Care  Goal: Plan of Care Review  Description: The Plan of Care Review/Shift note should be completed every shift.  The Outcome Evaluation is a brief statement about your assessment that the patient is improving, declining, or no change.  This information will be displayed automatically on your shift  note.  Outcome: Progressing  Goal: Patient-Specific Goal (Individualized)  Description: You can add care plan individualizations to a care plan. Examples of Individualization might be:  \"Parent requests to be called daily at 9am for status\", \"I have a hard time hearing out of my right ear\", or \"Do not touch me to wake me up as it startles  me\".  Outcome: Progressing  Goal: Absence of Hospital-Acquired Illness or Injury  Outcome: Progressing  Intervention: Identify and Manage Fall Risk  Recent Flowsheet Documentation  Taken 2/19/2025 0036 by Radha Grace RN  Safety Promotion/Fall Prevention:   activity supervised   lighting adjusted   increase visualization of patient   room door open   room near nurse's station   toileting scheduled  Intervention: Prevent Skin Injury  Recent Flowsheet Documentation  Taken 2/19/2025 0500 by Radha Grace RN  Body Position:   turned   supine  Taken 2/19/2025 0036 by Radha Grace RN  Body Position:   turned   left   heels elevated   foot of bed elevated  Intervention: Prevent and Manage VTE (Venous Thromboembolism) Risk  Recent Flowsheet Documentation  Taken 2/19/2025 0036 by Radha Grace RN  VTE Prevention/Management: SCDs off (sequential compression devices)  Intervention: Prevent Infection  Recent Flowsheet Documentation  Taken 2/19/2025 0036 by Radha Grace RN  Infection Prevention:   cohorting utilized   rest/sleep promoted   single patient room provided  Goal: Optimal Comfort and Wellbeing  Outcome: Progressing  Intervention: Monitor Pain and Promote Comfort  Recent Flowsheet Documentation  Taken 2/19/2025 0036 by " Radha Grace, RN  Pain Management Interventions:   cold applied   medication offered but refused   repositioned  Goal: Readiness for Transition of Care  Outcome: Progressing   Goal Outcome Evaluation:         PT STARTED SHIFT ORIENTED EXCEPT TO WHICH HOSPITAL AND DATE, NOT BEING ABLE TO COME UP WITH FEW WORDS. UPON REASSESSMENT PT STRUGGLING TO FIND WORDS/ CONFUSED. PT WAS UNABLE TO TELL ME HER BIRTHDAY AND THAT SHE WAS SHOPPING. SHE STARTED AND STOPPED SENTENCES, UNABLE TO COME UP WITH THE APPROPRIATE WORD. NO STRENGTH OR FACIAL CHANGES NOTED. RAPID/ THEN STROKE CODE CALLED. HEAD CT COMPLETE. IV SITE INFILTRATED (MARKED AND ELEVATED). MRI CHECK LIST COMPLETE. NURSING SWALLOW EVAL COMPLETE AND PT PASSED.     PT HAS NOT BEEN OUT OF BED TONIGHT.  FRACTURE OF RIGHT UPPER EXTREMITY IN SLING. PT VOIDING WITH PURWICK.

## 2025-02-19 NOTE — SIGNIFICANT EVENT
"5:11 AM    S: Rapid response called d/t word finding difficulty, then turned into stroke code called by charge RN.     Briefly, the patient was admitted for fall leading to R proximal humerus fx and R displaced intertronchanteric fx s/p repair 2/18. Humerus fx being conservatively managed.     On evaluation, patient alert and awake. She is able to converse, but has to stop frequently and process her thoughts before speaking. Unable to find words, such as \"stroke\" and \"heart\" when describing her PMHx. Bedside RN reports last known normal at midnight. She was very with it and could easily hold a conversation.     Exam: BP (!) 155/74 (BP Location: Left arm)   Pulse 96   Temp 98.3  F (36.8  C) (Oral)   Resp 16   Ht 1.702 m (5' 7.01\")   Wt 77.2 kg (170 lb 3.1 oz)   LMP 01/01/1998 (Within Months)   SpO2 96%   BMI 26.65 kg/m    GENERAL: alert and no distress, anxious  RESP: Lungs clear to auscultation  CV: RRR, murmurs/rubs/clicks not appreciated on exam.   ABDOMEN: nontender, nondistended  MS: RUE in sling. Finger squeeze 5/5 LUE, 4/5 RUE. Able to wiggle toes. RLE immobilized post-surgery. BLE w/non-pitting edema   NEURO: Alert, oriented x4. No facial droop, speech legible. Aphasia. CN II - XII grossly intact. Sensation grossly intact. PERRL, EOM grossly intact. No tongue deviation. Unable to assess pronator drift.       A/P:   R/o stroke. Certainly at high risk for this given prior CVA, POD#1, and multiple malignancies (MM, RCC). Not candidate for TPA - outside time window, POD#1.   -Inpatient stroke code orderset - CT head/CTA head/neck. Radiology to follow-up w/results.     Discussed w/stroke neurologist, Dr. Wicho Marinelli MD  [facility]     6:00 AM  Addendum  Dr. Sands reviewed CT imaging - negative for acute bleed or stroke findings. Recommended follow-up MRI brain - ordered.       " Yes

## 2025-02-19 NOTE — PROGRESS NOTES
02/19/25 0855   Appointment Info   Signing Clinician's Name / Credentials (PT) Marge Argueta,PT   Rehab Comments (PT) RN ok'd PT/OT jing - CT scan complete   Living Environment   People in Home spouse   Current Living Arrangements apartment;other (see comments)  (senior, laundry in apartment)   Home Accessibility no concerns  (elevator)   Self-Care   Regular Exercise Yes   Activity/Exercise Type other (see comments)  (nu step)   Exercise Amount/Frequency daily;3-5 times/wk   Equipment Currently Used at Home other (see comments);cane, straight  (4WW-or SEcane)   Fall history within last six months yes   Number of times patient has fallen within last six months 2   Activity/Exercise/Self-Care Comment I ADLS/ cleaning,  meals/ drives-  recovery from back surgery   General Information   Onset of Illness/Injury or Date of Surgery 02/17/25   Referring Physician Jb Alston MD   Patient/Family Therapy Goals Statement (PT) none stated   Pertinent History of Current Problem (include personal factors and/or comorbidities that impact the POC) Margaret Batista is a 79 year old female with history of coronary artery disease, hyperlipidemia, hypertension, rheumatoid arthritis, stage III CKD, hypertension, prior CVA, suspected renal cell carcinoma, TIA/CVA and monoclonal B-cell type lymphocytosis admitted with right displaced intertrochanteric fracture and mildly displaced periprosthetic fracture of the right proximal humerus.      Orthopedic surgery service performed surgical repair of right intertrochanteric fracture on 2/18/2025.  Repair of right periprosthetic fracture of the proximal humerus was deferred and being conservatively managed with a shoulder sling. She did have a rapid response/stroke code called early this AM. CT negative for acute bleed/stroke findings. Just completed EEG. MRI brain scheduled for this afternoon.   Existing Precautions/Restrictions other (see comments)  (sling RUE)    Weight-Bearing Status - RUE nonweight-bearing   Weight-Bearing Status - RLE weight-bearing as tolerated   General Observations pt in bed family in room   Cognition   Affect/Mental Status (Cognition) unable/difficult to assess   Follows Commands (Cognition) follows one-step commands   Pain Assessment   Patient Currently in Pain Yes, see Vital Sign flowsheet  (4/10 R shoulder , R hip)   Range of Motion (ROM)   ROM Comment AAROM RLE WFL, LLE AROM WFL   Strength (Manual Muscle Testing)   Strength Comments deficits noted with activity   Bed Mobility   Bed Mobility Limitations decreased ability to use legs for bridging/pushing;decreased ability to use arms for pushing/pulling   Impairments Contributing to Impaired Bed Mobility pain;decreased strength   Assistive Device (Bed Mobility) bed rails;draw sheet;other (see comments)  (HOB elevated)   Comment, (Bed Mobility) modAx2, pt unable to sit upraight due to pain in R hip with wtb in sitting   Transfers   Comment, (Transfers) unable to trial due to increased pain R hip   Clinical Impression   Criteria for Skilled Therapeutic Intervention Yes, treatment indicated   PT Diagnosis (PT) decreased functional mobility   Influenced by the following impairments pain, NWB RUE - sling all times, decreased strength and endurence   Functional limitations due to impairments bed mob, transfers . gait   Clinical Presentation (PT Evaluation Complexity) evolving   Clinical Presentation Rationale presents as medically diagnosed   Clinical Decision Making (Complexity) moderate complexity   Planned Therapy Interventions (PT) bed mobility training;gait training;strengthening;transfer training   Risk & Benefits of therapy have been explained evaluation/treatment results reviewed;patient   PT Total Evaluation Time   PT Eval, Moderate Complexity Minutes (38642) 12   Physical Therapy Goals   PT Frequency Daily   PT Predicted Duration/Target Date for Goal Attainment 02/26/25   PT Goals Bed  Mobility;Transfers;Gait   PT: Bed Mobility Minimal assist;Supine to/from sit;Within precautions   PT: Transfers Moderate assist;Sit to/from stand;Bed to/from chair;Assistive device;Within precautions   PT: Gait Moderate assist;Tyshawn walker;10 feet;Within precautions   Interventions   Interventions Quick Adds Therapeutic Activity   Therapeutic Activity   Therapeutic Activities: dynamic activities to improve functional performance Minutes (51306) 8   Symptoms Noted During/After Treatment Increased pain;Fatigue   Treatment Detail/Skilled Intervention pt able to pull self into yumiko sitting using LUE for giwn and sling adjustment,attempted repoisitioning bed and RLE for pt to bed able to sit fulling upraight at EOB- pt unable to tolerates due to pain in R hip when trying to sit upright- pt returned to bed   PT Discharge Planning   PT Plan R hip ex, bed mob, progress to transfers bring tyshawn walker to trial -gait as able with tyshawn walker, pt NWB RUE with sling all times   PT Discharge Recommendation (DC Rec) Transitional Care Facility   PT Rationale for DC Rec pt Ax2 for limited bed mob, pt unable to trial transfers due to pain recommend further rehab   PT Brief overview of current status Ax2 supine<>partial sit   PT Total Distance Amb During Session (feet) 0   PT Equipment Needed at Discharge other (see comments);wheelchair;walker, rolling  (pt has 4WW at home)

## 2025-02-19 NOTE — PROGRESS NOTES
[facility]    Medicine Progress Note - Hospitalist Service    Date of Admission:  2/17/2025    Assessment & Plan   Margaret Batista is a 79 year old female with history of coronary artery disease, hyperlipidemia, hypertension, rheumatoid arthritis, stage III CKD, hypertension, prior CVA, suspected renal cell carcinoma, TIA/CVA and monoclonal B-cell type lymphocytosis admitted with right displaced intertrochanteric fracture and mildly displaced periprosthetic fracture of the right proximal humerus.     Orthopedic surgery service performed surgical repair of right intertrochanteric fracture on 2/18/2025.  Repair of right periprosthetic fracture of the proximal humerus was deferred and being conservatively managed with a shoulder sling.    Patient developed word finding difficulty on 2/18/2025 overnight.  Stroke workup including CT angiogram of the head and neck and brain CT were negative for acute pathology.  Reports of EEG and brain MRI are pending.  Neurologist is assisting with further evaluation for possible to stroke versus TIA..      Right displaced intertrochanteric fracture  Mildly displaced periprosthetic fracture of the right proximal humerus.  Orthopedic surgery service performed surgical repair of right intertrochanteric fracture on 2/18/2025.  Repair of right periprosthetic fracture of the proximal humerus was deferred and being conservatively managed with a shoulder sling.    Analgesics as needed  Postoperative surgical care per orthopedic surgery service  DVT prophylaxis per orthopedic surgery service  PT/OT  Orthopedic surgery [id number] assistance      Prior CVA  Resume PTA aspirin once cleared by orthopedic surgery service  Continue PTA atorvastatin  PT/OT    Stage III CKD  Creatinine is at baseline  Monitor BMP  Avoid nephrotoxin    Leukocytosis  Possibly secondary to induced demargination  Monitor CBC  Consider starting antibiotics if there is evidence of occult  "infection.    Suspected renal cell carcinoma  Multiple myeloma   Patient wants to follow up with her primary oncologist  Outpatient follow-up with her primary oncologist    Rheumatoid arthritis  Resume PTA hydroxychloroquine in the next few days  Hold PTA methotrexate for now    Hyperlipidemia  Atorvastatin 80 mg daily      History of CAD  Resume PTA aspirin once cleared by orthopedic surgery service  Continue PTA atorvastatin  Resume PTA Imdur  Sublingual nitroglycerin as needed      Diet: Regular Diet Adult    DVT Prophylaxis: Pneumatic Compression Devices  Slaughter Catheter: Not present  Lines: None     Cardiac Monitoring: None  Code Status: No CPR- Do NOT Intubate      Clinically Significant Risk Factors                   # Hypertension: Noted on problem list            # Overweight: Estimated body mass index is 26.65 kg/m  as calculated from the following:    Height as of this encounter: 1.702 m (5' 7.01\").    Weight as of this encounter: 77.2 kg (170 lb 3.1 oz)., PRESENT ON ADMISSION            Social Drivers of Health    Alcohol Use: Unknown (5/6/2022)    Received from [facility], [facility]    AUDIT-C     Frequency of Alcohol Consumption: Monthly or less     Frequency of Binge Drinking: Never   Physical Activity: Insufficiently Active (10/17/2024)    Exercise Vital Sign     Days of Exercise per Week: 2 days     Minutes of Exercise per Session: 20 min   Social Connections: Unknown (10/17/2024)    Social Connection and Isolation Panel [NHANES]     Frequency of Social Gatherings with Friends and Family: Three times a week          Disposition Plan     Medically Ready for Discharge: Anticipated in 2-4 Days    Tod William MD  Hospitalist Service  [facility]  Securely message with [employer] (more info)  Text page via [employer] Paging/Directory   ______________________________________________________________________    Interval History   Patient developed word finding difficulty on " 2/18/2025 overnight.  Stroke workup including CT angiogram of the head and neck and brain CT were negative for acute pathology.  Reports of EEG and brain MRI are pending.  Seen during EEG session.     Physical Exam   Vital Signs: Temp: 98.3  F (36.8  C) Temp src: Oral BP: 111/64 Pulse: 92   Resp: 18 SpO2: 97 % O2 Device: None (Room air) Oxygen Delivery: 2 LPM  Weight: 170 lbs 3.12 oz    General appearance: Awake, Alert, Cooperative, not in any obvious distress and appears stated age   HEENT: Normocephalic, atraumatic, conjunctiva clear without icterus and ears without discharge  Lungs: Clear to auscultation bilaterally, no wheezing, good air exchange, normal work of breathing  Cardiovascular: Regular Rate and Rythm, normal apical impulse, normal S1 and S2, no lower extremity edema bilaterally  Abdomen: Soft, non-tender and Non-distended, active bowel sounds  Skin: Skin color, texture normal and bruising or bleeding. No rashes or lesions over face, neck, arms and legs, turgor normal.  Musculoskeletal: Right shoulder sling in place. Diminished ROM in the right shoulder and hip  Neurologic: Alert & Oriented X 3, Facial symmetry preserved and upper & lower extremities moving well with symmetry  Psychiatric: Calm, normal eye contact and normal affect      Medical Decision Making       45 MINUTES SPENT BY ME on the date of service doing chart review, history, exam, documentation & further activities per the note.      Data   Imaging results reviewed over the past 24 hrs:   Recent Results (from the past 24 hours)   CTA Head Neck with Contrast    Addendum: 2/19/2025    Addendum: Results were called to Dr. Marinelli at 6:23 AM.      Narrative    EXAM: CTA HEAD NECK W CONTRAST  LOCATION: [facility]  DATE: 2/19/2025    INDICATION: Code Stroke to evaluate for potential thrombolysis and thrombectomy. PLEASE READ IMMEDIATELY. Word finding difficulty and confusion  COMPARISON: Noncontrast head CT 2/17/2025 CTA  5/18/2024  CONTRAST: ISOVUE 370 67ML  TECHNIQUE: Head and neck CT angiogram with IV contrast. Noncontrast head CT followed by axial helical CT images of the head and neck vessels obtained during the arterial phase of intravenous contrast administration. Axial 2D reconstructed images and   multiplanar 3D MIP reconstructed images of the head and neck vessels were performed by the technologist. Dose reduction techniques were used. All stenosis measurements made according to NASCET criteria unless otherwise specified.    FINDINGS:   NONCONTRAST HEAD CT:   INTRACRANIAL CONTENTS: No intracranial hemorrhage, extraaxial collection, or mass effect.  No CT evidence of acute infarct. Mild to moderate presumed chronic small vessel ischemic changes. Mild to moderate generalized volume loss. No hydrocephalus. There   are couple of tiny chronic lacunar infarcts in the left basal ganglia.     VISUALIZED ORBITS/SINUSES/MASTOIDS: No intraorbital abnormality. No paranasal sinus mucosal disease. No middle ear or mastoid effusion.    BONES/SOFT TISSUES: Redemonstration of a small subgaleal contusion over the right frontal bone. No calvarial fracture.    HEAD CTA:  ANTERIOR CIRCULATION: No large vessel occlusion or flow-limiting stenosis. There are mild-to-moderate atheromatous changes in the carotid siphons. There are scattered areas of nonflow limiting irregularity and narrowing elsewhere in the anterior   circulation. Within the limits of CTA, no convincing aneurysm or high flow vascular malformation. Standard [place] anatomy.    POSTERIOR CIRCULATION: No large vessel occlusion or flow-limiting stenosis. Focal mild narrowing of the V4 segment of the left vertebral artery secondary to atherosclerotic change. Balanced vertebral arteries supply a normal basilar artery.     DURAL VENOUS SINUSES: Expected enhancement of the major dural venous sinuses.    NECK CTA:  RIGHT CAROTID: Atherosclerotic plaque results in less than 50%  stenosis in the right ICA. No dissection.    LEFT CAROTID: Left carotid stent again demonstrated. The stent is patent.    VERTEBRAL ARTERIES: No focal stenosis or dissection. Balanced vertebral arteries.    AORTIC ARCH: Classic aortic arch anatomy with no significant stenosis at the origin of the great vessels.    NONVASCULAR STRUCTURES: Moderate thyromegaly, primarily involving the isthmus and left lobe.      Impression    IMPRESSION:   HEAD CT:  1.  No CT evidence for acute intracranial process.  2.  Brain atrophy and presumed chronic microvascular ischemic changes as above.  3.  There are couple of tiny chronic lacunar infarcts in the left basal ganglia.    HEAD CTA:  1.  No large vessel occlusion or flow-limiting stenosis.  2.  Evidence of intracranial atherosclerosis.    NECK CTA:  1.  No hemodynamically significant stenosis or dissection.  2.  Left carotid stent is patent.

## 2025-02-19 NOTE — PROGRESS NOTES
"Orthopedic Progress Note      Assessment: 1 Day Post-Op  S/P Procedure(s):  Closed reduction and cephalomedullary nail right intertrochanteric femur fracture, Closed treatment of minimally displaced right periprosthetic proximal humerus fracture      Postoperative Plan:  Pain Control: Continue per pain protocol.  Weight Bearing: Weightbearing as tolerated with the assistance of a walker.  DVT Prophylaxis: Will plan for Lovenox starting on postop day 1 given cancer history.  Would recommend discharge on DOAC x 2 weeks pending risk stratification.  Antibiotics: 24 hours of perioperative antibiotics  GI: Plan for aggressive bowel regimen to prevent constipation from narcotic medications  Lines: HLIV once tolerating PO  PT/OT: Eval and treatment. Will follow up on recommendations.  Follow up:  in 2 weeks in clinic for wound check  Discharge plan: Likely to TCU pending progress with PT      Subjective:  Sandra is resting comfortably in bed upon my arrival. Her  and daughter are at her bedside. She reports minimal pain at rest, currently rating it a 2/10. She does note that her pain increased significantly to the right anterior thigh with transitions and movement.     She did have a rapid response/stroke code called early this AM. CT negative for acute bleed/stroke findings. Just completed EEG. MRI brain scheduled for this afternoon.     Denies any fevers/chills. Denies any N/V. Tolerating oral intake. Not yet passing gas. Voiding without difficulty.     Objective:  /64 (BP Location: Left arm)   Pulse 92   Temp 98.3  F (36.8  C) (Oral)   Resp 18   Ht 1.702 m (5' 7.01\")   Wt 77.2 kg (170 lb 3.1 oz)   LMP 01/01/1998 (Within Months)   SpO2 97%   BMI 26.65 kg/m    The patient is A&Ox3. Appears comfortable.   Sensation is intact.  Dorsiflexion and plantar flexion is intact.  Dorsalis pedis pulse intact on the right.   Right calf is soft and non-tender. Negative Mariana's.  The incisions are covered. Dressings " C/D/I.    No drain     Pertinent Labs   Lab Results: personally reviewed.   Lab Results   Component Value Date    INR 1.06 07/19/2024    INR 0.98 05/18/2024    INR 0.99 04/13/2024     Lab Results   Component Value Date    WBC 19.3 (H) 02/19/2025    HGB 8.6 (L) 02/19/2025    HCT 26.9 (L) 02/19/2025    MCV 97 02/19/2025     (L) 02/19/2025     Lab Results   Component Value Date     02/18/2025    CO2 34 (H) 02/18/2025         Report completed by:  Naomie Roldan PA-C/Dr. Alston  [facility]    Date: 2/19/2025  Time: 9:48 AM

## 2025-02-19 NOTE — PROGRESS NOTES
OT Evaluation     02/19/25 1200   Appointment Info   Signing Clinician's Name / Credentials (OT) Jone Ulloa, OTR/L   Living Environment   People in Home spouse   Current Living Arrangements apartment  (Senior living apartment)   Home Accessibility no concerns  (elevator access to parking garage and apartment)   Living Environment Comments Laundry within apartment. Bathroom setup includes walk-in shower with grab bars.   Self-Care   Regular Exercise Yes   Activity/Exercise Type other (see comments)  (Nu[employer])   Exercise Amount/Frequency 3-5 times/wk   Equipment Currently Used at Home cane, straight;walker, rolling   Fall history within last six months yes   Number of times patient has fallen within last six months 2   Activity/Exercise/Self-Care Comment Patient IND with ADL's at baseline; reports using cane or walker for mobility   Instrumental Activities of Daily Living (IADL)   IADL Comments Shares IADL responsibility with spouse, patient does not drive.   General Information   Onset of Illness/Injury or Date of Surgery 02/17/25   Referring Physician Tod William MD   Patient/Family Therapy Goal Statement (OT) None stated   Additional Occupational Profile Info/Pertinent History of Current Problem Margaret Batista is a 79 year old female with history of coronary artery disease, hyperlipidemia, hypertension, rheumatoid arthritis, stage III CKD, hypertension, prior CVA, suspected renal cell carcinoma, TIA/CVA and monoclonal B-cell type lymphocytosis admitted with right displaced intertrochanteric fracture and mildly displaced periprosthetic fracture of the right proximal humerus.      Orthopedic surgery service performed surgical repair of right intertrochanteric fracture on 2/18/2025.  Repair of right periprosthetic fracture of the proximal humerus was deferred and being conservatively managed with a shoulder sling. She did have a rapid response/stroke code called early morning of 2/19. CT negative for  acute bleed/stroke findings. MRI brain scheduled for afternoon 2/19.   Existing Precautions/Restrictions fall;no hip ADD past midline;90 degree hip flexion;no pivoting or twisting   Left Upper Extremity (Weight-bearing Status) full weight-bearing (FWB)   Right Upper Extremity (Weight-bearing Status) non weight-bearing (NWB)   Left Lower Extremity (Weight-bearing Status) full weight-bearing (FWB)   Right Lower Extremity (Weight-bearing Status) weight-bearing as tolerated (WBAT)   Cognitive Status Examination   Orientation Status orientation to person, place and time   Visual Perception   Visual Impairment/Limitations corrective lenses full-time   Sensory   Sensory Quick Adds sensation intact   Pain Assessment   Patient Currently in Pain Yes, see Vital Sign flowsheet  (Patient reports 8/10 pain when sitting at EOB (with bearing weight on R hip))   Posture   Posture other (see comments)   Posture Comments Unable to assess standing posture   Range of Motion Comprehensive   General Range of Motion upper extremity range of motion deficits identified   General Upper Extremity Assessment (Range of Motion)   Comment: Upper Extremity ROM R UE shoulder AROM limited; however, PROM within functional limits. R UE elbow, wrist, and hand AROM within functional limits   Upper Extremity: Range of Motion shoulder, right: UE ROM   Strength Comprehensive (MMT)   Comment, General Manual Muscle Testing (MMT) Assessment Generalized weakness   Muscle Tone Assessment   Muscle Tone Quick Adds No deficits were identified   Bed Mobility   Bed Mobility supine-sit   Supine-Sit [place] (Bed Mobility) minimum assist (75% patient effort);2 person assist   Comment (Bed Mobility) Min assist x2 for increased safety, incresaed trunk support, and for guidance of LE's over EOB   Balance   Balance Assessment sitting static balance   Sitting Balance: Static contact guard   Activities of Daily Living   BADL Assessment/Intervention lower body dressing    Lower Body Dressing Assessment/Training   [place] Level (Lower Body Dressing) don;socks;maximum assist (25% patient effort)   Clinical Impression   Criteria for Skilled Therapeutic Interventions Met (OT) Yes, treatment indicated   OT Diagnosis Decreased IND with ADL's, transfers, and mobility   OT Problem List-Impairments impacting ADL problems related to;activity tolerance impaired;balance;coordination;mobility;range of motion (ROM);strength;pain;post-surgical precautions   Assessment of Occupational Performance 5 or more Performance Deficits   Identified Performance Deficits Dressing, bathing, toileting, transfers, mobility, home mangement   Planned Therapy Interventions (OT) ADL retraining;bed mobility training;cognition;ROM;strengthening;stretching;transfer training;home program guidelines;progressive activity/exercise   Clinical Decision Making Complexity (OT) detailed assessment/moderate complexity   Risk & Benefits of therapy have been explained evaluation/treatment results reviewed;care plan/treatment goals reviewed;participants voiced agreement with care plan;participants included;patient   OT Total Evaluation Time   OT Eval, Moderate Complexity Minutes (58583) 10   OT Goals   Therapy Frequency (OT) 5 times/week   OT Predicted Duration/Target Date for Goal Attainment 02/28/25   OT Goals Hygiene/Grooming;Upper Body Dressing;Lower Body Dressing;Toilet Transfer/Toileting   OT: Hygiene/Grooming supervision/stand-by assist   OT: Upper Body Dressing Minimal assist   OT: Lower Body Dressing Minimal assist   OT: Toilet Transfer/Toileting Minimal assist   Interventions   Interventions Quick Adds Self-Care/Home Management   Self-Care/Home Management   Self-Care/Home Mgmt/ADL, Compensatory, Meal Prep Minutes (15875) 8   Symptoms Noted During/After Treatment (Meal Preparation/Planning Training) increased pain   Treatment Detail/Skilled Intervention Patient supine when approached, agreeable to OT eval and treat.  Ryan completed and treat initiated. Facilitated supine<>sitting by providing min assist x2 for increased safety, increased trunk support, and for gudiance of LE's over EOB. Cueing provided for increased adherence to R UE WB precautions. Facilitated donning of socks by providing max assist x1 for increased safety secondary to impaired balance and limited coordination (in context of R UE shoulder sling). At EOB, patient is unable to tolerate  WB through R hip/sit upright as patient endorses significantly elevated pain. Unable to progress transfers this date. Facilitated sitting<>supine by providing mod assist x2 for increased safety, increased trunk support, and for gudiance of LE's over edge into bed. Facilitated bed mobility (rolling R, rolling L) by providing min assist x1 for increased support at hips and for increased adherence to WB precautions. Session ended with patient supine in bed with call light within reach and all questions answered.   OT Discharge Planning   OT Plan Next session: ensure pain meds on board prior to therapy, progress transfers as able, seated grooming, monitor cognition   OT Discharge Recommendation (DC Rec) Transitional Care Facility   OT Rationale for DC Rec Patient remains below baseline and would benefit from continued skilled OT services to progress self care skills and to optimize safe return to daily activity.   OT Brief overview of current status Min-Mod assist x2 for bed mobility, assist x1-2 for ADL's.   OT Total Distance Amb During Session (feet) 0   Total Session Time   Timed Code Treatment Minutes 8   Total Session Time (sum of timed and untimed services) 18

## 2025-02-19 NOTE — PROGRESS NOTES
Bedside EEG was performed that included photic, auditory, and sensory stimulation. Hyperventilation was not possible given the patient's clinical state.   Skin intact.  EEG #     [id number] EEG system used.    Neurology dictation to follow up.

## 2025-02-19 NOTE — CONSULTS
NEUROLOGY CONSULTATION NOTE     Margaret Batista,  1945, MRN [id number] Date: 2025     [facility]   Code status:  No CPR- Do NOT Intubate   PCP: Swetha Maxwell, [phone]      ASSESSMENT & PLAN   Diagnosis code: Strokelike symptoms    Strokelike symptoms-rule out stroke  History of right intertrochanteric fracture status post surgery    Head CT negative for acute stroke  CTA head and neck negative for large vessel occlusion/stenosis  Check MRI brain  Check EEG for possible seizure  Symptoms could be related to side effects of medications  Echocardiogram for possible stroke  Cardiac monitoring  Lipid panel statin  Blood pressure can be normalized  Aspirin Plavix for 3 weeks if cleared by orthopedic surgery.  Long-term medications to be decided after MRI brain.  PT/OT       Chief Complaint   Patient presents with    Fall    Hip Pain    Shoulder Pain        HISTORY OF PRESENT ILLNESS     We have been requested by Dr. Marinelli to evaluate Margaret Batista who is a 79 year old  female for evaluation of possible stroke.  This is a 79-year-old female with history of coronary artery disease, hyperlipidemia, hypertension, rheumatoid arthritis, stage III CKD, hypertension, prior history of stroke, suspected renal cell carcinoma, TIA, monoclonal B-cell lymphocytosis who was admitted for a right displaced intertrochanteric fracture and underwent surgical repair yesterday.  Overnight she developed some word finding difficulty for which a stroke code was called.  She reports that she disoriented and did not recognize what was going on around her.  Had difficulty getting her words out to express herself.  She feels that she is getting better this morning though continues to have some difficulty.  Did not have good sleep overnight.  No headaches or other ongoing symptoms.  No major baseline cognitive issues.     PAST MEDICAL & SURGICAL HISTORY     Medical History  Past Medical History:   Diagnosis  Date    Atherosclerotic heart disease of native coronary artery without angina pectoris     -s/p ADÁN to the mid-LAD, mid-circumflex, mid-right coronary artery, proximal right coronary  artery, and PTCA of a marginal branch of the right coronary artery 2007. -s/p ADÁN to proximal left anterior descending 10/29/13.    Contusion of abdominal wall         Essential (primary) hypertension     2006    Gout     No Comments Provided    Hyperlipidemia     2007    Localized swelling, mass, or lump of upper extremity     2017    Other specified counseling     10/28/2013,Patient has identified Health Care Agent(s): Yes Add Health Care Agents: Yes   Health Care Agent(s): Primary Health Care Agent: Jay Batista  Relationship:  Phone:   Secondary Health Care Agent:  Relationship: Daughter Phone:   Conservator:  Relationship:  Phone:   Guardian: Relationship:  Phone:    Patient has Advance Care Plan Documents (Health Care Directive, POLST): No, refe*    Polyosteoarthritis     No Comments Provided    Postmenopausal bleeding     No Comments Provided    Presence of coronary angioplasty implant and graft     ,unstable angina; severe prox LAD stenosis; s/p 7 stents    Primary osteoarthritis of left hand     2017    Rheumatoid arthritis (H)     follows at [place] yearly     Surgical History  Past Surgical History:   Procedure Laterality Date    ARTHROPLASTY HIP ANTERIOR Left 2024    Procedure: ARTHROPLASTY, HIP, TOTAL, DIRECT ANTERIOR APPROACH;  Surgeon: Shashi Topete MD;  Location: [facility] OR    ARTHROPLASTY KNEE      2011    ARTHROSCOPY KNEE          BIOPSY BREAST      10/25/95,BRIAN RAMIREZ    CAROTID ARTERY ANGIOPLASTY Left 04/15/2024    [facility]     SECTION      1974, Section    COLONOSCOPY      05,Normal - Repeat in 10 years    COLONOSCOPY  2016    COLONOSCOPY N/A 2019    Procedure: COLONOSCOPY;  Surgeon:  Evelin Thompson MD;  Location: [facility] OR    COLONOSCOPY N/A 01/09/2019    Procedure: COMBINED COLONOSCOPY, SINGLE OR MULTIPLE BIOPSY/POLYPECTOMY BY BIOPSY;  Surgeon: Evelin Thompson MD;  Location: [facility] OR    ENDOSCOPIC RELEASE CARPAL TUNNEL Right 01/13/2023    Procedure: RELEASE, CARPAL TUNNEL, ENDOSCOPIC;  Surgeon: Danial Stanley MD;  Location:  OR    ESOPHAGOSCOPY, GASTROSCOPY, DUODENOSCOPY (EGD), COMBINED      5/2/2011,erosive gastritis;bx;consider MRI/A;[facility]    EXTRACTION(S) DENTAL      1970    HEART CATH, ANGIOPLASTY      10/29/2013,ADÁN to proximal left anterior descending    OTHER SURGICAL HISTORY      2007/2013,[id number],OTHER    OTHER SURGICAL HISTORY      01/2014,UCG477,TOTAL SHOULDER ARTHROPLASTY,Right    OTHER SURGICAL HISTORY      04/14/2016,14204.0,[facility] COLONOSCOPY REMOVE ELIZA POLYP LESN SNARE,repeat 2019, precancerous polyps    ZZ STRESS LEXISCAN TEST  08/2018    With Dr. Irwin - normal        SOCIAL HISTORY     Social History     Tobacco Use    Smoking status: Never     Passive exposure: Past    Smokeless tobacco: Never    Tobacco comments:     Passive exposure in childhood home.    Vaping Use    Vaping status: Never Used   Substance Use Topics    Alcohol use: Not Currently    Drug use: No        FAMILY HISTORY     Reviewed, and family history includes Arthritis in her father; Atrial fibrillation in her sister; Breast Cancer (age of onset: 53) in her mother; Cancer in her father; Heart Disease in her brother, father, and mother; Hypertension in her father and mother; Melanoma in her brother; Other - See Comments in her mother.     ALLERGIES     Allergies   Allergen Reactions    Plavix [Clopidogrel]      Plavix resistant-tested at [place]    Enalapril Cough    Food      Macadamia nuts make mouth itch    Losartan      Allergic rxn with hives and angioedema suspected to be from alternative manufacture of the effient or the losartan        REVIEW OF SYSTEMS     12 system ROS was done and was negative within the  HPI.     HOME & HOSPITAL MEDICATIONS     Prior to Admission Medications  Medications Prior to Admission   Medication Sig Dispense Refill Last Dose/Taking    acetaminophen (TYLENOL) 500 MG tablet Take 1,000 mg by mouth 2 times daily as needed for mild pain.   2/17/2025 Morning    albuterol (PROAIR HFA/PROVENTIL HFA/VENTOLIN HFA) 108 (90 Base) MCG/ACT inhaler Inhale 2 puffs into the lungs every 6 hours as needed for wheezing, shortness of breath or cough.   Taking As Needed    alendronate (FOSAMAX) 70 MG tablet Take 1 tablet (70 mg) by mouth every 7 days. 14 tablet 4 Taking    allopurinol (ZYLOPRIM) 300 MG tablet Take 1 tablet (300 mg) by mouth daily. 90 tablet 4 2/17/2025 Morning    aspirin (ASA) 81 MG chewable tablet Take 1 tablet (81 mg) by mouth daily 90 tablet 3 2/17/2025 Morning    atenolol (TENORMIN) 100 MG tablet Take 1 tablet (100 mg) by mouth daily. 90 tablet 4 2/17/2025 Morning    atorvastatin (LIPITOR) 80 MG tablet TAKE 1 TABLET DAILY 90 tablet 4 2/17/2025 Morning    cetirizine (ZYRTEC) 5 MG tablet Take 5 mg by mouth at bedtime.   2/16/2025 Bedtime    DULoxetine (CYMBALTA) 20 MG capsule Take 1 capsule (20 mg) by mouth daily. 90 capsule 4 2/17/2025 Morning    folic acid (FOLVITE) 1 MG tablet TAKE 1 TABLET DAILY 90 tablet 3 2/17/2025 Morning    hydroxychloroquine (PLAQUENIL) 200 MG tablet Take 400 mg by mouth every other day.   2/16/2025 Morning    hydroxychloroquine (PLAQUENIL) 200 MG tablet Take 200 mg by mouth daily.   2/17/2025 Morning    isosorbide mononitrate (IMDUR) 30 MG 24 hr tablet TAKE 1 TABLET DAILY 90 tablet 3 2/17/2025 Morning    methotrexate 2.5 MG tablet Take 20 mg by mouth once a week. On Mondays 2/17/2025 Morning    nitroGLYcerin (NITROSTAT) 0.4 MG sublingual tablet Place 1 tablet (0.4 mg) under the tongue every 5 minutes as needed for chest pain. (For chest pain x 3 doses) 30 tablet 3 Taking As Needed    polyethylene glycol (MIRALAX) 17 GM/Dose powder Take 17 g by mouth daily as needed  "510 g 1 Taking As Needed       Hospital Medications  Current Facility-Administered Medications   Medication Dose Route Frequency Provider Last Rate Last Admin    allopurinol (ZYLOPRIM) tablet 300 mg  300 mg Oral Daily Tod William MD   300 mg at 02/19/25 0834    atorvastatin (LIPITOR) tablet 80 mg  80 mg Oral Daily Tod William MD   80 mg at 02/19/25 0834    DULoxetine (CYMBALTA) DR capsule 20 mg  20 mg Oral Daily Tod William MD   20 mg at 02/19/25 0834    enoxaparin ANTICOAGULANT (LOVENOX) injection 40 mg  40 mg Subcutaneous Q24H Jb Alston MD   40 mg at 02/19/25 0839    folic acid (FOLVITE) tablet 1,000 mcg  1,000 mcg Oral Daily Tod William MD   1,000 mcg at 02/19/25 0834    isosorbide mononitrate (IMDUR) 24 hr tablet 30 mg  30 mg Oral Daily Tod William MD   30 mg at 02/19/25 0834    polyethylene glycol (MIRALAX) Packet 17 g  17 g Oral Daily Jb Alston MD   17 g at 02/19/25 0835    sodium chloride (PF) 0.9% PF flush 3 mL  3 mL Intracatheter Q8H Jb Alston MD   3 mL at 02/19/25 0841    sodium chloride (PF) 0.9% PF flush 3 mL  3 mL Intracatheter Q8H Jb Alston MD   3 mL at 02/19/25 0459        PHYSICAL EXAM     Vital signs  Temp:  [97.7  F (36.5  C)-98.3  F (36.8  C)] 98.3  F (36.8  C)  Pulse:  [68-96] 92  Resp:  [16-18] 18  BP: (110-155)/(55-74) 111/64  SpO2:  [92 %-97 %] 97 %    PHYSICAL EXAMINATION  VITALS: /64 (BP Location: Left arm)   Pulse 92   Temp 98.3  F (36.8  C) (Oral)   Resp 18   Ht 1.702 m (5' 7.01\")   Wt 77.2 kg (170 lb 3.1 oz)   LMP 01/01/1998 (Within Months)   SpO2 97%   BMI 26.65 kg/m    GENERAL -Health appearing, No apparent distress  EYES- No scleral icterus, no eyelid droop, Pupils - see Neuro section  HEENT - Normocephalic, atraumatic, Hearing grossly intact; Oral mucosa moist and pink in color. External Ears and nose intact.   Neck - supple   PULM - Good spontaneous respiratory effort;   CV- Pedal pulses " present with no peripheral edema/ No significant varicosities.  MSK- Gait - see Neuro section; Strength and tone- see Neuro section; Range of motion grossly intact.  PSYCH -cooperative    Neurological  Mental status - Patient is awake and oriented to self, place and time. Attention span is normal. Language is fluent and follows commands appropriately.  Mild aphasia with repetition.  Cranial nerves - CN II-XII intact. Pupils are reactive and symmetric; EOMI, VFIT, NLF symmetric  Motor - There is no pronator drift. Motor exam shows 5/5 strength in all extremities except chronic right arm and left leg weakness.  Tone - Tone is symmetric bilaterally in upper and lower extremities.  Reflexes -toes are downgoing/equivocal.  Sensation - Sensory exam is grossly intact to light touch, pain.  Coordination - Finger to nose and heel to shin is without dysmetria.  Gait and station --needs assistance to ambulate.  Formal gait testing cannot be done due to safety concerns from ongoing medical issues.       DIAGNOSTIC STUDIES     Pertinent Radiology   HEAD CT:  1.  No CT evidence for acute intracranial process.  2.  Brain atrophy and presumed chronic microvascular ischemic changes as above.  3.  There are couple of tiny chronic lacunar infarcts in the left basal ganglia.     HEAD CTA:  1.  No large vessel occlusion or flow-limiting stenosis.  2.  Evidence of intracranial atherosclerosis.     NECK CTA:  1.  No hemodynamically significant stenosis or dissection.  2.  Left carotid stent is patent.       Recent Results (from the past 24 hours)   Glucose by meter    Collection Time: 02/19/25  5:04 AM   Result Value Ref Range    GLUCOSE BY METER POCT 170 (H) 70 - 99 mg/dL   CBC with platelets    Collection Time: 02/19/25  6:16 AM   Result Value Ref Range    WBC Count 19.3 (H) 4.0 - 11.0 10e3/uL    RBC Count 2.78 (L) 3.80 - 5.20 10e6/uL    Hemoglobin 8.6 (L) 11.7 - 15.7 g/dL    Hematocrit 26.9 (L) 35.0 - 47.0 %    MCV 97 78 - 100 fL     MCH 30.9 26.5 - 33.0 pg    MCHC 32.0 31.5 - 36.5 g/dL    RDW 16.5 (H) 10.0 - 15.0 %    Platelet Count 145 (L) 150 - 450 10e3/uL   Extra Green Top Tube (LAB USE ONLY)    Collection Time: 02/19/25  6:23 AM   Result Value Ref Range    Hold Specimen JIC    Lactic acid whole blood    Collection Time: 02/19/25  8:07 AM   Result Value Ref Range    Lactic Acid 3.5 (H) 0.7 - 2.0 mmol/L   CRP inflammation    Collection Time: 02/19/25  8:07 AM   Result Value Ref Range    CRP Inflammation 79.70 (H) <5.00 mg/L   Procalcitonin    Collection Time: 02/19/25  8:07 AM   Result Value Ref Range    Procalcitonin 0.16 <0.50 ng/mL       Total time spent for face to face visit, reviewing labs/imaging studies, counseling and coordination of care was: Over 80 min More than 50% of this time was spent on counseling and coordination of care.    Counseling patient.  Counseling family.  Reviewing chart.  High risk.  Testing reviewed/ordered.    Irvin Cummins MD  Neurologist  [facility]  Tel:- [phone]

## 2025-02-20 ENCOUNTER — APPOINTMENT (OUTPATIENT)
Dept: CARDIOLOGY | Facility: HOSPITAL | Age: 80
DRG: 481 | End: 2025-02-20
Attending: PSYCHIATRY & NEUROLOGY
Payer: COMMERCIAL

## 2025-02-20 ENCOUNTER — APPOINTMENT (OUTPATIENT)
Dept: OCCUPATIONAL THERAPY | Facility: HOSPITAL | Age: 80
DRG: 481 | End: 2025-02-20
Payer: COMMERCIAL

## 2025-02-20 ENCOUNTER — APPOINTMENT (OUTPATIENT)
Dept: PHYSICAL THERAPY | Facility: HOSPITAL | Age: 80
DRG: 481 | End: 2025-02-20
Payer: COMMERCIAL

## 2025-02-20 VITALS
SYSTOLIC BLOOD PRESSURE: 109 MMHG | HEIGHT: 67 IN | WEIGHT: 170.19 LBS | OXYGEN SATURATION: 94 % | TEMPERATURE: 98 F | HEART RATE: 86 BPM | DIASTOLIC BLOOD PRESSURE: 55 MMHG | RESPIRATION RATE: 18 BRPM | BODY MASS INDEX: 26.71 KG/M2

## 2025-02-20 LAB
BACTERIA BLD CULT: NORMAL
BACTERIA BLD CULT: NORMAL
BACTERIA UR CULT: NORMAL
HGB BLD-MCNC: 7.3 G/DL (ref 11.7–15.7)
HOLD SPECIMEN: NORMAL
LVEF ECHO: NORMAL

## 2025-02-20 PROCEDURE — 99232 SBSQ HOSP IP/OBS MODERATE 35: CPT | Performed by: INTERNAL MEDICINE

## 2025-02-20 PROCEDURE — 97530 THERAPEUTIC ACTIVITIES: CPT | Mod: GP

## 2025-02-20 PROCEDURE — 97530 THERAPEUTIC ACTIVITIES: CPT | Mod: GO

## 2025-02-20 PROCEDURE — 36415 COLL VENOUS BLD VENIPUNCTURE: CPT | Performed by: STUDENT IN AN ORGANIZED HEALTH CARE EDUCATION/TRAINING PROGRAM

## 2025-02-20 PROCEDURE — 120N000001 HC R&B MED SURG/OB

## 2025-02-20 PROCEDURE — 93306 TTE W/DOPPLER COMPLETE: CPT | Mod: 26 | Performed by: INTERNAL MEDICINE

## 2025-02-20 PROCEDURE — 250N000011 HC RX IP 250 OP 636: Performed by: INTERNAL MEDICINE

## 2025-02-20 PROCEDURE — 97535 SELF CARE MNGMENT TRAINING: CPT | Mod: GO

## 2025-02-20 PROCEDURE — 97110 THERAPEUTIC EXERCISES: CPT | Mod: GP

## 2025-02-20 PROCEDURE — 250N000013 HC RX MED GY IP 250 OP 250 PS 637: Performed by: CLINICAL NURSE SPECIALIST

## 2025-02-20 PROCEDURE — 250N000013 HC RX MED GY IP 250 OP 250 PS 637: Performed by: STUDENT IN AN ORGANIZED HEALTH CARE EDUCATION/TRAINING PROGRAM

## 2025-02-20 PROCEDURE — 93306 TTE W/DOPPLER COMPLETE: CPT

## 2025-02-20 PROCEDURE — 99222 1ST HOSP IP/OBS MODERATE 55: CPT | Mod: AI | Performed by: CLINICAL NURSE SPECIALIST

## 2025-02-20 PROCEDURE — 85018 HEMOGLOBIN: CPT | Performed by: STUDENT IN AN ORGANIZED HEALTH CARE EDUCATION/TRAINING PROGRAM

## 2025-02-20 PROCEDURE — 250N000009 HC RX 250: Performed by: CLINICAL NURSE SPECIALIST

## 2025-02-20 PROCEDURE — 99232 SBSQ HOSP IP/OBS MODERATE 35: CPT | Performed by: PSYCHIATRY & NEUROLOGY

## 2025-02-20 PROCEDURE — 250N000013 HC RX MED GY IP 250 OP 250 PS 637: Performed by: INTERNAL MEDICINE

## 2025-02-20 PROCEDURE — 250N000011 HC RX IP 250 OP 636: Performed by: STUDENT IN AN ORGANIZED HEALTH CARE EDUCATION/TRAINING PROGRAM

## 2025-02-20 RX ORDER — MULTIPLE VITAMINS W/ MINERALS TAB 9MG-400MCG
1 TAB ORAL DAILY
Status: DISCONTINUED | OUTPATIENT
Start: 2025-02-20 | End: 2025-02-24 | Stop reason: HOSPADM

## 2025-02-20 RX ORDER — LIDOCAINE 50 MG/G
OINTMENT TOPICAL 4 TIMES DAILY
Status: DISCONTINUED | OUTPATIENT
Start: 2025-02-20 | End: 2025-02-24 | Stop reason: HOSPADM

## 2025-02-20 RX ORDER — CEFTRIAXONE 1 G/1
1 INJECTION, POWDER, FOR SOLUTION INTRAMUSCULAR; INTRAVENOUS EVERY 24 HOURS
Status: DISCONTINUED | OUTPATIENT
Start: 2025-02-20 | End: 2025-02-21

## 2025-02-20 RX ADMIN — OXYCODONE HYDROCHLORIDE 5 MG: 5 TABLET ORAL at 13:18

## 2025-02-20 RX ADMIN — DULOXETINE HYDROCHLORIDE 20 MG: 20 CAPSULE, DELAYED RELEASE PELLETS ORAL at 08:59

## 2025-02-20 RX ADMIN — ATORVASTATIN CALCIUM 80 MG: 40 TABLET, FILM COATED ORAL at 08:59

## 2025-02-20 RX ADMIN — ISOSORBIDE MONONITRATE 30 MG: 30 TABLET, EXTENDED RELEASE ORAL at 08:59

## 2025-02-20 RX ADMIN — LIDOCAINE: 50 OINTMENT TOPICAL at 20:30

## 2025-02-20 RX ADMIN — Medication 1 TABLET: at 13:18

## 2025-02-20 RX ADMIN — LIDOCAINE: 50 OINTMENT TOPICAL at 13:18

## 2025-02-20 RX ADMIN — LIDOCAINE: 50 OINTMENT TOPICAL at 16:16

## 2025-02-20 RX ADMIN — ALLOPURINOL 300 MG: 300 TABLET ORAL at 08:59

## 2025-02-20 RX ADMIN — ACETAMINOPHEN 650 MG: 325 TABLET ORAL at 20:29

## 2025-02-20 RX ADMIN — OXYCODONE HYDROCHLORIDE 5 MG: 5 TABLET ORAL at 09:05

## 2025-02-20 RX ADMIN — POLYETHYLENE GLYCOL 3350 17 G: 17 POWDER, FOR SOLUTION ORAL at 08:59

## 2025-02-20 RX ADMIN — ENOXAPARIN SODIUM 40 MG: 40 INJECTION SUBCUTANEOUS at 08:59

## 2025-02-20 RX ADMIN — Medication 500 MG: at 20:29

## 2025-02-20 RX ADMIN — FOLIC ACID 1000 MCG: 1 TABLET ORAL at 08:59

## 2025-02-20 RX ADMIN — CEFTRIAXONE SODIUM 1 G: 1 INJECTION, POWDER, FOR SOLUTION INTRAMUSCULAR; INTRAVENOUS at 10:39

## 2025-02-20 ASSESSMENT — ACTIVITIES OF DAILY LIVING (ADL)
ADLS_ACUITY_SCORE: 43
ADLS_ACUITY_SCORE: 43
DEPENDENT_IADLS:: CLEANING;COOKING;LAUNDRY;SHOPPING;MEAL PREPARATION;TRANSPORTATION
ADLS_ACUITY_SCORE: 43

## 2025-02-20 NOTE — PLAN OF CARE
Problem: Adult Inpatient Plan of Care  Goal: Absence of Hospital-Acquired Illness or Injury  Intervention: Identify and Manage Fall Risk  Recent Flowsheet Documentation  Taken 2/20/2025 1230 by Naomi Horn RN  Safety Promotion/Fall Prevention:   supervised activity   safety round/check completed   room organization consistent   room near nurse's station   patient and family education   nonskid shoes/slippers when out of bed   clutter free environment maintained   room door open   lighting adjusted  Taken 2/20/2025 0830 by Naomi Horn RN  Safety Promotion/Fall Prevention:   supervised activity   safety round/check completed   room organization consistent   room near nurse's station   patient and family education   nonskid shoes/slippers when out of bed   clutter free environment maintained   room door open   lighting adjusted  Intervention: Prevent Skin Injury  Recent Flowsheet Documentation  Taken 2/20/2025 1230 by Naomi Horn RN  Body Position: (pt was supine)   turned   left   heels elevated  Taken 2/20/2025 0830 by Naomi Horn RN  Body Position: (pt was supine)   turned   left   heels elevated  Intervention: Prevent and Manage VTE (Venous Thromboembolism) Risk  Recent Flowsheet Documentation  Taken 2/20/2025 1230 by Naomi Horn RN  VTE Prevention/Management: SCDs on (sequential compression devices)  Taken 2/20/2025 0830 by Naomi Horn RN  VTE Prevention/Management: SCDs on (sequential compression devices)  Intervention: Prevent Infection  Recent Flowsheet Documentation  Taken 2/20/2025 1230 by Naomi Horn RN  Infection Prevention:   rest/sleep promoted   single patient room provided  Taken 2/20/2025 0830 by Naomi Horn RN  Infection Prevention:   rest/sleep promoted   single patient room provided  Goal: Optimal Comfort and Wellbeing  Intervention: Monitor Pain and Promote Comfort  Recent Flowsheet Documentation  Taken 2/20/2025 1230 by Naomi Horn  RN  Pain Management Interventions: cold applied  Taken 2/20/2025 0830 by Naomi Horn RN  Pain Management Interventions: cold applied     Problem: Orthopaedic Fracture  Goal: Absence of Embolism Signs and Symptoms  Intervention: Prevent or Manage Embolism Risk  Recent Flowsheet Documentation  Taken 2/20/2025 1230 by Naomi Horn RN  VTE Prevention/Management: SCDs on (sequential compression devices)  Taken 2/20/2025 0830 by Naomi Horn RN  VTE Prevention/Management: SCDs on (sequential compression devices)  Goal: Fracture Stability  Intervention: Promote Fracture Stability and Healing  Recent Flowsheet Documentation  Taken 2/20/2025 1230 by Naomi Horn RN  Fracture Immobilization: (sling) immobilization device maintained  Taken 2/20/2025 0830 by Naomi Horn RN  Fracture Immobilization: (sling) immobilization device maintained  Goal: Optimal Functional Ability  Intervention: Optimize Functional Ability  Recent Flowsheet Documentation  Taken 2/20/2025 1230 by Naomi Horn RN  Activity Management: activity encouraged  Taken 2/20/2025 0830 by Naomi Horn RN  Activity Management: activity encouraged  Goal: Optimal Pain Control and Function  Intervention: Manage Acute Orthopaedic-Related Pain  Recent Flowsheet Documentation  Taken 2/20/2025 1230 by Naomi Horn RN  Pain Management Interventions: cold applied  Taken 2/20/2025 0830 by Naomi Horn RN  Pain Management Interventions: cold applied     Problem: Chronic Kidney Disease  Goal: Optimal Functional Ability  Intervention: Optimize Functional Ability  Recent Flowsheet Documentation  Taken 2/20/2025 1230 by Naomi Horn RN  Activity Management: activity encouraged  Taken 2/20/2025 0830 by Naomi Horn RN  Activity Management: activity encouraged     Problem: Comorbidity Management  Goal: Blood Pressure in Desired Range  Intervention: Maintain Blood Pressure Management  Recent Flowsheet  Documentation  Taken 2/20/2025 1230 by Naomi Horn RN  Medication Review/Management: medications reviewed  Taken 2/20/2025 0830 by Naomi Horn RN  Medication Review/Management: medications reviewed     Problem: Wound  Goal: Optimal Functional Ability  Intervention: Optimize Functional Ability  Recent Flowsheet Documentation  Taken 2/20/2025 1230 by Naomi Horn RN  Activity Management: activity encouraged  Taken 2/20/2025 0830 by Naomi Horn RN  Activity Management: activity encouraged  Goal: Optimal Pain Control and Function  Intervention: Prevent or Manage Pain  Recent Flowsheet Documentation  Taken 2/20/2025 1230 by Naomi Horn RN  Pain Management Interventions: cold applied  Taken 2/20/2025 0830 by Naomi Horn RN  Pain Management Interventions: cold applied  Goal: Skin Health and Integrity  Intervention: Optimize Skin Protection  Recent Flowsheet Documentation  Taken 2/20/2025 1230 by Naomi Horn RN  Activity Management: activity encouraged  Head of Bed (HOB) Positioning: HOB at 15 degrees  Taken 2/20/2025 0830 by Naomi Horn RN  Activity Management: activity encouraged  Head of Bed (HOB) Positioning: HOB at 15 degrees     Problem: Fall Injury Risk  Goal: Absence of Fall and Fall-Related Injury  Intervention: Identify and Manage Contributors  Recent Flowsheet Documentation  Taken 2/20/2025 1230 by Naomi Horn RN  Medication Review/Management: medications reviewed  Taken 2/20/2025 0830 by Naomi Horn RN  Medication Review/Management: medications reviewed  Intervention: Promote Injury-Free Environment  Recent Flowsheet Documentation  Taken 2/20/2025 1230 by Naomi Horn RN  Safety Promotion/Fall Prevention:   supervised activity   safety round/check completed   room organization consistent   room near nurse's station   patient and family education   nonskid shoes/slippers when out of bed   clutter free environment maintained   room door  open   lighting adjusted  Taken 2/20/2025 0830 by Naomi Horn RN  Safety Promotion/Fall Prevention:   supervised activity   safety round/check completed   room organization consistent   room near nurse's station   patient and family education   nonskid shoes/slippers when out of bed   clutter free environment maintained   room door open   lighting adjusted     Problem: Mobility Impairment  Goal: Optimal Mobility  Intervention: Optimize Mobility  Recent Flowsheet Documentation  Taken 2/20/2025 1230 by Naomi Horn RN  Activity Management: activity encouraged  Taken 2/20/2025 0830 by Naomi Horn RN  Activity Management: activity encouraged     Problem: Stroke, Ischemic (Includes Transient Ischemic Attack)  Goal: Optimal Functional Ability  Intervention: Optimize Functional Ability  Recent Flowsheet Documentation  Taken 2/20/2025 1230 by Naomi Horn RN  Activity Management: activity encouraged  Taken 2/20/2025 0830 by Naomi Horn RN  Activity Management: activity encouraged   Goal Outcome Evaluation:  Pt tolerating IV abx with no adverse effect. Hip dressings are CDI with sling in place to right arm. Pain is effectively controlled with scheduled and prn analgesia No confusion or word finding concerns noted today. Numerous family visiting throughout shift. Pt utilizes call light appropriately and is able to verbalize needs effectively.

## 2025-02-20 NOTE — PROGRESS NOTES
"Orthopedic Progress Note      Assessment: 2 Days Post-Op  S/P Procedure(s):  Closed reduction and cephalomedullary nail right intertrochanteric femur fracture, Closed treatment of minimally displaced right periprosthetic proximal humerus fracture     Plan:   Pain Control: Continue per pain protocol.  Weight Bearing: Weightbearing as tolerated with the assistance of a walker.  DVT Prophylaxis: Will plan for Lovenox starting on postop day 1 given cancer history.  Would recommend discharge on DOAC x 2 weeks pending risk stratification.  Antibiotics: 24 hours of perioperative antibiotics  GI: Plan for aggressive bowel regimen to prevent constipation from narcotic medications  Lines: HLIV once tolerating PO  PT/OT: Eval and treatment. Will follow up on recommendations.  Follow up:  Please follow-up with Dr. Alston' team in 2 weeks at [facility] for a wound check. Call our scheduling line at [phone] to make an appointment, if you do not already have one scheduled.   Discharge plan: To TCU pending placement and medical stability      Subjective:  Pain: mild  Chest pain, SOB: no  Nausea, Vomiting:  no  Lightheadedness, Dizziness:  no  Fever, chills: no  Neuro:  Patient denies new onset numbness or paresthesias    Patient doing well today and is in great spirits with CT and MRI being negative. No speech difficulties this morning. Minimal pain at rest. Increases with active movement of the hip and with ambulation. Pain however is well controlled with current regimen. Tolerating oral intake. Voiding without difficulty. No BM but is passing gas. All questions/concerns answered.     Objective:  /57 (BP Location: Left arm)   Pulse 81   Temp 98.2  F (36.8  C) (Oral)   Resp 19   Ht 1.702 m (5' 7.01\")   Wt 77.2 kg (170 lb 3.1 oz)   LMP 01/01/1998 (Within Months)   SpO2 95%   BMI 26.65 kg/m    The patient is A&Ox3. Appears comfortable.   Sensation is intact and equal in the bilateral lower " extremity  Dorsiflexion and plantar flexion is intact.  Dorsalis pedis pulse intact.  Right lower extremity is warm and well perfused.   Calves are soft and non-tender. Negative Mariana's.  The incision is covered. Right hip dressings are C/D/I without strikethrough or surrounding erythema.     No drain     Pertinent Labs   Lab Results: personally reviewed.   Lab Results   Component Value Date    INR 1.06 07/19/2024    INR 0.98 05/18/2024    INR 0.99 04/13/2024     Lab Results   Component Value Date    WBC 19.3 (H) 02/19/2025    HGB 7.3 (L) 02/20/2025    HCT 26.9 (L) 02/19/2025    MCV 97 02/19/2025     (L) 02/19/2025     Lab Results   Component Value Date     02/18/2025    CO2 34 (H) 02/18/2025         Report completed by:  Mustapha Pena PA-C/Dr. Alston  [facility]    Date: 2/20/2025  Time: 9:42 AM

## 2025-02-20 NOTE — PROGRESS NOTES
"CLINICAL NUTRITION SERVICES - ASSESSMENT NOTE    RECOMMENDATIONS FOR MDs/PROVIDERS TO ORDER:  ***    Malnutrition Status:    ***    Registered Dietitian Interventions:  ***    Future/Additional Recommendations:  ***     REASON FOR ASSESSMENT  Positive admission nutrition risk screen with 24-33 lb weight loss    Pt presents s/p fall with hip fx-POD2 ORIF  Hx CVA, CKD3, suspected renal cell CA, RA, HLD, CAD    SUBJECTIVE INFORMATION  {RDNSubjectiveinformation:154899}    NUTRITION HISTORY  Pt was seen by RD in April after CVA and OP in August d/t ongoing decreased appetite and weight loss  Pt had been taking 1 protein shake/day at home      Home nutrition support plan: ***    CURRENT NUTRITION ORDERS  Diet: Orders Placed This Encounter      Regular Diet Adult      Discharge Instruction - Regular Diet Adult        CURRENT INTAKE/TOLERANCE  Good. Ate 100% x 3 meals yesterday at 2084 kcal, 73 g protein meeting 100% of estimated kcal, 80% of estimated protein needs    LABS  Nutrition-relevant labs:   CRP 79 (H)    MEDICATIONS  Nutrition-relevant medications:   Lipitor, iv abx, folic acid, miralax daily    ANTHROPOMETRICS  Height: 170.2 cm (5' 7.008\")  IBW: 61.6 kg  % IBW: 125%  BMI (kg/m ): Overweight BMI 25-29.9  Weight History: Pt has had continual gradual wt loss. 15% in 10 months total. 4.5% past 2 months. 5.5% x 6 months.  Not clinically significant  Date/Time Weight Weight Method   02/18/25 0653 77.2 kg (170 lb 3.1 oz) Bed scale   02/17/25 1626 76.7 kg (169 lb) --     Wt Readings from Last 10 Encounters:   02/18/25 77.2 kg (170 lb 3.1 oz)   02/03/25 76.9 kg (169 lb 9.6 oz)   12/04/24 80.3 kg (177 lb)   11/26/24 80 kg (176 lb 6.4 oz)   10/17/24 80.7 kg (178 lb)   09/19/24 80.5 kg (177 lb 6.4 oz)   08/20/24 81.5 kg (179 lb 9.6 oz)   08/12/24 81.6 kg (180 lb)   08/08/24 82.6 kg (182 lb)   07/18/24 83.9 kg (185 lb)   04/2024 199 lb   lb    Dosing Weight: 77.2 kg, based on actual wt    ASSESSED NUTRITION " NEEDS  Estimated Energy Needs: 6521-0928 kcals/day (25 - 30 kcals/kg)  Justification: Maintenance  Estimated Protein Needs:  grams protein/day (1.2 - 1.5 grams of pro/kg)  Justification: Post-op  Estimated Fluid Needs: 0609-1847 mL/day (25 - 30 mL/kg)  Justification: Maintenance    SYSTEM FINDINGS    {RDNPhysicalfindings:777069}  Skin/wounds: ***  GI symptoms: ***    MALNUTRITION  % Intake: {RDNMalnutrition%intake:160792}  % Weight Loss: {RDNMalnutrition%weightloss:338551}  Subcutaneous Fat Loss: {RDNMalnutritionsubcutaneousfatloss:457985}  Muscle Loss: {RDNMalnutritionmuscleloss:383486}  Fluid Accumulation/Edema: {RDNMalnutritionfluidaccumulationedema:903711}  Malnutrition Diagnosis: {RDNMalnutritiondiagnosis:780065}  Malnutrition Present on Admission: {RDNMalnutritionpresentonadmit:676858}    NUTRITION DIAGNOSIS  {RDNNutritiondiagnosis:557085} related to *** as evidenced by ***    INTERVENTIONS  {RDNInterventions:640868}    Goals  {RDNGoals:233316}     Monitoring/Evaluation  Progress toward goals will be monitored and evaluated per policy.       Subcutaneous Fat Loss: None observed  Muscle Loss: Clavicles (pectoralis and deltoids): Mild, Shoulders (deltoids): Mild, and Interosseous muscles: Mild  Fluid Accumulation/Edema: Moderate, 2+ Left arm, right hip and leg  Malnutrition Diagnosis: Moderate malnutrition in the context of acute illness or injury  Malnutrition Present on Admission: Unable to assess    NUTRITION DIAGNOSIS  Inadequate oral intake related to decreased appetite, altered self feeding increased needs as evidenced by pt report of decreased intake, difficulty feeding self with non dominant hand, fx    Goals  Meet > 75% of estimated nutrition needs  Maintain weight     Monitoring/Evaluation  Progress toward goals will be monitored and evaluated per policy.

## 2025-02-20 NOTE — PROGRESS NOTES
[facility]    Medicine Progress Note - Hospitalist Service    Date of Admission:  2/17/2025    Assessment & Plan   Margaret Batista is a 79 year old female with history of coronary artery disease, hyperlipidemia, hypertension, rheumatoid arthritis, stage III CKD, hypertension, prior CVA, suspected renal cell carcinoma, TIA/CVA and monoclonal B-cell type lymphocytosis admitted with right displaced intertrochanteric fracture and mildly displaced periprosthetic fracture of the right proximal humerus.     Orthopedic surgery service performed surgical repair of right intertrochanteric fracture on 2/18/2025.  Repair of right periprosthetic fracture of the proximal humerus was deferred and being conservatively managed with a shoulder sling.  Orthopedic surgery service recommends Lovenox postoperatively given history of cancer during hospital stay with plan to discharge patient on DOAC for 2 weeks pending risk stratification and resumption of aspirin after completing 2 week course of DOAC.    Patient developed word finding difficulty on 2/18/2025 overnight.  Stroke workup including CT angiogram of the head and neck and brain CT were negative for acute pathology.  Reports of EEG and brain MRI was negative for acute pathology. Neurologist is assisting with further evaluation for possible to stroke versus TIA.      Right displaced intertrochanteric fracture  Mildly displaced periprosthetic fracture of the right proximal humerus.  Orthopedic surgery service performed surgical repair of right intertrochanteric fracture on 2/18/2025.  Repair of right periprosthetic fracture of the proximal humerus was deferred and being conservatively managed with a shoulder sling.    Analgesics as needed  Postoperative surgical care per orthopedic surgery service  DVT prophylaxis per orthopedic surgery service  PT/OT  Orthopedic surgery [id number] assistance    Suspected UTI  Leukocytosis  Urinalysis is  "suggestive of UTI  Persistent leukocytosis with WBC of 19  Start IV ceftriaxone-2/20/2025  Follow-up urine culture and blood culture    Acute blood loss anemia  Hemoglobin dropped to 7.3 from baseline of 11.2 postoperatively.  Possibly secondary to acute blood loss from surgery  Monitor hemoglobin and transfuse if hemoglobin drops below 7  Multivitamins      Prior CVA  Will resume aspirin after completing 2 weeks course of DOAC; currently on enoxaparin during hospital stay.  Continue PTA atorvastatin  PT/OT    Stage III CKD  Creatinine is at baseline  Monitor BMP  Avoid nephrotoxin    Suspected renal cell carcinoma  Multiple myeloma   Patient wants to follow up with her primary oncologist  Outpatient follow-up with her primary oncologist    Rheumatoid arthritis  Resume PTA hydroxychloroquine in the next few days  Hold PTA methotrexate for now    Hyperlipidemia  Atorvastatin 80 mg daily    History of CAD  Will resume aspirin after completing 2 weeks course of DOAC; currently on enoxaparin during hospital stay.  Continue PTA atorvastatin  Resume PTA Imdur  Sublingual nitroglycerin as needed      Diet: Regular Diet Adult  Discharge Instruction - Regular Diet Adult    DVT Prophylaxis: Pneumatic Compression Devices  Slaughter Catheter: Not present  Lines: None     Cardiac Monitoring: ACTIVE order. Indication: strokelike symptoms  Code Status: No CPR- Do NOT Intubate      Clinically Significant Risk Factors                   # Hypertension: Noted on problem list            # Overweight: Estimated body mass index is 26.65 kg/m  as calculated from the following:    Height as of this encounter: 1.702 m (5' 7.01\").    Weight as of this encounter: 77.2 kg (170 lb 3.1 oz)., PRESENT ON ADMISSION     # Financial/Environmental Concerns: none         Social Drivers of Health    Alcohol Use: Unknown (5/6/2022)    [facility]    AUDIT-C     Frequency of Alcohol Consumption: Monthly or less     Frequency of " Binge Drinking: Never   Physical Activity: Insufficiently Active (10/17/2024)    Exercise Vital Sign     Days of Exercise per Week: 2 days     Minutes of Exercise per Session: 20 min   Social Connections: Unknown (10/17/2024)    Social Connection and Isolation Panel [NHANES]     Frequency of Social Gatherings with Friends and Family: Three times a week          Disposition Plan     Medically Ready for Discharge: Anticipated in 2-4 Days    Tod William MD  Hospitalist Service  [facility]  Securely message with [employer] (more info)  Text page via [employer] Paging/Directory   ______________________________________________________________________    Interval History   No new complaints today and no acute events overnight. Urinalysis is suggestive of UTI.  Urine culture is pending. Hemoglobin dropped to 7.3, possibly secondary to acute blood loss related to surgery.  She denies shortness of breath, dizziness, chest pain, or palpitation.    Physical Exam   Vital Signs: Temp: 98.6  F (37  C) Temp src: Oral BP: 138/63 Pulse: 82   Resp: 19 SpO2: (!) 91 % O2 Device: None (Room air)    Weight: 170 lbs 3.12 oz    General appearance: Awake, Alert, Cooperative, not in any obvious distress and appears stated age   HEENT: Normocephalic, atraumatic, conjunctiva clear without icterus and ears without discharge  Lungs: Clear to auscultation bilaterally, no wheezing, good air exchange, normal work of breathing  Cardiovascular: Regular Rate and Rythm, normal apical impulse, normal S1 and S2, no lower extremity edema bilaterally  Abdomen: Soft, non-tender and Non-distended, active bowel sounds  Skin: Left frontal ecchymosis  Musculoskeletal: Right shoulder sling in place. Diminished ROM in the right shoulder and hip  Neurologic: Alert & Oriented X 3, Facial symmetry preserved and upper & lower extremities moving well with symmetry  Psychiatric: Calm, normal eye contact and normal affect      Medical Decision  Making       45 MINUTES SPENT BY ME on the date of service doing chart review, history, exam, documentation & further activities per the note.      Data   Imaging results reviewed over the past 24 hrs:   Recent Results (from the past 24 hours)   MR Brain w/o & w Contrast    Narrative    EXAM: MR BRAIN W/O and W CONTRAST  LOCATION: [facility]  DATE: 2/19/2025    INDICATION: Word finding difficulty, CTA head neck neg.  COMPARISON: 2/17/2025 head CT. 2/19/2025 head and neck CTA.  CONTRAST: 8ml Gadavist  TECHNIQUE: Routine multiplanar multisequence head MRI without and with intravenous contrast.    FINDINGS:  INTRACRANIAL CONTENTS: No finding for acute infarct, intracranial hemorrhage, or abnormal parenchymal enhancement. No extra-axial collection. Small focus of susceptibility suggesting hemosiderin deposition in the left parietal subcortical white matter.   Chronic small vessel ischemic changes mild in the right cerebral hemisphere and moderate in the left cerebral hemisphere. Chronic lacunar infarcts in the periventricular left corona radiata and in the left basal ganglia. Moderate diffuse parenchymal   volume loss. Ventricular size is in keeping with this volume loss. Intracranial vascular flow voids are evaluated to better effect on separate same day head and neck CTA. Brainstem and cerebellum are unremarkable. Cerebellar tonsils are normally   positioned.    SELLA: No abnormality accounting for technique.    OSSEOUS STRUCTURES/SOFT TISSUES: Right frontal scalp small subgaleal hematoma. Left parietal scalp soft tissue swelling.    ORBITS: No abnormality accounting for technique.     SINUSES/MASTOIDS: Mucosal thickening primarily involving the ethmoid air cells. No middle ear or mastoid effusion.       Impression    IMPRESSION:  1.  No finding for acute infarct, intracranial hemorrhage, or abnormally enhancing mass.  2.  Chronic lacunar infarcts in the periventricular left corona radiata and  in the left basal ganglia with a background mild right and moderate left chronic small vessel ischemic change.  3.  Moderate diffuse parenchymal volume loss.  4.  Right frontal scalp subgaleal hematoma with left parietal scalp soft tissue swelling.

## 2025-02-20 NOTE — PROGRESS NOTES
NEUROLOGY PROGRESS NOTE     Margaret Batista,  1945, MRN [id number] Date: 2025     [facility]   Code status:  No CPR- Do NOT Intubate   PCP: Swetha Maxwell, [phone]      ASSESSMENT & PLAN   Diagnosis code: Strokelike symptoms    Strokelike symptoms-rule out stroke  History of right intertrochanteric fracture status post surgery  Mild encephalopathy on EEG    Head CT negative for acute stroke  CTA head and neck negative for large vessel occlusion/stenosis  MRI brain shows chronic changes with no acute infarct.  EEG suggestive of mild encephalopathy.  No seizures.  Symptoms could be related to side effects of medications  Echocardiogram for possible stroke--pending.   Cardiac monitoring  Lipid panel statin  Blood pressure can be normalized  Most likely the spell is not cerebrovascular in nature and will continue PTA aspirin only after the first 2 weeks.  On DOAC for 2 weeks per orthopedics.  PT/OT       Chief Complaint   Patient presents with    Fall    Hip Pain    Shoulder Pain        HISTORY OF PRESENT ILLNESS     We have been requested by Dr. Marinelli to evaluate Margaret Batista who is a 79 year old  female for evaluation of possible stroke.  This is a 79-year-old female with history of coronary artery disease, hyperlipidemia, hypertension, rheumatoid arthritis, stage III CKD, hypertension, prior history of stroke, suspected renal cell carcinoma, TIA, monoclonal B-cell lymphocytosis who was admitted for a right displaced intertrochanteric fracture and underwent surgical repair yesterday.  Overnight she developed some word finding difficulty for which a stroke code was called.  She reports that she disoriented and did not recognize what was going on around her.  Had difficulty getting her words out to express herself.  She feels that she is getting better this morning though continues to have some difficulty.  Did not have good sleep overnight.  No headaches or other ongoing  symptoms.  No major baseline cognitive issues.    2/20  Reports some ongoing cognitive issues but not as severe as before.  No other new symptoms.  Echocardiogram pending.  MRI completed negative for stroke.     PAST MEDICAL & SURGICAL HISTORY     Medical History  Past Medical History:   Diagnosis Date    Atherosclerotic heart disease of native coronary artery without angina pectoris     -s/p ADÁN to the mid-LAD, mid-circumflex, mid-right coronary artery, proximal right coronary  artery, and PTCA of a marginal branch of the right coronary artery 04/30/2007. -s/p ADÁN to proximal left anterior descending 10/29/13.    Contusion of abdominal wall     2014    Essential (primary) hypertension     12/14/2006    Gout     No Comments Provided    Hyperlipidemia     4/4/2007    Localized swelling, mass, or lump of upper extremity     5/26/2017    Other specified counseling     10/28/2013,Patient has identified Health Care Agent(s): Yes Add Health Care Agents: Yes   Health Care Agent(s): Primary Health Care Agent: Jay Batista  Relationship:  Phone:   Secondary Health Care Agent:  Relationship: Daughter Phone:   Conservator:  Relationship:  Phone:   Guardian: Relationship:  Phone:    Patient has Advance Care Plan Documents (Health Care Directive, POLST): No, refe*    Polyosteoarthritis     No Comments Provided    Postmenopausal bleeding     No Comments Provided    Presence of coronary angioplasty implant and graft     2007/2013,unstable angina; severe prox LAD stenosis; s/p 7 stents    Primary osteoarthritis of left hand     5/30/2017    Rheumatoid arthritis (H)     follows at [facility] yearly     Surgical History  Past Surgical History:   Procedure Laterality Date    ARTHROPLASTY HIP ANTERIOR Left 7/22/2024    Procedure: ARTHROPLASTY, HIP, TOTAL, DIRECT ANTERIOR APPROACH;  Surgeon: Shashi Topete MD;  Location: [facility] OR    ARTHROPLASTY KNEE      1/13/2011    ARTHROSCOPY KNEE      1985/2002    BIOPSY BREAST      10/25/95,BRIAN   DR. YOGI RAMIREZ    CAROTID ARTERY ANGIOPLASTY Left 04/15/2024    [facility]     SECTION      1974, Section    CLOSED REDUCTION, PERCUTANEOUS PINNING HIP Right 2025    Procedure: Closed reduction and cephalomedullary nail right intertrochanteric femur fracture,;  Surgeon: Jb Alston MD;  Location: [facility] OR    COLONOSCOPY      05,Normal - Repeat in 10 years    COLONOSCOPY  2016    COLONOSCOPY N/A 2019    Procedure: COLONOSCOPY;  Surgeon: Evelin Thompson MD;  Location:  OR    COLONOSCOPY N/A 2019    Procedure: COMBINED COLONOSCOPY, SINGLE OR MULTIPLE BIOPSY/POLYPECTOMY BY BIOPSY;  Surgeon: Evelin Thompson MD;  Location: [facility] OR    ENDOSCOPIC RELEASE CARPAL TUNNEL Right 2023    Procedure: RELEASE, CARPAL TUNNEL, ENDOSCOPIC;  Surgeon: Danial Stanley MD;  Location:  OR    ESOPHAGOSCOPY, GASTROSCOPY, DUODENOSCOPY (EGD), COMBINED      2011,erosive gastritis;bx;consider MRI/A;[facility]    EXTRACTION(S) DENTAL      1970    HEART CATH, ANGIOPLASTY      10/29/2013,ADÁN to proximal left anterior descending    OTHER SURGICAL HISTORY      ,[id number],OTHER    OTHER SURGICAL HISTORY      2014,OEC937,TOTAL SHOULDER ARTHROPLASTY,Right    OTHER SURGICAL HISTORY      2016,00310.0,KS COLONOSCOPY REMOVE ELIZA POLYP LESN SNARE,repeat , precancerous polyps    ZZ STRESS LEXISCAN TEST  2018    With Dr. Irwin - normal        SOCIAL HISTORY     Social History     Tobacco Use    Smoking status: Never     Passive exposure: Past    Smokeless tobacco: Never    Tobacco comments:     Passive exposure in childhood home.    Vaping Use    Vaping status: Never Used   Substance Use Topics    Alcohol use: Not Currently    Drug use: No        FAMILY HISTORY     Reviewed, and family history includes Arthritis in her father; Atrial fibrillation in her sister; Breast Cancer (age of onset: 53) in her mother; Cancer in her father; Heart Disease in her  brother, father, and mother; Hypertension in her father and mother; Melanoma in her brother; Other - See Comments in her mother.     ALLERGIES     Allergies   Allergen Reactions    Plavix [Clopidogrel]      Plavix resistant-tested at [facility]    Enalapril Cough    Food      Macadamia nuts make mouth itch    Losartan      Allergic rxn with hives and angioedema suspected to be from alternative manufacture of the effient or the losartan        REVIEW OF SYSTEMS     12 system ROS was done and was negative within the HPI.     HOME & HOSPITAL MEDICATIONS     Prior to Admission Medications  Medications Prior to Admission   Medication Sig Dispense Refill Last Dose/Taking    acetaminophen (TYLENOL) 500 MG tablet Take 1,000 mg by mouth 2 times daily as needed for mild pain.   2/17/2025 Morning    albuterol (PROAIR HFA/PROVENTIL HFA/VENTOLIN HFA) 108 (90 Base) MCG/ACT inhaler Inhale 2 puffs into the lungs every 6 hours as needed for wheezing, shortness of breath or cough.   Taking As Needed    alendronate (FOSAMAX) 70 MG tablet Take 1 tablet (70 mg) by mouth every 7 days. 14 tablet 4 Taking    allopurinol (ZYLOPRIM) 300 MG tablet Take 1 tablet (300 mg) by mouth daily. 90 tablet 4 2/17/2025 Morning    aspirin (ASA) 81 MG chewable tablet Take 1 tablet (81 mg) by mouth daily 90 tablet 3 2/17/2025 Morning    atenolol (TENORMIN) 100 MG tablet Take 1 tablet (100 mg) by mouth daily. 90 tablet 4 2/17/2025 Morning    atorvastatin (LIPITOR) 80 MG tablet TAKE 1 TABLET DAILY 90 tablet 4 2/17/2025 Morning    cetirizine (ZYRTEC) 5 MG tablet Take 5 mg by mouth at bedtime.   2/16/2025 Bedtime    DULoxetine (CYMBALTA) 20 MG capsule Take 1 capsule (20 mg) by mouth daily. 90 capsule 4 2/17/2025 Morning    folic acid (FOLVITE) 1 MG tablet TAKE 1 TABLET DAILY 90 tablet 3 2/17/2025 Morning    hydroxychloroquine (PLAQUENIL) 200 MG tablet Take 400 mg by mouth every other day.   2/16/2025 Morning    hydroxychloroquine (PLAQUENIL) 200 MG tablet Take 200  mg by mouth daily.   2/17/2025 Morning    isosorbide mononitrate (IMDUR) 30 MG 24 hr tablet TAKE 1 TABLET DAILY 90 tablet 3 2/17/2025 Morning    methotrexate 2.5 MG tablet Take 20 mg by mouth once a week. On Mondays 2/17/2025 Morning    nitroGLYcerin (NITROSTAT) 0.4 MG sublingual tablet Place 1 tablet (0.4 mg) under the tongue every 5 minutes as needed for chest pain. (For chest pain x 3 doses) 30 tablet 3 Taking As Needed    polyethylene glycol (MIRALAX) 17 GM/Dose powder Take 17 g by mouth daily as needed 510 g 1 Taking As Needed       Hospital Medications  Current Facility-Administered Medications   Medication Dose Route Frequency Provider Last Rate Last Admin    allopurinol (ZYLOPRIM) tablet 300 mg  300 mg Oral Daily Tod William MD   300 mg at 02/20/25 0859    atorvastatin (LIPITOR) tablet 80 mg  80 mg Oral Daily Tod William MD   80 mg at 02/20/25 0859    cefTRIAXone (ROCEPHIN) 1 g vial to attach to  mL bag for ADULTS or NS 50 mL bag for PEDS  1 g Intravenous Q24H Tod William MD   1 g at 02/20/25 1039    DULoxetine (CYMBALTA) DR capsule 20 mg  20 mg Oral Daily Tod William MD   20 mg at 02/20/25 0859    enoxaparin ANTICOAGULANT (LOVENOX) injection 40 mg  40 mg Subcutaneous Q24H Jb Alston MD   40 mg at 02/20/25 0859    folic acid (FOLVITE) tablet 1,000 mcg  1,000 mcg Oral Daily Tod William MD   1,000 mcg at 02/20/25 0859    isosorbide mononitrate (IMDUR) 24 hr tablet 30 mg  30 mg Oral Daily Tod William MD   30 mg at 02/20/25 0859    lidocaine (XYLOCAINE) 5 % ointment   Topical 4x Daily Loren Nuñez APRN CNS   Given at 02/20/25 1318    magnesium gluconate (MAGONATE) tablet 500 mg  500 mg Oral At Bedtime Loren Nuñez APRN CNS        multivitamin w/minerals (THERA-VIT-M) tablet 1 tablet  1 tablet Oral Daily Tod William MD   1 tablet at 02/20/25 1318    polyethylene glycol (MIRALAX) Packet 17 g  17 g Oral Daily Gamaliel  "MD Jb   17 g at 02/20/25 0859    sodium chloride (PF) 0.9% PF flush 3 mL  3 mL Intracatheter Q8H Jb Alston MD   3 mL at 02/20/25 0909    sodium chloride (PF) 0.9% PF flush 3 mL  3 mL Intracatheter Q8H Jb Alston MD   3 mL at 02/20/25 0508        PHYSICAL EXAM     Vital signs  Temp:  [98.2  F (36.8  C)-98.9  F (37.2  C)] 98.6  F (37  C)  Pulse:  [76-87] 82  Resp:  [18-20] 19  BP: (101-138)/(52-63) 138/63  SpO2:  [91 %-95 %] 91 %    PHYSICAL EXAMINATION  VITALS: /63 (BP Location: Left arm)   Pulse 82   Temp 98.6  F (37  C) (Oral)   Resp 19   Ht 1.702 m (5' 7.01\")   Wt 77.2 kg (170 lb 3.1 oz)   LMP 01/01/1998 (Within Months)   SpO2 (!) 91%   BMI 26.65 kg/m    GENERAL -Health appearing, No apparent distress  EYES- No scleral icterus, no eyelid droop, Pupils - see Neuro section  HEENT - Normocephalic, atraumatic, Hearing grossly intact; Oral mucosa moist and pink in color. External Ears and nose intact.   Neck - supple   PULM - Good spontaneous respiratory effort;   CV- Pedal pulses present with no peripheral edema/ No significant varicosities.  MSK- Gait - see Neuro section; Strength and tone- see Neuro section; Range of motion grossly intact.  PSYCH -cooperative    Neurological  Mental status - Patient is awake and oriented to self, place and time. Attention span is normal. Language is fluent and follows commands appropriately.  Mild aphasia with repetition.  Cranial nerves - CN II-XII intact. Pupils are reactive and symmetric; EOMI, VFIT, NLF symmetric  Motor - There is no pronator drift. Motor exam shows 5/5 strength in all extremities except chronic right arm and left leg weakness.  Tone - Tone is symmetric bilaterally in upper and lower extremities.  Reflexes -toes are downgoing/equivocal.  Sensation - Sensory exam is grossly intact to light touch, pain.  Coordination - Finger to nose and heel to shin is without dysmetria.  Gait and station --needs assistance to ambulate.  Formal gait " testing cannot be done due to safety concerns from ongoing medical issues.  Exam stable.     DIAGNOSTIC STUDIES     Pertinent Radiology   HEAD CT:  1.  No CT evidence for acute intracranial process.  2.  Brain atrophy and presumed chronic microvascular ischemic changes as above.  3.  There are couple of tiny chronic lacunar infarcts in the left basal ganglia.     HEAD CTA:  1.  No large vessel occlusion or flow-limiting stenosis.  2.  Evidence of intracranial atherosclerosis.     NECK CTA:  1.  No hemodynamically significant stenosis or dissection.  2.  Left carotid stent is patent.    MRI  1.  No finding for acute infarct, intracranial hemorrhage, or abnormally enhancing mass.  2.  Chronic lacunar infarcts in the periventricular left corona radiata and in the left basal ganglia with a background mild right and moderate left chronic small vessel ischemic change.  3.  Moderate diffuse parenchymal volume loss.  4.  Right frontal scalp subgaleal hematoma with left parietal scalp soft tissue swelling.    EEG  IMPRESSION/REPORT/PLAN  This is an almost normal EEG during wakefulness and drowsiness except for moderate generalized background dysrhythmia. No electrographic seizures were noted during the recording.  Such an EEG can be seen in patients with mild encephalopathy. Further clinical correlation is needed.     Please note that the absence of epileptiform abnormalities does not exclude the possibility of epilepsy in any patient.        Recent Results (from the past 24 hours)   UA with Microscopic reflex to Culture    Collection Time: 02/19/25  8:05 PM    Specimen: Urine, Clean Catch   Result Value Ref Range    Color Urine Yellow Colorless, Straw, Light Yellow, Yellow    Appearance Urine Turbid (A) Clear    Glucose Urine Negative Negative mg/dL    Bilirubin Urine Negative Negative    Ketones Urine Trace (A) Negative mg/dL    Specific Gravity Urine 1.010 1.003 - 1.035    Blood Urine 0.2 mg/dL (A) Negative    pH Urine  6.0 5.0 - 7.0    Protein Albumin Urine 20 (A) Negative mg/dL    Urobilinogen Urine <2.0 <2.0 mg/dL    Nitrite Urine Positive (A) Negative    Leukocyte Esterase Urine 500 Steve/uL (A) Negative    Bacteria Urine Few (A) None Seen /HPF    Mucus Urine Present (A) None Seen /LPF    RBC Urine 9 (H) <=2 /HPF    WBC Urine >182 (H) <=5 /HPF    Squamous Epithelials Urine 18 (H) <=1 /HPF   Urine Culture    Collection Time: 02/19/25  8:05 PM    Specimen: Urine, Clean Catch   Result Value Ref Range    Culture No growth, less than 1 day    Hemoglobin    Collection Time: 02/20/25  5:51 AM   Result Value Ref Range    Hemoglobin 7.3 (L) 11.7 - 15.7 g/dL   Extra Green Top Tube (LAB USE ONLY)    Collection Time: 02/20/25  5:51 AM   Result Value Ref Range    Hold Specimen JIC    Echocardiogram Complete    Collection Time: 02/20/25  1:15 PM   Result Value Ref Range    LVEF  60-65%        Total time spent for face to face visit, reviewing labs/imaging studies, counseling and coordination of care was: Over 35 min More than 50% of this time was spent on counseling and coordination of care.    Counseling patient.  Reviewing chart/testing.  Coordination of care with the primary team.    Irvin Cummins MD  Neurologist  [facility]  Tel:- [phone]

## 2025-02-20 NOTE — CONSULTS
Care Management Initial Consult    General Information  Assessment completed with: Patient, Patient  Type of CM/SW Visit: Initial Assessment    Primary Care Provider verified and updated as needed: Yes   Readmission within the last 30 days: no previous admission in last 30 days      Reason for Consult: discharge planning  Advance Care Planning: Advance Care Planning Reviewed: present on chart        Communication Assessment  Patient's communication style: spoken language (English or Bilingual)    Hearing Difficulty or Deaf: no   Wear Glasses or Blind: yes    Cognitive  Cognitive/Neuro/Behavioral: WDL  Level of Consciousness: alert  Arousal Level: opens eyes spontaneously  Orientation: oriented x 4  Mood/Behavior: anxious, cooperative  Best Language: 0 - No aphasia  Speech: clear, logical (slow to answer)    Living Environment:   People in home: spouse  Jay  Current living Arrangements: apartment (Currentlly renting an senior living apartment in [facility] thru end of March 2025)      Able to return to prior arrangements: yes     Family/Social Support:  Care provided by: self, spouse/significant other  Provides care for: no one  Marital Status:   Support system:   Jay       Description of Support System: Supportive    Support Assessment: Adequate family and caregiver support    Current Resources:   Patient receiving home care services: No     Community Resources: None  Equipment currently used at home: cane, straight, walker, rolling, other (see comments) (Nustep)  Supplies currently used at home: None    Employment/Financial:  Employment Status: retired        Financial Concerns: none   Referral to Financial Worker: No     Does the patient's insurance plan have a 3 day qualifying hospital stay waiver?  Yes     Which insurance plan 3 day waiver is available? Alternative insurance waiver    Will the waiver be used for post-acute placement? Undetermined at this time    Lifestyle & Psychosocial  Needs:  Social Drivers of Health     Food Insecurity: Low Risk  (2/18/2025)    Food Insecurity     Within the past 12 months, did you worry that your food would run out before you got money to buy more?: No     Within the past 12 months, did the food you bought just not last and you didn t have money to get more?: No   Depression: Not at risk (11/26/2024)    PHQ-2     PHQ-2 Score: 0   Housing Stability: Low Risk  (2/18/2025)    Housing Stability     Do you have housing? : Yes     Are you worried about losing your housing?: No   Tobacco Use: Low Risk  (2/18/2025)    Patient History     Smoking Tobacco Use: Never     Smokeless Tobacco Use: Never     Passive Exposure: Past   Financial Resource Strain: Low Risk  (2/18/2025)    Financial Resource Strain     Within the past 12 months, have you or your family members you live with been unable to get utilities (heat, electricity) when it was really needed?: No   Alcohol Use: Unknown (5/6/2022)    [facility]    AUDIT-C     Frequency of Alcohol Consumption: Monthly or less     Average Number of Drinks: Not on file     Frequency of Binge Drinking: Never   Transportation Needs: Low Risk  (2/18/2025)    Transportation Needs     Within the past 12 months, has lack of transportation kept you from medical appointments, getting your medicines, non-medical meetings or appointments, work, or from getting things that you need?: No   Physical Activity: Insufficiently Active (10/17/2024)    Exercise Vital Sign     Days of Exercise per Week: 2 days     Minutes of Exercise per Session: 20 min   Interpersonal Safety: Low Risk  (2/18/2025)    Interpersonal Safety     Do you feel physically and emotionally safe where you currently live?: Yes     Within the past 12 months, have you been hit, slapped, kicked or otherwise physically hurt by someone?: No     Within the past 12 months, have you been humiliated or emotionally abused in other ways by your partner or  ex-partner?: No   Stress: No Stress Concern Present (10/17/2024)    [employer] - Occupational Stress Questionnaire     Feeling of Stress : Not at all   Social Connections: Unknown (10/17/2024)    Social Connection and Isolation Panel [NHANES]     Frequency of Communication with Friends and Family: Not on file     Frequency of Social Gatherings with Friends and Family: Three times a week     Attends Jewish Services: Not on file     Active Member of Clubs or Organizations: Not on file     Attends Club or Organization Meetings: Not on file     Marital Status: Not on file   Health Literacy: Not on file     Functional Status:  Prior to admission patient needed assistance:   Dependent ADLs:: Ambulation-cane, Ambulation-walker, Bathing, Dressing, Transfers  Dependent IADLs:: Cleaning, Cooking, Laundry, Shopping, Meal Preparation, Transportation     Discussed  Partnership in Safe Discharge Planning  document with patient/family: No    Additional Information:  Writer met with patient at bedside to review role of care management services, discuss goals of care and assess need for any possible services at discharge. Patient alert, answering questions appropriately and engaged in the conversation.  Address, phone number and PCP confirmed. Patient has a HCD on file. Patient and spouse currently renting an senior living apartment in [facility] thru the end of March 2025, does not want address change on her profile. Report had a stroke with right sided residual weakness last April 2024. Needs assist with ADLs and dependent with IADLs. Has walker, cane and NuStep. No community resources. Daughter lives in town here. Transport TBD.      Discussed therapy discharge rec for Transitional Care and patient agreed.  Patient wants a referral sent to [facility]    Writer provided a list of local skilled nursing facilities - [facility] (which includes the medicare.gov website) for patient and  family to review.     Next Steps: RNCM to follow for medical progression, recommendations, and final discharge plan.     Amy Felder RN

## 2025-02-20 NOTE — PLAN OF CARE
The patient was able to void, urine sample sent to the lab.  Culture is in process. PVR was 22ml.The patient was encouraged to get up out of bed, she refused. Repositioned with pillows. Sling to right arm is in place. Legs elevated on pillows. Purewick in place. Tylenol and Oxycodone given. Ice pack applied to right hip. Per Neurologist note, telemetry was applied. Normal sinus rhythm noted.     Goal Outcome Evaluation:           Problem: Wound  Goal: Optimal Functional Ability  Outcome: Not Progressing  Intervention: Optimize Functional Ability  Recent Flowsheet Documentation  Taken 2/19/2025 1649 by Dee Arguelles RN  Activity Management: (refused chair, commode)   activity adjusted per tolerance   activity encouraged   patient refuses activity  Taken 2/19/2025 1515 by Dee Arguelles RN  Activity Management: (refused chair)   activity adjusted per tolerance   activity encouraged     Problem: Mobility Impairment  Goal: Optimal Mobility  Outcome: Not Progressing  Intervention: Optimize Mobility  Recent Flowsheet Documentation  Taken 2/19/2025 2032 by Dee Arguelles RN  Positioning/Transfer Devices: pillows  Taken 2/19/2025 1932 by Dee Arguelles RN  Positioning/Transfer Devices: pillows  Taken 2/19/2025 1715 by Dee Arguelles RN  Positioning/Transfer Devices: pillows  Taken 2/19/2025 1649 by Dee Arguelles RN  Activity Management: (refused chair, commode)   activity adjusted per tolerance   activity encouraged   patient refuses activity  Taken 2/19/2025 1515 by Dee Arguelles RN  Activity Management: (refused chair)   activity adjusted per tolerance   activity encouraged  Positioning/Transfer Devices: pillows

## 2025-02-20 NOTE — PLAN OF CARE
"  Problem: Adult Inpatient Plan of Care  Goal: Plan of Care Review  Description: The Plan of Care Review/Shift note should be completed every shift.  The Outcome Evaluation is a brief statement about your assessment that the patient is improving, declining, or no change.  This information will be displayed automatically on your shift  note.  Outcome: Progressing  Goal: Patient-Specific Goal (Individualized)  Description: You can add care plan individualizations to a care plan. Examples of Individualization might be:  \"Parent requests to be called daily at 9am for status\", \"I have a hard time hearing out of my right ear\", or \"Do not touch me to wake me up as it startles  me\".  Outcome: Progressing  Goal: Absence of Hospital-Acquired Illness or Injury  Outcome: Progressing  Intervention: Identify and Manage Fall Risk  Recent Flowsheet Documentation  Taken 2/20/2025 0006 by Radha Grace RN  Safety Promotion/Fall Prevention:   supervised activity   safety round/check completed   room organization consistent   room near nurse's station   patient and family education   nonskid shoes/slippers when out of bed   clutter free environment maintained   room door open   lighting adjusted  Intervention: Prevent Skin Injury  Recent Flowsheet Documentation  Taken 2/20/2025 0508 by Radha Grace RN  Body Position:   turned   supine   heels elevated  Taken 2/20/2025 0325 by Radha Grace RN  Body Position:   tilted   left   heels elevated  Taken 2/20/2025 0006 by Radha Grace RN  Body Position: (pt was supine)   turned   left   heels elevated  Intervention: Prevent and Manage VTE (Venous Thromboembolism) Risk  Recent Flowsheet Documentation  Taken 2/20/2025 0006 by Radha Grace RN  VTE Prevention/Management: SCDs on (sequential compression devices)  Intervention: Prevent Infection  Recent Flowsheet Documentation  Taken 2/20/2025 0006 by Radha Grace RN  Infection Prevention:   rest/sleep promoted   " single patient room provided  Goal: Optimal Comfort and Wellbeing  Outcome: Progressing  Intervention: Monitor Pain and Promote Comfort  Recent Flowsheet Documentation  Taken 2/20/2025 0006 by Radha Grace RN  Pain Management Interventions: cold applied  Goal: Readiness for Transition of Care  Outcome: Progressing   Goal Outcome Evaluation:         Pt a/o at beginning of shift, slightly slow to respond. Pt fluent , clear, oriented , without word finding difficulty this am. Pt slept well. Pain controlled change of shift dose of oxycodone. Pt refusing prn pain medications tonight. Tele maintains NSR, occasionally has pac's.

## 2025-02-20 NOTE — CONSULTS
"[facility] ACUTE PAIN SERVICE    ([facility], [facility], [facility], [facility])  Pain Consult Note    Assessment/Plan:  Margaret Batista is a 79 year old female who was admitted on 2/17/2025.  Pain Service is asked to see the patient for assistance in pain management with intractable pain following right hip fracture repair.  Right humeral fracture is being managed conservatively.  Admitted for evaluation after a fall with right leg pain.  CT of the right hip demonstrated a closed fracture of the right humerus.   Ortho Consulted.  Prior hip arthoplasty 10 years ago.  Bilateral knee replacements, and previous total left hip.    Patient is status post op day #2 right hip cephalomedullary nailing.  Plan for non surgical management of right shoulder.    Patient developed word finding difficulties and right upper extremity weakness, Code stroke was activated.  Neurology Consulted and following.  Stroke workup including CT angiogram of the head and neck and brain CT were negative for acute pathology. Reports of EEG and brain MRI are pending. Neurologist is assisting with further evaluation for possible to stroke versus TIA.     Complex medical history significant for monoclonal B-cell type lymphocytosis with hyperlipidemia, CAD, hypertension, stroke, CKD stage III, rheumatoid arthritis, shoulder surgery, neuropathy, DJD, and Parkinsonism.  Suspected renal cell carcinoma, multiple myeloma.  Follow up with Primary Oncologist ([place]), Rheumatologist and Neurology as an Outpatient.    shows one prescription for hydromorphone 2 mg tablets 24 for 4 days prescribed 7/24/24.  No other controlled substances noted over this past year.    Over the past 24 hours patient has received:  oxycodone 5 mg tablet at 2040 last evening and 09 this AM,  IV hydromorphone yesterday AM at 0918, tylenol 650 mg at 2030, home Cymbalta at 20 mg yesterday AM at 09.    Subjective:  Describes pain as shoulder pain \"hurts " "all the time\" has increased incisional pain intermittently.  Pain is controlled with the oxycodone and tylenol.    Slept well last PM, able to sit up in chair for a couple hours.        PLAN:  Acute pain secondary to hip fracture status post nailing, shoulder pain with right humerus fracture, currently being managed non operatively.       Multimodal Medication Therapy:   Adjuvants: Cymbalta 20 mg every AM, tylenol 975 mg tid   Opioids: oxycodone 5 mg every 4 hours prn, IV hydromorphone 0.2 mg every four hours prn  Non-medication interventions- Ice prn  Constipation Prophylaxis- daily stool softener/laxative   Follow up /Discharge Recommendations - We recommend prescribing the following at the time of discharge:  oxycodone 5 mg qid prn, tylenol 1000 mg tid, home Cymbalta, topical Lidocaine if helpful      <principal problem not specified>   Patient Active Problem List   Diagnosis    Allergic rhinitis    ASCVD (arteriosclerotic cardiovascular disease)    DJD (degenerative joint disease) of knee    Hx of gout    Mixed hyperlipidemia    Benign essential hypertension    Mass of finger    Osteopenia    Primary osteoarthritis of both hands    Rheumatoid arthritis (H)    Right shoulder pain    Rotator cuff tendonitis, right    S/P shoulder surgery    Shoulder impingement, right    Status post total shoulder replacement, right    Neuropathy of hand    Chronic kidney disease, stage 3 (H)    Seborrheic keratosis    Immunocompromised    Right hand weakness    History of coronary artery stent placement in 2007 and 2013    History of cardiac cath    Hypertensive 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 and stage 1 through stage 4 chronic kidney disease, or unspecified chronic kidney disease (H)    TIA (transient ischemic attack)    Word finding difficulty    Acute ischemic left MCA stroke (H)    Left carotid stenosis    History of ischemic left MCA stroke    Cerebral microvascular disease    S/P angioplasty with stent "    Parkinsonism associated with infarction of basal ganglia (H)    Basal ganglia infarction (H)    Non morbid obesity due to excess calories    Right renal mass    Left displaced femoral neck fracture (H)    Long term (current) use of anticoagulants    Facial pain    Activities involving dishwashing    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 (H)    Accidental fall, initial encounter    S/P total left hip arthroplasty    Compression fracture of L1 vertebra with routine healing    Pain of right upper arm    Scalp laceration, initial encounter    Hip fracture, right, closed, initial encounter (H)        History   Drug Use No         Tobacco Use      Smoking status: Never        Passive exposure: Past      Smokeless tobacco: Never      Tobacco comments: Passive exposure in childhood home.         Current Facility-Administered Medications   Medication Dose Route Frequency Provider Last Rate Last Admin    allopurinol (ZYLOPRIM) tablet 300 mg  300 mg Oral Daily Tod William MD   300 mg at 02/19/25 0834    atorvastatin (LIPITOR) tablet 80 mg  80 mg Oral Daily Tod William MD   80 mg at 02/19/25 0834    DULoxetine (CYMBALTA) DR capsule 20 mg  20 mg Oral Daily Tod William MD   20 mg at 02/19/25 0834    enoxaparin ANTICOAGULANT (LOVENOX) injection 40 mg  40 mg Subcutaneous Q24H Jb Alston MD   40 mg at 02/19/25 0839    folic acid (FOLVITE) tablet 1,000 mcg  1,000 mcg Oral Daily Tod William MD   1,000 mcg at 02/19/25 0834    isosorbide mononitrate (IMDUR) 24 hr tablet 30 mg  30 mg Oral Daily Tod William MD   30 mg at 02/19/25 0834    polyethylene glycol (MIRALAX) Packet 17 g  17 g Oral Daily bJ Alston MD   17 g at 02/19/25 0835    sodium chloride (PF) 0.9% PF flush 3 mL  3 mL Intracatheter Q8H Jb Alston MD   3 mL at 02/20/25 0006    sodium chloride (PF) 0.9% PF flush 3 mL  3 mL Intracatheter Q8H Jb Alston MD   3 mL at 02/20/25 0508  "      Objective:  Vital signs in last 24 hours:  B/P: 109/57, T: 98.2, P: 81, R: 19   Blood pressure 109/57, pulse 81, temperature 98.2  F (36.8  C), temperature source Oral, resp. rate 19, height 1.702 m (5' 7.01\"), weight 77.2 kg (170 lb 3.1 oz), last menstrual period 01/01/1998, SpO2 95%, not currently breastfeeding.      Weight:   Wt Readings from Last 2 Encounters:   02/18/25 77.2 kg (170 lb 3.1 oz)   02/03/25 76.9 kg (169 lb 9.6 oz)         Intake/Output:    Intake/Output Summary (Last 24 hours) at 2/20/2025 0807  Last data filed at 2/20/2025 0550  Gross per 24 hour   Intake 1025 ml   Output 550 ml   Net 475 ml        Review of Systems:   As per subjective, all others negative.    Physical Exam:     General Appearance:  Alert, cooperative,pleasant, non focal   Head:  Normocephalic, without obvious abnormality, atraumatic   Eyes:  PERRL, conjunctiva/corneas clear, EOM's intact   ENT/Throat: Lips moist    Lymph/Neck: Supple, symmetrical, trachea midline   Lungs:   respirations unlabored   Abdomen:   Soft, non-tender, non distended   Musculoskeletal: Right arm in sling   Skin: Skin is intact    Neurologic: Alert and oriented X 3, Moves with assistance         Imaging:  Personally Reviewed.    Results for orders placed or performed during the hospital encounter of 02/17/25   Humerus XR, G/E 2 views, right    Impression    IMPRESSION: Postop changes of glenohumeral joint replacement. There is bone formation along the greater tuberosity of the humerus without a well-defined fracture lucency on these views, favor chronic although dedicated right shoulder radiographs could   assess further if there is clinical concern for fracture in this region. Subacromial spur. Bone demineralization.    No evidence of an acute displaced distal right humeral fracture.   XR Pelvis and Hip Right 2 Views    Impression    IMPRESSION: Acute, displaced and angulated right proximal femoral fracture.    Contralateral left total hip " replacement.    Bones are demineralized.    Pathologic fracture is not excluded as there is some vague lucency along the fracture site as well as a partially visualized area of lucency within the proximal femoral shaft measuring approximately 9.6 cm in length. These findings raise concern for   multiple myeloma as well as metastatic disease, which should be excluded.    NOTE: ABNORMAL REPORT    THE DICTATION ABOVE DESCRIBES AN ABNORMALITY FOR WHICH FOLLOW-UP IS NEEDED.    CT Head w/o Contrast    Impression    IMPRESSION:  1.  No acute intracranial process.    2.  Chronic intracranial changes described above.   XR Shoulder Right 3 Views    Impression    IMPRESSION:     Status post right total shoulder arthroplasty. There is an acute appearing, comminuted, mildly displaced periprosthetic fracture of the right proximal humerus.     No dislocation. Mild to moderate acromioclavicular degenerative arthrosis. Diffuse osseous demineralization.   XR Femur Right 2 Views    Impression    IMPRESSION: Comminuted, angulated, and displaced intertrochanteric fracture right hip. The distal femur is intact. Right knee arthroplasty. Vascular calcifications.   CT Hip Right w/o Contrast    Impression    IMPRESSION:  1.  Comminuted intertrochanteric fracture of the right hip with multiple fragments of the greater trochanter present.  2.  No definitive osteolytic lesion of the greater trochanter or proximal femur is identified.         CTA Head Neck with Contrast    Impression    IMPRESSION:   HEAD CT:  1.  No CT evidence for acute intracranial process.  2.  Brain atrophy and presumed chronic microvascular ischemic changes as above.  3.  There are couple of tiny chronic lacunar infarcts in the left basal ganglia.    HEAD CTA:  1.  No large vessel occlusion or flow-limiting stenosis.  2.  Evidence of intracranial atherosclerosis.    NECK CTA:  1.  No hemodynamically significant stenosis or dissection.  2.  Left carotid stent is  patent.         MR Brain w/o & w Contrast    Impression    IMPRESSION:  1.  No finding for acute infarct, intracranial hemorrhage, or abnormally enhancing mass.  2.  Chronic lacunar infarcts in the periventricular left corona radiata and in the left basal ganglia with a background mild right and moderate left chronic small vessel ischemic change.  3.  Moderate diffuse parenchymal volume loss.  4.  Right frontal scalp subgaleal hematoma with left parietal scalp soft tissue swelling.          Lab Results:  Personally Reviewed.   Last Comprehensive Metabolic Panel:  Sodium   Date Value Ref Range Status   02/18/2025 140 135 - 145 mmol/L Final   06/18/2021 139 134 - 144 mmol/L Final     Potassium   Date Value Ref Range Status   02/18/2025 5.1 3.4 - 5.3 mmol/L Final   06/18/2021 3.4 (L) 3.5 - 5.1 mmol/L Final     Chloride   Date Value Ref Range Status   02/18/2025 103 98 - 107 mmol/L Final   06/18/2021 101 98 - 107 mmol/L Final     Carbon Dioxide   Date Value Ref Range Status   06/18/2021 30 21 - 31 mmol/L Final     Carbon Dioxide (CO2)   Date Value Ref Range Status   02/18/2025 34 (H) 22 - 29 mmol/L Final     Anion Gap   Date Value Ref Range Status   02/18/2025 3 (L) 7 - 15 mmol/L Final   06/18/2021 8 3 - 14 mmol/L Final     Glucose   Date Value Ref Range Status   06/18/2021 100 70 - 105 mg/dL Final     GLUCOSE BY METER POCT   Date Value Ref Range Status   02/19/2025 170 (H) 70 - 99 mg/dL Final     Urea Nitrogen   Date Value Ref Range Status   02/18/2025 15.7 8.0 - 23.0 mg/dL Final   06/18/2021 22 7 - 25 mg/dL Final     Creatinine   Date Value Ref Range Status   02/18/2025 0.80 0.51 - 0.95 mg/dL Final   06/18/2021 0.99 0.60 - 1.20 mg/dL Final     GFR Estimate   Date Value Ref Range Status   02/18/2025 75 >60 mL/min/1.73m2 Final     Comment:     eGFR calculated using 2021 CKD-EPI equation.   06/18/2021 55 (L) >60 mL/min/[1.73_m2] Final     Calcium   Date Value Ref Range Status   02/18/2025 9.5 8.8 - 10.4 mg/dL Final  "  06/18/2021 9.7 8.6 - 10.3 mg/dL Final        UA: No results found for: \"UAMP\", \"UBARB\", \"BENZODIAZEUR\", \"UCANN\", \"UCOC\", \"OPIT\", \"UPCP\"           MANAGEMENT DISCUSSED with the following over the past 24 hours: RN   NOTE(S)/MEDICAL RECORDS REVIEWED over the past 24 hours: Nursing, Neurology, Hospital Medicine, Ortho  Medical complexity over the past 24 hours:  - Prescription DRUG MANAGEMENT performed      JODEE Carroll, DNP CNS  Acute Inpatient Pain Team  M-F   Paging via [employer] or [employer]         "

## 2025-02-20 NOTE — PROGRESS NOTES
"Care Management Follow Up    Length of Stay (days): 3    Expected Discharge Date: 02/21/2025     Concerns to be Addressed: Care progression - discharge planning     Patient plan of care discussed at interdisciplinary rounds: Yes    Anticipated Discharge Disposition:  Therapy rec Transitional care     Anticipated Discharge Services:  Transitional care  Anticipated Discharge DME:  NA    Patient/family educated on Medicare website which has current facility and service quality ratings:  Yes  Education Provided on the Discharge Plan:  Yes per team  Patient/Family in Agreement with the Plan:  Yes    Referrals Placed by CM/SW:  Yes  Private pay costs discussed: transportation costs    Discussed  Partnership in Safe Discharge Planning  document with patient/family: Yes: patient's daughterCheryl    Handoff Completed: No, handoff not indicated or clinically appropriate    Additional Information:  [id number] rec'd a voicemail from John, \"I declined her earlier, but I can accept her now. I have submitted the [facility] auth and have received it back approved.\"    Called [facility] and spoke with John to verify. John states he declined patient earlier stating, Cannot meet patient's needs, Bed Not Available, but after talking with patient's daughter, he can accept patient for tomorrow or when patient is ready. Does not admit on the weekend. If patient is not ready tomorrow, then patient will have to wait until Monday.  John will be off tomorrow and if patient is ready to call Felicia at [phone].    Called patient's daughter, Cheryl, to update on the above and Cheryl accepted stating her father was at [facility] last year and she called to talk with John.     Met with patient at bedside to update and discussed transportation.  Patient states due to her right-sided weakness and pain, she may want [employer]  transport at discharge.     Discussed out of pocket cost of [employer] medical transportation by wheelchair with " patient. Patient agreed with the plan to have transportation arranged by [employer] transport.      Social Hx:  Assessment: Follow. Patient/spouse currently renting a senior living apartment in [facility] thru the end of March 2025. Had a stroke w/right sided residual weakness last April 2024. Needs assist w/ADLs and dependent w/IADLs. Has walker, cane and Nu[employer]. No community resources.  Last note: 02/20/25  Plan: Accepted to [facility]. PAS completed.  Needs: Medical progression  Hand off sent: NA  Transport: [employer]  transport (costs discussed)     Next Steps: RNCM to follow for medical progression, recommendations, and final discharge plan.     Amy Felder RN     [id number]

## 2025-02-21 ENCOUNTER — APPOINTMENT (OUTPATIENT)
Dept: PHYSICAL THERAPY | Facility: HOSPITAL | Age: 80
DRG: 481 | End: 2025-02-21
Payer: COMMERCIAL

## 2025-02-21 ENCOUNTER — APPOINTMENT (OUTPATIENT)
Dept: OCCUPATIONAL THERAPY | Facility: HOSPITAL | Age: 80
DRG: 481 | End: 2025-02-21
Payer: COMMERCIAL

## 2025-02-21 LAB
ABO + RH BLD: NORMAL
ANION GAP SERPL CALCULATED.3IONS-SCNC: 6 MMOL/L (ref 7–15)
BACTERIA UR CULT: NO GROWTH
BASOPHILS # BLD AUTO: 0 10E3/UL (ref 0–0.2)
BASOPHILS NFR BLD AUTO: 0 %
BLD GP AB SCN SERPL QL: NEGATIVE
BLD PROD TYP BPU: NORMAL
BLOOD COMPONENT TYPE: NORMAL
BUN SERPL-MCNC: 21.1 MG/DL (ref 8–23)
CALCIUM SERPL-MCNC: 8.7 MG/DL (ref 8.8–10.4)
CHLORIDE SERPL-SCNC: 103 MMOL/L (ref 98–107)
CODING SYSTEM: NORMAL
CREAT SERPL-MCNC: 0.85 MG/DL (ref 0.51–0.95)
CREAT SERPL-MCNC: 0.85 MG/DL (ref 0.51–0.95)
CROSSMATCH: NORMAL
EGFRCR SERPLBLD CKD-EPI 2021: 70 ML/MIN/1.73M2
EGFRCR SERPLBLD CKD-EPI 2021: 70 ML/MIN/1.73M2
EOSINOPHIL # BLD AUTO: 0.7 10E3/UL (ref 0–0.7)
EOSINOPHIL NFR BLD AUTO: 5 %
ERYTHROCYTE [DISTWIDTH] IN BLOOD BY AUTOMATED COUNT: 16.4 % (ref 10–15)
GLUCOSE SERPL-MCNC: 95 MG/DL (ref 70–99)
HCO3 SERPL-SCNC: 28 MMOL/L (ref 22–29)
HCT VFR BLD AUTO: 21.8 % (ref 35–47)
HGB BLD-MCNC: 7.1 G/DL (ref 11.7–15.7)
IMM GRANULOCYTES # BLD: 0.1 10E3/UL
IMM GRANULOCYTES NFR BLD: 1 %
ISSUE DATE AND TIME: NORMAL
LYMPHOCYTES # BLD AUTO: 4.3 10E3/UL (ref 0.8–5.3)
LYMPHOCYTES NFR BLD AUTO: 32 %
MCH RBC QN AUTO: 31.8 PG (ref 26.5–33)
MCHC RBC AUTO-ENTMCNC: 32.6 G/DL (ref 31.5–36.5)
MCV RBC AUTO: 98 FL (ref 78–100)
MONOCYTES # BLD AUTO: 0.7 10E3/UL (ref 0–1.3)
MONOCYTES NFR BLD AUTO: 5 %
NEUTROPHILS # BLD AUTO: 7.5 10E3/UL (ref 1.6–8.3)
NEUTROPHILS NFR BLD AUTO: 56 %
NRBC # BLD AUTO: 0 10E3/UL
NRBC BLD AUTO-RTO: 0 /100
PLATELET # BLD AUTO: 157 10E3/UL (ref 150–450)
PLATELET # BLD AUTO: 162 10E3/UL (ref 150–450)
POTASSIUM SERPL-SCNC: 4 MMOL/L (ref 3.4–5.3)
RBC # BLD AUTO: 2.23 10E6/UL (ref 3.8–5.2)
SODIUM SERPL-SCNC: 137 MMOL/L (ref 135–145)
SPECIMEN EXP DATE BLD: NORMAL
UNIT ABO/RH: NORMAL
UNIT NUMBER: NORMAL
UNIT STATUS: NORMAL
UNIT TYPE ISBT: 600
WBC # BLD AUTO: 13.4 10E3/UL (ref 4–11)

## 2025-02-21 PROCEDURE — 250N000013 HC RX MED GY IP 250 OP 250 PS 637: Performed by: CLINICAL NURSE SPECIALIST

## 2025-02-21 PROCEDURE — 99233 SBSQ HOSP IP/OBS HIGH 50: CPT | Performed by: INTERNAL MEDICINE

## 2025-02-21 PROCEDURE — 120N000001 HC R&B MED SURG/OB

## 2025-02-21 PROCEDURE — 99232 SBSQ HOSP IP/OBS MODERATE 35: CPT | Performed by: PSYCHIATRY & NEUROLOGY

## 2025-02-21 PROCEDURE — 99232 SBSQ HOSP IP/OBS MODERATE 35: CPT | Performed by: CLINICAL NURSE SPECIALIST

## 2025-02-21 PROCEDURE — 97535 SELF CARE MNGMENT TRAINING: CPT | Mod: GO

## 2025-02-21 PROCEDURE — P9016 RBC LEUKOCYTES REDUCED: HCPCS | Performed by: INTERNAL MEDICINE

## 2025-02-21 PROCEDURE — 250N000013 HC RX MED GY IP 250 OP 250 PS 637: Performed by: INTERNAL MEDICINE

## 2025-02-21 PROCEDURE — 85049 AUTOMATED PLATELET COUNT: CPT | Performed by: INTERNAL MEDICINE

## 2025-02-21 PROCEDURE — 97530 THERAPEUTIC ACTIVITIES: CPT | Mod: GP

## 2025-02-21 PROCEDURE — 85025 COMPLETE CBC W/AUTO DIFF WBC: CPT | Performed by: INTERNAL MEDICINE

## 2025-02-21 PROCEDURE — 86901 BLOOD TYPING SEROLOGIC RH(D): CPT | Performed by: INTERNAL MEDICINE

## 2025-02-21 PROCEDURE — 36415 COLL VENOUS BLD VENIPUNCTURE: CPT | Performed by: INTERNAL MEDICINE

## 2025-02-21 PROCEDURE — 250N000011 HC RX IP 250 OP 636: Performed by: INTERNAL MEDICINE

## 2025-02-21 PROCEDURE — 250N000011 HC RX IP 250 OP 636: Performed by: STUDENT IN AN ORGANIZED HEALTH CARE EDUCATION/TRAINING PROGRAM

## 2025-02-21 PROCEDURE — 82374 ASSAY BLOOD CARBON DIOXIDE: CPT | Performed by: INTERNAL MEDICINE

## 2025-02-21 PROCEDURE — 82565 ASSAY OF CREATININE: CPT | Performed by: INTERNAL MEDICINE

## 2025-02-21 PROCEDURE — 80048 BASIC METABOLIC PNL TOTAL CA: CPT | Performed by: INTERNAL MEDICINE

## 2025-02-21 RX ORDER — ACETAMINOPHEN 325 MG/1
975 TABLET ORAL 3 TIMES DAILY
Qty: 100 TABLET | Refills: 0 | Status: SHIPPED | OUTPATIENT
Start: 2025-02-21

## 2025-02-21 RX ORDER — OXYCODONE HYDROCHLORIDE 5 MG/1
5 TABLET ORAL EVERY 4 HOURS PRN
Qty: 10 TABLET | Refills: 0 | Status: SHIPPED | OUTPATIENT
Start: 2025-02-21 | End: 2025-02-21

## 2025-02-21 RX ORDER — ACETAMINOPHEN 325 MG/1
975 TABLET ORAL 3 TIMES DAILY
Status: DISCONTINUED | OUTPATIENT
Start: 2025-02-21 | End: 2025-02-24 | Stop reason: HOSPADM

## 2025-02-21 RX ORDER — OXYCODONE HYDROCHLORIDE 5 MG/1
5 TABLET ORAL EVERY 4 HOURS PRN
Qty: 10 TABLET | Refills: 0 | Status: SHIPPED | OUTPATIENT
Start: 2025-02-21

## 2025-02-21 RX ADMIN — Medication 500 MG: at 21:26

## 2025-02-21 RX ADMIN — ATORVASTATIN CALCIUM 80 MG: 40 TABLET, FILM COATED ORAL at 08:55

## 2025-02-21 RX ADMIN — ISOSORBIDE MONONITRATE 30 MG: 30 TABLET, EXTENDED RELEASE ORAL at 08:55

## 2025-02-21 RX ADMIN — ACETAMINOPHEN 975 MG: 325 TABLET, FILM COATED ORAL at 08:54

## 2025-02-21 RX ADMIN — Medication 1 TABLET: at 08:54

## 2025-02-21 RX ADMIN — ENOXAPARIN SODIUM 40 MG: 40 INJECTION SUBCUTANEOUS at 08:54

## 2025-02-21 RX ADMIN — FOLIC ACID 1000 MCG: 1 TABLET ORAL at 08:55

## 2025-02-21 RX ADMIN — ACETAMINOPHEN 975 MG: 325 TABLET, FILM COATED ORAL at 21:26

## 2025-02-21 RX ADMIN — LIDOCAINE: 50 OINTMENT TOPICAL at 15:23

## 2025-02-21 RX ADMIN — ALLOPURINOL 300 MG: 300 TABLET ORAL at 08:55

## 2025-02-21 RX ADMIN — LIDOCAINE: 50 OINTMENT TOPICAL at 08:56

## 2025-02-21 RX ADMIN — ACETAMINOPHEN 975 MG: 325 TABLET, FILM COATED ORAL at 15:28

## 2025-02-21 RX ADMIN — DULOXETINE HYDROCHLORIDE 20 MG: 20 CAPSULE, DELAYED RELEASE PELLETS ORAL at 08:55

## 2025-02-21 RX ADMIN — LIDOCAINE: 50 OINTMENT TOPICAL at 21:26

## 2025-02-21 RX ADMIN — LIDOCAINE: 50 OINTMENT TOPICAL at 18:14

## 2025-02-21 RX ADMIN — CEFTRIAXONE SODIUM 1 G: 1 INJECTION, POWDER, FOR SOLUTION INTRAMUSCULAR; INTRAVENOUS at 09:04

## 2025-02-21 ASSESSMENT — ACTIVITIES OF DAILY LIVING (ADL)
ADLS_ACUITY_SCORE: 43
ADLS_ACUITY_SCORE: 42
ADLS_ACUITY_SCORE: 43
ADLS_ACUITY_SCORE: 42
ADLS_ACUITY_SCORE: 43

## 2025-02-21 NOTE — PROGRESS NOTES
[facility] ACUTE PAIN SERVICE    ([facility], [facility], [facility], [facility])  Pain Progress Note    Assessment/Plan:  Margaret Batista is a 79 year old female who was admitted on 2/17/2025.  Pain Service is asked to see the patient for assistance in pain management with intractable pain following right hip fracture repair.  Right humeral fracture is being managed conservatively.  Admitted for evaluation after a fall with right leg pain.  CT of the right hip demonstrated a closed fracture of the right humerus.   Ortho Consulted.  Prior hip arthoplasty 10 years ago.  Bilateral knee replacements, and previous total left hip.    Patient is status post op day #3 right hip cephalomedullary nailing.  Plan for non surgical management of right shoulder.  Has sling in place.      Night of surgery 2/18/25 patient developed word finding difficulties and right upper extremity weakness, Code stroke was activated.    Neurology Consulted and following:     Stroke workup including CT angiogram of the head and neck and brain CT were negative for acute pathology.   EEG with mild encephalopathy, and brain MRI with chronic changes with indication of an acute infarct.    Most likely the spell is not cerebrovascular in nature and will continue PTA aspirin only after the first 2 weeks.     Complex medical history significant for monoclonal B-cell type lymphocytosis with hyperlipidemia, CAD, hypertension, stroke, CKD stage III, rheumatoid arthritis, shoulder surgery, neuropathy, DJD, and Parkinsonism.  Suspected renal cell carcinoma, multiple myeloma.  Follow up with Primary Oncologist (Kaye), Rheumatologist and Neurology as an Outpatient.    shows one prescription for hydromorphone 2 mg tablets 24 for 4 days prescribed 7/24/24.  No other controlled substances noted over this past year.     Over the past 24 hours patient has received:  2(5mg) oxycodone 5 mg 1(650 mg)  tylenol, home Cymbalta at 20 mg  yesterday AM at 09.     Subjective:  Patient identifies that pain is managed with current pain management plan.  She is anticipating dismissal from hospital once cleared by Medical Team.    Reviewed current pain plan with patient anticipating dismissal from hospital in the next day or two.            PLAN:  Acute pain secondary to hip fracture status post nailing, shoulder pain with right humerus fracture, currently being managed non operatively.       Multimodal Medication Therapy:   Adjuvants: Cymbalta 20 mg every AM, tylenol 975 mg tid   Opioids: oxycodone 5 mg every 4 hours prn, IV hydromorphone 0.2 mg every four hours prn  Non-medication interventions- Ice prn  Constipation Prophylaxis- daily stool softener/laxative   Follow up /Discharge Recommendations - We recommend prescribing the following at the time of discharge:  oxycodone 5 mg qid prn, tylenol 1000 mg tid, home Cymbalta, topical Lidocaine if helpful      <principal problem not specified>   Patient Active Problem List   Diagnosis    Allergic rhinitis    ASCVD (arteriosclerotic cardiovascular disease)    DJD (degenerative joint disease) of knee    Hx of gout    Mixed hyperlipidemia    Benign essential hypertension    Mass of finger    Osteopenia    Primary osteoarthritis of both hands    Rheumatoid arthritis (H)    Right shoulder pain    Rotator cuff tendonitis, right    S/P shoulder surgery    Shoulder impingement, right    Status post total shoulder replacement, right    Neuropathy of hand    Chronic kidney disease, stage 3 (H)    Seborrheic keratosis    Immunocompromised    Right hand weakness    History of coronary artery stent placement in 2007 and 2013    History of cardiac cath    Hypertensive 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 and stage 1 through stage 4 chronic kidney disease, or unspecified chronic kidney disease (H)    TIA (transient ischemic attack)    Word finding difficulty    Acute ischemic left MCA stroke (H)    Left  carotid stenosis    History of ischemic left MCA stroke    Cerebral microvascular disease    S/P angioplasty with stent    Parkinsonism associated with infarction of basal ganglia (H)    Basal ganglia infarction (H)    Non morbid obesity due to excess calories    Right renal mass    Left displaced femoral neck fracture (H)    Long term (current) use of anticoagulants    Facial pain    Activities involving dishwashing    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 (H)    Accidental fall, initial encounter    S/P total left hip arthroplasty    Compression fracture of L1 vertebra with routine healing    Pain of right upper arm    Scalp laceration, initial encounter    Hip fracture, right, closed, initial encounter (H)        History   Drug Use No         Tobacco Use      Smoking status: Never        Passive exposure: Past      Smokeless tobacco: Never      Tobacco comments: Passive exposure in childhood home.         Current Facility-Administered Medications   Medication Dose Route Frequency Provider Last Rate Last Admin    allopurinol (ZYLOPRIM) tablet 300 mg  300 mg Oral Daily Tod William MD   300 mg at 02/20/25 0859    atorvastatin (LIPITOR) tablet 80 mg  80 mg Oral Daily Tod William MD   80 mg at 02/20/25 0859    cefTRIAXone (ROCEPHIN) 1 g vial to attach to  mL bag for ADULTS or NS 50 mL bag for PEDS  1 g Intravenous Q24H Tod William MD   1 g at 02/20/25 1039    DULoxetine (CYMBALTA) DR capsule 20 mg  20 mg Oral Daily Tod William MD   20 mg at 02/20/25 0859    enoxaparin ANTICOAGULANT (LOVENOX) injection 40 mg  40 mg Subcutaneous Q24H Jb Alston MD   40 mg at 02/20/25 0859    folic acid (FOLVITE) tablet 1,000 mcg  1,000 mcg Oral Daily Tod William MD   1,000 mcg at 02/20/25 0859    isosorbide mononitrate (IMDUR) 24 hr tablet 30 mg  30 mg Oral Daily Tod William MD   30 mg at 02/20/25 0859    lidocaine (XYLOCAINE) 5 % ointment  "  Topical 4x Daily Loren Nuñez APRN CNS   Given at 02/20/25 2030    magnesium gluconate (MAGONATE) tablet 500 mg  500 mg Oral At Bedtime Loren Nuñez APRN CNS   500 mg at 02/20/25 2029    multivitamin w/minerals (THERA-VIT-M) tablet 1 tablet  1 tablet Oral Daily Tod William MD   1 tablet at 02/20/25 1318    polyethylene glycol (MIRALAX) Packet 17 g  17 g Oral Daily Jb Alston MD   17 g at 02/20/25 0859    sodium chloride (PF) 0.9% PF flush 3 mL  3 mL Intracatheter Q8H Jb Alston MD   3 mL at 02/20/25 1616    sodium chloride (PF) 0.9% PF flush 3 mL  3 mL Intracatheter Q8H Jb Alston MD   3 mL at 02/20/25 2031       Objective:  Vital signs in last 24 hours:  B/P: 141/70, T: 98.3, P: 84, R: 18   Blood pressure (!) 141/70, pulse 84, temperature 98.3  F (36.8  C), temperature source Oral, resp. rate 18, height 1.702 m (5' 7.01\"), weight 77.2 kg (170 lb 3.1 oz), last menstrual period 01/01/1998, SpO2 97%, not currently breastfeeding.      Weight:   Wt Readings from Last 2 Encounters:   02/18/25 77.2 kg (170 lb 3.1 oz)   02/03/25 76.9 kg (169 lb 9.6 oz)         Intake/Output:    Intake/Output Summary (Last 24 hours) at 2/21/2025 0713  Last data filed at 2/21/2025 0555  Gross per 24 hour   Intake 240 ml   Output 700 ml   Net -460 ml        Review of Systems:   As per subjective, all others negative.    Physical Exam:     General Appearance:  Alert, cooperative, no distress, sitting up in bed, pleasant   Head:  Normocephalic, without obvious abnormality, atraumatic   Eyes:  PERRL, conjunctiva/corneas clear, EOM's intact   ENT/Throat: Lips moist    Lymph/Neck: Supple, symmetrical, trachea midline   Lungs:   respirations unlabored   Abdomen:   Soft, non-tender   Musculoskeletal: Right arm with sling in place, good CWMS of right hand   Skin: Skin is warm, dry    Neurologic: Alert and oriented X 3, limited mobililty of right side due to healing right hip fracture repair and right humerus " fracture.           Imaging:  Personally Reviewed.    Results for orders placed or performed during the hospital encounter of 02/17/25   Humerus XR, G/E 2 views, right    Impression    IMPRESSION: Postop changes of glenohumeral joint replacement. There is bone formation along the greater tuberosity of the humerus without a well-defined fracture lucency on these views, favor chronic although dedicated right shoulder radiographs could   assess further if there is clinical concern for fracture in this region. Subacromial spur. Bone demineralization.    No evidence of an acute displaced distal right humeral fracture.   XR Pelvis and Hip Right 2 Views    Impression    IMPRESSION: Acute, displaced and angulated right proximal femoral fracture.    Contralateral left total hip replacement.    Bones are demineralized.    Pathologic fracture is not excluded as there is some vague lucency along the fracture site as well as a partially visualized area of lucency within the proximal femoral shaft measuring approximately 9.6 cm in length. These findings raise concern for   multiple myeloma as well as metastatic disease, which should be excluded.    NOTE: ABNORMAL REPORT    THE DICTATION ABOVE DESCRIBES AN ABNORMALITY FOR WHICH FOLLOW-UP IS NEEDED.    CT Head w/o Contrast    Impression    IMPRESSION:  1.  No acute intracranial process.    2.  Chronic intracranial changes described above.   XR Shoulder Right 3 Views    Impression    IMPRESSION:     Status post right total shoulder arthroplasty. There is an acute appearing, comminuted, mildly displaced periprosthetic fracture of the right proximal humerus.     No dislocation. Mild to moderate acromioclavicular degenerative arthrosis. Diffuse osseous demineralization.   XR Femur Right 2 Views    Impression    IMPRESSION: Comminuted, angulated, and displaced intertrochanteric fracture right hip. The distal femur is intact. Right knee arthroplasty. Vascular calcifications.   CT Hip  Right w/o Contrast    Impression    IMPRESSION:  1.  Comminuted intertrochanteric fracture of the right hip with multiple fragments of the greater trochanter present.  2.  No definitive osteolytic lesion of the greater trochanter or proximal femur is identified.         CTA Head Neck with Contrast    Impression    IMPRESSION:   HEAD CT:  1.  No CT evidence for acute intracranial process.  2.  Brain atrophy and presumed chronic microvascular ischemic changes as above.  3.  There are couple of tiny chronic lacunar infarcts in the left basal ganglia.    HEAD CTA:  1.  No large vessel occlusion or flow-limiting stenosis.  2.  Evidence of intracranial atherosclerosis.    NECK CTA:  1.  No hemodynamically significant stenosis or dissection.  2.  Left carotid stent is patent.         MR Brain w/o & w Contrast    Impression    IMPRESSION:  1.  No finding for acute infarct, intracranial hemorrhage, or abnormally enhancing mass.  2.  Chronic lacunar infarcts in the periventricular left corona radiata and in the left basal ganglia with a background mild right and moderate left chronic small vessel ischemic change.  3.  Moderate diffuse parenchymal volume loss.  4.  Right frontal scalp subgaleal hematoma with left parietal scalp soft tissue swelling.     Echocardiogram Complete   Result Value Ref Range    LVEF  60-65%         Lab Results:  Personally Reviewed.   Last Comprehensive Metabolic Panel:  Sodium   Date Value Ref Range Status   02/18/2025 140 135 - 145 mmol/L Final   06/18/2021 139 134 - 144 mmol/L Final     Potassium   Date Value Ref Range Status   02/18/2025 5.1 3.4 - 5.3 mmol/L Final   06/18/2021 3.4 (L) 3.5 - 5.1 mmol/L Final     Chloride   Date Value Ref Range Status   02/18/2025 103 98 - 107 mmol/L Final   06/18/2021 101 98 - 107 mmol/L Final     Carbon Dioxide   Date Value Ref Range Status   06/18/2021 30 21 - 31 mmol/L Final     Carbon Dioxide (CO2)   Date Value Ref Range Status   02/18/2025 34 (H) 22 - 29  "mmol/L Final     Anion Gap   Date Value Ref Range Status   02/18/2025 3 (L) 7 - 15 mmol/L Final   06/18/2021 8 3 - 14 mmol/L Final     Glucose   Date Value Ref Range Status   06/18/2021 100 70 - 105 mg/dL Final     GLUCOSE BY METER POCT   Date Value Ref Range Status   02/19/2025 170 (H) 70 - 99 mg/dL Final     Urea Nitrogen   Date Value Ref Range Status   02/18/2025 15.7 8.0 - 23.0 mg/dL Final   06/18/2021 22 7 - 25 mg/dL Final     Creatinine   Date Value Ref Range Status   02/21/2025 0.85 0.51 - 0.95 mg/dL Final   06/18/2021 0.99 0.60 - 1.20 mg/dL Final     GFR Estimate   Date Value Ref Range Status   02/21/2025 70 >60 mL/min/1.73m2 Final     Comment:     eGFR calculated using 2021 CKD-EPI equation.   06/18/2021 55 (L) >60 mL/min/[1.73_m2] Final     Calcium   Date Value Ref Range Status   02/18/2025 9.5 8.8 - 10.4 mg/dL Final   06/18/2021 9.7 8.6 - 10.3 mg/dL Final        UA: No results found for: \"UAMP\", \"UBARB\", \"BENZODIAZEUR\", \"UCANN\", \"UCOC\", \"OPIT\", \"UPCP\"           MANAGEMENT DISCUSSED with the following over the past 24 hours: Nursing   NOTE(S)/MEDICAL RECORDS REVIEWED over the past 24 hours:  Neurology, Hospital Medicine, , Ortho  Medical complexity over the past 24 hours:  - Prescription DRUG MANAGEMENT performed      JODEE Carroll, DNP CNS  Acute Inpatient Pain Team  M-F   Paging via [employer] or "[employer]         "

## 2025-02-21 NOTE — PLAN OF CARE
Goal Outcome Evaluation:     Problem: Orthopaedic Fracture  Goal: Absence of Embolism Signs and Symptoms  Outcome: Progressing  Goal: Fracture Stability  Outcome: Progressing  Goal: Optimal Functional Ability  Outcome: Progressing  Goal: Optimal Pain Control and Function  Outcome: Progressing   Receive 1 unit of RBC's. Vss, on room air.

## 2025-02-21 NOTE — PLAN OF CARE
Problem: Adult Inpatient Plan of Care  Goal: Plan of Care Review  Description: The Plan of Care Review/Shift note should be completed every shift.  The Outcome Evaluation is a brief statement about your assessment that the patient is improving, declining, or no change.  This information will be displayed automatically on your shift  note.  Outcome: Progressing     Problem: Orthopaedic Fracture  Goal: Absence of Embolism Signs and Symptoms  Outcome: Progressing  Intervention: Prevent or Manage Embolism Risk  Recent Flowsheet Documentation  Taken 2/20/2025 1641 by Jessy Cardozo RN  VTE Prevention/Management:   SCDs on (sequential compression devices)   SCDs off (sequential compression devices)     Problem: Chronic Kidney Disease  Goal: Electrolyte Balance  Outcome: Progressing     Problem: Comorbidity Management  Goal: Blood Pressure in Desired Range  Outcome: Progressing  Intervention: Maintain Blood Pressure Management  Recent Flowsheet Documentation  Taken 2/20/2025 1641 by Jessy Cardozo RN  Medication Review/Management: medications reviewed     Problem: Wound  Goal: Optimal Coping  Outcome: Progressing     Problem: Adult Inpatient Plan of Care  Goal: Absence of Hospital-Acquired Illness or Injury  Intervention: Identify and Manage Fall Risk  Recent Flowsheet Documentation  Taken 2/20/2025 1641 by Jessy Cardozo RN  Safety Promotion/Fall Prevention:   supervised activity   safety round/check completed   room organization consistent   room near nurse's station   patient and family education   nonskid shoes/slippers when out of bed   clutter free environment maintained   room door open   lighting adjusted  Intervention: Prevent Skin Injury  Recent Flowsheet Documentation  Taken 2/20/2025 1641 by Jessy Cardozo RN  Body Position: (pt was supine)   turned   left   heels elevated  Intervention: Prevent and Manage VTE (Venous Thromboembolism) Risk  Recent Flowsheet Documentation  Taken 2/20/2025 1641 by  Kimanzi, Jessy M, RN  VTE Prevention/Management:   SCDs on (sequential compression devices)   SCDs off (sequential compression devices)  Intervention: Prevent Infection  Recent Flowsheet Documentation  Taken 2/20/2025 1641 by Jessy Cardozo RN  Infection Prevention:   rest/sleep promoted   single patient room provided  Goal: Optimal Comfort and Wellbeing  Intervention: Monitor Pain and Promote Comfort  Recent Flowsheet Documentation  Taken 2/20/2025 1641 by Jessy Cardozo RN  Pain Management Interventions: cold applied     Problem: Orthopaedic Fracture  Goal: Fracture Stability  Intervention: Promote Fracture Stability and Healing  Recent Flowsheet Documentation  Taken 2/20/2025 1641 by Jessy Cardozo RN  Fracture Immobilization: (sling) immobilization device maintained  Goal: Optimal Functional Ability  Intervention: Optimize Functional Ability  Recent Flowsheet Documentation  Taken 2/20/2025 1641 by Jessy Cardozo RN  Activity Management: activity encouraged  Goal: Optimal Pain Control and Function  Intervention: Manage Acute Orthopaedic-Related Pain  Recent Flowsheet Documentation  Taken 2/20/2025 1641 by Jessy Cardozo RN  Pain Management Interventions: cold applied     Problem: Chronic Kidney Disease  Goal: Optimal Functional Ability  Intervention: Optimize Functional Ability  Recent Flowsheet Documentation  Taken 2/20/2025 1641 by Jessy Cardozo, RN  Activity Management: activity encouraged     Problem: Wound  Goal: Optimal Functional Ability  Intervention: Optimize Functional Ability  Recent Flowsheet Documentation  Taken 2/20/2025 1641 by Jessy Cardozo RN  Activity Management: activity encouraged  Goal: Optimal Pain Control and Function  Intervention: Prevent or Manage Pain  Recent Flowsheet Documentation  Taken 2/20/2025 1641 by Jessy Cardozo RN  Pain Management Interventions: cold applied  Goal: Skin Health and Integrity  Intervention: Optimize Skin Protection  Recent Flowsheet  Documentation  Taken 2/20/2025 1641 by Jessy Cardozo, RN  Activity Management: activity encouraged  Head of Bed (HOB) Positioning: HOB at 15 degrees     Problem: Fall Injury Risk  Goal: Absence of Fall and Fall-Related Injury  Intervention: Identify and Manage Contributors  Recent Flowsheet Documentation  Taken 2/20/2025 1641 by Jessy Cardozo, RN  Medication Review/Management: medications reviewed  Intervention: Promote Injury-Free Environment  Recent Flowsheet Documentation  Taken 2/20/2025 1641 by Jessy Cardozo, RN  Safety Promotion/Fall Prevention:   supervised activity   safety round/check completed   room organization consistent   room near nurse's station   patient and family education   nonskid shoes/slippers when out of bed   clutter free environment maintained   room door open   lighting adjusted     Problem: Mobility Impairment  Goal: Optimal Mobility  Intervention: Optimize Mobility  Recent Flowsheet Documentation  Taken 2/20/2025 1641 by Jessy Cardozo, RN  Activity Management: activity encouraged     Problem: Stroke, Ischemic (Includes Transient Ischemic Attack)  Goal: Optimal Cognitive Function  Intervention: Optimize Cognitive Function  Recent Flowsheet Documentation  Taken 2/20/2025 1641 by Jessy Cardozo RN  Reorientation Measures: clock in view  Goal: Optimal Functional Ability  Intervention: Optimize Functional Ability  Recent Flowsheet Documentation  Taken 2/20/2025 1641 by Jessy Cardozo, RN  Activity Management: activity encouraged   Goal Outcome Evaluation:                  Pt. A&O. Had minimal pain this shift with movement. Tylenol given. Assist of 1 from chair to bed. RA. Continue with POC.

## 2025-02-21 NOTE — PROGRESS NOTES
Care Management Follow Up    Length of Stay (days): 4    Expected Discharge Date: 02/22/2025     Concerns to be Addressed: Care progression - discharge planning     Patient plan of care discussed at interdisciplinary rounds: Yes    Anticipated Discharge Disposition:  Therapy rec Transitional care     Anticipated Discharge Services:  Transitional care  Anticipated Discharge DME:  NA    Patient/family educated on Medicare website which has current facility and service quality ratings:  Yes  Education Provided on the Discharge Plan:  Yes per team  Patient/Family in Agreement with the Plan:  Yes    Referrals Placed by CM/SW:  Yes  Private pay costs discussed: transportation costs    Discussed  Partnership in Safe Discharge Planning  document with patient/family: Yes: patient's daughterCheryl    Handoff Completed: No, handoff not indicated or clinically appropriate    Additional Information:  [id number] message sent to Dr. William regarding the anticipated discharge today.    Social Hx:  Assessment: Follow. Patient/spouse currently renting a senior living apartment in [facility] thru the end of March 2025. Had a stroke w/right sided residual weakness last April 2024. Needs assist w/ADLs and dependent w/IADLs. Has walker, cane and NuStep. No community resources.  Last note: 02/21/25  Plan: Accepted to [facility]. PAS completed.  Needs: Medical progression  Hand off sent: NA  Transport: [place]  transport (costs discussed)     Next Steps: RNCM to follow for medical progression, recommendations, and final discharge plan.     Amy Felder, RN     5345 rec'd a message from Dr. William, ordered transfusion with prbc. Will keep patient for today and monitor hgb.    0900 Called [facility] and updated Felicia Bernard asked if TCU can take patient late today, [id number]?    1100 called [facility] and spoke with Felicia. Felicia said one of their admit nurse is out, their HUC is out, and if they admit today,  the latest they can take patient is 1400; because their current nurse plans to leave at 1500.    Updated Dr. William

## 2025-02-21 NOTE — PROGRESS NOTES
[facility]    Medicine Progress Note - Hospitalist Service    Date of Admission:  2/17/2025    Assessment & Plan   Margaret Batista is a 79 year old female with history of coronary artery disease, hyperlipidemia, hypertension, rheumatoid arthritis, stage III CKD, hypertension, prior CVA, suspected renal cell carcinoma, TIA/CVA and monoclonal B-cell type lymphocytosis admitted with right displaced intertrochanteric fracture and mildly displaced periprosthetic fracture of the right proximal humerus.     Orthopedic surgery service performed surgical repair of right intertrochanteric fracture on 2/18/2025.  Repair of right periprosthetic fracture of the proximal humerus was deferred and being conservatively managed with a shoulder sling.  Orthopedic surgery service recommends Lovenox postoperatively given history of cancer during hospital stay with plan to discharge patient on DOAC for 2 weeks pending risk stratification and resumption of aspirin after completing 2 week course of DOAC.    Patient developed word finding difficulty on 2/18/2025 overnight.  Stroke workup including CT angiogram of the head and neck and brain CT were negative for acute pathology.  Reports of EEG and brain MRI was negative for acute pathology. Neurologist is assisting with further evaluation for possible to stroke versus TIA.      Right displaced intertrochanteric fracture  Mildly displaced periprosthetic fracture of the right proximal humerus.  Orthopedic surgery service performed surgical repair of right intertrochanteric fracture on 2/18/2025.  Repair of right periprosthetic fracture of the proximal humerus was deferred and being conservatively managed with a shoulder sling.    Analgesics as needed  Postoperative surgical care per orthopedic surgery service  DVT prophylaxis per orthopedic surgery service  PT/OT  Orthopedic surgery [id number] assistance    Suspected UTI  Chronic leukocytosis since  "2023  Urinalysis is suggestive of UTI.  Urine culture was negative.  Stop IV ceftriaxone-2/20/2025    Acute blood loss anemia  Hemoglobin dropped to 7.1 from baseline of 11.2 postoperatively.  Possibly secondary to acute blood loss from surgery and bleeding prior to admission.  Per , patient had a significant bleed when she fell and was laying in a pool of blood  Monitor hemoglobin and transfuse if hemoglobin drops below 7  Multivitamins      Prior CVA  Will resume aspirin after completing 2 weeks course of DOAC; currently on enoxaparin during hospital stay.  Continue PTA atorvastatin  PT/OT    Stage III CKD  Creatinine is at baseline  Monitor BMP  Avoid nephrotoxin    Suspected renal cell carcinoma  Multiple myeloma   Patient wants to follow up with her primary oncologist  Outpatient follow-up with her primary oncologist    Rheumatoid arthritis  Resume PTA hydroxychloroquine in the next few days  Hold PTA methotrexate for now    Hyperlipidemia  Atorvastatin 80 mg daily    History of CAD  Will resume aspirin after completing 2 weeks course of DOAC; currently on enoxaparin during hospital stay.  Continue PTA atorvastatin  Continue PTA Imdur  Sublingual nitroglycerin as needed      Diet: Regular Diet Adult  Discharge Instruction - Regular Diet Adult  Snacks/Supplements Adult: Ensure Enlive; Between Meals  Snacks/Supplements Adult: Magic Cup; Between Meals    DVT Prophylaxis: Pneumatic Compression Devices  Slaughter Catheter: Not present  Lines: None     Cardiac Monitoring: ACTIVE order. Indication: strokelike symptoms  Code Status: No CPR- Do NOT Intubate      Clinically Significant Risk Factors                   # Hypertension: Noted on problem list            # Overweight: Estimated body mass index is 26.65 kg/m  as calculated from the following:    Height as of this encounter: 1.702 m (5' 7.01\").    Weight as of this encounter: 77.2 kg (170 lb 3.1 oz)., PRESENT ON ADMISSION     # Financial/Environmental " Concerns: none         Social Drivers of Health    Alcohol Use: Unknown (5/6/2022)    [facility], [facility]    AUDIT-C     Frequency of Alcohol Consumption: Monthly or less     Frequency of Binge Drinking: Never   Physical Activity: Insufficiently Active (10/17/2024)    Exercise Vital Sign     Days of Exercise per Week: 2 days     Minutes of Exercise per Session: 20 min   Social Connections: Unknown (10/17/2024)    Social Connection and Isolation Panel [NHANES]     Frequency of Social Gatherings with Friends and Family: Three times a week          Disposition Plan     Medically Ready for Discharge: Possible discharge in 1 to 2 days if hemoglobin remains stable posttransfusion.    Tod William MD  Hospitalist Service  [facility]  Securely message with [employer] (more info)  Text page via [facility] Paging/Directory   ______________________________________________________________________    Interval History   No new complaints today and no acute events overnight. Hemoglobin dropped to 7.1.  Transfused with 1 unit of PRBC.  She denies shortness of breath, dizziness, chest pain, or palpitation. Per , patient had a significant bleed when she fell and was laying in a pool of blood    Family at bedside-updated about current status and plans.      Physical Exam   Vital Signs: Temp: 98.2  F (36.8  C) Temp src: Oral BP: 121/62 Pulse: 85   Resp: 18 SpO2: 98 % O2 Device: None (Room air)    Weight: 170 lbs 3.12 oz    General appearance: Awake, Alert, Cooperative, not in any obvious distress and appears stated age   HEENT: Normocephalic, atraumatic, conjunctiva clear without icterus and ears without discharge  Lungs: Clear to auscultation bilaterally, no wheezing, good air exchange, normal work of breathing  Cardiovascular: Regular Rate and Rythm, normal apical impulse, normal S1 and S2, no lower extremity edema bilaterally  Abdomen: Soft, non-tender and Non-distended, active bowel  sounds  Skin: Left frontal ecchymosis  Musculoskeletal: Right shoulder sling in place. Diminished ROM in the right shoulder and hip  Neurologic: Alert & Oriented X 3, Facial symmetry preserved and upper & lower extremities moving well with symmetry  Psychiatric: Calm, normal eye contact and normal affect      Medical Decision Making       45 MINUTES SPENT BY ME on the date of service doing chart review, history, exam, documentation & further activities per the note.      Data   Imaging results reviewed over the past 24 hrs:   No results found for this or any previous visit (from the past 24 hours).

## 2025-02-21 NOTE — PROGRESS NOTES
NEUROLOGY PROGRESS NOTE     Margaret Batista,  1945, MRN [id number] Date: 2025     [facility]   Code status:  No CPR- Do NOT Intubate   PCP: Swetha Maxwell, [phone]      ASSESSMENT & PLAN   Diagnosis code: Strokelike symptoms    Strokelike symptoms-rule out stroke  History of right intertrochanteric fracture status post surgery  Mild encephalopathy on EEG    Head CT negative for acute stroke  CTA head and neck negative for large vessel occlusion/stenosis  MRI brain shows chronic changes with no acute infarct.  EEG suggestive of mild encephalopathy.  No seizures.  Symptoms could be related to side effects of medications  Echocardiogram shows 60 to 65% ejection fraction.  Cardiac monitoring  Lipid panel statin  Blood pressure can be normalized  Most likely the spell is not cerebrovascular in nature and will continue PTA aspirin only after the first 2 weeks.  On DOAC for 2 weeks per orthopedics.  PT/OT  Symptoms could be related to recent head injury or possibly encephalopathy related to recent surgery/hospitalization medications    Will sign off.  Please call if any further questions.       Chief Complaint   Patient presents with    Fall    Hip Pain    Shoulder Pain        HISTORY OF PRESENT ILLNESS     We have been requested by Dr. Marinelli to evaluate Margaret Batista who is a 79 year old  female for evaluation of possible stroke.  This is a 79-year-old female with history of coronary artery disease, hyperlipidemia, hypertension, rheumatoid arthritis, stage III CKD, hypertension, prior history of stroke, suspected renal cell carcinoma, TIA, monoclonal B-cell lymphocytosis who was admitted for a right displaced intertrochanteric fracture and underwent surgical repair yesterday.  Overnight she developed some word finding difficulty for which a stroke code was called.  She reports that she disoriented and did not recognize what was going on around her.  Had difficulty getting her words  out to express herself.  She feels that she is getting better this morning though continues to have some difficulty.  Did not have good sleep overnight.  No headaches or other ongoing symptoms.  No major baseline cognitive issues.    2/20  Reports some ongoing cognitive issues but not as severe as before.  No other new symptoms.  Echocardiogram pending.  MRI completed negative for stroke.    2/21  Continues to have some word finding difficulty.  Echocardiogram was noncontributory.  No new symptoms.  Is interested in doing rehab.     PAST MEDICAL & SURGICAL HISTORY     Medical History  Past Medical History:   Diagnosis Date    Atherosclerotic heart disease of native coronary artery without angina pectoris     -s/p ADÁN to the mid-LAD, mid-circumflex, mid-right coronary artery, proximal right coronary  artery, and PTCA of a marginal branch of the right coronary artery 04/30/2007. -s/p ADÁN to proximal left anterior descending 10/29/13.    Contusion of abdominal wall     2014    Essential (primary) hypertension     12/14/2006    Gout     No Comments Provided    Hyperlipidemia     4/4/2007    Localized swelling, mass, or lump of upper extremity     5/26/2017    Other specified counseling     10/28/2013,Patient has identified Health Care Agent(s): Yes Add Health Care Agents: Yes   Health Care Agent(s): Primary Health Care Agent: Jay Batista  Relationship:  Phone:   Secondary Health Care Agent:  Relationship: Daughter Phone:   Conservator:  Relationship:  Phone:   Guardian: Relationship:  Phone:    Patient has Advance Care Plan Documents (Health Care Directive, POLST): No, refe*    Polyosteoarthritis     No Comments Provided    Postmenopausal bleeding     No Comments Provided    Presence of coronary angioplasty implant and graft     2007/2013,unstable angina; severe prox LAD stenosis; s/p 7 stents    Primary osteoarthritis of left hand     5/30/2017    Rheumatoid arthritis (H)     follows at [facility]     Surgical  History  Past Surgical History:   Procedure Laterality Date    ARTHROPLASTY HIP ANTERIOR Left 2024    Procedure: ARTHROPLASTY, HIP, TOTAL, DIRECT ANTERIOR APPROACH;  Surgeon: Shashi Toepte MD;  Location:  OR    ARTHROPLASTY KNEE      2011    ARTHROSCOPY KNEE          BIOPSY BREAST      10/25/95,BRIAN RAMIREZ    CAROTID ARTERY [facility]LAST[facility]     SECTION      1974, Section    CLOSED REDUCTION, PERCUTANEOUS PINNING HIP Right 2025    Procedure: Closed reduction and cephalomedullary nail right intertrochanteric femur fracture,;  Surgeon: Jb Alston MD;  Location: [facility] OR    COLONOSCOPY      05,Normal - Repeat in 10 years    COLONOSCOPY  2016    COLONOSCOPY N/A 2019    Procedure: COLONOSCOPY;  Surgeon: Evelin Thompson MD;  Location:  OR    COLONOSCOPY N/A 2019    Procedure: COMBINED COLONOSCOPY, SINGLE OR MULTIPLE BIOPSY/POLYPECTOMY BY BIOPSY;  Surgeon: Evelin Thompson MD;  Location:  OR    ENDOSCOPIC RELEASE CARPAL TUNNEL Right 2023    Procedure: RELEASE, CARPAL TUNNEL, ENDOSCOPIC;  Surgeon: Danial Stanley MD;  Location:  OR    ESOPHAGOSCOPY, GASTROSCOPY, DUODENOSCOPY (EGD), COMBINED      2011,erosive gastritis;bx;consider MRI/A;[facility]    EXTRACTION(S) DENTAL      1970    HEART CATH, ANGIOPLASTY      10/29/2013,ADÁN to proximal left anterior descending    OTHER SURGICAL HISTORY      ,[id number],OTHER    OTHER SURGICAL HISTORY      2014,[id number],TOTAL SHOULDER ARTHROPLASTY,Right    OTHER SURGICAL HISTORY      2016,57765.0,OR COLONOSCOPY REMOVE ELIZA POLYP LESN SNARE,repeat , precancerous polyps    ZZ STRESS LEXISCAN TEST  2018    With Dr. Irwin - normal        SOCIAL HISTORY     Social History     Tobacco Use    Smoking status: Never     Passive exposure: Past    Smokeless tobacco: Never    Tobacco comments:     Passive exposure in childhood home.     Vaping Use    Vaping status: Never Used   Substance Use Topics    Alcohol use: Not Currently    Drug use: No        FAMILY HISTORY     Reviewed, and family history includes Arthritis in her father; Atrial fibrillation in her sister; Breast Cancer (age of onset: 53) in her mother; Cancer in her father; Heart Disease in her brother, father, and mother; Hypertension in her father and mother; Melanoma in her brother; Other - See Comments in her mother.     ALLERGIES     Allergies   Allergen Reactions    Plavix [Clopidogrel]      [facility] [place]    [facility]    Food      Macadamia nuts make mouth itch    Losartan      Allergic rxn with hives and angioedema suspected to be from alternative manufacture of the effient or the losartan        REVIEW OF SYSTEMS     12 system ROS was done and was negative within the HPI.     HOME & HOSPITAL MEDICATIONS     Prior to Admission Medications  Medications Prior to Admission   Medication Sig Dispense Refill Last Dose/Taking    acetaminophen (TYLENOL) 500 MG tablet Take 1,000 mg by mouth 2 times daily as needed for mild pain.   2/17/2025 Morning    albuterol (PROAIR HFA/PROVENTIL HFA/VENTOLIN HFA) 108 (90 Base) MCG/ACT inhaler Inhale 2 puffs into the lungs every 6 hours as needed for wheezing, shortness of breath or cough.   Taking As Needed    alendronate (FOSAMAX) 70 MG tablet Take 1 tablet (70 mg) by mouth every 7 days. 14 tablet 4 Taking    allopurinol (ZYLOPRIM) 300 MG tablet Take 1 tablet (300 mg) by mouth daily. 90 tablet 4 2/17/2025 Morning    aspirin (ASA) 81 MG chewable tablet Take 1 tablet (81 mg) by mouth daily 90 tablet 3 2/17/2025 Morning    atenolol (TENORMIN) 100 MG tablet Take 1 tablet (100 mg) by mouth daily. 90 tablet 4 2/17/2025 Morning    atorvastatin (LIPITOR) 80 MG tablet TAKE 1 TABLET DAILY 90 tablet 4 2/17/2025 Morning    cetirizine (ZYRTEC) 5 MG tablet Take 5 mg by mouth at bedtime.   2/16/2025 Bedtime    DULoxetine (CYMBALTA) 20 MG  capsule Take 1 capsule (20 mg) by mouth daily. 90 capsule 4 2/17/2025 Morning    folic acid (FOLVITE) 1 MG tablet TAKE 1 TABLET DAILY 90 tablet 3 2/17/2025 Morning    hydroxychloroquine (PLAQUENIL) 200 MG tablet Take 400 mg by mouth every other day.   2/16/2025 Morning    hydroxychloroquine (PLAQUENIL) 200 MG tablet Take 200 mg by mouth daily.   2/17/2025 Morning    isosorbide mononitrate (IMDUR) 30 MG 24 hr tablet TAKE 1 TABLET DAILY 90 tablet 3 2/17/2025 Morning    methotrexate 2.5 MG tablet Take 20 mg by mouth once a week. On Mondays 2/17/2025 Morning    nitroGLYcerin (NITROSTAT) 0.4 MG sublingual tablet Place 1 tablet (0.4 mg) under the tongue every 5 minutes as needed for chest pain. (For chest pain x 3 doses) 30 tablet 3 Taking As Needed    polyethylene glycol (MIRALAX) 17 GM/Dose powder Take 17 g by mouth daily as needed 510 g 1 Taking As Needed       Hospital Medications  Current Facility-Administered Medications   Medication Dose Route Frequency Provider Last Rate Last Admin    acetaminophen (TYLENOL) tablet 975 mg  975 mg Oral TID Loren Nuñez R, APRN CNS   975 mg at 02/21/25 0854    allopurinol (ZYLOPRIM) tablet 300 mg  300 mg Oral Daily Tod William MD   300 mg at 02/21/25 0855    atorvastatin (LIPITOR) tablet 80 mg  80 mg Oral Daily Tod William MD   80 mg at 02/21/25 0855    cefTRIAXone (ROCEPHIN) 1 g vial to attach to  mL bag for ADULTS or NS 50 mL bag for PEDS  1 g Intravenous Q24H Tod William MD   1 g at 02/21/25 0904    DULoxetine (CYMBALTA) DR capsule 20 mg  20 mg Oral Daily Tod William MD   20 mg at 02/21/25 0855    enoxaparin ANTICOAGULANT (LOVENOX) injection 40 mg  40 mg Subcutaneous Q24H Jb Alston MD   40 mg at 02/21/25 0854    folic acid (FOLVITE) tablet 1,000 mcg  1,000 mcg Oral Daily Tod William MD   1,000 mcg at 02/21/25 0855    isosorbide mononitrate (IMDUR) 24 hr tablet 30 mg  30 mg Oral Daily Tod William MD   " 30 mg at 02/21/25 0855    lidocaine (XYLOCAINE) 5 % ointment   Topical 4x Daily Loren Nuñez APRN CNS   Given at 02/21/25 0856    magnesium gluconate (MAGONATE) tablet 500 mg  500 mg Oral At Bedtime Loren Nuñez APRN CNS   500 mg at 02/20/25 2029    multivitamin w/minerals (THERA-VIT-M) tablet 1 tablet  1 tablet Oral Daily Tod William MD   1 tablet at 02/21/25 0854    polyethylene glycol (MIRALAX) Packet 17 g  17 g Oral Daily Jb Alston MD   17 g at 02/20/25 0859    sodium chloride (PF) 0.9% PF flush 3 mL  3 mL Intracatheter Q8H Jb Alston MD   3 mL at 02/21/25 0856    sodium chloride (PF) 0.9% PF flush 3 mL  3 mL Intracatheter Q8H Jb Alston MD   3 mL at 02/20/25 2031        PHYSICAL EXAM     Vital signs  Temp:  [98  F (36.7  C)-98.6  F (37  C)] 98.3  F (36.8  C)  Pulse:  [82-87] 84  Resp:  [18-20] 18  BP: (109-141)/(55-70) 141/70  SpO2:  [91 %-98 %] 97 %    PHYSICAL EXAMINATION  VITALS: BP (!) 141/70 (BP Location: Left arm)   Pulse 84   Temp 98.3  F (36.8  C) (Oral)   Resp 18   Ht 1.702 m (5' 7.01\")   Wt 77.2 kg (170 lb 3.1 oz)   LMP 01/01/1998 (Within Months)   SpO2 97%   BMI 26.65 kg/m    GENERAL -Health appearing, No apparent distress  EYES- No scleral icterus, no eyelid droop, Pupils - see Neuro section  HEENT - Normocephalic, atraumatic, Hearing grossly intact; Oral mucosa moist and pink in color. External Ears and nose intact.   Neck - supple   PULM - Good spontaneous respiratory effort;   CV- Pedal pulses present with no peripheral edema/ No significant varicosities.  MSK- Gait - see Neuro section; Strength and tone- see Neuro section; Range of motion grossly intact.  PSYCH -cooperative    Neurological  Mental status - Patient is awake and oriented to self, place and time. Attention span is normal. Language is fluent and follows commands appropriately.  Mild aphasia with repetition.  Cranial nerves - CN II-XII intact. Pupils are reactive and symmetric; EOMI, " VFIT, NLF symmetric  Motor - There is no pronator drift. Motor exam shows 5/5 strength in all extremities except chronic right arm and left leg weakness.  Tone - Tone is symmetric bilaterally in upper and lower extremities.  Reflexes -toes are downgoing/equivocal.  Sensation - Sensory exam is grossly intact to light touch, pain.  Coordination - Finger to nose and heel to shin is without dysmetria.  Gait and station --needs assistance to ambulate.  Formal gait testing cannot be done due to safety concerns from ongoing medical issues.  Exam stable.     DIAGNOSTIC STUDIES     Pertinent Radiology   HEAD CT:  1.  No CT evidence for acute intracranial process.  2.  Brain atrophy and presumed chronic microvascular ischemic changes as above.  3.  There are couple of tiny chronic lacunar infarcts in the left basal ganglia.     HEAD CTA:  1.  No large vessel occlusion or flow-limiting stenosis.  2.  Evidence of intracranial atherosclerosis.     NECK CTA:  1.  No hemodynamically significant stenosis or dissection.  2.  Left carotid stent is patent.    MRI  1.  No finding for acute infarct, intracranial hemorrhage, or abnormally enhancing mass.  2.  Chronic lacunar infarcts in the periventricular left corona radiata and in the left basal ganglia with a background mild right and moderate left chronic small vessel ischemic change.  3.  Moderate diffuse parenchymal volume loss.  4.  Right frontal scalp subgaleal hematoma with left parietal scalp soft tissue swelling.    EEG  IMPRESSION/REPORT/PLAN  This is an almost normal EEG during wakefulness and drowsiness except for moderate generalized background dysrhythmia. No electrographic seizures were noted during the recording.  Such an EEG can be seen in patients with mild encephalopathy. Further clinical correlation is needed.     Please note that the absence of epileptiform abnormalities does not exclude the possibility of epilepsy in any patient.     ECHO  Left ventricular size,  wall motion and function are normal. The ejection  fraction is 60-65%.  Normal right ventricle size and systolic function.  There is no color Doppler evidence of an atrial shunt.  No hemodynamically significant valvular abnormalities on 2D or color flow  imaging.       Recent Results (from the past 24 hours)   Echocardiogram Complete    Collection Time: 02/20/25  1:15 PM   Result Value Ref Range    LVEF  60-65%    Creatinine    Collection Time: 02/21/25  5:43 AM   Result Value Ref Range    Creatinine 0.85 0.51 - 0.95 mg/dL    GFR Estimate 70 >60 mL/min/1.73m2   Platelet count    Collection Time: 02/21/25  5:43 AM   Result Value Ref Range    Platelet Count 162 150 - 450 10e3/uL   Basic metabolic panel    Collection Time: 02/21/25  5:43 AM   Result Value Ref Range    Sodium 137 135 - 145 mmol/L    Potassium 4.0 3.4 - 5.3 mmol/L    Chloride 103 98 - 107 mmol/L    Carbon Dioxide (CO2) 28 22 - 29 mmol/L    Anion Gap 6 (L) 7 - 15 mmol/L    Urea Nitrogen 21.1 8.0 - 23.0 mg/dL    Creatinine 0.85 0.51 - 0.95 mg/dL    GFR Estimate 70 >60 mL/min/1.73m2    Calcium 8.7 (L) 8.8 - 10.4 mg/dL    Glucose 95 70 - 99 mg/dL   CBC with platelets and differential    Collection Time: 02/21/25  5:43 AM   Result Value Ref Range    WBC Count 13.4 (H) 4.0 - 11.0 10e3/uL    RBC Count 2.23 (L) 3.80 - 5.20 10e6/uL    Hemoglobin 7.1 (L) 11.7 - 15.7 g/dL    Hematocrit 21.8 (L) 35.0 - 47.0 %    MCV 98 78 - 100 fL    MCH 31.8 26.5 - 33.0 pg    MCHC 32.6 31.5 - 36.5 g/dL    RDW 16.4 (H) 10.0 - 15.0 %    Platelet Count 157 150 - 450 10e3/uL    % Neutrophils 56 %    % Lymphocytes 32 %    % Monocytes 5 %    % Eosinophils 5 %    % Basophils 0 %    % Immature Granulocytes 1 %    NRBCs per 100 WBC 0 <1 /100    Absolute Neutrophils 7.5 1.6 - 8.3 10e3/uL    Absolute Lymphocytes 4.3 0.8 - 5.3 10e3/uL    Absolute Monocytes 0.7 0.0 - 1.3 10e3/uL    Absolute Eosinophils 0.7 0.0 - 0.7 10e3/uL    Absolute Basophils 0.0 0.0 - 0.2 10e3/uL    Absolute Immature  Granulocytes 0.1 <=0.4 10e3/uL    Absolute NRBCs 0.0 10e3/uL   Adult Type and Screen    Collection Time: 02/21/25  5:43 AM   Result Value Ref Range    SPECIMEN EXPIRATION DATE [id number]        Total time spent for face to face visit, reviewing labs/imaging studies, counseling and coordination of care was: Over 35 min More than 50% of this time was spent on counseling and coordination of care.    Counseling patient.  Reviewing chart.  Discussion of test results/prognosis.    Irvin Cummins MD  Neurologist  [facility]  Tel:- [phone]

## 2025-02-21 NOTE — PROGRESS NOTES
"Orthopedic Progress Note      Assessment: 3 Days Post-Op  S/P Procedure(s):  Closed reduction and cephalomedullary nail right intertrochanteric femur fracture, Closed treatment of minimally displaced right periprosthetic proximal humerus fracture     Plan:   Pain Control: Continue per pain protocol.  Weight Bearing: Weightbearing as tolerated with the assistance of a walker.  DVT Prophylaxis: Will plan for Lovenox starting on postop day 1 given cancer history.  Would recommend discharge on DOAC x 2 weeks pending risk stratification.  GI: Plan for aggressive bowel regimen to prevent constipation from narcotic medications  Lines: HLIV once tolerating PO  PT/OT: Eval and treatment. Will follow up on recommendations.  Follow up:  Please follow-up with Dr. Alston' team in 2 weeks at [facility] for a wound check. Call our scheduling line at [phone] to make an appointment, if you do not already have one scheduled.   Discharge plan: To TCU pending placement and medical stability      Subjective:  Pain: mild  Chest pain, SOB: no  Nausea, Vomiting:  no  Lightheadedness, Dizziness:  no  Fever, chills: no  Neuro:  Patient denies new onset numbness or paresthesias    Patient notes ongoing pain in her right shoulder and right hip at this time.  She feels that she is moving fairly well with use of the one-handed walker.. All questions/concerns answered.     Objective:  BP (!) 141/70 (BP Location: Left arm)   Pulse 84   Temp 98.3  F (36.8  C) (Oral)   Resp 18   Ht 1.702 m (5' 7.01\")   Wt 77.2 kg (170 lb 3.1 oz)   LMP 01/01/1998 (Within Months)   SpO2 97%   BMI 26.65 kg/m    The patient is A&Ox3. Appears comfortable.   Sensation is intact and equal in the bilateral lower extremity  Dorsiflexion and plantar flexion is intact.  Dorsalis pedis pulse intact.  Right lower extremity is warm and well perfused.   Calves are soft and non-tender. Negative Mariana's.  The incision is covered. Right hip dressings are C/D/I " without strikethrough or surrounding erythema.     No drain     Pertinent Labs   Lab Results: personally reviewed.   Lab Results   Component Value Date    INR 1.06 07/19/2024    INR 0.98 05/18/2024    INR 0.99 04/13/2024     Lab Results   Component Value Date    WBC 13.4 (H) 02/21/2025    HGB 7.1 (L) 02/21/2025    HCT 21.8 (L) 02/21/2025    MCV 98 02/21/2025     02/21/2025     02/21/2025     Lab Results   Component Value Date     02/21/2025    CO2 28 02/21/2025         MARVIN ZELAYA PA-C  02/21/2025

## 2025-02-22 LAB
HGB BLD-MCNC: 8.2 G/DL (ref 11.7–15.7)
HGB BLD-MCNC: 8.6 G/DL (ref 11.7–15.7)

## 2025-02-22 PROCEDURE — 250N000011 HC RX IP 250 OP 636: Performed by: STUDENT IN AN ORGANIZED HEALTH CARE EDUCATION/TRAINING PROGRAM

## 2025-02-22 PROCEDURE — 250N000013 HC RX MED GY IP 250 OP 250 PS 637: Performed by: INTERNAL MEDICINE

## 2025-02-22 PROCEDURE — 120N000001 HC R&B MED SURG/OB

## 2025-02-22 PROCEDURE — 99232 SBSQ HOSP IP/OBS MODERATE 35: CPT | Performed by: INTERNAL MEDICINE

## 2025-02-22 PROCEDURE — 85018 HEMOGLOBIN: CPT | Performed by: INTERNAL MEDICINE

## 2025-02-22 PROCEDURE — 250N000009 HC RX 250: Performed by: CLINICAL NURSE SPECIALIST

## 2025-02-22 PROCEDURE — 36415 COLL VENOUS BLD VENIPUNCTURE: CPT | Performed by: INTERNAL MEDICINE

## 2025-02-22 PROCEDURE — 250N000013 HC RX MED GY IP 250 OP 250 PS 637: Performed by: CLINICAL NURSE SPECIALIST

## 2025-02-22 RX ADMIN — LIDOCAINE: 50 OINTMENT TOPICAL at 08:20

## 2025-02-22 RX ADMIN — Medication 1 TABLET: at 08:19

## 2025-02-22 RX ADMIN — LIDOCAINE: 50 OINTMENT TOPICAL at 17:27

## 2025-02-22 RX ADMIN — ACETAMINOPHEN 975 MG: 325 TABLET, FILM COATED ORAL at 08:19

## 2025-02-22 RX ADMIN — ATORVASTATIN CALCIUM 80 MG: 40 TABLET, FILM COATED ORAL at 08:19

## 2025-02-22 RX ADMIN — FOLIC ACID 1000 MCG: 1 TABLET ORAL at 08:19

## 2025-02-22 RX ADMIN — ALLOPURINOL 300 MG: 300 TABLET ORAL at 08:19

## 2025-02-22 RX ADMIN — DULOXETINE HYDROCHLORIDE 20 MG: 20 CAPSULE, DELAYED RELEASE PELLETS ORAL at 08:20

## 2025-02-22 RX ADMIN — LIDOCAINE: 50 OINTMENT TOPICAL at 12:57

## 2025-02-22 RX ADMIN — Medication 500 MG: at 20:58

## 2025-02-22 RX ADMIN — ACETAMINOPHEN 975 MG: 325 TABLET, FILM COATED ORAL at 14:03

## 2025-02-22 RX ADMIN — ENOXAPARIN SODIUM 40 MG: 40 INJECTION SUBCUTANEOUS at 08:19

## 2025-02-22 RX ADMIN — ACETAMINOPHEN 975 MG: 325 TABLET, FILM COATED ORAL at 20:58

## 2025-02-22 RX ADMIN — OXYCODONE HYDROCHLORIDE 5 MG: 5 TABLET ORAL at 00:05

## 2025-02-22 RX ADMIN — ISOSORBIDE MONONITRATE 30 MG: 30 TABLET, EXTENDED RELEASE ORAL at 08:19

## 2025-02-22 RX ADMIN — LIDOCAINE: 50 OINTMENT TOPICAL at 20:58

## 2025-02-22 ASSESSMENT — ACTIVITIES OF DAILY LIVING (ADL)
ADLS_ACUITY_SCORE: 42

## 2025-02-22 NOTE — PLAN OF CARE
Problem: Orthopaedic Fracture  Goal: Absence of Embolism Signs and Symptoms  Outcome: Progressing  Intervention: Prevent or Manage Embolism Risk  Recent Flowsheet Documentation  Taken 2/22/2025 0820 by Carito Bernstein RN  VTE Prevention/Management: SCDs off (sequential compression devices)     Problem: Adult Inpatient Plan of Care  Goal: Optimal Comfort and Wellbeing  Outcome: Progressing     Problem: Chronic Kidney Disease  Goal: Electrolyte Balance  Outcome: Progressing  Intervention: Monitor and Manage Electrolyte Imbalance  Recent Flowsheet Documentation  Taken 2/22/2025 0820 by Carito Bernstein RN  Fluid/Electrolyte Management: fluids provided   Goal Outcome Evaluation:       Denies pain except with movement, is A/O X4, sling on right arm, surg dressing clean and dry, was up to the chair with therapy, voiding with a pure wick, VSS, last hgb 8.2

## 2025-02-22 NOTE — PLAN OF CARE
Problem: Adult Inpatient Plan of Care  Goal: Absence of Hospital-Acquired Illness or Injury  Intervention: Identify and Manage Fall Risk  Recent Flowsheet Documentation  Taken 2/22/2025 0030 by Nina Tan RN  Safety Promotion/Fall Prevention:   activity supervised   assistive device/personal items within reach   nonskid shoes/slippers when out of bed   patient and family education   room near nurse's station   room door open   safety round/check completed  Taken 2/21/2025 2150 by Nina Tan RN  Safety Promotion/Fall Prevention:   activity supervised   assistive device/personal items within reach   nonskid shoes/slippers when out of bed   patient and family education   room near nurse's station   room door open   safety round/check completed  Intervention: Prevent Skin Injury  Recent Flowsheet Documentation  Taken 2/22/2025 0005 by Nina Tan RN  Body Position: refuses positioning  Taken 2/21/2025 2126 by Nina Tan RN  Body Position: refuses positioning  Goal: Optimal Comfort and Wellbeing  Intervention: Monitor Pain and Promote Comfort  Recent Flowsheet Documentation  Taken 2/22/2025 0045 by Nina Tan RN  Pain Management Interventions: pillow support provided  Taken 2/22/2025 0005 by Nina Tan RN  Pain Management Interventions: medication (see MAR)  Taken 2/21/2025 2126 by Nina Tan RN  Pain Management Interventions:   medication (see MAR)   pillow support provided     Problem: Orthopaedic Fracture  Goal: Optimal Functional Ability  Intervention: Optimize Functional Ability  Recent Flowsheet Documentation  Taken 2/22/2025 0005 by Nina Tan RN  Activity Management: activity adjusted per tolerance  Taken 2/21/2025 2126 by Nina Tan RN  Activity Management: activity adjusted per tolerance  Goal: Optimal Pain Control and Function  Intervention: Manage Acute Orthopaedic-Related Pain  Recent Flowsheet Documentation  Taken 2/22/2025 0045 by Nina Tan  RN  Pain Management Interventions: pillow support provided  Taken 2/22/2025 0005 by Nina Tan RN  Pain Management Interventions: medication (see MAR)  Taken 2/21/2025 2126 by Nina Tan RN  Pain Management Interventions:   medication (see MAR)   pillow support provided     Problem: Chronic Kidney Disease  Goal: Optimal Functional Ability  Intervention: Optimize Functional Ability  Recent Flowsheet Documentation  Taken 2/22/2025 0005 by Nina Tan RN  Activity Management: activity adjusted per tolerance  Taken 2/21/2025 2126 by Nina Tan RN  Activity Management: activity adjusted per tolerance     Problem: Comorbidity Management  Goal: Blood Pressure in Desired Range  Intervention: Maintain Blood Pressure Management  Recent Flowsheet Documentation  Taken 2/22/2025 0030 by Nina Tan RN  Medication Review/Management: medications reviewed  Taken 2/21/2025 2150 by Nina Tan RN  Medication Review/Management: medications reviewed     Problem: Wound  Goal: Optimal Functional Ability  Intervention: Optimize Functional Ability  Recent Flowsheet Documentation  Taken 2/22/2025 0005 by Nina Tan RN  Activity Management: activity adjusted per tolerance  Taken 2/21/2025 2126 by Nina Tan RN  Activity Management: activity adjusted per tolerance  Goal: Optimal Pain Control and Function  Intervention: Prevent or Manage Pain  Recent Flowsheet Documentation  Taken 2/22/2025 0045 by Nina Tan RN  Pain Management Interventions: pillow support provided  Taken 2/22/2025 0005 by Nina Tan RN  Pain Management Interventions: medication (see MAR)  Taken 2/21/2025 2126 by Nina Tan RN  Pain Management Interventions:   medication (see MAR)   pillow support provided  Goal: Skin Health and Integrity  Intervention: Optimize Skin Protection  Recent Flowsheet Documentation  Taken 2/22/2025 0005 by Nina Tan RN  Activity Management: activity adjusted per tolerance  Taken  2/21/2025 2126 by Nina Tan RN  Activity Management: activity adjusted per tolerance     Problem: Fall Injury Risk  Goal: Absence of Fall and Fall-Related Injury  Intervention: Identify and Manage Contributors  Recent Flowsheet Documentation  Taken 2/22/2025 0030 by Nina Tan RN  Medication Review/Management: medications reviewed  Taken 2/21/2025 2150 by Nina Tan RN  Medication Review/Management: medications reviewed  Intervention: Promote Injury-Free Environment  Recent Flowsheet Documentation  Taken 2/22/2025 0030 by Nina Tan RN  Safety Promotion/Fall Prevention:   activity supervised   assistive device/personal items within reach   nonskid shoes/slippers when out of bed   patient and family education   room near nurse's station   room door open   safety round/check completed  Taken 2/21/2025 2150 by Nina Tan RN  Safety Promotion/Fall Prevention:   activity supervised   assistive device/personal items within reach   nonskid shoes/slippers when out of bed   patient and family education   room near nurse's station   room door open   safety round/check completed     Problem: Mobility Impairment  Goal: Optimal Mobility  Intervention: Optimize Mobility  Recent Flowsheet Documentation  Taken 2/22/2025 0005 by Nina Tan RN  Activity Management: activity adjusted per tolerance  Taken 2/21/2025 2126 by Nina Tan RN  Activity Management: activity adjusted per tolerance     Problem: Stroke, Ischemic (Includes Transient Ischemic Attack)  Goal: Optimal Functional Ability  Intervention: Optimize Functional Ability  Recent Flowsheet Documentation  Taken 2/22/2025 0005 by Nina Tan RN  Activity Management: activity adjusted per tolerance  Taken 2/21/2025 2126 by Nina Tan RN  Activity Management: activity adjusted per tolerance   Goal Outcome Evaluation:         Problem: Adult Inpatient Plan of Care  Goal: Absence of Hospital-Acquired Illness or  Injury  Intervention: Identify and Manage Fall Risk  Recent Flowsheet Documentation  Taken 2/22/2025 0030 by Nina Tan RN  Safety Promotion/Fall Prevention:   activity supervised   assistive device/personal items within reach   nonskid shoes/slippers when out of bed   patient and family education   room near nurse's station   room door open   safety round/check completed  Taken 2/21/2025 2150 by Nina Tan RN  Safety Promotion/Fall Prevention:   activity supervised   assistive device/personal items within reach   nonskid shoes/slippers when out of bed   patient and family education   room near nurse's station   room door open   safety round/check completed  Intervention: Prevent Skin Injury  Recent Flowsheet Documentation  Taken 2/22/2025 0005 by Nina Tan RN  Body Position: refuses positioning  Taken 2/21/2025 2126 by Nina Tan RN  Body Position: refuses positioning  Goal: Optimal Comfort and Wellbeing  Intervention: Monitor Pain and Promote Comfort  Recent Flowsheet Documentation  Taken 2/22/2025 0045 by Nina Tan RN  Pain Management Interventions: pillow support provided  Taken 2/22/2025 0005 by Nina Tan RN  Pain Management Interventions: medication (see MAR)  Taken 2/21/2025 2126 by Nina Tan RN  Pain Management Interventions:   medication (see MAR)   pillow support provided     Problem: Orthopaedic Fracture  Goal: Optimal Functional Ability  Intervention: Optimize Functional Ability  Recent Flowsheet Documentation  Taken 2/22/2025 0005 by Nina Tan RN  Activity Management: activity adjusted per tolerance  Taken 2/21/2025 2126 by Nina Tan RN  Activity Management: activity adjusted per tolerance  Goal: Optimal Pain Control and Function  Intervention: Manage Acute Orthopaedic-Related Pain  Recent Flowsheet Documentation  Taken 2/22/2025 0045 by Nina Tan RN  Pain Management Interventions: pillow support provided  Taken 2/22/2025 0005 by Dominick  DAYNE Wood  Pain Management Interventions: medication (see MAR)  Taken 2/21/2025 2126 by Nina Tan RN  Pain Management Interventions:   medication (see MAR)   pillow support provided     Problem: Chronic Kidney Disease  Goal: Optimal Functional Ability  Intervention: Optimize Functional Ability  Recent Flowsheet Documentation  Taken 2/22/2025 0005 by Nina Tan RN  Activity Management: activity adjusted per tolerance  Taken 2/21/2025 2126 by Nnia Tan RN  Activity Management: activity adjusted per tolerance     Problem: Comorbidity Management  Goal: Blood Pressure in Desired Range  Intervention: Maintain Blood Pressure Management  Recent Flowsheet Documentation  Taken 2/22/2025 0030 by Nina Tan RN  Medication Review/Management: medications reviewed  Taken 2/21/2025 2150 by Nina Tan RN  Medication Review/Management: medications reviewed     Problem: Wound  Goal: Optimal Functional Ability  Intervention: Optimize Functional Ability  Recent Flowsheet Documentation  Taken 2/22/2025 0005 by Nina Tan RN  Activity Management: activity adjusted per tolerance  Taken 2/21/2025 2126 by Nina Tan RN  Activity Management: activity adjusted per tolerance  Goal: Optimal Pain Control and Function  Intervention: Prevent or Manage Pain  Recent Flowsheet Documentation  Taken 2/22/2025 0045 by Nina Tan RN  Pain Management Interventions: pillow support provided  Taken 2/22/2025 0005 by Nina Tan RN  Pain Management Interventions: medication (see MAR)  Taken 2/21/2025 2126 by Nina Tan RN  Pain Management Interventions:   medication (see MAR)   pillow support provided  Goal: Skin Health and Integrity  Intervention: Optimize Skin Protection  Recent Flowsheet Documentation  Taken 2/22/2025 0005 by Nina Tan RN  Activity Management: activity adjusted per tolerance  Taken 2/21/2025 2126 by Nina Tan RN  Activity Management: activity adjusted per  tolerance     Problem: Fall Injury Risk  Goal: Absence of Fall and Fall-Related Injury  Intervention: Identify and Manage Contributors  Recent Flowsheet Documentation  Taken 2/22/2025 0030 by Nina Tan RN  Medication Review/Management: medications reviewed  Taken 2/21/2025 2150 by Nina Tan RN  Medication Review/Management: medications reviewed  Intervention: Promote Injury-Free Environment  Recent Flowsheet Documentation  Taken 2/22/2025 0030 by Nina Tan RN  Safety Promotion/Fall Prevention:   activity supervised   assistive device/personal items within reach   nonskid shoes/slippers when out of bed   patient and family education   room near nurse's station   room door open   safety round/check completed  Taken 2/21/2025 2150 by Nina Tan RN  Safety Promotion/Fall Prevention:   activity supervised   assistive device/personal items within reach   nonskid shoes/slippers when out of bed   patient and family education   room near nurse's station   room door open   safety round/check completed     Problem: Mobility Impairment  Goal: Optimal Mobility  Intervention: Optimize Mobility  Recent Flowsheet Documentation  Taken 2/22/2025 0005 by Nina Tan RN  Activity Management: activity adjusted per tolerance  Taken 2/21/2025 2126 by Nina Tan RN  Activity Management: activity adjusted per tolerance     Problem: Stroke, Ischemic (Includes Transient Ischemic Attack)  Goal: Optimal Functional Ability  Intervention: Optimize Functional Ability  Recent Flowsheet Documentation  Taken 2/22/2025 0005 by Nina Tan RN  Activity Management: activity adjusted per tolerance  Taken 2/21/2025 2126 by Nina Tan RN  Activity Management: activity adjusted per tolerance     Problem: Surgery Nonspecified  Goal: Anesthesia/Sedation Recovery  Intervention: Optimize Anesthesia Recovery  Recent Flowsheet Documentation  Taken 2/22/2025 0030 by Nina Tan RN  Safety Promotion/Fall  Prevention:   activity supervised   assistive device/personal items within reach   nonskid shoes/slippers when out of bed   patient and family education   room near nurse's station   room door open   safety round/check completed  Taken 2/21/2025 2150 by Nina Tan RN  Safety Promotion/Fall Prevention:   activity supervised   assistive device/personal items within reach   nonskid shoes/slippers when out of bed   patient and family education   room near nurse's station   room door open   safety round/check completed  Goal: Optimal Pain Control and Function  Intervention: Prevent or Manage Pain  Recent Flowsheet Documentation  Taken 2/22/2025 0045 by Nina Tan RN  Pain Management Interventions: pillow support provided  Taken 2/22/2025 0005 by Nina Tan RN  Pain Management Interventions: medication (see MAR)  Taken 2/21/2025 2126 by Nina Tan RN  Pain Management Interventions:   medication (see MAR)   pillow support provided  Goal: Effective Oxygenation and Ventilation  Intervention: Optimize Oxygenation and Ventilation  Recent Flowsheet Documentation  Taken 2/22/2025 0005 by Nina Tan RN  Activity Management: activity adjusted per tolerance  Taken 2/21/2025 2126 by Nina Tan RN  Activity Management: activity adjusted per tolerance     Problem: Anemia  Goal: Anemia Symptom Improvement  Intervention: Monitor and Manage Anemia  Recent Flowsheet Documentation  Taken 2/22/2025 0030 by Nina Tan RN  Safety Promotion/Fall Prevention:   activity supervised   assistive device/personal items within reach   nonskid shoes/slippers when out of bed   patient and family education   room near nurse's station   room door open   safety round/check completed  Taken 2/21/2025 2150 by Nina Tan RN  Safety Promotion/Fall Prevention:   activity supervised   assistive device/personal items within reach   nonskid shoes/slippers when out of bed   patient and family education   room near  nurse's station   room door open   safety round/check completed     Aox4, calm and cooperative. Moderate pain in RUE/RLE, stable and controlled with meds and comfort measures. Pt reports she slept well. CMS intact with baseline neuropathy, dressing clean/dry/intact. Hemoglobin now 8.2.

## 2025-02-22 NOTE — PROGRESS NOTES
"Orthopedic Progress Note      Assessment: 4 Days Post-Op  S/P Procedure(s):  Closed reduction and cephalomedullary nail right intertrochanteric femur fracture, Closed treatment of minimally displaced right periprosthetic proximal humerus fracture     Plan:   Pain Control: Continue per pain protocol.  Weight Bearing: Weightbearing as tolerated with the assistance of a walker.  DVT Prophylaxis: Will plan for Lovenox starting on postop day 1 given cancer history.  Would recommend discharge on DOAC x 2 weeks pending risk stratification.  GI: Plan for aggressive bowel regimen to prevent constipation from narcotic medications  Lines: HLIV once tolerating PO  PT/OT: Eval and treatment. Will follow up on recommendations.  Follow up:  Please follow-up with Dr. Alston' team in 2 weeks at [facility] for a wound check. Call our scheduling line at [phone] to make an appointment, if you do not already have one scheduled.   Discharge plan: To TCU pending placement and medical stability      Subjective:  Reports she had her best night of sleep last night.  She reports overall her pain is improving.  She is mobilizing slowly with the single-handed walker.  She is optimistic about recovery.    Blood transfusion yesterday and feels like that improved her symptoms overall.    Objective:  BP (!) 145/65 (BP Location: Left arm)   Pulse 87   Temp 98.6  F (37  C) (Oral)   Resp 19   Ht 1.702 m (5' 7.01\")   Wt 77.2 kg (170 lb 3.1 oz)   LMP 01/01/1998 (Within Months)   SpO2 95%   BMI 26.65 kg/m    The patient is A&Ox3. Appears comfortable.   Sensation is intact and equal in the bilateral lower extremity  Dorsiflexion and plantar flexion is intact.  Dorsalis pedis pulse intact.  Right lower extremity is warm and well perfused.   Calves are soft and non-tender. Negative Mariana's.  The incision is covered. Right hip dressings are C/D/I without strikethrough or surrounding erythema.     No drain     Pertinent Labs   Lab " Results: personally reviewed.   Lab Results   Component Value Date    INR 1.06 07/19/2024    INR 0.98 05/18/2024    INR 0.99 04/13/2024     Lab Results   Component Value Date    WBC 13.4 (H) 02/21/2025    HGB 8.2 (L) 02/22/2025    HCT 21.8 (L) 02/21/2025    MCV 98 02/21/2025     02/21/2025     02/21/2025     Lab Results   Component Value Date     02/21/2025    CO2 28 02/21/2025         Daniel Dhillon MD  02/22/2025

## 2025-02-22 NOTE — PROGRESS NOTES
[facility]    Medicine Progress Note - Hospitalist Service    Date of Admission:  2/17/2025    Assessment & Plan   Margaret Batista is a 79 year old female with history of coronary artery disease, hyperlipidemia, hypertension, rheumatoid arthritis, stage III CKD, hypertension, prior CVA, suspected renal cell carcinoma, TIA/CVA and monoclonal B-cell type lymphocytosis admitted with right displaced intertrochanteric fracture and mildly displaced periprosthetic fracture of the right proximal humerus.     Orthopedic surgery service performed surgical repair of right intertrochanteric fracture on 2/18/2025.  Repair of right periprosthetic fracture of the proximal humerus was deferred and being conservatively managed with a shoulder sling.  Orthopedic surgery service recommends Lovenox postoperatively given history of cancer during hospital stay with plan to discharge patient on DOAC for 2 weeks pending risk stratification and resumption of aspirin after completing 2 week course of DOAC.    Patient developed word finding difficulty on 2/18/2025 overnight.  Stroke workup including CT angiogram of the head and neck and brain CT were negative for acute pathology.  Reports of EEG and brain MRI was negative for acute pathology. Neurologist signed off.  She was transfused with 1 unit of PRBC due to Acute blood loss anemia.    Right displaced intertrochanteric fracture  Mildly displaced periprosthetic fracture of the right proximal humerus.  Orthopedic surgery service performed surgical repair of right intertrochanteric fracture on 2/18/2025.  Repair of right periprosthetic fracture of the proximal humerus was deferred and being conservatively managed with a shoulder sling.    Analgesics as needed  Postoperative surgical care per orthopedic surgery service  DVT prophylaxis per orthopedic surgery service  PT/OT  Orthopedic surgery [id number] assistance    Suspected UTI  Chronic leukocytosis  "since 2023  Urinalysis is suggestive of UTI.  Urine culture was negative.  Stop IV ceftriaxone-2/20/2025    Acute blood loss anemia  Hemoglobin dropped to 7.1 from baseline of 11.2 postoperatively.  Possibly secondary to acute blood loss from surgery and bleeding prior to admission.  Per , patient had a significant bleed when she fell and was laying in a pool of blood  Transfused with 1 unit of PRBC on 2/21/2025  Monitor hemoglobin and transfuse if hemoglobin drops below 7  Multivitamins      Prior CVA  Will resume aspirin after completing 2 weeks course of DOAC; currently on enoxaparin during hospital stay.  Continue PTA atorvastatin  PT/OT    Stage III CKD  Creatinine is at baseline  Monitor BMP  Avoid nephrotoxin    Suspected renal cell carcinoma  Multiple myeloma   Patient wants to follow up with her primary oncologist  Outpatient follow-up with her primary oncologist    Rheumatoid arthritis  Resume PTA hydroxychloroquine in the next few days  Hold PTA methotrexate for now    Hyperlipidemia  Atorvastatin 80 mg daily    History of CAD  Will resume aspirin after completing 2 weeks course of DOAC; currently on enoxaparin during hospital stay.  Continue PTA atorvastatin  Continue PTA Imdur  Sublingual nitroglycerin as needed      Diet: Regular Diet Adult  Discharge Instruction - Regular Diet Adult  Snacks/Supplements Adult: Ensure Enlive; Between Meals  Snacks/Supplements Adult: Magic Cup; Between Meals    DVT Prophylaxis: Pneumatic Compression Devices  Slaughter Catheter: Not present  Lines: None     Cardiac Monitoring: None  Code Status: No CPR- Do NOT Intubate      Clinically Significant Risk Factors                   # Hypertension: Noted on problem list            # Overweight: Estimated body mass index is 26.65 kg/m  as calculated from the following:    Height as of this encounter: 1.702 m (5' 7.01\").    Weight as of this encounter: 77.2 kg (170 lb 3.1 oz).      # Financial/Environmental Concerns: none     "     Social Drivers of Health    Alcohol Use: Unknown (5/6/2022)    [facility]    AUDIT-C     Frequency of Alcohol Consumption: Monthly or less     Frequency of Binge Drinking: Never   Physical Activity: Insufficiently Active (10/17/2024)    Exercise Vital Sign     Days of Exercise per Week: 2 days     Minutes of Exercise per Session: 20 min   Social Connections: Unknown (10/17/2024)    Social Connection and Isolation Panel [NHANES]     Frequency of Social Gatherings with Friends and Family: Three times a week          Disposition Plan     Medically Ready for Discharge: Possible discharge in 1 to 2 days if hemoglobin remains stable posttransfusion.    Tod William MD  Hospitalist Service  [facility]  Securely message with [employer] (more info)  Text page via [facility] Paging/Directory   ______________________________________________________________________    Interval History   No new complaints today and no acute events overnight. She denies shortness of breath, dizziness, chest pain, or palpitation. Family at bedside-updated about current status and plans.      Physical Exam   Vital Signs: Temp: 97.4  F (36.3  C) Temp src: Oral BP: (!) 155/74 Pulse: 77   Resp: 18 SpO2: 96 % O2 Device: None (Room air)    Weight: 170 lbs 3.12 oz    General appearance: Awake, Alert, Cooperative, not in any obvious distress and appears stated age   HEENT: Normocephalic, atraumatic, conjunctiva clear without icterus and ears without discharge  Lungs: Clear to auscultation bilaterally, no wheezing, good air exchange, normal work of breathing  Cardiovascular: Regular Rate and Rythm, normal apical impulse, normal S1 and S2, no lower extremity edema bilaterally  Abdomen: Soft, non-tender and Non-distended, active bowel sounds  Skin: Left frontal ecchymosis  Musculoskeletal: Right shoulder sling in place. Diminished ROM in the right shoulder and hip  Neurologic: Alert & Oriented X 3,  Facial symmetry preserved and upper & lower extremities moving well with symmetry  Psychiatric: Calm, normal eye contact and normal affect      Medical Decision Making       45 MINUTES SPENT BY ME on the date of service doing chart review, history, exam, documentation & further activities per the note.      Data   Imaging results reviewed over the past 24 hrs:   No results found for this or any previous visit (from the past 24 hours).

## 2025-02-22 NOTE — PROGRESS NOTES
Care Management Follow Up    Length of Stay (days): 5    Expected Discharge Date: 02/24/2025     Concerns to be Addressed: Care progression - discharge planning     Patient plan of care discussed at interdisciplinary rounds: Yes    Anticipated Discharge Disposition:  Therapy rec Transitional care     Anticipated Discharge Services:  Transitional care  Anticipated Discharge DME:  NA    Patient/family educated on Medicare website which has current facility and service quality ratings:  Yes  Education Provided on the Discharge Plan:  Yes per team  Patient/Family in Agreement with the Plan:  Yes    Referrals Placed by CM/SW:  Yes  Private pay costs discussed: transportation costs    Discussed  Partnership in Safe Discharge Planning  document with patient/family: Yes: patient's daughterCheryl    Handoff Completed: No, handoff not indicated or clinically appropriate    Additional Information:  Per provider, Dr. William, patient is ready.    Social Hx:  Assessment: Follow. Patient/spouse currently renting a senior living apartment in [facility] thru the end of March 2025. Had a stroke w/right sided residual weakness last April 2024. Needs assist w/ADLs and dependent w/IADLs. Has walker, cane and NuStep. No community resources.  Last note: 02/22/25  Plan: [facility]ed to [facility]. PAS completed.  Needs: Medical progression  Hand off sent: NA  Transport: [employer]  transport (costs discussed)     Next Steps: RNCM to follow for medical progression, recommendations, and final discharge plan.     Amy Felder RN

## 2025-02-23 ENCOUNTER — APPOINTMENT (OUTPATIENT)
Dept: PHYSICAL THERAPY | Facility: HOSPITAL | Age: 80
DRG: 481 | End: 2025-02-23
Payer: COMMERCIAL

## 2025-02-23 PROCEDURE — 250N000013 HC RX MED GY IP 250 OP 250 PS 637: Performed by: CLINICAL NURSE SPECIALIST

## 2025-02-23 PROCEDURE — 250N000013 HC RX MED GY IP 250 OP 250 PS 637: Performed by: INTERNAL MEDICINE

## 2025-02-23 PROCEDURE — 120N000001 HC R&B MED SURG/OB

## 2025-02-23 PROCEDURE — 99232 SBSQ HOSP IP/OBS MODERATE 35: CPT | Performed by: INTERNAL MEDICINE

## 2025-02-23 PROCEDURE — 97116 GAIT TRAINING THERAPY: CPT | Mod: GP

## 2025-02-23 PROCEDURE — 250N000011 HC RX IP 250 OP 636: Performed by: STUDENT IN AN ORGANIZED HEALTH CARE EDUCATION/TRAINING PROGRAM

## 2025-02-23 PROCEDURE — 97530 THERAPEUTIC ACTIVITIES: CPT | Mod: GP

## 2025-02-23 RX ORDER — ATENOLOL 100 MG/1
100 TABLET ORAL DAILY
Status: DISCONTINUED | OUTPATIENT
Start: 2025-02-23 | End: 2025-02-24 | Stop reason: HOSPADM

## 2025-02-23 RX ADMIN — DULOXETINE HYDROCHLORIDE 20 MG: 20 CAPSULE, DELAYED RELEASE PELLETS ORAL at 08:33

## 2025-02-23 RX ADMIN — ACETAMINOPHEN 975 MG: 325 TABLET, FILM COATED ORAL at 14:00

## 2025-02-23 RX ADMIN — Medication 1 TABLET: at 08:33

## 2025-02-23 RX ADMIN — Medication 500 MG: at 20:32

## 2025-02-23 RX ADMIN — ACETAMINOPHEN 975 MG: 325 TABLET, FILM COATED ORAL at 20:31

## 2025-02-23 RX ADMIN — ATENOLOL 100 MG: 100 TABLET ORAL at 10:27

## 2025-02-23 RX ADMIN — LIDOCAINE: 50 OINTMENT TOPICAL at 13:59

## 2025-02-23 RX ADMIN — LIDOCAINE: 50 OINTMENT TOPICAL at 17:35

## 2025-02-23 RX ADMIN — ALLOPURINOL 300 MG: 300 TABLET ORAL at 08:33

## 2025-02-23 RX ADMIN — ACETAMINOPHEN 975 MG: 325 TABLET, FILM COATED ORAL at 08:33

## 2025-02-23 RX ADMIN — ATORVASTATIN CALCIUM 80 MG: 40 TABLET, FILM COATED ORAL at 08:33

## 2025-02-23 RX ADMIN — ENOXAPARIN SODIUM 40 MG: 40 INJECTION SUBCUTANEOUS at 08:33

## 2025-02-23 RX ADMIN — LIDOCAINE: 50 OINTMENT TOPICAL at 08:33

## 2025-02-23 RX ADMIN — FOLIC ACID 1000 MCG: 1 TABLET ORAL at 08:33

## 2025-02-23 RX ADMIN — ISOSORBIDE MONONITRATE 30 MG: 30 TABLET, EXTENDED RELEASE ORAL at 08:33

## 2025-02-23 RX ADMIN — LIDOCAINE: 50 OINTMENT TOPICAL at 20:32

## 2025-02-23 ASSESSMENT — ACTIVITIES OF DAILY LIVING (ADL)
ADLS_ACUITY_SCORE: 42
ADLS_ACUITY_SCORE: 43
ADLS_ACUITY_SCORE: 42
ADLS_ACUITY_SCORE: 43
ADLS_ACUITY_SCORE: 42

## 2025-02-23 NOTE — PLAN OF CARE
Problem: Mobility Impairment  Goal: Optimal Mobility  Outcome: Progressing  Intervention: Optimize Mobility  Recent Flowsheet Documentation  Taken 2/22/2025 2673 by Jarad Fineny RN  Activity Management: activity adjusted per tolerance  Positioning/Transfer Devices:   pillows   in use   Goal Outcome Evaluation:       Pt is alert and oriented x4. She endorses pain only with movement. Lung sounds crackles on RUL. IS encouraged. CMS intact. Purewick and right arm sling in place. Calm and compliant with cares.

## 2025-02-23 NOTE — PROGRESS NOTES
[facility]    Medicine Progress Note - Hospitalist Service    Date of Admission:  2/17/2025    Assessment & Plan   Margaret Batista is a 79 year old female with history of coronary artery disease, hyperlipidemia, hypertension, rheumatoid arthritis, stage III CKD, hypertension, prior CVA, suspected renal cell carcinoma, TIA/CVA and monoclonal B-cell type lymphocytosis admitted with right displaced intertrochanteric fracture and mildly displaced periprosthetic fracture of the right proximal humerus.     Orthopedic surgery service performed surgical repair of right intertrochanteric fracture on 2/18/2025.  Repair of right periprosthetic fracture of the proximal humerus was deferred and being conservatively managed with a shoulder sling.  Orthopedic surgery service recommends Lovenox postoperatively given history of cancer during hospital stay with plan to discharge patient on DOAC for 2 weeks pending risk stratification and resumption of aspirin after completing 2 week course of DOAC.    Patient developed word finding difficulty on 2/18/2025 overnight.  Stroke workup including CT angiogram of the head and neck and brain CT were negative for acute pathology.  Reports of EEG and brain MRI was negative for acute pathology. Neurologist signed off.  She was transfused with 1 unit of PRBC due to acute blood loss anemia.  Scheduled for discharge to TCU on Monday, 2/24/2025    Right displaced intertrochanteric fracture  Mildly displaced periprosthetic fracture of the right proximal humerus.  Orthopedic surgery service performed surgical repair of right intertrochanteric fracture on 2/18/2025.  Repair of right periprosthetic fracture of the proximal humerus was deferred and being conservatively managed with a shoulder sling.    Analgesics as needed  Postoperative surgical care per orthopedic surgery service  DVT prophylaxis per orthopedic surgery service  PT/OT  Orthopedic surgery [id number]  "assistance    Suspected UTI  Chronic leukocytosis since 2023  Urinalysis is suggestive of UTI.  Urine culture was negative.  Stop IV ceftriaxone-2/20/2025    Acute blood loss anemia  Hemoglobin dropped to 7.1 from baseline of 11.2 postoperatively.  Possibly secondary to acute blood loss from surgery and bleeding prior to admission.  Per , patient had a significant bleed when she fell and was laying in a pool of blood  Transfused with 1 unit of PRBC on 2/21/2025  Monitor hemoglobin and transfuse if hemoglobin drops below 7  Multivitamins      Prior CVA  Will resume aspirin after completing 2 weeks course of DOAC; currently on enoxaparin during hospital stay.  Continue PTA atorvastatin  PT/OT    Stage III CKD  Creatinine is at baseline  Monitor BMP  Avoid nephrotoxin    Suspected renal cell carcinoma  Multiple myeloma   Patient wants to follow up with her primary oncologist  Outpatient follow-up with her primary oncologist    Rheumatoid arthritis  Resume PTA hydroxychloroquine in the next few days  Hold PTA methotrexate for now    Hyperlipidemia  Atorvastatin 80 mg daily    History of CAD  Will resume aspirin after completing 2 weeks course of DOAC; currently on enoxaparin during hospital stay.  Continue PTA atorvastatin  Continue PTA Imdur  Sublingual nitroglycerin as needed      Diet: Regular Diet Adult  Discharge Instruction - Regular Diet Adult  Snacks/Supplements Adult: Ensure Enlive; Between Meals  Snacks/Supplements Adult: Magic Cup; Between Meals    DVT Prophylaxis: Pneumatic Compression Devices  Slaughter Catheter: Not present  Lines: None     Cardiac Monitoring: None  Code Status: No CPR- Do NOT Intubate      Clinically Significant Risk Factors                   # Hypertension: Noted on problem list            # Overweight: Estimated body mass index is 26.65 kg/m  as calculated from the following:    Height as of this encounter: 1.702 m (5' 7.01\").    Weight as of this encounter: 77.2 kg (170 lb 3.1 oz).  "     # Financial/Environmental Concerns: none         Social Drivers of Health    Alcohol Use: Unknown (5/6/2022)    [facility]    AUDIT-C     Frequency of Alcohol Consumption: Monthly or less     Frequency of Binge Drinking: Never   Physical Activity: Insufficiently Active (10/17/2024)    Exercise Vital Sign     Days of Exercise per Week: 2 days     Minutes of Exercise per Session: 20 min   Social Connections: Unknown (10/17/2024)    Social Connection and Isolation Panel [NHANES]     Frequency of Social Gatherings with Friends and Family: Three times a week          Disposition Plan     Medically Ready for Discharge: Possible discharge in 1 to 2 days if hemoglobin remains stable posttransfusion.    Tod William MD  Hospitalist Service  [facility]  Securely message with [email or web address] (more info)  Text page via [facility] Paging/Directory   ______________________________________________________________________    Interval History   No new complaints today and no acute events overnight. She denies shortness of breath, dizziness, chest pain, or palpitation. Family at bedside-updated about current status and plans.  Scheduled for discharge on Monday, 2/24/2025.    Physical Exam   Vital Signs: Temp: 98  F (36.7  C) Temp src: Oral BP: 122/59 Pulse: 73   Resp: 20 SpO2: 98 % O2 Device: None (Room air)    Weight: 170 lbs 3.12 oz    General appearance: Awake, Alert, Cooperative, not in any obvious distress and appears stated age   HEENT: Normocephalic, atraumatic, conjunctiva clear without icterus and ears without discharge  Lungs: Clear to auscultation bilaterally, no wheezing, good air exchange, normal work of breathing  Cardiovascular: Regular Rate and Rythm, normal apical impulse, normal S1 and S2, no lower extremity edema bilaterally  Abdomen: Soft, non-tender and Non-distended, active bowel sounds  Skin: Left frontal ecchymosis  Musculoskeletal: Right shoulder sling in  place. Diminished ROM in the right shoulder and hip  Neurologic: Alert & Oriented X 3, Facial symmetry preserved and upper & lower extremities moving well with symmetry  Psychiatric: Calm, normal eye contact and normal affect      Medical Decision Making       45 MINUTES SPENT BY ME on the date of service doing chart review, history, exam, documentation & further activities per the note.      Data   Imaging results reviewed over the past 24 hrs:   No results found for this or any previous visit (from the past 24 hours).

## 2025-02-23 NOTE — PLAN OF CARE
Problem: Orthopaedic Fracture  Goal: Absence of Embolism Signs and Symptoms  Outcome: Progressing  Intervention: Prevent or Manage Embolism Risk  Recent Flowsheet Documentation  Taken 2/23/2025 0834 by Carito Bernstein RN  VTE Prevention/Management: SCDs off (sequential compression devices)     Problem: Chronic Kidney Disease  Goal: Electrolyte Balance  Outcome: Progressing  Intervention: Monitor and Manage Electrolyte Imbalance  Recent Flowsheet Documentation  Taken 2/23/2025 0834 by Carito Bernstein RN  Fluid/Electrolyte Management: fluids provided     Problem: Wound  Goal: Optimal Coping  Outcome: Progressing     Problem: Mobility Impairment  Goal: Optimal Mobility  Outcome: Progressing  Intervention: Optimize Mobility  Recent Flowsheet Documentation  Taken 2/23/2025 1314 by Carito Bernstein RN  Positioning/Transfer Devices:   pillows   in use  Taken 2/23/2025 1047 by Carito Bernstein RN  Positioning/Transfer Devices:   in use   pillows  Taken 2/23/2025 0834 by Carito Bernstein RN  Activity Management: activity adjusted per tolerance  Positioning/Transfer Devices: pillows     Problem: Anemia  Goal: Anemia Symptom Improvement  Outcome: Progressing  Intervention: Monitor and Manage Anemia  Recent Flowsheet Documentation  Taken 2/23/2025 0834 by Carito Bernstein RN  Safety Promotion/Fall Prevention: activity supervised   Goal Outcome Evaluation:       Pt is A/O, denies pain, worked with therapy and got up to the chair for a few hours, dressing on right thigh is dry and intact, sling on right shoulder, skin with no breakdown or redness, voiding fine

## 2025-02-23 NOTE — PROGRESS NOTES
ORTHOPEDIC L/E PROGRESS NOTE  Procedure(s):  Closed reduction and cephalomedullary nail right intertrochanteric femur fracture, on 2/17/2025 - 2/18/2025.   5 Days Post-Op    PAIN: moderate  NAUSEA: mild  Patient has not yet been up and out of bed today.  She states that yesterday she did not see the physical therapist.  Temp:  [98  F (36.7  C)-98.9  F (37.2  C)] 98.9  F (37.2  C)  Pulse:  [83-94] 83  Resp:  [17-20] 17  BP: (132-174)/(60-83) 174/83  SpO2:  [92 %-99 %] 99 %    Denies chest pain and shortness of breath    Lab Results   Component Value Date    HGB 8.6 (L) 02/22/2025    HGB 8.2 (L) 02/22/2025    HGB 7.1 (L) 02/21/2025    INR 1.06 07/19/2024    INR 0.98 05/18/2024    INR 0.99 04/13/2024       EXAM   The patient is A&Ox3.  Calves are soft and non-tender.   Sensation is intact.  Dorsiflexion and plantar flexion is intact.  The incision is covered. Dressing C/D/I    Right upper extremity is in a sling.  No tenderness to palpation over the wrist or elbow.  She is tender over the proximal humerus.  She has intact  strength and radial pulses palpable.  Intact sensation in the right upper extremity.    ASSESSMENT  POD #5 s/p Procedure(s):  Closed reduction and cephalomedullary nail right intertrochanteric femur fracture,    PLAN  Anticoagulation addressed Lovenox currently would recommend DOAC for 2 weeks on discharge.  Continue with PT, OT.  Plan for Discharge discharge to [facility].  Follow-up 2 weeks with Dr. Alston.      Shannon Blanca MD  [facility]

## 2025-02-23 NOTE — PLAN OF CARE
Problem: Adult Inpatient Plan of Care  Goal: Optimal Comfort and Wellbeing  Outcome: Progressing  Intervention: Monitor Pain and Promote Comfort  Recent Flowsheet Documentation  Taken 2/23/2025 0030 by Farshad Mann RN  Pain Management Interventions: cold applied     Problem: Orthopaedic Fracture  Goal: Optimal Pain Control and Function  Outcome: Progressing  Intervention: Manage Acute Orthopaedic-Related Pain  Recent Flowsheet Documentation  Taken 2/23/2025 0030 by Farshad Mann RN  Pain Management Interventions: cold applied     Problem: Comorbidity Management  Goal: Blood Pressure in Desired Range  Outcome: Progressing  Intervention: Maintain Blood Pressure Management  Recent Flowsheet Documentation  Taken 2/23/2025 0030 by Farshad Mann RN  Medication Review/Management: medications reviewed     Problem: Mobility Impairment  Goal: Optimal Mobility  Outcome: Progressing  Intervention: Optimize Mobility  Recent Flowsheet Documentation  Taken 2/23/2025 0030 by Farshad Mann RN  Activity Management: activity adjusted per tolerance  Positioning/Transfer Devices:   pillows   in use   Goal Outcome Evaluation:       A&O x 4. Ambulates 1x assist w/walker. Admitted for right upper arm pain. Pt POD 5 right ORIF. PIV access left lower forearm SL. Purewick in place with moderate output. VSS on RA. Pt reporting slight pain controlled with ice packs. Bed in low position, call light within reach. Non-slip footwear in use. Patient calls appropriately.   Visit Vitals  /65 (BP Location: Left arm)   Pulse 87   Temp 98  F (36.7  C) (Oral)   Resp 19

## 2025-02-24 ENCOUNTER — APPOINTMENT (OUTPATIENT)
Dept: OCCUPATIONAL THERAPY | Facility: HOSPITAL | Age: 80
DRG: 481 | End: 2025-02-24
Payer: COMMERCIAL

## 2025-02-24 VITALS
BODY MASS INDEX: 26.71 KG/M2 | TEMPERATURE: 98.8 F | RESPIRATION RATE: 20 BRPM | HEIGHT: 67 IN | HEART RATE: 83 BPM | OXYGEN SATURATION: 98 % | SYSTOLIC BLOOD PRESSURE: 180 MMHG | DIASTOLIC BLOOD PRESSURE: 78 MMHG | WEIGHT: 170.19 LBS

## 2025-02-24 LAB
BACTERIA BLD CULT: NO GROWTH
BACTERIA BLD CULT: NO GROWTH
CREAT SERPL-MCNC: 0.79 MG/DL (ref 0.51–0.95)
EGFRCR SERPLBLD CKD-EPI 2021: 76 ML/MIN/1.73M2
PLATELET # BLD AUTO: 213 10E3/UL (ref 150–450)

## 2025-02-24 PROCEDURE — 82565 ASSAY OF CREATININE: CPT | Performed by: INTERNAL MEDICINE

## 2025-02-24 PROCEDURE — 85049 AUTOMATED PLATELET COUNT: CPT | Performed by: INTERNAL MEDICINE

## 2025-02-24 PROCEDURE — 36415 COLL VENOUS BLD VENIPUNCTURE: CPT | Performed by: INTERNAL MEDICINE

## 2025-02-24 PROCEDURE — 250N000011 HC RX IP 250 OP 636: Performed by: STUDENT IN AN ORGANIZED HEALTH CARE EDUCATION/TRAINING PROGRAM

## 2025-02-24 PROCEDURE — 97535 SELF CARE MNGMENT TRAINING: CPT | Mod: GO

## 2025-02-24 PROCEDURE — 250N000013 HC RX MED GY IP 250 OP 250 PS 637: Performed by: CLINICAL NURSE SPECIALIST

## 2025-02-24 PROCEDURE — 99239 HOSP IP/OBS DSCHRG MGMT >30: CPT | Performed by: HOSPITALIST

## 2025-02-24 PROCEDURE — 99231 SBSQ HOSP IP/OBS SF/LOW 25: CPT | Performed by: PHYSICIAN ASSISTANT

## 2025-02-24 PROCEDURE — 250N000013 HC RX MED GY IP 250 OP 250 PS 637: Performed by: INTERNAL MEDICINE

## 2025-02-24 RX ADMIN — ACETAMINOPHEN 975 MG: 325 TABLET, FILM COATED ORAL at 08:36

## 2025-02-24 RX ADMIN — ATORVASTATIN CALCIUM 80 MG: 40 TABLET, FILM COATED ORAL at 08:37

## 2025-02-24 RX ADMIN — Medication 1 TABLET: at 08:36

## 2025-02-24 RX ADMIN — DULOXETINE HYDROCHLORIDE 20 MG: 20 CAPSULE, DELAYED RELEASE PELLETS ORAL at 08:36

## 2025-02-24 RX ADMIN — ATENOLOL 100 MG: 100 TABLET ORAL at 08:35

## 2025-02-24 RX ADMIN — ENOXAPARIN SODIUM 40 MG: 40 INJECTION SUBCUTANEOUS at 08:35

## 2025-02-24 RX ADMIN — LIDOCAINE: 50 OINTMENT TOPICAL at 08:37

## 2025-02-24 RX ADMIN — ALLOPURINOL 300 MG: 300 TABLET ORAL at 08:36

## 2025-02-24 RX ADMIN — FOLIC ACID 1000 MCG: 1 TABLET ORAL at 08:37

## 2025-02-24 RX ADMIN — ISOSORBIDE MONONITRATE 30 MG: 30 TABLET, EXTENDED RELEASE ORAL at 08:37

## 2025-02-24 ASSESSMENT — ACTIVITIES OF DAILY LIVING (ADL)
ADLS_ACUITY_SCORE: 46
ADLS_ACUITY_SCORE: 42
ADLS_ACUITY_SCORE: 46
ADLS_ACUITY_SCORE: 42
ADLS_ACUITY_SCORE: 42
ADLS_ACUITY_SCORE: 46
ADLS_ACUITY_SCORE: 42
ADLS_ACUITY_SCORE: 46

## 2025-02-24 NOTE — PROGRESS NOTES
SPIRITUAL HEALTH SERVICES Progress Note    Saw pt Margaret Batista and offered Holiness sacrament of anointing for the healing of the sick.     Fr. Brian Vaughan

## 2025-02-24 NOTE — PLAN OF CARE
Physical Therapy Discharge Summary    Reason for therapy discharge:    Discharged to transitional care facility.    Progress towards therapy goal(s). See goals on Care Plan in [facility] electronic health record for goal details.  Goals partially met.  Barriers to achieving goals:   discharge from facility.    Therapy recommendation(s):    Continued therapy is recommended.  Rationale/Recommendations:  PT at TCU to improve functional mobility and decrease fall risk.

## 2025-02-24 NOTE — PLAN OF CARE
"  Problem: Adult Inpatient Plan of Care  Goal: Absence of Hospital-Acquired Illness or Injury  Outcome: Progressing  Intervention: Identify and Manage Fall Risk  Recent Flowsheet Documentation  Taken 2/23/2025 2330 by Ray Avina RN  Safety Promotion/Fall Prevention:   activity supervised   assistive device/personal items within reach  Intervention: Prevent Skin Injury  Recent Flowsheet Documentation  Taken 2/24/2025 0314 by Ray Avina RN  Body Position: refuses positioning  Taken 2/24/2025 0114 by Ray Avina RN  Body Position: refuses positioning  Taken 2/23/2025 2330 by Ray Avina RN  Body Position: supine, head elevated     Problem: Orthopaedic Fracture  Goal: Absence of Embolism Signs and Symptoms  Outcome: Progressing   Goal Outcome Evaluation:  BP (!) 142/66 (BP Location: Left arm)   Pulse 72   Temp 99.1  F (37.3  C) (Oral)   Resp 19   Ht 1.702 m (5' 7.01\")   Wt 77.2 kg (170 lb 3.1 oz)   LMP 01/01/1998 (Within Months)   SpO2 95%   BMI 26.65 kg/m    A&Ox4, reports minimal to no pain, no ice packs this shift, no PRNs. Refusing repositions, did allow writer to boost for comfort. RUE sling in place. RLE mepilex dressings DCI. Purewick in place. LPIV-SL patent. Regular diet. Ax1 walker, not up this shift. Will continue with plan of care.                      "

## 2025-02-24 NOTE — PLAN OF CARE
Problem: Adult Inpatient Plan of Care  Goal: Plan of Care Review  Description: The Plan of Care Review/Shift note should be completed every shift.  The Outcome Evaluation is a brief statement about your assessment that the patient is improving, declining, or no change.  This information will be displayed automatically on your shift  note.  Outcome: Progressing  Goal: Absence of Hospital-Acquired Illness or Injury  Outcome: Progressing  Intervention: Identify and Manage Fall Risk  Recent Flowsheet Documentation  Taken 2/23/2025 1545 by Daniela Ramos RN  Safety Promotion/Fall Prevention:   activity supervised   assistive device/personal items within reach  Intervention: Prevent Skin Injury  Recent Flowsheet Documentation  Taken 2/23/2025 1914 by Daniela Ramos RN  Body Position: refuses positioning  Taken 2/23/2025 1714 by Daniela Ramos RN  Body Position: position maintained  Taken 2/23/2025 1514 by Daniela Ramos RN  Body Position:   turned   supine  Intervention: Prevent and Manage VTE (Venous Thromboembolism) Risk  Recent Flowsheet Documentation  Taken 2/23/2025 1545 by Daniela Ramos RN  VTE Prevention/Management: SCDs on (sequential compression devices)  Intervention: Prevent Infection  Recent Flowsheet Documentation  Taken 2/23/2025 1545 by Daniela Ramos RN  Infection Prevention:   rest/sleep promoted   hand hygiene promoted  Goal: Optimal Comfort and Wellbeing  Outcome: Progressing  Intervention: Monitor Pain and Promote Comfort  Recent Flowsheet Documentation  Taken 2/23/2025 1514 by Daniela Ramos RN  Pain Management Interventions:   pillow support provided   quiet environment facilitated   repositioned   cold applied  Goal: Readiness for Transition of Care  Outcome: Progressing     Problem: Orthopaedic Fracture  Goal: Absence of Embolism Signs and Symptoms  Outcome: Progressing  Intervention: Prevent or Manage Embolism Risk  Recent Flowsheet Documentation  Taken 2/23/2025 1545 by Daniela Ramos RN  VTE Prevention/Management: SCDs on  (sequential compression devices)  Goal: Fracture Stability  Outcome: Progressing  Goal: Optimal Functional Ability  Outcome: Progressing  Intervention: Optimize Functional Ability  Recent Flowsheet Documentation  Taken 2/23/2025 1914 by Daniela Ramos RN  Positioning/Transfer Devices:   pillows   in use  Taken 2/23/2025 1714 by Daniela Ramos RN  Positioning/Transfer Devices:   pillows   in use  Taken 2/23/2025 1545 by Daniela Ramos RN  Activity Management: activity adjusted per tolerance  Taken 2/23/2025 1514 by Daniela Ramos RN  Activity Management: activity adjusted per tolerance  Positioning/Transfer Devices:   pillows   in use  Goal: Optimal Pain Control and Function  Outcome: Progressing  Intervention: Manage Acute Orthopaedic-Related Pain  Recent Flowsheet Documentation  Taken 2/23/2025 1514 by Daniela Ramos RN  Pain Management Interventions:   pillow support provided   quiet environment facilitated   repositioned   cold applied     Problem: Chronic Kidney Disease  Goal: Electrolyte Balance  Outcome: Progressing  Intervention: Monitor and Manage Electrolyte Imbalance  Recent Flowsheet Documentation  Taken 2/23/2025 1545 by Daniela Ramos RN  Fluid/Electrolyte Management: fluids provided  Goal: Fluid Balance  Outcome: Progressing  Intervention: Monitor and Manage Hypervolemia  Recent Flowsheet Documentation  Taken 2/23/2025 1545 by Daniela Ramos RN  Fluid/Electrolyte Management: fluids provided  Goal: Optimal Functional Ability  Outcome: Progressing  Intervention: Optimize Functional Ability  Recent Flowsheet Documentation  Taken 2/23/2025 1545 by Daniela Ramos RN  Activity Management: activity adjusted per tolerance  Environment Familiarity/Consistency: daily routine followed  Taken 2/23/2025 1514 by Daniela Ramos RN  Activity Management: activity adjusted per tolerance  Goal: Absence of Anemia Signs and Symptoms  Outcome: Progressing  Intervention: Manage Signs of Anemia and Bleeding  Recent Flowsheet Documentation  Taken 2/23/2025 1545  by Daniela Ramos RN  Environmental Support: calm environment promoted     Problem: Comorbidity Management  Goal: Blood Pressure in Desired Range  Outcome: Progressing  Intervention: Maintain Blood Pressure Management  Recent Flowsheet Documentation  Taken 2/23/2025 1545 by Daniela Ramos RN  Medication Review/Management: medications reviewed     Problem: Wound  Goal: Optimal Coping  Outcome: Progressing  Intervention: Support Patient and Family Response  Recent Flowsheet Documentation  Taken 2/23/2025 1545 by Daneila Ramos RN  Family/Support System Care: (spouse at bedside) --  Goal: Optimal Functional Ability  Outcome: Progressing  Intervention: Optimize Functional Ability  Recent Flowsheet Documentation  Taken 2/23/2025 1545 by Daniela Ramos RN  Activity Management: activity adjusted per tolerance  Taken 2/23/2025 1514 by Daniela Ramos RN  Activity Management: activity adjusted per tolerance  Activity Assistance Provided: assistance, 2 people  Goal: Absence of Infection Signs and Symptoms  Outcome: Progressing  Goal: Improved Oral Intake  Outcome: Progressing  Goal: Optimal Pain Control and Function  Outcome: Progressing  Intervention: Prevent or Manage Pain  Recent Flowsheet Documentation  Taken 2/23/2025 1514 by Daniela Ramos RN  Pain Management Interventions:   pillow support provided   quiet environment facilitated   repositioned   cold applied  Goal: Skin Health and Integrity  Outcome: Progressing  Intervention: Optimize Skin Protection  Recent Flowsheet Documentation  Taken 2/23/2025 1914 by Daniela Ramos RN  Head of Bed (HOB) Positioning: HOB at 30-45 degrees  Taken 2/23/2025 1714 by Daniela Ramos RN  Head of Bed (HOB) Positioning: HOB at 30-45 degrees  Taken 2/23/2025 1545 by Daniela Ramos RN  Activity Management: activity adjusted per tolerance  Taken 2/23/2025 1514 by Daniela Ramos RN  Activity Management: activity adjusted per tolerance  Head of Bed (HOB) Positioning: HOB at 20-30 degrees  Goal: Optimal Wound Healing  Outcome:  Progressing     Problem: Fall Injury Risk  Goal: Absence of Fall and Fall-Related Injury  Outcome: Progressing  Intervention: Identify and Manage Contributors  Recent Flowsheet Documentation  Taken 2/23/2025 1545 by Daniela Ramos RN  Medication Review/Management: medications reviewed  Intervention: Promote Injury-Free Environment  Recent Flowsheet Documentation  Taken 2/23/2025 1545 by Daniela Ramos RN  Safety Promotion/Fall Prevention:   activity supervised   assistive device/personal items within reach     Problem: Mobility Impairment  Goal: Optimal Mobility  Outcome: Progressing  Intervention: Optimize Mobility  Recent Flowsheet Documentation  Taken 2/23/2025 1914 by Daniela Ramos RN  Positioning/Transfer Devices:   pillows   in use  Taken 2/23/2025 1714 by Daniela Ramos RN  Positioning/Transfer Devices:   pillows   in use  Taken 2/23/2025 1545 by Daniela Ramos RN  Activity Management: activity adjusted per tolerance  Taken 2/23/2025 1514 by Daniela Ramos RN  Activity Management: activity adjusted per tolerance  Positioning/Transfer Devices:   pillows   in use     Problem: Stroke, Ischemic (Includes Transient Ischemic Attack)  Goal: Optimal Cognitive Function  Outcome: Progressing  Intervention: Optimize Cognitive Function  Recent Flowsheet Documentation  Taken 2/23/2025 1545 by Daniela Ramos RN  Sensory Stimulation Regulation: television on  Reorientation Measures: clock in view  Environment Familiarity/Consistency: daily routine followed  Goal: Optimal Functional Ability  Outcome: Progressing  Intervention: Optimize Functional Ability  Recent Flowsheet Documentation  Taken 2/23/2025 1545 by Daniela Ramos RN  Activity Management: activity adjusted per tolerance  Taken 2/23/2025 1514 by Daniela Ramos RN  Activity Management: activity adjusted per tolerance  Goal: Safe and Effective Swallow  Outcome: Progressing     Problem: Surgery Nonspecified  Goal: Effective Bowel Elimination  Outcome: Progressing  Goal: Optimal Pain Control and  Function  Outcome: Progressing  Intervention: Prevent or Manage Pain  Recent Flowsheet Documentation  Taken 2/23/2025 1514 by Daniela Ramos RN  Pain Management Interventions:   pillow support provided   quiet environment facilitated   repositioned   cold applied  Goal: Nausea and Vomiting Relief  Outcome: Progressing  Goal: Effective Urinary Elimination  Outcome: Progressing  Goal: Effective Oxygenation and Ventilation  Outcome: Progressing  Intervention: Optimize Oxygenation and Ventilation  Recent Flowsheet Documentation  Taken 2/23/2025 1914 by Daniela Ramos RN  Head of Bed (HOB) Positioning: HOB at 30-45 degrees  Taken 2/23/2025 1714 by Daniela Ramos RN  Head of Bed (HOB) Positioning: HOB at 30-45 degrees  Taken 2/23/2025 1545 by Daniela Ramos RN  Cough And Deep Breathing: done independently per patient  Activity Management: activity adjusted per tolerance  Taken 2/23/2025 1514 by Daniela Ramos RN  Activity Management: activity adjusted per tolerance  Head of Bed (HOB) Positioning: HOB at 20-30 degrees     Goal Outcome Evaluation:       Pt alert and oriented. Pt is able to make needs known. Pt denies pain this shift. Scheduled meds given. Pt compliant. Pt voiding with purewic in place. Ice pack applied to RLE. Sling on right arm.       Daniela Ramos RN

## 2025-02-24 NOTE — PLAN OF CARE
Occupational Therapy Discharge Summary    Reason for therapy discharge:    Discharged to transitional care facility.    Progress towards therapy goal(s). See goals on Care Plan in [facility] electronic health record for goal details.  Goals partially met.  Barriers to achieving goals:   discharge from facility.    Therapy recommendation(s):    Continued therapy is recommended.  Rationale/Recommendations:  to improve ADL independence.

## 2025-02-24 NOTE — PROGRESS NOTES
Care Management Discharge Note    Discharge Date: 02/24/2025       Discharge Disposition:      Discharge Services:  to [facility]    Discharge DME:  n/a    Discharge Transportation: other (see comments) ([facility] transport)    Private pay costs discussed: private room/amenity fees and transportation costs    Does the patient's insurance plan have a 3 day qualifying hospital stay waiver?  No    PAS Confirmation Code: [id number]  Patient/family educated on Medicare website which has current facility and service quality ratings:      Education Provided on the Discharge Plan:    Persons Notified of Discharge Plans: TCU, family   Patient/Family in Agreement with the Plan:      Handoff Referral Completed: No, handoff not indicated or clinically appropriate    Additional Information:    Patient stable and ready to discharge to TCU today.  Per family/patient request, transportation was arranged/scheduled,  today 1p - 1:45p.  Updated [facility], faxed discharge orders as well.    DU MathisW

## 2025-02-24 NOTE — DISCHARGE SUMMARY
[facility] MEDICINE  DISCHARGE SUMMARY     Primary Care Physician: Swetha Maxwell  Admission Date: 2/17/2025   Discharge Provider: Justin Palmer MD Discharge Date: 2/24/2025   Diet:   Active Diet and Nourishment Order   Procedures    Snacks/Supplements Adult: Ensure Enlive; Between Meals    Snacks/Supplements Adult: Magic Cup; Between Meals    Regular Diet Adult    Discharge Instruction - Regular Diet Adult    Diet       Code Status: No CPR- Do NOT Intubate   Activity: DCACTIVITY: Activity as tolerated        Condition at Discharge: Stable     REASON FOR PRESENTATION(See Admission Note for Details)     Fall    PRINCIPAL & ACTIVE DISCHARGE DIAGNOSES     Right displaced intertrochanteric fracture  Mildly displaced periprosthetic fracture of the right proximal humerus.    PENDING LABS     Unresulted Labs Ordered in the Past 30 Days of this Admission       Date and Time Order Name Status Description    2/19/2025  7:34 AM Blood Culture Peripheral Blood Preliminary     2/19/2025  7:34 AM Blood Culture Peripheral Blood Preliminary               PROCEDURES ( this hospitalization only)      Procedure(s):  Closed reduction and cephalomedullary nail right intertrochanteric femur fracture,    RECOMMENDATIONS TO OUTPATIENT PROVIDER FOR F/U VISIT     Follow-up Appointments       Follow Up Care      Please follow-up with Dr. Alston' team in 2 weeks at [facility]. Call our scheduling line at [phone] to make an appointment, if you do not already have one scheduled.        Follow Up and recommended labs and tests      Follow up with Nursing home physician.                  DISPOSITION     TCU    SUMMARY OF HOSPITAL COURSE:      79F with CAD, HLD, HTN, RA, CKD3, prior CVA, suspected renal cell carcinoma, TIA/CVA and monoclonal B-cell type lymphocytosis admitted with right displaced intertrochanteric fracture and mildly displaced periprosthetic fracture of the right proximal  humerus.      Orthopedic surgery service performed surgical repair of right intertrochanteric fracture on 2/18/2025.  Repair of right periprosthetic fracture of the proximal humerus was deferred and being conservatively managed with a shoulder sling.  Orthopedic surgery service recommends Lovenox postoperatively given history of cancer during hospital stay with plan to discharge patient on DOAC for 2 weeks pending risk stratification and resumption of aspirin after completing 2 week course of DOAC.     Patient developed word finding difficulty on 2/18/2025 overnight.  Stroke workup including CT angiogram of the head and neck and brain CT were negative for acute pathology.  Reports of EEG and brain MRI was negative for acute pathology. Neurologist signed off.  She was transfused with 1 unit of PRBC due to acute blood loss anemia.  Discharged to TCU for ongoing care 02/24.     #Right displaced intertrochanteric fracture - s/p IMN nail.    -Eliquis 2.5mg BID x 14 days, then resume aspirin 81mg daily for DVT px.    #Mildly displaced periprosthetic fracture of the right proximal humerus.  Orthopedic surgery service performed surgical repair of right intertrochanteric fracture on 2/18/2025.  Repair of right periprosthetic fracture of the proximal humerus was deferred and being conservatively managed with a shoulder sling.     Chronic leukocytosis since 2023  Urinalysis is suggestive of UTI.  Urine culture was negative.  Stop IV ceftriaxone-2/20/2025     Acute blood loss anemia  Hemoglobin dropped to 7.1 from baseline of 11.2 postoperatively.  Possibly secondary to acute blood loss from surgery and bleeding prior to admission.  Per , patient had a significant bleed when she fell and was laying in a pool of blood  Transfused with 1 unit of PRBC on 2/21/2025  Monitor hemoglobin and transfuse if hemoglobin drops below 7  Multivitamins    Prior CVA  Will resume aspirin after completing 2 weeks course of DOAC  Continue  PTA atorvastatin     Stage III CKD - baseline     Suspected renal cell carcinoma  Multiple myeloma   Patient wants to follow up with her primary oncologist  Outpatient follow-up with her primary oncologist     Rheumatoid arthritis  Resume PTA hydroxychloroquine and methotrexate     Hyperlipidemia Atorvastatin 80 mg daily     History of CAD  Will resume aspirin after completing 2 weeks course of DOAC; currently on enoxaparin during hospital stay.  Continue PTA atorvastatin  Continue PTA Imdur  Sublingual nitroglycerin as needed      Discharge Medications with Med changes:     Current Discharge Medication List        START taking these medications    Details   apixaban ANTICOAGULANT (ELIQUIS) 2.5 MG tablet Take 1 tablet (2.5 mg) by mouth 2 times daily for 14 days.  Qty: 28 tablet, Refills: 0    Associated Diagnoses: Hip fracture, right, closed, initial encounter (H)      oxyCODONE (ROXICODONE) 5 MG tablet Take 1 tablet (5 mg) by mouth every 4 hours as needed for severe pain.  Qty: 10 tablet, Refills: 0    Associated Diagnoses: Hip fracture, right, closed, initial encounter (H)           CONTINUE these medications which have CHANGED    Details   acetaminophen (TYLENOL) 325 MG tablet Take 3 tablets (975 mg) by mouth 3 times daily.  Qty: 100 tablet, Refills: 0    Associated Diagnoses: Hip fracture, right, closed, initial encounter (H)           CONTINUE these medications which have NOT CHANGED    Details   albuterol (PROAIR HFA/PROVENTIL HFA/VENTOLIN HFA) 108 (90 Base) MCG/ACT inhaler Inhale 2 puffs into the lungs every 6 hours as needed for wheezing, shortness of breath or cough.      allopurinol (ZYLOPRIM) 300 MG tablet Take 1 tablet (300 mg) by mouth daily.  Qty: 90 tablet, Refills: 4    Associated Diagnoses: Gout, unspecified cause, unspecified chronicity, unspecified site      atenolol (TENORMIN) 100 MG tablet Take 1 tablet (100 mg) by mouth daily.  Qty: 90 tablet, Refills: 4    Associated Diagnoses: Hypertensive  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 and stage 1 through stage 4 chronic kidney disease, or unspecified chronic kidney disease (H)      atorvastatin (LIPITOR) 80 MG tablet TAKE 1 TABLET DAILY  Qty: 90 tablet, Refills: 4    Associated Diagnoses: ASCVD (arteriosclerotic cardiovascular disease)      cetirizine (ZYRTEC) 5 MG tablet Take 5 mg by mouth at bedtime.    Associated Diagnoses: Seasonal allergic rhinitis due to other allergic trigger      DULoxetine (CYMBALTA) 20 MG capsule Take 1 capsule (20 mg) by mouth daily.  Qty: 90 capsule, Refills: 4    Associated Diagnoses: Compression fracture of L1 vertebra with routine healing; Peripheral polyneuropathy; Current mild episode of major depressive disorder without prior episode      folic acid (FOLVITE) 1 MG tablet TAKE 1 TABLET DAILY  Qty: 90 tablet, Refills: 3    Associated Diagnoses: Rheumatoid arthritis involving multiple sites with positive rheumatoid factor (H)      !! hydroxychloroquine (PLAQUENIL) 200 MG tablet Take 400 mg by mouth every other day.      !! hydroxychloroquine (PLAQUENIL) 200 MG tablet Take 200 mg by mouth daily.      isosorbide mononitrate (IMDUR) 30 MG 24 hr tablet TAKE 1 TABLET DAILY  Qty: 90 tablet, Refills: 3    Associated Diagnoses: Benign essential hypertension; ASCVD (arteriosclerotic cardiovascular disease); S/P angioplasty with stent; History of ischemic left MCA stroke; History of coronary artery stent placement; History of cardiac cath      methotrexate 2.5 MG tablet Take 20 mg by mouth once a week. On Mondays      nitroGLYcerin (NITROSTAT) 0.4 MG sublingual tablet Place 1 tablet (0.4 mg) under the tongue every 5 minutes as needed for chest pain. (For chest pain x 3 doses)  Qty: 30 tablet, Refills: 3    Associated Diagnoses: ASCVD (arteriosclerotic cardiovascular disease)      polyethylene glycol (MIRALAX) 17 GM/Dose powder Take 17 g by mouth daily as needed  Qty: 510 g, Refills: 1    Associated Diagnoses: Closed  fracture of neck of left femur, initial encounter (H)       !! - Potential duplicate medications found. Please discuss with provider.        STOP taking these medications       alendronate (FOSAMAX) 70 MG tablet Comments:   Reason for Stopping:         aspirin (ASA) 81 MG chewable tablet Comments:   Reason for Stopping:                     Rationale for medication changes:      Start eliquis x 2 weeks for DVT prophylaxis.  Then resume aspirin  Hold on starting fosamax for now, resume when ok from ortho perspective outpatient.        Consults     ORTHOPEDIC SURGERY IP CONSULT  HEMATOLOGY & ONCOLOGY IP CONSULT  CARE MANAGEMENT / SOCIAL WORK IP CONSULT  PHYSICAL THERAPY ADULT IP CONSULT  OCCUPATIONAL THERAPY ADULT IP CONSULT  NEUROLOGY IP STROKE CONSULT  PAIN MANAGEMENT ADULT IP CONSULT  PHYSICAL THERAPY ADULT IP CONSULT  OCCUPATIONAL THERAPY ADULT IP CONSULT    Immunizations given this encounter     Most Recent Immunizations   Administered Date(s) Administered    COVID-19 Bivalent 12+ (Pfizer) 09/28/2022    COVID-19 MONOVALENT 12+ (Pfizer) 01/03/2022    Flu, Unspecified 02/29/2008    Influenza (High Dose) Trivalent,PF (Fluzone) 10/17/2024    Influenza (IIV3) PF 10/11/2013    Influenza (prior to 2024) 11/17/2011    Influenza Vaccine 65+ (Fluzone HD) 12/13/2023    Influenza Vaccine >6 months,quad, PF 10/10/2014    Pneumo Conj 13-V (2010&after) 11/16/2015    Pneumococcal 23 valent 10/05/2012    TDAP Vaccine (Boostrix) 05/29/2020    Tdap (Adult) Unspecified Formulation 10/02/1996    Triamcinolone Acetonide 12/01/2014    Zoster recombinant adjuvanted (SHINGRIX) 09/22/2020    Zoster vaccine, live 02/23/2011           Anticoagulation Information      On eliquis      SIGNIFICANT IMAGING FINDINGS     Results for orders placed or performed during the hospital encounter of 02/17/25   Humerus XR, G/E 2 views, right    Impression    IMPRESSION: Postop changes of glenohumeral joint replacement. There is bone formation along the  greater tuberosity of the humerus without a well-defined fracture lucency on these views, favor chronic although dedicated right shoulder radiographs could   assess further if there is clinical concern for fracture in this region. Subacromial spur. Bone demineralization.    No evidence of an acute displaced distal right humeral fracture.   XR Pelvis and Hip Right 2 Views    Impression    IMPRESSION: Acute, displaced and angulated right proximal femoral fracture.    Contralateral left total hip replacement.    Bones are demineralized.    Pathologic fracture is not excluded as there is some vague lucency along the fracture site as well as a partially visualized area of lucency within the proximal femoral shaft measuring approximately 9.6 cm in length. These findings raise concern for   multiple myeloma as well as metastatic disease, which should be excluded.    NOTE: ABNORMAL REPORT    THE DICTATION ABOVE DESCRIBES AN ABNORMALITY FOR WHICH FOLLOW-UP IS NEEDED.    CT Head w/o Contrast    Impression    IMPRESSION:  1.  No acute intracranial process.    2.  Chronic intracranial changes described above.   XR Shoulder Right 3 Views    Impression    IMPRESSION:     Status post right total shoulder arthroplasty. There is an acute appearing, comminuted, mildly displaced periprosthetic fracture of the right proximal humerus.     No dislocation. Mild to moderate acromioclavicular degenerative arthrosis. Diffuse osseous demineralization.   XR Femur Right 2 Views    Impression    IMPRESSION: Comminuted, angulated, and displaced intertrochanteric fracture right hip. The distal femur is intact. Right knee arthroplasty. Vascular calcifications.   CT Hip Right w/o Contrast    Impression    IMPRESSION:  1.  Comminuted intertrochanteric fracture of the right hip with multiple fragments of the greater trochanter present.  2.  No definitive osteolytic lesion of the greater trochanter or proximal femur is identified.         CTA Head Neck  "with Contrast    Impression    IMPRESSION:   HEAD CT:  1.  No CT evidence for acute intracranial process.  2.  Brain atrophy and presumed chronic microvascular ischemic changes as above.  3.  There are couple of tiny chronic lacunar infarcts in the left basal ganglia.    HEAD CTA:  1.  No large vessel occlusion or flow-limiting stenosis.  2.  Evidence of intracranial atherosclerosis.    NECK CTA:  1.  No hemodynamically significant stenosis or dissection.  2.  Left carotid stent is patent.         MR Brain w/o & w Contrast    Impression    IMPRESSION:  1.  No finding for acute infarct, intracranial hemorrhage, or abnormally enhancing mass.  2.  Chronic lacunar infarcts in the periventricular left corona radiata and in the left basal ganglia with a background mild right and moderate left chronic small vessel ischemic change.  3.  Moderate diffuse parenchymal volume loss.  4.  Right frontal scalp subgaleal hematoma with left parietal scalp soft tissue swelling.     Echocardiogram Complete   Result Value Ref Range    LVEF  60-65%          Discharge Orders        Reason for your hospital stay    S/P Closed reduction and cephalomedullary nail right intertrochanteric femur fracture     When to call - Contact Surgeon Team    You may experience symptoms that require follow-up before your scheduled appointment. Refer to the \"Stoplight Tool\" for instructions on when to contact your Surgeon Team if you are concerned about pain control, blood clots, constipation, or if you are unable to urinate.     When to call - Reach out to Urgent Care    If you are not able to reach your Surgeon Team and you need immediate care, go to the Orthopedic Walk-in Clinic or Urgent Care at your Surgeon's office.  Do NOT go to the Emergency Room unless you have shortness of breath, chest pain, or other signs of a medical emergency.     When to call - Reasons to Call 911    Call 911 immediately if you experience sudden-onset chest pain, arm " weakness/numbness, slurred speech, or shortness of breath     Discharge Instruction - Breathing exercises    Perform breathing exercises 10 times per hours while awake for 2 weeks. (If given, use your Incentive Spirometer)     Symptoms - Fever Management    A low grade fever can be expected after surgery.  Use acetaminophen (TYLENOL) as needed for fever management.  Contact your Surgeon Team if you have a fever greater than 101.5 F, chills, and/or night sweats.     Symptoms - Constipation management    Constipation (hard, dry bowel movements) is expected after surgery due to the combination of being less active, the anesthetic, and the opioid pain medication.  You can do the following to help reduce constipation:  ~  FLUIDS:  Drink clear liquids (water or Gatorade), or juice (apple/prune).  ~  DIET:  Eat a fiber rich diet.    ~  ACTIVITY:  Get up and move around several times a day.  Increase your activity as you are able.  MEDICATIONS:  Reduce the risk of constipation by starting medications before you are constipated.  You can take Miralax   (1 packet as directed) and/or a stool softener (Senokot 1-2 tablets 1-2 times a day).  If you already have constipation and these medications are not working, you can get magnesium citrate and use as directed.  If you continue to have constipation you can try an over the counter suppository or enema.  Call your Surgeon Team if it has been greater than 3 days since your last bowel movement.     Symptoms - Reduced Urine Output    Changes in the amount of fluids you drank before and after surgery may result in problems urinating.  It is important to stay well-hydrated after surgery and drink plenty of water. If it has been greater than 8 hours since you have urinated despite drinking plenty of water, call your Surgeon Team.     Activity - Exercises to prevent blood clots    Unless otherwise directed by your Surgeon team, perform the following exercises at least three times per  day for the first four weeks after surgery to prevent blood clots in your legs: 1) Point and flex your feet (Ankle Pumps), 2) Move your ankle around in big circles, 3) Wiggle your toes, 4) Walk, even for short distances, several times a day, will help decrease the risk of blood clots.     Comfort and Pain Management - Pain after Surgery    Pain after surgery is normal and expected.  You will have some amount of pain for several weeks after surgery.  Your pain will improve with time.  There are several things you can do to help reduce your pain including: rest, ice, elevation, and using pain medications as needed. Contact your Surgeon Team if you have pain that persists or worsens after surgery despite rest, ice, elevation, and taking your medication(s) as prescribed. Contact your Surgeon Team if you have new numbness, tingling, or weakness in your operative extremity.     Comfort and Pain Management - Swelling after Surgery    Swelling and/or bruising of the surgical extremity is common and may persist for several months after surgery. In addition to frequent icing and elevation, gentle compressive support with an ACE wrap or tubigrip may help with swelling. Apply compression regularly, removing at least twice daily to perform skin checks. Contact your Surgeon Team if your swelling increases and is NOT associated with an increase in your activity level, or if your swelling increases and is associated with redness and pain.     Comfort and Pain Management - Cold therapy    Ice can be used to control swelling and discomfort after surgery. Place a thin towel over your operative site and apply the ice pack overtop. Leave ice pack in place for 20 minutes, then remove for 20 minutes. Repeat this 20 minutes on/20 minutes off routine as often as tolerated.     Medication Instructions - Acetaminophen (TYLENOL) Instructions    You were discharged with acetaminophen (TYLENOL) for pain management after surgery. Acetaminophen  "most effectively manages pain symptoms when it is taken on a schedule without missing doses (every four, six, or eight hours). Your Provider will prescribe a safe daily dose between 3000 - 4000 mg.  Do NOT exceed this daily dose. Most patients use acetaminophen for pain control for the first four weeks after surgery.  You can wean from this medication as your pain decreases.     Follow Up Care    Please follow-up with Dr. Alston' team in 2 weeks at [facility]. Call our scheduling line at [phone] to make an appointment, if you do not already have one scheduled.     Comfort and Pain Management - LOWER Extremity Elevation    Swelling is expected for several months after surgery. This type of swelling is usually associated with gravity and activity, and can be improved with elevation.   The best way to do this is to get your \"toes above your nose\" by laying down and placing several pillows lengthwise under your calf and heel. When elevating your leg keep your knee completely straight. Perform this elevation as often as possible especially for the first two weeks after surgery.     Opioid Instructions (Greater than or equal to 65 years)    You were discharged with an opioid medication (hydromorphone, oxycodone, hydrocodone, or tramadol). This medication should only be taken for breakthrough pain that is not controlled with acetaminophen (TYLENOL). If you rate your pain less than 3 you do not need this medication. Pain rating 0-3: You do not need this medication Pain rating 4-6: Take 1/2 tablet every 4-6 hours as needed Pain rating 7-10: Take 1 tablet every 4-6 hours as needed Do not exceed 4 tablets per day     Medication Instructions - Opioids - Tapering Instructions    In the first three days following surgery, your symptoms may warrant use of the narcotic pain medication every four to six hours as prescribed. This is normal. As your pain symptoms improve, focus your efforts on decreasing (tapering) use of " narcotic medications. The most successful tapering strategy is to first, decrease the number of tablets you take every 4-6 hours to the minimum prescribed. Then, increase the amount of time between doses. For example: First, taper to   or 1 tablet every 4-6 hours. Then, taper to   or 1 tablet every 6-8 hours. Then, taper to   or 1 tablet every 8-10 hours. Then, taper to   or 1 tablet every 10-12 hours. Then, taper to   or 1 tablet at bedtime. The bedtime dose can help with comfort during sleep and is typically the last dose to be discontinued after surgery.     Return to Driving    Return to driving - Driving is NOT permitted until directed by your provider. Under no circumstance are you permitted to drive while using narcotic pain medications.     Dressing / Wound Care - Wound    You have a clean dressing on your surgical wound. Dressing change instructions as follows: Keep dressing clean, dry, and intact until outpatient follow up appointment. Contact your Surgeon Team if you have increased redness, warmth around the surgical wound, and/or drainage from the surgical wound.     Dressing / Wound Care - NO Tub Bathing    Tub bathing, swimming, or any other activities that will cause your incision to be submerged in water should be avoided until allowed by your Surgeon.     No precautions    No precautions directed by your Provider.  You may perform range of motion activities as tolerated.     Activity    WBAT to RLE with walker for added stability.     Weight bearing as tolerated    Weight bearing as tolerated on your operative extremity with walker for added stability     Dressing / Wound care - Shower with wound/dressing covered    You must COVER your dressing or incision with saran wrap (or any other non-permeable covering) to allow the incision to remain dry while showering.  You may shower 3 days after surgery as long as the surgical wound stays dry. Continue to cover your dressing or incision for showering  until your first office visit.  You are strictly prohibited from soaking or submerging the surgical wound underwater.     General info for SNF    Length of Stay Estimate: Short Term Care: Estimated # of Days <30  Condition at Discharge: Improving  Level of care:skilled   Rehabilitation Potential: Excellent  Admission H&P remains valid and up-to-date: Yes  Recent Chemotherapy: N/A  Use Nursing Home Standing Orders: Yes     Mantoux instructions    Give two-step Mantoux (PPD) Per Facility Policy Yes     Follow Up and recommended labs and tests    Follow up with Nursing home physician.     Reason for your hospital stay    You were admitted with R femur fracture     Activity - Up with nursing assistance     No CPR- Do NOT Intubate     Physical Therapy Adult Consult    Evaluate and treat as clinically indicated.    Reason:  Strengthening     Occupational Therapy Adult Consult    Evaluate and treat as clinically indicated.    Reason:  ADLs     Airborne Isolation     Fall precautions     Crutches DME    DME Documentation: Describe the reason for need to support medical necessity: Impaired gait status post hip surgery. I, the undersigned, certify that the above prescribed supplies are medically necessary for this patient and is both reasonable and necessary in reference to accepted standards of medical practice in the treatment of this patient's condition and is not prescribed as a convenience.     Cane DME    DME Documentation: Describe the reason for need to support medical necessity: Impaired gait status post hip surgery. I, the undersigned, certify that the above prescribed supplies are medically necessary for this patient and is both reasonable and necessary in reference to accepted standards of medical practice in the treatment of this patient's condition and is not prescribed as a convenience.     Walker DME    DME Documentation: Describe the reason for need to support medical necessity: Impaired gait status post hip  surgery. I, the undersigned, certify that the above prescribed supplies are medically necessary for this patient and is both reasonable and necessary in reference to accepted standards of medical practice in the treatment of this patient's condition and is not prescribed as a convenience.     Discharge Instruction - Regular Diet Adult    Return to your pre-surgery diet unless instructed otherwise     Diet    Follow this diet upon discharge: Current Diet:Orders Placed This Encounter      Snacks/Supplements Adult: Ensure Enlive; Between Meals      Snacks/Supplements Adult: Magic Cup; Between Meals      Regular Diet Adult      Discharge Instruction - Regular Diet Adult       Examination   Physical Exam   Temp:  [98  F (36.7  C)-99.1  F (37.3  C)] 98.8  F (37.1  C)  Pulse:  [72-83] 83  Resp:  [19-20] 20  BP: (122-186)/(59-79) 180/78  SpO2:  [95 %-98 %] 98 %  Wt Readings from Last 1 Encounters:   02/18/25 77.2 kg (170 lb 3.1 oz)       Doing well.  No complaints      Please see EMR for more detailed significant labs, imaging, consultant notes etc.    I, Justin Palmer MD, personally saw the patient today and spent greater than 30 minutes discharging this patient.    Justin Palmer MD  [facility]    CC:Swetha Maxwell

## 2025-02-24 NOTE — PROGRESS NOTES
[facility] ACUTE INPATIENT PAIN SERVICE    [facility], [facility], [facility], [facility], [place]   PAIN PROGRESS      Assessment/Plan:  Margaret Batista is a 79 year old female who was admitted on 2/17/2025.  Pain Service is asked to see the patient for assistance in pain management with intractable pain following right hip fracture repair.  Right humeral fracture is being managed conservatively.  Admitted for evaluation after a fall with right leg pain.  CT of the right hip demonstrated a closed fracture of the right humerus.   Ortho Consulted.  Prior hip arthoplasty 10 years ago.  Bilateral knee replacements, and previous total left hip.    Patient is status post op day #3 right hip cephalomedullary nailing.  Plan for non surgical management of right shoulder.  Has sling in place.       Night of surgery 2/18/25 patient developed word finding difficulties and right upper extremity weakness, Code stroke was activated.    Neurology Consulted and following:     Stroke workup including CT angiogram of the head and neck and brain CT were negative for acute pathology.   EEG with mild encephalopathy, and brain MRI with chronic changes with indication of an acute infarct.    Most likely the spell is not cerebrovascular in nature and will continue PTA aspirin only after the first 2 weeks.     Complex medical history significant for monoclonal B-cell type lymphocytosis with hyperlipidemia, CAD, hypertension, stroke, CKD stage III, rheumatoid arthritis, shoulder surgery, neuropathy, DJD, and Parkinsonism.  Suspected renal cell carcinoma, multiple myeloma.  Follow up with Primary Oncologist (Emma), Rheumatologist and Neurology as an Outpatient.    shows one prescription for hydromorphone 2 mg tablets 24 for 4 days prescribed 7/24/24.  No other controlled substances noted over this past year.     Over the past 24 hours patient has received:  No opioids     Subjective:  Sandra is seen today in her bed alert and oriented  "in no acute distress. Reports her pain is currently a 0/10. She has not used any opioids in the past 24h. Reports minimal pain at rest which is significantly aggravated with movement. Plans to discharge to the TCU today.      Denies nausea, vomiting, constipation.      Reviewed continuing with current plan, all questions answered.          PLAN:  Acute pain secondary to hip fracture status post nailing, shoulder pain with right humerus fracture, currently being managed non operatively.       Multimodal Medication Therapy:   Adjuvants: Cymbalta 20 mg every AM, tylenol 975 mg tid   Opioids: oxycodone 5 mg every 4 hours prn  Non-medication interventions- Ice prn  Constipation Prophylaxis- daily stool softener/laxative   Follow up /Discharge Recommendations - We recommend prescribing the following at the time of discharge:  oxycodone 5 mg qid prn, tylenol 1000 mg tid, home Cymbalta, topical Lidocaine if helpful      History   Drug Use No         Tobacco Use      Smoking status: Never        Passive exposure: Past      Smokeless tobacco: Never      Tobacco comments: Passive exposure in childhood home.         Objective:  Vital signs in last 24 hours:  B/P: 180/78, T: 98.8, P: 83, R: 20   Blood pressure (!) 180/78, pulse 83, temperature 98.8  F (37.1  C), temperature source Oral, resp. rate 20, height 1.702 m (5' 7.01\"), weight 77.2 kg (170 lb 3.1 oz), last menstrual period 01/01/1998, SpO2 98%, not currently breastfeeding.        Review of Systems:   As per subjective, all others negative.    Physical Exam    General: in no apparent distress and non-toxic   HEENT: Head normocephalic atraumatic, oral mucosa moist. Sclerae anicteric  CV: Regular rhythm, normal rate, no murmurs  Resp: No wheezes, no rales or rhonchi, no focal consolidations  GI: Soft. Non-tender  Skin: No rashes or lesions  Extremities: + right arm sling  Psych: Normal affect, mood euthymic  Neuro: Grossly normal          Imaging:  Personally " Reviewed.    Results for orders placed or performed during the hospital encounter of 02/17/25   Humerus XR, G/E 2 views, right    Impression    IMPRESSION: Postop changes of glenohumeral joint replacement. There is bone formation along the greater tuberosity of the humerus without a well-defined fracture lucency on these views, favor chronic although dedicated right shoulder radiographs could   assess further if there is clinical concern for fracture in this region. Subacromial spur. Bone demineralization.    No evidence of an acute displaced distal right humeral fracture.   XR Pelvis and Hip Right 2 Views    Impression    IMPRESSION: Acute, displaced and angulated right proximal femoral fracture.    Contralateral left total hip replacement.    Bones are demineralized.    Pathologic fracture is not excluded as there is some vague lucency along the fracture site as well as a partially visualized area of lucency within the proximal femoral shaft measuring approximately 9.6 cm in length. These findings raise concern for   multiple myeloma as well as metastatic disease, which should be excluded.    NOTE: ABNORMAL REPORT    THE DICTATION ABOVE DESCRIBES AN ABNORMALITY FOR WHICH FOLLOW-UP IS NEEDED.    CT Head w/o Contrast    Impression    IMPRESSION:  1.  No acute intracranial process.    2.  Chronic intracranial changes described above.   XR Shoulder Right 3 Views    Impression    IMPRESSION:     Status post right total shoulder arthroplasty. There is an acute appearing, comminuted, mildly displaced periprosthetic fracture of the right proximal humerus.     No dislocation. Mild to moderate acromioclavicular degenerative arthrosis. Diffuse osseous demineralization.   XR Femur Right 2 Views    Impression    IMPRESSION: Comminuted, angulated, and displaced intertrochanteric fracture right hip. The distal femur is intact. Right knee arthroplasty. Vascular calcifications.   CT Hip Right w/o Contrast    Impression     IMPRESSION:  1.  Comminuted intertrochanteric fracture of the right hip with multiple fragments of the greater trochanter present.  2.  No definitive osteolytic lesion of the greater trochanter or proximal femur is identified.         CTA Head Neck with Contrast    Impression    IMPRESSION:   HEAD CT:  1.  No CT evidence for acute intracranial process.  2.  Brain atrophy and presumed chronic microvascular ischemic changes as above.  3.  There are couple of tiny chronic lacunar infarcts in the left basal ganglia.    HEAD CTA:  1.  No large vessel occlusion or flow-limiting stenosis.  2.  Evidence of intracranial atherosclerosis.    NECK CTA:  1.  No hemodynamically significant stenosis or dissection.  2.  Left carotid stent is patent.         MR Brain w/o & w Contrast    Impression    IMPRESSION:  1.  No finding for acute infarct, intracranial hemorrhage, or abnormally enhancing mass.  2.  Chronic lacunar infarcts in the periventricular left corona radiata and in the left basal ganglia with a background mild right and moderate left chronic small vessel ischemic change.  3.  Moderate diffuse parenchymal volume loss.  4.  Right frontal scalp subgaleal hematoma with left parietal scalp soft tissue swelling.     Echocardiogram Complete   Result Value Ref Range    LVEF  60-65%         Lab Results:  Personally Reviewed.   Last Comprehensive Metabolic Panel:  Sodium   Date Value Ref Range Status   02/21/2025 137 135 - 145 mmol/L Final   06/18/2021 139 134 - 144 mmol/L Final     Potassium   Date Value Ref Range Status   02/21/2025 4.0 3.4 - 5.3 mmol/L Final   06/18/2021 3.4 (L) 3.5 - 5.1 mmol/L Final     Chloride   Date Value Ref Range Status   02/21/2025 103 98 - 107 mmol/L Final   06/18/2021 101 98 - 107 mmol/L Final     Carbon Dioxide   Date Value Ref Range Status   06/18/2021 30 21 - 31 mmol/L Final     Carbon Dioxide (CO2)   Date Value Ref Range Status   02/21/2025 28 22 - 29 mmol/L Final     Anion Gap   Date Value Ref  "Range Status   02/21/2025 6 (L) 7 - 15 mmol/L Final   06/18/2021 8 3 - 14 mmol/L Final     Glucose   Date Value Ref Range Status   02/21/2025 95 70 - 99 mg/dL Final   06/18/2021 100 70 - 105 mg/dL Final     GLUCOSE BY METER POCT   Date Value Ref Range Status   02/19/2025 170 (H) 70 - 99 mg/dL Final     Urea Nitrogen   Date Value Ref Range Status   02/21/2025 21.1 8.0 - 23.0 mg/dL Final   06/18/2021 22 7 - 25 mg/dL Final     Creatinine   Date Value Ref Range Status   02/24/2025 0.79 0.51 - 0.95 mg/dL Final   06/18/2021 0.99 0.60 - 1.20 mg/dL Final     GFR Estimate   Date Value Ref Range Status   02/24/2025 76 >60 mL/min/1.73m2 Final     Comment:     eGFR calculated using 2021 CKD-EPI equation.   06/18/2021 55 (L) >60 mL/min/[1.73_m2] Final     Calcium   Date Value Ref Range Status   02/21/2025 8.7 (L) 8.8 - 10.4 mg/dL Final   06/18/2021 9.7 8.6 - 10.3 mg/dL Final        UA: No results found for: \"UAMP\", \"UBARB\", \"BENZODIAZEUR\", \"UCANN\", \"UCOC\", \"OPIT\", \"UPCP\"           Please see A&P for additional details of medical decision making.         Kendrick Epstein PA-C  Acute Care Pain Management  Team  Hours of pain coverage Mon-Fri 8-1600, afterhours please call the primary team   [facility] ([facility], [place], [place], [facility])   Page via [employer] jobs web console -Click for [place]           "

## 2025-02-26 ENCOUNTER — LAB REQUISITION (OUTPATIENT)
Dept: LAB | Facility: CLINIC | Age: 80
End: 2025-02-26
Payer: COMMERCIAL

## 2025-02-26 DIAGNOSIS — D62 ACUTE POSTHEMORRHAGIC ANEMIA: ICD-10-CM

## 2025-02-28 LAB — HGB BLD-MCNC: 8.6 G/DL (ref 11.7–15.7)

## 2025-02-28 PROCEDURE — P9604 ONE-WAY ALLOW PRORATED TRIP: HCPCS | Mod: ORL | Performed by: NURSE PRACTITIONER

## 2025-02-28 PROCEDURE — 85018 HEMOGLOBIN: CPT | Mod: ORL | Performed by: NURSE PRACTITIONER

## 2025-02-28 PROCEDURE — 36415 COLL VENOUS BLD VENIPUNCTURE: CPT | Mod: ORL | Performed by: NURSE PRACTITIONER

## 2025-03-26 ENCOUNTER — TELEPHONE (OUTPATIENT)
Dept: INTERNAL MEDICINE | Facility: OTHER | Age: 80
End: 2025-03-26
Payer: COMMERCIAL

## 2025-03-26 DIAGNOSIS — S72.002A LEFT DISPLACED FEMORAL NECK FRACTURE (H): Primary | ICD-10-CM

## 2025-03-26 NOTE — TELEPHONE ENCOUNTER
Suma Maxwell CNP was given home care or hospice form(s) for review and signature. Certification period effective 3/14/2025 to 5/12/2025.  Desiree Agudelo RN on 3/26/2025 at 3:26 PM

## 2025-03-28 ENCOUNTER — MEDICAL CORRESPONDENCE (OUTPATIENT)
Dept: HEALTH INFORMATION MANAGEMENT | Facility: OTHER | Age: 80
End: 2025-03-28
Payer: COMMERCIAL

## 2025-04-04 ENCOUNTER — TRANSFERRED RECORDS (OUTPATIENT)
Dept: HEALTH INFORMATION MANAGEMENT | Facility: OTHER | Age: 80
End: 2025-04-04
Payer: COMMERCIAL

## 2025-04-04 ENCOUNTER — TELEPHONE (OUTPATIENT)
Dept: INTERNAL MEDICINE | Facility: OTHER | Age: 80
End: 2025-04-04
Payer: COMMERCIAL

## 2025-04-04 NOTE — TELEPHONE ENCOUNTER
Reason for call: Patient wanting a work in appointment.    Is the appointment for a Hospital Follow up?  no     (If yes - Unable to find an appointment with any provider during the time frame needed. Nurse/Provider - Can this patient be worked into a schedule with PCP or team member?)    Patient is having the following symptoms and/or what is the appt for:  cancer in kidneys for 1 1/2 years  The patient is requesting an appointment with  [facility]    Was an appointment offered for this call? Yes    If Yes, what is the date of the appointment?  5/7/25      Preferred method for responding to this message: Telephone Call    Phone number patient can be reached at? Cell number on file:    Telephone Information:   Mobile [phone]       If we can't reach you directly, may we leave a detailed response at the number you provided? Yes

## 2025-04-07 NOTE — TELEPHONE ENCOUNTER
Patient was contacted and an appointment was made with Suma Maxwell for 4.23.2025.  Mansi Schwartz on 4/7/2025 at 12:17 PM

## 2025-04-10 ENCOUNTER — MEDICAL CORRESPONDENCE (OUTPATIENT)
Dept: HEALTH INFORMATION MANAGEMENT | Facility: OTHER | Age: 80
End: 2025-04-10
Payer: COMMERCIAL

## 2025-04-11 ENCOUNTER — MEDICAL CORRESPONDENCE (OUTPATIENT)
Dept: HEALTH INFORMATION MANAGEMENT | Facility: OTHER | Age: 80
End: 2025-04-11
Payer: COMMERCIAL

## 2025-04-17 ENCOUNTER — MEDICAL CORRESPONDENCE (OUTPATIENT)
Dept: HEALTH INFORMATION MANAGEMENT | Facility: OTHER | Age: 80
End: 2025-04-17
Payer: COMMERCIAL

## 2025-04-23 ENCOUNTER — OFFICE VISIT (OUTPATIENT)
Dept: INTERNAL MEDICINE | Facility: OTHER | Age: 80
End: 2025-04-23
Attending: NURSE PRACTITIONER
Payer: COMMERCIAL

## 2025-04-23 VITALS
HEART RATE: 71 BPM | BODY MASS INDEX: 25.62 KG/M2 | RESPIRATION RATE: 18 BRPM | DIASTOLIC BLOOD PRESSURE: 80 MMHG | SYSTOLIC BLOOD PRESSURE: 132 MMHG | TEMPERATURE: 98.1 F | WEIGHT: 163.6 LBS | OXYGEN SATURATION: 98 %

## 2025-04-23 DIAGNOSIS — R47.89 WORD FINDING DIFFICULTY: ICD-10-CM

## 2025-04-23 DIAGNOSIS — M05.79 RHEUMATOID ARTHRITIS INVOLVING MULTIPLE SITES WITH POSITIVE RHEUMATOID FACTOR (H): ICD-10-CM

## 2025-04-23 DIAGNOSIS — Z95.820 S/P ANGIOPLASTY WITH STENT: ICD-10-CM

## 2025-04-23 DIAGNOSIS — Z86.73 HISTORY OF ISCHEMIC LEFT MCA STROKE: ICD-10-CM

## 2025-04-23 DIAGNOSIS — I25.10 ASCVD (ARTERIOSCLEROTIC CARDIOVASCULAR DISEASE): ICD-10-CM

## 2025-04-23 DIAGNOSIS — Z98.890 HISTORY OF CARDIAC CATH: ICD-10-CM

## 2025-04-23 DIAGNOSIS — S32.010D COMPRESSION FRACTURE OF L1 VERTEBRA WITH ROUTINE HEALING: ICD-10-CM

## 2025-04-23 DIAGNOSIS — D72.820 MONOCLONAL B-CELL LYMPHOCYTOSIS: ICD-10-CM

## 2025-04-23 DIAGNOSIS — F32.0 CURRENT MILD EPISODE OF MAJOR DEPRESSIVE DISORDER WITHOUT PRIOR EPISODE: ICD-10-CM

## 2025-04-23 DIAGNOSIS — I69.951 HEMIPLGA FOL UNSP CEREBVASC DISEASE AFF RIGHT DOMINANT SIDE (H): Primary | ICD-10-CM

## 2025-04-23 DIAGNOSIS — G62.9 PERIPHERAL POLYNEUROPATHY: ICD-10-CM

## 2025-04-23 DIAGNOSIS — D84.9 IMMUNOCOMPROMISED: ICD-10-CM

## 2025-04-23 DIAGNOSIS — N28.89 RIGHT RENAL MASS: ICD-10-CM

## 2025-04-23 DIAGNOSIS — Z95.5 HISTORY OF CORONARY ARTERY STENT PLACEMENT: ICD-10-CM

## 2025-04-23 DIAGNOSIS — I10 BENIGN ESSENTIAL HYPERTENSION: ICD-10-CM

## 2025-04-23 LAB
ALT SERPL W P-5'-P-CCNC: 11 U/L (ref 0–50)
CRP SERPL-MCNC: <3 MG/L
ERYTHROCYTE [DISTWIDTH] IN BLOOD BY AUTOMATED COUNT: 15.5 % (ref 10–15)
ERYTHROCYTE [SEDIMENTATION RATE] IN BLOOD BY WESTERGREN METHOD: 7 MM/HR (ref 0–30)
HCT VFR BLD AUTO: 38.9 % (ref 35–47)
HGB BLD-MCNC: 12.3 G/DL (ref 11.7–15.7)
MCH RBC QN AUTO: 31.1 PG (ref 26.5–33)
MCHC RBC AUTO-ENTMCNC: 31.6 G/DL (ref 31.5–36.5)
MCV RBC AUTO: 98 FL (ref 78–100)
PLATELET # BLD AUTO: 208 10E3/UL (ref 150–450)
RBC # BLD AUTO: 3.96 10E6/UL (ref 3.8–5.2)
WBC # BLD AUTO: 10 10E3/UL (ref 4–11)

## 2025-04-23 PROCEDURE — 86140 C-REACTIVE PROTEIN: CPT | Mod: ZL | Performed by: NURSE PRACTITIONER

## 2025-04-23 PROCEDURE — 85018 HEMOGLOBIN: CPT | Mod: ZL | Performed by: NURSE PRACTITIONER

## 2025-04-23 PROCEDURE — 84460 ALANINE AMINO (ALT) (SGPT): CPT | Mod: ZL | Performed by: NURSE PRACTITIONER

## 2025-04-23 PROCEDURE — 85652 RBC SED RATE AUTOMATED: CPT | Mod: ZL | Performed by: NURSE PRACTITIONER

## 2025-04-23 PROCEDURE — 36415 COLL VENOUS BLD VENIPUNCTURE: CPT | Mod: ZL | Performed by: NURSE PRACTITIONER

## 2025-04-23 PROCEDURE — G0463 HOSPITAL OUTPT CLINIC VISIT: HCPCS

## 2025-04-23 RX ORDER — DULOXETIN HYDROCHLORIDE 20 MG/1
20 CAPSULE, DELAYED RELEASE ORAL DAILY
Qty: 90 CAPSULE | Refills: 4 | Status: SHIPPED | OUTPATIENT
Start: 2025-04-23

## 2025-04-23 RX ORDER — ISOSORBIDE MONONITRATE 30 MG/1
30 TABLET, EXTENDED RELEASE ORAL DAILY
Qty: 90 TABLET | Refills: 4 | Status: SHIPPED | OUTPATIENT
Start: 2025-04-23

## 2025-04-23 RX ORDER — FOLIC ACID 1 MG/1
1000 TABLET ORAL DAILY
Qty: 90 TABLET | Refills: 4 | Status: SHIPPED | OUTPATIENT
Start: 2025-04-23

## 2025-04-23 ASSESSMENT — PATIENT HEALTH QUESTIONNAIRE - PHQ9
SUM OF ALL RESPONSES TO PHQ QUESTIONS 1-9: 12
SUM OF ALL RESPONSES TO PHQ QUESTIONS 1-9: 12
10. IF YOU CHECKED OFF ANY PROBLEMS, HOW DIFFICULT HAVE THESE PROBLEMS MADE IT FOR YOU TO DO YOUR WORK, TAKE CARE OF THINGS AT HOME, OR GET ALONG WITH OTHER PEOPLE: VERY DIFFICULT

## 2025-04-23 ASSESSMENT — PAIN SCALES - GENERAL: PAINLEVEL_OUTOF10: NO PAIN (0)

## 2025-04-23 NOTE — PROGRESS NOTES
ICD-10-CM    1. 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 (H)  I69.951       2. Right renal mass  N28.89       3. Monoclonal B-cell lymphocytosis  D72.820       4. Immunocompromised  D84.9       5. Word finding difficulty  R47.89 Speech Therapy  Referral      6. History of ischemic left MCA stroke  Z86.73 Speech Therapy  Referral     isosorbide mononitrate (IMDUR) 30 MG 24 hr tablet      7. Benign essential hypertension  I10 isosorbide mononitrate (IMDUR) 30 MG 24 hr tablet      8. ASCVD (arteriosclerotic cardiovascular disease)  I25.10 isosorbide mononitrate (IMDUR) 30 MG 24 hr tablet      9. S/P angioplasty with stent  Z95.820 isosorbide mononitrate (IMDUR) 30 MG 24 hr tablet      10. History of coronary artery stent placement in 2007 and 2013  Z95.5 isosorbide mononitrate (IMDUR) 30 MG 24 hr tablet      11. History of cardiac cath  Z98.890 isosorbide mononitrate (IMDUR) 30 MG 24 hr tablet      12. Rheumatoid arthritis involving multiple sites with positive rheumatoid factor (H)  M05.79 folic acid (FOLVITE) 1 MG tablet      13. Compression fracture of L1 vertebra with routine healing  S32.010D DULoxetine (CYMBALTA) 20 MG capsule      14. Peripheral polyneuropathy  G62.9 DULoxetine (CYMBALTA) 20 MG capsule      15. Current mild episode of major depressive disorder without prior episode  F32.0 DULoxetine (CYMBALTA) 20 MG capsule         Plan:  -Continue with PT.  She is referred to speech therapy due to difficulty word finding and speech difficulties from CVA last year.  -Continue to follow with urology for right renal mass with follow-up imaging in August 2025.  -Follow-up on  postop anemia, will check labs.  -Labs ordered for RA management.  Continue following with rheumatology annually.    Sylvie Flores is a 79 year old, presenting for the following health issues:  No chief complaint on file.      4/23/2025     2:04 PM   Additional Questions   Roomed by Jenna Lu     She  is here today for follow-up.  She had a fall back in February 2025 with right hip fracture and humerus fracture.  She had nonsurgical management of humerus fracture and pinning of the right hip.  Postop anemia with hemoglobin at 9 g/dL.  She has worked with PT at [facility] nursing [facility] and since discharge has been working with PT in [place].  She had a stroke last year with chronic right hemiplegia.  She is working with PT for that as well.  She was supposed to get speech therapy but that was delayed due to the hip fracture rehab.  She would like to get orders for speech therapy.  Also follows with rheumatology annually but has labs every 3 months.  She is due for labs.  She would like to have this ordered.  Also was following with urology for right renal mass that has been slowly growing.  She has follow-up appointment again in August with CT imaging and urology visit.    History of Present Illness       Reason for visit:  Check up for cancer and contiunuation of weakness after stroke  Symptom onset:  More than a month  Symptoms include:  Weakness on right side  Symptom intensity:  Moderate  Symptom progression:  Improving  Had these symptoms before:  No  What makes it worse:  No  What makes it better:  No   She is taking medications regularly.                      Objective    /80 (BP Location: Right arm, Patient Position: Sitting, Cuff Size: Adult Regular)   Pulse 71   Temp 98.1  F (36.7  C) (Temporal)   Resp 18   Wt 74.2 kg (163 lb 9.6 oz)   LMP 01/01/1998 (Within Months)   SpO2 98%   BMI 25.62 kg/m    Body mass index is 25.62 kg/m .  Physical Exam   Pleasant female no acute distress.  Accompanied by .  Alert and oriented.  Lung fields clear to auscultation throughout.  Cardiovascular regular.            Signed Electronically by: Swetha Maxwell NP

## 2025-05-16 ENCOUNTER — TELEPHONE (OUTPATIENT)
Dept: INTERNAL MEDICINE | Facility: OTHER | Age: 80
End: 2025-05-16
Payer: COMMERCIAL

## 2025-05-16 NOTE — TELEPHONE ENCOUNTER
Patient would like a call back to discuss if she is getting orders for PT.    Rona Pan on 5/16/2025 at 11:27 AM

## 2025-05-16 NOTE — TELEPHONE ENCOUNTER
Patient called and wanted to let Swetha DENNIS know she will be starting speech therapy, OT,  and PT on Monday.   Claudia Rahman RN on 5/16/2025 at 1:04 PM

## 2025-05-24 ENCOUNTER — HOSPITAL ENCOUNTER (EMERGENCY)
Facility: OTHER | Age: 80
Discharge: HOME | End: 2025-05-24
Attending: FAMILY MEDICINE
Payer: COMMERCIAL

## 2025-05-24 ENCOUNTER — APPOINTMENT (OUTPATIENT)
Dept: CT IMAGING | Facility: OTHER | Age: 80
End: 2025-05-24
Payer: COMMERCIAL

## 2025-05-24 VITALS
HEART RATE: 73 BPM | RESPIRATION RATE: 7 BRPM | TEMPERATURE: 97.6 F | HEIGHT: 67 IN | OXYGEN SATURATION: 100 % | WEIGHT: 163 LBS | BODY MASS INDEX: 25.58 KG/M2 | SYSTOLIC BLOOD PRESSURE: 167 MMHG | DIASTOLIC BLOOD PRESSURE: 86 MMHG

## 2025-05-24 DIAGNOSIS — S06.5XAA SUBDURAL HEMATOMA (H): ICD-10-CM
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
## 2025-05-24 LAB
ALBUMIN UR-MCNC: NEGATIVE MG/DL
ANION GAP SERPL CALCULATED.3IONS-SCNC: 12 MMOL/L (ref 7–15)
APPEARANCE UR: CLEAR
APTT PPP: 26 SECONDS (ref 22–38)
BASOPHILS # BLD AUTO: 0 10E3/UL (ref 0–0.2)
BASOPHILS NFR BLD AUTO: 0 %
BILIRUB UR QL STRIP: NEGATIVE
BUN SERPL-MCNC: 20 MG/DL (ref 8–23)
CALCIUM SERPL-MCNC: 9.4 MG/DL (ref 8.8–10.4)
CHLORIDE SERPL-SCNC: 103 MMOL/L (ref 98–107)
COLOR UR AUTO: NORMAL
CREAT SERPL-MCNC: 0.82 MG/DL (ref 0.51–0.95)
EGFRCR SERPLBLD CKD-EPI 2021: 73 ML/MIN/1.73M2
EOSINOPHIL # BLD AUTO: 0.3 10E3/UL (ref 0–0.7)
EOSINOPHIL NFR BLD AUTO: 2 %
ERYTHROCYTE [DISTWIDTH] IN BLOOD BY AUTOMATED COUNT: 15.9 % (ref 10–15)
GLUCOSE SERPL-MCNC: 102 MG/DL (ref 70–99)
GLUCOSE UR STRIP-MCNC: NEGATIVE MG/DL
HCO3 SERPL-SCNC: 26 MMOL/L (ref 22–29)
HCT VFR BLD AUTO: 38.3 % (ref 35–47)
HGB BLD-MCNC: 12.4 G/DL (ref 11.7–15.7)
HGB UR QL STRIP: NEGATIVE
HOLD SPECIMEN: NORMAL
IMM GRANULOCYTES # BLD: 0.1 10E3/UL
IMM GRANULOCYTES NFR BLD: 0 %
INR PPP: 0.94 (ref 0.85–1.15)
KETONES UR STRIP-MCNC: NEGATIVE MG/DL
LEUKOCYTE ESTERASE UR QL STRIP: NEGATIVE
LYMPHOCYTES # BLD AUTO: 3 10E3/UL (ref 0.8–5.3)
LYMPHOCYTES NFR BLD AUTO: 20 %
MCH RBC QN AUTO: 30.8 PG (ref 26.5–33)
MCHC RBC AUTO-ENTMCNC: 32.4 G/DL (ref 31.5–36.5)
MCV RBC AUTO: 95 FL (ref 78–100)
MONOCYTES # BLD AUTO: 0.8 10E3/UL (ref 0–1.3)
MONOCYTES NFR BLD AUTO: 5 %
NEUTROPHILS # BLD AUTO: 10.7 10E3/UL (ref 1.6–8.3)
NEUTROPHILS NFR BLD AUTO: 72 %
NITRATE UR QL: NEGATIVE
NRBC # BLD AUTO: 0 10E3/UL
NRBC BLD AUTO-RTO: 0 /100
PH UR STRIP: 7 [PH] (ref 5–9)
PLATELET # BLD AUTO: 200 10E3/UL (ref 150–450)
POTASSIUM SERPL-SCNC: 4.1 MMOL/L (ref 3.4–5.3)
PROTHROMBIN TIME: 12.9 SECONDS (ref 11.8–14.8)
RBC # BLD AUTO: 4.03 10E6/UL (ref 3.8–5.2)
SODIUM SERPL-SCNC: 141 MMOL/L (ref 135–145)
SP GR UR STRIP: 1.02 (ref 1–1.03)
TROPONIN T SERPL HS-MCNC: 21 NG/L
UROBILINOGEN UR STRIP-MCNC: NORMAL MG/DL
WBC # BLD AUTO: 14.8 10E3/UL (ref 4–11)

## 2025-05-24 PROCEDURE — 85610 PROTHROMBIN TIME: CPT

## 2025-05-24 PROCEDURE — 36415 COLL VENOUS BLD VENIPUNCTURE: CPT | Performed by: STUDENT IN AN ORGANIZED HEALTH CARE EDUCATION/TRAINING PROGRAM

## 2025-05-24 PROCEDURE — 81003 URINALYSIS AUTO W/O SCOPE: CPT | Performed by: FAMILY MEDICINE

## 2025-05-24 PROCEDURE — 80048 BASIC METABOLIC PNL TOTAL CA: CPT

## 2025-05-24 PROCEDURE — 85730 THROMBOPLASTIN TIME PARTIAL: CPT

## 2025-05-24 PROCEDURE — 70496 CT ANGIOGRAPHY HEAD: CPT | Mod: 26 | Performed by: RADIOLOGY

## 2025-05-24 PROCEDURE — 70496 CT ANGIOGRAPHY HEAD: CPT

## 2025-05-24 PROCEDURE — 85025 COMPLETE CBC W/AUTO DIFF WBC: CPT

## 2025-05-24 PROCEDURE — 70498 CT ANGIOGRAPHY NECK: CPT

## 2025-05-24 PROCEDURE — 96374 THER/PROPH/DIAG INJ IV PUSH: CPT | Mod: XU

## 2025-05-24 PROCEDURE — 99285 EMERGENCY DEPT VISIT HI MDM: CPT

## 2025-05-24 PROCEDURE — 84484 ASSAY OF TROPONIN QUANT: CPT

## 2025-05-24 PROCEDURE — 250N000011 HC RX IP 250 OP 636

## 2025-05-24 PROCEDURE — 250N000009 HC RX 250

## 2025-05-24 PROCEDURE — 93005 ELECTROCARDIOGRAM TRACING: CPT

## 2025-05-24 PROCEDURE — 70450 CT HEAD/BRAIN W/O DYE: CPT | Mod: 26 | Performed by: RADIOLOGY

## 2025-05-24 PROCEDURE — 99291 CRITICAL CARE FIRST HOUR: CPT | Mod: 25

## 2025-05-24 PROCEDURE — 93010 ELECTROCARDIOGRAM REPORT: CPT | Performed by: INTERNAL MEDICINE

## 2025-05-24 PROCEDURE — 70498 CT ANGIOGRAPHY NECK: CPT | Mod: 26 | Performed by: RADIOLOGY

## 2025-05-24 PROCEDURE — 250N000011 HC RX IP 250 OP 636: Performed by: FAMILY MEDICINE

## 2025-05-24 PROCEDURE — 70450 CT HEAD/BRAIN W/O DYE: CPT

## 2025-05-24 RX ORDER — IOPAMIDOL 755 MG/ML
75 INJECTION, SOLUTION INTRAVASCULAR ONCE
Status: COMPLETED | OUTPATIENT
Start: 2025-05-24 | End: 2025-05-24

## 2025-05-24 RX ADMIN — IOPAMIDOL 75 ML: 755 INJECTION, SOLUTION INTRAVENOUS at 18:40

## 2025-05-24 RX ADMIN — NICARDIPINE HYDROCHLORIDE 2.5 MG/HR: 0.2 INJECTION, SOLUTION INTRAVENOUS at 18:52

## 2025-05-24 RX ADMIN — SODIUM CHLORIDE 70 ML: 9 INJECTION, SOLUTION INTRAVENOUS at 18:40

## 2025-05-24 ASSESSMENT — COLUMBIA-SUICIDE SEVERITY RATING SCALE - C-SSRS
2. HAVE YOU ACTUALLY HAD ANY THOUGHTS OF KILLING YOURSELF IN THE PAST MONTH?: NO
1. IN THE PAST MONTH, HAVE YOU WISHED YOU WERE DEAD OR WISHED YOU COULD GO TO SLEEP AND NOT WAKE UP?: NO
6. HAVE YOU EVER DONE ANYTHING, STARTED TO DO ANYTHING, OR PREPARED TO DO ANYTHING TO END YOUR LIFE?: NO

## 2025-05-24 ASSESSMENT — ENCOUNTER SYMPTOMS
VOMITING: 1
HEADACHES: 1
WEAKNESS: 1
NAUSEA: 1

## 2025-05-24 ASSESSMENT — ACTIVITIES OF DAILY LIVING (ADL): ADLS_ACUITY_SCORE: 58

## 2025-05-24 NOTE — ED NOTES
[facility]    Stroke Telephone Note    I was called by Farshad Villeda on 05/24/25 regarding patient Maragret Batista. The patient is a 79 year old  with past medical history of RA, ischemic stroke, HTN had a fall couple of days ago, she is here for headache since about 5 pm which is retroorbital in nature. Reportedly alert and oriented in ED. Not on any anticoagulation.     Vitals  BP: (!) 172/92   Pulse: 69   Resp: 20       Weight: 73.9 kg (163 lb)    Stroke Code Data (for stroke code without tele)  Stroke code activated 05/24/25  1820   Stroke provider first response 05/24/25  1820   Last known normal 05/24/25   (unknown)      Time of discovery (or onset of symptoms) 05/24/25  1700   Head CT read by Stroke Neuro Provider 05/24/25  1842   Was stroke code de-escalated? No           Imaging Findings  CT head: R hemispheric SDH with midline shift   CTA head/neck: no lvo    Intravenous Thrombolysis  Not given due to:   - ICH    Endovascular Treatment  Not initiated due to absence of proximal vessel occlusion    Impression  R SDH w/ midline shift likely traumatic   Cerebral edema   Headache     Recommendations   Acute Hemorrhagic Stroke Recommendations  - Neurochecks and Vital Signs every 1 hr  - Stat NeuroSx consult  - Transfer to [place] via air for potential emergent neurosurgery   - Consider Mannitol 1g/kg iv for decline in mentation   - Systolic BP Goal: 130-150  - Head of bed elevated  - Telemetry, EKG  - Imaging: Repeat  CT head in 4 hrs   - Bedside Glucose Monitoring  - A1c, Troponin x 3  - PT/OT/SLP  - Stroke Education  - Euthermia, Euglycemia  - Avoid antithrombotics     My recommendations are based on the information provided over the phone by Margaret Batista's in-person providers. They are not intended to replace the clinical judgment of her in-person providers. I was not requested to personally see or examine the patient at this time.     The Stroke Staff is   "Mayra Lama MD  Vascular Neurology Fellow    To page me or covering stroke neurology team member, click here: [facility]COM  Choose \"On Call\" tab at top, then select \"NEUROLOGY/ALL SITES\" from middle drop-down box, press Enter, then look for \"stroke\" or \"telestroke\" for your site.   "

## 2025-05-24 NOTE — ED PROVIDER NOTES
"  History     Chief Complaint   Patient presents with    Cerebrovascular Accident     HPI  Margaret Batista is a 79 year old female who presents via private vehicle from home with  and son with a severe headache and vomiting. Pt's daughter informed the nurse that approximately 1.5 hours ago she complained of a severe headache behind her eyes and vomited. She then became pale and went limp. Pt's daughter reports pt she was \"off\" and had word finding difficulty all day today and became more confused this afternoon. Pt's spouse at bedside reports pt fell and hit her head in the night approximately 3-4 nights ago. She has been seeing shadows out of her left eye for the past two days. She is lethargic. She is able to tell me she is in the emergency room. She is ashen in color. Tier 1 code stroke called.     Allergies:  Allergies   Allergen Reactions    Plavix [Clopidogrel]      Plavix resistant-tested at [place]    Enalapril Cough    Food      Macadamia nuts make mouth itch    Losartan      Allergic rxn with hives and angioedema suspected to be from alternative manufacture of the effient or the losartan       Problem List:    Patient Active Problem List    Diagnosis Date Noted    Pain of right upper arm 02/17/2025     Priority: Medium    Scalp laceration, initial encounter 02/17/2025     Priority: Medium    Hip fracture, right, closed, initial encounter (H) 02/17/2025     Priority: Medium    Compression fracture of L1 vertebra with routine healing 08/20/2024     Priority: Medium    S/P total left hip arthroplasty 07/24/2024     Priority: Medium    Long term (current) use of anticoagulants 07/22/2024     Priority: Medium    Facial pain 07/22/2024     Priority: Medium    Activities involving dishwashing 07/22/2024     Priority: Medium    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 (H) 07/22/2024     Priority: Medium    Accidental fall, initial encounter 07/22/2024     Priority: Medium    Left " displaced femoral neck fracture (H) 07/19/2024     Priority: Medium    Right renal mass 07/08/2024     Priority: Medium    Cerebral microvascular disease 05/23/2024     Priority: Medium    S/P angioplasty with stent 05/23/2024     Priority: Medium    Parkinsonism associated with infarction of basal ganglia (H) 05/23/2024     Priority: Medium    Basal ganglia infarction (H) 05/23/2024     Priority: Medium    Non morbid obesity due to excess calories 05/23/2024     Priority: Medium    History of ischemic left MCA stroke 05/18/2024     Priority: Medium    Acute ischemic left MCA stroke (H) 04/14/2024     Priority: Medium    Left carotid stenosis 04/14/2024     Priority: Medium    TIA (transient ischemic attack) 04/13/2024     Priority: Medium    Word finding difficulty 04/13/2024     Priority: Medium    Hypertensive 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 and stage 1 through stage 4 chronic kidney disease, or unspecified chronic kidney disease (H) 01/30/2024     Priority: Medium    History of coronary artery stent placement in 2007 and 2013 08/15/2023     Priority: Medium    History of cardiac cath 08/15/2023     Priority: Medium    Right hand weakness 05/31/2023     Priority: Medium    Immunocompromised 06/11/2022     Priority: Medium    Seborrheic keratosis 07/12/2021     Priority: Medium    Neuropathy of hand 06/18/2021     Priority: Medium    Chronic kidney disease, stage 3 (H) 06/18/2021     Priority: Medium    Primary osteoarthritis of both hands 05/30/2017     Priority: Medium    Mass of finger 05/26/2017     Priority: Medium    Osteopenia 11/21/2016     Priority: Medium    Allergic rhinitis 02/01/2016     Priority: Medium    Status post total shoulder replacement, right 05/28/2015     Priority: Medium    S/P shoulder surgery 02/10/2015     Priority: Medium    Mixed hyperlipidemia 01/16/2015     Priority: Medium    Benign essential hypertension 01/16/2015     Priority: Medium    Rotator cuff  tendonitis, right 2014     Priority: Medium    Shoulder impingement, right 2014     Priority: Medium    Right shoulder pain 10/14/2014     Priority: Medium    ASCVD (arteriosclerotic cardiovascular disease) 10/28/2013     Priority: Medium     Overview:   -s/p ADÁN to the mid-LAD, mid-circumflex, mid-right coronary artery, proximal right coronary artery, and PTCA of a marginal branch of the right coronary artery 2007.  -s/p ADÁN to proximal left anterior descending 10/29/13.      Rheumatoid arthritis (H) 10/25/2013     Priority: Medium    DJD (degenerative joint disease) of knee 2011     Priority: Medium    Hx of gout 10/27/2009     Priority: Medium        Past Medical History:    Past Medical History:   Diagnosis Date    Atherosclerotic heart disease of native coronary artery without angina pectoris     Contusion of abdominal wall     Essential (primary) hypertension     Gout     Hyperlipidemia     Localized swelling, mass, or lump of upper extremity     Other specified counseling     Polyosteoarthritis     Postmenopausal bleeding     Presence of coronary angioplasty implant and graft     Primary osteoarthritis of left hand     Rheumatoid arthritis (H)        Past Surgical History:    Past Surgical History:   Procedure Laterality Date    ARTHROPLASTY HIP ANTERIOR Left 2024    Procedure: ARTHROPLASTY, HIP, TOTAL, DIRECT ANTERIOR APPROACH;  Surgeon: Shashi Topete MD;  Location:  OR    ARTHROPLASTY KNEE      2011    ARTHROSCOPY KNEE          BIOPSY BREAST      10/25/95,BRIAN RAMIREZ    CAROTID ARTERY ANGIOPLASTY Left 04/15/2024    [facility]     SECTION      1974, Section    CLOSED REDUCTION, PERCUTANEOUS PINNING HIP Right 2025    Procedure: Closed reduction and cephalomedullary nail right intertrochanteric femur fracture,;  Surgeon: Jb Alston MD;  Location: [facility] OR    COLONOSCOPY      05,Normal - Repeat in 10 years     COLONOSCOPY  04/14/2016 04/14/2016    COLONOSCOPY N/A 01/09/2019    Procedure: COLONOSCOPY;  Surgeon: Evelin Thompson MD;  Location:  OR    COLONOSCOPY N/A 01/09/2019    Procedure: COMBINED COLONOSCOPY, SINGLE OR MULTIPLE BIOPSY/POLYPECTOMY BY BIOPSY;  Surgeon: Evelin Thompson MD;  Location: [facility] OR    ENDOSCOPIC RELEASE CARPAL TUNNEL Right 01/13/2023    Procedure: RELEASE, CARPAL TUNNEL, ENDOSCOPIC;  Surgeon: Danial Stanley MD;  Location: [facility] OR    ESOPHAGOSCOPY, GASTROSCOPY, DUODENOSCOPY (EGD), COMBINED      5/2/2011,erosive gastritis;bx;consider MRI/A;[facility]    EXTRACTION(S) DENTAL      1970    HEART CATH, ANGIOPLASTY      10/29/2013,ADÁN to proximal left anterior descending    OTHER SURGICAL HISTORY      2007/2013,022606,OTHER    OTHER SURGICAL HISTORY      01/2014,XBI591,TOTAL SHOULDER ARTHROPLASTY,Right    OTHER SURGICAL HISTORY      04/14/2016,43696.0,MT COLONOSCOPY REMOVE ELIZA POLYP LESN SNARE,repeat 2019, precancerous polyps    ZZ STRESS LEXISCAN TEST  08/2018    With Dr. Irwin - normal       Family History:    Family History   Problem Relation Age of Onset    Heart Disease Mother         Heart Disease    Hypertension Mother         Hypertension    Other - See Comments Mother         Stroke    Breast Cancer Mother 53        Cancer-breast    Arthritis Father         Arthritis    Cancer Father         Cancer    Heart Disease Father         Heart Disease    Hypertension Father         Hypertension    Atrial fibrillation Sister     Melanoma Brother     Heart Disease Brother         Heart Disease       Social History:  Marital Status:   [2]  Social History     Tobacco Use    Smoking status: Never     Passive exposure: Past    Smokeless tobacco: Never    Tobacco comments:     Passive exposure in childhood home.    Vaping Use    Vaping status: Never Used   Substance Use Topics    Alcohol use: Not Currently    Drug use: No        Medications:    acetaminophen (TYLENOL) 325 MG tablet  albuterol (PROAIR  "HFA/PROVENTIL HFA/VENTOLIN HFA) 108 (90 Base) MCG/ACT inhaler  allopurinol (ZYLOPRIM) 300 MG tablet  atenolol (TENORMIN) 100 MG tablet  atorvastatin (LIPITOR) 80 MG tablet  cetirizine (ZYRTEC) 5 MG tablet  DULoxetine (CYMBALTA) 20 MG capsule  folic acid (FOLVITE) 1 MG tablet  hydroxychloroquine (PLAQUENIL) 200 MG tablet  hydroxychloroquine (PLAQUENIL) 200 MG tablet  isosorbide mononitrate (IMDUR) 30 MG 24 hr tablet  methotrexate 2.5 MG tablet  nitroGLYcerin (NITROSTAT) 0.4 MG sublingual tablet      Review of Systems   Gastrointestinal:  Positive for nausea and vomiting.   Skin:  Positive for pallor.   Neurological:  Positive for weakness and headaches.   All other systems reviewed and are negative.    Physical Exam   BP: (!) 172/92  Pulse: 69  Resp: 20  Height: 170.2 cm (5' 7\")  Weight: 73.9 kg (163 lb)  SpO2: 92 %    Physical Exam  Vitals and nursing note reviewed.   Constitutional:       Appearance: She is ill-appearing.      Comments: Brief assessment prior to CT.    HENT:      Nose: Nose normal.      Mouth/Throat:      Mouth: Mucous membranes are moist.   Eyes:      General: Visual field deficit present.      Extraocular Movements: Extraocular movements intact.      Conjunctiva/sclera: Conjunctivae normal.      Comments: Seeing \"shadows\" out of her left eye for the past 2 days.    Cardiovascular:      Rate and Rhythm: Normal rate and regular rhythm.      Pulses: Normal pulses.      Heart sounds: Normal heart sounds.   Pulmonary:      Effort: Pulmonary effort is normal.      Breath sounds: Normal breath sounds.   Skin:     Coloration: Skin is ashen.      Findings: Bruising present.   Neurological:      Mental Status: She is alert.      GCS: GCS eye subscore is 4. GCS verbal subscore is 5. GCS motor subscore is 6.      Sensory: Sensation is intact.      Motor: Weakness present.      Comments: Right sided weakness and drift. Difficult to assess what is acute vs deficit from prior CVA.               Results for " orders placed or performed during the hospital encounter of 05/24/25 (from the past 24 hours)   [place] Draw    Narrative    The following orders were created for panel order [place] Draw.  Procedure                               Abnormality         Status                     ---------                               -----------         ------                     Extra Blue Top Tube[[id number]]                             Final result               Extra Red Top Tube[[id number]]                              Final result               Extra Green Top (Lithiu...[[id number]]                      Final result               Extra Purple Top Tube[[id number]]                           Final result               Extra Green Top (Lithiu...[[id number]]                      Final result                 Please view results for these tests on the individual orders.   Extra Blue Top Tube   Result Value Ref Range    Hold Specimen x    Extra Red Top Tube   Result Value Ref Range    Hold Specimen x    Extra Green Top (Lithium Heparin) Tube   Result Value Ref Range    Hold Specimen x    Extra Purple Top Tube   Result Value Ref Range    Hold Specimen x    Extra Green Top (Lithium Heparin) ON ICE   Result Value Ref Range    Hold Specimen x    CBC with Platelets & Differential    Narrative    The following orders were created for panel order CBC with Platelets & Differential.  Procedure                               Abnormality         Status                     ---------                               -----------         ------                     CBC with platelets and ...[[id number]]  Abnormal            Final result                 Please view results for these tests on the individual orders.   Basic metabolic panel   Result Value Ref Range    Sodium 141 135 - 145 mmol/L    Potassium 4.1 3.4 - 5.3 mmol/L    Chloride 103 98 - 107 mmol/L    Carbon Dioxide (CO2) 26 22 - 29 mmol/L    Anion Gap 12 7 - 15 mmol/L    Urea Nitrogen 20.0 8.0 -  23.0 mg/dL    Creatinine 0.82 0.51 - 0.95 mg/dL    GFR Estimate 73 >60 mL/min/1.73m2    Calcium 9.4 8.8 - 10.4 mg/dL    Glucose 102 (H) 70 - 99 mg/dL   INR   Result Value Ref Range    INR 0.94 0.85 - 1.15    PT 12.9 11.8 - 14.8 Seconds   Partial thromboplastin time   Result Value Ref Range    aPTT 26 22 - 38 Seconds   Troponin T, High Sensitivity   Result Value Ref Range    Troponin T, High Sensitivity 21 (H) <=14 ng/L   CBC with platelets and differential   Result Value Ref Range    WBC Count 14.8 (H) 4.0 - 11.0 10e3/uL    RBC Count 4.03 3.80 - 5.20 10e6/uL    Hemoglobin 12.4 11.7 - 15.7 g/dL    Hematocrit 38.3 35.0 - 47.0 %    MCV 95 78 - 100 fL    MCH 30.8 26.5 - 33.0 pg    MCHC 32.4 31.5 - 36.5 g/dL    RDW 15.9 (H) 10.0 - 15.0 %    Platelet Count 200 150 - 450 10e3/uL    % Neutrophils 72 %    % Lymphocytes 20 %    % Monocytes 5 %    % Eosinophils 2 %    % Basophils 0 %    % Immature Granulocytes 0 %    NRBCs per 100 WBC 0 <1 /100    Absolute Neutrophils 10.7 (H) 1.6 - 8.3 10e3/uL    Absolute Lymphocytes 3.0 0.8 - 5.3 10e3/uL    Absolute Monocytes 0.8 0.0 - 1.3 10e3/uL    Absolute Eosinophils 0.3 0.0 - 0.7 10e3/uL    Absolute Basophils 0.0 0.0 - 0.2 10e3/uL    Absolute Immature Granulocytes 0.1 <=0.4 10e3/uL    Absolute NRBCs 0.0 10e3/uL   CT Head w/o Contrast    Narrative    EXAM: CT HEAD W/O CONTRAST, CTA HEAD NECK W CONTRAST  LOCATION: [facility]  DATE: 05/24/2025    INDICATION: Code Stroke, rule-out hemorrhage and evaluate for potential thrombolysis thrombectomy. PLEASE READ IMMEDIATELY.  COMPARISON: MRI brain 02/19/2025. CTA [place] [facility] [place] and neck 02/19/2025.  CONTRAST: Isovue 370: 75 mL.  TECHNIQUE: Head and neck CT angiogram with IV contrast. Noncontrast head CT followed by axial helical CT images of the head and neck vessels obtained during the arterial phase of intravenous contrast administration. Axial 2D reconstructed images and   multiplanar 3D MIP reconstructed images of the  head and neck vessels were performed by the technologist. Dose reduction techniques were used. All stenosis measurements made according to NASCET criteria unless otherwise specified.    FINDINGS:     NONCONTRAST HEAD CT:   INTRACRANIAL CONTENTS: There is a large hyperattenuating hemorrhage extending along the right frontoparietal and temporal inner tables measuring up to 16 mm in thickness along the anterolateral right frontal lobe. There is associated mass effect with   right-sided sulcal effacement, effacement of the right lateral ventricle, and approximately 12 mm right-to-left midline shift. Gray-white matter differentiation is grossly preserved. Small chronic infarcts are again seen within the left basal ganglia.   Mild presumed sequela of chronic microvascular ischemic change, similar to prior.    Cerebellar tonsils are normally positioned. Sella is unremarkable for technique. Corpus callosum is normally formed.    VISUALIZED ORBITS/SINUSES/MASTOIDS: No intraorbital abnormality. No paranasal sinus mucosal disease. No middle ear or mastoid effusion.    BONES/SOFT TISSUES: Calvarium is intact without fracture or suspicious lytic or blastic foci.      HEAD CTA:  ANTERIOR CIRCULATION: Moderately heavy atherosclerotic calcifications are seen within both carotid siphons. Mild associated luminal narrowing. Anterior and middle cerebral arteries are patent. No vascular cut-off, aneurysm, or flow-limiting stenosis.    POSTERIOR CIRCULATION: Left vertebral artery is larger than the right. Mild atherosclerotic plaque is seen within the intradural vertebral arteries, left greater than right. Mild to moderate associated left-sided luminal narrowing. Basilar artery and   posterior cerebral arteries are patent. Posterior communicating arteries are not identified.    DURAL VENOUS SINUSES: Dural venous sinuses are not well evaluated on the basis of this exam.      NECK CTA:  RIGHT CAROTID: Moderate atherosclerotic plaque right  carotid bulb. Mild associated luminal narrowing, less than 30% by NASCET criteria.    LEFT CAROTID: Patient is status post stenting of the distal left common carotid artery and left carotid bulb/bifurcation. There is moderate AP flattening of the stent with approximately 60% stenosis by NASCET criteria.    VERTEBRAL ARTERIES: Atherosclerotic plaque mildly narrows the take-off of the right vertebral artery. Cervical vertebral arteries are otherwise widely patent without findings for significant stenosis or dissection. Left vertebral artery is slightly   larger than the right.    AORTIC ARCH: Mild atherosclerotic plaque is seen within the aortic arch and proximal great vessels.    NONVASCULAR STRUCTURES: Visualized lung apices reveal patchy mosaic airspace opacities suggesting air trapping. There is stable enlargement of the thyroid gland, with the left lobe much larger than the right. Scattered calcifications are seen internally   within the gland. Appearance is stable compared to the 02/19/2025 prior.        Impression    IMPRESSION:   HEAD CT:  1.  Large right-sided subdural hematoma with associated mass effect and approximately 12 mm right-to-left midline shift.  2.  Chronic infarcts left basal ganglia.    HEAD CTA:  1.  No vascular cut-off, aneurysm, or flow-limiting stenosis.  2.  Mild to moderate atherosclerotic narrowing of the intradural left vertebral artery.    NECK CTA:  1.  Prior stenting of the distal left common carotid artery and left carotid bulb/bifurcation. There is moderate AP flattening of the stent with approximately 60% stenosis by NASCET criteria.  2.  Mild atherosclerotic plaque right carotid bulb with mild associated luminal narrowing.  3.  Mild atherosclerotic narrowing of the take-off of the right vertebral artery.      Intracranial hemorrhage and lack of vascular cut-off on CTA communicated to Dr. Villeda at 1844 hours.   CTA Head Neck with Contrast    Narrative    EXAM: CT HEAD W/O  CONTRAST, CTA HEAD NECK W CONTRAST  LOCATION: [facility]  DATE: 05/24/2025    INDICATION: Code Stroke, rule-out hemorrhage and evaluate for potential thrombolysis thrombectomy. PLEASE READ IMMEDIATELY.  COMPARISON: MRI brain 02/19/2025. CTA [place] [facility] [place] and neck 02/19/2025.  CONTRAST: Isovue 370: 75 mL.  TECHNIQUE: Head and neck CT angiogram with IV contrast. Noncontrast head CT followed by axial helical CT images of the head and neck vessels obtained during the arterial phase of intravenous contrast administration. Axial 2D reconstructed images and   multiplanar 3D MIP reconstructed images of the head and neck vessels were performed by the technologist. Dose reduction techniques were used. All stenosis measurements made according to NASCET criteria unless otherwise specified.    FINDINGS:     NONCONTRAST HEAD CT:   INTRACRANIAL CONTENTS: There is a large hyperattenuating hemorrhage extending along the right frontoparietal and temporal inner tables measuring up to 16 mm in thickness along the anterolateral right frontal lobe. There is associated mass effect with   right-sided sulcal effacement, effacement of the right lateral ventricle, and approximately 12 mm right-to-left midline shift. Gray-white matter differentiation is grossly preserved. Small chronic infarcts are again seen within the left basal ganglia.   Mild presumed sequela of chronic microvascular ischemic change, similar to prior.    Cerebellar tonsils are normally positioned. Sella is unremarkable for technique. Corpus callosum is normally formed.    VISUALIZED ORBITS/SINUSES/MASTOIDS: No intraorbital abnormality. No paranasal sinus mucosal disease. No middle ear or mastoid effusion.    BONES/SOFT TISSUES: Calvarium is intact without fracture or suspicious lytic or blastic foci.      HEAD CTA:  ANTERIOR CIRCULATION: Moderately heavy atherosclerotic calcifications are seen within both carotid siphons. Mild associated luminal  narrowing. Anterior and middle cerebral arteries are patent. No vascular cut-off, aneurysm, or flow-limiting stenosis.    POSTERIOR CIRCULATION: Left vertebral artery is larger than the right. Mild atherosclerotic plaque is seen within the intradural vertebral arteries, left greater than right. Mild to moderate associated left-sided luminal narrowing. Basilar artery and   posterior cerebral arteries are patent. Posterior communicating arteries are not identified.    DURAL VENOUS SINUSES: Dural venous sinuses are not well evaluated on the basis of this exam.      NECK CTA:  RIGHT CAROTID: Moderate atherosclerotic plaque right carotid bulb. Mild associated luminal narrowing, less than 30% by NASCET criteria.    LEFT CAROTID: Patient is status post stenting of the distal left common carotid artery and left carotid bulb/bifurcation. There is moderate AP flattening of the stent with approximately 60% stenosis by NASCET criteria.    VERTEBRAL ARTERIES: Atherosclerotic plaque mildly narrows the take-off of the right vertebral artery. Cervical vertebral arteries are otherwise widely patent without findings for significant stenosis or dissection. Left vertebral artery is slightly   larger than the right.    AORTIC ARCH: Mild atherosclerotic plaque is seen within the aortic arch and proximal great vessels.    NONVASCULAR STRUCTURES: Visualized lung apices reveal patchy mosaic airspace opacities suggesting air trapping. There is stable enlargement of the thyroid gland, with the left lobe much larger than the right. Scattered calcifications are seen internally   within the gland. Appearance is stable compared to the 02/19/2025 prior.        Impression    IMPRESSION:   HEAD CT:  1.  Large right-sided subdural hematoma with associated mass effect and approximately 12 mm right-to-left midline shift.  2.  Chronic infarcts left basal ganglia.    HEAD CTA:  1.  No vascular cut-off, aneurysm, or flow-limiting stenosis.  2.  Mild to  "moderate atherosclerotic narrowing of the intradural left vertebral artery.    NECK CTA:  1.  Prior stenting of the distal left common carotid artery and left carotid bulb/bifurcation. There is moderate AP flattening of the stent with approximately 60% stenosis by NASCET criteria.  2.  Mild atherosclerotic plaque right carotid bulb with mild associated luminal narrowing.  3.  Mild atherosclerotic narrowing of the take-off of the right vertebral artery.      Intracranial hemorrhage and lack of vascular cut-off on CTA communicated to Dr. Villeda at 1844 hours.       Medications   niCARdipine 40 mg in 200 mL NS (CARDENE) infusion (0 mg/hr Intravenous ED/Periop/Clinic Infusing on Admission/transfer 5/24/25 1905)   iopamidol (ISOVUE-370) solution 75 mL (75 mLs Intravenous $Given 5/24/25 1840)     And   sodium chloride 0.9 % bag for CT scan flush (70 mLs As instructed $Given 5/24/25 1840)       Assessments & Plan (with Medical Decision Making)  Margaret Batista is a 79 year old female who presents via private vehicle from home with  and son with a severe headache and vomiting. Pt's daughter informed the nurse that approximately 1.5 hours ago she complained of a severe headache behind her eyes and vomited. She then became pale and went limp. Pt's daughter reports pt she was \"off\" and had word finding difficulty all day today and became more confused this afternoon. Pt's spouse at bedside reports pt fell and hit her head in the night approximately 3-4 nights ago. She has been seeing shadows out of her left eye for the past two days. She is lethargic. She is able to tell me she is in the emergency room. She is ashen in color. Tier 1 code stroke called.   VS in the ED. BP (!) 167/86   Pulse 73   Resp (!) 7   Ht 1.702 m (5' 7\")   Wt 73.9 kg (163 lb)   LMP 01/01/1998 (Within Months)   SpO2 100%   BMI 25.53 kg/m    Diagnostics:    Lab: WBC 14.8. PTT 26. INR 0.94. PT 12.9.     EKG  Rhythm- normal sinus rhythm "   Rate- 69  Axis  Any abnormal   No significant changes when compared to previous EKG.   I have independently reviewed and interpreted today's EKG, pending cardiologist over read.    1622- I spoke with stroke neurology, CT head, CTA head neck ordered. Will call back after imaging results.   1630- CT head reveals right subdural with a shift.   1635- I updated the pt's spouse, son in law, and daughter on the imaging results. Pt's spouse and daughter would like the pt transferred to [facility]. Nursing staff checking on flight status.   1638- Stat doc at [facility] stroke neurology.   1640- Transferred pt care to Dr. Villeda. Hx left MCA stroke in the past with right sided weakness.      I have reviewed the nursing notes.    I have reviewed the findings, diagnosis, plan and need for follow up with the patient.  Medical Decision Making  The patient's presentation was of high complexity (an acute health issue posing potential threat to life or bodily function).    The patient's evaluation involved:  an assessment requiring an independent historian (see separate area of note for details)  ordering and/or review of 3+ test(s) in this encounter (see separate area of note for details)  discussion of management or test interpretation with another health professional (see separate area of note for details)    The patient's management necessitated moderate risk (prescription drug management including medications given in the ED), moderate risk (IV contrast administration), high risk (a decision regarding hospitalization), and further care after sign-out to Dr. Villeda (see their note for further management).    Final diagnoses:   Subdural hematoma (H) - right side   Fall at home, initial encounter     5/24/2025   [facility], APRN CNP  05/24/25 1924

## 2025-05-24 NOTE — ED TRIAGE NOTES
Pt here with family, pt reports a severe HA that started around 1730 this afternoon, pt started vomiting, pt was lethargic and needed assistance from the car, pt has a hx of a CVA and recent falls, VSS, pt into bay 2, tier 1 stroke code was called, pt fell 3 days ago     Triage Assessment (Adult)       Row Name 05/24/25 8001          Triage Assessment    Airway WDL WDL        Respiratory WDL    Respiratory WDL WDL        Skin Circulation/Temperature WDL    Skin Circulation/Temperature WDL WDL        Cardiac WDL    Cardiac WDL WDL        Peripheral/Neurovascular WDL    Peripheral Neurovascular WDL WDL        Cognitive/Neuro/Behavioral WDL    Cognitive/Neuro/Behavioral WDL WDL

## 2025-05-24 NOTE — ED PROVIDER NOTES
"05/24/25   6:44 PM     I am accepting the care of this patient from Monica Mcintosh.  Margaret Batista is a 80 yo female here after LOC. She had a fall 3 or 4 days ago at night.  She has been telling family that she sees shadows on her left side for the past two days.  Today she had headache, vomiting and change in mental status. Family brought her here and staff had to physically lift her from the car.     She is not on blood thinners.  She has a history of right sided weakness after left MCA stroke.     VS in the ED BP (!) 172/92   Pulse 69   Resp 20   Ht 1.702 m (5' 7\")   Wt 73.9 kg (163 lb)   LMP 01/01/1998 (Within Months)   SpO2 92%   BMI 25.53 kg/m        She is awake and alert and talking to me.  She has no new neuro deficits on exam today.    Labs show CBC with WBC 14,800, BMP okay, troponin 21, INR 0.94, pTT 26.    CT shows she has a large right sided subdural hematoma.  CTA: no new findings    I spoke with she and her family (, daughter, son in law) about this and they all want everything done.  She is alert and talkative and very able to make her own decisions.  They want her to go to [facility]     ED Course    6:46 PM  I got a call from [facility] who wanted us to talk to [facility] before they were willing to accept the patient.  I got a call from radiology that she has a large right subdural that is 16 mm thick and causing 12 mm of midline shift. Images were pushed to [facility].     6:50 PM  I got a call from [facility] and they recommend transfer to [place] by helicopter, they want SBP >150 mmHg and HOB at 30 .     7:02 PM  I spoke with Dr Barragan, trauma at [facility], as well as the neurosurgeon about this case.  They do not feel that she needs to be intubated.  She will come through the ED there.     7:11 PM  She is leaving. The flight crew is okay with her not being intubated.    Diagnosis    (S06.5XAA) Subdural hematoma (H)  Comment: right side    (W19.XXXA, "  Y92.009) Fall at home, initial encounter        Plan: transfer to [facility] by helicopter               Farshad Villeda MD  05/24/25 1911

## 2025-05-26 LAB
ATRIAL RATE - MUSE: 69 BPM
DIASTOLIC BLOOD PRESSURE - MUSE: NORMAL MMHG
INTERPRETATION ECG - MUSE: NORMAL
P AXIS - MUSE: 13 DEGREES
PR INTERVAL - MUSE: 160 MS
QRS DURATION - MUSE: 92 MS
QT - MUSE: 418 MS
QTC - MUSE: 447 MS
R AXIS - MUSE: -40 DEGREES
SYSTOLIC BLOOD PRESSURE - MUSE: NORMAL MMHG
T AXIS - MUSE: 18 DEGREES
VENTRICULAR RATE- MUSE: 69 BPM

## 2025-06-10 DIAGNOSIS — D72.820 MONOCLONAL B-CELL LYMPHOCYTOSIS: Primary | ICD-10-CM

## 2025-06-12 ENCOUNTER — LAB REQUISITION (OUTPATIENT)
Dept: LAB | Facility: CLINIC | Age: 80
End: 2025-06-12
Payer: COMMERCIAL

## 2025-06-12 DIAGNOSIS — D64.9 ANEMIA, UNSPECIFIED: ICD-10-CM

## 2025-06-12 DIAGNOSIS — R79.9 ABNORMAL FINDING OF BLOOD CHEMISTRY, UNSPECIFIED: ICD-10-CM

## 2025-06-13 LAB
ALBUMIN SERPL BCG-MCNC: 3.4 G/DL (ref 3.5–5.2)
ALP SERPL-CCNC: 104 U/L (ref 40–150)
ALT SERPL W P-5'-P-CCNC: 19 U/L (ref 0–50)
ANION GAP SERPL CALCULATED.3IONS-SCNC: 8 MMOL/L (ref 7–15)
AST SERPL W P-5'-P-CCNC: 32 U/L (ref 0–45)
BASOPHILS # BLD AUTO: 0.1 10E3/UL (ref 0–0.2)
BASOPHILS NFR BLD AUTO: 1 %
BILIRUB SERPL-MCNC: 0.3 MG/DL
BUN SERPL-MCNC: 14.8 MG/DL (ref 8–23)
CALCIUM SERPL-MCNC: 9.8 MG/DL (ref 8.8–10.4)
CHLORIDE SERPL-SCNC: 105 MMOL/L (ref 98–107)
CREAT SERPL-MCNC: 0.71 MG/DL (ref 0.51–0.95)
EGFRCR SERPLBLD CKD-EPI 2021: 87 ML/MIN/1.73M2
EOSINOPHIL # BLD AUTO: 0.7 10E3/UL (ref 0–0.7)
EOSINOPHIL NFR BLD AUTO: 7 %
ERYTHROCYTE [DISTWIDTH] IN BLOOD BY AUTOMATED COUNT: 15.5 % (ref 10–15)
GLUCOSE SERPL-MCNC: 92 MG/DL (ref 70–99)
HCO3 SERPL-SCNC: 28 MMOL/L (ref 22–29)
HCT VFR BLD AUTO: 35.3 % (ref 35–47)
HGB BLD-MCNC: 11.1 G/DL (ref 11.7–15.7)
IMM GRANULOCYTES # BLD: 0.1 10E3/UL
IMM GRANULOCYTES NFR BLD: 1 %
LYMPHOCYTES # BLD AUTO: 3.8 10E3/UL (ref 0.8–5.3)
LYMPHOCYTES NFR BLD AUTO: 38 %
MCH RBC QN AUTO: 30.2 PG (ref 26.5–33)
MCHC RBC AUTO-ENTMCNC: 31.4 G/DL (ref 31.5–36.5)
MCV RBC AUTO: 96 FL (ref 78–100)
MONOCYTES # BLD AUTO: 0.5 10E3/UL (ref 0–1.3)
MONOCYTES NFR BLD AUTO: 6 %
NEUTROPHILS # BLD AUTO: 4.7 10E3/UL (ref 1.6–8.3)
NEUTROPHILS NFR BLD AUTO: 48 %
NRBC # BLD AUTO: 0 10E3/UL
NRBC BLD AUTO-RTO: 0 /100
PLATELET # BLD AUTO: 273 10E3/UL (ref 150–450)
POTASSIUM SERPL-SCNC: 3.9 MMOL/L (ref 3.4–5.3)
PROT SERPL-MCNC: 6.2 G/DL (ref 6.4–8.3)
RBC # BLD AUTO: 3.67 10E6/UL (ref 3.8–5.2)
SODIUM SERPL-SCNC: 141 MMOL/L (ref 135–145)
WBC # BLD AUTO: 9.9 10E3/UL (ref 4–11)

## 2025-06-13 PROCEDURE — 85025 COMPLETE CBC W/AUTO DIFF WBC: CPT | Mod: ORL | Performed by: NURSE PRACTITIONER

## 2025-06-13 PROCEDURE — 36415 COLL VENOUS BLD VENIPUNCTURE: CPT | Mod: ORL | Performed by: NURSE PRACTITIONER

## 2025-06-13 PROCEDURE — P9603 ONE-WAY ALLOW PRORATED MILES: HCPCS | Mod: ORL | Performed by: NURSE PRACTITIONER

## 2025-06-13 PROCEDURE — 80053 COMPREHEN METABOLIC PANEL: CPT | Mod: ORL | Performed by: NURSE PRACTITIONER

## 2025-06-17 DIAGNOSIS — I13.0 HYPERTENSIVE HEART AND CHRONIC KIDNEY DISEASE WITH HEART FAILURE AND STAGE 1 THROUGH STAGE 4 CHRONIC KIDNEY DISEASE, OR UNSPECIFIED CHRONIC KIDNEY DISEASE (H): ICD-10-CM

## 2025-06-17 DIAGNOSIS — M10.9 GOUT, UNSPECIFIED CAUSE, UNSPECIFIED CHRONICITY, UNSPECIFIED SITE: ICD-10-CM

## 2025-06-18 RX ORDER — ALLOPURINOL 300 MG/1
1 TABLET ORAL DAILY
Qty: 90 TABLET | Refills: 1 | Status: SHIPPED | OUTPATIENT
Start: 2025-06-18

## 2025-06-18 RX ORDER — ATENOLOL 100 MG/1
100 TABLET ORAL DAILY
Qty: 90 TABLET | Refills: 1 | Status: SHIPPED | OUTPATIENT
Start: 2025-06-18

## 2025-06-18 NOTE — TELEPHONE ENCOUNTER
[facility] sent Rx request for the following:      Requested Prescriptions   Pending Prescriptions Disp Refills    allopurinol (ZYLOPRIM) 300 MG tablet [Pharmacy Med Name: ALLOPURINOL 300 MG TABLET] 90 tablet 0     Sig: TAKE 1 TABLET BY MOUTH EVERY DAY       Gout Agents Protocol Failed - 6/18/2025  2:22 PM        Failed - Has Uric Acid on file in past 12 months and value is less than 6     Recent Labs   Lab Test 08/28/23  1035   URIC 4.1     If level is 6mg/dL or greater, ok to refill one time and refer to provider.      Gout, unspecified cause, unspecified chronicity, unspecified site [M10.9]     Last Prescription Date:   3/21/25  Last Fill Qty/Refills:         90, R-0        atenolol (TENORMIN) 100 MG tablet [Pharmacy Med Name: ATENOLOL 100 MG TABLET] 90 tablet 0     Sig: TAKE 1 TABLET BY MOUTH EVERY DAY       Beta-Blockers Protocol Failed - 6/18/2025  2:22 PM        Failed - Most recent blood pressure under 140/90 in past 12 months     BP Readings from Last 3 Encounters:   05/24/25 (!) 167/86   04/23/25 132/80   02/24/25 (!) 180/78       No data recorded       Hypertensive 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 and stage 1 through stage 4 chronic kidney disease, or unspecified chronic kidney disease (H) [I13.0]     Last Prescription Date:   3/21/25  Last Fill Qty/Refills:         90, R-0      Last Office Visit:                4/23/25 12/4/24 (dx discussed)   Future Office visit:             Next 5 appointments (look out 90 days)      Aug 06, 2025 10:30 AM  (Arrive by 10:15 AM)  Return Visit with Will Pinto MD  [facility] ([facility] [address]  [place] [facility] [address] [phone]         Unable to complete prescription refill per RN Medication Refill Policy.     Agustin Paulino RN on 6/18/2025 at 2:24 PM

## 2025-06-27 ENCOUNTER — TELEPHONE (OUTPATIENT)
Dept: INTERNAL MEDICINE | Facility: OTHER | Age: 80
End: 2025-06-27

## 2025-06-27 ENCOUNTER — TELEPHONE (OUTPATIENT)
Dept: INTERNAL MEDICINE | Facility: OTHER | Age: 80
End: 2025-06-27
Payer: COMMERCIAL

## 2025-06-27 NOTE — TELEPHONE ENCOUNTER
The physical therapy evaluation and treatment that was ordered was completed on 6/27/25      Request for additional Home Care Physical Therapy orders as follows:      Continuation frequency =   ___2___  x week x  ___4___ week(s)    Effective date = 6/30/25      Interventions include:    Gait Training  Muscle strengthening exercises  Balance training  Transfer Training  Education - home safety  Instruction - home exercise program  Therapeutic exercise  Pain assessment & management  Aerobic capacity training  Perform manual procedures/modality treatment                Therapist: Bethany Jones DPT    Please respond with .HOMECAREAGREE, if you are in agreement. Please sign with your comments and signature, if you are not in agreement. Route to the  Home Care pool.

## 2025-06-30 ENCOUNTER — TELEPHONE (OUTPATIENT)
Dept: INTERNAL MEDICINE | Facility: OTHER | Age: 80
End: 2025-06-30
Payer: COMMERCIAL

## 2025-06-30 NOTE — TELEPHONE ENCOUNTER
"  Home Care regulation mandates that you are notified about drug discrepancies, interactions & contraindications. Response within a 24 hour timeframe is established by CMS as \"best practice\" for the delivery of home health care. Home Care is required to report if the 24 hour timeframe was met. The home health clinician will contact you again if this timeframe is not met or if the response does not address all concerns.      Situation:  You are being contacted for clarifying orders related to issues found during medication reconciliation.     Background:  The patient was admitted to [facility]. Upon admission, a med reconciliation was completed to identify any drug discrepancies, interactions or contraindications. Home Care's drug regime review has revealed significant medication issues.     Assessment: A severe interaction was noted between ASA and Methotrexate. Patient started taking ASA when she returned home from the SNF and had been taking it prior to falling and being admitted to the SNF.         Recommendations:    Please evaluate this information and indicate below whether or not changes are required. A full copy of the patient's drug interaction/contraindications report is available upon request.    Gabrielle Woodward RN on 6/30/2025 at 11:13 AM       Provider response/orders as follows:  "

## 2025-07-01 ENCOUNTER — TELEPHONE (OUTPATIENT)
Dept: INTERNAL MEDICINE | Facility: OTHER | Age: 80
End: 2025-07-01
Payer: COMMERCIAL

## 2025-07-01 NOTE — TELEPHONE ENCOUNTER
"S: The patient had a vital sign that was outside of \"normal\" parameters at the end of their home visit 6/30/25 The vital sign was /95    B: You are being contacted because it's best practice for home care clinicians to notify the provider when the patient's vital signs are outside of defined parameters as a best practice of patient care.    [facility]'s vital sign parameters for adults are as follows:        oxygen saturation - less than 90%         blood pressure - less than 95/60 or greater than 140/90         respirations - less than 12 or greater than 20        pulse - less than 60 or greater than 100         temperature - less than 96.8 or greater than 100.4     A: If you are not concerned about the report above and do not want future communications like this, you can change the patient's defined parameters or specify when you would like to be notified by responding to this message.     R: Consider revising parameters or provide follow-up instructions        Please sign this encounter with your electronic signature and route to the  Home Care pool.          "

## 2025-07-02 ENCOUNTER — TELEPHONE (OUTPATIENT)
Dept: INTERNAL MEDICINE | Facility: OTHER | Age: 80
End: 2025-07-02
Payer: COMMERCIAL

## 2025-07-02 NOTE — TELEPHONE ENCOUNTER
The occupational therapy evaluation and treatment that was ordered was completed on 7/1/25    Request for additional Home Care Occupational Therapy orders as follows:        Continuation frequency =   ___2___  x week x  ___4___ week(s)    Effective date = 7/7/25      Interventions include:    ADL skills training  Education - home safety  Therapeutic exercise  Instruction - caregiver training  Cognitive neuro rehab  Adaptive equipment           For therapist: Karishma Fuentes, OTR/L    Please respond with .HOMECAREAGREE, if you are in agreement. Please sign with your comments and signature, if you are not in agreement. Route to the  Home Care pool.

## 2025-07-03 ENCOUNTER — LAB (OUTPATIENT)
Dept: LAB | Facility: OTHER | Age: 80
End: 2025-07-03
Payer: COMMERCIAL

## 2025-07-03 DIAGNOSIS — D72.820 MONOCLONAL B-CELL LYMPHOCYTOSIS: ICD-10-CM

## 2025-07-03 DIAGNOSIS — N28.89 RIGHT RENAL MASS: ICD-10-CM

## 2025-07-03 LAB
ALBUMIN SERPL BCG-MCNC: 3.7 G/DL (ref 3.5–5.2)
ALP SERPL-CCNC: 97 U/L (ref 40–150)
ALT SERPL W P-5'-P-CCNC: 13 U/L (ref 0–50)
ANION GAP SERPL CALCULATED.3IONS-SCNC: 10 MMOL/L (ref 7–15)
AST SERPL W P-5'-P-CCNC: 20 U/L (ref 0–45)
BASOPHILS # BLD AUTO: 0.1 10E3/UL (ref 0–0.2)
BASOPHILS NFR BLD AUTO: 1 %
BILIRUB SERPL-MCNC: 0.8 MG/DL
BUN SERPL-MCNC: 22.8 MG/DL (ref 8–23)
CALCIUM SERPL-MCNC: 9.9 MG/DL (ref 8.8–10.4)
CHLORIDE SERPL-SCNC: 106 MMOL/L (ref 98–107)
CREAT SERPL-MCNC: 0.84 MG/DL (ref 0.51–0.95)
EGFRCR SERPLBLD CKD-EPI 2021: 71 ML/MIN/1.73M2
EOSINOPHIL # BLD AUTO: 0.5 10E3/UL (ref 0–0.7)
EOSINOPHIL NFR BLD AUTO: 5 %
ERYTHROCYTE [DISTWIDTH] IN BLOOD BY AUTOMATED COUNT: 15.1 % (ref 10–15)
GLUCOSE SERPL-MCNC: 100 MG/DL (ref 70–99)
HCO3 SERPL-SCNC: 26 MMOL/L (ref 22–29)
HCT VFR BLD AUTO: 41.1 % (ref 35–47)
HGB BLD-MCNC: 12.9 G/DL (ref 11.7–15.7)
IMM GRANULOCYTES # BLD: 0 10E3/UL
IMM GRANULOCYTES NFR BLD: 0 %
LDH SERPL L TO P-CCNC: 209 U/L (ref 0–250)
LYMPHOCYTES # BLD AUTO: 4 10E3/UL (ref 0.8–5.3)
LYMPHOCYTES NFR BLD AUTO: 44 %
MCH RBC QN AUTO: 30 PG (ref 26.5–33)
MCHC RBC AUTO-ENTMCNC: 31.4 G/DL (ref 31.5–36.5)
MCV RBC AUTO: 96 FL (ref 78–100)
MONOCYTES # BLD AUTO: 0.2 10E3/UL (ref 0–1.3)
MONOCYTES NFR BLD AUTO: 2 %
NEUTROPHILS # BLD AUTO: 4.4 10E3/UL (ref 1.6–8.3)
NEUTROPHILS NFR BLD AUTO: 49 %
NRBC # BLD AUTO: 0 10E3/UL
NRBC BLD AUTO-RTO: 0 /100
PLATELET # BLD AUTO: 173 10E3/UL (ref 150–450)
POTASSIUM SERPL-SCNC: 4.2 MMOL/L (ref 3.4–5.3)
PROT SERPL-MCNC: 6.6 G/DL (ref 6.4–8.3)
RBC # BLD AUTO: 4.3 10E6/UL (ref 3.8–5.2)
SODIUM SERPL-SCNC: 142 MMOL/L (ref 135–145)
WBC # BLD AUTO: 9.1 10E3/UL (ref 4–11)

## 2025-07-03 PROCEDURE — 85004 AUTOMATED DIFF WBC COUNT: CPT | Mod: ZL

## 2025-07-03 PROCEDURE — 83615 LACTATE (LD) (LDH) ENZYME: CPT | Mod: ZL

## 2025-07-03 PROCEDURE — 85014 HEMATOCRIT: CPT | Mod: ZL

## 2025-07-03 PROCEDURE — 36415 COLL VENOUS BLD VENIPUNCTURE: CPT | Mod: ZL

## 2025-07-03 PROCEDURE — 84132 ASSAY OF SERUM POTASSIUM: CPT | Mod: ZL

## 2025-07-03 PROCEDURE — 82947 ASSAY GLUCOSE BLOOD QUANT: CPT | Mod: ZL

## 2025-07-07 ENCOUNTER — TELEPHONE (OUTPATIENT)
Dept: INTERNAL MEDICINE | Facility: OTHER | Age: 80
End: 2025-07-07

## 2025-07-07 ENCOUNTER — VIRTUAL VISIT (OUTPATIENT)
Dept: ONCOLOGY | Facility: OTHER | Age: 80
End: 2025-07-07
Attending: NURSE PRACTITIONER
Payer: COMMERCIAL

## 2025-07-07 DIAGNOSIS — I13.0 HYPERTENSIVE HEART AND CHRONIC KIDNEY DISEASE WITH HEART FAILURE AND STAGE 1 THROUGH STAGE 4 CHRONIC KIDNEY DISEASE, OR UNSPECIFIED CHRONIC KIDNEY DISEASE (H): Primary | ICD-10-CM

## 2025-07-07 DIAGNOSIS — I63.512 ACUTE ISCHEMIC LEFT MCA STROKE (H): ICD-10-CM

## 2025-07-07 DIAGNOSIS — Z86.79 HX OF SUBDURAL HEMATOMA: ICD-10-CM

## 2025-07-07 DIAGNOSIS — N28.89 RENAL MASS: ICD-10-CM

## 2025-07-07 DIAGNOSIS — D72.820 MONOCLONAL B-CELL LYMPHOCYTOSIS: Primary | ICD-10-CM

## 2025-07-07 DIAGNOSIS — E04.1 THYROID NODULE: ICD-10-CM

## 2025-07-07 RX ORDER — SODIUM CHLORIDE 1 G/1
TABLET ORAL
COMMUNITY

## 2025-07-07 NOTE — PROGRESS NOTES
Oncology Follow-up Visit    Reason for Visit:  Sandra is a 79 year old woman with a diagnosis of monoclonal B cell lymphocytosis, who presents to the clinic today for routine follow-up.    Nursing Note and documentation reviewed: yes    Provider location: [place]    Interval History: Doing well from oncology standpoint. No recent or recurrent infections. No bleeding concerns other than below. No night sweats. No abdominal pain. Low appetite but this is her stable since her past stroke. No adenopathy.     Since seeing us, she did fall and develop a subdural hematoma. She was hospitalized from 5/24/-6/10. She has and continues to follow with neurology for this and prior stroke. Continues to follow with urology for past renal mass. Will be seeing Dr. Pinto in August with repeat CT.     Oncologic History: Saw Dr. Pitts in consulation on 05/26/2025. At that time, review of labs done in epic show intermittent elevated white blood cell count dating back to 1/25/2021.  At that time her WBC count was 12.8.  More recently on 5/10/2023 her white cell count was 10.6.     A peripheral smear was ordered by her PCP which showed monotypic B cells on flow cytometry with the absolute monotypic B-cell count of 1300/cumm     She was therefore diagnosed with absolute monocytic B-cell lymphocytosis.     6/8/2023: CT soft tissue neck with contrast:   No cervical adenopathy. There is a large nodule in the left lobe of the thyroid, for which a dedicated thyroid ultrasound could be considered.There is moderate to severe calcified atherosclerosis of the carotid bulbs bilaterally. CT angiogram or carotid ultrasound could be  considered for further evaluation.     6/8/2023: CT chest, abdomen and pelvis with contrast:  No adenopathy in the chest, abdomen, or pelvis. No splenomegaly.There is an enhancing 18 mm mass in the upper pole of the right kidney concerning for renal cell carcinoma.       Current Chemo Regimen/TX:  None,  surveillance    Previous treatment: None    Past Medical History:   Diagnosis Date    Atherosclerotic heart disease of native coronary artery without angina pectoris     -s/p ADÁN to the mid-LAD, mid-circumflex, mid-right coronary artery, proximal right coronary  artery, and PTCA of a marginal branch of the right coronary artery 2007. -s/p ADÁN to proximal left anterior descending 10/29/13.    Contusion of abdominal wall         Essential (primary) hypertension     2006    Gout     No Comments Provided    Hyperlipidemia     2007    Localized swelling, mass, or lump of upper extremity     2017    Other specified counseling     10/28/2013,Patient has identified Health Care Agent(s): Yes Add Health Care Agents: Yes   Health Care Agent(s): Primary Health Care Agent: Jay Batista  Relationship:  Phone:   Secondary Health Care Agent:  Relationship: Daughter Phone:   Conservator:  Relationship:  Phone:   Guardian: Relationship:  Phone:    Patient has Advance Care Plan Documents (Health Care Directive, POLST): No, refe*    Polyosteoarthritis     No Comments Provided    Postmenopausal bleeding     No Comments Provided    Presence of coronary angioplasty implant and graft     ,unstable angina; severe prox LAD stenosis; s/p 7 stents    Primary osteoarthritis of left hand     2017    Rheumatoid arthritis (H)     follows at [facility] yearly       Past Surgical History:   Procedure Laterality Date    ARTHROPLASTY HIP ANTERIOR Left 2024    Procedure: ARTHROPLASTY, HIP, TOTAL, DIRECT ANTERIOR APPROACH;  Surgeon: Shashi Topete MD;  Location: [facility] OR    ARTHROPLASTY KNEE      2011    ARTHROSCOPY KNEE          BIOPSY BREAST      10/25/95,[facility]AN RAMIREZ    CAROTID ARTERY ANGIOPLASTY Left 04/15/2024    [facility]     SECTION      , Section    CLOSED REDUCTION, PERCUTANEOUS PINNING HIP Right 2025    Procedure: Closed reduction and  cephalomedullary nail right intertrochanteric femur fracture,;  Surgeon: Jb Alston MD;  Location: [facility] OR    COLONOSCOPY      11/21/05,Normal - Repeat in 10 years    COLONOSCOPY  04/14/2016 04/14/2016    COLONOSCOPY N/A 01/09/2019    Procedure: COLONOSCOPY;  Surgeon: Evelin Thompson MD;  Location:  OR    COLONOSCOPY N/A 01/09/2019    Procedure: COMBINED COLONOSCOPY, SINGLE OR MULTIPLE BIOPSY/POLYPECTOMY BY BIOPSY;  Surgeon: Evelin Thompson MD;  Location: [facility] OR    ENDOSCOPIC RELEASE CARPAL TUNNEL Right 01/13/2023    Procedure: RELEASE, CARPAL TUNNEL, ENDOSCOPIC;  Surgeon: Danial Stanley MD;  Location:  OR    ESOPHAGOSCOPY, GASTROSCOPY, DUODENOSCOPY (EGD), COMBINED      5/2/2011,erosive gastritis;bx;consider MRI/A;[facility]    EXTRACTION(S) DENTAL      1970    HEART CATH, ANGIOPLASTY      10/29/2013,ADÁN to proximal left anterior descending    OTHER SURGICAL HISTORY      2007/2013,266454,OTHER    OTHER SURGICAL HISTORY      01/2014,COM374,TOTAL SHOULDER ARTHROPLASTY,Right    OTHER SURGICAL HISTORY      04/14/2016,79319.0,DE COLONOSCOPY REMOVE ELIZA POLYP LESN SNARE,repeat 2019, precancerous polyps    ZZ STRESS LEXISCAN TEST  08/2018    With Dr. Irwin - normal       Family History   Problem Relation Age of Onset    Heart Disease Mother         Heart Disease    Hypertension Mother         Hypertension    Other - See Comments Mother         Stroke    Breast Cancer Mother 53        Cancer-breast    Arthritis Father         Arthritis    Cancer Father         Cancer    Heart Disease Father         Heart Disease    Hypertension Father         Hypertension    Atrial fibrillation Sister     Melanoma Brother     Heart Disease Brother         Heart Disease       Social History     Socioeconomic History    Marital status:      Spouse name: rashaad    Number of children: Not on file    Years of education: Not on file    Highest education level: Not on file   Occupational History    Not on file   Tobacco Use     Smoking status: Never     Passive exposure: Past    Smokeless tobacco: Never    Tobacco comments:     Passive exposure in childhood home.    Vaping Use    Vaping status: Never Used   Substance and Sexual Activity    Alcohol use: Not Currently    Drug use: No    Sexual activity: Not Currently     Partners: Male     Birth control/protection: Post-menopausal   Other Topics Concern    Not on file   Social History Narrative    . Moved to [place], [place] Summer of 2013 from [place], [place]. Has a daughter (lives in [place]) and son (who lives in [place]). 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 Never smoked. Rare alcohol use.     Social Drivers of Health     Financial Resource Strain: Low Risk  (2/18/2025)    Financial Resource Strain     Within the past 12 months, have you or your family members you live with been unable to get utilities (heat, electricity) when it was really needed?: No   Food Insecurity: Patient Declined (5/27/2025)    Received from [facility]    Hunger Vital Sign     Worried About Running Out of Food in the Last Year: Patient declined     Ran Out of Food in the Last Year: Patient declined   Transportation Needs: Patient Declined (5/27/2025)    Received from [employer]    PRAPARE - Transportation     Lack of Transportation (Medical): Patient declined     Lack of Transportation (Non-Medical): Patient declined   Physical Activity: Insufficiently Active (10/17/2024)    Exercise Vital Sign     Days of Exercise per Week: 2 days     Minutes of Exercise per Session: 20 min   Stress: No Stress Concern Present (10/17/2024)    [employer] - Occupational Stress Questionnaire     Feeling of Stress : Not at all   Social Connections: Unknown (10/17/2024)    Social Connection and Isolation Panel [NHANES]     Frequency of Communication with Friends and Family: Not on  file     Frequency of Social Gatherings with Friends and Family: Three times a week     Attends Latter-day Services: Not on file     Active Member of Clubs or Organizations: Not on file     Attends Club or Organization Meetings: Not on file     Marital Status: Not on file   Interpersonal Safety: Low Risk  (7/7/2025)    Interpersonal Safety     Do you feel physically and emotionally safe where you currently live?: Yes     Within the past 12 months, have you been hit, slapped, kicked or otherwise physically hurt by someone?: No     Within the past 12 months, have you been humiliated or emotionally abused in other ways by your partner or ex-partner?: No   Housing Stability: Patient Declined (5/27/2025)    [facility]    Housing Stability Vital Sign     Unable to Pay for Housing in the Last Year: Patient declined     Number of Times Moved in the Last Year: Not on file     Homeless in the Last Year: Patient declined       Current Outpatient Medications   Medication Sig Dispense Refill    acetaminophen (TYLENOL) 325 MG tablet Take 3 tablets (975 mg) by mouth 3 times daily. 100 tablet 0    allopurinol (ZYLOPRIM) 300 MG tablet TAKE 1 TABLET BY MOUTH EVERY DAY 90 tablet 1    ASPIRIN 81 PO Take 81 mg by mouth every 24 hours.      atenolol (TENORMIN) 100 MG tablet TAKE 1 TABLET BY MOUTH EVERY DAY 90 tablet 1    atorvastatin (LIPITOR) 80 MG tablet TAKE 1 TABLET DAILY 90 tablet 4    cetirizine (ZYRTEC) 5 MG tablet Take 5 mg by mouth at bedtime.      DULoxetine (CYMBALTA) 20 MG capsule Take 1 capsule (20 mg) by mouth daily. 90 capsule 4    folic acid (FOLVITE) 1 MG tablet Take 1 tablet (1,000 mcg) by mouth daily. 90 tablet 4    hydroxychloroquine (PLAQUENIL) 200 MG tablet Take 400 mg by mouth every other day.      hydroxychloroquine (PLAQUENIL) 200 MG tablet Take 200 mg by mouth daily.      isosorbide mononitrate (IMDUR) 30 MG 24 hr tablet Take 1 tablet (30 mg) by mouth daily. 90  tablet 4    methotrexate 2.5 MG tablet Take 20 mg by mouth once a week. On Mondays      nitroGLYcerin (NITROSTAT) 0.4 MG sublingual tablet Place 1 tablet (0.4 mg) under the tongue every 5 minutes as needed for chest pain. (For chest pain x 3 doses) 30 tablet 3    sodium chloride 1 GM tablet TAKE 1 TABLET (1 G) BY MOUTH THREE TIMES A DAY WITH MEALS.       No current facility-administered medications for this visit.        Allergies   Allergen Reactions    Plavix [Clopidogrel]      Plavix resistant-tested at [place]    [facility]    Food      Macadamia nuts make mouth itch    Losartan      Allergic rxn with hives and angioedema suspected to be from alternative manufacture of the effient or the losartan       Review Of Systems:  A complete review of systems is negative except for the above mentioned items in the interval history.     Physical Exam:  [facility] 01/01/1998 (Within Months)   GENERAL APPEARANCE: Healthy, alert and in no acute distress.  HEENT: Eyes appear normal without scleral icterus. Extraocular movements intact.  NECK:   Supple with normal range of motion. No asymmetry.  RESP: Respirations regular and easy.  NEURO: Alert and oriented x 3.   PSYCHIATRIC: Mentation and affect appear normal.  Mood appropriate.    Laboratory:  Component      Latest Ref Rn 7/3/2025  9:47 AM   WBC      4.0 - 11.0 10e3/uL 9.1    RBC Count      3.80 - 5.20 10e6/uL 4.30    Hemoglobin      11.7 - 15.7 g/dL 12.9    Hematocrit      35.0 - 47.0 % 41.1    MCV      78 - 100 fL 96    MCH      26.5 - 33.0 pg 30.0    MCHC      31.5 - 36.5 g/dL 31.4 (L)    RDW      10.0 - 15.0 % 15.1 (H)    Platelet Count      150 - 450 10e3/uL 173    % Neutrophils      % 49    % Lymphocytes      % 44    % Monocytes      % 2    % Eosinophils      % 5    % Basophils      % 1    % Immature Granulocytes      % 0    NRBC/W      <1 /100 0    Absolute Neutrophil      1.6 - 8.3 10e3/uL 4.4    Absolute Lymphocytes      0.8 - 5.3 10e3/uL 4.0    Absolute Monocytes       0.0 - 1.3 10e3/uL 0.2    Absolute Eosinophils      0.0 - 0.7 10e3/uL 0.5    Absolute Basophils      0.0 - 0.2 10e3/uL 0.1    Absolute Immature Granulocytes      <=0.4 10e3/uL 0.0    Absolute NRBCs      10e3/uL 0.0    Sodium      135 - 145 mmol/L 142    Potassium      3.4 - 5.3 mmol/L 4.2    Carbon Dioxide (CO2)      22 - 29 mmol/L 26    Anion Gap      7 - 15 mmol/L 10    Urea Nitrogen      8.0 - 23.0 mg/dL 22.8    Creatinine      0.51 - 0.95 mg/dL 0.84    GFR Estimate      >60 mL/min/1.73m2 71    Calcium      8.8 - 10.4 mg/dL 9.9    Chloride      98 - 107 mmol/L 106    Glucose      70 - 99 mg/dL 100 (H)    Alkaline Phosphatase      40 - 150 U/L 97    AST      0 - 45 U/L 20    ALT      0 - 50 U/L 13    Protein Total      6.4 - 8.3 g/dL 6.6    Albumin      3.5 - 5.2 g/dL 3.7    Bilirubin Total      <=1.2 mg/dL 0.8    Lactate Dehydrogenase      0 - 250 U/L 209       Legend:  (L) Low  (H) High    Imaging Studies:    None this visit    ASSESSMENT/PLAN:    1.  Monoclonal B-cell lymphocytosis:     Found to have intermittent leukocytosis dating back to 1/25/2021.  Leukocytosis is mostly generalized and there is no increase in absolute lymphocyte count.     A peripheral smear was ordered by her PCP and a flow cytometry was done by pathology which showed a monotypic B-cell population with the absolute monotypic B-cell count of 1300.  Therefore she meets criteria for monocytic B-cell lymphocytosis.     She saw Dr. Pitts 05/26/2023. She did not have any new B symptoms. No adenopathy on scans. It was felt she would continue surveillance.     Today, doing well. No new B symptoms. Working with many other specialties for various concerns. WBC, HGB normal today. We will see her back in 1 year with labs prior, but again educated on B symptoms and when to notify our clinic.     2. Renal Mass Found incidentally on CT. Following with Dr. Pinto. Will be undergoing CT and follow-up with him next month.     3. Hx stroke, subdural  hematoma Since last seeing us. Will follow-up with neurology as scheduled.     4. Thyroid Nodule Seen by Dr. Mckenzie. Had biopsy at [facility] but I can't find path. Unsure if or when she was suppose to see them back. Will request Path from [facility] in addition to Dr. Mckenzie's last note so we can help her with knowing if she was to follow-up.        Patient in agreement with plan and verbalizes understanding. Agrees to call with any questions or concerns.    Virtual Visit Details    Type of service:  Video Visit   Video Start Time: 210  Video End Time:2:23 PM    Originating Location (pt. Location): Home    Distant Location (provider location):  On-site [employer]  Platform used for Video Visit: Koding      33 minutes spent in the patient's encounter today with time spent in review of patient's chart along with chart preparation and review of the treatment plan and signing of treatment plan.  Time was also spent with the patient in obtaining a review of systems and performing a physical exam along with detailed review of all test results. Time was also spent in discussing plan for future follow-up and relating instructions for follow-up and in placing future orders.    JODEE Link [facility]  Medical Oncology

## 2025-07-07 NOTE — TELEPHONE ENCOUNTER
Suma Maxwell NP was given home care or hospice form(s) for review and signature. Certification period effective 6/27/2025 to 8/25/2025.  Desiree Agudelo RN on 7/7/2025 at 8:57 AM

## 2025-07-07 NOTE — PROGRESS NOTES
Patient is currently logged in at home for video visit.  Savana Zarate RN...........7/7/2025 1:40 PM

## 2025-07-08 ENCOUNTER — TELEPHONE (OUTPATIENT)
Dept: INTERNAL MEDICINE | Facility: OTHER | Age: 80
End: 2025-07-08
Payer: COMMERCIAL

## 2025-07-08 NOTE — TELEPHONE ENCOUNTER
Patient was discharged from SN visits today, She will continue with PT/OT.    Samia Basurto RN on 7/8/2025 at 2:56 PM

## 2025-07-09 ENCOUNTER — TELEPHONE (OUTPATIENT)
Dept: INTERNAL MEDICINE | Facility: OTHER | Age: 80
End: 2025-07-09
Payer: COMMERCIAL

## 2025-07-09 NOTE — TELEPHONE ENCOUNTER
"S: The patient had a vital sign that was outside of \"normal\" parameters at the end of their home visit today. The vital sign was:  /85    B: You are being contacted because it's best practice for home care clinicians to notify the provider when the patient's vital signs are outside of defined parameters as a best practice of patient care.    [facility]'s vital sign parameters for adults are as follows:        oxygen saturation - less than 90%         blood pressure - less than 95/60 or greater than 140/90         respirations - less than 12 or greater than 20        pulse - less than 60 or greater than 100         temperature - less than 96.8 or greater than 100.4     A: If you are not concerned about the report above and do not want future communications like this, you can change the patient's defined parameters or specify when you would like to be notified by responding to this message.     R: Consider revising parameters or provide follow-up instructions        Please sign this encounter with your electronic signature and route to the  Home Care pool.          "

## 2025-07-10 ENCOUNTER — MEDICAL CORRESPONDENCE (OUTPATIENT)
Dept: HEALTH INFORMATION MANAGEMENT | Facility: OTHER | Age: 80
End: 2025-07-10
Payer: COMMERCIAL

## 2025-07-16 ENCOUNTER — TELEPHONE (OUTPATIENT)
Dept: INTERNAL MEDICINE | Facility: OTHER | Age: 80
End: 2025-07-16
Payer: COMMERCIAL

## 2025-07-16 NOTE — TELEPHONE ENCOUNTER
Reason for call: Request an order.    Order requested for? Bilateral AFO    Who is your PCP? RKV    Preferred method for responding to this message: Telephone Call    Phone number patient can be reached at: Other phone number:  Tana Bridges (PT): [phone]    If we cannot reach you directly, may we leave a detailed response at the number you provided? Yes      The patient's physical therapist, Tana Bridges, requests an order for bilateral AFO on behalf of the patient due to memory issues. Tana stated the patient intends to make a face to face appointment but wanted a message to go to the patient's provider as well.      Ela Andre on 7/16/2025 at 2:13 PM

## 2025-07-16 NOTE — TELEPHONE ENCOUNTER
Patient was contacted and an appointment was made with Pretty Barreto for 7.22.2025.  Mansi Schwartz on 7/16/2025 at 3:28 PM

## 2025-07-17 ENCOUNTER — MEDICAL CORRESPONDENCE (OUTPATIENT)
Dept: HEALTH INFORMATION MANAGEMENT | Facility: OTHER | Age: 80
End: 2025-07-17
Payer: COMMERCIAL

## 2025-07-21 ENCOUNTER — MEDICAL CORRESPONDENCE (OUTPATIENT)
Dept: HEALTH INFORMATION MANAGEMENT | Facility: OTHER | Age: 80
End: 2025-07-21
Payer: COMMERCIAL

## 2025-07-22 ENCOUNTER — OFFICE VISIT (OUTPATIENT)
Dept: FAMILY MEDICINE | Facility: OTHER | Age: 80
End: 2025-07-22
Payer: COMMERCIAL

## 2025-07-22 ENCOUNTER — HOSPITAL ENCOUNTER (OUTPATIENT)
Dept: GENERAL RADIOLOGY | Facility: OTHER | Age: 80
Discharge: HOME | End: 2025-07-22
Payer: COMMERCIAL

## 2025-07-22 VITALS
OXYGEN SATURATION: 97 % | HEIGHT: 67 IN | DIASTOLIC BLOOD PRESSURE: 80 MMHG | SYSTOLIC BLOOD PRESSURE: 126 MMHG | TEMPERATURE: 98 F | HEART RATE: 80 BPM | RESPIRATION RATE: 18 BRPM | WEIGHT: 160 LBS | BODY MASS INDEX: 25.11 KG/M2

## 2025-07-22 DIAGNOSIS — M62.81 GENERALIZED MUSCLE WEAKNESS: Primary | ICD-10-CM

## 2025-07-22 DIAGNOSIS — M06.9 RHEUMATOID ARTHRITIS INVOLVING MULTIPLE SITES, UNSPECIFIED WHETHER RHEUMATOID FACTOR PRESENT (H): Chronic | ICD-10-CM

## 2025-07-22 DIAGNOSIS — M54.6 MIDLINE THORACIC BACK PAIN, UNSPECIFIED CHRONICITY: ICD-10-CM

## 2025-07-22 DIAGNOSIS — Z91.81 PERSONAL HISTORY OF FALL: ICD-10-CM

## 2025-07-22 DIAGNOSIS — Z74.09 IMPAIRED FUNCTIONAL MOBILITY, BALANCE, GAIT, AND ENDURANCE: ICD-10-CM

## 2025-07-22 PROCEDURE — 72100 X-RAY EXAM L-S SPINE 2/3 VWS: CPT

## 2025-07-22 PROCEDURE — 72100 X-RAY EXAM L-S SPINE 2/3 VWS: CPT | Mod: 26 | Performed by: RADIOLOGY

## 2025-07-22 PROCEDURE — G0463 HOSPITAL OUTPT CLINIC VISIT: HCPCS

## 2025-07-22 ASSESSMENT — PAIN SCALES - GENERAL: PAINLEVEL_OUTOF10: NO PAIN (0)

## 2025-07-22 ASSESSMENT — PATIENT HEALTH QUESTIONNAIRE - PHQ9
SUM OF ALL RESPONSES TO PHQ QUESTIONS 1-9: 3
10. IF YOU CHECKED OFF ANY PROBLEMS, HOW DIFFICULT HAVE THESE PROBLEMS MADE IT FOR YOU TO DO YOUR WORK, TAKE CARE OF THINGS AT HOME, OR GET ALONG WITH OTHER PEOPLE: NOT DIFFICULT AT ALL
SUM OF ALL RESPONSES TO PHQ QUESTIONS 1-9: 3

## 2025-07-22 NOTE — NURSING NOTE
"Chief Complaint   Patient presents with    Consult     Face to face for AFO order       Initial /80   Pulse 80   Temp 98  F (36.7  C) (Temporal)   Resp 18   Ht 1.702 m (5' 7\")   Wt 72.6 kg (160 lb)   LMP 01/01/1998 (Within Months)   SpO2 97%   BMI 25.06 kg/m   Estimated body mass index is 25.06 kg/m  as calculated from the following:    Height as of this encounter: 1.702 m (5' 7\").    Weight as of this encounter: 72.6 kg (160 lb).  Medication Reconciliation: complete    Ania Contreras LPN    "

## 2025-07-22 NOTE — PROGRESS NOTES
"  Assessment & Plan   Problem List Items Addressed This Visit    None  Visit Diagnoses         Generalized muscle weakness    -  Primary    Relevant Orders    Orthotics, Mastectomy and Custom Compression Orders Type: Orthotic; Orthotic Type Requested: AFO (Ankle Foot Orthosis); AFO Laterality: Bilateral; AFO Custom or Off-the-Shelf: Off-the-Shelf      Impaired functional mobility, balance, gait, and endurance        Relevant Orders    Orthotics, Mastectomy and Custom Compression Orders Type: Orthotic; Orthotic Type Requested: AFO (Ankle Foot Orthosis); AFO Laterality: Bilateral; AFO Custom or Off-the-Shelf: Off-the-Shelf      Personal history of fall        Relevant Orders    Orthotics, Mastectomy and Custom Compression Orders Type: Orthotic; Orthotic Type Requested: AFO (Ankle Foot Orthosis); AFO Laterality: Bilateral; AFO Custom or Off-the-Shelf: Off-the-Shelf    XR Thoracic Lumbar Standing 2 Views      Midline thoracic back pain, unspecified chronicity        Relevant Orders    XR Thoracic Lumbar Standing 2 Views           Patient presents today for OV for AFO order, recommended per PT. She has history of stroke, mild right sided hemiparesis, ongoing weakness, and history of multiple falls. AFO order placed.    She notes that she has some midline back pain with nodule. No injury. This started three weeks ago, no falls during that time period. On exam there is faint ecchymotic area over spinous processes mid to low back. XR was obtained to ensure no further stress fractures and was negative, recommend conservative therapy at this time with close follow up if symptoms progress.     BMI  Estimated body mass index is 25.06 kg/m  as calculated from the following:    Height as of this encounter: 1.702 m (5' 7\").    Weight as of this encounter: 72.6 kg (160 lb).       Sylvie Flores is a 79 year old, presenting for the following health issues:  Consult (Face to face for AFO order)      7/22/2025    11:31 AM " "  Additional Questions   Roomed by Shilpa DE PAZ LPN   Accompanied by      HPI      Patient with history of multiple falls, she has underlying weakness. She did sustain subdural hematoma with craniotomy 6/1/25. She has history of mild right sided hemiparesis. Seeing physical therapy who recommends bilateral foot orthoses which is why she presents today.     She notes that she has some midline back pain with nodule. No injury. This started three weeks ago, no falls during that time period.     Review of Systems  Constitutional, HEENT, cardiovascular, pulmonary, GI, , musculoskeletal, neuro, skin, endocrine and psych systems are negative, except as otherwise noted.      Objective    /80   Pulse 80   Temp 98  F (36.7  C) (Temporal)   Resp 18   Ht 1.702 m (5' 7\")   Wt 72.6 kg (160 lb)   LMP 01/01/1998 (Within Months)   SpO2 97%   BMI 25.06 kg/m    Body mass index is 25.06 kg/m .  Physical Exam   GENERAL: alert and no distress  EYES: Eyes grossly normal to inspection, PERRL and conjunctivae and sclerae normal  HENT: ear canals and TM's normal, nose and mouth without ulcers or lesions  NECK: no adenopathy, no asymmetry, masses, or scars  RESP: lungs clear to auscultation - no rales, rhonchi or wheezes  CV: regular rate and rhythm, normal S1 S2, no S3 or S4, no murmur, click or rub, no peripheral edema  MS: no gross musculoskeletal defects noted, no edema; faint ecchymotic area over spinous processes mid to low back.  SKIN: no suspicious lesions or rashes  NEURO: Normal strength and tone, mentation intact and speech normal  PSYCH: mentation appears normal, affect normal/bright    Results for orders placed or performed during the hospital encounter of 07/22/25   XR Lumbar Spine 2/3 Views     Status: None    Narrative    PROCEDURE: XR LUMBAR SPINE 2/3 VIEWS 7/22/2025 12:15 PM    HISTORY: Personal history of fall; Midline thoracic back pain,  unspecified chronicity    COMPARISONS: 8/12/2024    TECHNIQUE: AP " and lateral    FINDINGS: There is an L1 vertebral body compression fracture stable  from previous examination. There is decrease in height in the L2-L3,  L3-L4 and L4-L5 discs. Severe lower lumbar facet joint degenerative  changes are noted. Sacrum and sacroiliac joints appear normal.         Impression    IMPRESSION: Advanced degenerative changes of the lumbar spine. No  acute fracture.    SHELLY BUSH MD         SYSTEM ID:  [id number]             Signed Electronically by: JODEE GLASGOW CNP

## 2025-07-30 ENCOUNTER — TELEPHONE (OUTPATIENT)
Facility: OTHER | Age: 80
End: 2025-07-30
Payer: COMMERCIAL

## 2025-07-30 NOTE — TELEPHONE ENCOUNTER
I contacted the patient to get her scheduled with Dr. Albert. Next available new patient is in December.   She stated that she was not able to wait that long. She normally goes to [facility] and she has not been able to make it that far to get to an appointment. She stated that she is in need of a refill of her Methotrexate which is why she wanted to get scheduled with us at [facility].   Patient and her  would like a call from a nurse/provider to discuss her history/current issues. And would like to know if there is anyway they can be seen sooner.   Thank you  Chikis Suarez on 7/30/2025 at 12:05 PM

## 2025-07-30 NOTE — TELEPHONE ENCOUNTER
Patient will call her [facility] provider and get her medications refilled until she can see Dr Dion Albert. Explained to patient that we have her name and are moving patients to better fit the schedule, scheduling will call her and her  to make a sooner consult than December if possible. Also informed patient and spouse that since she has mobility issues that in the future there is a possibility to do some virtual visits. Spouse did state that she is increasing her mobility but previous injuries have made it hard to travel. Again reiterated to patient and spouse that we will call her and work with the schedules to assist her and that a phone call to her [facility] provider for a refill of medications is possible. If unable to have medications refilled, can ask PCP for short term medication prescription. Patient and spouse in agreement with this plan.    Latisha Douglas RN on 7/30/2025 at 2:24 PM

## 2025-08-04 ENCOUNTER — LAB (OUTPATIENT)
Dept: LAB | Facility: OTHER | Age: 80
End: 2025-08-04
Attending: UROLOGY
Payer: COMMERCIAL

## 2025-08-04 DIAGNOSIS — D72.820 MONOCLONAL B-CELL LYMPHOCYTOSIS: ICD-10-CM

## 2025-08-04 LAB
ALBUMIN SERPL BCG-MCNC: 3.8 G/DL (ref 3.5–5.2)
ALP SERPL-CCNC: 102 U/L (ref 40–150)
ALT SERPL W P-5'-P-CCNC: 15 U/L (ref 0–50)
ANION GAP SERPL CALCULATED.3IONS-SCNC: 11 MMOL/L (ref 7–15)
AST SERPL W P-5'-P-CCNC: 25 U/L (ref 0–45)
BASOPHILS # BLD AUTO: 0.1 10E3/UL (ref 0–0.2)
BASOPHILS NFR BLD AUTO: 1 %
BILIRUB SERPL-MCNC: 0.5 MG/DL
BUN SERPL-MCNC: 21 MG/DL (ref 8–23)
CALCIUM SERPL-MCNC: 9.7 MG/DL (ref 8.8–10.4)
CHLORIDE SERPL-SCNC: 105 MMOL/L (ref 98–107)
CREAT SERPL-MCNC: 0.88 MG/DL (ref 0.51–0.95)
EGFRCR SERPLBLD CKD-EPI 2021: 67 ML/MIN/1.73M2
EOSINOPHIL # BLD AUTO: 0.4 10E3/UL (ref 0–0.7)
EOSINOPHIL NFR BLD AUTO: 3 %
ERYTHROCYTE [DISTWIDTH] IN BLOOD BY AUTOMATED COUNT: 15.5 % (ref 10–15)
GLUCOSE SERPL-MCNC: 89 MG/DL (ref 70–99)
HCO3 SERPL-SCNC: 26 MMOL/L (ref 22–29)
HCT VFR BLD AUTO: 44.9 % (ref 35–47)
HGB BLD-MCNC: 14 G/DL (ref 11.7–15.7)
IMM GRANULOCYTES # BLD: 0.1 10E3/UL
IMM GRANULOCYTES NFR BLD: 1 %
LDH SERPL L TO P-CCNC: 228 U/L (ref 0–250)
LYMPHOCYTES # BLD AUTO: 3.9 10E3/UL (ref 0.8–5.3)
LYMPHOCYTES NFR BLD AUTO: 35 %
MCH RBC QN AUTO: 30.2 PG (ref 26.5–33)
MCHC RBC AUTO-ENTMCNC: 31.2 G/DL (ref 31.5–36.5)
MCV RBC AUTO: 97 FL (ref 78–100)
MONOCYTES # BLD AUTO: 0.5 10E3/UL (ref 0–1.3)
MONOCYTES NFR BLD AUTO: 4 %
NEUTROPHILS # BLD AUTO: 6.3 10E3/UL (ref 1.6–8.3)
NEUTROPHILS NFR BLD AUTO: 57 %
NRBC # BLD AUTO: 0 10E3/UL
NRBC BLD AUTO-RTO: 0 /100
PLATELET # BLD AUTO: 186 10E3/UL (ref 150–450)
POTASSIUM SERPL-SCNC: 4.6 MMOL/L (ref 3.4–5.3)
PROT SERPL-MCNC: 6.8 G/DL (ref 6.4–8.3)
RBC # BLD AUTO: 4.64 10E6/UL (ref 3.8–5.2)
SODIUM SERPL-SCNC: 142 MMOL/L (ref 135–145)
WBC # BLD AUTO: 11.1 10E3/UL (ref 4–11)

## 2025-08-04 PROCEDURE — 85004 AUTOMATED DIFF WBC COUNT: CPT | Mod: ZL

## 2025-08-04 PROCEDURE — 83615 LACTATE (LD) (LDH) ENZYME: CPT | Mod: ZL

## 2025-08-04 PROCEDURE — 80051 ELECTROLYTE PANEL: CPT | Mod: ZL

## 2025-08-04 PROCEDURE — 36415 COLL VENOUS BLD VENIPUNCTURE: CPT | Mod: ZL

## 2025-08-14 ENCOUNTER — HOSPITAL ENCOUNTER (OUTPATIENT)
Dept: CT IMAGING | Facility: OTHER | Age: 80
End: 2025-08-14
Attending: UROLOGY
Payer: COMMERCIAL

## 2025-08-14 DIAGNOSIS — N28.89 RIGHT RENAL MASS: ICD-10-CM

## 2025-08-14 PROCEDURE — 250N000009 HC RX 250: Performed by: UROLOGY

## 2025-08-14 PROCEDURE — 250N000011 HC RX IP 250 OP 636: Performed by: UROLOGY

## 2025-08-14 PROCEDURE — 74177 CT ABD & PELVIS W/CONTRAST: CPT

## 2025-08-14 RX ORDER — IOPAMIDOL 755 MG/ML
93 INJECTION, SOLUTION INTRAVASCULAR ONCE
Status: COMPLETED | OUTPATIENT
Start: 2025-08-14 | End: 2025-08-14

## 2025-08-14 RX ADMIN — SODIUM CHLORIDE 60 ML: 9 INJECTION, SOLUTION INTRAVENOUS at 09:55

## 2025-08-14 RX ADMIN — IOPAMIDOL 93 ML: 755 INJECTION, SOLUTION INTRAVENOUS at 09:54

## 2025-08-18 ENCOUNTER — VIRTUAL VISIT (OUTPATIENT)
Dept: UROLOGY | Facility: OTHER | Age: 80
End: 2025-08-18
Attending: UROLOGY
Payer: COMMERCIAL

## 2025-08-18 VITALS
OXYGEN SATURATION: 99 % | SYSTOLIC BLOOD PRESSURE: 136 MMHG | BODY MASS INDEX: 25.69 KG/M2 | RESPIRATION RATE: 16 BRPM | DIASTOLIC BLOOD PRESSURE: 84 MMHG | HEART RATE: 71 BPM | WEIGHT: 164 LBS

## 2025-08-18 DIAGNOSIS — N28.89 RIGHT RENAL MASS: Primary | ICD-10-CM

## 2025-08-18 PROCEDURE — 3075F SYST BP GE 130 - 139MM HG: CPT | Mod: 95 | Performed by: UROLOGY

## 2025-08-18 PROCEDURE — 98006 SYNCH AUDIO-VIDEO EST MOD 30: CPT | Performed by: UROLOGY

## 2025-08-18 PROCEDURE — 3079F DIAST BP 80-89 MM HG: CPT | Mod: 95 | Performed by: UROLOGY

## 2025-08-18 PROCEDURE — 1125F AMNT PAIN NOTED PAIN PRSNT: CPT | Mod: 95 | Performed by: UROLOGY

## 2025-08-18 PROCEDURE — G0463 HOSPITAL OUTPT CLINIC VISIT: HCPCS

## 2025-08-18 ASSESSMENT — PAIN SCALES - GENERAL: PAINLEVEL_OUTOF10: MILD PAIN (3)

## 2025-08-21 ENCOUNTER — OFFICE VISIT (OUTPATIENT)
Facility: OTHER | Age: 80
End: 2025-08-21
Attending: INTERNAL MEDICINE
Payer: COMMERCIAL

## 2025-08-21 ASSESSMENT — RHEUMATOLOGY NEW PATIENT QUESTIONNAIRE
HOARSE VOICE: N
EASY BRUISING: N
SWOLLEN LEGS OR FEET: Y
SWOLLEN OR TENDER GLANDS: N
UNEXPLAINED WEIGHT CHANGE: N
AGITATION: N
EASILY LOSING TEMPER: N
JOINT PAIN: Y
EYE REDNESS: N
EYE PAIN: Y
MUSCLE WEAKNESS: Y
MORNING STIFFNESS: Y
NIGHT SWEATS: N
LIST JOINTS AFFECTED BY SWELLING IN THE PAST MONTH: FINGERS
ANXIETY: N
NODULES/BUMPS: N
EXCESSIVE HAIR LOSS (MORE THAN YOUR NORM): N
HEADACHES: Y
SUN SENSITIVE (SUN ALLERGY): N
SHORTNESS OF BREATH: N
INCREASED SUSCEPTIBILITY TO INFECTION: N
PAIN OR BURNING ON URINATION: N
ABNORMAL URINE: N
DRYNESS OF MOUTH: N
ANEMIA: N
NUMBNESS OR TINGLING IN HANDS OR FEET: Y
UNUSUALLY RAPID OR SLOWED HEART RATE: N
UNUSUAL BLEEDING: N
DIFFICULTY SWALLOWING: N
FEVER: N
LOSS OF VISION: N
UNUSUAL FATIGUE: N
VOMITING OF BLOOD OR COFFEE GROUND CONSISTENCY MATERIAL: N
JOINT SWELLING: Y
BEHAVIORAL CHANGES: N
VAGINAL DRYNESS: N
SKIN REDNESS: N
COUGH: N
NAUSEA: N
UNEXPLAINED HEARING LOSS: N
STOMACH PAIN: N
RASH: N
DIFFICULTY FALLING ASLEEP: Y
SORES IN MOUTH OR NOSE: N
DEPRESSION: N
DIFFICULTY STAYING ASLEEP: N
JAUNDICE: N
DIFFICULTY BREATHING LYING DOWN: N
CHEST PAIN: N
EYE DRYNESS: Y
PERSISTENT DIARRHEA: N
RASH OR ULCERS: N
HEARTBURN OR REFLUX: N
SKIN TIGHTNESS: N
MORNING STIFFNESS IN LOWER BACK: Y
DOUBLE OR BLURRED VISION: N
BLOOD IN STOOLS: N
HOW WOULD YOU DESCRIBE YOUR STIFFNESS ON AVERAGE: MILD

## 2025-08-21 ASSESSMENT — PAIN SCALES - GENERAL: PAINLEVEL_OUTOF10: MILD PAIN (2)

## 2025-09-03 ENCOUNTER — HOSPITAL ENCOUNTER (EMERGENCY)
Facility: OTHER | Age: 80
Discharge: HOME | End: 2025-09-03
Attending: STUDENT IN AN ORGANIZED HEALTH CARE EDUCATION/TRAINING PROGRAM
Payer: COMMERCIAL

## 2025-09-03 VITALS
DIASTOLIC BLOOD PRESSURE: 105 MMHG | WEIGHT: 162.2 LBS | BODY MASS INDEX: 25.46 KG/M2 | HEART RATE: 69 BPM | TEMPERATURE: 97.6 F | OXYGEN SATURATION: 98 % | HEIGHT: 67 IN | RESPIRATION RATE: 14 BRPM | SYSTOLIC BLOOD PRESSURE: 191 MMHG

## 2025-09-03 DIAGNOSIS — R60.0 FACIAL EDEMA: ICD-10-CM
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
## 2025-09-03 DIAGNOSIS — S00.83XA FACIAL BRUISING, INITIAL ENCOUNTER: ICD-10-CM

## 2025-09-03 DIAGNOSIS — S20.221A CONTUSION OF RIGHT SIDE OF BACK, INITIAL ENCOUNTER: ICD-10-CM
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
## 2025-09-03 PROCEDURE — 99284 EMERGENCY DEPT VISIT MOD MDM: CPT | Mod: 25 | Performed by: STUDENT IN AN ORGANIZED HEALTH CARE EDUCATION/TRAINING PROGRAM

## 2025-09-03 ASSESSMENT — ACTIVITIES OF DAILY LIVING (ADL)
ADLS_ACUITY_SCORE: 58

## 2025-09-03 ASSESSMENT — COLUMBIA-SUICIDE SEVERITY RATING SCALE - C-SSRS
2. HAVE YOU ACTUALLY HAD ANY THOUGHTS OF KILLING YOURSELF IN THE PAST MONTH?: NO
6. HAVE YOU EVER DONE ANYTHING, STARTED TO DO ANYTHING, OR PREPARED TO DO ANYTHING TO END YOUR LIFE?: NO
1. IN THE PAST MONTH, HAVE YOU WISHED YOU WERE DEAD OR WISHED YOU COULD GO TO SLEEP AND NOT WAKE UP?: NO

## 2025-09-03 ASSESSMENT — ENCOUNTER SYMPTOMS: HEADACHES: 1

## (undated) DEVICE — GLOVE SURG PI ULTRA TOUCH M SZ 7 LF 42670

## (undated) DEVICE — DRAPE C-ARMOR 5 SIDED 5523

## (undated) DEVICE — PREP CHLORAPREP 26ML TINTED ORANGE  260815

## (undated) DEVICE — BNDG ELASTIC 2"X5YDS UNSTERILE 6611-20

## (undated) DEVICE — MAT FLOOR WATERPROOF BACKSHEET FMBP30

## (undated) DEVICE — GLOVE SENSICARE 8.5 MSG1085 LATEX FREE

## (undated) DEVICE — SOL WATER IRRIG 1000ML BOTTLE 2F7114

## (undated) DEVICE — ESU GROUND PAD ADULT REM W/15' CORD E7507DB

## (undated) DEVICE — GLOVE PROTEXIS POWDER FREE SMT 8.5 2D72PT85X

## (undated) DEVICE — SUCTION MANIFOLD NEPTUNE 2 SYS 4 PORT 0702-020-000

## (undated) DEVICE — ESU BIPOLAR SEALER AQUAMANTYS 6MM 23-112-1

## (undated) DEVICE — DRAPE STERI TOWEL LG 1010

## (undated) DEVICE — PAD FLOOR SURGISAFE 46X40" 84610

## (undated) DEVICE — CUSTOM PACK GEN MAJOR SBA5BGMHEA

## (undated) DEVICE — CAST PADDING-STERILE 2"

## (undated) DEVICE — GLOVE BIOGEL PI SZ 8.5 40885

## (undated) DEVICE — TUBING SUCTION 10'X3/16" N510

## (undated) DEVICE — ESU GROUND PAD ADULT W/CORD E7507

## (undated) DEVICE — SU DERMABOND ADVANCED .7ML DNX12

## (undated) DEVICE — DRSG GAUZE 4X4" TRAY 6939

## (undated) DEVICE — DRILL BIT QUICK COUPLING 3 FLUTE 4.2MMX145MM NDL  POINT

## (undated) DEVICE — DRAPE IOBAN ISOLATION VERTICAL 320X21CM 6617

## (undated) DEVICE — PENCIL MEGADYNE TELESCOPING SMOKE EVACUATION 10 FT 251010J

## (undated) DEVICE — DRAPE TOP SURGICAL HD 59352

## (undated) DEVICE — SU VICRYL 2-0 CT-2 27" UND J269H

## (undated) DEVICE — Device

## (undated) DEVICE — SU VICRYL+ 3-0 CT1 36IN UND VCP944H

## (undated) DEVICE — BLADE CARPAL TUNNEL ENDO 81010-1

## (undated) DEVICE — DRSG MEPILEX BORDER FLEX 3X3" 595200

## (undated) DEVICE — CAST PADDING 6" STERILE 9046S

## (undated) DEVICE — SUTURE MONOCRYL 3-0 18 PS2 UND MCP497G

## (undated) DEVICE — HEMOCLIP QUICKCLIP PRO OLYMPUS 230CM HX-202UR.B

## (undated) DEVICE — DRSG AQUACEL AG 3.5X14"  422607

## (undated) DEVICE — DRAPE C-ARM PACK 9"

## (undated) DEVICE — DRAPE U-SHAPED ORTHOARTS 60X70" 89331

## (undated) DEVICE — GLOVE BIOGEL PI INDICATOR 8.0 LF 41680

## (undated) DEVICE — PREP CHLORAPREP 26ML TINTED HI-LITE ORANGE 930815

## (undated) DEVICE — COVER TABLE L60 IN 2 TIER BACK STRL 8197-

## (undated) DEVICE — SOL WATER 1500ML

## (undated) DEVICE — TOURNIQUET SGL BLADDER 18"X4" RED 5921-218-135

## (undated) DEVICE — DRAPE STERI U 1015

## (undated) DEVICE — GLOVE BIOGEL PI ULTRATOUCH G SZ 7.5 42175

## (undated) DEVICE — GLOVE UNDER INDICATOR PI SZ 7.0 LF 41670

## (undated) DEVICE — ESU PENCIL SMOKE EVAC W/ROCKER SWITCH 0703-047-000

## (undated) DEVICE — DRSG XEROFORM 1X8"

## (undated) DEVICE — ANTIFOG SOLUTION W/FOAM PAD CF-1001

## (undated) DEVICE — COVER LIGHT HANDLE LT-F02

## (undated) DEVICE — BLADE SAW OSCIL/SAG STRK 25X90X1.27MM 4125-127-090

## (undated) DEVICE — DRAPE C-ARM 60X42" 1013

## (undated) DEVICE — PACK MAJOR EXTREMITY SOP15MEFCA

## (undated) DEVICE — ENDO KIT COMPLIANCE DYKENDOCMPLY

## (undated) DEVICE — WIRE GUIDE 3.2X400MM  357.399

## (undated) DEVICE — HOOD T4 PROTECTIVE STERI FACE SHIELD 400-800

## (undated) DEVICE — SOL NACL 0.9% IRRIG 1000ML BOTTLE 2F7124

## (undated) DEVICE — DRILL BIT CANNULATED 16MM HOLLOW STER 03.037.004S

## (undated) DEVICE — SYR 50ML LL W/O NDL 309653

## (undated) DEVICE — DRILL BIL CANNULATED 6MM/9MM 03.037.022

## (undated) DEVICE — SLEEVE COMPRESSION SCD KNEE MED 74022

## (undated) DEVICE — KIT PATIENT CARE HANA TABLE PROFX SUPINE 6855

## (undated) DEVICE — DRAPE IOBAN INCISE 23X17" 6650EZ

## (undated) DEVICE — ROD SYN REAMER BALL TIP 2.5X950MM  351.706S

## (undated) DEVICE — ENDO BRUSH CHANNEL MASTER CLEANING 2-4.2MM BW-412T

## (undated) DEVICE — SUTURE VICRYL+ 0 27IN CT-1 UND VCP260H

## (undated) DEVICE — STPL SKIN 35W 6.9MM  PXW35

## (undated) DEVICE — PACK MAJOR ORTHO SOP15MOFCA

## (undated) DEVICE — SUTURE VICRYL+ 2-0 27IN CT-1 UND VCP259H

## (undated) DEVICE — SU ETHILON 4-0 FS-2 18" 662H

## (undated) DEVICE — ESU ENDO FORCEP BX HOT FD-210U

## (undated) DEVICE — ENDO BITE BLOCK 60 MAXI LF 00712804

## (undated) DEVICE — SU VICRYL 1 CT-1 36" J347H

## (undated) RX ORDER — LIDOCAINE HYDROCHLORIDE 10 MG/ML
INJECTION, SOLUTION INFILTRATION; PERINEURAL
Status: DISPENSED
Start: 2023-01-13

## (undated) RX ORDER — LIDOCAINE HYDROCHLORIDE AND EPINEPHRINE 10; 10 MG/ML; UG/ML
INJECTION, SOLUTION INFILTRATION; PERINEURAL
Status: DISPENSED
Start: 2020-11-30

## (undated) RX ORDER — KETOROLAC TROMETHAMINE 30 MG/ML
INJECTION, SOLUTION INTRAMUSCULAR; INTRAVENOUS
Status: DISPENSED
Start: 2023-01-13

## (undated) RX ORDER — DEXAMETHASONE SODIUM PHOSPHATE 4 MG/ML
INJECTION, SOLUTION INTRA-ARTICULAR; INTRALESIONAL; INTRAMUSCULAR; INTRAVENOUS; SOFT TISSUE
Status: DISPENSED
Start: 2024-07-22

## (undated) RX ORDER — BUPIVACAINE HYDROCHLORIDE 2.5 MG/ML
INJECTION, SOLUTION EPIDURAL; INFILTRATION; INTRACAUDAL
Status: DISPENSED
Start: 2024-07-22

## (undated) RX ORDER — LABETALOL HYDROCHLORIDE 5 MG/ML
INJECTION, SOLUTION INTRAVENOUS
Status: DISPENSED
Start: 2024-04-14

## (undated) RX ORDER — PROPOFOL 10 MG/ML
INJECTION, EMULSION INTRAVENOUS
Status: DISPENSED
Start: 2025-02-18

## (undated) RX ORDER — LIDOCAINE HYDROCHLORIDE 10 MG/ML
INJECTION, SOLUTION EPIDURAL; INFILTRATION; INTRACAUDAL; PERINEURAL
Status: DISPENSED
Start: 2019-01-09

## (undated) RX ORDER — LORATADINE 10 MG/1
TABLET ORAL
Status: DISPENSED
Start: 2019-02-11

## (undated) RX ORDER — AMLODIPINE BESYLATE 5 MG/1
TABLET ORAL
Status: DISPENSED
Start: 2024-05-18

## (undated) RX ORDER — ONDANSETRON 2 MG/ML
INJECTION INTRAMUSCULAR; INTRAVENOUS
Status: DISPENSED
Start: 2025-02-18

## (undated) RX ORDER — PROPOFOL 10 MG/ML
INJECTION, EMULSION INTRAVENOUS
Status: DISPENSED
Start: 2023-01-13

## (undated) RX ORDER — LIDOCAINE HYDROCHLORIDE 10 MG/ML
INJECTION, SOLUTION EPIDURAL; INFILTRATION; INTRACAUDAL; PERINEURAL
Status: DISPENSED
Start: 2025-02-18

## (undated) RX ORDER — LEVETIRACETAM 500 MG/5ML
INJECTION, SOLUTION, CONCENTRATE INTRAVENOUS
Status: DISPENSED
Start: 2024-05-18

## (undated) RX ORDER — EPHEDRINE SULFATE 50 MG/ML
INJECTION, SOLUTION INTRAMUSCULAR; INTRAVENOUS; SUBCUTANEOUS
Status: DISPENSED
Start: 2025-02-18

## (undated) RX ORDER — LORAZEPAM 0.5 MG/1
TABLET ORAL
Status: DISPENSED
Start: 2024-04-14

## (undated) RX ORDER — PROPOFOL 10 MG/ML
INJECTION, EMULSION INTRAVENOUS
Status: DISPENSED
Start: 2019-01-09

## (undated) RX ORDER — HYDROMORPHONE HYDROCHLORIDE 2 MG/ML
INJECTION, SOLUTION INTRAMUSCULAR; INTRAVENOUS; SUBCUTANEOUS
Status: DISPENSED
Start: 2024-07-22

## (undated) RX ORDER — PROPOFOL 10 MG/ML
INJECTION, EMULSION INTRAVENOUS
Status: DISPENSED
Start: 2024-07-22

## (undated) RX ORDER — FENTANYL CITRATE 50 UG/ML
INJECTION, SOLUTION INTRAMUSCULAR; INTRAVENOUS
Status: DISPENSED
Start: 2024-07-22

## (undated) RX ORDER — ONDANSETRON 2 MG/ML
INJECTION INTRAMUSCULAR; INTRAVENOUS
Status: DISPENSED
Start: 2023-01-13

## (undated) RX ORDER — DEXAMETHASONE SODIUM PHOSPHATE 4 MG/ML
INJECTION, SOLUTION INTRA-ARTICULAR; INTRALESIONAL; INTRAMUSCULAR; INTRAVENOUS; SOFT TISSUE
Status: DISPENSED
Start: 2019-02-11

## (undated) RX ORDER — LIDOCAINE HYDROCHLORIDE 10 MG/ML
INJECTION, SOLUTION INFILTRATION; PERINEURAL
Status: DISPENSED
Start: 2020-05-27

## (undated) RX ORDER — ONDANSETRON 2 MG/ML
INJECTION INTRAMUSCULAR; INTRAVENOUS
Status: DISPENSED
Start: 2024-07-22

## (undated) RX ORDER — HYDROMORPHONE HYDROCHLORIDE 1 MG/ML
INJECTION, SOLUTION INTRAMUSCULAR; INTRAVENOUS; SUBCUTANEOUS
Status: DISPENSED
Start: 2024-07-19

## (undated) RX ORDER — DIPHENHYDRAMINE HYDROCHLORIDE 50 MG/ML
INJECTION INTRAMUSCULAR; INTRAVENOUS
Status: DISPENSED
Start: 2019-02-11

## (undated) RX ORDER — DEXAMETHASONE SODIUM PHOSPHATE 10 MG/ML
INJECTION, SOLUTION INTRAMUSCULAR; INTRAVENOUS
Status: DISPENSED
Start: 2025-02-18

## (undated) RX ORDER — CEFAZOLIN SODIUM/WATER 2 G/20 ML
SYRINGE (ML) INTRAVENOUS
Status: DISPENSED
Start: 2024-07-22

## (undated) RX ORDER — HYDROXYZINE PAMOATE 25 MG/1
CAPSULE ORAL
Status: DISPENSED
Start: 2019-02-11

## (undated) RX ORDER — GLYCOPYRROLATE 0.2 MG/ML
INJECTION, SOLUTION INTRAMUSCULAR; INTRAVENOUS
Status: DISPENSED
Start: 2024-07-22

## (undated) RX ORDER — FENTANYL CITRATE-0.9 % NACL/PF 10 MCG/ML
PLASTIC BAG, INJECTION (ML) INTRAVENOUS
Status: DISPENSED
Start: 2024-07-22

## (undated) RX ORDER — LIDOCAINE HYDROCHLORIDE 20 MG/ML
INJECTION, SOLUTION EPIDURAL; INFILTRATION; INTRACAUDAL; PERINEURAL
Status: DISPENSED
Start: 2019-01-09

## (undated) RX ORDER — DEXMEDETOMIDINE HYDROCHLORIDE 4 UG/ML
INJECTION, SOLUTION INTRAVENOUS
Status: DISPENSED
Start: 2024-07-22

## (undated) RX ORDER — LIDOCAINE HYDROCHLORIDE 10 MG/ML
INJECTION, SOLUTION INFILTRATION; PERINEURAL
Status: DISPENSED
Start: 2020-11-30

## (undated) RX ORDER — LABETALOL HYDROCHLORIDE 5 MG/ML
INJECTION, SOLUTION INTRAVENOUS
Status: DISPENSED
Start: 2024-04-13

## (undated) RX ORDER — ASPIRIN 81 MG/1
TABLET, CHEWABLE ORAL
Status: DISPENSED
Start: 2024-04-13

## (undated) RX ORDER — SODIUM CHLORIDE 9 MG/ML
INJECTION, SOLUTION INTRAVENOUS
Status: DISPENSED
Start: 2019-02-11

## (undated) RX ORDER — LIDOCAINE HYDROCHLORIDE AND EPINEPHRINE 10; 10 MG/ML; UG/ML
INJECTION, SOLUTION INFILTRATION; PERINEURAL
Status: DISPENSED
Start: 2020-05-27

## (undated) RX ORDER — BUPIVACAINE HYDROCHLORIDE 2.5 MG/ML
INJECTION, SOLUTION EPIDURAL; INFILTRATION; INTRACAUDAL
Status: DISPENSED
Start: 2023-01-13